# Patient Record
Sex: FEMALE | Race: BLACK OR AFRICAN AMERICAN | NOT HISPANIC OR LATINO | Employment: OTHER | URBAN - METROPOLITAN AREA
[De-identification: names, ages, dates, MRNs, and addresses within clinical notes are randomized per-mention and may not be internally consistent; named-entity substitution may affect disease eponyms.]

---

## 2017-01-12 ENCOUNTER — TELEPHONE (OUTPATIENT)
Dept: TRANSPLANT | Facility: CLINIC | Age: 62
End: 2017-01-12

## 2017-01-12 ENCOUNTER — ANTI-COAG VISIT (OUTPATIENT)
Dept: CARDIOLOGY | Facility: CLINIC | Age: 62
End: 2017-01-12
Payer: MEDICARE

## 2017-01-12 DIAGNOSIS — Z79.01 ANTICOAGULATION MONITORING BY PHARMACIST: Primary | ICD-10-CM

## 2017-01-12 DIAGNOSIS — I27.0 PRIMARY PULMONARY HYPERTENSION: ICD-10-CM

## 2017-01-12 LAB
CTP QC/QA: NORMAL
INR PPP: 1.5 (ref 2–3)

## 2017-01-12 PROCEDURE — 85610 PROTHROMBIN TIME: CPT | Mod: PBBFAC,PO

## 2017-01-12 PROCEDURE — 99211 OFF/OP EST MAY X REQ PHY/QHP: CPT | Mod: S$PBB,25,,

## 2017-01-12 NOTE — PROGRESS NOTES
INR is low. Pt missed 2 days this past week. No other changes. We will boost dose today and resume maintenance dose. Repeat INR in 2 weeks

## 2017-01-18 DIAGNOSIS — H40.50X3: ICD-10-CM

## 2017-01-18 RX ORDER — DORZOLAMIDE HYDROCHLORIDE AND TIMOLOL MALEATE 20; 5 MG/ML; MG/ML
SOLUTION/ DROPS OPHTHALMIC
Qty: 10 ML | Refills: 0 | Status: SHIPPED | OUTPATIENT
Start: 2017-01-18

## 2017-01-26 ENCOUNTER — ANTI-COAG VISIT (OUTPATIENT)
Dept: CARDIOLOGY | Facility: CLINIC | Age: 62
End: 2017-01-26
Payer: MEDICARE

## 2017-01-26 DIAGNOSIS — Z79.01 ANTICOAGULATION MONITORING BY PHARMACIST: Primary | ICD-10-CM

## 2017-01-26 LAB
CTP QC/QA: YES
INR PPP: 2.3 (ref 2–3)

## 2017-01-26 PROCEDURE — 99211 OFF/OP EST MAY X REQ PHY/QHP: CPT | Mod: S$PBB,25,,

## 2017-01-26 PROCEDURE — 85610 PROTHROMBIN TIME: CPT | Mod: PBBFAC,PO

## 2017-02-16 ENCOUNTER — ANTI-COAG VISIT (OUTPATIENT)
Dept: CARDIOLOGY | Facility: CLINIC | Age: 62
End: 2017-02-16
Payer: MEDICARE

## 2017-02-16 DIAGNOSIS — Z79.01 ANTICOAGULATION MONITORING BY PHARMACIST: Primary | ICD-10-CM

## 2017-02-16 LAB — INR PPP: 3 (ref 2–3)

## 2017-02-16 PROCEDURE — 99211 OFF/OP EST MAY X REQ PHY/QHP: CPT | Mod: S$PBB,25,,

## 2017-02-16 PROCEDURE — 85610 PROTHROMBIN TIME: CPT | Mod: PBBFAC,PO

## 2017-02-16 NOTE — PROGRESS NOTES
INR on upper end today. Pt reports she took a zpak last week. She has had a cold. She reports appetite low while having the cold. She has just started getting back to normal in the past couple days. We will continue on maintenance dose. Pt reminded again to call with new meds, especially abx. We will repeat INR in 3 weeks.

## 2017-02-21 ENCOUNTER — OFFICE VISIT (OUTPATIENT)
Dept: OPHTHALMOLOGY | Facility: CLINIC | Age: 62
End: 2017-02-21
Payer: MEDICARE

## 2017-02-21 DIAGNOSIS — H40.2232 CHRONIC ANGLE-CLOSURE GLAUCOMA OF BOTH EYES, MODERATE STAGE: Primary | ICD-10-CM

## 2017-02-21 DIAGNOSIS — Z98.49 STATUS POST CATARACT EXTRACTION AND INSERTION OF INTRAOCULAR LENS, UNSPECIFIED LATERALITY: ICD-10-CM

## 2017-02-21 DIAGNOSIS — Z98.83 GLAUCOMA FILTERING BLEB OF BOTH EYES: ICD-10-CM

## 2017-02-21 DIAGNOSIS — E11.3593 PROLIFERATIVE DIABETIC RETINOPATHY OF BOTH EYES WITHOUT MACULAR EDEMA ASSOCIATED WITH TYPE 2 DIABETES MELLITUS: ICD-10-CM

## 2017-02-21 DIAGNOSIS — H40.53X2 NEOVASCULAR GLAUCOMA OF BOTH EYES, MODERATE STAGE: ICD-10-CM

## 2017-02-21 DIAGNOSIS — Z96.1 STATUS POST CATARACT EXTRACTION AND INSERTION OF INTRAOCULAR LENS, UNSPECIFIED LATERALITY: ICD-10-CM

## 2017-02-21 PROCEDURE — 99999 PR PBB SHADOW E&M-EST. PATIENT-LVL II: CPT | Mod: PBBFAC,,, | Performed by: OPHTHALMOLOGY

## 2017-02-21 PROCEDURE — 92020 GONIOSCOPY: CPT | Mod: PBBFAC | Performed by: OPHTHALMOLOGY

## 2017-02-21 PROCEDURE — 92012 INTRM OPH EXAM EST PATIENT: CPT | Mod: S$PBB,,, | Performed by: OPHTHALMOLOGY

## 2017-02-21 PROCEDURE — 92020 GONIOSCOPY: CPT | Mod: S$PBB,,, | Performed by: OPHTHALMOLOGY

## 2017-02-21 PROCEDURE — 99212 OFFICE O/P EST SF 10 MIN: CPT | Mod: PBBFAC | Performed by: OPHTHALMOLOGY

## 2017-02-21 RX ORDER — AZITHROMYCIN 250 MG/1
TABLET, FILM COATED ORAL
Refills: 0 | COMMUNITY
Start: 2016-12-20 | End: 2017-07-14

## 2017-02-21 NOTE — MR AVS SNAPSHOT
[facility] - Ophthalmology  [address]  [place] [address]  Phone: [phone]  Fax: [phone]                  Shivani Sheets   2017 2:00 PM   Office Visit    Description:  Female : 1955   Provider:  Junaid Kern MD   Department:  [facility] - Ophthalmology           Reason for Visit     Glaucoma           Diagnoses this Visit        Comments    Chronic angle-closure glaucoma of both eyes, moderate stage    -  Primary     Glaucoma shunt device of right eye         Status post cataract extraction and insertion of intraocular lens, unspecified laterality         Glaucoma filtering bleb of both eyes         Neovascular glaucoma of both eyes, moderate stage         Proliferative diabetic retinopathy of both eyes without macular edema associated with type 2 diabetes mellitus                To Do List           Future Appointments        Provider Department Dept Phone    3/9/2017 10:30 AM Matty FlorianD [place] - Coumadin [phone]    3/16/2017 9:00 AM LAB, APPOINTMENT [place] [facility] [phone]    3/16/2017 9:40 AM SIX, MINUTE WALK [facility] - Pulmonary Lab [phone]    3/16/2017 10:00 AM Jenny Hennessy MD [facility] [phone]    2017 8:50 AM ERIKA Hilario MD [facility] [address] Ophthalmology [phone]      Goals (5 Years of Data)     None      Follow-Up and Disposition     Return in about 6 months (around 2017), or if symptoms worsen or fail to improve, for Pressure and HVF.      [facility]     [facility] Care [facility] - 24/7 Assistance  Registered nurses in the [facility] provide clinical advisement, health education, appointment booking, and other advisory services.  Call for this free service at [phone].             Medications           Message regarding Medications     Verify the changes and/or additions to your medication regime listed below are the same as discussed  with your clinician today.  If any of these changes or additions are incorrect, please notify your healthcare provider.             Verify that the below list of medications is an accurate representation of the medications you are currently taking.  If none reported, the list may be blank. If incorrect, please contact your healthcare provider. Carry this list with you in case of emergency.           Current Medications     aspirin 81 MG chewable tablet Take 81 mg by mouth Daily. 1  By mouth Every day    atorvastatin (LIPITOR) 10 MG tablet Take 1 tablet (10 mg total) by mouth once daily.    azithromycin (Z-SHLOMO) 250 MG tablet     brimonidine 0.1% (ALPHAGAN P) 0.1 % Drop Place 1 drop into both eyes 3 (three) times daily.    CARTIA  mg 24 hr capsule     cloNIDine (CATAPRES) 0.2 MG tablet Take 0.2 mg by mouth 2 (two) times daily.    diltiazem (CARDIZEM CD) 240 MG 24 hr capsule Take 1 capsule (240 mg total) by mouth once daily.    dorzolamide-timolol 2-0.5% (COSOPT) 22.3-6.8 mg/mL ophthalmic solution INSTILL 1 DROP IN BOTH EYES TWICE DAILY    latanoprost 0.005 % ophthalmic solution Place 1 drop into both eyes every evening.    levothyroxine (SYNTHROID) 100 MCG tablet Take 100 mcg by mouth. 1 Tablet Oral Every day    lidocaine-prilocaine (EMLA) cream Apply topically as needed.    losartan (COZAAR) 50 MG tablet Take 1 tablet (50 mg total) by mouth once daily.    macitentan (OPSUMIT) 10 mg Tab Take 1 tablet (10 mg total) by mouth every evening.    ondansetron (ZOFRAN-ODT) 4 MG TbDL Take 1 tablet (4 mg total) by mouth every 8 (eight) hours as needed (nausea or vomiting).    prednisoLONE acetate (PRED FORTE) 1 % DrpS Place 1 drop into the left eye 4 (four) times daily.    selexipag 200 mcg (140)- 800 mcg (60) DsPk Take 400 mcg tablet, BID, by mouth for 1 week, then increase by 200 mcg BID, at weekly intervals, to the highest tolerated dose up to 1600 mcg BID    tadalafil (CIALIS) 20 MG Tab Take 2 tablets (40 mg total) by  mouth once daily.    tramadol (ULTRAM) 50 mg tablet TK 1 T PO Q 4 H PRN P    warfarin (COUMADIN) 5 MG tablet Take 1 tablet (5 mg total) by mouth Daily. Current Dose ( 7.5 mg on Tue, Thu, Sat; 5 mg all other days) or as directed by coumadin clinic.    hydrALAZINE (APRESOLINE) 50 MG tablet Take 1 tablet (50 mg total) by mouth every 12 (twelve) hours.           Clinical Reference Information           Allergies as of 2/21/2017     Penicillins    Iodine    Sulfamethoxazole-trimethoprim      Immunizations Administered on Date of Encounter - 2/21/2017     None      Orders Placed During Today's Visit     Future Labs/Procedures Expected by Expires    Hein Visual Field - OU - Extended - Both Eyes  As directed 7/6/2018      Maintenance Dialysis History     Start End Type Comments Center    10/2/2008    [facility]            Current Dialysis Center Information     [facility] [address] Phone #:  [phone]    Contact:  N/A [place]  [address] Fax #:  [phone]            [facility] Sign-Up     Activating your [facility] account is as easy as 1-2-3!     1) Visit [email or web address], select Sign Up Now, enter this activation code and your date of birth, then select Next.  [email or web address]  Expires: 4/7/2017  3:07 PM      2) Create a username and password to use when you visit [facility] in the future and select a security question in case you lose your password and select Next.    3) Enter your e-mail address and click Sign Up!    Additional Information  If you have questions, please e-mail [email or web address] or call [phone] to talk to our [facility] staff. Remember, [facility] is NOT to be used for urgent needs. For medical emergencies, dial 911.         Language Assistance Services     ATTENTION: Language assistance services are available, free of charge. Please call [phone].      ATENCIÓN: Si habla español, tiene a ram disposición servicios gratuitos de asistencia  lingüística. Marcellus al [phone].     IRON Ý: N?u b?n nói Ti?ng Vi?t, có các d?ch v? h? tr? ngôn ng? mi?n phí dành cho b?n. G?i s? [phone].         [facility] complies with applicable Federal civil rights laws and does not discriminate on the basis of race, color, national origin, age, disability, or sex.

## 2017-02-21 NOTE — PROGRESS NOTES
HPI     Glaucoma    Additional comments: 3 mon iop chk           Comments     Bilateral Canaloplasty  ST Ahmed Sulcus OD  PC IOL  Sp avastin OS with sulaiman        Both eyes:  Cosopt TID  Xalatan QHS  Alphagan TID       HTN retinopathy OU  ONH shunt vessels OU     DM2  Hx NVG OS  Avastin OS per Sulaiman  Hx PRP OS          Last edited by Jose Bautista on 2/21/2017  2:26 PM. (History)            Assessment /Plan     For exam results, see Encounter Report.    Chronic angle-closure glaucoma of both eyes, moderate stage    Glaucoma shunt device of right eye    Status post cataract extraction and insertion of intraocular lens, unspecified laterality    Glaucoma filtering bleb of both eyes    Neovascular glaucoma of both eyes, moderate stage    Proliferative diabetic retinopathy of both eyes without macular edema associated with type 2 diabetes mellitus        HTN --> Dialysis --> renal Tx / steroids --> DM2 --> NVG OU    Complex eye Hx    Ayalla  Bilateral Canaloplasty --> PAS / scarred  ST Ahmed Sulcus OD --> not functioning  S Ex-press shunt OD --> scarred      PC IOL OU  quiet    NVG quiet  Sp avastin OS with raja    CCT  553 // 568    <24    Both eyes  Cosopt BID  Xal q day  Alphagan TID --> BID      HTN retinopathy OU  ONH shunt vessels OU    DM2  Hx NVG OS  Avastin OS per Sulaiman  Hx PRP OS      Plan  RTC 6 months IOP & HVF  Keep fu with Dr Hilario 4/2017  RTC sooner prn with good understanding

## 2017-03-01 DIAGNOSIS — I27.9 CHRONIC PULMONARY HEART DISEASE: ICD-10-CM

## 2017-03-09 ENCOUNTER — ANTI-COAG VISIT (OUTPATIENT)
Dept: CARDIOLOGY | Facility: CLINIC | Age: 62
End: 2017-03-09
Payer: MEDICARE

## 2017-03-09 DIAGNOSIS — Z79.01 ANTICOAGULATION MONITORING BY PHARMACIST: ICD-10-CM

## 2017-03-09 LAB — INR PPP: 2 (ref 2–3)

## 2017-03-09 PROCEDURE — 85610 PROTHROMBIN TIME: CPT | Mod: PBBFAC,PO

## 2017-03-09 PROCEDURE — 99211 OFF/OP EST MAY X REQ PHY/QHP: CPT | Mod: S$PBB,25,,

## 2017-03-09 NOTE — PROGRESS NOTES
INR on low end. Pt missed a dose but it was over a week ago. She took a little extra the next day, 10mg instead of 7.5mg. No other changes. Pt reports stress level improved. We will keep dose as is for now. Watch trend for INR decrease. Pt already has an INR scheduled for Dr. Hennessy next week. We will address this iNR then RTC.

## 2017-03-17 ENCOUNTER — TELEPHONE (OUTPATIENT)
Dept: TRANSPLANT | Facility: CLINIC | Age: 62
End: 2017-03-17

## 2017-04-07 DIAGNOSIS — I27.0 PRIMARY PULMONARY HYPERTENSION: ICD-10-CM

## 2017-04-07 RX ORDER — TADALAFIL 20 MG/1
40 TABLET ORAL DAILY
Qty: 60 TABLET | Refills: 11 | Status: SHIPPED | OUTPATIENT
Start: 2017-04-07 | End: 2018-04-04 | Stop reason: SDUPTHER

## 2017-04-17 ENCOUNTER — TELEPHONE (OUTPATIENT)
Dept: TRANSPLANT | Facility: CLINIC | Age: 62
End: 2017-04-17

## 2017-04-17 NOTE — TELEPHONE ENCOUNTER
----- Message from Diamante Gonzlaez sent at 4/17/2017 12:48 PM CDT -----  Contact: self  Shivani would like for a nurse to give her a call.  Pt has an appt in May and would like to know if she can be rescheduled    Please contact Shivani at [phone]    Thank you

## 2017-04-19 ENCOUNTER — TELEPHONE (OUTPATIENT)
Dept: TRANSPLANT | Facility: CLINIC | Age: 62
End: 2017-04-19

## 2017-04-19 NOTE — TELEPHONE ENCOUNTER
Patient called asking to reschedule her May appointment with Dr. Hennessy because her sister had suffered a major heart attack and needed round the clock care. Mrs. Sheets wants to act as her care giver for a month to relieve her sister's children.  Rescheduled appointment, but reminded the patient that it had been a year since she has seen Dr. Hennessy and she needs to make this next appointment. Mrs. Sheets assured the RN that she would keep her appointment. She also confirmed that she is taking Uptravi 400 mcg, BID, Opsumit and Adcirca as directed and currently not experiencing any issues.  Notified Dr. Hennessy.

## 2017-04-20 ENCOUNTER — TELEPHONE (OUTPATIENT)
Dept: TRANSPLANT | Facility: CLINIC | Age: 62
End: 2017-04-20

## 2017-04-20 ENCOUNTER — ANTI-COAG VISIT (OUTPATIENT)
Dept: CARDIOLOGY | Facility: CLINIC | Age: 62
End: 2017-04-20
Payer: MEDICARE

## 2017-04-20 DIAGNOSIS — Z79.01 ANTICOAGULATION MONITORING BY PHARMACIST: Primary | ICD-10-CM

## 2017-04-20 LAB — INR PPP: 1.5 (ref 2–3)

## 2017-04-20 PROCEDURE — 85610 PROTHROMBIN TIME: CPT | Mod: PBBFAC,PO

## 2017-04-20 PROCEDURE — 99211 OFF/OP EST MAY X REQ PHY/QHP: CPT | Mod: S$PBB,25,,

## 2017-04-20 NOTE — PROGRESS NOTES
INR low. Pt had a stressful week with a relative falling ill out of town. Pt is now planning to go to [place] to help take care of her family member. She will be leaving 4/28 or 4/29. She is getting her HD switched to somewhere out there. She will be checking on where we can schedule INRs. We will f/u on that next week. Increase dose today then resume maintenance dose.

## 2017-04-27 ENCOUNTER — ANTI-COAG VISIT (OUTPATIENT)
Dept: CARDIOLOGY | Facility: CLINIC | Age: 62
End: 2017-04-27
Payer: MEDICARE

## 2017-04-27 DIAGNOSIS — Z79.01 ANTICOAGULATION MONITORING BY PHARMACIST: Primary | ICD-10-CM

## 2017-04-27 LAB — INR PPP: 1.7 (ref 2–3)

## 2017-04-27 PROCEDURE — 85610 PROTHROMBIN TIME: CPT | Mod: PBBFAC,PO

## 2017-04-27 PROCEDURE — 99211 OFF/OP EST MAY X REQ PHY/QHP: CPT | Mod: S$PBB,25,,

## 2017-04-27 NOTE — PROGRESS NOTES
INR improved but still low. Pt denies changes in meds, health, or diet. Pt reports increase in activity this week preparing to go out of town. Pt will not be leaving until next Friday. No s/sx of bleeding.  We will increase dose and repeat INR next week.

## 2017-05-04 ENCOUNTER — ANTI-COAG VISIT (OUTPATIENT)
Dept: CARDIOLOGY | Facility: CLINIC | Age: 62
End: 2017-05-04
Payer: MEDICARE

## 2017-05-04 DIAGNOSIS — Z79.01 ANTICOAGULATION MONITORING BY PHARMACIST: Primary | ICD-10-CM

## 2017-05-04 LAB — INR PPP: 1.7 (ref 2–3)

## 2017-05-04 PROCEDURE — 85610 PROTHROMBIN TIME: CPT | Mod: PBBFAC,PO

## 2017-05-04 PROCEDURE — 99211 OFF/OP EST MAY X REQ PHY/QHP: CPT | Mod: S$PBB,25,,

## 2017-05-04 NOTE — PROGRESS NOTES
INR low again. Pt denies missed doses or changes in diet. She has been very busy getting things together for her to leave tomorrow to go to [place]. Pt will be in [place] for about a month to take care of a sick relative. She has HD set up for tu/th/sa. I gave her a standing order to bring to an outpatient lab. Pt advised to get the name and contact info of the lab and call it into us next week in case we need it. She will go to lab on Wed. Increase dose for now.

## 2017-05-12 ENCOUNTER — ANTI-COAG VISIT (OUTPATIENT)
Dept: CARDIOLOGY | Facility: CLINIC | Age: 62
End: 2017-05-12

## 2017-05-12 DIAGNOSIS — Z79.01 ANTICOAGULATION MONITORING BY PHARMACIST: ICD-10-CM

## 2017-05-12 LAB — INR PPP: 2.04

## 2017-05-18 ENCOUNTER — ANTI-COAG VISIT (OUTPATIENT)
Dept: CARDIOLOGY | Facility: CLINIC | Age: 62
End: 2017-05-18

## 2017-05-18 DIAGNOSIS — Z79.01 ANTICOAGULATION MONITORING BY PHARMACIST: ICD-10-CM

## 2017-05-18 LAB — INR PPP: 1.85

## 2017-06-01 ENCOUNTER — LAB VISIT (OUTPATIENT)
Dept: LAB | Facility: HOSPITAL | Age: 62
End: 2017-06-01
Attending: FAMILY MEDICINE
Payer: MEDICARE

## 2017-06-01 DIAGNOSIS — I48.91 ATRIAL FIBRILLATION WITH RAPID VENTRICULAR RESPONSE: ICD-10-CM

## 2017-06-01 DIAGNOSIS — I27.0 PRIMARY PULMONARY HYPERTENSION: ICD-10-CM

## 2017-06-01 LAB
INR PPP: 2.3
PROTHROMBIN TIME: 23 SEC

## 2017-06-01 PROCEDURE — 85610 PROTHROMBIN TIME: CPT

## 2017-06-01 PROCEDURE — 36415 COLL VENOUS BLD VENIPUNCTURE: CPT | Mod: PO

## 2017-06-02 ENCOUNTER — ANTI-COAG VISIT (OUTPATIENT)
Dept: CARDIOLOGY | Facility: CLINIC | Age: 62
End: 2017-06-02

## 2017-06-02 DIAGNOSIS — Z79.01 ANTICOAGULATION MONITORING BY PHARMACIST: ICD-10-CM

## 2017-06-06 ENCOUNTER — OFFICE VISIT (OUTPATIENT)
Dept: OPHTHALMOLOGY | Facility: CLINIC | Age: 62
End: 2017-06-06
Payer: MEDICARE

## 2017-06-06 DIAGNOSIS — E11.3593 PROLIFERATIVE DIABETIC RETINOPATHY OF BOTH EYES WITHOUT MACULAR EDEMA ASSOCIATED WITH TYPE 2 DIABETES MELLITUS: Primary | ICD-10-CM

## 2017-06-06 DIAGNOSIS — H40.53X2 NEOVASCULAR GLAUCOMA OF BOTH EYES, MODERATE STAGE: ICD-10-CM

## 2017-06-06 DIAGNOSIS — E11.311 DIABETIC MACULAR EDEMA: ICD-10-CM

## 2017-06-06 DIAGNOSIS — H40.2232 CHRONIC ANGLE-CLOSURE GLAUCOMA OF BOTH EYES, MODERATE STAGE: ICD-10-CM

## 2017-06-06 PROCEDURE — 92134 CPTRZ OPH DX IMG PST SGM RTA: CPT | Mod: PBBFAC | Performed by: OPHTHALMOLOGY

## 2017-06-06 PROCEDURE — 92226 PR SPECIAL EYE EXAM, SUBSEQUENT: CPT | Mod: 50,PBBFAC | Performed by: OPHTHALMOLOGY

## 2017-06-06 PROCEDURE — 99213 OFFICE O/P EST LOW 20 MIN: CPT | Mod: PBBFAC,25 | Performed by: OPHTHALMOLOGY

## 2017-06-06 PROCEDURE — 92014 COMPRE OPH EXAM EST PT 1/>: CPT | Mod: S$PBB,,, | Performed by: OPHTHALMOLOGY

## 2017-06-06 PROCEDURE — 99999 PR PBB SHADOW E&M-EST. PATIENT-LVL III: CPT | Mod: PBBFAC,,, | Performed by: OPHTHALMOLOGY

## 2017-06-06 PROCEDURE — 92226 PR SPECIAL EYE EXAM, SUBSEQUENT: CPT | Mod: 50,S$PBB,, | Performed by: OPHTHALMOLOGY

## 2017-06-06 RX ORDER — CLONIDINE HYDROCHLORIDE 0.1 MG/1
TABLET ORAL
Status: ON HOLD | COMMUNITY
Start: 2017-05-25 | End: 2018-03-02 | Stop reason: HOSPADM

## 2017-06-06 RX ORDER — SELEXIPAG 400 UG/1
600 TABLET, COATED ORAL 2 TIMES DAILY
COMMUNITY
Start: 2017-05-09 | End: 2017-10-11 | Stop reason: SDUPTHER

## 2017-06-06 NOTE — PROGRESS NOTES
OCT  No DME OU    Prior FA - smoldering regressed NVD OU  NO NVE      Plan     1. Neovascular Glaucoma OU  PDR/OIS OU - significant vascular disease -?RVO OD  OD - s/p GDD with [facility] 2013  OS  - Pt with NVI and blood in the inferior angle on last gonioscopic exam   -had Avastin #1 with tap 6/14/16  -has glaucoma f/u with Dr. Kern  Improved with optimization of gtt regimen in July    - Continue combigan gtts BID and Latanoprost QHS         - actively following with [facility]   - current regimen as below:  Both eyes  Cosopt BID  Xal q day  Alphagan BID   - IOP 25/11 today, no NVI   - monitor    2. PCIOL OU      12 months OCT

## 2017-06-15 ENCOUNTER — ANTI-COAG VISIT (OUTPATIENT)
Dept: CARDIOLOGY | Facility: CLINIC | Age: 62
End: 2017-06-15
Payer: MEDICARE

## 2017-06-15 DIAGNOSIS — Z79.01 ANTICOAGULATION MONITORING BY PHARMACIST: ICD-10-CM

## 2017-06-15 LAB — INR PPP: 1.4 (ref 2–3)

## 2017-06-15 PROCEDURE — 99211 OFF/OP EST MAY X REQ PHY/QHP: CPT | Mod: S$PBB,25,,

## 2017-06-15 PROCEDURE — 85610 PROTHROMBIN TIME: CPT | Mod: PBBFAC,PO

## 2017-06-15 NOTE — PROGRESS NOTES
INR low today. Patient denies all changes. No signs or symptoms of bleeding. We will increase dose and repeat INR next week

## 2017-06-22 ENCOUNTER — ANTI-COAG VISIT (OUTPATIENT)
Dept: CARDIOLOGY | Facility: CLINIC | Age: 62
End: 2017-06-22
Payer: MEDICARE

## 2017-06-22 DIAGNOSIS — Z79.01 ANTICOAGULATION MONITORING BY PHARMACIST: ICD-10-CM

## 2017-06-22 LAB — INR PPP: 2.2 (ref 2–3)

## 2017-06-22 PROCEDURE — 85610 PROTHROMBIN TIME: CPT | Mod: PBBFAC,PO

## 2017-06-22 PROCEDURE — 99211 OFF/OP EST MAY X REQ PHY/QHP: CPT | Mod: S$PBB,25,,

## 2017-06-22 NOTE — PROGRESS NOTES
Patient here for close follow up of recent low INR. INR good today. She reports dose as 5mg on Sun/7.5mg all other days. No other changes. No signs or symptoms of bleeding. We will continue on reported dose. Repeat INR next week.

## 2017-06-28 RX ORDER — ATORVASTATIN CALCIUM 10 MG
TABLET ORAL
Qty: 90 TABLET | Refills: 3 | Status: SHIPPED | OUTPATIENT
Start: 2017-06-28 | End: 2018-03-22 | Stop reason: SDUPTHER

## 2017-07-06 ENCOUNTER — ANTI-COAG VISIT (OUTPATIENT)
Dept: CARDIOLOGY | Facility: CLINIC | Age: 62
End: 2017-07-06
Payer: MEDICARE

## 2017-07-06 DIAGNOSIS — Z79.01 ANTICOAGULATION MONITORING BY PHARMACIST: ICD-10-CM

## 2017-07-06 LAB — INR PPP: 2.4 (ref 2–3)

## 2017-07-06 PROCEDURE — 85610 PROTHROMBIN TIME: CPT | Mod: PBBFAC,PO

## 2017-07-06 NOTE — PROGRESS NOTES
INR good. No changes reported. No signs or symptoms of bleeding. Maintain current dose and repeat INR in 3 weeks.

## 2017-07-14 ENCOUNTER — LAB VISIT (OUTPATIENT)
Dept: LAB | Facility: HOSPITAL | Age: 62
End: 2017-07-14
Attending: INTERNAL MEDICINE
Payer: MEDICARE

## 2017-07-14 ENCOUNTER — OFFICE VISIT (OUTPATIENT)
Dept: TRANSPLANT | Facility: CLINIC | Age: 62
End: 2017-07-14
Payer: MEDICARE

## 2017-07-14 VITALS
BODY MASS INDEX: 31.97 KG/M2 | DIASTOLIC BLOOD PRESSURE: 75 MMHG | HEIGHT: 61 IN | HEART RATE: 77 BPM | SYSTOLIC BLOOD PRESSURE: 119 MMHG | WEIGHT: 169.31 LBS | OXYGEN SATURATION: 95 %

## 2017-07-14 DIAGNOSIS — N18.6 ESRD (END STAGE RENAL DISEASE): ICD-10-CM

## 2017-07-14 DIAGNOSIS — I48.91 ATRIAL FIBRILLATION WITH RAPID VENTRICULAR RESPONSE: ICD-10-CM

## 2017-07-14 DIAGNOSIS — E66.9 NON MORBID OBESITY, UNSPECIFIED OBESITY TYPE: ICD-10-CM

## 2017-07-14 DIAGNOSIS — I48.20 CHRONIC ATRIAL FIBRILLATION: ICD-10-CM

## 2017-07-14 DIAGNOSIS — I25.10 CORONARY ARTERY DISEASE INVOLVING NATIVE CORONARY ARTERY OF NATIVE HEART WITHOUT ANGINA PECTORIS: ICD-10-CM

## 2017-07-14 DIAGNOSIS — Z98.61 S/P PTCA (PERCUTANEOUS TRANSLUMINAL CORONARY ANGIOPLASTY): ICD-10-CM

## 2017-07-14 DIAGNOSIS — I50.32 CHRONIC DIASTOLIC HEART FAILURE: ICD-10-CM

## 2017-07-14 DIAGNOSIS — I12.9 RENAL HYPERTENSION, STAGE 1-4 OR UNSPECIFIED CHRONIC KIDNEY DISEASE: Chronic | ICD-10-CM

## 2017-07-14 DIAGNOSIS — I27.20 PULMONARY HYPERTENSION: Primary | ICD-10-CM

## 2017-07-14 DIAGNOSIS — E11.3593 PROLIFERATIVE DIABETIC RETINOPATHY OF BOTH EYES WITHOUT MACULAR EDEMA ASSOCIATED WITH TYPE 2 DIABETES MELLITUS: ICD-10-CM

## 2017-07-14 DIAGNOSIS — Z76.82 AWAITING ORGAN TRANSPLANT: ICD-10-CM

## 2017-07-14 DIAGNOSIS — G47.30 SLEEP APNEA, UNSPECIFIED TYPE: ICD-10-CM

## 2017-07-14 DIAGNOSIS — I27.0 PRIMARY PULMONARY HYPERTENSION: ICD-10-CM

## 2017-07-14 DIAGNOSIS — M06.9 RHEUMATOID ARTHRITIS OF HAND, UNSPECIFIED LATERALITY, UNSPECIFIED RHEUMATOID FACTOR PRESENCE: ICD-10-CM

## 2017-07-14 DIAGNOSIS — Z79.01 ANTICOAGULATION MONITORING BY PHARMACIST: ICD-10-CM

## 2017-07-14 LAB
ALBUMIN SERPL BCP-MCNC: 3.5 G/DL
ALP SERPL-CCNC: 38 U/L
ALT SERPL W/O P-5'-P-CCNC: 11 U/L
ANION GAP SERPL CALC-SCNC: 11 MMOL/L
AST SERPL-CCNC: 23 U/L
BASOPHILS # BLD AUTO: 0.02 K/UL
BASOPHILS NFR BLD: 0.4 %
BILIRUB SERPL-MCNC: 0.4 MG/DL
BNP SERPL-MCNC: 112 PG/ML
BUN SERPL-MCNC: 23 MG/DL
CALCIUM SERPL-MCNC: 9 MG/DL
CHLORIDE SERPL-SCNC: 96 MMOL/L
CO2 SERPL-SCNC: 33 MMOL/L
CREAT SERPL-MCNC: 6.2 MG/DL
DIFFERENTIAL METHOD: ABNORMAL
EOSINOPHIL # BLD AUTO: 0.5 K/UL
EOSINOPHIL NFR BLD: 8 %
ERYTHROCYTE [DISTWIDTH] IN BLOOD BY AUTOMATED COUNT: 18.3 %
EST. GFR  (AFRICAN AMERICAN): 7.6 ML/MIN/1.73 M^2
EST. GFR  (NON AFRICAN AMERICAN): 6.6 ML/MIN/1.73 M^2
GLUCOSE SERPL-MCNC: 87 MG/DL
HCT VFR BLD AUTO: 38.5 %
HGB BLD-MCNC: 11.4 G/DL
INR PPP: 2.3
LYMPHOCYTES # BLD AUTO: 1.3 K/UL
LYMPHOCYTES NFR BLD: 23.1 %
MCH RBC QN AUTO: 27.3 PG
MCHC RBC AUTO-ENTMCNC: 29.6 %
MCV RBC AUTO: 92 FL
MONOCYTES # BLD AUTO: 0.6 K/UL
MONOCYTES NFR BLD: 10.3 %
NEUTROPHILS # BLD AUTO: 3.3 K/UL
NEUTROPHILS NFR BLD: 58.2 %
PLATELET # BLD AUTO: 175 K/UL
PLATELET BLD QL SMEAR: ABNORMAL
PMV BLD AUTO: 12.5 FL
POTASSIUM SERPL-SCNC: 3.8 MMOL/L
PROT SERPL-MCNC: 8.3 G/DL
PROTHROMBIN TIME: 23.4 SEC
RBC # BLD AUTO: 4.18 M/UL
SODIUM SERPL-SCNC: 140 MMOL/L
WBC # BLD AUTO: 5.63 K/UL

## 2017-07-14 PROCEDURE — 99214 OFFICE O/P EST MOD 30 MIN: CPT | Mod: S$PBB,,, | Performed by: INTERNAL MEDICINE

## 2017-07-14 PROCEDURE — 99999 PR PBB SHADOW E&M-EST. PATIENT-LVL III: CPT | Mod: PBBFAC,,, | Performed by: INTERNAL MEDICINE

## 2017-07-14 PROCEDURE — 99213 OFFICE O/P EST LOW 20 MIN: CPT | Mod: PBBFAC | Performed by: INTERNAL MEDICINE

## 2017-07-14 NOTE — PATIENT INSTRUCTIONS
Increase uptravi to 400mcg in am, and 600 mcg in pm    In two weeks, if you arent having bad headaches, increase to 600mcg twice a day    Then in 2 weeks, increase to 600mcg in am, 800 mcg in pm    Two weeks later go to 800 mcg twice A day    Continue on this way until you start getting bad headaches and then we'll park it for a while at that dose

## 2017-07-14 NOTE — LETTER
July 14, 2017        Tye Deleon  [address] [place] [address] [address]  [address]  [place] [address]  Phone: [phone]  Fax: [phone]             [facility]  [address]  [place] [address]  Phone: [phone]   Patient: Shivani Sheets   MR Number: [id number]   YOB: 1955   Date of Visit: 7/14/2017       Dear Dr. Tye Deleon    Thank you for referring Shivani Sheets to me for evaluation. Attached you will find relevant portions of my assessment and plan of care.    If you have questions, please do not hesitate to call me. I look forward to following Shivani Sheets along with you.    Sincerely,    Jenny Hennessy MD    Enclosure    If you would like to receive this communication electronically, please contact [email or web address] or [phone] to request [employer] Link access.    [employer] Link is a tool which provides read-only access to select patient information with whom you have a relationship. Its easy to use and provides real time access to review your patients record including encounter summaries, notes, results, and demographic information.    If you feel you have received this communication in error or would no longer like to receive these types of communications, please e-mail [email or web address]

## 2017-07-14 NOTE — PROGRESS NOTES
Subjective:    Patient ID:  Shivani Sheets is a 61 y.o. female who presents for follow-up of Pulmonary Hypertension      HPI 59 yo AAF with moderate to severe pulmonary HTN, diastolic dysfunction, central retinal artery occlusion with no significant carotid artery stenosis, CAD, h/o PCI, ESRD secondary to HTN, s/p failed kidney tx, now back on HD and is re-listed, STEFFANY/CPAP, hypothyroidism, rheumatoid arthritis, DLP and obesity, recent admit for AF with RVR, recently switched from adcirca/tyvaso to adcirca/opsumit, here for overdue PH/HF f/u visit (last seen 6/16).     Since last visit, pt has had a very difficult time with issues amongst her family. With all of this stress she is very drained. pt remains on uptravi- she is on 400 mcg bid (had been on higher doses but got extreme HA)  As she has been on it for a while now she is interested in trying to increase again. Says her breathing has been good- can walk without being SOB- says she could do [employer] with me.         No CP, orthopnea, PND. HD has been going well.    TTE July 2015  1 - Eccentric LVH with normal left ventricular systolic function (EF 60-65%).   2 - Mild left atrial enlargement.   3 - Left ventricular diastolic dysfunction.   4 - Right ventricular enlargement with hypertrophy, with low normal systolic function.   5 - Mild tricuspid regurgitation.   6 - Pulmonary hypertension.     6mw today 366m (305 m,  313.94   m in Nov   )                                              O2 sat  95->89  %                                                           HR 90 ->   108                                                               BP  124 / 61  ->170 /80                                                         Dolly    2 -> 4      [facility] 11/6/14  AOSAT: 97  FICKCI: 3.26  FICKCO: 5.87  PAPRES: 97/38 (59)  PASAT: 60  PVR: 4.43  PWPRES: 22/23 (24)  RAPRES: 20/23 (17)  RVPRES: 94/2, 21    6mw  314.15 (305m, 176m last visit) (317m in Sept)                              "            O2 sat   99-> 91%                                                           HR 80->102                                                               BP  158 / 92  ->217/114                                                         Dolly   0.5-> 2    Review of Systems   Constitution: Negative for chills, fever, malaise/fatigue and weight gain.   HENT: Negative.    Eyes: Negative.    Cardiovascular: Positive for dyspnea on exertion. Negative for chest pain, leg swelling, near-syncope, orthopnea, palpitations, paroxysmal nocturnal dyspnea and syncope.   Respiratory: Positive for shortness of breath. Negative for cough.    Endocrine: Negative.    Hematologic/Lymphatic: Negative.    Skin: Negative.    Musculoskeletal: Negative.    Gastrointestinal: Negative for bloating, abdominal pain and change in bowel habit.   Neurological: Negative for dizziness and light-headedness.   Psychiatric/Behavioral: Negative for depression.        Objective:  /75   Pulse 77   Ht 5' 1" (1.549 m)   Wt 76.8 kg (169 lb 5 oz)   SpO2 95%   BMI 31.99 kg/m²       Physical Exam   Constitutional: She is oriented to person, place, and time. She appears well-developed and well-nourished.   HENT:   Head: Normocephalic.   Eyes: Conjunctivae are normal. Pupils are equal, round, and reactive to light.   Neck: Normal range of motion. Neck supple. No JVD present.   Cardiovascular: Normal rate and regular rhythm.    Large AV fistula L arm, accentuated S2   Pulmonary/Chest: Effort normal. She has no rales.   Abdominal: Soft. Bowel sounds are normal.   Musculoskeletal: Normal range of motion.   Neurological: She is alert and oriented to person, place, and time.   Skin: Skin is warm and dry.   Psychiatric: She has a normal mood and affect.   Vitals reviewed.          Chemistry        Component Value Date/Time     10/19/2016 1111    K 3.7 10/19/2016 1111    CL 92 (L) 10/19/2016 1111    CO2 33 (H) 10/19/2016 1111    BUN 50 (H) 10/19/2016 " 1111    CREATININE 11.6 (H) 10/19/2016 1111     (H) 10/19/2016 1111        Component Value Date/Time    CALCIUM 8.2 (L) 10/19/2016 1111    ALKPHOS 51 (L) 10/19/2016 1111    AST 11 10/19/2016 1111    ALT 9 (L) 10/19/2016 1111    BILITOT 0.5 10/19/2016 1111            Magnesium   Date Value Ref Range Status   07/17/2015 2.8 (H) 1.6 - 2.6 mg/dL Final       Lab Results   Component Value Date    WBC 7.35 10/19/2016    HGB 13.2 10/19/2016    HCT 42.9 10/19/2016    MCV 87 10/19/2016     (L) 10/19/2016       Lab Results   Component Value Date    INR 2.4 07/06/2017    INR 2.2 06/22/2017    INR 1.4 06/15/2017       BNP   Date Value Ref Range Status   06/21/2016 948 (H) 0 - 99 pg/mL Final     Comment:     Values of less than 100 pg/ml are consistent with non-CHF populations.   04/05/2016 462 (H) 0 - 99 pg/mL Final     Comment:     Values of less than 100 pg/ml are consistent with non-CHF populations.   11/17/2015 470 (H) 0 - 99 pg/mL Final     Comment:     Values of less than 100 pg/ml are consistent with non-CHF populations.       No results found for: LDH          Assessment:       1. Pulmonary hypertension- Severe and out of proportion to her diastolic dysfunction- improving with addition of uptravi- back to being FC II, euvolemic on exam though \   2. Chronic diastolic heart failure   3. Renal hypertension, stage 5 chronic kidney disease or end stage renal disease    4. Atrial fibrillation    5. CAD (coronary artery disease)    6. Hypoxia    7. Rheumatoid arthritis    8. ESRD from HTN started RRT 1999    9. Anticoagulation monitoring by pharmacist    10. Awaiting organ transplant    11. Obesity    12. S/P PTCA (percutaneous transluminal coronary angioplasty)    13. Sleep apnea         Plan:      Will cont selexipag- would like to try to titrate by increasing by 200mcg at night for 2 week, then 200mcg in am, and cont stepwise until HA becomes unbearable    Recommend 2 gram sodium restriction and 1500cc fluid  restriction.     Daily wts. If weight goes up 3# overnight or 5# in one week she should call us    F/u3mo with labs echo and 6mw

## 2017-07-17 DIAGNOSIS — H40.50X3: ICD-10-CM

## 2017-07-17 RX ORDER — LATANOPROST 50 UG/ML
SOLUTION/ DROPS OPHTHALMIC
Qty: 7.5 ML | Refills: 0 | Status: SHIPPED | OUTPATIENT
Start: 2017-07-17 | End: 2017-09-30 | Stop reason: SDUPTHER

## 2017-07-27 ENCOUNTER — ANTI-COAG VISIT (OUTPATIENT)
Dept: CARDIOLOGY | Facility: CLINIC | Age: 62
End: 2017-07-27
Payer: MEDICARE

## 2017-07-27 DIAGNOSIS — I48.20 CHRONIC ATRIAL FIBRILLATION: ICD-10-CM

## 2017-07-27 DIAGNOSIS — Z79.01 ANTICOAGULATION MONITORING BY PHARMACIST: Primary | ICD-10-CM

## 2017-07-27 LAB — INR PPP: 3 (ref 2–3)

## 2017-07-27 PROCEDURE — 99211 OFF/OP EST MAY X REQ PHY/QHP: CPT | Mod: S$PBB,25,,

## 2017-07-27 PROCEDURE — 85610 PROTHROMBIN TIME: CPT | Mod: PBBFAC,PO

## 2017-07-27 NOTE — PROGRESS NOTES
INR is good but on upper end. She reports knee injury Monday. She is taking tylenol bid. She is in pain. No other changes. No signs or symptoms of bleeding. We will continue maintenance dose for now. Repeat INR in 2 weeks.

## 2017-08-10 ENCOUNTER — ANTI-COAG VISIT (OUTPATIENT)
Dept: CARDIOLOGY | Facility: CLINIC | Age: 62
End: 2017-08-10
Payer: MEDICARE

## 2017-08-10 DIAGNOSIS — I48.20 CHRONIC ATRIAL FIBRILLATION: ICD-10-CM

## 2017-08-10 DIAGNOSIS — Z79.01 ANTICOAGULATION MONITORING BY PHARMACIST: Primary | ICD-10-CM

## 2017-08-10 LAB — INR PPP: 3.4 (ref 2–3)

## 2017-08-10 PROCEDURE — 99211 OFF/OP EST MAY X REQ PHY/QHP: CPT | Mod: S$PBB,25,,

## 2017-08-10 PROCEDURE — 85610 PROTHROMBIN TIME: CPT | Mod: PBBFAC,PO

## 2017-08-10 NOTE — PROGRESS NOTES
INR increased. Patient reports health changes this week. She has been having increased pain and also suffering with sinuses. She reports taking a little more tylenol arthritis than normal. No other changes. No signs or symptoms of bleeding. We will decrease dose and reevaluate 8/22 while at [facility]

## 2017-08-11 ENCOUNTER — TELEPHONE (OUTPATIENT)
Dept: TRANSPLANT | Facility: CLINIC | Age: 62
End: 2017-08-11

## 2017-08-11 NOTE — TELEPHONE ENCOUNTER
Patient called to report that her blood pressure had been running low since increasing her Uptravi and they have not been able to pull off as much fluid at dialysis.   Per Dr. Hennessy, patient will decrease Uptravi dose to 600 mcg BID and call the office on Monday after dialysis, if her BP is still low.  Earlier this week it was determined that the patient had been increasing her PM dose as directed but not her AM dose, therefore she has been taking 800 mcg in the evening and 400 mcg in the morning.  Patient verbalized understanding of medication changes. RN notified [facility] Pharmacist who is also available to the patient 24/7 if she has any questions or concerns.

## 2017-08-19 DIAGNOSIS — E11.3599 PROLIFERATIVE DIABETIC RETINOPATHY WITHOUT MACULAR EDEMA ASSOCIATED WITH TYPE 2 DIABETES MELLITUS: ICD-10-CM

## 2017-08-19 RX ORDER — BRIMONIDINE TARTRATE 1 MG/ML
SOLUTION/ DROPS OPHTHALMIC
Qty: 5 ML | Refills: 0 | OUTPATIENT
Start: 2017-08-19

## 2017-08-21 ENCOUNTER — TELEPHONE (OUTPATIENT)
Dept: OPHTHALMOLOGY | Facility: CLINIC | Age: 62
End: 2017-08-21

## 2017-08-21 DIAGNOSIS — H40.50X3: ICD-10-CM

## 2017-08-21 DIAGNOSIS — E11.3599 PROLIFERATIVE DIABETIC RETINOPATHY WITHOUT MACULAR EDEMA ASSOCIATED WITH TYPE 2 DIABETES MELLITUS, UNSPECIFIED LATERALITY: ICD-10-CM

## 2017-08-22 ENCOUNTER — OFFICE VISIT (OUTPATIENT)
Dept: OPHTHALMOLOGY | Facility: CLINIC | Age: 62
End: 2017-08-22
Payer: MEDICARE

## 2017-08-22 ENCOUNTER — CLINICAL SUPPORT (OUTPATIENT)
Dept: OPHTHALMOLOGY | Facility: CLINIC | Age: 62
End: 2017-08-22
Payer: MEDICARE

## 2017-08-22 ENCOUNTER — ANTI-COAG VISIT (OUTPATIENT)
Dept: CARDIOLOGY | Facility: CLINIC | Age: 62
End: 2017-08-22
Payer: MEDICARE

## 2017-08-22 DIAGNOSIS — H40.53X2 NEOVASCULAR GLAUCOMA OF BOTH EYES, MODERATE STAGE: Primary | ICD-10-CM

## 2017-08-22 DIAGNOSIS — H40.2232 CHRONIC ANGLE-CLOSURE GLAUCOMA OF BOTH EYES, MODERATE STAGE: ICD-10-CM

## 2017-08-22 DIAGNOSIS — Z98.83 GLAUCOMA FILTERING BLEB OF BOTH EYES: ICD-10-CM

## 2017-08-22 DIAGNOSIS — Z98.49 STATUS POST CATARACT EXTRACTION AND INSERTION OF INTRAOCULAR LENS, UNSPECIFIED LATERALITY: ICD-10-CM

## 2017-08-22 DIAGNOSIS — Z96.1 STATUS POST CATARACT EXTRACTION AND INSERTION OF INTRAOCULAR LENS, UNSPECIFIED LATERALITY: ICD-10-CM

## 2017-08-22 DIAGNOSIS — Z79.01 ANTICOAGULATION MONITORING BY PHARMACIST: ICD-10-CM

## 2017-08-22 LAB — INR PPP: 1.2 (ref 2–3)

## 2017-08-22 PROCEDURE — 99999 PR PBB SHADOW E&M-EST. PATIENT-LVL I: CPT | Mod: PBBFAC,,,

## 2017-08-22 PROCEDURE — 92012 INTRM OPH EXAM EST PATIENT: CPT | Mod: S$PBB,,, | Performed by: OPHTHALMOLOGY

## 2017-08-22 PROCEDURE — 85610 PROTHROMBIN TIME: CPT | Mod: PBBFAC

## 2017-08-22 PROCEDURE — 99211 OFF/OP EST MAY X REQ PHY/QHP: CPT | Mod: PBBFAC,27

## 2017-08-22 PROCEDURE — 92083 EXTENDED VISUAL FIELD XM: CPT | Mod: 26,S$PBB,, | Performed by: OPHTHALMOLOGY

## 2017-08-22 PROCEDURE — 99999 PR PBB SHADOW E&M-EST. PATIENT-LVL II: CPT | Mod: PBBFAC,,, | Performed by: OPHTHALMOLOGY

## 2017-08-22 RX ORDER — LATANOPROST 50 UG/ML
SOLUTION/ DROPS OPHTHALMIC
Qty: 7.5 ML | Refills: 0 | Status: CANCELLED | OUTPATIENT
Start: 2017-08-22

## 2017-08-22 RX ORDER — BRIMONIDINE TARTRATE 1 MG/ML
1 SOLUTION/ DROPS OPHTHALMIC 3 TIMES DAILY
Qty: 5 ML | Refills: 10 | Status: CANCELLED | OUTPATIENT
Start: 2017-08-22 | End: 2018-08-22

## 2017-08-22 RX ORDER — DORZOLAMIDE HYDROCHLORIDE AND TIMOLOL MALEATE 20; 5 MG/ML; MG/ML
1 SOLUTION/ DROPS OPHTHALMIC 2 TIMES DAILY
Qty: 10 ML | Refills: 0 | Status: CANCELLED | OUTPATIENT
Start: 2017-08-22

## 2017-08-22 NOTE — PROGRESS NOTES
HPI     Glaucoma    Additional comments: 6 mon iop chk/hvf rev           Comments   6 months F/U-hvf rev          Meds: Cosopt BID OU             Xalatan qhs OU              Alphagan BID OU        Last edited by Jose Bautista on 8/22/2017  9:33 AM. (History)            Assessment /Plan     For exam results, see Encounter Report.    Neovascular glaucoma of both eyes, moderate stage    Chronic angle-closure glaucoma of both eyes, moderate stage    Glaucoma filtering bleb of both eyes    Status post cataract extraction and insertion of intraocular lens, unspecified laterality          HTN --> Dialysis --> renal Tx / steroids --> DM2 --> NVG OU    Complex eye Hx    Ayalla  Bilateral Canaloplasty --> PAS / scarred  ST Ahmed Sulcus OD --> not functioning  S Ex-press shunt OD --> scarred      PC IOL OU  quiet    NVG quiet  Sp avastin OS with sulaiman    CCT  553 // 568    <24    Both eyes --> non-adherence --> discussed elevated IOP / silent Glc  Cosopt BID  Xal q day  Alphagan TID --> BID      HTN retinopathy OU  ONH shunt vessels OU    DM2  Hx NVG OS  Avastin OS per Sulaiman  Hx PRP OS      Plan  RTC 3-4 weeks IOP --> adherence check  Keep fu with Dr Hilario  RTC sooner prn with good understanding

## 2017-08-22 NOTE — PROGRESS NOTES
Patient confirms dose. States no changes in diet or medications. Reports 1 missed dose 8/22. INR subtherapeutic with new dose and possibly not seeing full effect of missed dose. Will boost and increase. I have reviewed the student's initial findings and agree with their assessment. I have personally spoken with and assessed the patient in clinic to devise care plan.

## 2017-08-31 ENCOUNTER — ANTI-COAG VISIT (OUTPATIENT)
Dept: CARDIOLOGY | Facility: CLINIC | Age: 62
End: 2017-08-31
Payer: MEDICARE

## 2017-08-31 DIAGNOSIS — Z79.01 ANTICOAGULATION MONITORING BY PHARMACIST: ICD-10-CM

## 2017-08-31 LAB — INR PPP: 2.9 (ref 2–3)

## 2017-08-31 PROCEDURE — 85610 PROTHROMBIN TIME: CPT | Mod: PBBFAC,PO

## 2017-08-31 PROCEDURE — 99211 OFF/OP EST MAY X REQ PHY/QHP: CPT | Mod: S$PBB,25,,

## 2017-08-31 NOTE — PROGRESS NOTES
INR good. Patient reports she took 5mg ram/th, 7.5mg other days. She did not follow her written instructions from the visit. She went by the verbal instructions to go back to old dose. No other changes. No signs or symptoms of bleeding. We will continue the dose she has been taking. Recheck INR in 2 weeks

## 2017-09-14 ENCOUNTER — ANTI-COAG VISIT (OUTPATIENT)
Dept: CARDIOLOGY | Facility: CLINIC | Age: 62
End: 2017-09-14
Payer: MEDICARE

## 2017-09-14 DIAGNOSIS — Z79.01 ANTICOAGULATION MONITORING BY PHARMACIST: ICD-10-CM

## 2017-09-14 LAB — INR PPP: 1.4 (ref 2–3)

## 2017-09-14 PROCEDURE — 99211 OFF/OP EST MAY X REQ PHY/QHP: CPT | Mod: S$PBB,25,,

## 2017-09-14 PROCEDURE — 85610 PROTHROMBIN TIME: CPT | Mod: PBBFAC,PO

## 2017-09-14 NOTE — PROGRESS NOTES
INR low. Patient possibly missed a dose a week ago. No other changes. No signs or symptoms of bleeding. We will boost dose today and increase slightly until follow up. I would not expect INR to be this low still after missing 1 dose a week ago.

## 2017-09-28 ENCOUNTER — ANTI-COAG VISIT (OUTPATIENT)
Dept: CARDIOLOGY | Facility: CLINIC | Age: 62
End: 2017-09-28
Payer: MEDICARE

## 2017-09-28 DIAGNOSIS — Z79.01 ANTICOAGULATION MONITORING BY PHARMACIST: ICD-10-CM

## 2017-09-28 LAB — INR PPP: 1.5 (ref 2–3)

## 2017-09-28 PROCEDURE — 85610 PROTHROMBIN TIME: CPT | Mod: PBBFAC,PO

## 2017-09-28 PROCEDURE — 99211 OFF/OP EST MAY X REQ PHY/QHP: CPT | Mod: S$PBB,25,,

## 2017-09-28 NOTE — PROGRESS NOTES
Assessment /Plan     For exam results, see Encounter Report.    Chronic angle-closure glaucoma of both eyes, moderate stage    Neovascular glaucoma of both eyes, moderate stage    Proliferative diabetic retinopathy of both eyes without macular edema associated with type 2 diabetes mellitus    Glaucoma filtering bleb of both eyes    Status post cataract extraction and insertion of intraocular lens, unspecified laterality    Glaucoma shunt device of right eye    Central retinal artery occlusion, unspecified laterality          HTN --> Dialysis M, W, F 18 years  --> renal Tx / steroids --> DM2 --> NVG OU --> open     Complex eye Hx      NVG quiet  Sp avastin OS with sulaiman    CCT  553 // 568    <24 --> too high OS 10/3/2017 as discussed with patient    Both eyes --> good adherenceCosopt BID  Xal q day  Alphagan TID --> BID    Ayalla  Bilateral Canaloplasty --> PAS / scarred  ST Ahmed Sulcus OD --> not functioning  S Ex-press shunt OD --> scarred      PC IOL OU  quiet      HTN retinopathy OU  ONH shunt vessels OU    DM2  Hx NVG OS  Avastin OS per Sulaiman  Hx PRP OS      Left eye  / PPG  Glaucoma Incisional Surgery Consent: Patient with poorly controlled glaucoma on MTMT with IOP deemed too high for the health of the eye.  Discussed with patient options, risks and benefits, expectations of glaucoma surgery; utilized a glaucoma eye model, patient held  with questions and answers to facilitate discussion.  The patient voice good understanding and patient wishes to proceed with surgery.  The patient will likely benefit from surgery and patient signed consent for Left Eye.    Discussed my absence for 2 weeks during post period  Q & A regarding post-op course  Comfortable with plans for surgery      Plan  RTC  / PPG OS  Keep fu with Dr Hilario  RTC sooner prn with good understanding

## 2017-09-28 NOTE — PROGRESS NOTES
INR low. Patient missed a dose this past week. No other changes to cause low INR. Current dose has been her most stable dose and never higher. We will boost dose today and reevaluate next week.

## 2017-09-30 DIAGNOSIS — H40.50X3: ICD-10-CM

## 2017-10-02 ENCOUNTER — TELEPHONE (OUTPATIENT)
Dept: OPHTHALMOLOGY | Facility: CLINIC | Age: 62
End: 2017-10-02

## 2017-10-02 RX ORDER — LATANOPROST 50 UG/ML
SOLUTION/ DROPS OPHTHALMIC
Qty: 7.5 ML | Refills: 0 | Status: SHIPPED | OUTPATIENT
Start: 2017-10-02 | End: 2017-12-14 | Stop reason: SDUPTHER

## 2017-10-03 ENCOUNTER — OFFICE VISIT (OUTPATIENT)
Dept: OPHTHALMOLOGY | Facility: CLINIC | Age: 62
End: 2017-10-03
Payer: MEDICARE

## 2017-10-03 DIAGNOSIS — Z98.49 STATUS POST CATARACT EXTRACTION AND INSERTION OF INTRAOCULAR LENS, UNSPECIFIED LATERALITY: ICD-10-CM

## 2017-10-03 DIAGNOSIS — H40.53X2 NEOVASCULAR GLAUCOMA OF BOTH EYES, MODERATE STAGE: ICD-10-CM

## 2017-10-03 DIAGNOSIS — Z96.1 STATUS POST CATARACT EXTRACTION AND INSERTION OF INTRAOCULAR LENS, UNSPECIFIED LATERALITY: ICD-10-CM

## 2017-10-03 DIAGNOSIS — E11.3593 PROLIFERATIVE DIABETIC RETINOPATHY OF BOTH EYES WITHOUT MACULAR EDEMA ASSOCIATED WITH TYPE 2 DIABETES MELLITUS: ICD-10-CM

## 2017-10-03 DIAGNOSIS — H34.10 CENTRAL RETINAL ARTERY OCCLUSION, UNSPECIFIED LATERALITY: ICD-10-CM

## 2017-10-03 DIAGNOSIS — H40.1192 MODERATE STAGE CHRONIC OPEN ANGLE GLAUCOMA: Primary | ICD-10-CM

## 2017-10-03 DIAGNOSIS — Z98.83 GLAUCOMA FILTERING BLEB OF BOTH EYES: ICD-10-CM

## 2017-10-03 PROCEDURE — 92020 GONIOSCOPY: CPT | Mod: PBBFAC | Performed by: OPHTHALMOLOGY

## 2017-10-03 PROCEDURE — 92014 COMPRE OPH EXAM EST PT 1/>: CPT | Mod: S$PBB,,, | Performed by: OPHTHALMOLOGY

## 2017-10-03 PROCEDURE — 99212 OFFICE O/P EST SF 10 MIN: CPT | Mod: PBBFAC,25 | Performed by: OPHTHALMOLOGY

## 2017-10-03 PROCEDURE — 92020 GONIOSCOPY: CPT | Mod: S$PBB,,, | Performed by: OPHTHALMOLOGY

## 2017-10-03 PROCEDURE — 99999 PR PBB SHADOW E&M-EST. PATIENT-LVL II: CPT | Mod: PBBFAC,,, | Performed by: OPHTHALMOLOGY

## 2017-10-04 ENCOUNTER — TELEPHONE (OUTPATIENT)
Dept: OPHTHALMOLOGY | Facility: CLINIC | Age: 62
End: 2017-10-04

## 2017-10-04 DIAGNOSIS — H40.53X2 NEOVASCULAR GLAUCOMA OF BOTH EYES, MODERATE STAGE: ICD-10-CM

## 2017-10-04 DIAGNOSIS — H40.1192 MODERATE STAGE CHRONIC OPEN ANGLE GLAUCOMA: Primary | ICD-10-CM

## 2017-10-05 ENCOUNTER — ANTI-COAG VISIT (OUTPATIENT)
Dept: CARDIOLOGY | Facility: CLINIC | Age: 62
End: 2017-10-05
Payer: MEDICARE

## 2017-10-05 DIAGNOSIS — Z79.01 ANTICOAGULATION MONITORING BY PHARMACIST: ICD-10-CM

## 2017-10-05 LAB — INR PPP: 2.3 (ref 2–3)

## 2017-10-05 PROCEDURE — 85610 PROTHROMBIN TIME: CPT | Mod: PBBFAC,PO

## 2017-10-05 PROCEDURE — 99211 OFF/OP EST MAY X REQ PHY/QHP: CPT | Mod: S$PBB,,,

## 2017-10-05 NOTE — PROGRESS NOTES
INR good. Patient confirmed dose and compliance. She denies changes in medications or health or diet. She reports eye procedure 10/12 and was instructed to hold coumadin 3 days prior. Patient reports she was also instructed that she will be able to resume on 10/12. Patient advised to take a boosted dose on the day she is able to resume; 12.5mg. Then, to resume maintenance dose. Repeat INR the week after procedure.

## 2017-10-06 ENCOUNTER — TELEPHONE (OUTPATIENT)
Dept: OPHTHALMOLOGY | Facility: CLINIC | Age: 62
End: 2017-10-06

## 2017-10-06 NOTE — TELEPHONE ENCOUNTER
----- Message from Mara Mack sent at 10/5/2017 11:07 AM CDT -----  Contact: Shivani Oswald calling to see if you can fax over her clearance papers to her cardiologist?  Her name is Dr. Jenny Hennessy and the fax number is [phone]  Faxed form to Dr. Hennessy. [facility]

## 2017-10-09 ENCOUNTER — HOSPITAL ENCOUNTER (EMERGENCY)
Facility: HOSPITAL | Age: 62
Discharge: HOME | End: 2017-10-09
Attending: EMERGENCY MEDICINE
Payer: MEDICARE

## 2017-10-09 VITALS
HEART RATE: 82 BPM | TEMPERATURE: 98 F | BODY MASS INDEX: 31.72 KG/M2 | SYSTOLIC BLOOD PRESSURE: 155 MMHG | DIASTOLIC BLOOD PRESSURE: 84 MMHG | RESPIRATION RATE: 18 BRPM | WEIGHT: 168 LBS | OXYGEN SATURATION: 99 % | HEIGHT: 61 IN

## 2017-10-09 DIAGNOSIS — K52.9 GASTROENTERITIS: Primary | ICD-10-CM

## 2017-10-09 LAB
ALBUMIN SERPL BCP-MCNC: 2.9 G/DL
ALP SERPL-CCNC: 42 U/L
ALT SERPL W/O P-5'-P-CCNC: 11 U/L
ANION GAP SERPL CALC-SCNC: 16 MMOL/L
ANISOCYTOSIS BLD QL SMEAR: SLIGHT
AST SERPL-CCNC: 13 U/L
BASOPHILS # BLD AUTO: 0.01 K/UL
BASOPHILS NFR BLD: 0.1 %
BILIRUB SERPL-MCNC: 0.5 MG/DL
BUN SERPL-MCNC: 85 MG/DL
CALCIUM SERPL-MCNC: 8.7 MG/DL
CHLORIDE SERPL-SCNC: 95 MMOL/L
CO2 SERPL-SCNC: 27 MMOL/L
CREAT SERPL-MCNC: 12.7 MG/DL
DIFFERENTIAL METHOD: ABNORMAL
EOSINOPHIL # BLD AUTO: 0.3 K/UL
EOSINOPHIL NFR BLD: 3.4 %
ERYTHROCYTE [DISTWIDTH] IN BLOOD BY AUTOMATED COUNT: 19 %
EST. GFR  (AFRICAN AMERICAN): 3 ML/MIN/1.73 M^2
EST. GFR  (NON AFRICAN AMERICAN): 3 ML/MIN/1.73 M^2
GLUCOSE SERPL-MCNC: 93 MG/DL
HCT VFR BLD AUTO: 34.9 %
HGB BLD-MCNC: 10.8 G/DL
HYPOCHROMIA BLD QL SMEAR: ABNORMAL
LIPASE SERPL-CCNC: 10 U/L
LYMPHOCYTES # BLD AUTO: 1.4 K/UL
LYMPHOCYTES NFR BLD: 16.3 %
MCH RBC QN AUTO: 25.9 PG
MCHC RBC AUTO-ENTMCNC: 30.9 G/DL
MCV RBC AUTO: 84 FL
MONOCYTES # BLD AUTO: 1.1 K/UL
MONOCYTES NFR BLD: 13.6 %
NEUTROPHILS # BLD AUTO: 5.5 K/UL
NEUTROPHILS NFR BLD: 66.6 %
PLATELET # BLD AUTO: 239 K/UL
PMV BLD AUTO: ABNORMAL FL
POTASSIUM SERPL-SCNC: 4.1 MMOL/L
PROT SERPL-MCNC: 7.1 G/DL
RBC # BLD AUTO: 4.17 M/UL
SODIUM SERPL-SCNC: 138 MMOL/L
WBC # BLD AUTO: 8.33 K/UL

## 2017-10-09 PROCEDURE — 80053 COMPREHEN METABOLIC PANEL: CPT

## 2017-10-09 PROCEDURE — 85025 COMPLETE CBC W/AUTO DIFF WBC: CPT

## 2017-10-09 PROCEDURE — 63600175 PHARM REV CODE 636 W HCPCS: Performed by: EMERGENCY MEDICINE

## 2017-10-09 PROCEDURE — 93010 ELECTROCARDIOGRAM REPORT: CPT | Mod: ,,, | Performed by: INTERNAL MEDICINE

## 2017-10-09 PROCEDURE — 83690 ASSAY OF LIPASE: CPT

## 2017-10-09 PROCEDURE — 96374 THER/PROPH/DIAG INJ IV PUSH: CPT

## 2017-10-09 PROCEDURE — 93005 ELECTROCARDIOGRAM TRACING: CPT

## 2017-10-09 PROCEDURE — 99284 EMERGENCY DEPT VISIT MOD MDM: CPT | Mod: 25

## 2017-10-09 RX ORDER — ONDANSETRON 4 MG/1
4 TABLET, ORALLY DISINTEGRATING ORAL EVERY 8 HOURS PRN
Qty: 12 TABLET | Refills: 0 | Status: SHIPPED | OUTPATIENT
Start: 2017-10-09 | End: 2018-01-23

## 2017-10-09 RX ORDER — ONDANSETRON 2 MG/ML
4 INJECTION INTRAMUSCULAR; INTRAVENOUS
Status: COMPLETED | OUTPATIENT
Start: 2017-10-09 | End: 2017-10-09

## 2017-10-09 RX ADMIN — ONDANSETRON HYDROCHLORIDE 4 MG: 2 INJECTION INTRAMUSCULAR; INTRAVENOUS at 03:10

## 2017-10-09 NOTE — DISCHARGE INSTRUCTIONS
Please return to the emergency department if you develop worsening abdominal pain, persistent vomiting of everything you drink, bloody diarrhea, progressive enlargement of your belly, or for any new or worsening medical concerns.

## 2017-10-09 NOTE — ED TRIAGE NOTES
Pt arrived to Ed due to N/V for past three days. Pt reports having abdominal tenderness. Pt also reports having diarrhea for past two days. Pt reports having SOB, but denies CP.Pt also denies fever, and chills. Pt reports last dialysis being Friday.

## 2017-10-09 NOTE — ED PROVIDER NOTES
Encounter Date: 10/9/2017    SCRIBE #1 NOTE: I, Kerrie Cho, am scribing for, and in the presence of,  Jose Dangelo III, MD. I have scribed the following portions of the note - Other sections scribed: HPI and ROS.       History     Chief Complaint   Patient presents with    Nausea     States for the last 3 days she has been having abd cramping and N/V/D.  States her last dialysis was on friday    Vomiting    Diarrhea    Abdominal Pain     CC: Nausea    HPI: This 62 y.o. Female who has ESRD-HD, CHF, CVA, HTN, CAD, HLD, Anemia, Arthritis, Hypothyroidism, Renal Hypertension, DM, Glaucoma, and Diabetic retinopathy presents to the ED c/o acute, intermittent, moderately severe generalized abdominal cramping with associated nausea, emesis, and diarrhea for the past 2 days.  Patient reports she has been having episodes of emesis and diarrhea that occur simultaneously.  She reports of greater than 10 episodes of both diarrhea and emesis, with the last episode occurring this morning.  Patient reports having episodes of diarrhea, in which she was not able to make it the bathroom in time.  Patient denies any sick contacts, recent travel out of the country, consuming undercooked food, or recent use of antibiotics.  Patient reports her last colonoscopy 1 year ago and reports the removal of 2 polyps.  Patient denies fever, chills, chest pain, back pain, cough, rhinorrhea, rash, or any other associated symptoms.  No prior tx.  No alleviating factors.      The history is provided by the patient. No  was used.     Review of patient's allergies indicates:   Allergen Reactions    Penicillins Swelling    Iodine      Other reaction(s): Hives    Sulfamethoxazole-trimethoprim      Other reaction(s): Swelling  Other reaction(s): Hives     Past Medical History:   Diagnosis Date    Allergy     Anemia     Arthritis     Awaiting organ transplant 4/30/2013    Cataract     CHF (congestive heart failure)      Chronic kidney disease     Coronary artery disease     Diabetes mellitus     Diabetic retinopathy     ESRD from HTN strtied RRT 1999    Failed  donor kidney transplant      Glaucoma     Hyperlipidemia     Hypertension     Hypothyroidism     Morbid obesity 8/10/2012    Renal hypertension     Stroke      Past Surgical History:   Procedure Laterality Date    CATARACT EXTRACTION W/  INTRAOCULAR LENS IMPLANT Bilateral     EYE SURGERY      HYSTERECTOMY      KIDNEY TRANSPLANT      NEPHRECTOMY  2008    transplant     TONSILLECTOMY       Family History   Problem Relation Age of Onset    Heart attack Father     Heart failure Father     Hypertension Father     Blindness Father     Heart attack Mother     Heart failure Mother     Hypertension Mother     Asthma Sister     Depression Sister     Hypertension Sister     Hypertension Brother     Kidney disease Brother      ESRD    Diabetes Maternal Aunt     Amblyopia Neg Hx     Cataracts Neg Hx     Macular degeneration Neg Hx     Retinal detachment Neg Hx     Strabismus Neg Hx      Social History   Substance Use Topics    Smoking status: Former Smoker     Quit date: 8/10/2000    Smokeless tobacco: Never Used    Alcohol use No     Review of Systems   Constitutional: Negative for chills and fever.   HENT: Negative for ear pain and sore throat.    Eyes: Negative for pain.   Respiratory: Negative for cough and shortness of breath.    Cardiovascular: Negative for chest pain.   Gastrointestinal: Positive for abdominal pain, diarrhea, nausea and vomiting. Negative for blood in stool and constipation.   Genitourinary: Negative for dysuria.   Musculoskeletal: Negative for back pain.   Skin: Negative for rash.   Neurological: Negative for headaches.       Physical Exam     Initial Vitals [10/09/17 1313]   BP Pulse Resp Temp SpO2   138/80 85 18 98.9 °F (37.2 °C) 96 %      MAP       99.33         Physical Exam    Nursing note  and vitals reviewed.  Constitutional: She appears well-developed and well-nourished. She is not diaphoretic. No distress.   HENT:   Head: Normocephalic and atraumatic.   Nose: Nose normal.   Mouth/Throat: Oropharynx is clear and moist. No oropharyngeal exudate.   Eyes: Conjunctivae and EOM are normal. Pupils are equal, round, and reactive to light. No scleral icterus.   Neck: Normal range of motion. Neck supple. No thyromegaly present. No tracheal deviation present.   Cardiovascular: Normal rate, regular rhythm and normal heart sounds. Exam reveals no gallop and no friction rub.    No murmur heard.  Pulmonary/Chest: Breath sounds normal. No respiratory distress. She has no wheezes. She has no rhonchi. She has no rales.   Abdominal: Soft. Bowel sounds are normal. She exhibits no distension and no mass. There is no tenderness. There is no rebound and no guarding.   Musculoskeletal: Normal range of motion. She exhibits no edema or tenderness.   Lymphadenopathy:     She has no cervical adenopathy.   Neurological: She is alert and oriented to person, place, and time. She has normal strength. No cranial nerve deficit or sensory deficit.   Skin: Skin is warm and dry. No rash noted. No erythema. No pallor.   Psychiatric: She has a normal mood and affect. Her behavior is normal. Thought content normal.         ED Course   Procedures  Labs Reviewed   CBC W/ AUTO DIFFERENTIAL - Abnormal; Notable for the following:        Result Value    Hemoglobin 10.8 (*)     Hematocrit 34.9 (*)     MCH 25.9 (*)     MCHC 30.9 (*)     RDW 19.0 (*)     All other components within normal limits   COMPREHENSIVE METABOLIC PANEL - Abnormal; Notable for the following:     BUN, Bld 85 (*)     Creatinine 12.7 (*)     Albumin 2.9 (*)     Alkaline Phosphatase 42 (*)     eGFR if  3 (*)     eGFR if non  3 (*)     All other components within normal limits    Narrative:     Recoll. [id number] by JESSICA at 10/09/2017 15:30,  reason: Specimen   hemolyzed. Called to Nurse Giuliana.  10/09/2017  15:30   LIPASE    Narrative:     Recoll. [id number] by JESSICA at 10/09/2017 15:30, reason: Specimen   hemolyzed. Called to Nurse Giuliana.  10/09/2017  15:30             Medical Decision Making:   Initial Assessment:   62-year-old female presents complaining of multiple episodes of vomiting and diarrhea for the last few days.  She denies sick contacts, recent antibiotic use, or other risk factors for bacterial enteritis.  On exam she is well-appearing, with normal vital signs, benign abdominal exam, no evidence of significant dehydration or volume overload.  Differential Diagnosis:   Likely gastroenteritis, will screen for bowel obstruction or other emergent abdominal pathology.  Independently Interpreted Test(s):   I have ordered and independently interpreted X-rays - see summary below.       <> Summary of X-Ray Reading(s): CT abdomen: Borderline dilated loops of small bowel with no transition point or other acute abnormality.  ED Management:  Patient's workup is largely unremarkable.  CT shows nonspecific borderline dilation of small bowel loops with no transition point or other acute abnormality, which could be suggestive of ileus versus early small bowel traction versus enteritis.  Given patient's presence of bowel sounds and also presence of many episodes of diarrhea, ileus and small bowel obstruction are less likely.  Will treat supportively for gastroenteritis.    Patient counseled regarding test results, recommendations for supportive care, and need for follow-up.  Return precautions given.              Scribe Attestation:   Scribe #1: I performed the above scribed service and the documentation accurately describes the services I performed. I attest to the accuracy of the note.    Attending Attestation:           Physician Attestation for Scribe:  Physician Attestation Statement for Scribe #1: I, Jose Dangelo III, MD, reviewed documentation, as  scribed by Kerrie Cho in my presence, and it is both accurate and complete.                 ED Course      Clinical Impression:   The encounter diagnosis was Gastroenteritis.                           Jose Dangelo III, MD  10/09/17 7131

## 2017-10-10 ENCOUNTER — DOCUMENTATION ONLY (OUTPATIENT)
Dept: TRANSPLANT | Facility: CLINIC | Age: 62
End: 2017-10-10

## 2017-10-11 ENCOUNTER — TELEPHONE (OUTPATIENT)
Dept: TRANSPLANT | Facility: CLINIC | Age: 62
End: 2017-10-11

## 2017-10-11 ENCOUNTER — TELEPHONE (OUTPATIENT)
Dept: OPHTHALMOLOGY | Facility: CLINIC | Age: 62
End: 2017-10-11

## 2017-10-11 ENCOUNTER — ANESTHESIA EVENT (OUTPATIENT)
Dept: SURGERY | Facility: HOSPITAL | Age: 62
End: 2017-10-11
Payer: MEDICARE

## 2017-10-11 DIAGNOSIS — I27.0 PRIMARY PULMONARY HYPERTENSION: Primary | ICD-10-CM

## 2017-10-11 RX ORDER — SELEXIPAG 600 UG/1
600 TABLET, COATED ORAL 2 TIMES DAILY
Qty: 60 TABLET | Refills: 11 | Status: ON HOLD | OUTPATIENT
Start: 2017-10-11 | End: 2018-03-02 | Stop reason: HOSPADM

## 2017-10-11 NOTE — PRE-PROCEDURE INSTRUCTIONS
PreOp Instructions given:     - Verbal medication information (what to hold and what to take)   - NPO guidelines   - Arrival place directions given; time to be given the day before procedure by the   Surgeon's Office   - Bathing with antibacterial soap   - Don't wear any jewelry or bring any valuables AM of surgery   - No makeup or moisturizer to face   - No perfume/cologne, powder, lotions or aftershave   Pt. verbalized understanding.     Denies any family history of side effects or issues with anesthesia or sedation.

## 2017-10-12 ENCOUNTER — HOSPITAL ENCOUNTER (OUTPATIENT)
Facility: HOSPITAL | Age: 62
Discharge: HOME | End: 2017-10-12
Attending: OPHTHALMOLOGY | Admitting: OPHTHALMOLOGY
Payer: MEDICARE

## 2017-10-12 ENCOUNTER — ANESTHESIA (OUTPATIENT)
Dept: SURGERY | Facility: HOSPITAL | Age: 62
End: 2017-10-12
Payer: MEDICARE

## 2017-10-12 ENCOUNTER — SURGERY (OUTPATIENT)
Age: 62
End: 2017-10-12

## 2017-10-12 VITALS
HEART RATE: 66 BPM | OXYGEN SATURATION: 95 % | BODY MASS INDEX: 31.72 KG/M2 | RESPIRATION RATE: 16 BRPM | DIASTOLIC BLOOD PRESSURE: 65 MMHG | TEMPERATURE: 98 F | HEIGHT: 61 IN | WEIGHT: 168 LBS | SYSTOLIC BLOOD PRESSURE: 122 MMHG

## 2017-10-12 DIAGNOSIS — H40.9 GLAUCOMA: ICD-10-CM

## 2017-10-12 DIAGNOSIS — H40.2232 CHRONIC ANGLE-CLOSURE GLAUCOMA OF BOTH EYES, MODERATE STAGE: Primary | ICD-10-CM

## 2017-10-12 LAB
POCT GLUCOSE: 137 MG/DL (ref 70–110)
POCT GLUCOSE: 149 MG/DL (ref 70–110)

## 2017-10-12 PROCEDURE — 82962 GLUCOSE BLOOD TEST: CPT | Performed by: OPHTHALMOLOGY

## 2017-10-12 PROCEDURE — 63600175 PHARM REV CODE 636 W HCPCS: Performed by: NURSE ANESTHETIST, CERTIFIED REGISTERED

## 2017-10-12 PROCEDURE — 63600175 PHARM REV CODE 636 W HCPCS: Performed by: OPHTHALMOLOGY

## 2017-10-12 PROCEDURE — 37000009 HC ANESTHESIA EA ADD 15 MINS: Performed by: OPHTHALMOLOGY

## 2017-10-12 PROCEDURE — 36000707: Performed by: OPHTHALMOLOGY

## 2017-10-12 PROCEDURE — D9220A PRA ANESTHESIA: Mod: ANES,,, | Performed by: ANESTHESIOLOGY

## 2017-10-12 PROCEDURE — 66180 AQUEOUS SHUNT EYE W/GRAFT: CPT | Mod: LT,,, | Performed by: OPHTHALMOLOGY

## 2017-10-12 PROCEDURE — 27800903 OPTIME MED/SURG SUP & DEVICES OTHER IMPLANTS: Performed by: OPHTHALMOLOGY

## 2017-10-12 PROCEDURE — D9220A PRA ANESTHESIA: Mod: CRNA,,, | Performed by: NURSE ANESTHETIST, CERTIFIED REGISTERED

## 2017-10-12 PROCEDURE — C1783 OCULAR IMP, AQUEOUS DRAIN DE: HCPCS | Performed by: OPHTHALMOLOGY

## 2017-10-12 PROCEDURE — 71000016 HC POSTOP RECOV ADDL HR: Performed by: OPHTHALMOLOGY

## 2017-10-12 PROCEDURE — 36000706: Performed by: OPHTHALMOLOGY

## 2017-10-12 PROCEDURE — 37000008 HC ANESTHESIA 1ST 15 MINUTES: Performed by: OPHTHALMOLOGY

## 2017-10-12 PROCEDURE — 71000015 HC POSTOP RECOV 1ST HR: Performed by: OPHTHALMOLOGY

## 2017-10-12 PROCEDURE — 25000003 PHARM REV CODE 250: Performed by: OPHTHALMOLOGY

## 2017-10-12 DEVICE — PERICARDIUM TUTOPLAST 1.5X1.5: Type: IMPLANTABLE DEVICE | Site: EYE | Status: FUNCTIONAL

## 2017-10-12 DEVICE — IMPLANT BEARVELDT GLAUCOMA 250: Type: IMPLANTABLE DEVICE | Site: EYE | Status: FUNCTIONAL

## 2017-10-12 RX ORDER — SODIUM CHLORIDE 0.9 % (FLUSH) 0.9 %
3 SYRINGE (ML) INJECTION
Status: DISCONTINUED | OUTPATIENT
Start: 2017-10-12 | End: 2017-10-12 | Stop reason: HOSPADM

## 2017-10-12 RX ORDER — LIDOCAINE HYDROCHLORIDE 40 MG/ML
INJECTION, SOLUTION RETROBULBAR
Status: DISCONTINUED
Start: 2017-10-12 | End: 2017-10-12 | Stop reason: HOSPADM

## 2017-10-12 RX ORDER — MIDAZOLAM HYDROCHLORIDE 1 MG/ML
INJECTION, SOLUTION INTRAMUSCULAR; INTRAVENOUS
Status: DISCONTINUED | OUTPATIENT
Start: 2017-10-12 | End: 2017-10-12

## 2017-10-12 RX ORDER — FENTANYL CITRATE 50 UG/ML
INJECTION, SOLUTION INTRAMUSCULAR; INTRAVENOUS
Status: DISCONTINUED | OUTPATIENT
Start: 2017-10-12 | End: 2017-10-12

## 2017-10-12 RX ORDER — ATROPINE SULFATE 10 MG/ML
SOLUTION/ DROPS OPHTHALMIC
Status: DISCONTINUED | OUTPATIENT
Start: 2017-10-12 | End: 2017-10-12 | Stop reason: HOSPADM

## 2017-10-12 RX ORDER — MOXIFLOXACIN 5 MG/ML
SOLUTION/ DROPS OPHTHALMIC
Status: DISCONTINUED | OUTPATIENT
Start: 2017-10-12 | End: 2017-10-12 | Stop reason: HOSPADM

## 2017-10-12 RX ORDER — SODIUM CHLORIDE 9 MG/ML
INJECTION, SOLUTION INTRAVENOUS CONTINUOUS
Status: DISCONTINUED | OUTPATIENT
Start: 2017-10-12 | End: 2017-10-12 | Stop reason: HOSPADM

## 2017-10-12 RX ORDER — MOXIFLOXACIN 5 MG/ML
SOLUTION/ DROPS OPHTHALMIC
Status: DISCONTINUED
Start: 2017-10-12 | End: 2017-10-12 | Stop reason: HOSPADM

## 2017-10-12 RX ORDER — ATROPINE SULFATE 10 MG/ML
SOLUTION/ DROPS OPHTHALMIC
Status: DISCONTINUED
Start: 2017-10-12 | End: 2017-10-12 | Stop reason: HOSPADM

## 2017-10-12 RX ORDER — LIDOCAINE HYDROCHLORIDE 10 MG/ML
1 INJECTION, SOLUTION EPIDURAL; INFILTRATION; INTRACAUDAL; PERINEURAL ONCE
Status: COMPLETED | OUTPATIENT
Start: 2017-10-12 | End: 2017-10-12

## 2017-10-12 RX ORDER — GENTAMICIN SULFATE 40 MG/ML
INJECTION, SOLUTION INTRAMUSCULAR; INTRAVENOUS
Status: DISCONTINUED
Start: 2017-10-12 | End: 2017-10-12 | Stop reason: HOSPADM

## 2017-10-12 RX ORDER — PREDNISOLONE ACETATE 10 MG/ML
SUSPENSION/ DROPS OPHTHALMIC
Status: DISCONTINUED
Start: 2017-10-12 | End: 2017-10-12 | Stop reason: HOSPADM

## 2017-10-12 RX ORDER — ACETAMINOPHEN 325 MG/1
325 TABLET ORAL ONCE
Status: DISCONTINUED | OUTPATIENT
Start: 2017-10-12 | End: 2017-10-12 | Stop reason: HOSPADM

## 2017-10-12 RX ORDER — LIDOCAINE HYDROCHLORIDE 40 MG/ML
1 INJECTION, SOLUTION RETROBULBAR
Status: DISCONTINUED | OUTPATIENT
Start: 2017-10-12 | End: 2017-10-12 | Stop reason: HOSPADM

## 2017-10-12 RX ORDER — PREDNISOLONE ACETATE 10 MG/ML
SUSPENSION/ DROPS OPHTHALMIC
Status: DISCONTINUED | OUTPATIENT
Start: 2017-10-12 | End: 2017-10-12 | Stop reason: HOSPADM

## 2017-10-12 RX ORDER — GENTAMICIN SULFATE 40 MG/ML
INJECTION, SOLUTION INTRAMUSCULAR; INTRAVENOUS
Status: DISCONTINUED | OUTPATIENT
Start: 2017-10-12 | End: 2017-10-12 | Stop reason: HOSPADM

## 2017-10-12 RX ORDER — ACETAMINOPHEN 325 MG/1
325 TABLET ORAL EVERY 8 HOURS PRN
Status: DISCONTINUED | OUTPATIENT
Start: 2017-10-12 | End: 2017-10-12 | Stop reason: HOSPADM

## 2017-10-12 RX ADMIN — ACETAMINOPHEN 325 MG: 325 TABLET ORAL at 02:10

## 2017-10-12 RX ADMIN — APRACLONIDINE HYDROCHLORIDE 1 DROP: 10 SOLUTION/ DROPS OPHTHALMIC at 10:10

## 2017-10-12 RX ADMIN — SODIUM CHLORIDE: 900 INJECTION, SOLUTION INTRAVENOUS at 09:10

## 2017-10-12 RX ADMIN — LIDOCAINE HYDROCHLORIDE 0.2 MG: 10 INJECTION, SOLUTION EPIDURAL; INFILTRATION; INTRACAUDAL; PERINEURAL at 09:10

## 2017-10-12 RX ADMIN — FENTANYL CITRATE 25 MCG: 50 INJECTION, SOLUTION INTRAMUSCULAR; INTRAVENOUS at 11:10

## 2017-10-12 RX ADMIN — PREDNISOLONE ACETATE 6 DROP: 10 SUSPENSION/ DROPS OPHTHALMIC at 11:10

## 2017-10-12 RX ADMIN — GENTAMICIN SULFATE 80 MG: 40 INJECTION, SOLUTION INTRAMUSCULAR; INTRAVENOUS at 10:10

## 2017-10-12 RX ADMIN — FENTANYL CITRATE 25 MCG: 50 INJECTION, SOLUTION INTRAMUSCULAR; INTRAVENOUS at 10:10

## 2017-10-12 RX ADMIN — MOXIFLOXACIN HYDROCHLORIDE 6 DROP: 5 SOLUTION/ DROPS OPHTHALMIC at 11:10

## 2017-10-12 RX ADMIN — LIDOCAINE HYDROCHLORIDE 5 ML: 40 INJECTION, SOLUTION RETROBULBAR; TOPICAL at 10:10

## 2017-10-12 RX ADMIN — MIDAZOLAM HYDROCHLORIDE 1 MG: 1 INJECTION, SOLUTION INTRAMUSCULAR; INTRAVENOUS at 11:10

## 2017-10-12 RX ADMIN — MIDAZOLAM HYDROCHLORIDE 1 MG: 1 INJECTION, SOLUTION INTRAMUSCULAR; INTRAVENOUS at 10:10

## 2017-10-12 RX ADMIN — ATROPINE SULFATE 2 DROP: 10 SOLUTION OPHTHALMIC at 10:10

## 2017-10-12 NOTE — DISCHARGE SUMMARY
Operative Date:  10/12/2017    Discharge Date:  10/12/2017    Discharge Patient Home    SURGEON: Junaid Kern MD    ASSISTANT: Kenji Oreilly MD    PREOPERATIVE DIAGNOSIS: Poorly controlled advanced NVG glaucoma of the left eye    POSTOPERATIVE DIAGNOSIS: Poorly controlled advanced NVG glaucoma of the left eye    PROCEDURE PERFORMED: Baerveldt  glaucoma shunt implant 250  with a pericardial patch graft in the superior temporal quadrant of the left eye    COMPLICATIONS: None.    ESTIMATED BLOOD LOSS: Minimal.    ANESTHESIA: Topical with MAC    PROCEDURE IN DETIAL: The patient is a 62 year old  woman with poorly controlled glaucoma.  The intraocular pressure was deemed too high for the health the optic nerve and visual field status. Discussions have been carried out with this patient regarding the pertinent options, surgical risks and benefits of the procedure and expectations.   The patient / family voiced good understanding and wished to proceed with the above procedure.    The patient and correct eye to be operated on were identified by the operating surgeon and the patient was brought into the operating room. The patient received topical anesthesia and then prepped and draped in the standard sterile fashion.  A superotemporal  fornix-based conjunctival peritomy was performed with Vannas and Luis scissor dissection.  The  implant was inspected and the stent was tied off in a watertight fashion using a  7-0 vicryl suture and tested with 30-gauge cannula on a BSS syringe and demonstrated to be watertight.  A sub-Tenons pocket was formed, the rectus muscles were identified on successive throws with muscle hooks and the shunt implant was then placed in the sub-Tenons space without difficulty.  The implant was fixed to the globe 9 to 10 mm posterior to the limbus using 7-0 Vicryl sutures.  The tubing was then cut to size and the eye was first entered at the paracentesis site and then the eye was reentered  to form a stent tract site using a 23-gauge butterfly needle.  The shunt tubing  was placed in the anterior chamber in good position through this tract without difficulty.  The silicone stent was fixated to the globe using interrupted Vicryl suture, thress 7-0 needle vents placed and a pericardial patch graft was cut to shape, fitting  well over the entrance site and tubing length and fixed to the globe with 8-0 Vicryl suture.  The conjunctiva was then secured to the limbus in a watertight fashion using 8-0 Vicryl sutures.  The anterior chamber was well formed throughout the case and there were no complications.  Following the procedure, a collagen shield soaked in vigamox and prednisone 1% along with a drop atropine 1% was placed on the cornea.  The eye was closed, patched, and a Carlos shield was placed.  The patient was taken to the recovery room in good and stable condition.  The patient tolerated the procedure well.  The patient  was instructed to refrain from any heavy lifting, bending, stooping, or straining activities, discharge Home and to follow up in the morning for routine postoperative care with Dr. Junaid Kern.

## 2017-10-12 NOTE — ANESTHESIA RELEASE NOTE
"Anesthesia Release from PACU Note    Patient: Shivani Sheets    Procedure(s) Performed: Procedure(s) (LRB):  INSERTION-IMPLANT-GLAUCOMA (Left)    Anesthesia type: MAC    Post pain: Adequate analgesia    Post assessment: no apparent anesthetic complications    Last Vitals:   Visit Vitals  /65   Pulse 66   Temp 36.6 °C (97.9 °F) (Temporal)   Resp 16   Ht 5' 1" (1.549 m)   Wt 76.2 kg (168 lb)   SpO2 95%   Breastfeeding? No   BMI 31.74 kg/m²       Post vital signs: stable    Level of consciousness: awake    Nausea/Vomiting: no nausea/no vomiting    Complications: none    Airway Patency: patent    Respiratory: unassisted    Cardiovascular: stable and blood pressure at baseline    Hydration: euvolemic  "

## 2017-10-12 NOTE — ANESTHESIA PREPROCEDURE EVALUATION
10/12/2017  Shivani Sheets is a 62 y.o., female.    Anesthesia Evaluation    I have reviewed the Patient Summary Reports.     I have reviewed the Medications.     Review of Systems  Anesthesia Hx:  No problems with previous Anesthesia    Hematology/Oncology:     Oncology Normal    -- Anemia:   EENT/Dental:EENT/Dental Normal   Cardiovascular:   Exercise tolerance: poor Hypertension CAD   CHF     MORA On home oxygen; uses with exertion   Pulmonary:   Severe pulmonary hypertension; under care by Dr. Hennessy - reviewed   Renal/:   Chronic Renal Disease, ESRD, Dialysis Dialyzed yesterday   Hepatic/GI:   Denies GERD. Recent gastroenteritis, recovering   Endocrine:   Diabetes, well controlled, type 2 Hypothyroidism        Physical Exam   Airway/Jaw/Neck:  Airway Findings: Mouth Opening: Normal Tongue: Normal  General Airway Assessment: Adult  Mallampati: II  TM Distance: 4 - 6 cm  Jaw/Neck Findings:  Neck ROM: Normal ROM      Dental:  Dental Findings: In tact   Chest/Lungs:  Chest/Lungs Findings: Clear to auscultation, Normal Respiratory Rate     Heart/Vascular:  Heart Findings: Rate: Normal  Rhythm: Regular Rhythm  Sounds: Normal       Skin:  Skin Findings:  AVF L arm          Anesthesia Plan  Type of Anesthesia, risks & benefits discussed:  Anesthesia Type:  MAC  Patient's Preference:   Intra-op Monitoring Plan: standard ASA monitors  Intra-op Monitoring Plan Comments:   Post Op Pain Control Plan: multimodal analgesia  Post Op Pain Control Plan Comments:   Induction:   IV  Beta Blocker:  Patient is not currently on a Beta-Blocker (No further documentation required).       Informed Consent: Patient understands risks and agrees with Anesthesia plan.  Questions answered. Anesthesia consent signed with patient.  ASA Score: 4     Day of Surgery Review of History & Physical:    H&P update referred to the surgeon.      Anesthesia Plan Notes: miminal sedation; consider dexmedetomidine        Ready For Surgery From Anesthesia Perspective.

## 2017-10-12 NOTE — PROGRESS NOTES
Patient's earrings found in slot after she was discharged. Will lock in locker in  with her name on them.

## 2017-10-12 NOTE — PROGRESS NOTES
Patient complaining of pain; verbalized understanding that some pain and burning is normal after surgery.  Tylenol given. Dr. Calzada explained danger of giving prescription pain medication for home when she has been having so much trouble waking up and keeping oxygen at an appropriate level for discharge.  Explained that if she is having very severe pain at home, she should go to ER.

## 2017-10-12 NOTE — TRANSFER OF CARE
"Anesthesia Transfer of Care Note    Patient: Shivani Sheets    Procedure(s) Performed: Procedure(s) (LRB):  INSERTION-IMPLANT-GLAUCOMA (Left)    Patient location: PACU    Anesthesia Type: MAC    Transport from OR: Transported from OR on room air with adequate spontaneous ventilation    Post pain: adequate analgesia    Post assessment: no apparent anesthetic complications    Post vital signs: stable    Level of consciousness: awake    Nausea/Vomiting: no nausea/vomiting    Complications: none    Transfer of care protocol was followed      Last vitals:   Visit Vitals  /69   Pulse 66   Temp 36.6 °C (97.9 °F) (Oral)   Resp 16   Ht 5' 1" (1.549 m)   Wt 76.2 kg (168 lb)   SpO2 99%   Breastfeeding? No   BMI 31.74 kg/m²     "

## 2017-10-12 NOTE — PROGRESS NOTES
Dr. Calzada notified of patient's low oxygen saturation of high 80s to low 90s on 2 L nasal cannula when groggy.

## 2017-10-12 NOTE — ANESTHESIA POSTPROCEDURE EVALUATION
"Anesthesia Post Evaluation    Patient: Shivani Sheets    Procedure(s) Performed: Procedure(s) (LRB):  INSERTION-IMPLANT-GLAUCOMA (Left)    Final Anesthesia Type: MAC  Patient location during evaluation: PACU  Patient participation: Yes- Able to Participate  Level of consciousness: awake and alert  Post-procedure vital signs: reviewed and stable  Pain management: adequate  Airway patency: patent  PONV status at discharge: No PONV  Anesthetic complications: no      Cardiovascular status: blood pressure returned to baseline  Respiratory status: unassisted  Hydration status: euvolemic  Follow-up not needed.        Visit Vitals  /65   Pulse 66   Temp 36.6 °C (97.9 °F) (Temporal)   Resp 16   Ht 5' 1" (1.549 m)   Wt 76.2 kg (168 lb)   SpO2 95%   Breastfeeding? No   BMI 31.74 kg/m²       Pain/Suzie Score: Pain Assessment Performed: Yes (10/12/2017 11:45 AM)  Presence of Pain: denies (10/12/2017 11:45 AM)  Pain Rating Prior to Med Admin: 3 (10/12/2017  2:17 PM)  Suzie Score: 8 (10/12/2017 12:30 PM)      "

## 2017-10-12 NOTE — PROGRESS NOTES
When patient is reminded to take deep breaths, oxygen saturation increases to 95% - 99%.  When patient stops deep breathing, oxygen level drops to 88-90%.

## 2017-10-12 NOTE — PROGRESS NOTES
Dr. Kern at bedside; said okay to discharge. Said he will write for another 325 mg of acetaminophen PO.

## 2017-10-12 NOTE — INTERVAL H&P NOTE
History    Chief complaint:  Poorly controlled NVG glaucoma  left Eye    Present Ilness/Diagnosis: Poorly controlled NVG glaucoma left Eye    ROS: +Eyes, otherwise no significant changes    Past Medical History: refer to chart    Family History/Social History: refer to chart    Allergies:  Review of patient's allergies indicates:   Allergen Reactions    Penicillins Swelling    Iodine      Other reaction(s): Hives    Sulfamethoxazole-trimethoprim      Other reaction(s): Swelling  Other reaction(s): Hives       Current Medications: see medcard      Physical Exam    BP: Vital signs stable  General: No apparent distress  HEENT: PC IOL  Lungs: adequate respirations  Heart: + pulses  Abdomen: soft  Rectal/pelvic: deferred    Labs: Labs Reviewed    Lab Results   Component Value Date    WBC 8.33 10/09/2017    HGB 10.8 (L) 10/09/2017    HCT 34.9 (L) 10/09/2017    MCV 84 10/09/2017     10/09/2017       CMP  Sodium   Date Value Ref Range Status   10/09/2017 138 136 - 145 mmol/L Final     Potassium   Date Value Ref Range Status   10/09/2017 4.1 3.5 - 5.1 mmol/L Final     Chloride   Date Value Ref Range Status   10/09/2017 95 95 - 110 mmol/L Final     CO2   Date Value Ref Range Status   10/09/2017 27 23 - 29 mmol/L Final     Glucose   Date Value Ref Range Status   10/09/2017 93 70 - 110 mg/dL Final     BUN, Bld   Date Value Ref Range Status   10/09/2017 85 (H) 8 - 23 mg/dL Final     Creatinine   Date Value Ref Range Status   10/09/2017 12.7 (H) 0.5 - 1.4 mg/dL Final     Calcium   Date Value Ref Range Status   10/09/2017 8.7 8.7 - 10.5 mg/dL Final     Total Protein   Date Value Ref Range Status   10/09/2017 7.1 6.0 - 8.4 g/dL Final     Albumin   Date Value Ref Range Status   10/09/2017 2.9 (L) 3.5 - 5.2 g/dL Final     Total Bilirubin   Date Value Ref Range Status   10/09/2017 0.5 0.1 - 1.0 mg/dL Final     Comment:     For infants and newborns, interpretation of results should be based  on gestational age, weight and in  agreement with clinical  observations.  Premature Infant recommended reference ranges:  Up to 24 hours.............<8.0 mg/dL  Up to 48 hours............<12.0 mg/dL  3-5 days..................<15.0 mg/dL  6-29 days.................<15.0 mg/dL       Alkaline Phosphatase   Date Value Ref Range Status   10/09/2017 42 (L) 55 - 135 U/L Final     AST   Date Value Ref Range Status   10/09/2017 13 10 - 40 U/L Final     ALT   Date Value Ref Range Status   10/09/2017 11 10 - 44 U/L Final     Anion Gap   Date Value Ref Range Status   10/09/2017 16 8 - 16 mmol/L Final     eGFR if    Date Value Ref Range Status   10/09/2017 3 (A) >60 mL/min/1.73 m^2 Final     eGFR if non    Date Value Ref Range Status   10/09/2017 3 (A) >60 mL/min/1.73 m^2 Final     Comment:     Calculation used to obtain the estimated glomerular filtration  rate (eGFR) is the CKD-EPI equation. Since race is unknown   in our information system, the eGFR values for   -American and Non--American patients are given   for each creatinine result.           Impression: Poorly controlled glaucoma left Eye    Plan: Glaucoma surgery Baerveldt Shunt with Pericardium patch graft left Eye    Discussed with anesthesia  Hi risk for heavy sedation / GETA etc  Discussed with patient / family  Will plan for Local / light MAC

## 2017-10-12 NOTE — DISCHARGE INSTRUCTIONS
Leave eye shield in place until seeing Dr. Kern tomorrow.  Dr. Kern said it is your choice whether you want to go to dialysis or to see him first tomorrow morning.  Do not lift anything heavier than 10 pounds.  Keep head upright, do not stoop over or bend.  You can return to your normal diet, but do not eat anything to heavy or spicy today because you may become nauseated.

## 2017-10-13 ENCOUNTER — OFFICE VISIT (OUTPATIENT)
Dept: OPHTHALMOLOGY | Facility: CLINIC | Age: 62
End: 2017-10-13
Payer: MEDICARE

## 2017-10-13 DIAGNOSIS — H40.2232 CHRONIC ANGLE-CLOSURE GLAUCOMA OF BOTH EYES, MODERATE STAGE: ICD-10-CM

## 2017-10-13 DIAGNOSIS — Z98.83 GLAUCOMA FILTERING BLEB OF BOTH EYES: ICD-10-CM

## 2017-10-13 DIAGNOSIS — Z98.49 STATUS POST CATARACT EXTRACTION AND INSERTION OF INTRAOCULAR LENS, UNSPECIFIED LATERALITY: ICD-10-CM

## 2017-10-13 DIAGNOSIS — Z96.1 STATUS POST CATARACT EXTRACTION AND INSERTION OF INTRAOCULAR LENS, UNSPECIFIED LATERALITY: ICD-10-CM

## 2017-10-13 PROCEDURE — 99999 PR PBB SHADOW E&M-EST. PATIENT-LVL II: CPT | Mod: PBBFAC,,, | Performed by: OPHTHALMOLOGY

## 2017-10-13 PROCEDURE — 99024 POSTOP FOLLOW-UP VISIT: CPT | Mod: ,,, | Performed by: OPHTHALMOLOGY

## 2017-10-13 PROCEDURE — 99212 OFFICE O/P EST SF 10 MIN: CPT | Mod: PBBFAC | Performed by: OPHTHALMOLOGY

## 2017-10-13 NOTE — PROGRESS NOTES
HPI     Post-op Evaluation    Additional comments: 1 day po           Comments   Patient states her left eye is doing well today. No pain. No discharge.    Cosopt BID OU   Brimonidine BID OU   Latanoprost Q HS OU          Last edited by Jose Bautista on 10/13/2017  9:02 AM. (History)            Assessment /Plan     For exam results, see Encounter Report.    Glaucoma shunt device of left eye    Chronic angle-closure glaucoma of both eyes, moderate stage    Glaucoma filtering bleb of both eyes    Status post cataract extraction and insertion of intraocular lens, unspecified laterality    Glaucoma shunt device of right eye          HTN --> Dialysis M, W, F 18 years  --> renal Tx / steroids --> DM2 --> NVG OU --> open     Complex eye Hx      NVG quiet  Sp avastin OS with sulaiman    CCT  553 // 568    <24      Ayalla  Bilateral Canaloplasty --> PAS / scarred  ST Ahmed Sulcus OD --> not functioning  S Ex-press shunt OD --> scarred      PC IOL OU  quiet      HTN retinopathy OU  ONH shunt vessels OU    DM2  Hx NVG OS  Avastin OS per Sulaiman  Hx PRP OS      Left eye  / PPG 10/12/2017  Anticipate tube opening 11/07/2017    Post-op glaucoma surgery right Eye, POD 1: Patient doing well, reviewed post-op instructions handout with limited activity & eye care instructions, eye drop therapy and endophthalmitis precautions.  The patient and family voice good understanding and will return as scheduled or sooner as needed.      Discussed my absence for 2 weeks during post period  Q & A regarding post-op course  Comfortable with plans for surgery    Both eyes --> good adherence --> Hold Left eye  Cosopt BID  Xal q day  Alphagan  BID      Left eye  Vig QID  PF 1% QID        Plan  RTC 1 week post  / PPG OS  Keep fu with Dr Hilario  RTC sooner prn with good understanding

## 2017-10-14 NOTE — PROGRESS NOTES
Assessment /Plan     For exam results, see Encounter Report.    Glaucoma shunt device of left eye    Chronic angle-closure glaucoma of both eyes, moderate stage    Glaucoma filtering bleb of both eyes    Glaucoma shunt device of right eye    Central retinal artery occlusion, unspecified laterality    Status post cataract extraction and insertion of intraocular lens, unspecified laterality    Proliferative diabetic retinopathy of both eyes without macular edema associated with type 2 diabetes mellitus          HTN --> Dialysis M, W, F 18 years  --> renal Tx / steroids --> DM2 --> NVG OU --> open     Complex eye Hx      NVG quiet  Sp avastin OS with sulaiman    CCT  553 // 568    <24      Ayalla  Bilateral Canaloplasty --> PAS / scarred  ST Ahmed Sulcus OD --> not functioning  S Ex-press shunt OD --> scarred      PC IOL OU  quiet      HTN retinopathy OU  ONH shunt vessels OU    DM2  Hx NVG OS  Avastin OS per Sulaiman  Hx PRP OS      Left eye  / PPG 10/12/2017  Anticipate tube opening 11/07/2017    Post-op glaucoma surgery right Eye, POW 1: Patient doing well, reviewed post-op instructions handout with limited activity & eye care instructions, eye drop therapy and endophthalmitis precautions.  The patient and family voice good understanding and will return as scheduled or sooner as needed.      Re-Discussed my absence for 2 weeks during post period  Q & A regarding post-op course  Comfortable with plans for surgery    Both eyes --> good adherence --> Hold Left eye  Cosopt BID  Xal q day  Alphagan  BID      Left eye  Vig QID --> finish  PF 1% QID --> BID        Plan  RTC 2-3 week post  / PPG OS  Keep fu with Dr Hilario  RTC sooner prn with good understanding

## 2017-10-19 ENCOUNTER — OFFICE VISIT (OUTPATIENT)
Dept: OPHTHALMOLOGY | Facility: CLINIC | Age: 62
End: 2017-10-19
Payer: MEDICARE

## 2017-10-19 ENCOUNTER — ANTI-COAG VISIT (OUTPATIENT)
Dept: CARDIOLOGY | Facility: CLINIC | Age: 62
End: 2017-10-19
Payer: MEDICARE

## 2017-10-19 DIAGNOSIS — Z79.01 ANTICOAGULATION MONITORING BY PHARMACIST: ICD-10-CM

## 2017-10-19 DIAGNOSIS — H34.10 CENTRAL RETINAL ARTERY OCCLUSION, UNSPECIFIED LATERALITY: ICD-10-CM

## 2017-10-19 DIAGNOSIS — H40.2232 CHRONIC ANGLE-CLOSURE GLAUCOMA OF BOTH EYES, MODERATE STAGE: ICD-10-CM

## 2017-10-19 DIAGNOSIS — Z98.83 GLAUCOMA FILTERING BLEB OF BOTH EYES: ICD-10-CM

## 2017-10-19 DIAGNOSIS — Z96.1 STATUS POST CATARACT EXTRACTION AND INSERTION OF INTRAOCULAR LENS, UNSPECIFIED LATERALITY: ICD-10-CM

## 2017-10-19 DIAGNOSIS — Z98.49 STATUS POST CATARACT EXTRACTION AND INSERTION OF INTRAOCULAR LENS, UNSPECIFIED LATERALITY: ICD-10-CM

## 2017-10-19 DIAGNOSIS — E11.3593 PROLIFERATIVE DIABETIC RETINOPATHY OF BOTH EYES WITHOUT MACULAR EDEMA ASSOCIATED WITH TYPE 2 DIABETES MELLITUS: ICD-10-CM

## 2017-10-19 LAB — INR PPP: 1.5 (ref 2–3)

## 2017-10-19 PROCEDURE — 99999 PR PBB SHADOW E&M-EST. PATIENT-LVL II: CPT | Mod: PBBFAC,,, | Performed by: OPHTHALMOLOGY

## 2017-10-19 PROCEDURE — 99211 OFF/OP EST MAY X REQ PHY/QHP: CPT | Mod: S$PBB,,,

## 2017-10-19 PROCEDURE — 99024 POSTOP FOLLOW-UP VISIT: CPT | Mod: ,,, | Performed by: OPHTHALMOLOGY

## 2017-10-19 PROCEDURE — 99212 OFFICE O/P EST SF 10 MIN: CPT | Mod: PBBFAC | Performed by: OPHTHALMOLOGY

## 2017-10-19 PROCEDURE — 85610 PROTHROMBIN TIME: CPT | Mod: PBBFAC,PO

## 2017-10-19 NOTE — PROGRESS NOTES
INR low. Patient was off of coumadin and resumed 10/13. She reports eye surgery went well and is healing nicely. No complications. No signs or symptoms of bleeding. We will boost dose and increase for the week. Recheck INR in 1 week

## 2017-10-26 ENCOUNTER — ANTI-COAG VISIT (OUTPATIENT)
Dept: CARDIOLOGY | Facility: CLINIC | Age: 62
End: 2017-10-26
Payer: MEDICARE

## 2017-10-26 DIAGNOSIS — Z79.01 ANTICOAGULATION MONITORING BY PHARMACIST: ICD-10-CM

## 2017-10-26 LAB — INR PPP: 3.3 (ref 2–3)

## 2017-10-26 PROCEDURE — 85610 PROTHROMBIN TIME: CPT | Mod: PBBFAC,PO

## 2017-10-26 PROCEDURE — 99211 OFF/OP EST MAY X REQ PHY/QHP: CPT | Mod: S$PBB,25,,

## 2017-10-26 NOTE — PROGRESS NOTES
INR increased and too high today. Patient denies significant changes. No signs or symptoms of bleeding. We will decrease dose and reevaluate next week.

## 2017-11-02 ENCOUNTER — LAB VISIT (OUTPATIENT)
Dept: LAB | Facility: HOSPITAL | Age: 62
End: 2017-11-02
Attending: INTERNAL MEDICINE
Payer: MEDICARE

## 2017-11-02 ENCOUNTER — ANTI-COAG VISIT (OUTPATIENT)
Dept: CARDIOLOGY | Facility: CLINIC | Age: 62
End: 2017-11-02

## 2017-11-02 DIAGNOSIS — Z79.01 ANTICOAGULATION MONITORING BY PHARMACIST: ICD-10-CM

## 2017-11-02 LAB
INR PPP: 2.4
PROTHROMBIN TIME: 24.4 SEC

## 2017-11-02 PROCEDURE — 85610 PROTHROMBIN TIME: CPT

## 2017-11-02 PROCEDURE — 36415 COLL VENOUS BLD VENIPUNCTURE: CPT | Mod: PO

## 2017-11-09 ENCOUNTER — ANTI-COAG VISIT (OUTPATIENT)
Dept: CARDIOLOGY | Facility: CLINIC | Age: 62
End: 2017-11-09
Payer: MEDICARE

## 2017-11-09 DIAGNOSIS — Z79.01 ANTICOAGULATION MONITORING BY PHARMACIST: ICD-10-CM

## 2017-11-09 LAB — INR PPP: 2.2 (ref 2–3)

## 2017-11-09 PROCEDURE — 85610 PROTHROMBIN TIME: CPT | Mod: PBBFAC,PO

## 2017-11-09 PROCEDURE — 99211 OFF/OP EST MAY X REQ PHY/QHP: CPT | Mod: S$PBB,25,,

## 2017-11-09 NOTE — PROGRESS NOTES
INR good. Patient reports dose a 5mg Su/Th instead of Mo/Th. She adjusted it based on her memory and not following her dosing card. No signs or symptoms of bleeding. She is scheduled for scan of graft tomorrow that requires her to load prednisone due to allergy to dye. She is unsure of the prednisone dose, but knows she is taking 3 tabs today then 2 tabs tonight then 1 tab in the AM. Regardless of the strength, it will be a high dose of prednisone over the next 24 hours. We will have her take a low dose of coumadin tomorrow to offset the interaction. Otherwise, patient advised to continue current dose. Repeat INR 11/21 while at [facility] for other appts.

## 2017-11-21 ENCOUNTER — ANTI-COAG VISIT (OUTPATIENT)
Dept: CARDIOLOGY | Facility: CLINIC | Age: 62
End: 2017-11-21
Payer: MEDICARE

## 2017-11-21 ENCOUNTER — OFFICE VISIT (OUTPATIENT)
Dept: OPHTHALMOLOGY | Facility: CLINIC | Age: 62
End: 2017-11-21
Payer: MEDICARE

## 2017-11-21 DIAGNOSIS — Z98.49 STATUS POST CATARACT EXTRACTION AND INSERTION OF INTRAOCULAR LENS, UNSPECIFIED LATERALITY: ICD-10-CM

## 2017-11-21 DIAGNOSIS — Z96.1 STATUS POST CATARACT EXTRACTION AND INSERTION OF INTRAOCULAR LENS, UNSPECIFIED LATERALITY: ICD-10-CM

## 2017-11-21 DIAGNOSIS — H34.11 CENTRAL RETINAL ARTERY OCCLUSION OF RIGHT EYE: ICD-10-CM

## 2017-11-21 DIAGNOSIS — H40.2232 CHRONIC ANGLE-CLOSURE GLAUCOMA OF BOTH EYES, MODERATE STAGE: ICD-10-CM

## 2017-11-21 DIAGNOSIS — Z79.01 ANTICOAGULATION MONITORING BY PHARMACIST: Primary | ICD-10-CM

## 2017-11-21 DIAGNOSIS — E11.3593 PROLIFERATIVE DIABETIC RETINOPATHY OF BOTH EYES WITHOUT MACULAR EDEMA ASSOCIATED WITH TYPE 2 DIABETES MELLITUS: ICD-10-CM

## 2017-11-21 DIAGNOSIS — Z98.83 GLAUCOMA FILTERING BLEB OF BOTH EYES: ICD-10-CM

## 2017-11-21 LAB — INR PPP: 2.6 (ref 2–3)

## 2017-11-21 PROCEDURE — 99999 PR PBB SHADOW E&M-EST. PATIENT-LVL II: CPT | Mod: PBBFAC,,, | Performed by: OPHTHALMOLOGY

## 2017-11-21 PROCEDURE — 99211 OFF/OP EST MAY X REQ PHY/QHP: CPT | Mod: PBBFAC,27

## 2017-11-21 PROCEDURE — 99024 POSTOP FOLLOW-UP VISIT: CPT | Mod: ,,, | Performed by: OPHTHALMOLOGY

## 2017-11-21 PROCEDURE — 99212 OFFICE O/P EST SF 10 MIN: CPT | Mod: PBBFAC | Performed by: OPHTHALMOLOGY

## 2017-11-21 PROCEDURE — 99999 PR PBB SHADOW E&M-EST. PATIENT-LVL I: CPT | Mod: PBBFAC,,,

## 2017-11-21 PROCEDURE — 85610 PROTHROMBIN TIME: CPT | Mod: PBBFAC

## 2017-11-21 RX ORDER — PREDNISONE 20 MG/1
TABLET ORAL
COMMUNITY
Start: 2017-11-08 | End: 2018-01-23

## 2017-11-21 RX ORDER — BRIMONIDINE TARTRATE 1 MG/ML
SOLUTION/ DROPS OPHTHALMIC DAILY
COMMUNITY
Start: 2017-11-19

## 2017-11-21 NOTE — PROGRESS NOTES
Assessment /Plan     For exam results, see Encounter Report.    Glaucoma shunt device of left eye    Chronic angle-closure glaucoma of both eyes, moderate stage    Proliferative diabetic retinopathy of both eyes without macular edema associated with type 2 diabetes mellitus    Glaucoma filtering bleb of both eyes    Status post cataract extraction and insertion of intraocular lens, unspecified laterality    Glaucoma shunt device of right eye    Central retinal artery occlusion of right eye        HTN --> Dialysis M, W, F 18 years  --> renal Tx / steroids --> DM2 --> NVG OU --> open     Complex eye Hx      NVG quiet  Sp avastin OS with sulaiman    CCT  553 // 568    <24      Ayalla  Bilateral Canaloplasty --> PAS / scarred  ST Ahmed Sulcus OD --> not functioning  S Ex-press shunt OD --> scarred      PC IOL OU  quiet      HTN retinopathy OU  ONH shunt vessels OU    DM2  Hx NVG OS  Avastin OS per Sulaiman  Hx PRP OS    ? RVO OD    Left eye  / PPG 10/12/2017  Anticipate tube opening 11/07/2017 --> discussed    Post-op glaucoma surgery right Eye, POW 6: Patient doing well, reviewed post-op instructions handout with limited activity & eye care instructions, eye drop therapy and endophthalmitis precautions.  The patient and family voice good understanding and will return as scheduled or sooner as needed.      Re-Discussed my absence for 2 weeks during post period  Q & A regarding post-op course  Comfortable with plans for surgery    Both eyes --> good adherence --> Hold Left eye  Cosopt BID  Xal q day  Alphagan  BID      Left eye  PF 1% BID --> QID  A 1% q day --> start        Plan  RTC 1 week post  / PPG OS  Keep fu with Dr Hilario  RTC sooner prn with good understanding

## 2017-11-21 NOTE — PROGRESS NOTES
Assessment /Plan     For exam results, see Encounter Report.    Glaucoma shunt device of left eye    Chronic angle-closure glaucoma of both eyes, moderate stage    Proliferative diabetic retinopathy of both eyes without macular edema associated with type 2 diabetes mellitus    Glaucoma filtering bleb of both eyes    Status post cataract extraction and insertion of intraocular lens, unspecified laterality    Glaucoma shunt device of right eye          HTN --> Dialysis M, W, F 18 years  --> renal Tx / steroids --> DM2 --> NVG OU --> open     Complex eye Hx      NVG quiet  Sp avastin OS with sulaiman    CCT  553 // 568    <24      Ayalla  Bilateral Canaloplasty --> PAS / scarred  ST Ahmed Sulcus OD --> not functioning  S Ex-press shunt OD --> scarred      PC IOL OU  quiet      HTN retinopathy OU  ONH shunt vessels OU    DM2  Hx NVG OS  Avastin OS per Sulaiman  Hx PRP OS    ? RVO OD    Left eye  / PPG 10/12/2017  Anticipate tube opening 11/07/2017 --> uneventful @ 11/17/2017 ??    Post-op glaucoma surgery right Eye, POW 6+: Patient doing well, reviewed post-op instructions handout with limited activity & eye care instructions, eye drop therapy and endophthalmitis precautions.  The patient and family voice good understanding and will return as scheduled or sooner as needed.    Both eyes --> good adherence --> Hold Left eye  Cosopt BID  Xal q day  Alphagan  BID      Left eye  PF 1% QID --> taper to BID  A 1% q day --> finish bottle        Plan  RTC 1 month post  / PPG OS  Keep fu with Dr Hilario  RTC sooner prn with good understanding

## 2017-11-21 NOTE — PROGRESS NOTES
INR increased despite stopping prednisone therefore no DDI was occurring. I have reviewed the nurse's initial findings and agree with their assessment.

## 2017-11-21 NOTE — PROGRESS NOTES
INR within normal range today. Patient finished prednisone on 11/14/17. Denies any bleeding, bruising or other changes. Will maintain weekly dose. Follow-up in 4 weeks. Advised patient to notify us of any changes or concerns.

## 2017-11-28 ENCOUNTER — OFFICE VISIT (OUTPATIENT)
Dept: OPHTHALMOLOGY | Facility: CLINIC | Age: 62
End: 2017-11-28
Payer: MEDICARE

## 2017-11-28 DIAGNOSIS — Z98.49 STATUS POST CATARACT EXTRACTION AND INSERTION OF INTRAOCULAR LENS, UNSPECIFIED LATERALITY: ICD-10-CM

## 2017-11-28 DIAGNOSIS — Z98.83 GLAUCOMA FILTERING BLEB OF BOTH EYES: ICD-10-CM

## 2017-11-28 DIAGNOSIS — E11.3593 PROLIFERATIVE DIABETIC RETINOPATHY OF BOTH EYES WITHOUT MACULAR EDEMA ASSOCIATED WITH TYPE 2 DIABETES MELLITUS: ICD-10-CM

## 2017-11-28 DIAGNOSIS — Z96.1 STATUS POST CATARACT EXTRACTION AND INSERTION OF INTRAOCULAR LENS, UNSPECIFIED LATERALITY: ICD-10-CM

## 2017-11-28 DIAGNOSIS — H40.2232 CHRONIC ANGLE-CLOSURE GLAUCOMA OF BOTH EYES, MODERATE STAGE: ICD-10-CM

## 2017-11-28 PROCEDURE — 99212 OFFICE O/P EST SF 10 MIN: CPT | Mod: PBBFAC | Performed by: OPHTHALMOLOGY

## 2017-11-28 PROCEDURE — 99999 PR PBB SHADOW E&M-EST. PATIENT-LVL II: CPT | Mod: PBBFAC,,, | Performed by: OPHTHALMOLOGY

## 2017-11-28 PROCEDURE — 99024 POSTOP FOLLOW-UP VISIT: CPT | Mod: ,,, | Performed by: OPHTHALMOLOGY

## 2017-12-12 NOTE — PROGRESS NOTES
Assessment /Plan     For exam results, see Encounter Report.    Glaucoma shunt device of left eye    Chronic angle-closure glaucoma of both eyes, moderate stage    Glaucoma filtering bleb of both eyes    Glaucoma shunt device of right eye    Status post cataract extraction and insertion of intraocular lens, unspecified laterality    Central retinal artery occlusion of right eye    Proliferative diabetic retinopathy of both eyes without macular edema associated with type 2 diabetes mellitus          3 sons  17 grandchildren  2 great grandchildren    Mom  when 13 yo the --> 11 grade 1st child --> dropped out HS --> GED and raised a multitude of family members  Highly resilient     HTN --> Dialysis M, W, F 18 years  --> renal Tx / steroids --> DM2 --> NVG OU --> open         Complex eye Hx    NVG quiet  Sp avastin OS with sulaiman    CCT  553 // 568    <24      Ayalla  Bilateral Canaloplasty --> PAS / scarred  ST Ahmed Sulcus OD --> not functioning  S Ex-press shunt OD --> scarred      PC IOL OU  quiet      HTN retinopathy OU  ONH shunt vessels OU    DM2  Hx NVG OS  Avastin OS per Sulaiman  Hx PRP OS    ? RVO OD    Left eye  / PPG 10/12/2017  Anticipate tube opening 2017 --> uneventful @ 2017 ??    Post-op glaucoma surgery right Eye, POW 6+: Patient doing well, reviewed post-op instructions handout with limited activity & eye care instructions, eye drop therapy and endophthalmitis precautions.  The patient and family voice good understanding and will return as scheduled or sooner as needed.    Both eyes --> good adherence --> Hold Left eye  Cosopt BID  Xal q day  Alphagan  BID      Left eye  PF 1% QID --> taper to BID  A 1% q day --> stop        Plan  RTC 4-6 weeks post  / PPG OS  Keep fu with Dr Hilario  RTC sooner prn with good understanding

## 2017-12-14 DIAGNOSIS — H40.50X3: ICD-10-CM

## 2017-12-14 RX ORDER — LATANOPROST 50 UG/ML
SOLUTION/ DROPS OPHTHALMIC
Qty: 7.5 ML | Refills: 0 | Status: SHIPPED | OUTPATIENT
Start: 2017-12-14 | End: 2018-03-05 | Stop reason: SDUPTHER

## 2017-12-21 ENCOUNTER — ANTI-COAG VISIT (OUTPATIENT)
Dept: CARDIOLOGY | Facility: CLINIC | Age: 62
End: 2017-12-21
Payer: MEDICARE

## 2017-12-21 DIAGNOSIS — Z79.01 ANTICOAGULATION MONITORING BY PHARMACIST: ICD-10-CM

## 2017-12-21 LAB — INR PPP: 2.5 (ref 2–3)

## 2017-12-21 PROCEDURE — 85610 PROTHROMBIN TIME: CPT | Mod: PBBFAC,PO

## 2017-12-26 ENCOUNTER — OFFICE VISIT (OUTPATIENT)
Dept: OPHTHALMOLOGY | Facility: CLINIC | Age: 62
End: 2017-12-26
Payer: MEDICARE

## 2017-12-26 DIAGNOSIS — H40.2232 CHRONIC ANGLE-CLOSURE GLAUCOMA OF BOTH EYES, MODERATE STAGE: ICD-10-CM

## 2017-12-26 DIAGNOSIS — H34.11 CENTRAL RETINAL ARTERY OCCLUSION OF RIGHT EYE: ICD-10-CM

## 2017-12-26 DIAGNOSIS — Z96.1 STATUS POST CATARACT EXTRACTION AND INSERTION OF INTRAOCULAR LENS, UNSPECIFIED LATERALITY: ICD-10-CM

## 2017-12-26 DIAGNOSIS — Z98.49 STATUS POST CATARACT EXTRACTION AND INSERTION OF INTRAOCULAR LENS, UNSPECIFIED LATERALITY: ICD-10-CM

## 2017-12-26 DIAGNOSIS — E11.3593 PROLIFERATIVE DIABETIC RETINOPATHY OF BOTH EYES WITHOUT MACULAR EDEMA ASSOCIATED WITH TYPE 2 DIABETES MELLITUS: ICD-10-CM

## 2017-12-26 DIAGNOSIS — Z98.83 GLAUCOMA FILTERING BLEB OF BOTH EYES: ICD-10-CM

## 2017-12-26 PROCEDURE — 99212 OFFICE O/P EST SF 10 MIN: CPT | Mod: PBBFAC | Performed by: OPHTHALMOLOGY

## 2017-12-26 PROCEDURE — 99024 POSTOP FOLLOW-UP VISIT: CPT | Mod: ,,, | Performed by: OPHTHALMOLOGY

## 2017-12-26 PROCEDURE — 99999 PR PBB SHADOW E&M-EST. PATIENT-LVL II: CPT | Mod: PBBFAC,,, | Performed by: OPHTHALMOLOGY

## 2018-01-06 DIAGNOSIS — Z79.01 ANTICOAGULATION MONITORING BY PHARMACIST: ICD-10-CM

## 2018-01-08 RX ORDER — WARFARIN SODIUM 5 MG/1
TABLET ORAL
Qty: 120 TABLET | Refills: 0 | Status: ON HOLD | OUTPATIENT
Start: 2018-01-08 | End: 2018-03-02

## 2018-01-22 NOTE — PROGRESS NOTES
Patient called to reschedule 1/18 missed coumadin clinic appointment at [facility], it was rescheduled to 2/01

## 2018-01-23 ENCOUNTER — HOSPITAL ENCOUNTER (EMERGENCY)
Facility: HOSPITAL | Age: 63
Discharge: HOME | End: 2018-01-23
Attending: EMERGENCY MEDICINE
Payer: MEDICARE

## 2018-01-23 VITALS
HEART RATE: 65 BPM | BODY MASS INDEX: 32.47 KG/M2 | RESPIRATION RATE: 16 BRPM | HEIGHT: 61 IN | TEMPERATURE: 98 F | WEIGHT: 172 LBS | OXYGEN SATURATION: 95 % | SYSTOLIC BLOOD PRESSURE: 163 MMHG | DIASTOLIC BLOOD PRESSURE: 86 MMHG

## 2018-01-23 DIAGNOSIS — M25.461 KNEE EFFUSION, RIGHT: ICD-10-CM
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
## 2018-01-23 DIAGNOSIS — M25.561 RIGHT KNEE PAIN: ICD-10-CM
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
## 2018-01-23 PROCEDURE — 25000003 PHARM REV CODE 250: Performed by: EMERGENCY MEDICINE

## 2018-01-23 PROCEDURE — 99283 EMERGENCY DEPT VISIT LOW MDM: CPT

## 2018-01-23 RX ORDER — HYDROCODONE BITARTRATE AND ACETAMINOPHEN 5; 325 MG/1; MG/1
1 TABLET ORAL EVERY 4 HOURS PRN
Qty: 12 TABLET | Refills: 0 | Status: SHIPPED | OUTPATIENT
Start: 2018-01-23 | End: 2018-03-27 | Stop reason: DRUGHIGH

## 2018-01-23 RX ORDER — HYDROCODONE BITARTRATE AND ACETAMINOPHEN 5; 325 MG/1; MG/1
1 TABLET ORAL
Status: COMPLETED | OUTPATIENT
Start: 2018-01-23 | End: 2018-01-23

## 2018-01-23 RX ADMIN — HYDROCODONE BITARTRATE AND ACETAMINOPHEN 1 TABLET: 5; 325 TABLET ORAL at 04:01

## 2018-01-23 NOTE — ED TRIAGE NOTES
Pt [place] the [place] and she fell and tripped on the rug; pt denies any dizziness or weakness before the fall; pt denies hitting her head pt states she hit her right side when she fell

## 2018-01-23 NOTE — DISCHARGE INSTRUCTIONS
Please follow up with your ortho MD, Dr. Morrow.  Call and make an appointment to see him.   Use crutches.  Elevate the knee, apply ice and rest.  Return to the ED for any increase in swelling, fever or change in pain.  Be careful when taking pain medication as it can make you sleeping.

## 2018-01-23 NOTE — ED PROVIDER NOTES
"Encounter Date: 1/23/2018    SCRIBE #1 NOTE: I, Nicholbarbara Hassan, am scribing for, and in the presence of,  Jessica Braswell PA-C. I have scribed the following portions of the note - Other sections scribed: HPI and ROS.       History     Chief Complaint   Patient presents with    Knee Injury     Patient states that she fell on the rug at the bank and landed on her right side. Patient states that she landed on her right knee primarily. Patient did not hit her head.     CC: Fall    HPI: This 62 y.o. female with medical history of chronic kidney disease, congestive heart failure, stroke, anemia, arthritis, morbid obesity, hypertension, hyperlipidemia, coronary artery disease, renal hypertension, hypothyroidism, diabetes mellitus, glaucoma and diabetic retinopathy presents to the ED s/p a mechanical fall that occurred today. Pt reports that she was walking into the bank when she tripped on a rug and fell onto her right side (mainly right knee and right hip). She notes that she was able to "hop" to her car after the fall and drive home. Pt presently c/o right sided knee pain and right hip pain. She states that she experiences pain when bearing weight on the right side and notes that the symptoms are exacerbated when ambulating. Pt describes the symptoms as a squeezing pain, noting that it is constant and severe (9/10). Pt notes that she is unsure of any head trauma as the fall occurred quickly, but denies experiencing a headache. No other associated symptoms. No treatment attempted PTA to the ED. No alleviating factors. Pt notes her orthopedist as Dr. Morrow.           The history is provided by the patient. No  was used.     Review of patient's allergies indicates:   Allergen Reactions    Penicillins Swelling    Iodine      Other reaction(s): Hives    Sulfamethoxazole-trimethoprim      Other reaction(s): Swelling  Other reaction(s): Hives     Past Medical History:   Diagnosis Date    Allergy     " Anemia     Arthritis     Awaiting organ transplant 2013    Cataract     CHF (congestive heart failure)     Chronic kidney disease     Coronary artery disease     Diabetes mellitus     Diabetic retinopathy     ESRD from HTN strtied RRT 1999 1999    Failed  donor kidney transplant -     Glaucoma     Hyperlipidemia     Hypertension     Hypothyroidism     Morbid obesity 8/10/2012    Renal hypertension     Stroke      Past Surgical History:   Procedure Laterality Date    CATARACT EXTRACTION W/  INTRAOCULAR LENS IMPLANT Bilateral     EYE SURGERY      HYSTERECTOMY      KIDNEY TRANSPLANT      NEPHRECTOMY  2008    transplant     TONSILLECTOMY       Family History   Problem Relation Age of Onset    Heart attack Father     Heart failure Father     Hypertension Father     Blindness Father     Heart attack Mother     Heart failure Mother     Hypertension Mother     Asthma Sister     Depression Sister     Hypertension Sister     Hypertension Brother     Kidney disease Brother      ESRD    Diabetes Maternal Aunt     Amblyopia Neg Hx     Cataracts Neg Hx     Macular degeneration Neg Hx     Retinal detachment Neg Hx     Strabismus Neg Hx      Social History   Substance Use Topics    Smoking status: Former Smoker     Quit date: 8/10/2000    Smokeless tobacco: Never Used    Alcohol use No     Review of Systems   Constitutional: Negative for fever.   HENT: Negative for sore throat.    Respiratory: Negative for shortness of breath.    Cardiovascular: Negative for chest pain.   Gastrointestinal: Negative for nausea.   Genitourinary: Negative for dysuria.   Musculoskeletal: Positive for arthralgias (right knee; right hip). Negative for back pain.   Skin: Negative for rash.   Neurological: Negative for weakness and headaches.       Physical Exam     Initial Vitals [18 1452]   BP Pulse Resp Temp SpO2   (!) 159/85 69 16 98.4 °F (36.9 °C) 96 %      MAP       109.67          Physical Exam    Nursing note and vitals reviewed.  Constitutional: She appears well-developed and well-nourished.   This patient is sitting in a wheelchair, she appears in pain and uncomfortable.   HENT:   Head: Normocephalic and atraumatic.   Eyes: EOM are normal. Pupils are equal, round, and reactive to light.   Neck: Neck supple.   Cardiovascular: Normal rate.   Pulmonary/Chest: Breath sounds normal. No respiratory distress. She has no wheezes.   Abdominal: Soft. Bowel sounds are normal.   Musculoskeletal: Normal range of motion.   There is edema and tenderness to palpation of the right knee.  The patient could not tolerate varus or valgus stress.  No obvious deformity noted.  No erythema or warmth noted.  There is no ecchymosis noted to the right hip.   Neurological: She is alert and oriented to person, place, and time.   Skin: Skin is warm. Capillary refill takes less than 2 seconds.         ED Course   Procedures  Labs Reviewed - No data to display       X-Rays:   Independently Interpreted Readings:   Other Readings:  X-ray of the right hip reveals no acute fracture, dislocation or bony destructive lesion.    X-ray right knee reveals moderate osteoarthritis of the right knee with a suprapatellar joint effusion.    Medical Decision Making:   Initial Assessment:   This is an urgent evaluation of a 62-year-old female who presents to the emergency department complaining of right hip pain and right knee pain after fall that occurred just prior to arrival.  She denies loss of consciousness or head trauma.  She states it was a mechanical fall over a rug.    The patient is currently afebrile and nontoxic in appearance.  Her vital signs are stable.  On physical exam, there is a effusion noted to the right knee.  There is tenderness to palpation.  The patient could not tolerate varus or valgus stress.  There is no erythema or warmth noted.  There is no ecchymosis noted to the right hip.  The remaining physical  exam is unremarkable.  The patient is unable to weight-bear on the right lower extremity.    An x-ray of the right knee and right hip were performed.  X-ray of the right knee revealed moderate osteoarthritis with suprapatellar joint effusion.  X-ray of the right hip reveals no acute fracture or dislocation.  There are degenerative changes noted.  I will place his patient in a knee immobilizer and give her crutches.  Norco was prescribed.  She will need to follow-up with her orthopedist.  All diagnostic study results were discussed with the patient and she verbalized understanding and agreement.  She is safe and stable for discharge at this time.  This case was discussed with Dr. Noel and she is in agreement with the assessment and treatment plan.            Scribe Attestation:   Scribe #1: I performed the above scribed service and the documentation accurately describes the services I performed. I attest to the accuracy of the note.    Attending Attestation:           Physician Attestation for Scribe:  Physician Attestation Statement for Scribe #1: I, Jessica Braswell PA-C, reviewed documentation, as scribed by Nichol Hassan in my presence, and it is both accurate and complete.                 ED Course      Clinical Impression:   Diagnoses of Right knee pain and Fall were pertinent to this visit.    Disposition:   Disposition: Discharged  Condition: Stable                        Jessica Braswell PA-C  01/23/18 2023

## 2018-01-23 NOTE — ED PROVIDER NOTES
Encounter Date: 2018       History     Chief Complaint   Patient presents with    Knee Injury     Patient states that she [place] the bank and landed on her right side. Patient states that she landed on her right knee primarily. Patient did not hit her head.     HPI  Review of patient's allergies indicates:   Allergen Reactions    Penicillins Swelling    Iodine      Other reaction(s): Hives    Sulfamethoxazole-trimethoprim      Other reaction(s): Swelling  Other reaction(s): Hives     Past Medical History:   Diagnosis Date    Allergy     Anemia     Arthritis     Awaiting organ transplant 2013    Cataract     CHF (congestive heart failure)     Chronic kidney disease     Coronary artery disease     Diabetes mellitus     Diabetic retinopathy     ESRD from HTN strtied RRT 1999    Failed  donor kidney transplant -     Glaucoma     Hyperlipidemia     Hypertension     Hypothyroidism     Morbid obesity 8/10/2012    Renal hypertension     Stroke      Past Surgical History:   Procedure Laterality Date    CATARACT EXTRACTION W/  INTRAOCULAR LENS IMPLANT Bilateral     EYE SURGERY      HYSTERECTOMY      KIDNEY TRANSPLANT      NEPHRECTOMY  2008    transplant     TONSILLECTOMY       Family History   Problem Relation Age of Onset    Heart attack Father     Heart failure Father     Hypertension Father     Blindness Father     Heart attack Mother     Heart failure Mother     Hypertension Mother     Asthma Sister     Depression Sister     Hypertension Sister     Hypertension Brother     Kidney disease Brother      ESRD    Diabetes Maternal Aunt     Amblyopia Neg Hx     Cataracts Neg Hx     Macular degeneration Neg Hx     Retinal detachment Neg Hx     Strabismus Neg Hx      Social History   Substance Use Topics    Smoking status: Former Smoker     Quit date: 8/10/2000    Smokeless tobacco: Never Used    Alcohol use No     Review of  Systems    Physical Exam     Initial Vitals [01/23/18 1452]   BP Pulse Resp Temp SpO2   (!) 159/85 69 16 98.4 °F (36.9 °C) 96 %      MAP       109.67         Physical Exam    ED Course   Procedures  Labs Reviewed - No data to display                            ED Course      Clinical Impression:   {Add your Clinical Impression here. If you haven't documented one yet, please pend the note, finalize a Clinical Impression, and refresh your note before signing.:[id number]}

## 2018-01-24 NOTE — PROGRESS NOTES
Assessment /Plan     For exam results, see Encounter Report.    Glaucoma shunt device of right eye    Central retinal artery occlusion of right eye    Chronic angle-closure glaucoma of both eyes, moderate stage    Proliferative diabetic retinopathy of both eyes without macular edema associated with type 2 diabetes mellitus    Glaucoma filtering bleb of both eyes    Status post cataract extraction and insertion of intraocular lens, unspecified laterality    Glaucoma shunt device of left eye        3 sons  17 grandchildren  2 great grandchildren    Mom  when 11 yo the --> 11 grade 1st child --> dropped out HS --> GED and raised a multitude of family members  Highly resilient     HTN --> Dialysis M, W, F 18 years  --> renal Tx / steroids --> DM2 --> NVG OU --> open         Complex eye Hx    NVG quiet  Sp avastin OS with sulaiman    CCT  553 // 568    <24      Ayalla  Bilateral Canaloplasty --> PAS / scarred  ST Ahmed Sulcus OD --> not functioning  S Ex-press shunt OD --> scarred      PC IOL OU  quiet      HTN retinopathy OU  ONH shunt vessels OU    DM2  Hx NVG OS  Avastin OS per Sulaiman  Hx PRP OS    ? RVO OD    Left eye  / PPG 10/12/2017  Anticipate tube opening 2017 --> uneventful @ 2017 ??    Both eyes --> good adherence --> Hold Left eye  Cosopt BID  Xal q day  Alphagan  BID      Left eye  PF 1% BID      Plan  RTC 3 months post  / PPG OS  Keep fu with Dr Hilario  RTC sooner prn with good understanding

## 2018-02-01 ENCOUNTER — ANTI-COAG VISIT (OUTPATIENT)
Dept: CARDIOLOGY | Facility: CLINIC | Age: 63
End: 2018-02-01
Payer: MEDICARE

## 2018-02-01 DIAGNOSIS — Z79.01 ANTICOAGULATION MONITORING BY PHARMACIST: ICD-10-CM

## 2018-02-01 LAB — INR PPP: 3.8 (ref 2–3)

## 2018-02-01 PROCEDURE — 85610 PROTHROMBIN TIME: CPT | Mod: PBBFAC,PO

## 2018-02-01 PROCEDURE — 99211 OFF/OP EST MAY X REQ PHY/QHP: CPT | Mod: S$PBB,,,

## 2018-02-01 NOTE — PROGRESS NOTES
INR high. Patient reports recent injury. She tripped and hurt her knee pretty bad on 1/23. She missed her dose 1/23 and 1/24. She is wearing a brace and in a lot of pain. She is undergoing work-up with ortho today. She was taking hydrocodone until she ran out a few days ago. Then, she started tylenol arthritis. Her appetite has been decreased due to pain/injury as well. No signs or symptoms of bleeding. We will hold coumadin dose today and decrease dose for now. Recheck INR in 1 week

## 2018-02-06 ENCOUNTER — OFFICE VISIT (OUTPATIENT)
Dept: OPHTHALMOLOGY | Facility: CLINIC | Age: 63
End: 2018-02-06
Payer: MEDICARE

## 2018-02-06 DIAGNOSIS — Z98.49 STATUS POST CATARACT EXTRACTION AND INSERTION OF INTRAOCULAR LENS, UNSPECIFIED LATERALITY: ICD-10-CM

## 2018-02-06 DIAGNOSIS — Z98.83 GLAUCOMA FILTERING BLEB OF BOTH EYES: ICD-10-CM

## 2018-02-06 DIAGNOSIS — H34.11 CENTRAL RETINAL ARTERY OCCLUSION OF RIGHT EYE: ICD-10-CM

## 2018-02-06 DIAGNOSIS — H40.2232 CHRONIC ANGLE-CLOSURE GLAUCOMA OF BOTH EYES, MODERATE STAGE: ICD-10-CM

## 2018-02-06 DIAGNOSIS — E11.3593 PROLIFERATIVE DIABETIC RETINOPATHY OF BOTH EYES WITHOUT MACULAR EDEMA ASSOCIATED WITH TYPE 2 DIABETES MELLITUS: ICD-10-CM

## 2018-02-06 DIAGNOSIS — Z96.1 STATUS POST CATARACT EXTRACTION AND INSERTION OF INTRAOCULAR LENS, UNSPECIFIED LATERALITY: ICD-10-CM

## 2018-02-06 PROCEDURE — 99999 PR PBB SHADOW E&M-EST. PATIENT-LVL II: CPT | Mod: PBBFAC,,, | Performed by: OPHTHALMOLOGY

## 2018-02-06 PROCEDURE — 99024 POSTOP FOLLOW-UP VISIT: CPT | Mod: POP,,, | Performed by: OPHTHALMOLOGY

## 2018-02-06 PROCEDURE — 99212 OFFICE O/P EST SF 10 MIN: CPT | Mod: PBBFAC | Performed by: OPHTHALMOLOGY

## 2018-02-07 NOTE — PROGRESS NOTES
Patient Shivani Sheets, MRN [id number], was dependent on dialysis (ICD10 Z99.2) at the time of this visit on 2/6/18. This addendum is made to the medical record on 02/07/2018.

## 2018-02-08 ENCOUNTER — OFFICE VISIT (OUTPATIENT)
Dept: TRANSPLANT | Facility: CLINIC | Age: 63
End: 2018-02-08
Payer: MEDICARE

## 2018-02-08 ENCOUNTER — ANTI-COAG VISIT (OUTPATIENT)
Dept: CARDIOLOGY | Facility: CLINIC | Age: 63
End: 2018-02-08

## 2018-02-08 ENCOUNTER — HOSPITAL ENCOUNTER (OUTPATIENT)
Dept: CARDIOLOGY | Facility: CLINIC | Age: 63
Discharge: HOME | End: 2018-02-08
Attending: INTERNAL MEDICINE
Payer: MEDICARE

## 2018-02-08 VITALS
WEIGHT: 172.19 LBS | OXYGEN SATURATION: 95 % | SYSTOLIC BLOOD PRESSURE: 177 MMHG | HEART RATE: 75 BPM | BODY MASS INDEX: 32.51 KG/M2 | HEIGHT: 61 IN | DIASTOLIC BLOOD PRESSURE: 91 MMHG

## 2018-02-08 DIAGNOSIS — G47.30 SLEEP APNEA, UNSPECIFIED TYPE: ICD-10-CM

## 2018-02-08 DIAGNOSIS — E66.09 CLASS 1 OBESITY DUE TO EXCESS CALORIES WITH SERIOUS COMORBIDITY AND BODY MASS INDEX (BMI) OF 32.0 TO 32.9 IN ADULT: ICD-10-CM

## 2018-02-08 DIAGNOSIS — I27.20 PULMONARY HYPERTENSION: Primary | ICD-10-CM

## 2018-02-08 DIAGNOSIS — Z79.01 ANTICOAGULATION MONITORING BY PHARMACIST: ICD-10-CM

## 2018-02-08 DIAGNOSIS — Z98.61 S/P PTCA (PERCUTANEOUS TRANSLUMINAL CORONARY ANGIOPLASTY): ICD-10-CM

## 2018-02-08 DIAGNOSIS — Z76.82 AWAITING ORGAN TRANSPLANT: ICD-10-CM

## 2018-02-08 DIAGNOSIS — I27.20 PULMONARY HYPERTENSION: ICD-10-CM

## 2018-02-08 DIAGNOSIS — M06.9 RHEUMATOID ARTHRITIS OF HAND, UNSPECIFIED LATERALITY, UNSPECIFIED RHEUMATOID FACTOR PRESENCE: ICD-10-CM

## 2018-02-08 DIAGNOSIS — I25.10 CORONARY ARTERY DISEASE INVOLVING NATIVE CORONARY ARTERY OF NATIVE HEART WITHOUT ANGINA PECTORIS: ICD-10-CM

## 2018-02-08 DIAGNOSIS — R09.02 HYPOXIA: ICD-10-CM

## 2018-02-08 DIAGNOSIS — I50.32 CHRONIC DIASTOLIC HEART FAILURE: ICD-10-CM

## 2018-02-08 DIAGNOSIS — I48.0 PAROXYSMAL ATRIAL FIBRILLATION: ICD-10-CM

## 2018-02-08 DIAGNOSIS — I51.7 CARDIOMEGALY: ICD-10-CM

## 2018-02-08 DIAGNOSIS — E78.5 HYPERLIPIDEMIA, UNSPECIFIED HYPERLIPIDEMIA TYPE: ICD-10-CM

## 2018-02-08 DIAGNOSIS — N18.6 ESRD (END STAGE RENAL DISEASE): ICD-10-CM

## 2018-02-08 LAB
DIASTOLIC DYSFUNCTION: YES
ESTIMATED PA SYSTOLIC PRESSURE: 70.9
MITRAL VALVE REGURGITATION: ABNORMAL
RETIRED EF AND QEF - SEE NOTES: 60 (ref 55–65)
TRICUSPID VALVE REGURGITATION: ABNORMAL

## 2018-02-08 PROCEDURE — 93306 TTE W/DOPPLER COMPLETE: CPT | Mod: PBBFAC | Performed by: INTERNAL MEDICINE

## 2018-02-08 PROCEDURE — 99213 OFFICE O/P EST LOW 20 MIN: CPT | Mod: PBBFAC,25 | Performed by: INTERNAL MEDICINE

## 2018-02-08 PROCEDURE — 99214 OFFICE O/P EST MOD 30 MIN: CPT | Mod: S$PBB,,, | Performed by: INTERNAL MEDICINE

## 2018-02-08 PROCEDURE — 99999 PR PBB SHADOW E&M-EST. PATIENT-LVL III: CPT | Mod: PBBFAC,,, | Performed by: INTERNAL MEDICINE

## 2018-02-08 NOTE — LETTER
February 8, 2018        Tye Deleon  [address] [place] [address] [address]  [address]  [place] [address]  Phone: [phone]  Fax: [phone]             [facility]  [address]  [place] [address]  Phone: [phone]   Patient: Shivani Sheets   MR Number: [id number]   YOB: 1955   Date of Visit: 2/8/2018       Dear Dr. Tye Deleon    Thank you for referring Shivani Sheets to me for evaluation. Attached you will find relevant portions of my assessment and plan of care.    If you have questions, please do not hesitate to call me. I look forward to following Shivani Sheets along with you.    Sincerely,    Jenny Hennessy MD    Enclosure    If you would like to receive this communication electronically, please contact [email or web address] or [phone] to request [facility] [employer] access.    [facility] is a tool which provides read-only access to select patient information with whom you have a relationship. Its easy to use and provides real time access to review your patients record including encounter summaries, notes, results, and demographic information.    If you feel you have received this communication in error or would no longer like to receive these types of communications, please e-mail [email or web address]

## 2018-02-08 NOTE — PROGRESS NOTES
Subjective:    Patient ID:  Shivani Sheets is a 62 y.o. female who presents for follow-up of Pulmonary Hypertension (7mo clinic visit...No 6mwt done today, but, Pt did get ECHO)      HPI 63 yo AAF with moderate to severe pulmonary HTN, diastolic dysfunction, central retinal artery occlusion with no significant carotid artery stenosis, CAD, h/o PCI, ESRD secondary to HTN, s/p failed kidney tx, now back on HD and is re-listed, STEFFANY/CPAP, hypothyroidism, rheumatoid arthritis, DLP and obesity, recent admit for AF with RVR, recently switched from adcirca/tyvaso to adcirca/opsumit, here for overdue PH/HF f/u visit (last seen 6/16).     Since last visit,   Pt fell in the bank, tripped over the rug- saw sports med and is due for an MRI, says the swelling is going down, struggling with pain still. Says her breathing has been pretty good and no CP. Only has a little swelling in her affected ankle, none in her abdomen. Her appetite is down because of the pain, says all she wants to do is sleep once she gets wilber comfortable position,    uptravi is 600 mg bid- no SE. Had HA in beginning which subsided     HD has been going well except she missed a couple of treatments after the fall- weds after she fell and yest.    TTE July 2015  1 - Eccentric LVH with normal left ventricular systolic function (EF 60-65%).   2 - Mild left atrial enlargement.   3 - Left ventricular diastolic dysfunction.   4 - Right ventricular enlargement with hypertrophy, with low normal systolic function.   5 - Mild tricuspid regurgitation.   6 - Pulmonary hypertension.     No 6mw today due to fall  Last visit 366m (305 m,  313.94   m in Nov   )                                              O2 sat  95->89  %                                                           HR 90 ->   108                                                               BP  124 / 61  ->170 /80                                                         Dolly    2 -> 4      RHC  "11/6/14  AOSAT: 97  FICKCI: 3.26  FICKCO: 5.87  PAPRES: 97/38 (59)  PASAT: 60  PVR: 4.43  PWPRES: 22/23 (24)  RAPRES: 20/23 (17)  RVPRES: 94/2, 21    6mw  314.15 (305m, 176m last visit) (317m in Sept)                                         O2 sat   99-> 91%                                                           HR 80->102                                                               BP  158 / 92  ->217/114                                                         Dolly   0.5-> 2    Review of Systems   Constitution: Negative for chills, fever, malaise/fatigue and weight gain.   HENT: Negative.    Eyes: Negative.    Cardiovascular: Positive for dyspnea on exertion. Negative for chest pain, leg swelling, near-syncope, orthopnea, palpitations, paroxysmal nocturnal dyspnea and syncope.   Respiratory: Positive for shortness of breath. Negative for cough.    Endocrine: Negative.    Hematologic/Lymphatic: Negative.    Skin: Negative.    Musculoskeletal: Negative.    Gastrointestinal: Negative for bloating, abdominal pain and change in bowel habit.   Neurological: Negative for dizziness and light-headedness.   Psychiatric/Behavioral: Negative for depression.        Objective:  BP (!) 177/91   Pulse 75   Ht 5' 1" (1.549 m)   Wt 78.1 kg (172 lb 2.9 oz)   SpO2 95%   BMI 32.53 kg/m²       Physical Exam   Constitutional: She is oriented to person, place, and time. She appears well-developed and well-nourished.   HENT:   Head: Normocephalic.   Eyes: Conjunctivae are normal. Pupils are equal, round, and reactive to light.   Neck: Normal range of motion. Neck supple. No JVD present.   Cardiovascular: Normal rate and regular rhythm.    Large AV fistula L arm, accentuated S2   Pulmonary/Chest: Effort normal. She has no rales.   Abdominal: Soft. Bowel sounds are normal.   Musculoskeletal: Normal range of motion.   Neurological: She is alert and oriented to person, place, and time.   Skin: Skin is warm and dry.   Psychiatric: She " has a normal mood and affect.   Vitals reviewed.          Chemistry        Component Value Date/Time     02/08/2018 1510    K 4.7 02/08/2018 1510    CL 95 02/08/2018 1510    CO2 33 (H) 02/08/2018 1510    BUN 53 (H) 02/08/2018 1510    CREATININE 11.3 (H) 02/08/2018 1510     (H) 02/08/2018 1510        Component Value Date/Time    CALCIUM 9.6 02/08/2018 1510    ALKPHOS 47 (L) 02/08/2018 1510    AST 13 02/08/2018 1510    ALT 8 (L) 02/08/2018 1510    BILITOT 0.5 02/08/2018 1510            Magnesium   Date Value Ref Range Status   07/17/2015 2.8 (H) 1.6 - 2.6 mg/dL Final       Lab Results   Component Value Date    WBC 5.14 02/08/2018    HGB 7.9 (L) 02/08/2018    HCT 27.0 (L) 02/08/2018    MCV 87 02/08/2018     02/08/2018       Lab Results   Component Value Date    INR 1.5 (H) 02/08/2018    INR 1.5 (H) 02/08/2018    INR 3.8 02/01/2018       BNP   Date Value Ref Range Status   02/08/2018 572 (H) 0 - 99 pg/mL Final     Comment:     Values of less than 100 pg/ml are consistent with non-CHF populations.   07/14/2017 112 (H) 0 - 99 pg/mL Final     Comment:     Values of less than 100 pg/ml are consistent with non-CHF populations.   06/21/2016 948 (H) 0 - 99 pg/mL Final     Comment:     Values of less than 100 pg/ml are consistent with non-CHF populations.       No results found for: LDH          Assessment:       1. Pulmonary hypertension- Severe and out of proportion to her diastolic dysfunction- improving with addition of uptravi- back to being FC II, euvolemic on exam- BP and BNP both high today however pt missed HD yest   2. Chronic diastolic heart failure   3. Renal hypertension, stage 5 chronic kidney disease or end stage renal disease    4. Atrial fibrillation    5. CAD (coronary artery disease)    6. Hypoxia    7. Rheumatoid arthritis    8. ESRD from HTN started RRT 1999    9. Anticoagulation monitoring by pharmacist    10. Awaiting organ transplant    11. Obesity    12. S/P PTCA (percutaneous  transluminal coronary angioplasty)    13. Sleep apnea         Plan:      Will cont selexipag- will slowly increase once a month as tolerated.    Recommend 2 gram sodium restriction and 1500cc fluid restriction.     F/u on todays pending echo    Daily wts. If weight goes up 3# overnight or 5# in one week she should call us    F/u 4mo with labs and 6mw (pt knows she can cancel it if she is not back to walking yet)

## 2018-02-08 NOTE — PATIENT INSTRUCTIONS
1. Continue current therapy.  2.  Keep salt intake to under 2000 mg sodium, fluids to under 2 L (64 oz)  3  Check your weights every morning after getting out of bed and urinating. If your weight goes up 3# overnight or 5# in one week let your dialysis nurse know  4. Call us if you find yourself getting more short of breath, have more swelling or unexpected weight changes, chest pain or fatigue

## 2018-02-15 ENCOUNTER — HOSPITAL ENCOUNTER (EMERGENCY)
Facility: HOSPITAL | Age: 63
Discharge: HOME | End: 2018-02-15
Payer: MEDICARE

## 2018-02-15 VITALS
HEART RATE: 70 BPM | SYSTOLIC BLOOD PRESSURE: 126 MMHG | BODY MASS INDEX: 30.58 KG/M2 | WEIGHT: 162 LBS | OXYGEN SATURATION: 99 % | TEMPERATURE: 99 F | DIASTOLIC BLOOD PRESSURE: 72 MMHG | RESPIRATION RATE: 20 BRPM | HEIGHT: 61 IN

## 2018-02-15 DIAGNOSIS — J18.9 ATYPICAL PNEUMONIA: Primary | ICD-10-CM

## 2018-02-15 DIAGNOSIS — R06.02 SOB (SHORTNESS OF BREATH): ICD-10-CM

## 2018-02-15 DIAGNOSIS — I27.9 CHRONIC PULMONARY HEART DISEASE: ICD-10-CM

## 2018-02-15 DIAGNOSIS — R05.9 COUGH: ICD-10-CM

## 2018-02-15 LAB
ALBUMIN SERPL BCP-MCNC: 3.1 G/DL
ALP SERPL-CCNC: 46 U/L
ALT SERPL W/O P-5'-P-CCNC: 7 U/L
ANION GAP SERPL CALC-SCNC: 6 MMOL/L
AST SERPL-CCNC: 15 U/L
BASOPHILS # BLD AUTO: 0.02 K/UL
BASOPHILS NFR BLD: 0.3 %
BILIRUB SERPL-MCNC: 0.5 MG/DL
BUN SERPL-MCNC: 23 MG/DL
CALCIUM SERPL-MCNC: 8.8 MG/DL
CHLORIDE SERPL-SCNC: 94 MMOL/L
CO2 SERPL-SCNC: 38 MMOL/L
CREAT SERPL-MCNC: 7.3 MG/DL
DIFFERENTIAL METHOD: ABNORMAL
EOSINOPHIL # BLD AUTO: 0.3 K/UL
EOSINOPHIL NFR BLD: 5 %
ERYTHROCYTE [DISTWIDTH] IN BLOOD BY AUTOMATED COUNT: 18.7 %
EST. GFR  (AFRICAN AMERICAN): 6 ML/MIN/1.73 M^2
EST. GFR  (NON AFRICAN AMERICAN): 5 ML/MIN/1.73 M^2
FLUAV AG SPEC QL IA: NEGATIVE
FLUBV AG SPEC QL IA: NEGATIVE
GLUCOSE SERPL-MCNC: 106 MG/DL
HCT VFR BLD AUTO: 26.6 %
HGB BLD-MCNC: 7.8 G/DL
LYMPHOCYTES # BLD AUTO: 1.5 K/UL
LYMPHOCYTES NFR BLD: 22.8 %
MCH RBC QN AUTO: 25.5 PG
MCHC RBC AUTO-ENTMCNC: 29.3 G/DL
MCV RBC AUTO: 87 FL
MONOCYTES # BLD AUTO: 0.8 K/UL
MONOCYTES NFR BLD: 13 %
NEUTROPHILS # BLD AUTO: 3.8 K/UL
NEUTROPHILS NFR BLD: 58.7 %
PLATELET # BLD AUTO: 189 K/UL
PMV BLD AUTO: 12 FL
POCT GLUCOSE: 112 MG/DL (ref 70–110)
POTASSIUM SERPL-SCNC: 4.1 MMOL/L
PROT SERPL-MCNC: 7.6 G/DL
RBC # BLD AUTO: 3.06 M/UL
SODIUM SERPL-SCNC: 138 MMOL/L
SPECIMEN SOURCE: NORMAL
WBC # BLD AUTO: 6.46 K/UL

## 2018-02-15 PROCEDURE — 82962 GLUCOSE BLOOD TEST: CPT

## 2018-02-15 PROCEDURE — 93010 ELECTROCARDIOGRAM REPORT: CPT | Mod: ,,, | Performed by: INTERNAL MEDICINE

## 2018-02-15 PROCEDURE — 85025 COMPLETE CBC W/AUTO DIFF WBC: CPT

## 2018-02-15 PROCEDURE — 99284 EMERGENCY DEPT VISIT MOD MDM: CPT | Mod: 25

## 2018-02-15 PROCEDURE — 93005 ELECTROCARDIOGRAM TRACING: CPT

## 2018-02-15 PROCEDURE — 87400 INFLUENZA A/B EACH AG IA: CPT | Mod: 59

## 2018-02-15 PROCEDURE — 80053 COMPREHEN METABOLIC PANEL: CPT

## 2018-02-15 RX ORDER — AZITHROMYCIN 250 MG/1
250 TABLET, FILM COATED ORAL DAILY
Qty: 6 TABLET | Refills: 0 | Status: ON HOLD | OUTPATIENT
Start: 2018-02-15 | End: 2018-03-02 | Stop reason: HOSPADM

## 2018-02-15 NOTE — ED PROVIDER NOTES
Encounter Date: 2/15/2018       History     Chief Complaint   Patient presents with    Cough     f/c, body aches, earache and sore throat x 3 days. HD- MWF, shunt in lt arm. Had dialysis yesterday. On home O2 at 2l but doesn't have it with her today.    Fever     Had about 4 days of cough and fever with body aches. Had some cough productive of purulent sputum with mild hemoptysis. Recently around grandchild with similar sx. Also with ear pain, sore throat, rhinorrhea. On home 02, but no nebs for copd? (more likely pulm htn). Had last dialysis yesterday.           Review of patient's allergies indicates:   Allergen Reactions    Penicillins Swelling    Iodine      Other reaction(s): Hives    Sulfamethoxazole-trimethoprim      Other reaction(s): Swelling  Other reaction(s): Hives     Past Medical History:   Diagnosis Date    Allergy     Anemia     Arthritis     Awaiting organ transplant 2013    Cataract     CHF (congestive heart failure)     Chronic kidney disease     Coronary artery disease     Diabetes mellitus     Diabetic retinopathy     ESRD from HTN strtied RRT 1999    Failed  donor kidney transplant -     Glaucoma     Hyperlipidemia     Hypertension     Hypothyroidism     Morbid obesity 8/10/2012    Renal hypertension     Stroke      Past Surgical History:   Procedure Laterality Date    CATARACT EXTRACTION W/  INTRAOCULAR LENS IMPLANT Bilateral     EYE SURGERY      HYSTERECTOMY      KIDNEY TRANSPLANT      NEPHRECTOMY  2008    transplant     TONSILLECTOMY       Family History   Problem Relation Age of Onset    Heart attack Father     Heart failure Father     Hypertension Father     Blindness Father     Heart attack Mother     Heart failure Mother     Hypertension Mother     Asthma Sister     Depression Sister     Hypertension Sister     Hypertension Brother     Kidney disease Brother      ESRD    Diabetes Maternal Aunt     Amblyopia Neg  Hx     Cataracts Neg Hx     Macular degeneration Neg Hx     Retinal detachment Neg Hx     Strabismus Neg Hx      Social History   Substance Use Topics    Smoking status: Former Smoker     Quit date: 8/10/2000    Smokeless tobacco: Never Used    Alcohol use No     Review of Systems   Constitutional: Positive for appetite change, chills, fatigue and fever.   HENT: Positive for congestion, ear pain, rhinorrhea and sore throat. Negative for nosebleeds.    Eyes: Negative.    Respiratory: Positive for cough. Negative for shortness of breath.    Cardiovascular: Positive for leg swelling.        Chronic right leg swelling for past month since an injury.    Gastrointestinal: Negative for constipation, diarrhea, nausea and vomiting.   Musculoskeletal: Positive for myalgias.   Skin: Negative.    Neurological: Negative.        Physical Exam     Initial Vitals [02/15/18 1158]   BP Pulse Resp Temp SpO2   124/67 73 20 99.2 °F (37.3 °C) (!) 90 %      MAP       86         Physical Exam    Constitutional: She appears well-developed and well-nourished.   HENT:   Head: Normocephalic and atraumatic.   Tm normal bilaterally   Eyes: Conjunctivae and EOM are normal.   Neck: Normal range of motion. Neck supple.   Cardiovascular: Normal rate, regular rhythm and normal heart sounds.   Pulmonary/Chest: Breath sounds normal. She has no wheezes. She has no rales.   Abdominal: Soft. Bowel sounds are normal.   Musculoskeletal:   Left upper arm with dialysis access, no erythema   Skin: Skin is warm and dry. Capillary refill takes less than 2 seconds.   Psychiatric: She has a normal mood and affect.         ED Course   Procedures  Labs Reviewed   CBC W/ AUTO DIFFERENTIAL   COMPREHENSIVE METABOLIC PANEL   INFLUENZA A AND B ANTIGEN             Medical Decision Making:   ED Management:  Possible mild atypical pneumonia, or early fluid retention from dialysis. No signif dyspnea, so will treat for pneumonitis with cough.   Suspect viral illness,  with recent contacts. Will check for pneumonia. Non toxic in appearance, no current fever. Good airflow on lung exam               Attending Attestation:           Physician Attestation for Scribe:      Comments: I, Dr. Scar Bean, personally performed the services described in this documentation. All medical record entries made by the scribe were at my direction and in my presence.  I have reviewed the chart and agree that the record reflects my personal performance and is accurate and complete. Scar Bean MD.  3:11 PM 02/15/2018              ED Course      Clinical Impression:   Atypical pneumonia.                           Scar Bean MD  02/15/18 1511       Scar Bean MD  02/15/18 1540

## 2018-02-16 ENCOUNTER — PES CALL (OUTPATIENT)
Dept: ADMINISTRATIVE | Facility: CLINIC | Age: 63
End: 2018-02-16

## 2018-02-22 ENCOUNTER — ANTI-COAG VISIT (OUTPATIENT)
Dept: CARDIOLOGY | Facility: CLINIC | Age: 63
DRG: 377 | End: 2018-02-22
Payer: MEDICARE

## 2018-02-22 ENCOUNTER — HOSPITAL ENCOUNTER (INPATIENT)
Facility: HOSPITAL | Age: 63
LOS: 8 days | Discharge: HOME HEALTH | DRG: 377 | End: 2018-03-02
Attending: EMERGENCY MEDICINE | Admitting: FAMILY MEDICINE
Payer: MEDICARE

## 2018-02-22 DIAGNOSIS — I27.20 PULMONARY HYPERTENSION: ICD-10-CM

## 2018-02-22 DIAGNOSIS — K92.2 UPPER GI BLEED: ICD-10-CM

## 2018-02-22 DIAGNOSIS — K92.2 ACUTE UPPER GI BLEED: ICD-10-CM

## 2018-02-22 DIAGNOSIS — K92.2 GIB (GASTROINTESTINAL BLEEDING): ICD-10-CM

## 2018-02-22 DIAGNOSIS — R10.9 ABDOMINAL PAIN: ICD-10-CM

## 2018-02-22 DIAGNOSIS — I27.0 PRIMARY PULMONARY HYPERTENSION: ICD-10-CM

## 2018-02-22 DIAGNOSIS — K92.1 MELENA: ICD-10-CM

## 2018-02-22 DIAGNOSIS — D64.9 SYMPTOMATIC ANEMIA: Primary | ICD-10-CM

## 2018-02-22 DIAGNOSIS — Z79.01 ANTICOAGULATION MONITORING BY PHARMACIST: ICD-10-CM

## 2018-02-22 LAB
ABO + RH BLD: NORMAL
ALBUMIN SERPL BCP-MCNC: 2.9 G/DL
ALP SERPL-CCNC: 39 U/L
ALT SERPL W/O P-5'-P-CCNC: 8 U/L
ANION GAP SERPL CALC-SCNC: 11 MMOL/L
ANISOCYTOSIS BLD QL SMEAR: SLIGHT
APTT BLDCRRT: 40.7 SEC
AST SERPL-CCNC: 13 U/L
BASOPHILS # BLD AUTO: 0.04 K/UL
BASOPHILS NFR BLD: 0.6 %
BILIRUB SERPL-MCNC: 0.5 MG/DL
BLD GP AB SCN CELLS X3 SERPL QL: NORMAL
BLD PROD TYP BPU: NORMAL
BLD PROD TYP BPU: NORMAL
BLOOD GROUP ANTIBODIES SERPL: NORMAL
BLOOD UNIT EXPIRATION DATE: NORMAL
BLOOD UNIT EXPIRATION DATE: NORMAL
BLOOD UNIT TYPE CODE: 6200
BLOOD UNIT TYPE CODE: 6200
BLOOD UNIT TYPE: NORMAL
BLOOD UNIT TYPE: NORMAL
BUN SERPL-MCNC: 58 MG/DL
CALCIUM SERPL-MCNC: 8.8 MG/DL
CHLORIDE SERPL-SCNC: 94 MMOL/L
CO2 SERPL-SCNC: 32 MMOL/L
CODING SYSTEM: NORMAL
CODING SYSTEM: NORMAL
CREAT SERPL-MCNC: 8.7 MG/DL
DIFFERENTIAL METHOD: ABNORMAL
DISPENSE STATUS: NORMAL
DISPENSE STATUS: NORMAL
EOSINOPHIL # BLD AUTO: 0.3 K/UL
EOSINOPHIL NFR BLD: 3.9 %
ERYTHROCYTE [DISTWIDTH] IN BLOOD BY AUTOMATED COUNT: 19 %
EST. GFR  (AFRICAN AMERICAN): 5 ML/MIN/1.73 M^2
EST. GFR  (NON AFRICAN AMERICAN): 4 ML/MIN/1.73 M^2
GLUCOSE SERPL-MCNC: 137 MG/DL
HCT VFR BLD AUTO: 16.4 %
HGB BLD-MCNC: 4.8 G/DL
INR PPP: 2.5
INR PPP: 3.1 (ref 2–3)
LIPASE SERPL-CCNC: 17 U/L
LYMPHOCYTES # BLD AUTO: 2.1 K/UL
LYMPHOCYTES NFR BLD: 29.5 %
MCH RBC QN AUTO: 26.1 PG
MCHC RBC AUTO-ENTMCNC: 29.3 G/DL
MCV RBC AUTO: 89 FL
MONOCYTES # BLD AUTO: 0.8 K/UL
MONOCYTES NFR BLD: 11.7 %
NEUTROPHILS # BLD AUTO: 3.9 K/UL
NEUTROPHILS NFR BLD: 54.3 %
NUM UNITS TRANS FFP: NORMAL
NUM UNITS TRANS FFP: NORMAL
OB PNL STL: POSITIVE
PLATELET # BLD AUTO: 156 K/UL
PMV BLD AUTO: 12.3 FL
POTASSIUM SERPL-SCNC: 3.9 MMOL/L
PROT SERPL-MCNC: 6.7 G/DL
PROTHROMBIN TIME: 26.6 SEC
RBC # BLD AUTO: 1.84 M/UL
SODIUM SERPL-SCNC: 137 MMOL/L
WBC # BLD AUTO: 7.18 K/UL

## 2018-02-22 PROCEDURE — 63600175 PHARM REV CODE 636 W HCPCS: Performed by: NURSE PRACTITIONER

## 2018-02-22 PROCEDURE — 85610 PROTHROMBIN TIME: CPT | Mod: PBBFAC,PO

## 2018-02-22 PROCEDURE — P9017 PLASMA 1 DONOR FRZ W/IN 8 HR: HCPCS

## 2018-02-22 PROCEDURE — 86922 COMPATIBILITY TEST ANTIGLOB: CPT

## 2018-02-22 PROCEDURE — 85730 THROMBOPLASTIN TIME PARTIAL: CPT

## 2018-02-22 PROCEDURE — 96374 THER/PROPH/DIAG INJ IV PUSH: CPT

## 2018-02-22 PROCEDURE — 25000003 PHARM REV CODE 250: Performed by: NURSE PRACTITIONER

## 2018-02-22 PROCEDURE — 63600175 PHARM REV CODE 636 W HCPCS

## 2018-02-22 PROCEDURE — 86901 BLOOD TYPING SEROLOGIC RH(D): CPT

## 2018-02-22 PROCEDURE — 83690 ASSAY OF LIPASE: CPT

## 2018-02-22 PROCEDURE — 86870 RBC ANTIBODY IDENTIFICATION: CPT

## 2018-02-22 PROCEDURE — 63600175 PHARM REV CODE 636 W HCPCS: Performed by: EMERGENCY MEDICINE

## 2018-02-22 PROCEDURE — 85025 COMPLETE CBC W/AUTO DIFF WBC: CPT

## 2018-02-22 PROCEDURE — 80053 COMPREHEN METABOLIC PANEL: CPT

## 2018-02-22 PROCEDURE — C9113 INJ PANTOPRAZOLE SODIUM, VIA: HCPCS | Performed by: EMERGENCY MEDICINE

## 2018-02-22 PROCEDURE — 86905 BLOOD TYPING RBC ANTIGENS: CPT

## 2018-02-22 PROCEDURE — 36430 TRANSFUSION BLD/BLD COMPNT: CPT

## 2018-02-22 PROCEDURE — 85610 PROTHROMBIN TIME: CPT

## 2018-02-22 PROCEDURE — 20000000 HC ICU ROOM

## 2018-02-22 PROCEDURE — 99291 CRITICAL CARE FIRST HOUR: CPT | Mod: 25

## 2018-02-22 PROCEDURE — 82272 OCCULT BLD FECES 1-3 TESTS: CPT

## 2018-02-22 PROCEDURE — 99211 OFF/OP EST MAY X REQ PHY/QHP: CPT | Mod: S$PBB,25,,

## 2018-02-22 PROCEDURE — C9113 INJ PANTOPRAZOLE SODIUM, VIA: HCPCS | Performed by: NURSE PRACTITIONER

## 2018-02-22 RX ORDER — DIPHENHYDRAMINE HYDROCHLORIDE 50 MG/ML
25 INJECTION INTRAMUSCULAR; INTRAVENOUS ONCE
Status: COMPLETED | OUTPATIENT
Start: 2018-02-22 | End: 2018-02-22

## 2018-02-22 RX ORDER — FUROSEMIDE 10 MG/ML
INJECTION INTRAMUSCULAR; INTRAVENOUS
Status: DISPENSED
Start: 2018-02-22 | End: 2018-02-23

## 2018-02-22 RX ORDER — ONDANSETRON 2 MG/ML
4 INJECTION INTRAMUSCULAR; INTRAVENOUS
Status: DISPENSED | OUTPATIENT
Start: 2018-02-22 | End: 2018-02-23

## 2018-02-22 RX ORDER — LORAZEPAM 2 MG/ML
1 INJECTION INTRAMUSCULAR ONCE
Status: COMPLETED | OUTPATIENT
Start: 2018-02-22 | End: 2018-02-22

## 2018-02-22 RX ORDER — DIPHENHYDRAMINE HYDROCHLORIDE 50 MG/ML
INJECTION INTRAMUSCULAR; INTRAVENOUS
Status: DISPENSED
Start: 2018-02-22 | End: 2018-02-23

## 2018-02-22 RX ORDER — METHYLPREDNISOLONE SOD SUCC 125 MG
VIAL (EA) INJECTION
Status: COMPLETED
Start: 2018-02-22 | End: 2018-02-22

## 2018-02-22 RX ORDER — METHYLPREDNISOLONE SOD SUCC 125 MG
125 VIAL (EA) INJECTION ONCE
Status: COMPLETED | OUTPATIENT
Start: 2018-02-22 | End: 2018-02-22

## 2018-02-22 RX ORDER — LORAZEPAM 2 MG/ML
INJECTION INTRAMUSCULAR
Status: DISPENSED
Start: 2018-02-22 | End: 2018-02-23

## 2018-02-22 RX ORDER — HYDROCODONE BITARTRATE AND ACETAMINOPHEN 500; 5 MG/1; MG/1
TABLET ORAL
Status: DISCONTINUED | OUTPATIENT
Start: 2018-02-22 | End: 2018-03-02 | Stop reason: HOSPADM

## 2018-02-22 RX ORDER — FAMOTIDINE 10 MG/ML
20 INJECTION INTRAVENOUS ONCE
Status: COMPLETED | OUTPATIENT
Start: 2018-02-22 | End: 2018-02-23

## 2018-02-22 RX ORDER — FUROSEMIDE 10 MG/ML
40 INJECTION INTRAMUSCULAR; INTRAVENOUS ONCE
Status: COMPLETED | OUTPATIENT
Start: 2018-02-22 | End: 2018-02-22

## 2018-02-22 RX ORDER — DIAZEPAM 10 MG/2ML
5 INJECTION INTRAMUSCULAR ONCE
Status: DISCONTINUED | OUTPATIENT
Start: 2018-02-22 | End: 2018-02-22

## 2018-02-22 RX ORDER — PANTOPRAZOLE SODIUM 40 MG/10ML
40 INJECTION, POWDER, LYOPHILIZED, FOR SOLUTION INTRAVENOUS
Status: COMPLETED | OUTPATIENT
Start: 2018-02-22 | End: 2018-02-22

## 2018-02-22 RX ORDER — HYDROCODONE BITARTRATE AND ACETAMINOPHEN 500; 5 MG/1; MG/1
TABLET ORAL
Status: DISCONTINUED | OUTPATIENT
Start: 2018-02-22 | End: 2018-02-22

## 2018-02-22 RX ADMIN — LORAZEPAM 1 MG: 2 INJECTION, SOLUTION INTRAMUSCULAR; INTRAVENOUS at 11:02

## 2018-02-22 RX ADMIN — PANTOPRAZOLE SODIUM 8 MG/HR: 40 INJECTION, POWDER, FOR SOLUTION INTRAVENOUS at 07:02

## 2018-02-22 RX ADMIN — PANTOPRAZOLE SODIUM 40 MG: 40 INJECTION, POWDER, FOR SOLUTION INTRAVENOUS at 07:02

## 2018-02-22 RX ADMIN — METHYLPREDNISOLONE SODIUM SUCCINATE 125 MG: 125 INJECTION, POWDER, FOR SOLUTION INTRAMUSCULAR; INTRAVENOUS at 11:02

## 2018-02-22 RX ADMIN — DIPHENHYDRAMINE HYDROCHLORIDE 25 MG: 50 INJECTION, SOLUTION INTRAMUSCULAR; INTRAVENOUS at 11:02

## 2018-02-22 RX ADMIN — Medication 125 MG: at 11:02

## 2018-02-22 RX ADMIN — FUROSEMIDE 40 MG: 10 INJECTION, SOLUTION INTRAMUSCULAR; INTRAVENOUS at 11:02

## 2018-02-22 RX ADMIN — DEXTROSE 8 MG/HR: 50 INJECTION, SOLUTION INTRAVENOUS at 11:02

## 2018-02-22 NOTE — ED PROVIDER NOTES
Encounter Date: 2018    SCRIBE #1 NOTE: I, Anatoliy Gordon, am scribing for, and in the presence of,  Gi Patel MD. I have scribed the following portions of the note - Other sections scribed: HPI, ROS, PE.       History     Chief Complaint   Patient presents with    Abdominal Pain     with nausea and black tarry stools x 2 times. +SOB. Supposed to wear 2 L of home O2, not on O2 with EMS. Generalized weakness, warm to touch, Dialysis yesterday. Finished tx for pneumonia yesterday.      CC: Abdominal Pain    HPI: This 62 y.o. Female with chronic kidney disease, diabetes mellitus and HTN presents to the ED c/o periumbilical abdominal pain which began x2 days ago. Pt reports she is a dialysis pt and dialyzes x3 days a week. She states she can not sit or stand. She has not taken any medications for her current symptoms. Pt denies nausea, emesis, chest pain or diarrhea. She admits she is allergic to penicillins, iodine and sulfamethoxazole-trimethoprim. Pt reports when she has iodine her face swells.      The history is provided by the patient and a relative. No  was used.     Review of patient's allergies indicates:   Allergen Reactions    Penicillins Swelling    Iodine      Other reaction(s): Hives    Sulfamethoxazole-trimethoprim      Other reaction(s): Swelling  Other reaction(s): Hives     Past Medical History:   Diagnosis Date    Allergy     Anemia     Arthritis     Awaiting organ transplant 2013    Cataract     CHF (congestive heart failure)     Chronic kidney disease     Coronary artery disease     Diabetes mellitus     Diabetic retinopathy     ESRD from HTN strtied RRT 1999    Failed  donor kidney transplant -     Glaucoma     History of bleeding peptic ulcer      as stated per pt    Hyperlipidemia     Hypertension     Hypothyroidism     Morbid obesity 8/10/2012    Renal hypertension     Stroke      Past Surgical History:    Procedure Laterality Date    CATARACT EXTRACTION W/  INTRAOCULAR LENS IMPLANT Bilateral     EYE SURGERY      HYSTERECTOMY      KIDNEY TRANSPLANT      NEPHRECTOMY  11/2008    transplant     TONSILLECTOMY       Family History   Problem Relation Age of Onset    Heart attack Father     Heart failure Father     Hypertension Father     Blindness Father     Heart attack Mother     Heart failure Mother     Hypertension Mother     Asthma Sister     Depression Sister     Hypertension Sister     Hypertension Brother     Kidney disease Brother      ESRD    Diabetes Maternal Aunt     Amblyopia Neg Hx     Cataracts Neg Hx     Macular degeneration Neg Hx     Retinal detachment Neg Hx     Strabismus Neg Hx      Social History   Substance Use Topics    Smoking status: Former Smoker     Quit date: 8/10/2000    Smokeless tobacco: Never Used    Alcohol use No     Review of Systems   Constitutional: Negative for chills and fever.   HENT: Negative for ear pain, rhinorrhea and sore throat.    Eyes: Negative for redness.   Respiratory: Negative for shortness of breath.    Cardiovascular: Negative for chest pain.   Gastrointestinal: Positive for abdominal pain (periumbilical). Negative for diarrhea, nausea and vomiting.   Genitourinary: Negative for dysuria and hematuria.   Musculoskeletal: Negative for back pain and neck pain.   Skin: Negative for rash.   Neurological: Positive for dizziness and light-headedness. Negative for weakness, numbness and headaches.   Hematological: Does not bruise/bleed easily.   Psychiatric/Behavioral: Negative for agitation, behavioral problems and confusion. The patient is not nervous/anxious.    All other systems reviewed and are negative.      Physical Exam     Initial Vitals [02/22/18 1639]   BP Pulse Resp Temp SpO2   (!) 160/80 80 20 100.1 °F (37.8 °C) 98 %      MAP       106.67         Physical Exam    Nursing note and vitals reviewed.  Constitutional: She appears  well-developed and well-nourished. She is cooperative.  Non-toxic appearance. No distress.   HENT:   Head: Normocephalic and atraumatic.   Mouth/Throat: Oropharynx is clear and moist.   Eyes: EOM are normal. Pupils are equal, round, and reactive to light.   Pale conjunctiva bilaterally.   Neck: Normal range of motion and full passive range of motion without pain. Neck supple. No thyromegaly present. No JVD present.   Cardiovascular: Normal rate, regular rhythm, normal heart sounds and normal pulses.   Pulmonary/Chest: Effort normal and breath sounds normal. No respiratory distress.   Abdominal: Soft. Normal appearance and bowel sounds are normal. She exhibits no distension and no mass. There is tenderness in the periumbilical area.   Pt has periumbilical tenderness to palpation.   Musculoskeletal: Normal range of motion.   Neurological: She is alert and oriented to person, place, and time. She has normal strength. No cranial nerve deficit or sensory deficit.   Skin: Skin is warm, dry and intact. No rash noted.   Psychiatric: She has a normal mood and affect. Her speech is normal and behavior is normal. Judgment and thought content normal.         ED Course   Procedures  Labs Reviewed   CBC W/ AUTO DIFFERENTIAL - Abnormal; Notable for the following:        Result Value    RBC 1.84 (*)     Hemoglobin 4.8 (*)     Hematocrit 16.4 (*)     MCH 26.1 (*)     MCHC 29.3 (*)     RDW 19.0 (*)     All other components within normal limits    Narrative:       HGB & HCT critical result(s) called and verbal readback obtained   from:Paulina Quintero, 02/22/2018 19:30  Recoll. [id number] by SAI at 02/22/2018 18:26, reason: verify   results/discarded/ekta ij.   COMPREHENSIVE METABOLIC PANEL - Abnormal; Notable for the following:     Chloride 94 (*)     CO2 32 (*)     Glucose 137 (*)     BUN, Bld 58 (*)     Creatinine 8.7 (*)     Albumin 2.9 (*)     Alkaline Phosphatase 39 (*)     ALT 8 (*)     eGFR if  5 (*)      eGFR if non  4 (*)     All other components within normal limits    Narrative:     Recoll. [id number] by LM1 at 02/22/2018 18:26, reason: verify   results/discarded/ekta ji.   PROTIME-INR - Abnormal; Notable for the following:     Prothrombin Time 26.6 (*)     INR 2.5 (*)     All other components within normal limits   APTT - Abnormal; Notable for the following:     aPTT 40.7 (*)     All other components within normal limits   LIPASE    Narrative:     Recoll. [id number] by LM1 at 02/22/2018 18:26, reason: verify   results/discarded/ekta ji.   OCCULT BLOOD X 1, STOOL   PREPARE RBC SOFT        Imaging Results          X-Ray Chest AP Portable (Final result)  Result time 02/22/18 19:51:33    Final result by Winter Woods Jr., MD (02/22/18 19:51:33)                 Impression:     Findings most consistent with congestive failure.      Electronically signed by: WINTER WOODS MD  Date:     02/22/18  Time:    19:51              Narrative:    Portable chest compared to 02/15/2018.  Heart is enlarged and there is diffuse increase in interstitial and alveolar markings.  Some patchy opacities at the bases.                             CT Abdomen Pelvis  Without Contrast (Final result)  Result time 02/22/18 18:42:19   Procedure changed from CT Abdomen Pelvis With Contrast     Final result by Winter Woods Jr., MD (02/22/18 18:42:19)                 Impression:     There is a small anterior abdominal wall hernia containing a portion of the transverse colon.    There are increased areas of ground glass opacity at the lung bases as well as some linear bands of atelectasis or fibrosis.  Congestive failure or developing infiltrate not excluded.    Cholelithiasis, probably chronic renal failure with dysplastic kidneys and hypodensities presumably cysts.  Probable nonfunctioning right iliac fossa transplant or residual soft tissue from removal unchanged.      Electronically signed by: WINTER WOODS  MD  Date:     02/22/18  Time:    18:42              Narrative:    CT of the abdomen and pelvis was performed without the use of oral intravenous contrast.  Axial sagittal and coronal reformatted images submitted.    Today's study compared prior 10/09/2017    Findings: In the chest there are scattered areas of groundglass opacity as well as some linear bands of atelectasis or fibrosis.    In the abdomen the liver is similar in size and configuration.  No focal mass lesions.  Punctate gallstone again identified.  No pancreatic or splenic masses on this noncontrast exam.  Likewise no adrenal masses.  The kidneys are atrophic and contains scattered small hypodensities presumably chronic renal failure.  This calcified plaque in the aorta and branch vessels.  Normal sized para-aortic nodes are visualized.  No significant adenopathy.  In the pelvis uterus is not visualized.  Bladder is unremarkable.  Scattered groin nodes similar to prior.    Evaluation of the bowel demonstrate a small anterior abdominal wall hernia containing a portion of the transverse colon.  No obstruction seen.  No significant dilated loops of small bowel.  No inflammatory change in the right lower quadrant.  Irregular soft tissue density in the right iliac fossa presumably a prior renal transplant unchanged.  Bone windows demonstrate no lytic or blastic lesions.                                 Medical Decision Making:   Differential Diagnosis:   Abdominal pain.  Upper GI bleed.  Other:   I have discussed this case with another health care provider.       <> Summary of the Discussion: I consulted the gastroenterologist on call Dr. Kenneth Devi.  He wants patient admitted and given blood transfusion and FFP.  He will see patient in the morning for EGD.  I also discussed patient with Dr. Iverson who is the primary care doctor.  Dr. Iverson will admit patient to the ICU for further management.  He agreed with blood transfusion and FFP transfusion.             Scribe Attestation:   Scribe #1: I performed the above scribed service and the documentation accurately describes the services I performed. I attest to the accuracy of the note.    Attending Attestation:         Attending Critical Care:   Critical Care Times:   Direct Patient Care (initial evaluation, reassessments, and time considering the case)................................................................20 minutes.   Additional History from reviewing old medical records or taking additional history from the family, EMS, PCP, etc.......................10 minutes.   Ordering, Reviewing, and Interpreting Diagnostic Studies...............................................................................................................10 minutes.   Documentation..................................................................................................................................................................................15 minutes.   Consultation with other Physicians. .................................................................................................................................................15 minutes.   ==============================================================  · Total Critical Care Time - exclusive of procedural time: 70 minutes.  ==============================================================  Critical care was necessary to treat or prevent imminent or life-threatening deterioration of the following conditions: GI bleeding.   The following critical care procedures were done by me (see procedure notes): blood draw for specimens.   Critical care was time spent personally by me on the following activities: obtaining history from patient or relative, examination of patient, review of old charts, ordering lab, x-rays, and/or EKG, development of treatment plan with patient or relative, ordering and performing treatments and interventions, evaluation of patient's response to  treatment, discussion with consultants and re-evaluation of patient's conition.   Critical Care Condition: life-threatening     Physician Attestation for Scribe:  Physician Attestation Statement for Scribe #1: I, Gi Patel MD, reviewed documentation, as scribed by Anatoliy Gordon in my presence, and it is both accurate and complete.     Comments: I, Dr. Patel, personally performed the services described in this documentation. All medical record entries made by the scribe were at my direction and in my presence.  I have reviewed the chart and agree that the record reflects my personal performance and is accurate and complete.                 Clinical Impression:   The primary encounter diagnosis was Symptomatic anemia. Diagnoses of Abdominal pain, Acute upper GI bleed, and Upper GI bleed were also pertinent to this visit.    Disposition:   Disposition: Admitted  Condition: Stable                        Gi Patel MD  02/22/18 5464

## 2018-02-22 NOTE — ED TRIAGE NOTES
Patient presents after a call to EMS with c/o SOB x 2 days (wears 2L O@ at home), productive cough, lightheadedness, feeling of legs giving out along with numbness and tingling in upper and LE. Shortly after EMS arrived, patient also having c/o abdominal pain in lower abdominal area. Patient is on dialysis and takes Coumadin and Iron and has been having constipation with black tarry stools. Was treated about a week ago for pneumonia. Denies blurred vision, HA, N/V. Will continue to monitor.

## 2018-02-22 NOTE — PROGRESS NOTES
INR a tad high. Patient is not feeling well today. She reports diagnosed with pneumonia last week. She took a zpak and completed a few days ago. She is not eating well. She reports missing dialysis last week due to being ill. She reports black, tarry stools yesterday. Patient reports she is thinking/planning on going to ER for assessment of pneumonia. Agreed with patient and encouraged her to go to ER also due to black stools and concern for GI bleed. We will c/s tomorrow. She was advised to hold coumadin today.

## 2018-02-23 ENCOUNTER — ANESTHESIA EVENT (OUTPATIENT)
Dept: ENDOSCOPY | Facility: HOSPITAL | Age: 63
End: 2018-02-23

## 2018-02-23 LAB
ANION GAP SERPL CALC-SCNC: 9 MMOL/L
APTT BLDCRRT: 30.3 SEC
BASOPHILS # BLD AUTO: 0.01 K/UL
BASOPHILS NFR BLD: 0.1 %
BLD PROD TYP BPU: NORMAL
BLD PROD TYP BPU: NORMAL
BLOOD UNIT EXPIRATION DATE: NORMAL
BLOOD UNIT EXPIRATION DATE: NORMAL
BLOOD UNIT TYPE CODE: 6200
BLOOD UNIT TYPE CODE: 6200
BLOOD UNIT TYPE: NORMAL
BLOOD UNIT TYPE: NORMAL
BUN SERPL-MCNC: 70 MG/DL
CALCIUM SERPL-MCNC: 8.6 MG/DL
CHLORIDE SERPL-SCNC: 92 MMOL/L
CO2 SERPL-SCNC: 35 MMOL/L
CODING SYSTEM: NORMAL
CODING SYSTEM: NORMAL
CREAT SERPL-MCNC: 9.5 MG/DL
DIFFERENTIAL METHOD: ABNORMAL
DISPENSE STATUS: NORMAL
DISPENSE STATUS: NORMAL
EOSINOPHIL # BLD AUTO: 0 K/UL
EOSINOPHIL NFR BLD: 0.1 %
ERYTHROCYTE [DISTWIDTH] IN BLOOD BY AUTOMATED COUNT: 17.4 %
EST. GFR  (AFRICAN AMERICAN): 5 ML/MIN/1.73 M^2
EST. GFR  (NON AFRICAN AMERICAN): 4 ML/MIN/1.73 M^2
GLUCOSE SERPL-MCNC: 167 MG/DL
HCT VFR BLD AUTO: 19.6 %
HGB BLD-MCNC: 6 G/DL
INR PPP: 1.4
LYMPHOCYTES # BLD AUTO: 0.7 K/UL
LYMPHOCYTES NFR BLD: 9.9 %
MCH RBC QN AUTO: 26.8 PG
MCHC RBC AUTO-ENTMCNC: 30.6 G/DL
MCV RBC AUTO: 88 FL
MONOCYTES # BLD AUTO: 0.1 K/UL
MONOCYTES NFR BLD: 1 %
NEUTROPHILS # BLD AUTO: 6 K/UL
NEUTROPHILS NFR BLD: 88.3 %
PLATELET # BLD AUTO: 136 K/UL
PMV BLD AUTO: 11.7 FL
POTASSIUM SERPL-SCNC: 4.6 MMOL/L
PROTHROMBIN TIME: 14.6 SEC
RBC # BLD AUTO: 2.24 M/UL
SODIUM SERPL-SCNC: 136 MMOL/L
TRANS ERYTHROCYTES VOL PATIENT: NORMAL ML
TRANS ERYTHROCYTES VOL PATIENT: NORMAL ML
TSH SERPL DL<=0.005 MIU/L-ACNC: 0.79 UIU/ML
WBC # BLD AUTO: 6.84 K/UL

## 2018-02-23 PROCEDURE — 27201040 HC RC 50 FILTER

## 2018-02-23 PROCEDURE — 85610 PROTHROMBIN TIME: CPT

## 2018-02-23 PROCEDURE — 36415 COLL VENOUS BLD VENIPUNCTURE: CPT

## 2018-02-23 PROCEDURE — 25000003 PHARM REV CODE 250: Performed by: NURSE PRACTITIONER

## 2018-02-23 PROCEDURE — S0028 INJECTION, FAMOTIDINE, 20 MG: HCPCS | Performed by: NURSE PRACTITIONER

## 2018-02-23 PROCEDURE — P9021 RED BLOOD CELLS UNIT: HCPCS

## 2018-02-23 PROCEDURE — 93010 ELECTROCARDIOGRAM REPORT: CPT | Mod: ,,, | Performed by: INTERNAL MEDICINE

## 2018-02-23 PROCEDURE — 84443 ASSAY THYROID STIM HORMONE: CPT

## 2018-02-23 PROCEDURE — 85730 THROMBOPLASTIN TIME PARTIAL: CPT

## 2018-02-23 PROCEDURE — 27000221 HC OXYGEN, UP TO 24 HOURS

## 2018-02-23 PROCEDURE — 80100016 HC MAINTENANCE HEMODIALYSIS

## 2018-02-23 PROCEDURE — 63600175 PHARM REV CODE 636 W HCPCS: Performed by: NURSE PRACTITIONER

## 2018-02-23 PROCEDURE — 85025 COMPLETE CBC W/AUTO DIFF WBC: CPT

## 2018-02-23 PROCEDURE — 63600175 PHARM REV CODE 636 W HCPCS: Performed by: FAMILY MEDICINE

## 2018-02-23 PROCEDURE — 36430 TRANSFUSION BLD/BLD COMPNT: CPT

## 2018-02-23 PROCEDURE — 80048 BASIC METABOLIC PNL TOTAL CA: CPT

## 2018-02-23 PROCEDURE — 20000000 HC ICU ROOM

## 2018-02-23 PROCEDURE — C9113 INJ PANTOPRAZOLE SODIUM, VIA: HCPCS | Performed by: NURSE PRACTITIONER

## 2018-02-23 PROCEDURE — 25000003 PHARM REV CODE 250: Performed by: FAMILY MEDICINE

## 2018-02-23 RX ORDER — HYDRALAZINE HYDROCHLORIDE 20 MG/ML
10 INJECTION INTRAMUSCULAR; INTRAVENOUS EVERY 6 HOURS PRN
Status: DISCONTINUED | OUTPATIENT
Start: 2018-02-23 | End: 2018-03-02 | Stop reason: HOSPADM

## 2018-02-23 RX ORDER — SODIUM CHLORIDE 9 MG/ML
INJECTION, SOLUTION INTRAVENOUS
Status: DISCONTINUED | OUTPATIENT
Start: 2018-02-23 | End: 2018-03-02 | Stop reason: HOSPADM

## 2018-02-23 RX ORDER — SODIUM CHLORIDE 9 MG/ML
INJECTION, SOLUTION INTRAVENOUS ONCE
Status: DISCONTINUED | OUTPATIENT
Start: 2018-02-23 | End: 2018-02-23

## 2018-02-23 RX ADMIN — DEXTROSE 8 MG/HR: 50 INJECTION, SOLUTION INTRAVENOUS at 02:02

## 2018-02-23 RX ADMIN — ERYTHROPOIETIN 8000 UNITS: 10000 INJECTION, SOLUTION INTRAVENOUS; SUBCUTANEOUS at 11:02

## 2018-02-23 RX ADMIN — FAMOTIDINE 20 MG: 10 INJECTION INTRAVENOUS at 12:02

## 2018-02-23 RX ADMIN — DEXTROSE 8 MG/HR: 50 INJECTION, SOLUTION INTRAVENOUS at 09:02

## 2018-02-23 RX ADMIN — PHYTONADIONE 10 MG: 10 INJECTION, EMULSION INTRAMUSCULAR; INTRAVENOUS; SUBCUTANEOUS at 05:02

## 2018-02-23 RX ADMIN — DEXTROSE 8 MG/HR: 50 INJECTION, SOLUTION INTRAVENOUS at 04:02

## 2018-02-23 RX ADMIN — DEXTROSE 8 MG/HR: 50 INJECTION, SOLUTION INTRAVENOUS at 07:02

## 2018-02-23 NOTE — NURSING
2 units of FFP completed. Patient then begins to c/o SOB and itching. Notified Fanta Cabrera NP. NP states possible transfusion reaction. New orders rec'd to administer Benadryl 25mg IVP x1, SoluMedrol 125mg IVP x1, Pepcid 20mg IVP x1, and Lasix 40mg IVP x1. Also to notify Nephrology at this time for possible dialysis.    0010 Notified Dr. Deleon regarding consult, and  is on call tonight. Called  regarding consult. Informed physician regarding patient status.  states patient will be dialyzed in AM and ok to transfuse 1unit of PRBC's and hold 2nd unit at this time.

## 2018-02-23 NOTE — ED NOTES
Notified by charge nurse Beatriz that there will be a delay in blood from blood bank secondary to antibodies noted during screen. Communicated this with receiving nurse in ICU [place].

## 2018-02-23 NOTE — EICU
63 y/o F ESRD,  pulmonary HTN PAP 71 on warfarin, macitentan, tadalafil and selexipag. Presented with symptomatic anemia with report of black tarry stools.  H/H 4.8/16.4 (7.8/26.6 one week prior).    Camera assessment: not in distress, BP stable not tachycardic, O2 100%  CXR with congestive changes    · Ordered BNP for monitoring  · Packed RBC not available  · Spoke with bedside nurse to give blood products slowly and to watchout for congestion.  Ideally to give PRBC during dialysis tomorrow.

## 2018-02-23 NOTE — CONSULTS
[facility] [place]  Nephrology  Consult Note    Patient Name: Shivani Sheets  MRN: [id number]  Admission Date: 2018  Hospital Length of Stay: 1 days  Attending Provider: César Iverson MD   Primary Care Physician: César Iverson MD  Principal Problem:<principal problem not specified>    Inpatient consult to Nephrology  Consult performed by: CONNIE MARIA  Consult ordered by: WALTER PINK  Reason for consult: dialysis        Subjective:     HPI: 63yo F with PMH ESRD on HD MWF with Dr Deleon at [place] s/p failed KTxp, CAD who presented to our facility with 2-3 days of dark, tarry stools.  She uses home O2 NC 2L at baseline and reports progressively worsening SOB over that time frame.  She had a fall last month and has been ambulating with a walker but has noticed worsening generalized weakness.  No abdominal pain, nausea, vomiting, NSAID use.  She has not had any further bloody stools since admission.  Since admission, she was noted to have significant anemia and underwent transfusion with worsening SOB and required ventimask.  Since dialysis initiated, her SOB has improved and weaned to O2 NC.    Past Medical History:   Diagnosis Date    Allergy     Anemia     Arthritis     Awaiting organ transplant 2013    Cataract     CHF (congestive heart failure)     Chronic kidney disease     Coronary artery disease     Diabetes mellitus     Diabetic retinopathy     ESRD from HTN strtied RRT 1999    Failed  donor kidney transplant -     Glaucoma     History of bleeding peptic ulcer      as stated per pt    Hyperlipidemia     Hypertension     Hypothyroidism     Morbid obesity 8/10/2012    Renal hypertension     Stroke        Past Surgical History:   Procedure Laterality Date    CATARACT EXTRACTION W/  INTRAOCULAR LENS IMPLANT Bilateral     EYE SURGERY      HYSTERECTOMY      KIDNEY TRANSPLANT      NEPHRECTOMY  2008    transplant      TONSILLECTOMY         Review of patient's allergies indicates:   Allergen Reactions    Penicillins Swelling    Iodine      Other reaction(s): Hives    Sulfamethoxazole-trimethoprim      Other reaction(s): Swelling  Other reaction(s): Hives     Current Facility-Administered Medications   Medication Frequency    0.9%  NaCl infusion (for blood administration) Q24H PRN    0.9%  NaCl infusion PRN    0.9%  NaCl infusion Once    pantoprazole 40 mg in dextrose 5 % 100 mL infusion (ready to mix system) Continuous    pneumoc 13-jessica conj-dip cr(PF) 0.5 mL vaccine x 1 dose     Family History     Problem Relation (Age of Onset)    Asthma Sister    Blindness Father    Depression Sister    Diabetes Maternal Aunt    Heart attack Father, Mother    Heart failure Father, Mother    Hypertension Father, Mother, Sister, Brother    Kidney disease Brother        Social History Main Topics    Smoking status: Former Smoker     Quit date: 8/10/2000    Smokeless tobacco: Never Used    Alcohol use No    Drug use: No    Sexual activity: No     Review of Systems   Constitutional: Positive for activity change. Negative for chills and fever.   HENT: Negative for hearing loss.    Eyes: Negative for visual disturbance.   Respiratory: Positive for shortness of breath.    Cardiovascular: Positive for chest pain. Negative for leg swelling.   Gastrointestinal: Positive for blood in stool. Negative for nausea and vomiting.   Skin: Negative for rash and wound.   Neurological: Positive for weakness.   All other systems reviewed and are negative.    Objective:     Vital Signs (Most Recent):  Temp: 97.6 °F (36.4 °C) (02/23/18 1300)  Pulse: 76 (02/23/18 1300)  Resp: 19 (02/23/18 1300)  BP: (!) 165/85 (02/23/18 1300)  SpO2: 100 % (02/23/18 1300)  O2 Device (Oxygen Therapy): venti mask (02/23/18 0744) Vital Signs (24h Range):  Temp:  [97.6 °F (36.4 °C)-100.1 °F (37.8 °C)] 97.6 °F (36.4 °C)  Pulse:  [] 76  Resp:  [11-62] 19  SpO2:  [90 %-100 %]  100 %  BP: (126-193)/() 165/85     Weight: 73.4 kg (161 lb 13.1 oz) (02/22/18 2142)  Body mass index is 30.58 kg/m².  Body surface area is 1.78 meters squared.    I/O last 3 completed shifts:  In: 819.7 [I.V.:220.7; Blood:549; IV Piggyback:50]  Out: -     Physical Exam   Constitutional: She is oriented to person, place, and time. She appears well-developed and well-nourished. No distress.   HENT:   Head: Normocephalic and atraumatic.   Eyes: EOM are normal. No scleral icterus.   Cardiovascular: Normal rate, regular rhythm and normal heart sounds.  Exam reveals no friction rub.    No murmur heard.  ABEL AVF   Pulmonary/Chest: Effort normal. No respiratory distress. She has no wheezes. She has rales.   5L O2 NC   Abdominal: Soft. She exhibits no distension. There is no tenderness.   Musculoskeletal: She exhibits no edema or deformity.   Neurological: She is alert and oriented to person, place, and time.   Skin: Skin is warm and dry. No rash noted. She is not diaphoretic.   Psychiatric: She has a normal mood and affect. Her behavior is normal.   Nursing note and vitals reviewed.      Significant Labs:  CBC:   Recent Labs  Lab 02/23/18  0828   WBC 6.84   RBC 2.24*   HGB 6.0*   HCT 19.6*   *   MCV 88   MCH 26.8*   MCHC 30.6*     CMP:   Recent Labs  Lab 02/22/18  1844 02/23/18  0828   * 167*   CALCIUM 8.8 8.6*   ALBUMIN 2.9*  --    PROT 6.7  --     136   K 3.9 4.6   CO2 32* 35*   CL 94* 92*   BUN 58* 70*   CREATININE 8.7* 9.5*   ALKPHOS 39*  --    ALT 8*  --    AST 13  --    BILITOT 0.5  --      All labs within the past 24 hours have been reviewed.    Significant Imaging:  CXR 2/23/18:   Findings: The cardiomediastinal silhouette is enlarged, similar to prior examination.  There is a left subclavian vascular stent in place.  Surgical clips project over the left lower neck.  There is increased interstitial and parenchymal attenuation, similar to prior examination.  Findings can be seen in setting  of pulmonary edema/CHF.  No evidence of pneumothorax or significant interval detrimental change in cardiopulmonary status compared to prior examination.    CT abd/pelv:   There is a small anterior abdominal wall hernia containing a portion of the transverse colon.  There are increased areas of ground glass opacity at the lung bases as well as some linear bands of atelectasis or fibrosis.  Congestive failure or developing infiltrate not excluded.  Cholelithiasis, probably chronic renal failure with dysplastic kidneys and hypodensities presumably cysts.  Probable nonfunctioning right iliac fossa transplant or residual soft tissue from removal unchanged.    Assessment/Plan:     Active Diagnoses:    Diagnosis Date Noted POA    Symptomatic anemia [D64.9] 02/22/2018 Yes      Problems Resolved During this Admission:    Diagnosis Date Noted Date Resolved POA       1. ESRD - usual HD on MWF with Dr Deleon in [place], Dry weight 73-74kg.   - HD today with goal UF 3L as tolerated  - pulmonary edema - UF as tolerated, transfuse on dialysis today, wean O2 as tolerated  2. HTN - UF as tolerated, will give hydralazine PRN until scope.  Plan to resume PO medications once GI clears for a diet  3. Anemia of chronic kidney disease - and acute blood loss anemia 2/2 GIB, transfusion underway with dialysis, continue Epo  4. MBD/secondary hyperparathyroidism - check phos  5. Access - ABEL AVF  6. Nutrition - NPO    Failed kidney transplant  Pulm HTN  DHF  STEFFANY  Hypothyroid  RA  HLD    Thank you for allowing me to participate in the care of your patient.  Please call with any questions.    Date of service: 2/23/2018  Judi Thompson MD   Nephrology  [facility]  Office [phone]   [facility] [place]:  Dr Pancho Hutchinson

## 2018-02-23 NOTE — CONSULTS
Gastroenterology    CC: Anemia    HPI 62 y.o. female with severe, normocytic, painless anemia associated with some dark stools recently.  No abd pain.  No emesis but does have some nausea. No heavy NSAID use.  No hematochezia or hematemesis.  No recent wt loss.  On warfarin.  No other change in bowel habits.  No jaundice.  No dysphagia.  No early satiety.  No freq heartburn.  Some SOB.      Per RN, no stools or melena overnight.        Past Medical History:   Diagnosis Date    Allergy     Anemia     Arthritis     Awaiting organ transplant 2013    Cataract     CHF (congestive heart failure)     Chronic kidney disease     Coronary artery disease     Diabetes mellitus     Diabetic retinopathy     ESRD from HTN strtied RRT 1999    Failed  donor kidney transplant      Glaucoma     History of bleeding peptic ulcer      as stated per pt    Hyperlipidemia     Hypertension     Hypothyroidism     Morbid obesity 8/10/2012    Renal hypertension     Stroke          Past Surgical History:   Procedure Laterality Date    CATARACT EXTRACTION W/  INTRAOCULAR LENS IMPLANT Bilateral     EYE SURGERY      HYSTERECTOMY      KIDNEY TRANSPLANT      NEPHRECTOMY  2008    transplant     TONSILLECTOMY         Social History  Social History   Substance Use Topics    Smoking status: Former Smoker     Quit date: 8/10/2000    Smokeless tobacco: Never Used    Alcohol use No   no illicits      Family History   Problem Relation Age of Onset    Heart attack Father     Heart failure Father     Hypertension Father     Blindness Father     Heart attack Mother     Heart failure Mother     Hypertension Mother     Asthma Sister     Depression Sister     Hypertension Sister     Hypertension Brother     Kidney disease Brother      ESRD    Diabetes Maternal Aunt     Amblyopia Neg Hx     Cataracts Neg Hx     Macular degeneration Neg Hx     Retinal detachment Neg Hx      "Strabismus Neg Hx        Review of Systems  General ROS: negative for chills, fever or weight loss  Psychological ROS: negative for hallucination, depression or suicidal ideation  Ophthalmic ROS: negative for blurry vision, photophobia or eye pain  ENT ROS: negative for epistaxis, sore throat or rhinorrhea  Respiratory ROS: no cough, wheezing, + SOB  Cardiovascular ROS: no chest pain or palpitations  Gastrointestinal ROS: no abdominal pain, change in bowel habits  Genito-Urinary ROS: no vaginal pain or bleeding, no urethral discharge  Musculoskeletal ROS: negative for gait disturbance or muscular weakness  Neurological ROS: no syncope or seizures; no ataxia  Dermatological ROS: negative for pruritis, rash and jaundice    Physical Examination  BP (!) 141/77   Pulse 82   Temp 97.7 °F (36.5 °C) (Oral)   Resp (!) 26   Ht 5' 1" (1.549 m)   Wt 73.4 kg (161 lb 13.1 oz)   LMP  (LMP Unknown)   SpO2 100%   Breastfeeding? No   BMI 30.58 kg/m²   General appearance: alert, cooperative, no distress  HENT: Normocephalic, atraumatic, neck symmetrical, no nasal discharge   Eyes: conjunctivae/corneas clear, PERRL, EOM's intact  Lungs: clear to auscultation bilaterally, no dullness to percussion bilaterally  Heart: regular rate and rhythm without rub; no displacement of the PMI   Abdomen: soft, non-tender; ND  Extremities: extremities symmetric; no clubbing, cyanosis, or edema  Integument: Skin color, texture, turgor normal; no rashes; hair distrubution normal  Neurologic: Alert and oriented X 3, MAEW  Psychiatric: no pressured speech; normal affect; no evidence of impaired cognition     Labs:  Hb 4.8 with nl MCV    Assessment:     Severe anemia with reported dark stool/melena recently.  VSS.  No stools or melena overnight.      Plan:   - EGD planned for this AM.  However, pt currently on ventimask (post FFP) and getting first unit of blood.  Resp status has declined post FFP likely due to volume.  Getting lasix as well.  " Will get HD this AM with second unit blood at that time.  If resp status allows, will perform EGD this afternoon.  If she is not stable from resp standpoint, will defer for now, unless bleeding becomes recurrent/active.  PPI ggt for now.     Addendum  VSS.  Will need more blood before EGD/sedation.  Can plan for Monday, unless significant recurrent bleeding occurs.

## 2018-02-23 NOTE — PROCEDURES
"Shivani Sheets is a 62 y.o. female patient.    Temp: 97.7 °F (36.5 °C) (02/23/18 1345)  Pulse: 76 (02/23/18 1300)  Resp: 19 (02/23/18 1300)  BP: (!) 165/85 (02/23/18 1300)  SpO2: 100 % (02/23/18 1300)  Weight: 73.4 kg (161 lb 13.1 oz) (02/22/18 2142)  Height: 5' 1" (154.9 cm) (02/22/18 2142)       Prepare patient for dialysis  Date/Time: 2/23/2018 2:15 PM  Performed by: CONNIE THOMPSON.  Authorized by: ANNE LAMB       Patient seen and examined on dialysis.  /79  HR 70  AP -147   235       Full consult note to follow with history and physical exam.      Debby Thompson  2/23/2018  "

## 2018-02-23 NOTE — NURSING
O2 Sats decreased to 87% on nasal cannula. Increased oxygen to 5L and O2 Sat did not increased. Placed 45% Venti Mask on patient at this time. Fanta Cabrera aware. No acute distress at this time. Will continue to monitor.

## 2018-02-23 NOTE — ED NOTES
Pt in route to ICU and blood bank called to say FFP was ready.  Called ICU and spoke to Michelle to let them know.

## 2018-02-23 NOTE — PLAN OF CARE
Brief Nephrology Note    Consult received. Dialysis planned for after EGD.  Full consult note to follow.  Thank you for allowing me to participate in the care of your patient.  Please call with any questions.    Judi Thompson MD   Nephrology  [facility]  Office [phone]     Cross covering for:  Dr Pancho Hutchinson

## 2018-02-23 NOTE — PLAN OF CARE
Problem: Patient Care Overview  Goal: Plan of Care Review  Outcome: Ongoing (interventions implemented as appropriate)  Patient admitted from ER. AAOx4. Afebrile. Follows commands. VS WNL. NSR on cardiac monitor. Transfused 2units of FFP's. Patient became SOB after transfusions were complete. Lasix 40mg IVP was given. Placed patient on 45% venit mask. Patient currently on 55% venti mask to keep O2 Sats >92%. Anuric. Transfusing 1unit of PRBC's at a slow rate to avoid the chance of rapid fluid overload. Consulted Nephrology. Plan is for patient to have dialysis this AM. 2nd unit of PRBC will be transfused during dialysis. 1 dose of Vitamin K+ was administered. NPO. Full code. Plan of care was discussed with patient. Patient verbalized understanding.

## 2018-02-23 NOTE — NURSING
Decreased rate of blood transfusion to 75mL/hr to decrease the chance of quickly overloading patient with fluid. Ok Rick Allen NP and  with Nephrology.

## 2018-02-23 NOTE — H&P
[facility]  History & Physical    SUBJECTIVE:     Chief Complaint/Reason for Admission: SOB and weakness  History of Present Illness:  Patient is a 62 y.o. female presents will known to Dr. Iverson presents with nausea and black stool twice today.  She also c/o SOB, MORA and generalized fatigue and weakness.  She had HD yesterday for her full time.She was recently treated for a pneumonia.  She denies assoicated fever, chills or vomiting or coughing up blood.    PTA Medications   Medication Sig    ALPHAGAN P 0.1 % Drop     aspirin 81 MG chewable tablet Take 81 mg by mouth Daily. 1  By mouth Every day    azithromycin (Z-SHLOMO) 250 MG tablet Take 1 tablet (250 mg total) by mouth once daily. Take first 2 tablets together, then 1 every day until finished.    CARTIA  mg 24 hr capsule     cloNIDine (CATAPRES) 0.1 MG tablet     dorzolamide-timolol 2-0.5% (COSOPT) 22.3-6.8 mg/mL ophthalmic solution INSTILL 1 DROP IN BOTH EYES TWICE DAILY    hydrocodone-acetaminophen 5-325mg (NORCO) 5-325 mg per tablet Take 1 tablet by mouth every 4 (four) hours as needed.    latanoprost 0.005 % ophthalmic solution INSTILL 1 DROP IN BOTH EYES EVERY DAY AT BEDTIME    levothyroxine (SYNTHROID) 100 MCG tablet Take 100 mcg by mouth. 1 Tablet Oral Every day    lidocaine-prilocaine (EMLA) cream Apply topically as needed.    LIPITOR 10 mg tablet TAKE 1 BY MOUTH ONCE A DAY    macitentan (OPSUMIT) 10 mg Tab Take 1 tablet (10 mg total) by mouth every evening.    tadalafil, PULMONARY HYPERTENSION, (ADCIRCA) 20 mg Tab Take 2 tablets (40 mg total) by mouth once daily.    UPTRAVI 600 mcg Tab Take 600 mcg by mouth 2 (two) times daily.    warfarin (COUMADIN) 5 MG tablet TAKE 1 1/2 TABLETS BY MOUTH DAILY ON TUESDAY, THURSDAY AND SATURDAY, THEN TAKE 1 TABLET DAILY ON MONDAY, WEDNESDAY, FRIDAY AND SUNDAY       Review of patient's allergies indicates:   Allergen Reactions    Penicillins Swelling    Iodine      Other  reaction(s): Hives    Sulfamethoxazole-trimethoprim      Other reaction(s): Swelling  Other reaction(s): Hives       Past Medical History:   Diagnosis Date    Allergy     Anemia     Arthritis     Awaiting organ transplant 2013    Cataract     CHF (congestive heart failure)     Chronic kidney disease     Coronary artery disease     Diabetes mellitus     Diabetic retinopathy     ESRD from HTN strtied RRT 1999    Failed  donor kidney transplant      Glaucoma     History of bleeding peptic ulcer      as stated per pt    Hyperlipidemia     Hypertension     Hypothyroidism     Morbid obesity 8/10/2012    Renal hypertension     Stroke      Past Surgical History:   Procedure Laterality Date    CATARACT EXTRACTION W/  INTRAOCULAR LENS IMPLANT Bilateral     EYE SURGERY      HYSTERECTOMY      KIDNEY TRANSPLANT      NEPHRECTOMY  2008    transplant     TONSILLECTOMY       Family History   Problem Relation Age of Onset    Heart attack Father     Heart failure Father     Hypertension Father     Blindness Father     Heart attack Mother     Heart failure Mother     Hypertension Mother     Asthma Sister     Depression Sister     Hypertension Sister     Hypertension Brother     Kidney disease Brother      ESRD    Diabetes Maternal Aunt     Amblyopia Neg Hx     Cataracts Neg Hx     Macular degeneration Neg Hx     Retinal detachment Neg Hx     Strabismus Neg Hx      Social History   Substance Use Topics    Smoking status: Former Smoker     Quit date: 8/10/2000    Smokeless tobacco: Never Used    Alcohol use No        Review of Systems:  Constitutional: no fever or chills  Eyes: no visual changes  ENT: no nasal congestion or sore throat  Respiratory: positive for dyspnea on exertion  Cardiovascular: no chest pain or palpitations  Gastrointestinal: positive for change in bowel habits, melena and nausea  Neurological: no seizures or tremors    OBJECTIVE:      Vital Signs (Most Recent):  Temp: 98.5 °F (36.9 °C) (02/22/18 2257)  Pulse: 89 (02/22/18 2300)  Resp: (!) 43 (02/22/18 2300)  BP: 129/66 (02/22/18 2300)  SpO2: 100 % (02/22/18 2300)    Physical Exam:  General: cachectic, mild distress  Eyes: conjunctivae/corneas clear. PERRL..  HENT: Head:normocephalic, atraumatic. Ears:bilateral TM's and external ear canals normal. Nose: Nares normal. Septum midline. Mucosa normal. No drainage or sinus tenderness., no discharge. Throat: lips, mucosa, and tongue normal; teeth and gums normal and no throat erythema.  Neck: no carotid bruit, supple, symmetrical, trachea midline, no JVD, thyroid not enlarged, symmetric, no tenderness/mass/nodules and + JVD  Lungs:  labored breathing and rhonchi bilaterally  Cardiovascular: Heart: irregularly irregular rhythm. Chest Wall: no tenderness. Extremities: no cyanosis or edema, or clubbing. Pulses: 2+ and symmetric.  Abdomen/Rectal: Abdomen: soft, non-tender non-distented; bowel sounds normal; no masses,  no organomegaly. Rectal: not examined  Pelvic:  deferred  Skin: Skin color, texture, turgor normal. No rashes or lesions  Neurologic: Normal strength and tone. No focal numbness or weakness    Laboratory:  CBC:   Recent Labs  Lab 02/22/18 1844   WBC 7.18   RBC 1.84*   HGB 4.8*   HCT 16.4*      MCV 89   MCH 26.1*   MCHC 29.3*     BMP:   Recent Labs  Lab 02/22/18 1844   *      K 3.9   CL 94*   CO2 32*   BUN 58*   CREATININE 8.7*   CALCIUM 8.8     CMP:   Recent Labs  Lab 02/22/18  1844   *   CALCIUM 8.8   ALBUMIN 2.9*   PROT 6.7      K 3.9   CO2 32*   CL 94*   BUN 58*   CREATININE 8.7*   ALKPHOS 39*   ALT 8*   AST 13   BILITOT 0.5     LFTs:   Recent Labs  Lab 02/22/18 1844   ALT 8*   AST 13   ALKPHOS 39*   BILITOT 0.5   PROT 6.7   ALBUMIN 2.9*     Coagulation:   Recent Labs  Lab 02/22/18  1845   LABPROT 26.6*   INR 2.5*   APTT 40.7*     Cardiac markers: No results for input(s): CKMB, CPKMB, TROPONINT,  TROPONINI, MYOGLOBIN in the last 168 hours.  ABGs: No results for input(s): PH, PCO2, PO2, HCO3, POCSATURATED, BE in the last 168 hours.  Microbiology Results (last 7 days)     ** No results found for the last 168 hours. **        Specimen (12h ago through future)    None        No results for input(s): COLORU, CLARITYU, SPECGRAV, PHUR, PROTEINUA, GLUCOSEU, BILIRUBINCON, BLOODU, WBCU, RBCU, BACTERIA, MUCUS, NITRITE, LEUKOCYTESUR, UROBILINOGEN, HYALINECASTS in the last 168 hours.    Diagnostic Results:  Labs: Reviewed  ECG: Reviewed  X-Ray: Reviewed  reviewed    ASSESSMENT/PLAN:     1. Acute on Chronic Symptomatic Anemia normocytic anemia  2.  Lower GI Bleed   3. ESRD with HD  4. Anemia of CKD  5. SHPT Disease  5. Low RBC  7. Pulmonary HTN  8. Failed renal transplant  9. Diastolic HF  10. STEFFANY  11. Hypothyroidism  12. RA  13. Hyperlipidemia    Plan: Transfuse with blood products as ordered, Consult GI, Consult Hem/Onc, Consult nephrology for HD  AM labs, Trend H&H, IV Protonix bolus the drip. Close ICU monitoring    Critical Care time spent: 45 minutes

## 2018-02-23 NOTE — PROGRESS NOTES
02/23/18 0744   Patient Assessment/Suction   Level of Consciousness (AVPU) alert   Respiratory Effort Normal;Unlabored   Expansion/Accessory Muscles/Retractions expansion symmetric   All Lung Fields Breath Sounds clear;equal bilaterally   Rhythm/Pattern, Respiratory pattern regular   Cough Frequency infrequent   Cough Type none   PRE-TX-O2-ETCO2   O2 Device (Oxygen Therapy) venti mask   $ Is the patient on Low Flow Oxygen? Yes   Flow (L/min) 14   Oxygen Concentration (%) 55   Ready to Wean/Extubation Screen   FIO2<=50 (chart decimal) (!) 0.55

## 2018-02-23 NOTE — H&P
Shivani Sheets  1955, , [phone],  [id number] [address]      [facility]# [id number]   Insur#  Medicare -A  Medicaid -[id number]    Allergies: iodine penicillin    past medical history: 63-year-old female doesn't smoke, denies alcohol, no illicit drugs no decision resuscitation     medical history: endstage renal failure/dialysis Dr. Deleon, hypertension, hypothyroidism, hyperlipidemia, Type II diabetes, cardiologist is Dr. Malone at [facility], rheumatoid arthritis managed by rheumatology , right internal carotid clot diverticulitis, obstructive sleep apnea   family history:   positive for hypertension previous surgeries large intestinal surgery, thyroid surgery, status post kidney transplant which failed, hernia, removal of kidney, left upper extremity AV fistula    Medication:   Been receiving from other physicians:  clonidine 0.2 mg twice daily dispense 60  aspirin 81 mg daily  synthroid 100 mcg daily dispense 30  Lipitor 10 mg daily dispense 30  renalvite one daily  tums as directed   diltiazem 60 mg twice daily  ESRD/dialysis Dr. Deleon  eyedrops, pred forte, Acular as per ophthalmology      as needed only:  Claritin 10 mg daily for sinuses  Nasonex nasal one puff each nostril at bedtime  CPAP  high-fiber renal diet  Patanol ophthalmic one drop each eye three times daily times one-week, no contacts  albuterol HFA inhaler two puffs every four hours as needed for wheezing  Singulair 10 mg, one daily, for allergies/wheezing  other MD :  Jenny Hennessy MD Cardiology @ [facility]  Coumadin 5 mg as directed    acarica - pulm htn  opsumit - pulm htn  uptravi- pulm htn    Miscellaneous: preventive/routine medicine: yearly fasting blood sugar, lipid profile yearly female exam/Pap , yearly stool cards/mammogram  12/2010 up-to-date mammogram, clonazepam 2009 7/20110 stool cards times two  08/08/2011 Spoke to pt. today ref.to increased med.Coumadin pt. @[facility],also pt. picking up sheet  for REPEAT PTT/INR Anushka  8/19/2011 seen by rheumatology Dr. Hernández, gout versus to get of the knees, short course of steroids  2/14/2012 seen by ophthalmology for glaucoma Dr. Morgan  10/08/2012 Spoke with pt. today, concerning PT/INR RESULTS as ordred per ,, NO CHANGE =REPEAT IN ONE MONTH Anushka    4/11/2008 126/90, 185 lbs., 68, afebrile urinalysis three plus proteins otherwise negative, CBC, basal metabolic profile, urine culture, chest x-ray, 24-hour urine for protein creatinine and creatinine clearance, noncontrast screening CAT scan of the sinuses chest x-ray negative, urine culture probably negative  4/18/2008 blood sugar 124, BUN 53, creatinine 5.64, potassium 3.6, calcium 12.3, WBC 5.6, hemoglobin 7.7, hematocrit 25.4, total urine protein 3690 milligrams needs clinic follow-up  12/28/2010 afebrile, 200 pounds, 140/82 EKG shows prolong  ms left anterior fascicular block  2/24/2011 106/60, 74, 202 pounds spirometry: FEV1, 0.91, FVC, 0.99, PEF, 2.14, patient has a restrictive disorder, there is no improvement bronchodilators  chest x-ray  5/17/2011 184/106, 198 pounds chest x-ray  home blood-pressure monitor rolling walker handicap parking certificate  6/14/2011 hemoglobin 10.7 hematocrit 33; lupus anticoagulant pending regulation studies pending homocystein 15.0 elevated, glycohemoglobin 6.4, gamma globulins 1.8, protein C. protein S. within normal limits INR 1.0 MCV 86 ISSA negative rheumatoid factor 83, elevated, ACE level 22 within normal limits may need Coumadin for coagulopathy referred to Dr. Wilder and will fax copy of lab results  7/12/2011  vital signs: ht     wt, pounds; blood-pressure; pulse; last menstrual cycle, females, BMI: 147/98, 197 pounds, 86   procedures/labs: PT/INR weekly times four then monthly  controlled substance refills:  other:   7/19/2011 INR 1.9 no changes   7/27/2011  vital signs: ht     wt, pounds; blood-pressure; pulse; last menstrual cycle, females, BMI,   procedures/labs:  200 pounds 128/74, 104 controlled substance refills: PT/INR weekly times four then monthly times two other:   7/28/2011 INR 1.0, continue Coumadin 5 mg daily times three days followed by 2.5 mg daily and recheck one-week   8/5/2011 INR 1.1 increase Coumadin, 5 mg daily, recheck one-week  8/9/2011  vital signs: ht     wt, pounds; blood-pressure; pulse; last menstrual cycle, females, BMI,  procedures/labs: 200 pounds, 120/82  controlled substance refills: other: addendum, ultrasound both lower extremities negative for DVT  8/16/2011 INR is 1.8 recheck one-week no changes recheck 1 week  8/19/2011 hepatitis panel negative rheumatoid factor 70 C-reactive protein 35 ESR 38 uric acid level 4.5 consider further therapy after finishing treatment for DVT  08/30/2011 Called pt. today ref;Coumdin 7.5mq Anushka  09/27/2011 Appt. on 09/30/2011 @1;30pm Dr. Cohen N.[id number], pt. notified Anushka  8/23/2011 INR 1.3 please verify dosage and recheck one-week  8/26/2011 admitted to [facility] uncontrolled diabetes Dr. Parisi consulted, patient was taking prednisone for her knees prescribed by orthopedics  8/30/2011 INR 1.0 orders for Coumadin 7.5 mg daily and recheck one-week  9/21/2011 INR 1.1 referral to hematology Dr. Wilder for Coumadin management  9/30/2011 specification Dr. Champagne vascular surgery he feels that since it's been nearly 3 months of Coumadin therapy she can discontinue the Coumadin  9/25/2012 blood sugar 170 B. UN 32 creatinine 7.3 potassium 3.9 SGPT 11 W. BC 7.7 hemoglobin 11 hematocrit 34 , INR 3.0  10/4/2012 INR 2.3 no changes repeat one-month  10/12/2012 INR 2.6 no new changes needs clinic appointment  11/16/2012 INR 2.7 no changes repeat one-month needs clinic appointment  11/30/2012 INR 2.6 patient referred to Dr. Wilder for management of her DVT  1/25/2013 INR 1.6 fax to Dr. Wilder  2/5/2013 INR 3.8 fax to Dr. Wilder  2/28/2013 INR 2.5 fax to Dr. Wilder  3/5/2013 INR 2.5,  "notify lab that this is no longer our patient 9/17/2013 INR 3.0 this is no longer our patient please notify current and current primary care  10/1/2013 INR 3.0, no longer our patient  10/22/2013 INR 3.2 no longer our patient  12/17/2013 INR 2.3, no longer our patient  1/7/2014 INR 2.7  1/31/2013 admitted to [facility] rapid atrial fibrillation INR 5.0 cardiology consulted  2/3/2014 BNP 2054, cholesterol 161 triglycerides 122 HDL 61 LDL 75    6/24/2014 admitted to [facility] with COPD exacerbation  10/6/2015 INR 1.8  10/19/2016 seen an option with abdominal pain negative cannot enter the abdomen/pelvis afebrile with normal white count, script for Cipro advice to follow up in the office near future  2/20/2018  vital signs: ht     wt, pounds; blood-pressure; pulse; last menstrual cycle, females, BMI,  procedures/labs: 140/81, 88, 5'1", 75.4 kg, repeat chest x ray  2/23/2018 vital signs: ht  wt, pounds; blood-pressure; pulse, BMI,  procedures/labs/referrals:  controlled substance refills: patient arrived: patient discharged:      2/23/2018 admit history/physical for [facility]    Chief complaint: blood in stool    History present illness: 56-year-old female previously are patient, going to [facility] for most of her medical care until recently. Was seen in our office by the nurse practitioner about three days ago. Patient has been taking Coumadin and presents to [facility] with hemoglobin four and hematocrit 16. She got some fresh frozen plasma and is receiving her first of two units of NE BC. Dialysis is scheduled in the morning. G.I. has been consulted, she's on a protonix drip. Needs endoscopy. On Coumadin, INR 2.5, vitamin K ordered    Review of systems: patient is currently on a Ventimask is very lethargic    Clinic notes, 2/20/2018:  done today:     56-year-old female returns for follow-up, diagnosed with internal jugular clot by recent carotid ultrasound, cardiology is " recommending three months of Coumadin therapy recently admitted with abdominal pain, [facility] and was diagnosed with diverticulitis. Plus four dependent edema both legs right greater than left popliteal pulses no evidence of ischemia, recent INR was 1.1. Prescription for non-medicated Unna boots but needs stat ultrasound to exclude DVT, if positive will need admit to rapidly get INR therapeutic.  02/20/2018 kali carlson seen in office 8/2011. 62 y/o female reports present today for follow-up for [facility] last thursday was told she a touch of the pneumonia was given a Z Pac which she completed on last night. denies any fevr denies cough up green mucous. reports not ding that anymore. pt reports she follows with MD at [facility] for [facility]iology. Md at [facility] ordered her last mammogram 2 years ago. pt reports had colonoscopy ordered by Md Hennessy with [facility] dialysis clinic has her up to date on flu shot and pneumonia shot and follows with MD Deleon.     On exam    nose and ears clear, Throat  no exudate,   Head and neck: atraumatic, normocephalic, no carotid bruits  cardiac: sinus tachycardia, no murmur  lungs somewhat congested  abdominal exam positive bowel sounds, no guarding/rebounding  peripheral: deferred  neurologic no localizing deficits  orthopedic: no deformity  vascular: deferred  skin: deferred  rectal/: deferred      diagnosis: acute G.I. bleeding, anemia from acute blood loss,  resolving CAP, dependent edema rule out DVT right internal carotid clot probable diastolic CHF, chronic, uncontrolled hypertension status post renal transplantation, immunosuppressed    plan: patient has been admitted to the ICU, she may need more blood, would leave her in the ICU at least one more day    César Iverson M.D. /

## 2018-02-23 NOTE — ED NOTES
Spoke to Jan in reference to critical lab for patient. Hemoglobin 4.7 Hematocrit 16.2. Dr. Patel advised repeat.

## 2018-02-24 LAB
ALBUMIN SERPL BCP-MCNC: 3 G/DL
ALP SERPL-CCNC: 41 U/L
ALT SERPL W/O P-5'-P-CCNC: 11 U/L
ANION GAP SERPL CALC-SCNC: 9 MMOL/L
AST SERPL-CCNC: 13 U/L
BASOPHILS # BLD AUTO: 0.01 K/UL
BASOPHILS NFR BLD: 0.2 %
BILIRUB SERPL-MCNC: 0.5 MG/DL
BLD PROD TYP BPU: NORMAL
BLOOD UNIT EXPIRATION DATE: NORMAL
BLOOD UNIT TYPE CODE: 6200
BLOOD UNIT TYPE: NORMAL
BUN SERPL-MCNC: 26 MG/DL
CALCIUM SERPL-MCNC: 8.4 MG/DL
CHLORIDE SERPL-SCNC: 100 MMOL/L
CO2 SERPL-SCNC: 28 MMOL/L
CODING SYSTEM: NORMAL
CREAT SERPL-MCNC: 5.1 MG/DL
DIFFERENTIAL METHOD: ABNORMAL
DISPENSE STATUS: NORMAL
EOSINOPHIL # BLD AUTO: 0 K/UL
EOSINOPHIL NFR BLD: 0 %
ERYTHROCYTE [DISTWIDTH] IN BLOOD BY AUTOMATED COUNT: 17.3 %
EST. GFR  (AFRICAN AMERICAN): 10 ML/MIN/1.73 M^2
EST. GFR  (NON AFRICAN AMERICAN): 8 ML/MIN/1.73 M^2
GLUCOSE SERPL-MCNC: 96 MG/DL
HCT VFR BLD AUTO: 23.6 %
HGB BLD-MCNC: 7.5 G/DL
LYMPHOCYTES # BLD AUTO: 1 K/UL
LYMPHOCYTES NFR BLD: 15.8 %
MAGNESIUM SERPL-MCNC: 1.8 MG/DL
MCH RBC QN AUTO: 26.8 PG
MCHC RBC AUTO-ENTMCNC: 31.8 G/DL
MCV RBC AUTO: 84 FL
MONOCYTES # BLD AUTO: 0.5 K/UL
MONOCYTES NFR BLD: 8.5 %
NEUTROPHILS # BLD AUTO: 4.6 K/UL
NEUTROPHILS NFR BLD: 75.5 %
PHOSPHATE SERPL-MCNC: 4.3 MG/DL
PLATELET # BLD AUTO: 140 K/UL
PMV BLD AUTO: 11.4 FL
POTASSIUM SERPL-SCNC: 4.4 MMOL/L
PROT SERPL-MCNC: 7.1 G/DL
RBC # BLD AUTO: 2.8 M/UL
SODIUM SERPL-SCNC: 137 MMOL/L
TRANS ERYTHROCYTES VOL PATIENT: NORMAL ML
WBC # BLD AUTO: 6.03 K/UL

## 2018-02-24 PROCEDURE — 85025 COMPLETE CBC W/AUTO DIFF WBC: CPT

## 2018-02-24 PROCEDURE — 63700000 PHARM REV CODE 250 ALT 637 W/O HCPCS: Performed by: NURSE PRACTITIONER

## 2018-02-24 PROCEDURE — 87340 HEPATITIS B SURFACE AG IA: CPT

## 2018-02-24 PROCEDURE — P9021 RED BLOOD CELLS UNIT: HCPCS

## 2018-02-24 PROCEDURE — 25000003 PHARM REV CODE 250: Performed by: NURSE PRACTITIONER

## 2018-02-24 PROCEDURE — 36415 COLL VENOUS BLD VENIPUNCTURE: CPT

## 2018-02-24 PROCEDURE — C9113 INJ PANTOPRAZOLE SODIUM, VIA: HCPCS | Performed by: NURSE PRACTITIONER

## 2018-02-24 PROCEDURE — 63600175 PHARM REV CODE 636 W HCPCS: Performed by: NURSE PRACTITIONER

## 2018-02-24 PROCEDURE — 86706 HEP B SURFACE ANTIBODY: CPT | Mod: 91

## 2018-02-24 PROCEDURE — 86706 HEP B SURFACE ANTIBODY: CPT

## 2018-02-24 PROCEDURE — 86902 BLOOD TYPE ANTIGEN DONOR EA: CPT | Mod: 59

## 2018-02-24 PROCEDURE — 84100 ASSAY OF PHOSPHORUS: CPT

## 2018-02-24 PROCEDURE — 63600175 PHARM REV CODE 636 W HCPCS: Performed by: FAMILY MEDICINE

## 2018-02-24 PROCEDURE — 27000221 HC OXYGEN, UP TO 24 HOURS

## 2018-02-24 PROCEDURE — 63600175 PHARM REV CODE 636 W HCPCS: Performed by: INTERNAL MEDICINE

## 2018-02-24 PROCEDURE — 27201040 HC RC 50 FILTER

## 2018-02-24 PROCEDURE — 83735 ASSAY OF MAGNESIUM: CPT

## 2018-02-24 PROCEDURE — 20000000 HC ICU ROOM

## 2018-02-24 PROCEDURE — 99232 SBSQ HOSP IP/OBS MODERATE 35: CPT | Mod: ,,, | Performed by: INTERNAL MEDICINE

## 2018-02-24 PROCEDURE — 80053 COMPREHEN METABOLIC PANEL: CPT

## 2018-02-24 PROCEDURE — 90935 HEMODIALYSIS ONE EVALUATION: CPT

## 2018-02-24 PROCEDURE — 94761 N-INVAS EAR/PLS OXIMETRY MLT: CPT

## 2018-02-24 RX ORDER — FUROSEMIDE 10 MG/ML
20 INJECTION INTRAMUSCULAR; INTRAVENOUS ONCE
Status: DISCONTINUED | OUTPATIENT
Start: 2018-02-24 | End: 2018-02-24

## 2018-02-24 RX ORDER — LATANOPROST 50 UG/ML
1 SOLUTION/ DROPS OPHTHALMIC NIGHTLY
Status: DISCONTINUED | OUTPATIENT
Start: 2018-02-24 | End: 2018-03-02 | Stop reason: HOSPADM

## 2018-02-24 RX ORDER — DORZOLAMIDE HYDROCHLORIDE AND TIMOLOL MALEATE 20; 5 MG/ML; MG/ML
1 SOLUTION/ DROPS OPHTHALMIC 2 TIMES DAILY
Status: DISCONTINUED | OUTPATIENT
Start: 2018-02-24 | End: 2018-03-02 | Stop reason: HOSPADM

## 2018-02-24 RX ORDER — SODIUM CHLORIDE 9 MG/ML
INJECTION, SOLUTION INTRAVENOUS ONCE
Status: DISCONTINUED | OUTPATIENT
Start: 2018-02-24 | End: 2018-03-02 | Stop reason: HOSPADM

## 2018-02-24 RX ORDER — SODIUM CHLORIDE 9 MG/ML
INJECTION, SOLUTION INTRAVENOUS
Status: DISCONTINUED | OUTPATIENT
Start: 2018-02-24 | End: 2018-03-02 | Stop reason: HOSPADM

## 2018-02-24 RX ORDER — BRIMONIDINE TARTRATE 1 MG/ML
1 SOLUTION/ DROPS OPHTHALMIC 2 TIMES DAILY
Status: DISCONTINUED | OUTPATIENT
Start: 2018-02-24 | End: 2018-03-02 | Stop reason: HOSPADM

## 2018-02-24 RX ORDER — HYDROCODONE BITARTRATE AND ACETAMINOPHEN 500; 5 MG/1; MG/1
TABLET ORAL
Status: DISCONTINUED | OUTPATIENT
Start: 2018-02-24 | End: 2018-03-02 | Stop reason: HOSPADM

## 2018-02-24 RX ORDER — FUROSEMIDE 10 MG/ML
20 INJECTION INTRAMUSCULAR; INTRAVENOUS ONCE AS NEEDED
Status: COMPLETED | OUTPATIENT
Start: 2018-02-24 | End: 2018-02-24

## 2018-02-24 RX ORDER — PREDNISOLONE ACETATE 10 MG/ML
1 SUSPENSION/ DROPS OPHTHALMIC 2 TIMES DAILY
Status: DISCONTINUED | OUTPATIENT
Start: 2018-02-24 | End: 2018-03-02 | Stop reason: HOSPADM

## 2018-02-24 RX ADMIN — DEXTROSE 8 MG/HR: 50 INJECTION, SOLUTION INTRAVENOUS at 02:02

## 2018-02-24 RX ADMIN — HYDRALAZINE HYDROCHLORIDE 10 MG: 20 INJECTION INTRAMUSCULAR; INTRAVENOUS at 01:02

## 2018-02-24 RX ADMIN — DEXTROSE 8 MG/HR: 50 INJECTION, SOLUTION INTRAVENOUS at 08:02

## 2018-02-24 RX ADMIN — PREDNISOLONE ACETATE 1 DROP: 10 SUSPENSION/ DROPS OPHTHALMIC at 09:02

## 2018-02-24 RX ADMIN — DORZOLAMIDE HYDROCHLORIDE AND TIMOLOL MALEATE 1 DROP: 20; 5 SOLUTION/ DROPS OPHTHALMIC at 09:02

## 2018-02-24 RX ADMIN — DEXTROSE 8 MG/HR: 50 INJECTION, SOLUTION INTRAVENOUS at 05:02

## 2018-02-24 RX ADMIN — BRIMONIDINE TARTRATE 1 DROP: 1 SOLUTION/ DROPS OPHTHALMIC at 09:02

## 2018-02-24 RX ADMIN — DEXTROSE 8 MG/HR: 50 INJECTION, SOLUTION INTRAVENOUS at 10:02

## 2018-02-24 RX ADMIN — FUROSEMIDE 20 MG: 10 INJECTION, SOLUTION INTRAMUSCULAR; INTRAVENOUS at 09:02

## 2018-02-24 RX ADMIN — LATANOPROST 1 DROP: 50 SOLUTION/ DROPS OPHTHALMIC at 09:02

## 2018-02-24 NOTE — PLAN OF CARE
Problem: Hemodialysis (Adult)  Goal: Signs and Symptoms of Listed Potential Problems Will be Absent, Minimized or Managed (Hemodialysis)  Signs and symptoms of listed potential problems will be absent, minimized or managed by discharge/transition of care (reference Hemodialysis (Adult) CPG).   Outcome: Ongoing (interventions implemented as appropriate)  Hemodialysis x 4 hours done with 3144 ml. UF.

## 2018-02-24 NOTE — PLAN OF CARE
Problem: Hemodialysis (Adult)  Goal: Signs and Symptoms of Listed Potential Problems Will be Absent, Minimized or Managed (Hemodialysis)  Signs and symptoms of listed potential problems will be absent, minimized or managed by discharge/transition of care (reference Hemodialysis (Adult) CPG).   Outcome: Ongoing (interventions implemented as appropriate)   02/24/18 1216   Hemodialysis   Problems Assessed (Hemodialysis) all   Problems Present (Hemodialysis) cardiovascular complications;electrolyte imbalance;fluid imbalance;hematologic complications;situational response   2 hour UF only . In progress.

## 2018-02-24 NOTE — SUBJECTIVE & OBJECTIVE
Oncology Treatment Plan:   [No treatment plan]    Medications:  Continuous Infusions:   pantoprazole 40 mg in dextrose 5 % 100 mL infusion (ready to mix system) 8 mg/hr (18 1001)     Scheduled Meds:   sodium chloride 0.9%   Intravenous Once     PRN Meds:sodium chloride, sodium chloride 0.9%, sodium chloride 0.9%, hydrALAZINE, pneumoc 13-jessica conj-dip cr(PF)     Review of patient's allergies indicates:   Allergen Reactions    Penicillins Swelling    Iodine      Other reaction(s): Hives    Sulfamethoxazole-trimethoprim      Other reaction(s): Swelling  Other reaction(s): Hives        Past Medical History:   Diagnosis Date    Allergy     Anemia     Arthritis     Awaiting organ transplant 2013    Cataract     CHF (congestive heart failure)     Chronic kidney disease     Coronary artery disease     Diabetes mellitus     Diabetic retinopathy     ESRD from HTN strtied RRT 1999    Failed  donor kidney transplant -     Glaucoma     History of bleeding peptic ulcer      as stated per pt    Hyperlipidemia     Hypertension     Hypothyroidism     Morbid obesity 8/10/2012    Renal hypertension     Stroke      Past Surgical History:   Procedure Laterality Date    CATARACT EXTRACTION W/  INTRAOCULAR LENS IMPLANT Bilateral     EYE SURGERY      HYSTERECTOMY      KIDNEY TRANSPLANT      NEPHRECTOMY  2008    transplant     TONSILLECTOMY       Family History     Problem Relation (Age of Onset)    Asthma Sister    Blindness Father    Depression Sister    Diabetes Maternal Aunt    Heart attack Father, Mother    Heart failure Father, Mother    Hypertension Father, Mother, Sister, Brother    Kidney disease Brother        Social History Main Topics    Smoking status: Former Smoker     Quit date: 8/10/2000    Smokeless tobacco: Never Used    Alcohol use No    Drug use: No    Sexual activity: No       Review of Systems   Constitutional: Positive for fatigue.  Negative for appetite change, fever and unexpected weight change.   HENT: Negative for mouth sores.    Eyes: Negative for visual disturbance.   Respiratory: Negative for cough and shortness of breath.    Cardiovascular: Negative for chest pain.   Gastrointestinal: Negative for abdominal pain and diarrhea.   Genitourinary: Negative for frequency.   Musculoskeletal: Negative for back pain.   Skin: Negative for rash.   Neurological: Negative for headaches.   Hematological: Negative for adenopathy.   Psychiatric/Behavioral: The patient is not nervous/anxious.      Objective:     Vital Signs (Most Recent):  Temp: 98.3 °F (36.8 °C) (02/24/18 1115)  Pulse: 86 (02/24/18 1415)  Resp: (!) 29 (02/24/18 1415)  BP: 137/77 (02/24/18 1415)  SpO2: 100 % (02/24/18 1415) Vital Signs (24h Range):  Temp:  [97.7 °F (36.5 °C)-98.7 °F (37.1 °C)] 98.3 °F (36.8 °C)  Pulse:  [69-86] 86  Resp:  [0-38] 29  SpO2:  [99 %-100 %] 100 %  BP: (129-191)/() 137/77     Weight: 68.9 kg (151 lb 14.4 oz)  Body mass index is 28.7 kg/m².  Body surface area is 1.72 meters squared.      Intake/Output Summary (Last 24 hours) at 02/24/18 1454  Last data filed at 02/24/18 0600   Gross per 24 hour   Intake             1176 ml   Output             4000 ml   Net            -2824 ml       Physical Exam   Constitutional: She is oriented to person, place, and time. She appears well-developed and well-nourished.   HENT:   Head: Normocephalic.   Mouth/Throat: Oropharynx is clear and moist. No oropharyngeal exudate.   Eyes: Conjunctivae and lids are normal. Pupils are equal, round, and reactive to light. No scleral icterus.   Neck: Normal range of motion. Neck supple. No thyromegaly present.   Cardiovascular: Normal rate, regular rhythm and normal heart sounds.    No murmur heard.  Pulmonary/Chest: Breath sounds normal. She has no wheezes. She has no rales.   Abdominal: Soft. Bowel sounds are normal. She exhibits no mass. There is no hepatosplenomegaly. There is no  rebound.   Musculoskeletal: Normal range of motion. She exhibits no edema or tenderness.   Neurological: She is alert and oriented to person, place, and time. No cranial nerve deficit. Coordination normal.   Skin: Skin is warm and dry. No ecchymosis, no petechiae and no rash noted. No erythema.   Psychiatric: She has a normal mood and affect.       Significant Labs:   All pertinent labs within the past 24 hours have been reviewed    Diagnostic Results:  I have reviewed and interpreted all pertinent imaging results/findings within the past 24 hours

## 2018-02-24 NOTE — PLAN OF CARE
Problem: Patient Care Overview  Goal: Plan of Care Review  Outcome: Ongoing (interventions implemented as appropriate)  Patient awake and alert denies any pain or discomfort but does have some sob on exertion noted. Patient has received two units of PRBC today and also tolerated dialysis without any problems. Patient's EGD has been rescheduled for tomorrow.  Vital signs have been stable but high before dialysis. Patient breathing improved after dialysis and patient weaned from 55% venti mask to 5 liters nasal cannula. Patient has passed two tarry black stools today. Dr. Moore here to see patient as hematology for repeated low blood count and low rbcs. Patient remains on protonix drip at 8 mg/hour. Plan of care reviewed with patient at bedside.

## 2018-02-24 NOTE — PROGRESS NOTES
Subjective:  CC - melena/anemia    No overnight events.  The patient and RN deny any further episodes of melena since yesterday afternoon.  She denies any abdominal pain, nausea or vomiting.  She is asking for something to eat.      ROS - negative for SOB, chest pain, fever, chills    Objective:  Vitals:    02/24/18 1145   BP: (!) 181/91   Pulse: 72   Resp: 18   Temp:      Physical exam:  GEN: Well developed, well nourished in no apparent distress   HENT: Normocephalic, anicteric sclera   Cardiovascular: Regular rate and rhythm. No murmurs appreciated.   Chest: Non-labored respirations. Breath sounds equal   Abdomen: Soft, NTND, normoactive BS  Psych: Appropriate mood and affect.   Extermities: No C/C/E. 2+ dorsalis pedis pulses bilaterally      Recent Labs  Lab 02/24/18  0652   WBC 6.03   RBC 2.80*   HGB 7.5*   HCT 23.6*   *   MCV 84   MCH 26.8*   MCHC 31.8*     Impression: 62 year old female with a history of HTN, DM, CVA, CHF, CAD, ESRD on dialysis, hyperlipidemia, hypothyroidism presenting with severe anemia and dark stools/melena.    Plan:  1.  Severe anemia - unclear etiology; concerning for UGIB (aggravated by coumadin) given history of dark stools/melena.  H/H improved after receiving 2 units PRBCs though would recommend giving an additional unit as would prefer Hgb > 8.  Unable to perform EGD yesterday due to need for more blood to assure hemodynamic stability prior to sedation.  No overt bleeding overnight and VSS (actually hypertensive).  Continue PPI drip.  Ok to give clears today and plan for EGD on Monday unless significant rebleeding occurs in the meantime.   Will discuss with Dr. Esteban.

## 2018-02-24 NOTE — CONSULTS
[facility]  Hematology/Oncology  Consult Note    Patient Name: Shivani Sheets  MRN: [id number]  Admission Date: 2018  Hospital Length of Stay: 1 days  Code Status: Full Code   Attending Provider: César Casey MD  Consulting Provider: Donta Dick MD  Primary Care Physician: César Casey MD  Principal Problem:<principal problem not specified>    Inpatient consult to Hematology  Consult performed by: DONTA DICK  Consult ordered by: CÉSAR CAESY        Subjective:     HPI:     Ms. Sheets is a pleasant 63 YO lady with ESRD on HD, on chronic coumadin therapy admitted to [facility] ICU with nausea/vomiting, melena and Hg 4.8g. Pt wad determined to have upper GI bleeding and started on protonix drip. Given 2 FFP and 2  Units prb. In addition, she reported to have low red cell volume. Has prior hx of GI bleeding. No recent changes to meds.       Oncology Treatment Plan:   [No treatment plan]    Medications:  Continuous Infusions:   pantoprazole 40 mg in dextrose 5 % 100 mL infusion (ready to mix system) 8 mg/hr (18 1800)     Scheduled Meds:  PRN Meds:sodium chloride, sodium chloride 0.9%, hydrALAZINE, pneumoc 13-jessica conj-dip cr(PF)     Review of patient's allergies indicates:   Allergen Reactions    Penicillins Swelling    Iodine      Other reaction(s): Hives    Sulfamethoxazole-trimethoprim      Other reaction(s): Swelling  Other reaction(s): Hives        Past Medical History:   Diagnosis Date    Allergy     Anemia     Arthritis     Awaiting organ transplant 2013    Cataract     CHF (congestive heart failure)     Chronic kidney disease     Coronary artery disease     Diabetes mellitus     Diabetic retinopathy     ESRD from HTN strtied RRT 1999    Failed  donor kidney transplant      Glaucoma     History of bleeding peptic ulcer      as stated per pt    Hyperlipidemia     Hypertension     Hypothyroidism     Morbid  obesity 8/10/2012    Renal hypertension     Stroke      Past Surgical History:   Procedure Laterality Date    CATARACT EXTRACTION W/  INTRAOCULAR LENS IMPLANT Bilateral     EYE SURGERY      HYSTERECTOMY      KIDNEY TRANSPLANT      NEPHRECTOMY  11/2008    transplant     TONSILLECTOMY       Family History     Problem Relation (Age of Onset)    Asthma Sister    Blindness Father    Depression Sister    Diabetes Maternal Aunt    Heart attack Father, Mother    Heart failure Father, Mother    Hypertension Father, Mother, Sister, Brother    Kidney disease Brother        Social History Main Topics    Smoking status: Former Smoker     Quit date: 8/10/2000    Smokeless tobacco: Never Used    Alcohol use No    Drug use: No    Sexual activity: No       Review of Systems     Constitutional: + fatigue   Eyes: no visual changes   ENT: no nasal congestion. Denies sore throat with odynophagia   Respiratory: No cough, SOB, exertional dyspnea or  hemoptysis   Cardiovascular: no chest pain or palpitations   Gastrointestinal: see HPI  Hematologic/Lymphatic: see HPI   Musculoskeletal: No ROM abnormalities or muscle weakness   Neurological: no seizures or tremors, gait or balance prblems  Skin: No rashes or lesions  Psych: Denies any anxiety, depression or insomnia      Objective:     Vital Signs (Most Recent):  Temp: 97.7 °F (36.5 °C) (02/23/18 1600)  Pulse: 73 (02/23/18 1830)  Resp: 20 (02/23/18 1830)  BP: (!) 183/81 (02/23/18 1830)  SpO2: 100 % (02/23/18 1830) Vital Signs (24h Range):  Temp:  [97.6 °F (36.4 °C)-99.3 °F (37.4 °C)] 97.7 °F (36.5 °C)  Pulse:  [] 73  Resp:  [11-62] 20  SpO2:  [90 %-100 %] 100 %  BP: (126-193)/() 183/81     Weight: 73.4 kg (161 lb 13.1 oz)  Body mass index is 30.58 kg/m².  Body surface area is 1.78 meters squared.      Intake/Output Summary (Last 24 hours) at 02/23/18 1918  Last data filed at 02/23/18 1800   Gross per 24 hour   Intake          1745.67 ml   Output                0 ml    Net          1745.67 ml       Physical Exam    General: NAD, resting in icu bed.   Head: normocephalic, atraumatic   Eyes: conjunctivae pale  Throat: No erythema or post nasal discharge   Neck: supple, no LAD   Lungs: crackles at the bases   Heart: S1, S2, RRR   Abdomen: + BS x 4 quadrants, soft, non tender.   Extremities: no cyanosis or edema.   Skin: dry skin.   MS: move all extremities  Neuro: A&O x 2  Psy: calm     Significant Labs:   CBC:   Recent Labs  Lab 02/22/18  1844 02/23/18  0828   WBC 7.18 6.84   HGB 4.8* 6.0*   HCT 16.4* 19.6*    136*   , CMP:   Recent Labs  Lab 02/22/18  1844 02/23/18  0828    136   K 3.9 4.6   CL 94* 92*   CO2 32* 35*   * 167*   BUN 58* 70*   CREATININE 8.7* 9.5*   CALCIUM 8.8 8.6*   PROT 6.7  --    ALBUMIN 2.9*  --    BILITOT 0.5  --    ALKPHOS 39*  --    AST 13  --    ALT 8*  --    ANIONGAP 11 9   EGFRNONAA 4* 4*   , Haptoglobin: No results for input(s): HAPTOGLOBIN in the last 48 hours., Immunology: No results for input(s): SPEP, MARIKA, ISSA, FREELAMBDALI in the last 48 hours., Reticulocytes: No results for input(s): RETIC in the last 48 hours., Tumor Markers: No results for input(s): PSA, CEA, , AFPTM, TE1414,  in the last 48 hours.    Invalid input(s): ALGTM, Uric Acid No results for input(s): URICACID in the last 48 hours. and Urine Studies: No results for input(s): COLORU, APPEARANCEUA, PHUR, SPECGRAV, PROTEINUA, GLUCUA, KETONESU, BILIRUBINUA, OCCULTUA, NITRITE, UROBILINOGEN, LEUKOCYTESUR, RBCUA, WBCUA, BACTERIA, SQUAMEPITHEL, HYALINECASTS in the last 48 hours.    Invalid input(s): WRIGHTSUR    Diagnostic Results:      Assessment/Plan:     Active Diagnoses:    Diagnosis Date Noted POA    Symptomatic anemia [D64.9] 02/22/2018 Yes      Problems Resolved During this Admission:    Diagnosis Date Noted Date Resolved POA       Ms. Sheets, 63 YO lady with acute GI bleeding and symptomatic anemia in the context of coumadin therapeutic levell.     1.  Acute blood loss anemia due to acute GI bleeding:   - Hg improving   - check cbc in the am   - pt to undergo GI evaluation once Hg > 7 g   - pt received FFP    2. Low rbc mass: due to acute GI blood loss   - cbc pending for the     3. ESRD: on HD     Thank you for your consult.     Donta Moore MD  Hematology/Oncology  [facility]

## 2018-02-24 NOTE — PLAN OF CARE
Problem: Patient Care Overview  Goal: Plan of Care Review  Outcome: Ongoing (interventions implemented as appropriate)  Pt remains in ICU. 2L NC. No BM or signs of bleeding overnight. BP stable. Afebrile. NPO.

## 2018-02-24 NOTE — PROGRESS NOTES
Pt assessed, orders/labs/consent reviewed and confirmed,  Pt is awake , alert and oriented x Person, place, time and event. Noted with GCS @ 15. Denies any c/c at  Present.  Admitting Dx: GI bleeding presentation.  S/p EGD yesterday, also rec'd 2-4 units of blood products including FFP. . Regular hd tx days are MWF, rec'd 4 hour hd tx yesterday.  Today is ordered for UF only x 2 hours, 1-2 L as tolerated.  Noted afebrile, but remains hypertensive. Left UE AVF noted intact with +thrill, and +bruits auscultated. Accessed according to P&P, using dialysis angio's x 2 , 15 ga., good blood return noted, easily flushed. All connections attached and secured. UF-SEQ in progress, will continue to monitor for changes and trends.

## 2018-02-24 NOTE — HPI
Ms. Sheets is a pleasant 61 YO lady with ESRD on HD, on chronic coumadin therapy admitted to [facility] ICU with nausea/vomiting, melena and Hg 4.8g. Pt wad determined to have upper GI bleeding and started on protonix drip. Given 2 FFP and 2  Units prb. In addition, she reported to have low red cell volume. Has prior hx of GI bleeding. No recent changes to meds

## 2018-02-24 NOTE — PLAN OF CARE
02/24/18 1710   Discharge Assessment   Assessment Type Discharge Planning Assessment   Confirmed/corrected address and phone number on facesheet? Yes   Assessment information obtained from? Patient   Expected Length of Stay (days) 3   Communicated expected length of stay with patient/caregiver yes   Prior to hospitilization cognitive status: Alert/Oriented   Prior to hospitalization functional status: Independent   Current cognitive status: Alert/Oriented   Current Functional Status: Needs Assistance   Lives With sibling(s);child(jelena), adult   Able to Return to Prior Arrangements yes   Is patient able to care for self after discharge? Unable to determine at this time (comments)   Patient's perception of discharge disposition home or selfcare   Readmission Within The Last 30 Days no previous admission in last 30 days   Patient currently being followed by outpatient case management? No   Patient currently receives any other outside agency services? No   Equipment Currently Used at Home cane, straight;oxygen;walker, rolling   Do you have any problems affording any of your prescribed medications? No   Is the patient taking medications as prescribed? yes   Does the patient have transportation home? Yes   Transportation Available family or friend will provide   Dialysis Name and Scheduled days [facility]   Does the patient receive services at the [facility]? No   Discharge Plan A Home with family   Discharge Plan B (tbd)   Patient/Family In Agreement With Plan yes   Does the patient have transportation to healthcare appointments? Yes

## 2018-02-24 NOTE — PROGRESS NOTES
2 hour UF completed. Net UF =2004ml.  Well tolerated by patient. Post tx, states she is feeling better. But remains hypertensive.

## 2018-02-24 NOTE — PROGRESS NOTES
[facility]  Nephrology  Progress Note    Patient Name: Shivani Sheets  MRN: [id number]  Admission Date: 2/22/2018  Hospital Length of Stay: 2 days  Attending Provider: César Iverson MD   Primary Care Physician: César Iverson MD  Principal Problem:<principal problem not specified>    Consults   Inpatient consult to Nephrology  Consult performed by: CONNIE MARIA  Consult ordered by: WALTER PINK  Reason for consult: dialysis     Subjective:     Interval History: She is feeling better, no further bleeding or BM.  SOB is improved, weaning O2 now to 1L.  She had some cramping with dialysis today but improving since dialysis has completed.  2/24/2018      Review of patient's allergies indicates:   Allergen Reactions    Penicillins Swelling    Iodine      Other reaction(s): Hives    Sulfamethoxazole-trimethoprim      Other reaction(s): Swelling  Other reaction(s): Hives     Current Facility-Administered Medications   Medication Frequency    0.9%  NaCl infusion (for blood administration) Q24H PRN    0.9%  NaCl infusion PRN    0.9%  NaCl infusion PRN    0.9%  NaCl infusion Once    hydrALAZINE injection 10 mg Q6H PRN    pantoprazole 40 mg in dextrose 5 % 100 mL infusion (ready to mix system) Continuous    pneumoc 13-jessica conj-dip cr(PF) 0.5 mL vaccine x 1 dose       Objective:     Vital Signs (Most Recent):  Temp: 98.6 °F (37 °C) (02/24/18 1515)  Pulse: 78 (02/24/18 1608)  Resp: 14 (02/24/18 1608)  BP: 133/77 (02/24/18 1602)  SpO2: 100 % (02/24/18 1608)  O2 Device (Oxygen Therapy): nasal cannula (02/24/18 1330) Vital Signs (24h Range):  Temp:  [97.7 °F (36.5 °C)-98.7 °F (37.1 °C)] 98.6 °F (37 °C)  Pulse:  [69-89] 78  Resp:  [0-38] 14  SpO2:  [99 %-100 %] 100 %  BP: (102-191)/() 133/77     Weight: 68.9 kg (151 lb 14.4 oz) (02/24/18 1030)  Body mass index is 28.7 kg/m².  Body surface area is 1.72 meters squared.    I/O last 3 completed shifts:  In: 2841.7 [I.V.:680.7;  Blood:1255; Other:856; IV Piggyback:50]  Out: 4000 [Other:4000]    Physical Exam   Constitutional: She is oriented to person, place, and time. She appears well-developed and well-nourished. No distress.   HENT:   Head: Normocephalic and atraumatic.   Eyes: EOM are normal. No scleral icterus.   Cardiovascular: Normal rate, regular rhythm and normal heart sounds.  Exam reveals no friction rub.    No murmur heard.  ABEL AVF    Pulmonary/Chest: Effort normal. No respiratory distress. She has no wheezes. She has rales.   1L O2 NC   Abdominal: Soft. She exhibits no distension. There is no tenderness.   Musculoskeletal: She exhibits no edema or deformity.   Neurological: She is alert and oriented to person, place, and time.   Skin: Skin is warm and dry. No rash noted. She is not diaphoretic.   Psychiatric: She has a normal mood and affect. Her behavior is normal.   Nursing note and vitals reviewed.      Significant Labs:  CBC:   Recent Labs  Lab 02/24/18  0652   WBC 6.03   RBC 2.80*   HGB 7.5*   HCT 23.6*   *   MCV 84   MCH 26.8*   MCHC 31.8*     CMP:   Recent Labs  Lab 02/24/18  0240   GLU 96   CALCIUM 8.4*   ALBUMIN 3.0*   PROT 7.1      K 4.4   CO2 28      BUN 26*   CREATININE 5.1*   ALKPHOS 41*   ALT 11   AST 13   BILITOT 0.5     All labs within the past 24 hours have been reviewed.    Significant Imaging:  CXR 2/23/18:   Findings: The cardiomediastinal silhouette is enlarged, similar to prior examination.  There is a left subclavian vascular stent in place.  Surgical clips project over the left lower neck.  There is increased interstitial and parenchymal attenuation, similar to prior examination.  Findings can be seen in setting of pulmonary edema/CHF.  No evidence of pneumothorax or significant interval detrimental change in cardiopulmonary status compared to prior examination.     CT abd/pelv:   There is a small anterior abdominal wall hernia containing a portion of the transverse colon.  There are  increased areas of ground glass opacity at the lung bases as well as some linear bands of atelectasis or fibrosis.  Congestive failure or developing infiltrate not excluded.  Cholelithiasis, probably chronic renal failure with dysplastic kidneys and hypodensities presumably cysts.  Probable nonfunctioning right iliac fossa transplant or residual soft tissue from removal unchanged    Assessment/Plan:     Active Diagnoses:    Diagnosis Date Noted POA    Symptomatic anemia [D64.9] 02/22/2018 Yes      Problems Resolved During this Admission:    Diagnosis Date Noted Date Resolved POA       1. ESRD - usual HD on MWF with Dr Deleon in [place], Dry weight 73-74kg.   - s/p UF session today with goal UF 2L as tolerated  - plan to hold HD tomorrow unless otherwise indicated by labs or O2 status  - pulmonary edema - UF as tolerated, wean O2 as tolerated, improving, plan to hold HD tomorrow unless volume status deteriorates  2. HTN - UF as tolerated, continue IV hydralazine PRN until scope.  Plan to resume PO medications once GI clears for a diet  3. Anemia of chronic kidney disease - and acute blood loss anemia 2/2 GIB, s/p transfusion 2/23/18, continue Epo  4. MBD/secondary hyperparathyroidism - phos acceptable  5. Access - ABEL AVF  6. Nutrition - clear liquid diet, plan for Nepro, renal vitamins and renal diet restrictions once diet is advanced     GIB  Failed kidney transplant  Pulm HTN  DHF  STEFFANY  Hypothyroid  RA  HLD     Thank you for allowing me to participate in the care of your patient.  Please call with any questions.     Judi [place] MD   Nephrology  [facility]  Office [phone]   [facility] - [place], [place] covering for:  Dr Pancho [place]

## 2018-02-25 LAB
ALBUMIN SERPL BCP-MCNC: 3.2 G/DL
ALBUMIN SERPL BCP-MCNC: 3.2 G/DL
ALP SERPL-CCNC: 40 U/L
ALP SERPL-CCNC: 40 U/L
ALT SERPL W/O P-5'-P-CCNC: 10 U/L
ALT SERPL W/O P-5'-P-CCNC: 10 U/L
ANION GAP SERPL CALC-SCNC: 13 MMOL/L
ANION GAP SERPL CALC-SCNC: 13 MMOL/L
ANISOCYTOSIS BLD QL SMEAR: SLIGHT
AST SERPL-CCNC: 20 U/L
AST SERPL-CCNC: 20 U/L
BASOPHILS # BLD AUTO: 0.02 K/UL
BASOPHILS NFR BLD: 0.3 %
BILIRUB SERPL-MCNC: 1 MG/DL
BILIRUB SERPL-MCNC: 1 MG/DL
BUN SERPL-MCNC: 44 MG/DL
BUN SERPL-MCNC: 44 MG/DL
CALCIUM SERPL-MCNC: 8.3 MG/DL
CALCIUM SERPL-MCNC: 8.3 MG/DL
CHLORIDE SERPL-SCNC: 99 MMOL/L
CHLORIDE SERPL-SCNC: 99 MMOL/L
CO2 SERPL-SCNC: 21 MMOL/L
CO2 SERPL-SCNC: 21 MMOL/L
CREAT SERPL-MCNC: 7.3 MG/DL
CREAT SERPL-MCNC: 7.3 MG/DL
DIFFERENTIAL METHOD: ABNORMAL
EOSINOPHIL # BLD AUTO: 0.3 K/UL
EOSINOPHIL NFR BLD: 4.1 %
ERYTHROCYTE [DISTWIDTH] IN BLOOD BY AUTOMATED COUNT: 17.1 %
ERYTHROCYTE [DISTWIDTH] IN BLOOD BY AUTOMATED COUNT: 17.1 %
EST. GFR  (AFRICAN AMERICAN): 6 ML/MIN/1.73 M^2
EST. GFR  (AFRICAN AMERICAN): 6 ML/MIN/1.73 M^2
EST. GFR  (NON AFRICAN AMERICAN): 5 ML/MIN/1.73 M^2
EST. GFR  (NON AFRICAN AMERICAN): 5 ML/MIN/1.73 M^2
GLUCOSE SERPL-MCNC: 77 MG/DL
GLUCOSE SERPL-MCNC: 77 MG/DL
HCT VFR BLD AUTO: 28.8 %
HCT VFR BLD AUTO: 28.8 %
HGB BLD-MCNC: 9.4 G/DL
HGB BLD-MCNC: 9.4 G/DL
HYPOCHROMIA BLD QL SMEAR: ABNORMAL
LYMPHOCYTES # BLD AUTO: 1.6 K/UL
LYMPHOCYTES NFR BLD: 22.5 %
MAGNESIUM SERPL-MCNC: 2.1 MG/DL
MCH RBC QN AUTO: 27.6 PG
MCH RBC QN AUTO: 27.6 PG
MCHC RBC AUTO-ENTMCNC: 32.6 G/DL
MCHC RBC AUTO-ENTMCNC: 32.6 G/DL
MCV RBC AUTO: 85 FL
MCV RBC AUTO: 85 FL
MONOCYTES # BLD AUTO: 0.7 K/UL
MONOCYTES NFR BLD: 9.8 %
NEUTROPHILS # BLD AUTO: 4.6 K/UL
NEUTROPHILS NFR BLD: 63.8 %
OVALOCYTES BLD QL SMEAR: ABNORMAL
PHOSPHATE SERPL-MCNC: 3.1 MG/DL
PLATELET # BLD AUTO: 161 K/UL
PLATELET # BLD AUTO: 161 K/UL
PMV BLD AUTO: 11.3 FL
PMV BLD AUTO: 11.3 FL
POIKILOCYTOSIS BLD QL SMEAR: SLIGHT
POTASSIUM SERPL-SCNC: 4.4 MMOL/L
POTASSIUM SERPL-SCNC: 4.4 MMOL/L
PROT SERPL-MCNC: 7.4 G/DL
PROT SERPL-MCNC: 7.4 G/DL
RBC # BLD AUTO: 3.41 M/UL
RBC # BLD AUTO: 3.41 M/UL
SODIUM SERPL-SCNC: 133 MMOL/L
SODIUM SERPL-SCNC: 133 MMOL/L
WBC # BLD AUTO: 7.28 K/UL
WBC # BLD AUTO: 7.28 K/UL

## 2018-02-25 PROCEDURE — 63600175 PHARM REV CODE 636 W HCPCS: Performed by: INTERNAL MEDICINE

## 2018-02-25 PROCEDURE — 25000003 PHARM REV CODE 250: Performed by: FAMILY MEDICINE

## 2018-02-25 PROCEDURE — 63600175 PHARM REV CODE 636 W HCPCS: Performed by: NURSE PRACTITIONER

## 2018-02-25 PROCEDURE — 80053 COMPREHEN METABOLIC PANEL: CPT

## 2018-02-25 PROCEDURE — 85025 COMPLETE CBC W/AUTO DIFF WBC: CPT

## 2018-02-25 PROCEDURE — 27000221 HC OXYGEN, UP TO 24 HOURS

## 2018-02-25 PROCEDURE — 20000000 HC ICU ROOM

## 2018-02-25 PROCEDURE — 83735 ASSAY OF MAGNESIUM: CPT

## 2018-02-25 PROCEDURE — 25000003 PHARM REV CODE 250: Performed by: NURSE PRACTITIONER

## 2018-02-25 PROCEDURE — 36415 COLL VENOUS BLD VENIPUNCTURE: CPT

## 2018-02-25 PROCEDURE — C9113 INJ PANTOPRAZOLE SODIUM, VIA: HCPCS | Performed by: NURSE PRACTITIONER

## 2018-02-25 PROCEDURE — 84100 ASSAY OF PHOSPHORUS: CPT

## 2018-02-25 RX ORDER — DIAZEPAM 5 MG/1
5 TABLET ORAL EVERY 12 HOURS PRN
Status: DISCONTINUED | OUTPATIENT
Start: 2018-02-25 | End: 2018-03-02 | Stop reason: HOSPADM

## 2018-02-25 RX ORDER — TRAMADOL HYDROCHLORIDE 50 MG/1
50 TABLET ORAL EVERY 6 HOURS PRN
Status: DISCONTINUED | OUTPATIENT
Start: 2018-02-25 | End: 2018-03-02 | Stop reason: HOSPADM

## 2018-02-25 RX ADMIN — HYDRALAZINE HYDROCHLORIDE 10 MG: 20 INJECTION INTRAMUSCULAR; INTRAVENOUS at 10:02

## 2018-02-25 RX ADMIN — DEXTROSE 8 MG/HR: 50 INJECTION, SOLUTION INTRAVENOUS at 01:02

## 2018-02-25 RX ADMIN — DEXTROSE 8 MG/HR: 50 INJECTION, SOLUTION INTRAVENOUS at 11:02

## 2018-02-25 RX ADMIN — TRAMADOL HYDROCHLORIDE 50 MG: 50 TABLET, FILM COATED ORAL at 11:02

## 2018-02-25 RX ADMIN — BRIMONIDINE TARTRATE 1 DROP: 1 SOLUTION/ DROPS OPHTHALMIC at 08:02

## 2018-02-25 RX ADMIN — DEXTROSE 8 MG/HR: 50 INJECTION, SOLUTION INTRAVENOUS at 10:02

## 2018-02-25 RX ADMIN — DORZOLAMIDE HYDROCHLORIDE AND TIMOLOL MALEATE 1 DROP: 20; 5 SOLUTION/ DROPS OPHTHALMIC at 09:02

## 2018-02-25 RX ADMIN — PREDNISOLONE ACETATE 1 DROP: 10 SUSPENSION/ DROPS OPHTHALMIC at 08:02

## 2018-02-25 RX ADMIN — DEXTROSE 8 MG/HR: 50 INJECTION, SOLUTION INTRAVENOUS at 06:02

## 2018-02-25 RX ADMIN — HYDRALAZINE HYDROCHLORIDE 10 MG: 20 INJECTION INTRAMUSCULAR; INTRAVENOUS at 02:02

## 2018-02-25 RX ADMIN — BRIMONIDINE TARTRATE 1 DROP: 1 SOLUTION/ DROPS OPHTHALMIC at 10:02

## 2018-02-25 RX ADMIN — DORZOLAMIDE HYDROCHLORIDE AND TIMOLOL MALEATE 1 DROP: 20; 5 SOLUTION/ DROPS OPHTHALMIC at 10:02

## 2018-02-25 RX ADMIN — LATANOPROST 1 DROP: 50 SOLUTION/ DROPS OPHTHALMIC at 08:02

## 2018-02-25 RX ADMIN — TRAMADOL HYDROCHLORIDE 50 MG: 50 TABLET, FILM COATED ORAL at 10:02

## 2018-02-25 RX ADMIN — DIAZEPAM 5 MG: 5 TABLET ORAL at 04:02

## 2018-02-25 RX ADMIN — DEXTROSE 8 MG/HR: 50 INJECTION, SOLUTION INTRAVENOUS at 04:02

## 2018-02-25 RX ADMIN — PREDNISOLONE ACETATE 1 DROP: 10 SUSPENSION/ DROPS OPHTHALMIC at 10:02

## 2018-02-25 NOTE — PROGRESS NOTES
Progress Note    Admit Date: 2/22/2018   LOS: 2 days     SUBJECTIVE:     Follow-up For:  Severe symptomatic anemia/probable GI bleed    Scheduled Meds:   sodium chloride 0.9%   Intravenous Once    brimonidine 0.1%  1 drop Right Eye BID    dorzolamide-timolol 2-0.5%  1 drop Right Eye BID    [START ON 2/26/2018] epoetin constanza (PROCRIT) injection  10,000 Units Intravenous Every Mon, Wed, Fri    latanoprost  1 drop Right Eye QHS    prednisoLONE acetate  1 drop Left Eye BID     Continuous Infusions:   pantoprazole 40 mg in dextrose 5 % 100 mL infusion (ready to mix system) 8 mg/hr (02/24/18 1800)     PRN Meds:sodium chloride, sodium chloride, sodium chloride 0.9%, sodium chloride 0.9%, furosemide, hydrALAZINE, pneumoc 13-jessica conj-dip cr(PF)    Review of patient's allergies indicates:   Allergen Reactions    Penicillins Swelling    Iodine      Other reaction(s): Hives    Sulfamethoxazole-trimethoprim      Other reaction(s): Swelling  Other reaction(s): Hives       Review of Systems  Constitutional: negative for fevers and night sweats  Eyes: negative  Ears, nose, mouth, throat, and face: negative  Respiratory: positive for cough and Shortness of breath  Cardiovascular: positive for palpitations, palpitations and tachypnea  Gastrointestinal: positive for abdominal pain, melena and nausea  Hematologic/lymphatic: positive for easy bruising  Musculoskeletal:positive for muscle weakness and stiff joints  Neurological: positive for dizziness and weakness    OBJECTIVE:     Vital Signs (Most Recent)  Temp: 98.6 °F (37 °C) (02/24/18 1515)  Pulse: 84 (02/24/18 1915)  Resp: (!) 24 (02/24/18 1915)  BP: (!) 153/88 (02/24/18 1900)  SpO2: 100 % (02/24/18 1915)    Vital Signs Range (Last 24H):  Temp:  [97.7 °F (36.5 °C)-98.7 °F (37.1 °C)]   Pulse:  [69-89]   Resp:  [0-47]   BP: (102-187)/()   SpO2:  [99 %-100 %]     I & O (Last 24H):  Intake/Output Summary (Last 24 hours) at 02/24/18 1915  Last data filed at 02/24/18  1800   Gross per 24 hour   Intake             2160 ml   Output             2504 ml   Net             -344 ml     Physical Exam:  General: fatigued, no distress  Eyes: conjunctivae/corneas clear. PERRL..  HENT: Head:normocephalic, atraumatic. Ears:bilateral TM's and external ear canals normal. Nose: Nares normal. Septum midline. Mucosa normal. No drainage or sinus tenderness., no discharge. Throat: lips, mucosa, and tongue normal; teeth and gums normal and no throat erythema.  Neck: supple, symmetrical, trachea midline, no JVD, thyroid not enlarged, symmetric, no tenderness/mass/nodules and + JVD  Lungs:  clear to auscultation bilaterally and normal respiratory effort  Cardiovascular: Heart: regular rate and rhythm, S1, S2 normal, no murmur, click, rub or gallop and systolic murmur: early systolic 3/6, high pitch at 2nd right intercostal space. Chest Wall: no tenderness. Extremities: no cyanosis or edema, or clubbing. Pulses: 2+ and symmetric.  Abdomen/Rectal: Abdomen: soft, non-tender non-distented; bowel sounds normal; no masses,  no organomegaly. Rectal: not examined  Skin: Skin color, texture, turgor normal. No rashes or lesions  Neurologic: Normal strength and tone. No focal numbness or weakness  Mental status: Alert, oriented, thought content appropriate    Laboratory:  CBC:   Recent Labs  Lab 02/24/18  0652   WBC 6.03   RBC 2.80*   HGB 7.5*   HCT 23.6*   *   MCV 84   MCH 26.8*   MCHC 31.8*     BMP:   Recent Labs  Lab 02/24/18  0240   GLU 96      K 4.4      CO2 28   BUN 26*   CREATININE 5.1*   CALCIUM 8.4*   MG 1.8     CMP:   Recent Labs  Lab 02/24/18  0240   GLU 96   CALCIUM 8.4*   ALBUMIN 3.0*   PROT 7.1      K 4.4   CO2 28      BUN 26*   CREATININE 5.1*   ALKPHOS 41*   ALT 11   AST 13   BILITOT 0.5     LFTs:   Recent Labs  Lab 02/24/18  0240   ALT 11   AST 13   ALKPHOS 41*   BILITOT 0.5   PROT 7.1   ALBUMIN 3.0*     Coagulation:   Recent Labs  Lab 02/23/18  0828   LABPROT 14.6*    INR 1.4*   APTT 30.3     Cardiac markers: No results for input(s): CKMB, CPKMB, TROPONINT, TROPONINI, MYOGLOBIN in the last 168 hours.  ABGs: No results for input(s): PH, PCO2, PO2, HCO3, POCSATURATED, BE in the last 168 hours.  Microbiology Results (last 7 days)     ** No results found for the last 168 hours. **        Specimen (12h ago through future)    None        No results for input(s): COLORU, CLARITYU, SPECGRAV, PHUR, PROTEINUA, GLUCOSEU, BILIRUBINCON, BLOODU, WBCU, RBCU, BACTERIA, MUCUS, NITRITE, LEUKOCYTESUR, UROBILINOGEN, HYALINECASTS in the last 168 hours.    Diagnostic Results:  Labs: Reviewed  ECG: Reviewed  X-Ray: Reviewed  reviewed    ASSESSMENT/PLAN:     1. Acute on Chronic Symptomatic Anemia normocytic anemia  2.  Lower GI Bleed   3. ESRD with HD  4. Anemia of CKD  5. SHPT Disease  5. Low RBC mass (S/t GI per Hem/Onc)  7. Pulmonary HTN  8. Failed renal transplant  9. Diastolic HF  10. STEFFANY  11. Hypothyroidism  12. RA  13. Hyperlipidemia      Plan: Continue Current. Transfuse one unit of PRBC over 4-5 hours and laxis 20mg after blood complete

## 2018-02-25 NOTE — PROGRESS NOTES
Subjective:  CC - melena/anemia     Patient voices no complaints this morning.  No overt bleeding yesterday or overnight.  Last BM was 2 days ago.  She denies abdominal pain, nausea or vomiting.  Tolerating liquid diet.       ROS - negative for SOB, chest pain, fever, chills    Objective:  Vitals:    02/25/18 0815   BP:    Pulse: 78   Resp: (!) 21   Temp:      Physical exam:  GEN: Well developed, well nourished in no apparent distress   HENT: Normocephalic, anicteric sclera   Cardiovascular: Regular rate and rhythm. High-pitched systolic murmur noted.   Chest: Non-labored respirations. Breath sounds equal   Abdomen: Normal BS. Soft and ND.  Mild epigastric TTP without rebound or guarding.  Psych: Appropriate mood and affect.   Extermities: No C/C/E. 2+ dorsalis pedis pulses bilaterally      Recent Labs  Lab 02/25/18  0538   WBC 7.28  7.28   RBC 3.41*  3.41*   HGB 9.4*  9.4*   HCT 28.8*  28.8*     161   MCV 85  85   MCH 27.6  27.6   MCHC 32.6  32.6     Impression: 62 year old female with a history of HTN, DM, CVA, CHF, CAD, ESRD on dialysis, hyperlipidemia, hypothyroidism presenting with severe anemia and dark stools/melena.     Plan:  1.  Severe anemia - source uncertain though concern for UGIB given history of dark/black stools. Likely aggravated by coumadin.  H/H has corrected after receiving total of 3 units PRBCs.  No further overt bleeding over last 24-48 hours and VSS (actually remains hypertensive).  Plan will be for EGD tomorrow morning if no further active bleeding before then.  Continue PPI drip and clears for today.  NPO after MN.  Consult anesthesia.

## 2018-02-25 NOTE — ASSESSMENT & PLAN NOTE
Secondary to suspected GIB  S/p 2upbc -Hb 7.5g/dl   GI following   GI eval planned   It recommended pt undergo an additional 1uprbc target Hb >8g/dl for planned GI eval    Cont to monitor counts

## 2018-02-25 NOTE — PLAN OF CARE
Problem: Patient Care Overview  Goal: Plan of Care Review  Outcome: Ongoing (interventions implemented as appropriate)  No evidence of bleeding on today. Protonix drip maintained. Ultrafiltration today w/ 2 liters off. O2 maintained at 1 liter per minute NC. NSR on monitor w/ frequent PVCs. Order received to transfuse 1 unit of blood this evening w/ Lasix IVP to be given when infusion completed. Remained free of falls and injury this shift.

## 2018-02-25 NOTE — PROGRESS NOTES
[facility]  Hematology/Oncology  Progress Note    Patient Name: Shivani Sheets  Admission Date: 2018  Hospital Length of Stay: 2 days  Code Status: Full Code     Subjective:     HPI:  Ms. Sheets is a pleasant 63 YO lady with ESRD on HD, on chronic coumadin therapy admitted to [facility] ICU with nausea/vomiting, melena and Hg 4.8g. Pt wad determined to have upper GI bleeding and started on protonix drip. Given 2 FFP and 2  Units prb. In addition, she reported to have low red cell volume. Has prior hx of GI bleeding. No recent changes to meds    Interval History: Pt doing better. Less fatigued. No melena. No abd pain/N/V. No SOB/CP.    Oncology Treatment Plan:   [No treatment plan]    Medications:  Continuous Infusions:   pantoprazole 40 mg in dextrose 5 % 100 mL infusion (ready to mix system) 8 mg/hr (18 1001)     Scheduled Meds:   sodium chloride 0.9%   Intravenous Once     PRN Meds:sodium chloride, sodium chloride 0.9%, sodium chloride 0.9%, hydrALAZINE, pneumoc 13-jessica conj-dip cr(PF)     Review of patient's allergies indicates:   Allergen Reactions    Penicillins Swelling    Iodine      Other reaction(s): Hives    Sulfamethoxazole-trimethoprim      Other reaction(s): Swelling  Other reaction(s): Hives        Past Medical History:   Diagnosis Date    Allergy     Anemia     Arthritis     Awaiting organ transplant 2013    Cataract     CHF (congestive heart failure)     Chronic kidney disease     Coronary artery disease     Diabetes mellitus     Diabetic retinopathy     ESRD from HTN strtied RRT 1999    Failed  donor kidney transplant      Glaucoma     History of bleeding peptic ulcer      as stated per pt    Hyperlipidemia     Hypertension     Hypothyroidism     Morbid obesity 8/10/2012    Renal hypertension     Stroke      Past Surgical History:   Procedure Laterality Date    CATARACT EXTRACTION W/  INTRAOCULAR LENS IMPLANT  Bilateral     EYE SURGERY      HYSTERECTOMY      KIDNEY TRANSPLANT      NEPHRECTOMY  11/2008    transplant     TONSILLECTOMY       Family History     Problem Relation (Age of Onset)    Asthma Sister    Blindness Father    Depression Sister    Diabetes Maternal Aunt    Heart attack Father, Mother    Heart failure Father, Mother    Hypertension Father, Mother, Sister, Brother    Kidney disease Brother        Social History Main Topics    Smoking status: Former Smoker     Quit date: 8/10/2000    Smokeless tobacco: Never Used    Alcohol use No    Drug use: No    Sexual activity: No       Review of Systems   Constitutional: Positive for fatigue. Negative for appetite change, fever and unexpected weight change.   HENT: Negative for mouth sores.    Eyes: Negative for visual disturbance.   Respiratory: Negative for cough and shortness of breath.    Cardiovascular: Negative for chest pain.   Gastrointestinal: Negative for abdominal pain and diarrhea.   Genitourinary: Negative for frequency.   Musculoskeletal: Negative for back pain.   Skin: Negative for rash.   Neurological: Negative for headaches.   Hematological: Negative for adenopathy.   Psychiatric/Behavioral: The patient is not nervous/anxious.      Objective:     Vital Signs (Most Recent):  Temp: 98.3 °F (36.8 °C) (02/24/18 1115)  Pulse: 86 (02/24/18 1415)  Resp: (!) 29 (02/24/18 1415)  BP: 137/77 (02/24/18 1415)  SpO2: 100 % (02/24/18 1415) Vital Signs (24h Range):  Temp:  [97.7 °F (36.5 °C)-98.7 °F (37.1 °C)] 98.3 °F (36.8 °C)  Pulse:  [69-86] 86  Resp:  [0-38] 29  SpO2:  [99 %-100 %] 100 %  BP: (129-191)/() 137/77     Weight: 68.9 kg (151 lb 14.4 oz)  Body mass index is 28.7 kg/m².  Body surface area is 1.72 meters squared.      Intake/Output Summary (Last 24 hours) at 02/24/18 1454  Last data filed at 02/24/18 0600   Gross per 24 hour   Intake             1176 ml   Output             4000 ml   Net            -2824 ml       Physical Exam    Constitutional: She is oriented to person, place, and time. She appears well-developed and well-nourished.   HENT:   Head: Normocephalic.   Mouth/Throat: Oropharynx is clear and moist. No oropharyngeal exudate.   Eyes: Conjunctivae and lids are normal. Pupils are equal, round, and reactive to light. No scleral icterus.   Neck: Normal range of motion. Neck supple. No thyromegaly present.   Cardiovascular: Normal rate, regular rhythm and normal heart sounds.    No murmur heard.  Pulmonary/Chest: Breath sounds normal. She has no wheezes. She has no rales.   Abdominal: Soft. Bowel sounds are normal. She exhibits no mass. There is no hepatosplenomegaly. There is no rebound.   Musculoskeletal: Normal range of motion. She exhibits no edema or tenderness.   Neurological: She is alert and oriented to person, place, and time. No cranial nerve deficit. Coordination normal.   Skin: Skin is warm and dry. No ecchymosis, no petechiae and no rash noted. No erythema.   Psychiatric: She has a normal mood and affect.       Significant Labs:   All pertinent labs within the past 24 hours have been reviewed    Diagnostic Results:  I have reviewed and interpreted all pertinent imaging results/findings within the past 24 hours    Assessment/Plan:     Symptomatic anemia    Secondary to suspected GIB  S/p 2upbc -Hb 7.5g/dl   GI following   GI eval planned   It recommended pt undergo an additional 1uprbc target Hb >8g/dl for planned GI eval    Cont to monitor counts                Josephine Adair MD  Hematology/Oncology  [facility]

## 2018-02-25 NOTE — PROGRESS NOTES
[facility]  Nephrology  Progress Note    Patient Name: Shivani Sheets  MRN: [id number]  Admission Date: 2/22/2018  Hospital Length of Stay: 3 days  Attending Provider: César Iverson MD   Primary Care Physician: César Iverson MD  Principal Problem:<principal problem not specified>    Consults     Inpatient consult to Nephrology  Consult performed by: CONNIE MARIA  Consult ordered by: WALTER PINK  Reason for consult: dialysis     Subjective:     Interval History: No more cramping since dialysis treatment yesterday.  No nausea, vomiting, BM, bleeding, SOB.  Received additional 1u pRBC last night.  No complications with transfusion  2/25/2018      Review of patient's allergies indicates:   Allergen Reactions    Penicillins Swelling    Iodine      Other reaction(s): Hives    Sulfamethoxazole-trimethoprim      Other reaction(s): Swelling  Other reaction(s): Hives     Current Facility-Administered Medications   Medication Frequency    0.9%  NaCl infusion (for blood administration) Q24H PRN    0.9%  NaCl infusion (for blood administration) Q24H PRN    0.9%  NaCl infusion PRN    0.9%  NaCl infusion PRN    0.9%  NaCl infusion Once    brimonidine (ALPHAGAN P) ophthalmic solution 0.1% BID    diazePAM tablet 5 mg Q12H PRN    dorzolamide-timolol 2-0.5% ophthalmic solution 1 drop BID    [START ON 2/26/2018] epoetin constanza injection 10,000 Units Every Mon, Wed, Fri    hydrALAZINE injection 10 mg Q6H PRN    latanoprost 0.005 % ophthalmic solution 1 drop QHS    pantoprazole 40 mg in dextrose 5 % 100 mL infusion (ready to mix system) Continuous    pneumoc 13-jessica conj-dip cr(PF) 0.5 mL vaccine x 1 dose    prednisoLONE acetate 1 % ophthalmic suspension 1 drop BID    traMADol tablet 50 mg Q6H PRN       Objective:     Vital Signs (Most Recent):  Temp: 98.7 °F (37.1 °C) (02/25/18 1200)  Pulse: 72 (02/25/18 1230)  Resp: 16 (02/25/18 1230)  BP: 139/70 (02/25/18 1200)  SpO2: 100 % (02/25/18  1230)  O2 Device (Oxygen Therapy): nasal cannula (02/25/18 1100) Vital Signs (24h Range):  Temp:  [97.6 °F (36.4 °C)-98.9 °F (37.2 °C)] 98.7 °F (37.1 °C)  Pulse:  [71-92] 72  Resp:  [13-49] 16  SpO2:  [94 %-100 %] 100 %  BP: (102-200)/() 139/70     Weight: 68.9 kg (151 lb 14.4 oz) (02/24/18 1030)  Body mass index is 28.7 kg/m².  Body surface area is 1.72 meters squared.    I/O last 3 completed shifts:  In: 2420 [P.O.:1200; I.V.:720; Other:500]  Out: 2504 [Other:2504]    Physical Exam   Constitutional: She is oriented to person, place, and time. She appears well-developed and well-nourished. No distress.   HENT:   Head: Normocephalic and atraumatic.   Eyes: EOM are normal. No scleral icterus.   Cardiovascular: Normal rate, regular rhythm and normal heart sounds.  Exam reveals no friction rub.    No murmur heard.  ABEL AVF    Pulmonary/Chest: Effort normal. No respiratory distress. She has no wheezes. She has rales.   1L O2 NC   Abdominal: Soft. She exhibits no distension. There is no tenderness.   Musculoskeletal: She exhibits no edema or deformity.   Neurological: She is alert and oriented to person, place, and time.   Skin: Skin is warm and dry. No rash noted. She is not diaphoretic.   Psychiatric: She has a normal mood and affect. Her behavior is normal.   Nursing note and vitals reviewed.      Significant Labs:  CBC:     Recent Labs  Lab 02/25/18  0538   WBC 7.28  7.28   RBC 3.41*  3.41*   HGB 9.4*  9.4*   HCT 28.8*  28.8*     161   MCV 85  85   MCH 27.6  27.6   MCHC 32.6  32.6     CMP:     Recent Labs  Lab 02/25/18  0240   GLU 77  77   CALCIUM 8.3*  8.3*   ALBUMIN 3.2*  3.2*   PROT 7.4  7.4   *  133*   K 4.4  4.4   CO2 21*  21*   CL 99  99   BUN 44*  44*   CREATININE 7.3*  7.3*   ALKPHOS 40*  40*   ALT 10  10   AST 20  20   BILITOT 1.0  1.0     All labs within the past 24 hours have been reviewed.    Significant Imaging:  CXR 2/23/18:   Findings: The cardiomediastinal  silhouette is enlarged, similar to prior examination.  There is a left subclavian vascular stent in place.  Surgical clips project over the left lower neck.  There is increased interstitial and parenchymal attenuation, similar to prior examination.  Findings can be seen in setting of pulmonary edema/CHF.  No evidence of pneumothorax or significant interval detrimental change in cardiopulmonary status compared to prior examination.     CT abd/pelv:   There is a small anterior abdominal wall hernia containing a portion of the transverse colon.  There are increased areas of ground glass opacity at the lung bases as well as some linear bands of atelectasis or fibrosis.  Congestive failure or developing infiltrate not excluded.  Cholelithiasis, probably chronic renal failure with dysplastic kidneys and hypodensities presumably cysts.  Probable nonfunctioning right iliac fossa transplant or residual soft tissue from removal unchanged    Assessment/Plan:     Active Diagnoses:    Diagnosis Date Noted POA    Symptomatic anemia [D64.9] 02/22/2018 Yes      Problems Resolved During this Admission:    Diagnosis Date Noted Date Resolved POA       1. ESRD - usual HD on MWF with Dr Deleon in [place], Dry weight 73-74kg.   - hold HD today and resume MWF schedule  - renally dose medications for dialysis  - pulmonary edema - UF as tolerated, wean O2 as tolerated, improving, hold HD today and plan for HD tomorrow   2. HTN - UF as tolerated, continue IV hydralazine PRN until scope.  Plan to resume PO medications once GI clears for a diet  3. Anemia of chronic kidney disease - and acute blood loss anemia 2/2 GIB, s/p transfusion 2/23/18 and 2/24/18, continue Epo  4. MBD/secondary hyperparathyroidism - phos acceptable  5. Access - ABEL AVF  6. Nutrition - clear liquid diet, plan for Nepro, renal vitamins and renal diet restrictions once diet is advanced     GIB  Failed kidney transplant  Pulm  HTN  DHF  STEFFANY  Hypothyroid  RA  HLD     Thank you for allowing me to participate in the care of your patient.  Please call with any questions.     Judi Thompson MD   Nephrology  [facility]  Office [phone]   [facility] covering for:  Dr Pancho Hutchinson

## 2018-02-25 NOTE — PLAN OF CARE
Problem: Patient Care Overview  Goal: Plan of Care Review  Outcome: Ongoing (interventions implemented as appropriate)  Pt remains in ICU. One unit PRBC overnight. No signs of bleeding, H&H remains stable. Patient feeling a little anxious and unrested overnight, PRN Valium given. Clear liquid diet, tolerating well.

## 2018-02-26 ENCOUNTER — ANESTHESIA (OUTPATIENT)
Dept: ENDOSCOPY | Facility: HOSPITAL | Age: 63
DRG: 377 | End: 2018-02-26
Payer: MEDICARE

## 2018-02-26 ENCOUNTER — ANESTHESIA EVENT (OUTPATIENT)
Dept: ENDOSCOPY | Facility: HOSPITAL | Age: 63
DRG: 377 | End: 2018-02-26
Payer: MEDICARE

## 2018-02-26 LAB
ALBUMIN SERPL BCP-MCNC: 3.2 G/DL
ALP SERPL-CCNC: 42 U/L
ALT SERPL W/O P-5'-P-CCNC: 7 U/L
ANION GAP SERPL CALC-SCNC: 13 MMOL/L
ANISOCYTOSIS BLD QL SMEAR: SLIGHT
ANISOCYTOSIS BLD QL SMEAR: SLIGHT
AST SERPL-CCNC: 12 U/L
BASOPHILS # BLD AUTO: 0.02 K/UL
BASOPHILS # BLD AUTO: 0.02 K/UL
BASOPHILS NFR BLD: 0.3 %
BASOPHILS NFR BLD: 0.3 %
BILIRUB SERPL-MCNC: 0.7 MG/DL
BUN SERPL-MCNC: 54 MG/DL
CALCIUM SERPL-MCNC: 8.3 MG/DL
CHLORIDE SERPL-SCNC: 92 MMOL/L
CO2 SERPL-SCNC: 25 MMOL/L
CREAT SERPL-MCNC: 9.2 MG/DL
DIFFERENTIAL METHOD: ABNORMAL
DIFFERENTIAL METHOD: ABNORMAL
EOSINOPHIL # BLD AUTO: 0.7 K/UL
EOSINOPHIL # BLD AUTO: 0.7 K/UL
EOSINOPHIL NFR BLD: 9.9 %
EOSINOPHIL NFR BLD: 9.9 %
ERYTHROCYTE [DISTWIDTH] IN BLOOD BY AUTOMATED COUNT: 17.2 %
ERYTHROCYTE [DISTWIDTH] IN BLOOD BY AUTOMATED COUNT: 17.2 %
EST. GFR  (AFRICAN AMERICAN): 5 ML/MIN/1.73 M^2
EST. GFR  (NON AFRICAN AMERICAN): 4 ML/MIN/1.73 M^2
GIANT PLATELETS BLD QL SMEAR: PRESENT
GIANT PLATELETS BLD QL SMEAR: PRESENT
GLUCOSE SERPL-MCNC: 91 MG/DL
HBV SURFACE AB SER-ACNC: POSITIVE M[IU]/ML
HBV SURFACE AG SERPL QL IA: NEGATIVE
HCT VFR BLD AUTO: 30.8 %
HCT VFR BLD AUTO: 30.8 %
HGB BLD-MCNC: 10 G/DL
HGB BLD-MCNC: 10 G/DL
HYPOCHROMIA BLD QL SMEAR: ABNORMAL
HYPOCHROMIA BLD QL SMEAR: ABNORMAL
LYMPHOCYTES # BLD AUTO: 1.9 K/UL
LYMPHOCYTES # BLD AUTO: 1.9 K/UL
LYMPHOCYTES NFR BLD: 29 %
LYMPHOCYTES NFR BLD: 29 %
MAGNESIUM SERPL-MCNC: 2.2 MG/DL
MCH RBC QN AUTO: 27.6 PG
MCH RBC QN AUTO: 27.6 PG
MCHC RBC AUTO-ENTMCNC: 32.5 G/DL
MCHC RBC AUTO-ENTMCNC: 32.5 G/DL
MCV RBC AUTO: 85 FL
MCV RBC AUTO: 85 FL
MONOCYTES # BLD AUTO: 0.7 K/UL
MONOCYTES # BLD AUTO: 0.7 K/UL
MONOCYTES NFR BLD: 9.7 %
MONOCYTES NFR BLD: 9.7 %
NEUTROPHILS # BLD AUTO: 3.4 K/UL
NEUTROPHILS # BLD AUTO: 3.4 K/UL
NEUTROPHILS NFR BLD: 51.7 %
NEUTROPHILS NFR BLD: 51.7 %
OVALOCYTES BLD QL SMEAR: ABNORMAL
OVALOCYTES BLD QL SMEAR: ABNORMAL
PHOSPHATE SERPL-MCNC: 3.9 MG/DL
PLATELET # BLD AUTO: 154 K/UL
PLATELET # BLD AUTO: 154 K/UL
PLATELET BLD QL SMEAR: ABNORMAL
PLATELET BLD QL SMEAR: ABNORMAL
PMV BLD AUTO: 10.9 FL
PMV BLD AUTO: 10.9 FL
POIKILOCYTOSIS BLD QL SMEAR: ABNORMAL
POIKILOCYTOSIS BLD QL SMEAR: ABNORMAL
POTASSIUM SERPL-SCNC: 4.3 MMOL/L
PROT SERPL-MCNC: 7.4 G/DL
RBC # BLD AUTO: 3.62 M/UL
RBC # BLD AUTO: 3.62 M/UL
SCHISTOCYTES BLD QL SMEAR: ABNORMAL
SCHISTOCYTES BLD QL SMEAR: ABNORMAL
SODIUM SERPL-SCNC: 130 MMOL/L
WBC # BLD AUTO: 6.7 K/UL
WBC # BLD AUTO: 6.7 K/UL

## 2018-02-26 PROCEDURE — 80100016 HC MAINTENANCE HEMODIALYSIS

## 2018-02-26 PROCEDURE — 36415 COLL VENOUS BLD VENIPUNCTURE: CPT

## 2018-02-26 PROCEDURE — 63600175 PHARM REV CODE 636 W HCPCS: Performed by: NURSE PRACTITIONER

## 2018-02-26 PROCEDURE — C9113 INJ PANTOPRAZOLE SODIUM, VIA: HCPCS | Performed by: NURSE PRACTITIONER

## 2018-02-26 PROCEDURE — 94761 N-INVAS EAR/PLS OXIMETRY MLT: CPT

## 2018-02-26 PROCEDURE — 99233 SBSQ HOSP IP/OBS HIGH 50: CPT | Mod: GC,,, | Performed by: INTERNAL MEDICINE

## 2018-02-26 PROCEDURE — 63600175 PHARM REV CODE 636 W HCPCS: Performed by: INTERNAL MEDICINE

## 2018-02-26 PROCEDURE — 20000000 HC ICU ROOM

## 2018-02-26 PROCEDURE — 84100 ASSAY OF PHOSPHORUS: CPT

## 2018-02-26 PROCEDURE — 85025 COMPLETE CBC W/AUTO DIFF WBC: CPT

## 2018-02-26 PROCEDURE — 83735 ASSAY OF MAGNESIUM: CPT

## 2018-02-26 PROCEDURE — 25000003 PHARM REV CODE 250: Performed by: NURSE PRACTITIONER

## 2018-02-26 PROCEDURE — 27000221 HC OXYGEN, UP TO 24 HOURS

## 2018-02-26 PROCEDURE — 80053 COMPREHEN METABOLIC PANEL: CPT

## 2018-02-26 RX ORDER — ONDANSETRON 2 MG/ML
4 INJECTION INTRAMUSCULAR; INTRAVENOUS EVERY 6 HOURS PRN
Status: DISCONTINUED | OUTPATIENT
Start: 2018-02-26 | End: 2018-03-02 | Stop reason: HOSPADM

## 2018-02-26 RX ADMIN — DEXTROSE 8 MG/HR: 50 INJECTION, SOLUTION INTRAVENOUS at 10:02

## 2018-02-26 RX ADMIN — HYDRALAZINE HYDROCHLORIDE 10 MG: 20 INJECTION INTRAMUSCULAR; INTRAVENOUS at 12:02

## 2018-02-26 RX ADMIN — DEXTROSE 8 MG/HR: 50 INJECTION, SOLUTION INTRAVENOUS at 06:02

## 2018-02-26 RX ADMIN — ERYTHROPOIETIN 10000 UNITS: 10000 INJECTION, SOLUTION INTRAVENOUS; SUBCUTANEOUS at 03:02

## 2018-02-26 RX ADMIN — DEXTROSE 8 MG/HR: 50 INJECTION, SOLUTION INTRAVENOUS at 02:02

## 2018-02-26 RX ADMIN — PREDNISOLONE ACETATE 1 DROP: 10 SUSPENSION/ DROPS OPHTHALMIC at 08:02

## 2018-02-26 RX ADMIN — BRIMONIDINE TARTRATE 1 DROP: 1 SOLUTION/ DROPS OPHTHALMIC at 08:02

## 2018-02-26 RX ADMIN — PREDNISOLONE ACETATE 1 DROP: 10 SUSPENSION/ DROPS OPHTHALMIC at 10:02

## 2018-02-26 RX ADMIN — DORZOLAMIDE HYDROCHLORIDE AND TIMOLOL MALEATE 1 DROP: 20; 5 SOLUTION/ DROPS OPHTHALMIC at 08:02

## 2018-02-26 RX ADMIN — ONDANSETRON 4 MG: 2 INJECTION INTRAMUSCULAR; INTRAVENOUS at 01:02

## 2018-02-26 RX ADMIN — BRIMONIDINE TARTRATE 1 DROP: 1 SOLUTION/ DROPS OPHTHALMIC at 10:02

## 2018-02-26 RX ADMIN — LATANOPROST 1 DROP: 50 SOLUTION/ DROPS OPHTHALMIC at 08:02

## 2018-02-26 RX ADMIN — DORZOLAMIDE HYDROCHLORIDE AND TIMOLOL MALEATE 1 DROP: 20; 5 SOLUTION/ DROPS OPHTHALMIC at 10:02

## 2018-02-26 NOTE — SUBJECTIVE & OBJECTIVE
Past Medical History:   Diagnosis Date    Allergy     Anemia     Arthritis     Awaiting organ transplant 2013    Cataract     CHF (congestive heart failure)     Chronic kidney disease     Coronary artery disease     Diabetes mellitus     Diabetic retinopathy     ESRD from HTN strtied RRT 1999    Failed  donor kidney transplant      Glaucoma     History of bleeding peptic ulcer      as stated per pt    Hyperlipidemia     Hypertension     Hypothyroidism     Morbid obesity 8/10/2012    Renal hypertension     Stroke        Past Surgical History:   Procedure Laterality Date    CATARACT EXTRACTION W/  INTRAOCULAR LENS IMPLANT Bilateral     EYE SURGERY      HYSTERECTOMY      KIDNEY TRANSPLANT      NEPHRECTOMY  2008    transplant     TONSILLECTOMY         Review of patient's allergies indicates:   Allergen Reactions    Penicillins Swelling    Iodine      Other reaction(s): Hives    Sulfamethoxazole-trimethoprim      Other reaction(s): Swelling  Other reaction(s): Hives       Family History     Problem Relation (Age of Onset)    Asthma Sister    Blindness Father    Depression Sister    Diabetes Maternal Aunt    Heart attack Father, Mother    Heart failure Father, Mother    Hypertension Father, Mother, Sister, Brother    Kidney disease Brother        Social History Main Topics    Smoking status: Former Smoker     Quit date: 8/10/2000    Smokeless tobacco: Never Used    Alcohol use No    Drug use: No    Sexual activity: No         Review of Systems   Constitutional: Negative.    HENT: Negative.    Eyes: Negative.    Respiratory: Negative for shortness of breath and wheezing.    Cardiovascular: Negative for chest pain and leg swelling.   Gastrointestinal: Positive for blood in stool.   Endocrine: Negative.    Genitourinary: Negative.    Musculoskeletal: Negative.    Skin: Negative.    Allergic/Immunologic: Negative.    Neurological: Negative.     Hematological: Negative.    Psychiatric/Behavioral: Negative.      Objective:     Vital Signs (Most Recent):  Temp: 97.9 °F (36.6 °C) (02/26/18 0715)  Pulse: 77 (02/26/18 1000)  Resp: (!) 38 (02/26/18 1000)  BP: (!) 157/83 (02/26/18 1000)  SpO2: 100 % (02/26/18 1000) Vital Signs (24h Range):  Temp:  [97.9 °F (36.6 °C)-98.7 °F (37.1 °C)] 97.9 °F (36.6 °C)  Pulse:  [67-84] 77  Resp:  [14-56] 38  SpO2:  [97 %-100 %] 100 %  BP: (139-187)/(70-99) 157/83     Weight: 70.8 kg (156 lb 1.4 oz)  Body mass index is 29.49 kg/m².      Intake/Output Summary (Last 24 hours) at 02/26/18 1130  Last data filed at 02/26/18 1100   Gross per 24 hour   Intake              555 ml   Output                0 ml   Net              555 ml       Physical Exam   Constitutional: She is oriented to person, place, and time. She appears well-developed and well-nourished.   HENT:   Head: Normocephalic and atraumatic.   MPII   Eyes: EOM are normal. Pupils are equal, round, and reactive to light.   Neck: Normal range of motion. Neck supple. No JVD present. No tracheal deviation present.   Cardiovascular: Normal rate, regular rhythm and normal heart sounds.    No murmur heard.  Pulmonary/Chest: Effort normal and breath sounds normal. No respiratory distress. She has no wheezes. She has no rales.   Abdominal: Soft. Bowel sounds are normal. She exhibits no distension. There is no tenderness.   Musculoskeletal: Normal range of motion. She exhibits no edema.   Neurological: She is alert and oriented to person, place, and time.   Skin: Skin is warm and dry. Capillary refill takes less than 2 seconds.   Psychiatric: She has a normal mood and affect. Her behavior is normal.   Vitals reviewed.      Vents:  Oxygen Concentration (%): 36 (02/24/18 1330)    Lines/Drains/Airways     Central Venous Catheter Line                 RETIRED! DO NOT USE: Hemodialysis Catheter Arteriovenous fistula Left Forearm -- days          Drain                 Hemodialysis AV Fistula  Left upper arm -- days          Peripheral Intravenous Line                 Midline Catheter Insertion/Assessment  - Single Lumen 07/14/15 2110 Right basilic vein (medial side of arm) 18g x 10cm 957 days         Peripheral IV - Single Lumen 02/22/18 1728 Right Hand 3 days         Peripheral IV - Single Lumen 02/22/18 2120 Right Forearm 3 days                Significant Labs:    CBC/Anemia Profile:    Recent Labs  Lab 02/25/18  0538 02/26/18  0236   WBC 7.28  7.28 6.70  6.70   HGB 9.4*  9.4* 10.0*  10.0*   HCT 28.8*  28.8* 30.8*  30.8*     161 154  154   MCV 85  85 85  85   RDW 17.1*  17.1* 17.2*  17.2*        Chemistries:    Recent Labs  Lab 02/25/18  0240 02/26/18  0235   *  133* 130*   K 4.4  4.4 4.3   CL 99  99 92*   CO2 21*  21* 25   BUN 44*  44* 54*   CREATININE 7.3*  7.3* 9.2*   CALCIUM 8.3*  8.3* 8.3*   ALBUMIN 3.2*  3.2* 3.2*   PROT 7.4  7.4 7.4   BILITOT 1.0  1.0 0.7   ALKPHOS 40*  40* 42*   ALT 10  10 7*   AST 20  20 12   MG 2.1 2.2   PHOS 3.1 3.9     ECHO: 5/8/18L EF=60-65%, grade 2 diastolic dysfunction,  Moderate atrial enlargement. Tricuspid regurgitation, Estimated PASP of 71mmHg    Significant Imaging:   CXR reviewed.

## 2018-02-26 NOTE — ANESTHESIA PREPROCEDURE EVALUATION
2018  [facility]  Anesthesia Pre-Operative Evaluation         Patient Name: Shivani Sheets  YOB: 1955  MRN: [id number]    SUBJECTIVE:     Pre-operative evaluation for Procedure(s) (LRB):  ESOPHAGOGASTRODUODENOSCOPY (EGD) (N/A)     2018    Shivani Sheets is a 62 y.o. female w/ a significant PMHx of HTN, CAD s/p angioplasty, dCHF, atrial fibrillation, ESRD s/p failed  donor kidney transplant, STEFFANY, RA,  who presents for the above procedure.    LDA:   Right forearm 20g PIV  Right hand 20g PIV    Prev airway: None documented.    Drips: None documented.    Patient Active Problem List   Diagnosis    Chronic diastolic heart failure    CAD (coronary artery disease)    S/P PTCA (percutaneous transluminal coronary angioplasty)    Central retinal artery occlusion    ESRD from HTN started RRT     Sleep apnea    Hyperlipidemia    Obesity    Rheumatoid arthritis    Anticoagulation monitoring by pharmacist    Complication of vascular access for dialysis    Hand pain, left    Failed  donor kidney transplant     Renal hypertension diagnosed age 15    Hypothyroidism    Awaiting organ transplant    Pulmonary hypertension    Atrial fibrillation    Hypoxia    Low back pain    Other chronic pulmonary heart diseases    Chest wall pain    Glaucoma shunt device of right eye    Status post cataract extraction and insertion of intraocular lens    Glaucoma filtering bleb of both eyes    Proliferative diabetic retinopathy of both eyes without macular edema associated with type 2 diabetes mellitus    Chronic angle-closure glaucoma of both eyes, moderate stage    Glaucoma shunt device of left eye    Atypical pneumonia    GIB (gastrointestinal bleeding)       Review of patient's allergies indicates:   Allergen Reactions     Penicillins Swelling    Iodine      Other reaction(s): Hives    Sulfamethoxazole-trimethoprim      Other reaction(s): Swelling  Other reaction(s): Hives       Current Inpatient Medications:   sodium chloride 0.9%   Intravenous Once    brimonidine 0.1%  1 drop Right Eye BID    dorzolamide-timolol 2-0.5%  1 drop Right Eye BID    epoetin constanza (PROCRIT) injection  10,000 Units Intravenous Every Mon, Wed, Fri    latanoprost  1 drop Right Eye QHS    pantoprazole  40 mg Intravenous BID    prednisoLONE acetate  1 drop Left Eye BID       No current facility-administered medications on file prior to encounter.      Current Outpatient Prescriptions on File Prior to Encounter   Medication Sig Dispense Refill    ALPHAGAN P 0.1 % Drop       aspirin 81 MG chewable tablet Take 81 mg by mouth Daily. 1  By mouth Every day      azithromycin (Z-SHLOMO) 250 MG tablet Take 1 tablet (250 mg total) by mouth once daily. Take first 2 tablets together, then 1 every day until finished. 6 tablet 0    CARTIA  mg 24 hr capsule       cloNIDine (CATAPRES) 0.1 MG tablet       dorzolamide-timolol 2-0.5% (COSOPT) 22.3-6.8 mg/mL ophthalmic solution INSTILL 1 DROP IN BOTH EYES TWICE DAILY 10 mL 0    hydrocodone-acetaminophen 5-325mg (NORCO) 5-325 mg per tablet Take 1 tablet by mouth every 4 (four) hours as needed. 12 tablet 0    latanoprost 0.005 % ophthalmic solution INSTILL 1 DROP IN BOTH EYES EVERY DAY AT BEDTIME 7.5 mL 0    levothyroxine (SYNTHROID) 100 MCG tablet Take 100 mcg by mouth. 1 Tablet Oral Every day      lidocaine-prilocaine (EMLA) cream Apply topically as needed.      LIPITOR 10 mg tablet TAKE 1 BY MOUTH ONCE A DAY 90 tablet 3    macitentan (OPSUMIT) 10 mg Tab Take 1 tablet (10 mg total) by mouth every evening. 30 tablet 11    tadalafil, PULMONARY HYPERTENSION, (ADCIRCA) 20 mg Tab Take 2 tablets (40 mg total) by mouth once daily. 60 tablet 11    UPTRAVI 600 mcg Tab Take 600 mcg by mouth 2 (two) times daily. 60  tablet 11    warfarin (COUMADIN) 5 MG tablet TAKE 1 1/2 TABLETS BY MOUTH DAILY ON TUESDAY, THURSDAY AND SATURDAY, THEN TAKE 1 TABLET DAILY ON MONDAY, WEDNESDAY, FRIDAY AND SUNDAY 120 tablet 0       Past Surgical History:   Procedure Laterality Date    CATARACT EXTRACTION W/  INTRAOCULAR LENS IMPLANT Bilateral     EYE SURGERY      HYSTERECTOMY      KIDNEY TRANSPLANT      NEPHRECTOMY  11/2008    transplant     TONSILLECTOMY         Social History     Social History    Marital status:      Spouse name: N/A    Number of children: N/A    Years of education: N/A     Occupational History    Not on file.     Social History Main Topics    Smoking status: Former Smoker     Quit date: 8/10/2000    Smokeless tobacco: Never Used    Alcohol use No    Drug use: No    Sexual activity: No     Other Topics Concern    Not on file     Social History Narrative    Shivani had three children and 11grandchildren.  She lives alone.  She is on disability.        OBJECTIVE:     Vital Signs Range (Last 24H):  Temp:  [36.6 °C (97.8 °F)-37.2 °C (99 °F)]   Pulse:  []   Resp:  [12-31]   BP: (108-162)/(65-92)   SpO2:  [93 %-100 %]       CBC:   Recent Labs      02/26/18   0236  02/27/18   0406   WBC  6.70  6.70  8.20   RBC  3.62*  3.62*  3.67*   HGB  10.0*  10.0*  9.9*   HCT  30.8*  30.8*  30.8*   PLT  154  154  163   MCV  85  85  84   MCH  27.6  27.6  27.0   MCHC  32.5  32.5  32.1       CMP:   Recent Labs      02/26/18   0235  02/27/18   0406   NA  130*  134*   K  4.3  3.9   CL  92*  97   CO2  25  27   BUN  54*  20   CREATININE  9.2*  5.6*   GLU  91  94   MG  2.2  1.8   PHOS  3.9  3.0   CALCIUM  8.3*  8.2*   ALBUMIN  3.2*  2.9*   PROT  7.4  7.4   ALKPHOS  42*  45*   ALT  7*  8*   AST  12  12   BILITOT  0.7  0.6       INR:  Recent Labs      02/27/18   0406   INR  1.1   APTT  24.9       Diagnostic Studies: No relevant studies.    EKG:   Normal sinus rhythm  Cannot rule out Anterior infarct (cited on or before  09-OCT-2017)  Abnormal ECG  When compared with ECG of 15-FEB-2018 12:07,  No significant change was found  Confirmed by Eliazar AMADO John ([id number]) on 2/23/2018 11:48:56 AM    2D ECHO:  Results for orders placed or performed during the hospital encounter of 02/08/18   2D echo with color flow doppler   Result Value Ref Range    EF 60 55 - 65    Mitral Valve Regurgitation TRIVIAL     Diastolic Dysfunction Yes (A)     Est. PA Systolic Pressure 70.9 (A)     Tricuspid Valve Regurgitation MILD          ASSESSMENT/PLAN:         Anesthesia Evaluation    I have reviewed the Patient Summary Reports.     I have reviewed the Medications.     Review of Systems  Anesthesia Hx:  No problems with previous Anesthesia  History of prior surgery of interest to airway management or planning: Denies Family Hx of Anesthesia complications.   Denies Personal Hx of Anesthesia complications.   Social:  Former Smoker, No Alcohol Use    Hematology/Oncology:     Oncology Normal    -- Anemia:   EENT/Dental:EENT/Dental Normal   Cardiovascular:   Exercise tolerance: poor Hypertension CAD   CHF     hyperlipidemia MORA ECG has been reviewed. On home oxygen; uses with exertion   Pulmonary:   Sleep Apnea Severe pulmonary hypertension; under care by Dr. Hennessy - reviewed   Renal/:   Chronic Renal Disease, ESRD, Dialysis Dialyzed yesterday   Hepatic/GI:   Denies GERD. Recent gastroenteritis, recovering   Musculoskeletal:  Musculoskeletal Normal    Neurological:   CVA    Endocrine:   Diabetes, well controlled, type 2 Hypothyroidism    Dermatological:  Skin Normal    Psych:  Psychiatric Normal           Physical Exam  General:  Well nourished, Obesity    Airway/Jaw/Neck:  Airway Findings: Mouth Opening: Normal Tongue: Normal  General Airway Assessment: Adult  Mallampati: II  Improves to I with phonation.  TM Distance: 4 - 6 cm  Jaw/Neck Findings:  Neck ROM: Normal ROM      Dental:  Dental Findings: In tact   Chest/Lungs:  Chest/Lungs Findings: Clear to  auscultation, Normal Respiratory Rate     Heart/Vascular:  Heart Findings: Rate: Normal  Rhythm: Regular Rhythm  Sounds: Normal       Skin:  Skin Findings:  AVF L arm          Anesthesia Plan  Type of Anesthesia, risks & benefits discussed:  Anesthesia Type:  MAC, general  Patient's Preference:   Intra-op Monitoring Plan: standard ASA monitors  Intra-op Monitoring Plan Comments:   Post Op Pain Control Plan: multimodal analgesia  Post Op Pain Control Plan Comments:   Induction:   IV  Beta Blocker:  Patient is not currently on a Beta-Blocker (No further documentation required).       Informed Consent: Patient understands risks and agrees with Anesthesia plan.  Questions answered. Anesthesia consent signed with patient.  ASA Score: 4     Day of Surgery Review of History & Physical:    H&P update referred to the surgeon.     Anesthesia Plan Notes: Pt with severe pulmonary hypertension, on phosophodiesterase inhibitors, which have been stopped since admission.        Ready For Surgery From Anesthesia Perspective.

## 2018-02-26 NOTE — PLAN OF CARE
Problem: Patient Care Overview  Goal: Plan of Care Review  Outcome: Ongoing (interventions implemented as appropriate)  Patient AAOx4, Anuria, clear liquid diet until midnight-NPO after midnight for EGD procedure scheduled on 2/26/18, continuous protonix drip maintianed, patient complained of pain, informed MD-Tramadol Q6 PRN ordered, pain reassessed, bowel movement x1-dark brown, loose, small amount, no distress, nor falls noted during entire shift.

## 2018-02-26 NOTE — ASSESSMENT & PLAN NOTE
Severe pulmonary hypertension managed by Dr. Hennessy at [facility].  Multifactorial: diastolic dysfunction, STEFFANY  Prescribed uptravi, tadalafil, and macitentan at home.  Currently off all PH meds.  Contacted [facility] service at [facility] for guidance regarding re-initiating medical therapy.  They are familiar with the patient and echoed anesthesia's  concerns about potential hemodynamic instability related to uncontrolled pH during procedural sedation.  Recommended transfer to [facility].  Spoke with Dr. Hennessy who accepted the patient to [facility].

## 2018-02-26 NOTE — PROGRESS NOTES
[facility]  Hematology/Oncology  Progress Note    Patient Name: Shivani Sheets  Admission Date: 2/22/2018  Hospital Length of Stay: 4 days  Code Status: Full Code     Subjective:     Interval History:     02/26/2018: Had one black BM last night. No fever, chills, abdominal pain     Oncology Treatment Plan:   [No treatment plan]    Medications:  Continuous Infusions:   pantoprazole 40 mg in dextrose 5 % 100 mL infusion (ready to mix system) 8 mg/hr (02/26/18 0628)     Scheduled Meds:   sodium chloride 0.9%   Intravenous Once    brimonidine 0.1%  1 drop Right Eye BID    dorzolamide-timolol 2-0.5%  1 drop Right Eye BID    epoetin constanza (PROCRIT) injection  10,000 Units Intravenous Every Mon, Wed, Fri    latanoprost  1 drop Right Eye QHS    prednisoLONE acetate  1 drop Left Eye BID     PRN Meds:sodium chloride, sodium chloride, sodium chloride 0.9%, sodium chloride 0.9%, diazePAM, hydrALAZINE, pneumoc 13-jessica conj-dip cr(PF), traMADol     Review of Systems   Constitutional: + fatigue   Eyes: no visual changes   ENT: no nasal congestion. Denies sore throat with odynophagia   Respiratory: No cough, SOB, exertional dyspnea or  hemoptysis   Cardiovascular: no chest pain or palpitations   Gastrointestinal: see HPI  Hematologic/Lymphatic: see HPI   Musculoskeletal: No ROM abnormalities or muscle weakness   Neurological: no seizures or tremors, gait or balance prblems  Skin: No rashes or lesions  Psych: Denies any anxiety, depression or insomnia       Objective:     Vital Signs (Most Recent):  Temp: 98.3 °F (36.8 °C) (02/26/18 0300)  Pulse: 69 (02/26/18 0600)  Resp: 14 (02/26/18 0600)  BP: (!) 164/93 (02/26/18 0600)  SpO2: 100 % (02/26/18 0600) Vital Signs (24h Range):  Temp:  [97.6 °F (36.4 °C)-98.7 °F (37.1 °C)] 98.3 °F (36.8 °C)  Pulse:  [67-92] 69  Resp:  [14-49] 14  SpO2:  [94 %-100 %] 100 %  BP: (139-200)/(70-99) 164/93     Weight: 70.8 kg (156 lb 1.4 oz)  Body mass index is 29.49 kg/m².  Body  surface area is 1.75 meters squared.      Intake/Output Summary (Last 24 hours) at 02/26/18 0710  Last data filed at 02/26/18 0600   Gross per 24 hour   Intake              535 ml   Output                0 ml   Net              535 ml       Physical Exam     General: NAD, resting in icu bed.  RN at the bedside   Head: normocephalic, atraumatic   Eyes: conjunctivae pale  Throat: No erythema or post nasal discharge   Neck: supple, no LAD   Lungs: crackles at the bases   Heart: S1, S2, RRR   Abdomen: + BS x 4 quadrants, soft, non tender.   Extremities: no cyanosis or edema.   Skin: dry skin.   MS: move all extremities  Neuro: A&O x 2  Psy: calm     Significant Labs:   CBC:   Recent Labs  Lab 02/25/18  0538 02/26/18  0236   WBC 7.28  7.28 6.70  6.70   HGB 9.4*  9.4* 10.0*  10.0*   HCT 28.8*  28.8* 30.8*  30.8*     161 154  154   , CMP:   Recent Labs  Lab 02/25/18  0240 02/26/18  0235   *  133* 130*   K 4.4  4.4 4.3   CL 99  99 92*   CO2 21*  21* 25   GLU 77  77 91   BUN 44*  44* 54*   CREATININE 7.3*  7.3* 9.2*   CALCIUM 8.3*  8.3* 8.3*   PROT 7.4  7.4 7.4   ALBUMIN 3.2*  3.2* 3.2*   BILITOT 1.0  1.0 0.7   ALKPHOS 40*  40* 42*   AST 20  20 12   ALT 10  10 7*   ANIONGAP 13  13 13   EGFRNONAA 5*  5* 4*   , Coagulation: No results for input(s): PT, INR, APTT in the last 48 hours., LDH: No results for input(s): LDHCSF, BFSOURCE in the last 48 hours., Reticulocytes: No results for input(s): RETIC in the last 48 hours. and Uric Acid No results for input(s): URICACID in the last 48 hours.    Diagnostic Results:      Assessment/Plan:     Active Diagnoses:    Diagnosis Date Noted POA    Symptomatic anemia [D64.9] 02/22/2018 Yes      Problems Resolved During this Admission:    Diagnosis Date Noted Date Resolved POA     Ms. Sheets, 61 YO lady with acute GI bleeding and symptomatic anemia in the context of coumadin therapeutic levell.      1. Acute blood loss anemia due to acute GI bleeding:               - Hg improving              -Hg > 8g and ok for pt to undergo GI evaluation               - pt received FFP   - had one black BM      2. Low rbc mass: due to acute GI blood loss                 3. ESRD: on HD      I will follow-up with patient. Please contact us if you have any additional questions.     Donta Moore MD  Hematology/Oncology  [facility] [place], [place]

## 2018-02-26 NOTE — PLAN OF CARE
Problem: Patient Care Overview  Goal: Plan of Care Review  Outcome: Ongoing (interventions implemented as appropriate)  EGD was cancelled today and will be rescheduled after transfer to [facility]. Patient has been off PH medication since admission due to diagnosis of symptomatic GI bleed. She has been accepted by the HTS team and will be transferred after bedside hemodialysis and a bed is available. She is awake alert and oriented x4. She has not had any obvious signs of hemorrhage this shift and remains on continuous PPI infusion. The patient and her family are aware of the plan and are in agreement with the transfer. She was given an as needed dose of antihypertension and antiemetic medications once respectively with good results. She sustained no injury, fall, or trauma this shift.

## 2018-02-26 NOTE — CONSULTS
[facility]  Pulmonology  Consult Note    Patient Name: Shivani Sheets  MRN: [id number]  Admission Date: 2018  Hospital Length of Stay: 4 days  Code Status: Full Code  Attending Physician: César Iverson MD  Primary Care Provider: César Iverson MD   Principal Problem: <principal problem not specified>    Consults  Subjective:     HPI:  Ms. Sheets is a 63 year old woman with a complex medical history that includes ESRD with history of failed transplant current on HD, HTN, hypothyroidism, HLD, typeII DM, STEFFANY, Diastolic heart failure and severe pulmonary hypertension managed by Dr. Hennessy at [facility] on tadalifil, macitantin, uptravi, and coumadin presenting with melena and admitted for acute blood loss anemia.     Hemoglobin on admit was 4.8. She was transfused appropriately. Per chart review there was worsening hypoxemia and concern for fluid overload post transfusion and so EGD was delayed until patient could be stabilized.  Pulmonary hypertension medications were stopped on admit.  Consult requested to address optimization of her condition prior to anesthesia.    Past Medical History:   Diagnosis Date    Allergy     Anemia     Arthritis     Awaiting organ transplant 2013    Cataract     CHF (congestive heart failure)     Chronic kidney disease     Coronary artery disease     Diabetes mellitus     Diabetic retinopathy     ESRD from HTN strtied RRT 1999    Failed  donor kidney transplant -     Glaucoma     History of bleeding peptic ulcer      as stated per pt    Hyperlipidemia     Hypertension     Hypothyroidism     Morbid obesity 8/10/2012    Renal hypertension     Stroke        Past Surgical History:   Procedure Laterality Date    CATARACT EXTRACTION W/  INTRAOCULAR LENS IMPLANT Bilateral     EYE SURGERY      HYSTERECTOMY      KIDNEY TRANSPLANT      NEPHRECTOMY  2008    transplant     TONSILLECTOMY         Review  of patient's allergies indicates:   Allergen Reactions    Penicillins Swelling    Iodine      Other reaction(s): Hives    Sulfamethoxazole-trimethoprim      Other reaction(s): Swelling  Other reaction(s): Hives       Family History     Problem Relation (Age of Onset)    Asthma Sister    Blindness Father    Depression Sister    Diabetes Maternal Aunt    Heart attack Father, Mother    Heart failure Father, Mother    Hypertension Father, Mother, Sister, Brother    Kidney disease Brother        Social History Main Topics    Smoking status: Former Smoker     Quit date: 8/10/2000    Smokeless tobacco: Never Used    Alcohol use No    Drug use: No    Sexual activity: No         Review of Systems   Constitutional: Negative.    HENT: Negative.    Eyes: Negative.    Respiratory: Negative for shortness of breath and wheezing.    Cardiovascular: Negative for chest pain and leg swelling.   Gastrointestinal: Positive for blood in stool.   Endocrine: Negative.    Genitourinary: Negative.    Musculoskeletal: Negative.    Skin: Negative.    Allergic/Immunologic: Negative.    Neurological: Negative.    Hematological: Negative.    Psychiatric/Behavioral: Negative.      Objective:     Vital Signs (Most Recent):  Temp: 97.9 °F (36.6 °C) (02/26/18 0715)  Pulse: 77 (02/26/18 1000)  Resp: (!) 38 (02/26/18 1000)  BP: (!) 157/83 (02/26/18 1000)  SpO2: 100 % (02/26/18 1000) Vital Signs (24h Range):  Temp:  [97.9 °F (36.6 °C)-98.7 °F (37.1 °C)] 97.9 °F (36.6 °C)  Pulse:  [67-84] 77  Resp:  [14-56] 38  SpO2:  [97 %-100 %] 100 %  BP: (139-187)/(70-99) 157/83     Weight: 70.8 kg (156 lb 1.4 oz)  Body mass index is 29.49 kg/m².      Intake/Output Summary (Last 24 hours) at 02/26/18 1130  Last data filed at 02/26/18 1100   Gross per 24 hour   Intake              555 ml   Output                0 ml   Net              555 ml       Physical Exam   Constitutional: She is oriented to person, place, and time. She appears well-developed and  well-nourished.   HENT:   Head: Normocephalic and atraumatic.   MPII   Eyes: EOM are normal. Pupils are equal, round, and reactive to light.   Neck: Normal range of motion. Neck supple. No JVD present. No tracheal deviation present.   Cardiovascular: Normal rate, regular rhythm and normal heart sounds.    No murmur heard.  Pulmonary/Chest: Effort normal and breath sounds normal. No respiratory distress. She has no wheezes. She has no rales.   Abdominal: Soft. Bowel sounds are normal. She exhibits no distension. There is no tenderness.   Musculoskeletal: Normal range of motion. She exhibits no edema.   Neurological: She is alert and oriented to person, place, and time.   Skin: Skin is warm and dry. Capillary refill takes less than 2 seconds.   Psychiatric: She has a normal mood and affect. Her behavior is normal.   Vitals reviewed.      Vents:  Oxygen Concentration (%): 36 (02/24/18 1330)    Lines/Drains/Airways     Central Venous Catheter Line                 RETIRED! DO NOT USE: Hemodialysis Catheter Arteriovenous fistula Left Forearm -- days          Drain                 Hemodialysis AV Fistula Left upper arm -- days          Peripheral Intravenous Line                 Midline Catheter Insertion/Assessment  - Single Lumen 07/14/15 2110 Right basilic vein (medial side of arm) 18g x 10cm 957 days         Peripheral IV - Single Lumen 02/22/18 1728 Right Hand 3 days         Peripheral IV - Single Lumen 02/22/18 2120 Right Forearm 3 days                Significant Labs:    CBC/Anemia Profile:    Recent Labs  Lab 02/25/18  0538 02/26/18  0236   WBC 7.28  7.28 6.70  6.70   HGB 9.4*  9.4* 10.0*  10.0*   HCT 28.8*  28.8* 30.8*  30.8*     161 154  154   MCV 85  85 85  85   RDW 17.1*  17.1* 17.2*  17.2*        Chemistries:    Recent Labs  Lab 02/25/18  0240 02/26/18  0235   *  133* 130*   K 4.4  4.4 4.3   CL 99  99 92*   CO2 21*  21* 25   BUN 44*  44* 54*   CREATININE 7.3*  7.3* 9.2*   CALCIUM  8.3*  8.3* 8.3*   ALBUMIN 3.2*  3.2* 3.2*   PROT 7.4  7.4 7.4   BILITOT 1.0  1.0 0.7   ALKPHOS 40*  40* 42*   ALT 10  10 7*   AST 20  20 12   MG 2.1 2.2   PHOS 3.1 3.9     ECHO: 5/8/18L EF=60-65%, grade 2 diastolic dysfunction,  Moderate atrial enlargement. Tricuspid regurgitation, Estimated PASP of 71mmHg    Significant Imaging:   CXR reviewed.    Assessment/Plan:     Pulmonary hypertension    Severe pulmonary hypertension managed by Dr. Hennessy at [facility].  Multifactorial: diastolic dysfunction, STEFFANY  Prescribed uptravi, tadalafil, and macitentan at home.  Currently off all PH meds.  Contacted [facility] service at [facility] for guidance regarding re-initiating medical therapy.  They are familiar with the patient and echoed anesthesia's  concerns about potential hemodynamic instability during procedural sedation related to uncontrolled pH.  Recommended transfer to [facility].  Spoke with Dr. Hennessy who accepted the [facility] to [facility].                 Thank you for your consult.     Romi Sanches MD  Pulmonology  [facility]

## 2018-02-26 NOTE — PROGRESS NOTES
Transfer to [facility]-- aware of plan. Notified [facility] [place] per protocol.

## 2018-02-26 NOTE — PROGRESS NOTES
Progress Note    Admit Date: 2/22/2018   LOS: 3 days     SUBJECTIVE:     Follow-up For:  Severe symptomatic anemia/probable GI bleed    Scheduled Meds:   sodium chloride 0.9%   Intravenous Once    brimonidine 0.1%  1 drop Right Eye BID    dorzolamide-timolol 2-0.5%  1 drop Right Eye BID    [START ON 2/26/2018] epoetin constanza (PROCRIT) injection  10,000 Units Intravenous Every Mon, Wed, Fri    latanoprost  1 drop Right Eye QHS    prednisoLONE acetate  1 drop Left Eye BID     Continuous Infusions:   pantoprazole 40 mg in dextrose 5 % 100 mL infusion (ready to mix system) 8 mg/hr (02/25/18 2217)     PRN Meds:sodium chloride, sodium chloride, sodium chloride 0.9%, sodium chloride 0.9%, diazePAM, hydrALAZINE, pneumoc 13-jessica conj-dip cr(PF), traMADol    Review of patient's allergies indicates:   Allergen Reactions    Penicillins Swelling    Iodine      Other reaction(s): Hives    Sulfamethoxazole-trimethoprim      Other reaction(s): Swelling  Other reaction(s): Hives       Review of Systems  Constitutional: negative for fevers and night sweats  Eyes: negative  Ears, nose, mouth, throat, and face: negative  Respiratory: positive for cough and Shortness of breath  Cardiovascular: positive for palpitations, palpitations and tachypnea  Gastrointestinal: positive for abdominal pain, melena and nausea  Hematologic/lymphatic: positive for easy bruising  Musculoskeletal:positive for muscle weakness and stiff joints  Neurological: positive for dizziness and weakness    OBJECTIVE:     Vital Signs (Most Recent)  Temp: 98.2 °F (36.8 °C) (02/25/18 1900)  Pulse: 71 (02/25/18 2110)  Resp: 16 (02/25/18 2110)  BP: (!) 179/99 (02/25/18 2110)  SpO2: 100 % (02/25/18 2110)    Vital Signs Range (Last 24H):  Temp:  [97.6 °F (36.4 °C)-98.7 °F (37.1 °C)]   Pulse:  [67-92]   Resp:  [14-49]   BP: (132-200)/()   SpO2:  [94 %-100 %]     I & O (Last 24H):    Intake/Output Summary (Last 24 hours) at 02/25/18 5962  Last data filed at  02/25/18 1800   Gross per 24 hour   Intake              380 ml   Output                0 ml   Net              380 ml     Physical Exam:  General: fatigued, no distress  Eyes: conjunctivae/corneas clear. PERRL..  HENT: Head:normocephalic, atraumatic. Ears:bilateral TM's and external ear canals normal. Nose: Nares normal. Septum midline. Mucosa normal. No drainage or sinus tenderness., no discharge. Throat: lips, mucosa, and tongue normal; teeth and gums normal and no throat erythema.  Neck: supple, symmetrical, trachea midline, no JVD, thyroid not enlarged, symmetric, no tenderness/mass/nodules and + JVD  Lungs:  clear to auscultation bilaterally and normal respiratory effort  Cardiovascular: Heart: regular rate and rhythm, S1, S2 normal, no murmur, click, rub or gallop and systolic murmur: early systolic 3/6, high pitch at 2nd right intercostal space. Chest Wall: no tenderness. Extremities: no cyanosis or edema, or clubbing. Pulses: 2+ and symmetric.  Abdomen/Rectal: Abdomen: soft, non-tender non-distented; bowel sounds normal; no masses,  no organomegaly. Rectal: not examined  Skin: Skin color, texture, turgor normal. No rashes or lesions  Neurologic: Normal strength and tone. No focal numbness or weakness  Mental status: Alert, oriented, thought content appropriate    Laboratory:  CBC:     Recent Labs  Lab 02/25/18  0538   WBC 7.28  7.28   RBC 3.41*  3.41*   HGB 9.4*  9.4*   HCT 28.8*  28.8*     161   MCV 85  85   MCH 27.6  27.6   MCHC 32.6  32.6     BMP:     Recent Labs  Lab 02/25/18  0240   GLU 77  77   *  133*   K 4.4  4.4   CL 99  99   CO2 21*  21*   BUN 44*  44*   CREATININE 7.3*  7.3*   CALCIUM 8.3*  8.3*   MG 2.1     CMP:     Recent Labs  Lab 02/25/18  0240   GLU 77  77   CALCIUM 8.3*  8.3*   ALBUMIN 3.2*  3.2*   PROT 7.4  7.4   *  133*   K 4.4  4.4   CO2 21*  21*   CL 99  99   BUN 44*  44*   CREATININE 7.3*  7.3*   ALKPHOS 40*  40*   ALT 10  10   AST 20  20    BILITOT 1.0  1.0     LFTs:     Recent Labs  Lab 02/25/18  0240   ALT 10  10   AST 20  20   ALKPHOS 40*  40*   BILITOT 1.0  1.0   PROT 7.4  7.4   ALBUMIN 3.2*  3.2*     Coagulation:     Recent Labs  Lab 02/23/18  0828   LABPROT 14.6*   INR 1.4*   APTT 30.3     Cardiac markers: No results for input(s): CKMB, CPKMB, TROPONINT, TROPONINI, MYOGLOBIN in the last 168 hours.  ABGs: No results for input(s): PH, PCO2, PO2, HCO3, POCSATURATED, BE in the last 168 hours.  Microbiology Results (last 7 days)     ** No results found for the last 168 hours. **        Specimen (12h ago through future)    None        No results for input(s): COLORU, CLARITYU, SPECGRAV, PHUR, PROTEINUA, GLUCOSEU, BILIRUBINCON, BLOODU, WBCU, RBCU, BACTERIA, MUCUS, NITRITE, LEUKOCYTESUR, UROBILINOGEN, HYALINECASTS in the last 168 hours.    Diagnostic Results:  Labs: Reviewed  ECG: Reviewed  X-Ray: Reviewed  reviewed    ASSESSMENT/PLAN:     1. Acute on Chronic Symptomatic Anemia normocytic anemia - much improved after blood  2. Probable Lower GI Bleed   3. ESRD with HD  4. Anemia of CKD  5. SHPT Disease  5. Low RBC mass (S/t GI per Hem/Onc)  7. Pulmonary HTN  8. Failed renal transplant  9. Diastolic HF  10. STEFFANY  11. Hypothyroidism  12. RA  13. Hyperlipidemia      Plan: Continue Current. Planning EGD in am

## 2018-02-26 NOTE — PLAN OF CARE
Problem: Hemodialysis (Adult)  Goal: Signs and Symptoms of Listed Potential Problems Will be Absent, Minimized or Managed (Hemodialysis)  Signs and symptoms of listed potential problems will be absent, minimized or managed by discharge/transition of care (reference Hemodialysis (Adult) CPG).   Outcome: Ongoing (interventions implemented as appropriate)   02/26/18 1509   Hemodialysis   Problems Assessed (Hemodialysis) all   Problems Present (Hemodialysis) electrolyte imbalance;fluid imbalance;hematologic complications   Hemodialysis as ordered

## 2018-02-26 NOTE — DISCHARGE SUMMARY
Admit Date: 2/22/2018   LOS: 4 days     SUBJECTIVE:     Follow-up For:  Severe symptomatic anemia/probable GI bleed    Scheduled Meds:   sodium chloride 0.9%   Intravenous Once    brimonidine 0.1%  1 drop Right Eye BID    dorzolamide-timolol 2-0.5%  1 drop Right Eye BID    epoetin constanza (PROCRIT) injection  10,000 Units Intravenous Every Mon, Wed, Fri    latanoprost  1 drop Right Eye QHS    prednisoLONE acetate  1 drop Left Eye BID     Continuous Infusions:   pantoprazole 40 mg in dextrose 5 % 100 mL infusion (ready to mix system) 8 mg/hr (02/26/18 1100)     PRN Meds:sodium chloride, sodium chloride, sodium chloride 0.9%, sodium chloride 0.9%, diazePAM, hydrALAZINE, pneumoc 13-jessica conj-dip cr(PF), traMADol    Review of patient's allergies indicates:   Allergen Reactions    Penicillins Swelling    Iodine      Other reaction(s): Hives    Sulfamethoxazole-trimethoprim      Other reaction(s): Swelling  Other reaction(s): Hives       Review of Systems  Constitutional: negative for fevers and night sweats  Eyes: negative  Ears, nose, mouth, throat, and face: negative  Respiratory: positive for cough and Shortness of breath  Cardiovascular: positive for palpitations, palpitations and tachypnea  Gastrointestinal: positive for abdominal pain, melena and nausea  Hematologic/lymphatic: positive for easy bruising  Musculoskeletal:positive for muscle weakness and stiff joints  Neurological: positive for dizziness and weakness    OBJECTIVE:     Vital Signs (Most Recent)  Temp: 98.1 °F (36.7 °C) (02/26/18 1200)  Pulse: 77 (02/26/18 1200)  Resp: (!) 27 (02/26/18 1200)  BP: (!) 162/86 (02/26/18 1200)  SpO2: (!) 93 % (02/26/18 1200)    Vital Signs Range (Last 24H):  Temp:  [97.9 °F (36.6 °C)-98.4 °F (36.9 °C)]   Pulse:  [67-84]   Resp:  [14-56]   BP: (144-187)/(75-99)   SpO2:  [93 %-100 %]     I & O (Last 24H):    Intake/Output Summary (Last 24 hours) at 02/26/18 1250  Last data filed at 02/26/18 1100   Gross per 24 hour    Intake              535 ml   Output                0 ml   Net              535 ml     Physical Exam:  General: fatigued, no distress  Eyes: conjunctivae/corneas clear. PERRL..  HENT: Head:normocephalic, atraumatic. Ears:bilateral TM's and external ear canals normal. Nose: Nares normal. Septum midline. Mucosa normal. No drainage or sinus tenderness., no discharge. Throat: lips, mucosa, and tongue normal; teeth and gums normal and no throat erythema.  Neck: supple, symmetrical, trachea midline, no JVD, thyroid not enlarged, symmetric, no tenderness/mass/nodules and + JVD  Lungs:  clear to auscultation bilaterally and normal respiratory effort  Cardiovascular: Heart: regular rate and rhythm, S1, S2 normal, no murmur, click, rub or gallop and systolic murmur: early systolic 3/6, high pitch at 2nd right intercostal space. Chest Wall: no tenderness. Extremities: no cyanosis or edema, or clubbing. Pulses: 2+ and symmetric.  Abdomen/Rectal: Abdomen: soft, non-tender non-distented; bowel sounds normal; no masses,  no organomegaly. Rectal: not examined  Skin: Skin color, texture, turgor normal. No rashes or lesions  Neurologic: Normal strength and tone. No focal numbness or weakness  Mental status: Alert, oriented, thought content appropriate    Laboratory:  CBC:     Recent Labs  Lab 02/26/18 0236   WBC 6.70  6.70   RBC 3.62*  3.62*   HGB 10.0*  10.0*   HCT 30.8*  30.8*     154   MCV 85  85   MCH 27.6  27.6   MCHC 32.5  32.5     BMP:     Recent Labs  Lab 02/26/18  0235   GLU 91   *   K 4.3   CL 92*   CO2 25   BUN 54*   CREATININE 9.2*   CALCIUM 8.3*   MG 2.2     CMP:     Recent Labs  Lab 02/26/18  0235   GLU 91   CALCIUM 8.3*   ALBUMIN 3.2*   PROT 7.4   *   K 4.3   CO2 25   CL 92*   BUN 54*   CREATININE 9.2*   ALKPHOS 42*   ALT 7*   AST 12   BILITOT 0.7     LFTs:     Recent Labs  Lab 02/26/18 0235   ALT 7*   AST 12   ALKPHOS 42*   BILITOT 0.7   PROT 7.4   ALBUMIN 3.2*     Coagulation:     Recent  Labs  Lab 02/23/18  0828   LABPROT 14.6*   INR 1.4*   APTT 30.3     Cardiac markers: No results for input(s): CKMB, CPKMB, TROPONINT, TROPONINI, MYOGLOBIN in the last 168 hours.  ABGs: No results for input(s): PH, PCO2, PO2, HCO3, POCSATURATED, BE in the last 168 hours.  Microbiology Results (last 7 days)     ** No results found for the last 168 hours. **        Specimen (12h ago through future)    None        No results for input(s): COLORU, CLARITYU, SPECGRAV, PHUR, PROTEINUA, GLUCOSEU, BILIRUBINCON, BLOODU, WBCU, RBCU, BACTERIA, MUCUS, NITRITE, LEUKOCYTESUR, UROBILINOGEN, HYALINECASTS in the last 168 hours.    Diagnostic Results:  Labs: Reviewed  ECG: Reviewed  X-Ray: Reviewed  reviewed    ASSESSMENT/PLAN:     1. Acute on Chronic Symptomatic Anemia normocytic anemia - much improved after blood  2. Probable Lower GI Bleed   3. ESRD with HD  4. Anemia of CKD  5. SHPT Disease  5. Low RBC mass (S/t GI per Hem/Onc)  7. Pulmonary HTN  8. Failed renal transplant  9. Diastolic HF  10. STEFFANY  11. Hypothyroidism  12. RA  13. Hyperlipidemia      Plan: Due to the patient's complex history of severe Pulm HTN, and CHF she was transferred to [facility] for EGD. Per anesthesiologist  recommendations

## 2018-02-26 NOTE — PHYSICIAN QUERY
PT Name: Shivani Sheets  MR #: [id number]     Physician Query Form - Documentation Clarification      Judith Dobbs RN, CCDS  Contact Info: [phone] [email or web address]      This form is a permanent document in the medical record.     Query Date: February 26, 2018    By submitting this query, we are merely seeking further clarification of documentation. Please utilize your independent clinical judgment when addressing the question(s) below.    The Medical record reflects the following:    Supporting Clinical Findings Location in Medical Record   Anesthesia would prefer to have pul HTN optimized prior to EGD   GI pn 2/26    Failed kidney transplant  Pulm HTN  DHF  STEFFANY  Hypothyroid  RA  HLD    1. ESRD - usual HD on MWF with Dr Deleon in [place], Dry weight 73-74kg.   - HD today with goal UF 3L as tolerated  - pulmonary edema - UF as tolerated, transfuse on dialysis today, wean O2 as tolerated    2. HTN - UF as tolerated, will give hydralazine PRN until scope.  Plan to resume PO medications once GI clears for a diet    Positive for shortness of breath.    She has rales.   5L O2 NC   She is not diaphoretic.   She uses home O2 NC 2L at baseline   Nephrology  MD at 2/23/2018    Some SOB.    Resp status has declined post FFP likely due to volume.    Getting lasix as well   GI pn 2/23   Chronic diastolic CHF   H&P Iverson   cachectic, mild distress   + JVD  Lungs:  labored breathing and rhonchi bilaterally   H&P Royer Cabrera NP   ESRD,  pulmonary HTN PAP 71  Presented with symptomatic anemia with report of black tarry stools.  H/H 4.8/16.4 (7.8/26.6 one week prior).    · Ordered BNP for monitoring  · Packed RBC not available  · Spoke with bedside nurse to give blood products slowly and to watchout for congestion.  Ideally to give PRBC during dialysis tomorrow.   eICU by Rebecca Connor MD at 2/22/2018      On 2/8/18                                                                             Doctor, Please  "specify diagnosis or diagnoses associated with above clinical findings.      Please further specify "Pulmonary HTN".     Provider Use Only    ( x ) Primary Pulmonary HTN    (  ) Pulmonary HTN Due to CHF    (  ) Pulmonary HTN, unspecified    (  ) Pulmonary HTN, due to other - specify: __________________    (  ) Other: _________________________                                                                                                             [  ] Clinically undetermined            "

## 2018-02-26 NOTE — HPI
Ms. Sheets is a 63 year old woman with a complex medical history that includes ESRD with history of failed transplant current on HD, HTN, hypothyroidism, HLD, typeII DM, STEFFANY, Diastolic heart failure and severe pulmonary hypertension [facility] tadalif[facility], macitantin, uptravi, and coumadin presenting with melena and admitted for acute blood loss anemia.     Hemoglobin on admit was 4.8. She was transfused appropriately. Per chart review there was worsening hypoxemia and concern for fluid overload post transfusion and so EGD was delayed until patient could be stabilized.  Pulmonary hypertension medications were stopped on admit.  Consult requested to address optimization of her condition prior to anesthesia.

## 2018-02-26 NOTE — PROGRESS NOTES
GI    Anesthesia would prefer to have pul HTN optimized prior to EGD.  Apparently, she was on phosphodiesterase inhibitor therapy as outpt.  H/H stable.  Will consult pulmonology and repeat attempt at EGD tomorrow.

## 2018-02-26 NOTE — PHYSICIAN QUERY
PT Name: Shivani Sheets  MR #: [id number]     Physician Query Form - Documentation Clarification      Judith Dobbs RN, CCDS  Contact Info: [phone] [email or web address]      This form is a permanent document in the medical record.     Query Date: February 26, 2018    By submitting this query, we are merely seeking further clarification of documentation. Please utilize your independent clinical judgment when addressing the question(s) below.    The Medical record reflects the following:    Supporting Clinical Findings Location in Medical Record   Some SOB.    Resp status has declined post FFP likely due to volume.    Getting lasix as well   GI pn 2/23   · 2 units of FFP completed. Patient then begins to c/o SOB and itching.   · Notified Fanta Cabrera NP.   · NP states possible transfusion reaction.   · New orders rec'd to administer Benadryl 25mg IVP x1, SoluMedrol 125mg IVP x1, Pepcid 20mg IVP x1, and Lasix 40mg IVP x1.   Also to notify Nephrology at this time for possible dialysis.    O2 Sats decreased to 87% on nasal cannula  Increased oxygen to 5L and O2 Sat did not increased. Placed 45% Venti Mask on patient at this time.    RN at 2/23/2018 12:15 AM   She also c/o SOB, MORA and generalized fatigue and weakness  cachectic, mild distress   + JVD  Lungs:  labored breathing and rhonchi bilaterally  Respiratory: positive for dyspnea on exertion   She was recently treated for a pneumonia   H&P Fanta Cabrera NP   Chronic Diastolic CHF   Iverson H&P   Pulm HTN  DHF     1. ESRD - usual HD on MWF with Dr Deleon in [place], Dry weight 73-74kg.   - HD today with goal UF 3L as tolerated  - pulmonary edema - UF as tolerated, transfuse on dialysis today, wean O2 as tolerated     2. HTN - UF as tolerated, will give hydralazine PRN until scope.  Plan to resume PO medications once GI clears for a diet     Positive for shortness of breath.    She has rales.   5L O2 NC   She is not diaphoretic.   She uses home O2 NC 2L at  baseline Nephrology  MD at 2/23/2018                                                                             Doctor, Please specify diagnosis or diagnoses associated with above clinical findings.    Provider Use Only      ( x ) FFP transfusion reaction ruled in    (  ) FFP transfusion reaction ruled out    (   ) Other diagnosis: ____________________                                                                                                             [  ] Clinically undetermined

## 2018-02-27 PROBLEM — D64.9 SYMPTOMATIC ANEMIA: Status: RESOLVED | Noted: 2018-02-22 | Resolved: 2018-02-27

## 2018-02-27 PROBLEM — K92.2 GIB (GASTROINTESTINAL BLEEDING): Status: ACTIVE | Noted: 2018-02-27

## 2018-02-27 PROBLEM — K92.1 MELENA: Status: ACTIVE | Noted: 2018-02-27

## 2018-02-27 PROBLEM — D64.9 SYMPTOMATIC ANEMIA: Status: ACTIVE | Noted: 2018-02-27

## 2018-02-27 LAB
ABO + RH BLD: NORMAL
ALBUMIN SERPL BCP-MCNC: 2.9 G/DL
ALP SERPL-CCNC: 45 U/L
ALT SERPL W/O P-5'-P-CCNC: 8 U/L
ANION GAP SERPL CALC-SCNC: 10 MMOL/L
APTT BLDCRRT: 24.9 SEC
AST SERPL-CCNC: 12 U/L
BASOPHILS # BLD AUTO: 0.03 K/UL
BASOPHILS NFR BLD: 0.4 %
BILIRUB SERPL-MCNC: 0.6 MG/DL
BLD GP AB SCN CELLS X3 SERPL QL: NORMAL
BUN SERPL-MCNC: 20 MG/DL
CALCIUM SERPL-MCNC: 8.2 MG/DL
CHLORIDE SERPL-SCNC: 97 MMOL/L
CO2 SERPL-SCNC: 27 MMOL/L
CREAT SERPL-MCNC: 5.6 MG/DL
DIFFERENTIAL METHOD: ABNORMAL
EOSINOPHIL # BLD AUTO: 0.4 K/UL
EOSINOPHIL NFR BLD: 4.8 %
ERYTHROCYTE [DISTWIDTH] IN BLOOD BY AUTOMATED COUNT: 17.7 %
EST. GFR  (AFRICAN AMERICAN): 8.6 ML/MIN/1.73 M^2
EST. GFR  (NON AFRICAN AMERICAN): 7.4 ML/MIN/1.73 M^2
GLUCOSE SERPL-MCNC: 94 MG/DL
HCT VFR BLD AUTO: 30.8 %
HEP. B SURF AB, QUAL: POSITIVE
HEP. B SURF AB, QUANT.: 36 MIU/ML
HGB BLD-MCNC: 9.9 G/DL
IMM GRANULOCYTES # BLD AUTO: 0.06 K/UL
IMM GRANULOCYTES NFR BLD AUTO: 0.7 %
INR PPP: 1.1
LYMPHOCYTES # BLD AUTO: 1.2 K/UL
LYMPHOCYTES NFR BLD: 14.5 %
MAGNESIUM SERPL-MCNC: 1.8 MG/DL
MCH RBC QN AUTO: 27 PG
MCHC RBC AUTO-ENTMCNC: 32.1 G/DL
MCV RBC AUTO: 84 FL
MONOCYTES # BLD AUTO: 1 K/UL
MONOCYTES NFR BLD: 12 %
NEUTROPHILS # BLD AUTO: 5.6 K/UL
NEUTROPHILS NFR BLD: 67.6 %
NRBC BLD-RTO: 0 /100 WBC
PHOSPHATE SERPL-MCNC: 3 MG/DL
PLATELET # BLD AUTO: 163 K/UL
PMV BLD AUTO: 12.6 FL
POCT GLUCOSE: 102 MG/DL (ref 70–110)
POCT GLUCOSE: 105 MG/DL (ref 70–110)
POCT GLUCOSE: 106 MG/DL (ref 70–110)
POCT GLUCOSE: 83 MG/DL (ref 70–110)
POCT GLUCOSE: 91 MG/DL (ref 70–110)
POTASSIUM SERPL-SCNC: 3.9 MMOL/L
PROT SERPL-MCNC: 7.4 G/DL
PROTHROMBIN TIME: 11.3 SEC
RBC # BLD AUTO: 3.67 M/UL
SODIUM SERPL-SCNC: 134 MMOL/L
WBC # BLD AUTO: 8.2 K/UL

## 2018-02-27 PROCEDURE — 85025 COMPLETE CBC W/AUTO DIFF WBC: CPT

## 2018-02-27 PROCEDURE — 20600001 HC STEP DOWN PRIVATE ROOM

## 2018-02-27 PROCEDURE — 27201012 HC FORCEPS, HOT/COLD, DISP: Performed by: INTERNAL MEDICINE

## 2018-02-27 PROCEDURE — 99223 1ST HOSP IP/OBS HIGH 75: CPT | Mod: ,,, | Performed by: NURSE PRACTITIONER

## 2018-02-27 PROCEDURE — 43239 EGD BIOPSY SINGLE/MULTIPLE: CPT | Performed by: INTERNAL MEDICINE

## 2018-02-27 PROCEDURE — 25000003 PHARM REV CODE 250: Performed by: PHYSICIAN ASSISTANT

## 2018-02-27 PROCEDURE — 63600175 PHARM REV CODE 636 W HCPCS: Performed by: NURSE ANESTHETIST, CERTIFIED REGISTERED

## 2018-02-27 PROCEDURE — 37000008 HC ANESTHESIA 1ST 15 MINUTES: Performed by: INTERNAL MEDICINE

## 2018-02-27 PROCEDURE — 0DB78ZX EXCISION OF STOMACH, PYLORUS, VIA NATURAL OR ARTIFICIAL OPENING ENDOSCOPIC, DIAGNOSTIC: ICD-10-PCS | Performed by: INTERNAL MEDICINE

## 2018-02-27 PROCEDURE — 88305 TISSUE EXAM BY PATHOLOGIST: CPT | Mod: 26,,, | Performed by: PATHOLOGY

## 2018-02-27 PROCEDURE — 80053 COMPREHEN METABOLIC PANEL: CPT

## 2018-02-27 PROCEDURE — 82962 GLUCOSE BLOOD TEST: CPT | Performed by: INTERNAL MEDICINE

## 2018-02-27 PROCEDURE — 94761 N-INVAS EAR/PLS OXIMETRY MLT: CPT

## 2018-02-27 PROCEDURE — D9220A PRA ANESTHESIA: Mod: ANES,,, | Performed by: ANESTHESIOLOGY

## 2018-02-27 PROCEDURE — 25000003 PHARM REV CODE 250: Performed by: NURSE ANESTHETIST, CERTIFIED REGISTERED

## 2018-02-27 PROCEDURE — C9113 INJ PANTOPRAZOLE SODIUM, VIA: HCPCS | Performed by: INTERNAL MEDICINE

## 2018-02-27 PROCEDURE — 27000221 HC OXYGEN, UP TO 24 HOURS

## 2018-02-27 PROCEDURE — 63600175 PHARM REV CODE 636 W HCPCS: Performed by: INTERNAL MEDICINE

## 2018-02-27 PROCEDURE — D9220A PRA ANESTHESIA: Mod: CRNA,,, | Performed by: NURSE ANESTHETIST, CERTIFIED REGISTERED

## 2018-02-27 PROCEDURE — 99222 1ST HOSP IP/OBS MODERATE 55: CPT | Mod: ,,, | Performed by: INTERNAL MEDICINE

## 2018-02-27 PROCEDURE — 84100 ASSAY OF PHOSPHORUS: CPT

## 2018-02-27 PROCEDURE — 85610 PROTHROMBIN TIME: CPT

## 2018-02-27 PROCEDURE — 99223 1ST HOSP IP/OBS HIGH 75: CPT | Mod: ,,, | Performed by: INTERNAL MEDICINE

## 2018-02-27 PROCEDURE — 88305 TISSUE EXAM BY PATHOLOGIST: CPT | Performed by: PATHOLOGY

## 2018-02-27 PROCEDURE — 88342 IMHCHEM/IMCYTCHM 1ST ANTB: CPT | Performed by: PATHOLOGY

## 2018-02-27 PROCEDURE — 83735 ASSAY OF MAGNESIUM: CPT

## 2018-02-27 PROCEDURE — 37000009 HC ANESTHESIA EA ADD 15 MINS: Performed by: INTERNAL MEDICINE

## 2018-02-27 PROCEDURE — A4216 STERILE WATER/SALINE, 10 ML: HCPCS | Performed by: NURSE ANESTHETIST, CERTIFIED REGISTERED

## 2018-02-27 PROCEDURE — 43239 EGD BIOPSY SINGLE/MULTIPLE: CPT | Mod: ,,, | Performed by: INTERNAL MEDICINE

## 2018-02-27 PROCEDURE — 88342 IMHCHEM/IMCYTCHM 1ST ANTB: CPT | Mod: 26,,, | Performed by: PATHOLOGY

## 2018-02-27 PROCEDURE — 25000003 PHARM REV CODE 250: Performed by: NURSE PRACTITIONER

## 2018-02-27 PROCEDURE — 99222 1ST HOSP IP/OBS MODERATE 55: CPT | Mod: 25,,, | Performed by: INTERNAL MEDICINE

## 2018-02-27 PROCEDURE — 86901 BLOOD TYPING SEROLOGIC RH(D): CPT

## 2018-02-27 PROCEDURE — 85730 THROMBOPLASTIN TIME PARTIAL: CPT

## 2018-02-27 RX ORDER — PANTOPRAZOLE SODIUM 40 MG/10ML
40 INJECTION, POWDER, LYOPHILIZED, FOR SOLUTION INTRAVENOUS 2 TIMES DAILY
Status: DISCONTINUED | OUTPATIENT
Start: 2018-02-27 | End: 2018-03-02

## 2018-02-27 RX ORDER — DOCUSATE SODIUM 100 MG/1
100 CAPSULE, LIQUID FILLED ORAL 2 TIMES DAILY
Status: DISCONTINUED | OUTPATIENT
Start: 2018-02-27 | End: 2018-03-02 | Stop reason: HOSPADM

## 2018-02-27 RX ORDER — SODIUM CHLORIDE 9 MG/ML
INJECTION, SOLUTION INTRAVENOUS
Status: DISCONTINUED | OUTPATIENT
Start: 2018-02-27 | End: 2018-03-02

## 2018-02-27 RX ORDER — SODIUM CHLORIDE 9 MG/ML
INJECTION, SOLUTION INTRAVENOUS ONCE
Status: COMPLETED | OUTPATIENT
Start: 2018-02-27 | End: 2018-02-27

## 2018-02-27 RX ORDER — SODIUM CHLORIDE 0.9 % (FLUSH) 0.9 %
3 SYRINGE (ML) INJECTION
Status: DISCONTINUED | OUTPATIENT
Start: 2018-02-27 | End: 2018-03-02 | Stop reason: HOSPADM

## 2018-02-27 RX ORDER — DEXMEDETOMIDINE HYDROCHLORIDE 100 UG/ML
INJECTION, SOLUTION INTRAVENOUS
Status: DISCONTINUED | OUTPATIENT
Start: 2018-02-27 | End: 2018-02-28

## 2018-02-27 RX ORDER — INSULIN ASPART 100 [IU]/ML
0-5 INJECTION, SOLUTION INTRAVENOUS; SUBCUTANEOUS EVERY 6 HOURS PRN
Status: DISCONTINUED | OUTPATIENT
Start: 2018-02-27 | End: 2018-03-02 | Stop reason: HOSPADM

## 2018-02-27 RX ORDER — LIDOCAINE HCL/PF 100 MG/5ML
SYRINGE (ML) INTRAVENOUS
Status: DISCONTINUED | OUTPATIENT
Start: 2018-02-27 | End: 2018-02-28

## 2018-02-27 RX ORDER — TADALAFIL 20 MG/1
40 TABLET ORAL DAILY
Status: DISCONTINUED | OUTPATIENT
Start: 2018-02-27 | End: 2018-03-02 | Stop reason: HOSPADM

## 2018-02-27 RX ORDER — GLUCAGON 1 MG
1 KIT INJECTION
Status: DISCONTINUED | OUTPATIENT
Start: 2018-02-27 | End: 2018-03-02 | Stop reason: HOSPADM

## 2018-02-27 RX ORDER — ETOMIDATE 2 MG/ML
INJECTION INTRAVENOUS
Status: DISCONTINUED | OUTPATIENT
Start: 2018-02-27 | End: 2018-02-28

## 2018-02-27 RX ORDER — VASOPRESSIN 20 [USP'U]/ML
INJECTION, SOLUTION INTRAMUSCULAR; SUBCUTANEOUS
Status: DISCONTINUED | OUTPATIENT
Start: 2018-02-27 | End: 2018-02-28

## 2018-02-27 RX ADMIN — PREDNISOLONE ACETATE 1 DROP: 10 SUSPENSION/ DROPS OPHTHALMIC at 09:02

## 2018-02-27 RX ADMIN — DEXMEDETOMIDINE HYDROCHLORIDE 50 MCG: 100 INJECTION, SOLUTION, CONCENTRATE INTRAVENOUS at 04:02

## 2018-02-27 RX ADMIN — ETOMIDATE 2 MG: 2 INJECTION, SOLUTION INTRAVENOUS at 04:02

## 2018-02-27 RX ADMIN — ETOMIDATE 4 MG: 2 INJECTION, SOLUTION INTRAVENOUS at 04:02

## 2018-02-27 RX ADMIN — DEXMEDETOMIDINE HYDROCHLORIDE 0.5 MCG/KG/HR: 100 INJECTION, SOLUTION, CONCENTRATE INTRAVENOUS at 04:02

## 2018-02-27 RX ADMIN — DORZOLAMIDE HYDROCHLORIDE AND TIMOLOL MALEATE 1 DROP: 20; 5 SOLUTION/ DROPS OPHTHALMIC at 09:02

## 2018-02-27 RX ADMIN — BRIMONIDINE TARTRATE 1 DROP: 1 SOLUTION/ DROPS OPHTHALMIC at 09:02

## 2018-02-27 RX ADMIN — LIDOCAINE HYDROCHLORIDE 60 MG: 20 INJECTION, SOLUTION INTRAVENOUS at 04:02

## 2018-02-27 RX ADMIN — TADALAFIL 40 MG: 20 TABLET ORAL at 12:02

## 2018-02-27 RX ADMIN — DORZOLAMIDE HYDROCHLORIDE AND TIMOLOL MALEATE 1 DROP: 20; 5 SOLUTION/ DROPS OPHTHALMIC at 08:02

## 2018-02-27 RX ADMIN — PREDNISOLONE ACETATE 1 DROP: 10 SUSPENSION/ DROPS OPHTHALMIC at 08:02

## 2018-02-27 RX ADMIN — VASOPRESSIN 2 UNITS: 20 INJECTION INTRAVENOUS at 05:02

## 2018-02-27 RX ADMIN — DOCUSATE SODIUM 100 MG: 100 CAPSULE, LIQUID FILLED ORAL at 08:02

## 2018-02-27 RX ADMIN — BRIMONIDINE TARTRATE 1 DROP: 1 SOLUTION/ DROPS OPHTHALMIC at 08:02

## 2018-02-27 RX ADMIN — PANTOPRAZOLE SODIUM 40 MG: 40 INJECTION, POWDER, FOR SOLUTION INTRAVENOUS at 09:02

## 2018-02-27 RX ADMIN — PANTOPRAZOLE SODIUM 40 MG: 40 INJECTION, POWDER, FOR SOLUTION INTRAVENOUS at 08:02

## 2018-02-27 RX ADMIN — SODIUM CHLORIDE: 0.9 INJECTION, SOLUTION INTRAVENOUS at 04:02

## 2018-02-27 RX ADMIN — DOCUSATE SODIUM 100 MG: 100 CAPSULE, LIQUID FILLED ORAL at 12:02

## 2018-02-27 RX ADMIN — LATANOPROST 1 DROP: 50 SOLUTION/ DROPS OPHTHALMIC at 08:02

## 2018-02-27 NOTE — PROVATION PATIENT INSTRUCTIONS
Discharge Summary/Instructions after an Endoscopic Procedure  Patient Name: Shivani Sheets  Patient MRN: [id number]  Patient YOB: 1955 Tuesday, February 27, 2018  Kenji Desir MD  RESTRICTIONS:  During your procedure today, you received medications for sedation.  These   medications may affect your judgment, balance and coordination.  Therefore,   for 24 hours, you have the following restrictions:   - DO NOT drive a car, operate machinery, make legal/financial decisions,   sign important papers or drink alcohol.    ACTIVITY:  The following day: return to full activity including work, except no heavy   lifting, straining or running for 3 days if polyps were removed.  DIET:  Eat and drink normally unless instructed otherwise.     TREATMENT FOR COMMON SIDE EFFECTS:  - Mild abdominal pain, nausea, belching, bloating or excessive gas:  rest,   eat lightly and use a heating pad.  - Sore Throat: treat with throat lozenges and/or gargle with warm salt   water.  - Because air was used during the procedure, expelling large amounts of air   from your rectum or belching is normal.  - If a bowel prep was taken, you may not have a bowel movement for 1-3 days.    This is normal.  SYMPTOMS TO WATCH FOR AND REPORT TO YOUR PHYSICIAN:  1. Abdominal pain or bloating, other than gas cramps.  2. Chest pain.  3. Back pain.  4. Signs of infection such as: chills or fever occurring within 24 hours   after the procedure.  5. Rectal bleeding, which would show as bright red, maroon, or black stools.   (A tablespoon of blood from the rectum is not serious, especially if   hemorrhoids are present.)  6. Vomiting.  7. Weakness or dizziness.  GO DIRECTLY TO THE NEAREST EMERGENCY ROOM IF YOU HAVE ANY OF THE FOLLOWING:      Difficulty breathing  Chills and/or fever over 101 F   Persistent vomiting and/or vomiting blood   Severe abdominal pain   Severe chest pain   Black, tarry stools   Bleeding- more than one tablespoon   Any other  symptom or condition that you feel may need urgent attention  Your doctor recommends these additional instructions:  If any biopsies were taken, your doctors clinic will contact you in 1 to 2   weeks with any results.  Take a clear liquid diet.   Continue your present medications.   We are waiting for your pathology results.  For questions, problems or results please call your physician - Kenji Desir MD at Work:  [phone].  [facility], EMERGENCY ROOM PHONE NUMBER: [phone]  IF A COMPLICATION OR EMERGENCY SITUATION ARISES AND YOU ARE UNABLE TO REACH   YOUR PHYSICIAN - GO DIRECTLY TO THE EMERGENCY ROOM.  Kenji Desir MD  2/27/2018 5:01:20 PM  This report has been verified and signed electronically.

## 2018-02-27 NOTE — HPI
"64 y/o female with PMH of PHT, RA, HLD, DD, CAD, central retinal artery occlusion, PUD (patient reports diagnosis in [place]), and ESRD 2/2 HTN (LUE AVF) s/p failed transplant (re-listed) who presented to [facility] on 2/15 with cough and was diagnosed with atypical PNA and discharged home with a Z-Yariel. She saw anticoagulation clinic 2/22 and her INR was noted to be elevated with patient having melena. She was encouraged to go to the ER and adviced to hold warfarin. In triage, she was note to have abdominal pain in the lower abdomen per history. The patient's Hgb was noted to 4.7 (down from 7.8 one week prior), the patient was admitted to the ICU, and "given blood transfusion and FFP". Per notes, at least 2U FFP and 3U PRBCs were given as well as Benadryl, IV steroids, Pepsid, and Lasix. IV protonix gtt was also ordered as was vitamin K. An EGD was post-poned with plan for 2/26 because the patient was on Venturi mask after FFP administration. HD was initiated with plan for MWF inpatient dialysis. Pulmonolgy was consulted 2/2 high risk nature of an EGD in someone with PHT. She was noted to be off all PHT medications, and the pulmonologist discussed a transfer to [facility] [place]. Hennessy.     TTE 2/8/18    1 - Eccentric hypertrophy.     2 - Normal left ventricular systolic function (EF 60-65%).     3 - Right ventricle is upper limit of normal in size with hypertrophy, with normal systolic function.     4 - Impaired LV relaxation, elevated LAP (grade 2 diastolic dysfunction).     5 - Moderate left atrial enlargement.     6 - Mild tricuspid regurgitation.     7 - Pulmonary hypertension. The estimated PA systolic pressure is 71 mmHg.   "

## 2018-02-27 NOTE — ASSESSMENT & PLAN NOTE
- hold selexipag, tadalafil, macitentan   - follows with Dr. Hennessy at [facility]  - high risk EGD given hx  - anesthesia will need to be involved for EGD  - hold OAC

## 2018-02-27 NOTE — CONSULTS
"[facility]  Nephrology  Consult Note    Patient Name: Shivani Sheets  MRN: [id number]  Admission Date: 2018  Hospital Length of Stay: 5 days  Attending Provider: Jenny Hennessy MD   Primary Care Physician: César Iverson MD  Principal Problem:GIB (gastrointestinal bleeding)    Inpatient consult to Nephrology  Consult performed by: HUI DIEZ  Consult ordered by: FAIZAN RICHARDS  Reason for consult: esrd        Subjective:     HPI: 64 yo with significant history for PHT on home oxygen 2 L NC, diastolic heart failure, RA, DM, CAD, atrial fib on coumadin, PUD/GIB 1072 CVA in  with no residual deficit and ESRD sp failed transplant (recieved  then failed ) who initially admitted to [facility] for weakness and melena found [address] hgb 4.8 sp 4 units PRBC and 2 FFP transfusion now transfer to [facility] for further guidance of severe pulmonary hypertension by HTS (patient followed by Dr. Hennessy outpt) and continue GI evaluation/EGD. Patient was found to have "black stools" 2 days PTA to . Vitamin K and protonix gtt also started at . Last BM  (3 days ago) also noted to be dark. Currently, hgb 9.9 stable.     Nephrology consult for ESRD. ESRD on HD MWF 3.5 hrs duration at [facility] under direction of Dr. Masterson. No residual. EDW 75.5 kg? (currently under EDW). Last HD yesterday  prior to transfer to [facility].      Past Medical History:   Diagnosis Date    Allergy     Anemia     Arthritis     Awaiting organ transplant 2013    Cataract     CHF (congestive heart failure)     Chronic kidney disease     Coronary artery disease     Diabetes mellitus     Diabetic retinopathy     ESRD from HTN strtied RRT 1999    Failed  donor kidney transplant      Glaucoma     History of bleeding peptic ulcer      as stated per pt    Hyperlipidemia     Hypertension     Hypothyroidism     Morbid obesity 8/10/2012    " Renal hypertension     Stroke        Past Surgical History:   Procedure Laterality Date    CATARACT EXTRACTION W/  INTRAOCULAR LENS IMPLANT Bilateral     EYE SURGERY      HYSTERECTOMY      KIDNEY TRANSPLANT      NEPHRECTOMY  11/2008    transplant     TONSILLECTOMY         Review of patient's allergies indicates:   Allergen Reactions    Penicillins Swelling    Iodine      Other reaction(s): Hives    Sulfamethoxazole-trimethoprim      Other reaction(s): Swelling  Other reaction(s): Hives     Current Facility-Administered Medications   Medication Frequency    0.9%  NaCl infusion (for blood administration) Q24H PRN    0.9%  NaCl infusion (for blood administration) Q24H PRN    0.9%  NaCl infusion PRN    0.9%  NaCl infusion PRN    0.9%  NaCl infusion Once    brimonidine (ALPHAGAN P) ophthalmic solution 0.1% BID    dextrose 50% injection 12.5 g PRN    diazePAM tablet 5 mg Q12H PRN    docusate sodium capsule 100 mg BID    dorzolamide-timolol 2-0.5% ophthalmic solution 1 drop BID    epoetin constanza injection 10,000 Units Every Mon, Wed, Fri    glucagon (human recombinant) injection 1 mg PRN    hydrALAZINE injection 10 mg Q6H PRN    insulin aspart U-100 pen 0-5 Units Q6H PRN    latanoprost 0.005 % ophthalmic solution 1 drop QHS    ondansetron injection 4 mg Q6H PRN    pantoprazole injection 40 mg BID    pneumoc 13-jessica conj-dip cr(PF) 0.5 mL vaccine x 1 dose    prednisoLONE acetate 1 % ophthalmic suspension 1 drop BID    tadalafil (PULMONARY HYPERTENSION) tablet 40 mg Daily    traMADol tablet 50 mg Q6H PRN     Family History     Problem Relation (Age of Onset)    Asthma Sister    Blindness Father    Depression Sister    Diabetes Maternal Aunt    Heart attack Father, Mother    Heart failure Father, Mother    Hypertension Father, Mother, Sister, Brother    Kidney disease Brother        Social History Main Topics    Smoking status: Former Smoker     Quit date: 8/10/2000    Smokeless tobacco: Never  Used    Alcohol use No    Drug use: No    Sexual activity: No     Review of Systems   Constitutional: Negative for activity change, appetite change, chills, fatigue and fever.   HENT: Negative.    Eyes: Negative.    Respiratory: Negative.    Cardiovascular: Negative.    Gastrointestinal: Negative.    Endocrine: Negative.    Genitourinary:        No residual.    Musculoskeletal: Negative.    Skin: Negative.    Neurological: Negative.    Psychiatric/Behavioral: Negative.      Objective:     Vital Signs (Most Recent):  Temp: 98.6 °F (37 °C) (02/27/18 1100)  Pulse: 80 (02/27/18 1223)  Resp: (!) 21 (02/27/18 1223)  BP: (!) 146/85 (02/27/18 1100)  SpO2: 100 % (02/27/18 1223)  O2 Device (Oxygen Therapy): nasal cannula (02/27/18 1223) Vital Signs (24h Range):  Temp:  [97.8 °F (36.6 °C)-99 °F (37.2 °C)] 98.6 °F (37 °C)  Pulse:  [] 80  Resp:  [12-32] 21  SpO2:  [96 %-100 %] 100 %  BP: (108-162)/(65-94) 146/85     Weight: 70.8 kg (156 lb 1.4 oz) (02/26/18 0500)  Body mass index is 29.49 kg/m².  Body surface area is 1.75 meters squared.    I/O last 3 completed shifts:  In: 1355 [P.O.:75; I.V.:580; Other:700]  Out: 3000 [Other:3000]    Physical Exam   Constitutional: She is oriented to person, place, and time. She appears well-developed and well-nourished.   HENT:   Head: Normocephalic and atraumatic.   Eyes: EOM are normal.   Neck: Neck supple.   Cardiovascular: Normal rate and regular rhythm.    Pulmonary/Chest: Effort normal and breath sounds normal. No respiratory distress.   Abdominal: Soft. Bowel sounds are normal.   Musculoskeletal: Normal range of motion. She exhibits no edema or deformity.   Neurological: She is alert and oriented to person, place, and time.   Skin: Skin is warm and dry.   ABEL AVF with pseudoaneurysm. Good bruit and thrill.    Psychiatric: She has a normal mood and affect.       Significant Labs:  All labs within the past 24 hours have been reviewed.    Significant Imaging:  Labs:  Reviewed    Assessment/Plan:     ESRD from HTN started RRT 1999    ESRD hx failed transplant (received 2005 then failed transplant 2008) on HD MWF 3.5 hrs duration at [facility] under care of . She is under EDW 75.5 kg. No residual. Last HD 2/26/18 prior to transfer to [facility]  -Lytes and acid base stable.   -Plan for HD tomorrow.         Anemia of CKD/acute GIB  -Hgb 9.9 stable last 3 days.   -Continue epo    BMD  Lab Results   Component Value Date    PTH 79 06/27/2014    CALCIUM 8.2 (L) 02/27/2018    PHOS 3.0 02/27/2018   -No binders for now. Renal diet when able to eat.               Thank you for your consult. I will follow-up with patient. Please contact us if you have any additional questions.    Ruth Phipps NP  Nephrology  [facility]

## 2018-02-27 NOTE — SUBJECTIVE & OBJECTIVE
Past Medical History:   Diagnosis Date    Allergy     Anemia     Arthritis     Awaiting organ transplant 2013    Cataract     CHF (congestive heart failure)     Chronic kidney disease     Coronary artery disease     Diabetes mellitus     Diabetic retinopathy     ESRD from HTN strtied RRT 1999    Failed  donor kidney transplant      Glaucoma     History of bleeding peptic ulcer      as stated per pt    Hyperlipidemia     Hypertension     Hypothyroidism     Morbid obesity 8/10/2012    Renal hypertension     Stroke        Past Surgical History:   Procedure Laterality Date    CATARACT EXTRACTION W/  INTRAOCULAR LENS IMPLANT Bilateral     EYE SURGERY      HYSTERECTOMY      KIDNEY TRANSPLANT      NEPHRECTOMY  2008    transplant     TONSILLECTOMY         Review of patient's allergies indicates:   Allergen Reactions    Penicillins Swelling    Iodine      Other reaction(s): Hives    Sulfamethoxazole-trimethoprim      Other reaction(s): Swelling  Other reaction(s): Hives     Current Facility-Administered Medications   Medication Frequency    0.9%  NaCl infusion (for blood administration) Q24H PRN    0.9%  NaCl infusion (for blood administration) Q24H PRN    0.9%  NaCl infusion PRN    0.9%  NaCl infusion PRN    0.9%  NaCl infusion Once    brimonidine (ALPHAGAN P) ophthalmic solution 0.1% BID    dextrose 50% injection 12.5 g PRN    diazePAM tablet 5 mg Q12H PRN    docusate sodium capsule 100 mg BID    dorzolamide-timolol 2-0.5% ophthalmic solution 1 drop BID    epoetin constanza injection 10,000 Units Every Mon, Wed, Fri    glucagon (human recombinant) injection 1 mg PRN    hydrALAZINE injection 10 mg Q6H PRN    insulin aspart U-100 pen 0-5 Units Q6H PRN    latanoprost 0.005 % ophthalmic solution 1 drop QHS    ondansetron injection 4 mg Q6H PRN    pantoprazole injection 40 mg BID    pneumoc 13-jessica conj-dip cr(PF) 0.5 mL vaccine x 1 dose     prednisoLONE acetate 1 % ophthalmic suspension 1 drop BID    tadalafil (PULMONARY HYPERTENSION) tablet 40 mg Daily    traMADol tablet 50 mg Q6H PRN     Family History     Problem Relation (Age of Onset)    Asthma Sister    Blindness Father    Depression Sister    Diabetes Maternal Aunt    Heart attack Father, Mother    Heart failure Father, Mother    Hypertension Father, Mother, Sister, Brother    Kidney disease Brother        Social History Main Topics    Smoking status: Former Smoker     Quit date: 8/10/2000    Smokeless tobacco: Never Used    Alcohol use No    Drug use: No    Sexual activity: No     Review of Systems   Constitutional: Negative for activity change, appetite change, chills, fatigue and fever.   HENT: Negative.    Eyes: Negative.    Respiratory: Negative.    Cardiovascular: Negative.    Gastrointestinal: Negative.    Endocrine: Negative.    Genitourinary:        No residual.    Musculoskeletal: Negative.    Skin: Negative.    Neurological: Negative.    Psychiatric/Behavioral: Negative.      Objective:     Vital Signs (Most Recent):  Temp: 98.6 °F (37 °C) (02/27/18 1100)  Pulse: 80 (02/27/18 1223)  Resp: (!) 21 (02/27/18 1223)  BP: (!) 146/85 (02/27/18 1100)  SpO2: 100 % (02/27/18 1223)  O2 Device (Oxygen Therapy): nasal cannula (02/27/18 1223) Vital Signs (24h Range):  Temp:  [97.8 °F (36.6 °C)-99 °F (37.2 °C)] 98.6 °F (37 °C)  Pulse:  [] 80  Resp:  [12-32] 21  SpO2:  [96 %-100 %] 100 %  BP: (108-162)/(65-94) 146/85     Weight: 70.8 kg (156 lb 1.4 oz) (02/26/18 0500)  Body mass index is 29.49 kg/m².  Body surface area is 1.75 meters squared.    I/O last 3 completed shifts:  In: 1355 [P.O.:75; I.V.:580; Other:700]  Out: 3000 [Other:3000]    Physical Exam   Constitutional: She is oriented to person, place, and time. She appears well-developed and well-nourished.   HENT:   Head: Normocephalic and atraumatic.   Eyes: EOM are normal.   Neck: Neck supple.   Cardiovascular: Normal rate and  regular rhythm.    Pulmonary/Chest: Effort normal and breath sounds normal. No respiratory distress.   Abdominal: Soft. Bowel sounds are normal.   Musculoskeletal: Normal range of motion. She exhibits no edema or deformity.   Neurological: She is alert and oriented to person, place, and time.   Skin: Skin is warm and dry.   ABEL AVG with pseudoaneurysm. Good bruit and thrill.    Psychiatric: She has a normal mood and affect.       Significant Labs:  All labs within the past 24 hours have been reviewed.    Significant Imaging:  Labs: Reviewed

## 2018-02-27 NOTE — SUBJECTIVE & OBJECTIVE
Past Medical History:   Diagnosis Date    Allergy     Anemia     Arthritis     Awaiting organ transplant 2013    Cataract     CHF (congestive heart failure)     Chronic kidney disease     Coronary artery disease     Diabetes mellitus     Diabetic retinopathy     ESRD from HTN strtied RRT 1999    Failed  donor kidney transplant      Glaucoma     History of bleeding peptic ulcer      as stated per pt    Hyperlipidemia     Hypertension     Hypothyroidism     Morbid obesity 8/10/2012    Renal hypertension     Stroke        Past Surgical History:   Procedure Laterality Date    CATARACT EXTRACTION W/  INTRAOCULAR LENS IMPLANT Bilateral     EYE SURGERY      HYSTERECTOMY      KIDNEY TRANSPLANT      NEPHRECTOMY  2008    transplant     TONSILLECTOMY         Review of patient's allergies indicates:   Allergen Reactions    Penicillins Swelling    Iodine      Other reaction(s): Hives    Sulfamethoxazole-trimethoprim      Other reaction(s): Swelling  Other reaction(s): Hives     Family History     Problem Relation (Age of Onset)    Asthma Sister    Blindness Father    Depression Sister    Diabetes Maternal Aunt    Heart attack Father, Mother    Heart failure Father, Mother    Hypertension Father, Mother, Sister, Brother    Kidney disease Brother        Social History Main Topics    Smoking status: Former Smoker     Quit date: 8/10/2000    Smokeless tobacco: Never Used    Alcohol use No    Drug use: No    Sexual activity: No     Review of Systems   Constitutional: Positive for activity change, appetite change and fatigue.   HENT: Negative for trouble swallowing and voice change.    Eyes: Negative for photophobia.   Respiratory: Negative for choking and chest tightness.    Cardiovascular: Positive for leg swelling.   Gastrointestinal: Positive for blood in stool. Negative for abdominal distention, abdominal pain, anal bleeding, constipation, diarrhea and  nausea.   Endocrine: Negative for polyphagia.   Genitourinary: Negative for dysuria and enuresis.   Musculoskeletal: Negative for arthralgias and back pain.   Neurological: Positive for weakness. Negative for seizures.   Psychiatric/Behavioral: Negative for confusion and hallucinations.     Objective:     Vital Signs (Most Recent):  Temp: 99 °F (37.2 °C) (02/27/18 0700)  Pulse: 87 (02/27/18 0851)  Resp: (!) 27 (02/27/18 0851)  BP: 139/82 (02/27/18 0700)  SpO2: 97 % (02/27/18 0851) Vital Signs (24h Range):  Temp:  [97.8 °F (36.6 °C)-99 °F (37.2 °C)] 99 °F (37.2 °C)  Pulse:  [] 87  Resp:  [12-38] 27  SpO2:  [93 %-100 %] 97 %  BP: (108-162)/(65-92) 139/82     Weight: 70.8 kg (156 lb 1.4 oz) (02/26/18 0500)  Body mass index is 29.49 kg/m².      Intake/Output Summary (Last 24 hours) at 02/27/18 0932  Last data filed at 02/26/18 2300   Gross per 24 hour   Intake              980 ml   Output             3000 ml   Net            -2020 ml       Lines/Drains/Airways     Central Venous Catheter Line                 RETIRED! DO NOT USE: Hemodialysis Catheter Arteriovenous fistula Left Forearm -- days          Drain                 Hemodialysis AV Fistula Left upper arm -- days          Peripheral Intravenous Line                 Midline Catheter Insertion/Assessment  - Single Lumen 07/14/15 2110 Right basilic vein (medial side of arm) 18g x 10cm 958 days         Peripheral IV - Single Lumen 02/22/18 2120 Right Forearm 4 days         Peripheral IV - Single Lumen 02/27/18 0010 Right Hand less than 1 day                Physical Exam   Constitutional: She is oriented to person, place, and time. She appears well-developed and well-nourished.   Eyes: No scleral icterus.   Neck: Neck supple.   Cardiovascular: Normal rate.  Exam reveals no gallop.    Pulmonary/Chest: Effort normal. No respiratory distress.   Abdominal: Soft. Bowel sounds are normal. She exhibits no distension and no mass. There is no tenderness. There is no  rebound and no guarding. No hernia.   Musculoskeletal: She exhibits no edema.   Neurological: She is alert and oriented to person, place, and time.   Skin: Skin is warm.   Psychiatric: She has a normal mood and affect.       Lab Results   Component Value Date    WBC 8.20 02/27/2018    HGB 9.9 (L) 02/27/2018    HCT 30.8 (L) 02/27/2018    MCV 84 02/27/2018     02/27/2018     Lab Results   Component Value Date    INR 1.1 02/27/2018     Lab Results   Component Value Date     (L) 02/27/2018    K 3.9 02/27/2018    CREATININE 5.6 (H) 02/27/2018     Lab Results   Component Value Date    ALBUMIN 2.9 (L) 02/27/2018    ALT 8 (L) 02/27/2018    AST 12 02/27/2018    ALKPHOS 45 (L) 02/27/2018    BILITOT 0.6 02/27/2018     No results found for: AFP  Lab Results   Component Value Date    LIPASE 17 02/22/2018    AMYLASE 79 07/21/2011     No results found for: TACROLIMUS    Imaging:  Reviewed and as noted in HPI.

## 2018-02-27 NOTE — HPI
"62 yo with significant history for PHT on home oxygen 2 L NC, diastolic heart failure, RA, DM, CAD, atrial fib on coumadin, PUD/GIB 1072 CVA in 1980 with no residual deficit and ESRD sp failed transplant (recieved 2005 then failed 2008) who initially admitted to [facility] for weakness and melena found [address] hgb 4.8 sp 4 units PRBC and 2 FFP transfusion now transfer to [facility] ED for further guidance of severe pulmonary hypertension by HTS (patient followed by Dr. Hennessy outpt) and continue GI evaluation/EGD. Patient was found to have "black stools" 2 days PTA to . Vitamin K and protonix gtt also started at . Last BM Sunday (3 days ago) also noted to be dark. Currently, hgb 9.9 stable.       Nephrology consult for ESRD. ESRD on HD MWF 3.5 hrs duration at [facility] under direction of Dr. Masterson. No residual. EDW 75.5 kg? (currently under EDW). Last HD yesterday 2/26 prior to transfer to [facility] ED.    "

## 2018-02-27 NOTE — CONSULTS
"[facility]  Gastroenterology  Consult Note    Patient Name: Shivani Sheets  MRN: [id number]  Admission Date: 2018  Hospital Length of Stay: 5 days  Code Status: Full Code   Attending Provider: Jenny Hennessy MD   Consulting Provider: Kevan Olmstead MD  Primary Care Physician: César Iverson MD  Principal Problem:<principal problem not specified>    Inpatient consult to Gastroenterology  Consult performed by: KEVAN OLMSTEAD  Consult ordered by: FAIZAN RICHARDS  Reason for consult: GIB        Subjective:     HPI:  Shivani Sheets is a 62 y.o. female with PHTN with PA pressures of 71, afib on coumadin, remote PUD, and ESRD 2/2 HTN (LUE AVF) s/p failed transplant (re-listed) who presented to [facility] on 2/15 with cough and was diagnosed with atypical PNA and discharged home with a Z-Yariel. She saw anticoagulation clinic  and her INR was noted to be elevated with patient having melena. She was encouraged to go to the ER and adviced to hold warfarin. In triage, Hgb was noted to 4.7 (down from 7.8 one week prior with baseline around 9-10), the patient was admitted to the ICU, and "given blood transfusion and FFP". Per notes, at least 2U FFP and 3U PRBCs were given as well as Benadryl, IV steroids, Pepsid, and Lasix. IV protonix gtt was also ordered as was vitamin K. An EGD was post-poned with plan for  because the patient was on Venturi mask after FFP administration. Transferred to [facility] for EGD. Currently, stable, on 2L NC. Denies having any new complaints.     Past Medical History:   Diagnosis Date    Allergy     Anemia     Arthritis     Awaiting organ transplant 2013    Cataract     CHF (congestive heart failure)     Chronic kidney disease     Coronary artery disease     Diabetes mellitus     Diabetic retinopathy     ESRD from HTN strtied RRT 1999    Failed  donor kidney transplant      Glaucoma     History of bleeding peptic " ulcer     1970's as stated per pt    Hyperlipidemia     Hypertension     Hypothyroidism     Morbid obesity 8/10/2012    Renal hypertension     Stroke        Past Surgical History:   Procedure Laterality Date    CATARACT EXTRACTION W/  INTRAOCULAR LENS IMPLANT Bilateral     EYE SURGERY      HYSTERECTOMY      KIDNEY TRANSPLANT      NEPHRECTOMY  11/2008    transplant     TONSILLECTOMY         Review of patient's allergies indicates:   Allergen Reactions    Penicillins Swelling    Iodine      Other reaction(s): Hives    Sulfamethoxazole-trimethoprim      Other reaction(s): Swelling  Other reaction(s): Hives     Family History     Problem Relation (Age of Onset)    Asthma Sister    Blindness Father    Depression Sister    Diabetes Maternal Aunt    Heart attack Father, Mother    Heart failure Father, Mother    Hypertension Father, Mother, Sister, Brother    Kidney disease Brother        Social History Main Topics    Smoking status: Former Smoker     Quit date: 8/10/2000    Smokeless tobacco: Never Used    Alcohol use No    Drug use: No    Sexual activity: No     Review of Systems   Constitutional: Positive for activity change, appetite change and fatigue.   HENT: Negative for trouble swallowing and voice change.    Eyes: Negative for photophobia.   Respiratory: Negative for choking and chest tightness.    Cardiovascular: Positive for leg swelling.   Gastrointestinal: Positive for blood in stool. Negative for abdominal distention, abdominal pain, anal bleeding, constipation, diarrhea and nausea.   Endocrine: Negative for polyphagia.   Genitourinary: Negative for dysuria and enuresis.   Musculoskeletal: Negative for arthralgias and back pain.   Neurological: Positive for weakness. Negative for seizures.   Psychiatric/Behavioral: Negative for confusion and hallucinations.     Objective:     Vital Signs (Most Recent):  Temp: 99 °F (37.2 °C) (02/27/18 0700)  Pulse: 87 (02/27/18 0851)  Resp: (!) 27 (02/27/18  0851)  BP: 139/82 (02/27/18 0700)  SpO2: 97 % (02/27/18 0851) Vital Signs (24h Range):  Temp:  [97.8 °F (36.6 °C)-99 °F (37.2 °C)] 99 °F (37.2 °C)  Pulse:  [] 87  Resp:  [12-38] 27  SpO2:  [93 %-100 %] 97 %  BP: (108-162)/(65-92) 139/82     Weight: 70.8 kg (156 lb 1.4 oz) (02/26/18 0500)  Body mass index is 29.49 kg/m².      Intake/Output Summary (Last 24 hours) at 02/27/18 0932  Last data filed at 02/26/18 2300   Gross per 24 hour   Intake              980 ml   Output             3000 ml   Net            -2020 ml       Lines/Drains/Airways     Central Venous Catheter Line                 RETIRED! DO NOT USE: Hemodialysis Catheter Arteriovenous fistula Left Forearm -- days          Drain                 Hemodialysis AV Fistula Left upper arm -- days          Peripheral Intravenous Line                 Midline Catheter Insertion/Assessment  - Single Lumen 07/14/15 2110 Right basilic vein (medial side of arm) 18g x 10cm 958 days         Peripheral IV - Single Lumen 02/22/18 2120 Right Forearm 4 days         Peripheral IV - Single Lumen 02/27/18 0010 Right Hand less than 1 day                Physical Exam   Constitutional: She is oriented to person, place, and time. She appears well-developed and well-nourished.   Eyes: No scleral icterus.   Neck: Neck supple.   Cardiovascular: Normal rate.  Exam reveals no gallop.    Pulmonary/Chest: Effort normal. No respiratory distress.   Abdominal: Soft. Bowel sounds are normal. She exhibits no distension and no mass. There is no tenderness. There is no rebound and no guarding. No hernia.   Musculoskeletal: She exhibits no edema.   Neurological: She is alert and oriented to person, place, and time.   Skin: Skin is warm.   Psychiatric: She has a normal mood and affect.       Lab Results   Component Value Date    WBC 8.20 02/27/2018    HGB 9.9 (L) 02/27/2018    HCT 30.8 (L) 02/27/2018    MCV 84 02/27/2018     02/27/2018     Lab Results   Component Value Date    INR 1.1  02/27/2018     Lab Results   Component Value Date     (L) 02/27/2018    K 3.9 02/27/2018    CREATININE 5.6 (H) 02/27/2018     Lab Results   Component Value Date    ALBUMIN 2.9 (L) 02/27/2018    ALT 8 (L) 02/27/2018    AST 12 02/27/2018    ALKPHOS 45 (L) 02/27/2018    BILITOT 0.6 02/27/2018     No results found for: AFP  Lab Results   Component Value Date    LIPASE 17 02/22/2018    AMYLASE 79 07/21/2011     No results found for: TACROLIMUS    Imaging:  Reviewed and as noted in HPI.         Assessment/Plan:     GIB (gastrointestinal bleeding)    Melena and drop in HnH in the setting of supratherapeutic INR. Denies NSAID exposure.   DDx PUD, AVM, dieulafoy.     Plan:  Plan:  Protonix IV BID  Intravascular resuscitation/support with IVFs   Serial H/H's and pRBCs transfusion as indicated  Discontinue all NSAIDs and Heparin products  Please correct any coagulopathy with platelets and FFP to a goal of platelets >50K and INR <2.0  Maintain IV access with 2 large bore IVs  NPO  Plan for EGD today  Please notify GI team if there is significant change in patient's clinical status              Thank you for your consult. I will follow-up with patient. Please contact us if you have any additional questions.    Gabby Merrill MD  Gastroenterology  [facility]

## 2018-02-27 NOTE — PROGRESS NOTES
Called report to Elida on TSU. Patient belonging left at charge nurses desk. Endoscopy notified of patient transfer.

## 2018-02-27 NOTE — PROGRESS NOTES
Shivani Sheets is a 62 y.o. female patient.  Seen on HD   No issues  Transferring to main campus for EGD in am  Scheduled Meds:   sodium chloride 0.9%   Intravenous Once    brimonidine 0.1%  1 drop Right Eye BID    dorzolamide-timolol 2-0.5%  1 drop Right Eye BID    epoetin constanza (PROCRIT) injection  10,000 Units Intravenous Every Mon, Wed, Fri    latanoprost  1 drop Right Eye QHS    prednisoLONE acetate  1 drop Left Eye BID       Review of patient's allergies indicates:   Allergen Reactions    Penicillins Swelling    Iodine      Other reaction(s): Hives    Sulfamethoxazole-trimethoprim      Other reaction(s): Swelling  Other reaction(s): Hives       Past Medical History:   Diagnosis Date    Allergy     Anemia     Arthritis     Awaiting organ transplant 2013    Cataract     CHF (congestive heart failure)     Chronic kidney disease     Coronary artery disease     Diabetes mellitus     Diabetic retinopathy     ESRD from HTN strtied RRT 1999    Failed  donor kidney transplant -     Glaucoma     History of bleeding peptic ulcer      as stated per pt    Hyperlipidemia     Hypertension     Hypothyroidism     Morbid obesity 8/10/2012    Renal hypertension     Stroke      Past Surgical History:   Procedure Laterality Date    CATARACT EXTRACTION W/  INTRAOCULAR LENS IMPLANT Bilateral     EYE SURGERY      HYSTERECTOMY      KIDNEY TRANSPLANT      NEPHRECTOMY  2008    transplant     TONSILLECTOMY        reports that she quit smoking about 17 years ago. She has never used smokeless tobacco. She reports that she does not drink alcohol or use drugs.   Family History   Problem Relation Age of Onset    Heart attack Father     Heart failure Father     Hypertension Father     Blindness Father     Heart attack Mother     Heart failure Mother     Hypertension Mother     Asthma Sister     Depression Sister     Hypertension Sister     Hypertension  Brother     Kidney disease Brother      ESRD    Diabetes Maternal Aunt     Amblyopia Neg Hx     Cataracts Neg Hx     Macular degeneration Neg Hx     Retinal detachment Neg Hx     Strabismus Neg Hx           Vital Signs Range (Last 24H):  Temp:  [97.9 °F (36.6 °C)-98.4 °F (36.9 °C)]   Pulse:  [67-87]   Resp:  [13-56]   BP: (108-187)/(65-99)   SpO2:  [93 %-100 %]     I & O (Last 24H):  Intake/Output Summary (Last 24 hours) at 02/26/18 1908  Last data filed at 02/26/18 1845   Gross per 24 hour   Intake             1255 ml   Output             3000 ml   Net            -1745 ml           Physical Exam:  General appearance: well developed, well nourished, appears older than stated age  Lungs:  clear to auscultation bilaterally, normal respiratory effort and normal percussion bilaterally  Heart: regular rate and rhythm, S1, S2 normal, no murmur, click, rub or gallop, normal apical impulse and S4 present  Abdomen: soft, non-tender non-distented; bowel sounds normal; no masses,  no organomegaly  Extremities: no cyanosis or edema, or clubbing  avf in place  Laboratory:  CBC:   Recent Labs  Lab 02/26/18  0236   WBC 6.70  6.70   RBC 3.62*  3.62*   HGB 10.0*  10.0*   HCT 30.8*  30.8*     154   MCV 85  85   MCH 27.6  27.6   MCHC 32.5  32.5     BMP:   Recent Labs  Lab 02/26/18  0235   GLU 91   *   K 4.3   CL 92*   CO2 25   BUN 54*   CREATININE 9.2*   CALCIUM 8.3*   MG 2.2         Diagnostic Results:      1. ESRD  2. GI Bleed  3. Anemia of CKD  4. Pulm HTN    HD today  MWF while admitted  To [facility]  Hb stable    Lani Hutchinson  2/26/2018

## 2018-02-27 NOTE — PLAN OF CARE
Report called to CHIDI Chase at [facility]. EMS here for transport. Pt remains AA&O. Protonix infusing at 8 mg/hr via pump. Pt moved self to stretcher w/o diff. Ready for transported from unit w/o diff. Will notify [facility] CHIDI Chase. All personal belonging sent with pt.

## 2018-02-27 NOTE — ASSESSMENT & PLAN NOTE
Melena and drop in HnH in the setting of supratherapeutic INR. Denies NSAID exposure.   DDx PUD, AVM, dieulafoy.     Plan:  Plan:  Protonix IV BID  Intravascular resuscitation/support with IVFs   Serial H/H's and pRBCs transfusion as indicated  Discontinue all NSAIDs and Heparin products  Please correct any coagulopathy with platelets and FFP to a goal of platelets >50K and INR <2.0  Maintain IV access with 2 large bore IVs  NPO  Plan for EGD today  Please notify GI team if there is significant change in patient's clinical status

## 2018-02-27 NOTE — PROGRESS NOTES
Admit Note     Met with patient to assess needs. Patient is a 62 y.o.  female, admitted for pulmonary hypertension.      Patient admitted from an outside hospital on 2/22/2018 .  At this time, patient presents as alert and oriented x 4, pleasant, calm and asking and answering questions appropriately.  At this time, patient is alone in room.    Household/Family Systems     Patient resides with patient's brother Kenji Zaldivar and her son, at:     [address]  [place] [address].      Cell [phone]  Brother Kenji [phone]  Bne, son, [phone]    Support system includes brother Kenji, and her son, Ben. Pt has other supportive extended family members as well.  Patient does not have dependents that are need of being cared for.     Patient cares for herself, with help from family as needed.   During admission, patient's caregiver plans to stay at home.  Confirmed patient and patients caregivers do have access to reliable transportation.    Cognitive Status/Learning     Patient reports reading ability as 12th grade and states patient does have difficulty with vision. Pt had surgery for cataracts and can see much better per report.    Needed: N/A.   Highest education level: Attended College/Technical School    Vocation/Disability     Working for Income: No  If no, reason not working: Patient Choice - Retired  Patient retired in 1999.    Adherence     Patient reports a high level of adherence to patients health care regimen.  Adherence counseling and education provided.  Patient's caregiver verbalizes understanding.    Substance Use    Patient reports the following substance usage.    Tobacco: none, patient denies any use.  Alcohol: none, patient denies any use.  Illicit Drugs/Non-prescribed Medications: none, patient denies any use.  Patient states clear understanding of the potential impact of substance use.  Substance abstinence/cessation counseling, education and resources provided and  reviewed.     Services Utilizing/ADLS    Infusion Service: Prior to admission, patient utilizing? Pt is on several PHTN medications  Home Health: Prior to admission, patient utilizing? no  DME: Prior to admission, yes o2 concentrator, walker, cane, Cpap, scooter and shower chair  Pulmonary/Cardiac Rehab: Prior to admission, no  Dialysis:  Prior to admission, Yes, pt goes to the [facility] on // at 5:30am  Transplant Specialty Pharmacy:  Prior to admission, no.    Prior to admission, patient reports patient was independent with ADLS and was not driving. Pt states she injured her knee recently and hasn't drive for the past month. She plans to return to driving once it is healed. Pt's brother has been providing her transportation. Patient reports patient is not able to care for self at this time due to compromised medical condition (as documented in medical record) and physical weakness.  Patient indicates a willingness to care for self once medically cleared to do so.    Insurance/Medications    Insured by   Payor/Plan Subscr  Sex Relation Sub. Ins. ID Effective Group Num   1. [place] - [place]* MIGUEL ANGEL AMADO* 1955 Female  [id number]                                    [address]       Primary Insurance (for UNOS reporting): Public Insurance - Medicare FFS (Fee For Service)  Secondary Insurance (for UNOS reporting): None in chart, but pt states she had Medicaid as well.    Patient reports patient is able to obtain and afford medications at this time and at time of discharge.    Living Will/Healthcare Power of     Patient states patient does not have a LW and/or HCPA.  provided education regarding LW and HCPA and the completion of forms.    Coping/Mental Health    Patient is coping adequately with the aid of  family members and mely. Patient denies mental health difficulties.     Discharge Planning    At time of discharge, patient plans to return to patient's home under the  care of herself.  Patients family will transport patient.  Per rounds today, expected discharge date has not been medically determined at this time. Patient verbalizes understanding and are involved in treatment planning and discharge process.    Additional Concerns     providing ongoing psychosocial support, education, resources and d/c planning as needed.  SW remains available. Patient denies additional needs and/or concerns at this time. Patient verbalizes understanding and agreement with information reviewed, social work availability, and how to access available resources as needed.

## 2018-02-27 NOTE — PROGRESS NOTES
Pt admitted to SICU 6067 . Transferred for [facility] by transport via ambulance. Pt brought up on cardiac, BP, and O2 monitoring to 1L NC. VSS. Pt transferred to SICU monitors. Assessment preformed. Skin free from any break down. Pt is AAOx4 and following commands. VSS. Will continue to monitor.

## 2018-02-27 NOTE — ASSESSMENT & PLAN NOTE
- currently not tachycardic  - no NSAIDs/ASA  - 2 large bore IV catheters  - resuscitated prior to transfer  - high risk case given PHT, tenuous volume status  - CBC, CMP, coags, T&S  - goal Hgb >8 (h/o CAD)  - alerted endoscopist at [facility] (EGD planned for today)  - c/w Protonix gtt (once runs out start 40 mg IVP BID)  - formal GI consult

## 2018-02-27 NOTE — ASSESSMENT & PLAN NOTE
ESRD hx failed transplant (received 2005 then failed transplant 2008) on HD MWF 3.5 hrs duration [place] [facility] under care of . She is under EDW 75.5 kg. No residual. Last HD 2/26/18 prior to [place] [facility]  -Lytes and acid base stable.   -Plan for HD tomorrow.         Anemia of CKD/acute GIB  -Hgb 9.9 stable last 3 days.   -Continue epo    BMD  Lab Results   Component Value Date    PTH 79 06/27/2014    CALCIUM 8.2 (L) 02/27/2018    PHOS 3.0 02/27/2018   -No binders for now. Renal diet when able to eat.

## 2018-02-27 NOTE — PLAN OF CARE
Problem: Patient Care Overview  Goal: Plan of Care Review  Outcome: Ongoing (interventions implemented as appropriate)  POC reviewed with pt. Pt AAOx4 and following commands. Pt on no continuous IV medications. Pt on 1L NC, o2 saturation % throughout shift. Breath sounds clear. Pt anuric. No bowel movements throughout shift. Pt remained NPO throughout the night. Skin free from new breakdown. Shift free from any falls or injury. VSS. See flow sheets for details. Will continue to monitor.

## 2018-02-27 NOTE — HPI
"Shivani Sheets is a 62 y.o. female with PHTN with PA pressures of 71, afib on coumadin, remote PUD, and ESRD 2/2 HTN (LUE AVF) s/p failed transplant (re-listed) who presented to [facility] on 2/15 with cough and was diagnosed with atypical PNA and discharged home with a Z-Yariel. She saw anticoagulation clinic 2/22 and her INR was noted to be elevated with patient having melena. She was encouraged to go to the ER and adviced to hold warfarin. In triage, Hgb was noted to 4.7 (down from 7.8 one week prior with baseline around 9-10), the patient was admitted to the ICU, and "given blood transfusion and FFP". Per notes, at least 2U FFP and 3U PRBCs were given as well as Benadryl, IV steroids, Pepsid, and Lasix. IV protonix gtt was also ordered as was vitamin K. An EGD was post-poned with plan for 2/26 because the patient was on Venturi mask after FFP administration. Transferred to [facility] for EGD. Currently, stable, on 2L NC. Denies having any new complaints.   "

## 2018-02-27 NOTE — SUBJECTIVE & OBJECTIVE
Past Medical History:   Diagnosis Date    Allergy     Anemia     Arthritis     Awaiting organ transplant 2013    Cataract     CHF (congestive heart failure)     Chronic kidney disease     Coronary artery disease     Diabetes mellitus     Diabetic retinopathy     ESRD from HTN strtied RRT 1999    Failed  donor kidney transplant      Glaucoma     History of bleeding peptic ulcer      as stated per pt    Hyperlipidemia     Hypertension     Hypothyroidism     Morbid obesity 8/10/2012    Renal hypertension     Stroke        Past Surgical History:   Procedure Laterality Date    CATARACT EXTRACTION W/  INTRAOCULAR LENS IMPLANT Bilateral     EYE SURGERY      HYSTERECTOMY      KIDNEY TRANSPLANT      NEPHRECTOMY  2008    transplant     TONSILLECTOMY         Review of patient's allergies indicates:   Allergen Reactions    Penicillins Swelling    Iodine      Other reaction(s): Hives    Sulfamethoxazole-trimethoprim      Other reaction(s): Swelling  Other reaction(s): Hives       Current Facility-Administered Medications   Medication    0.9%  NaCl infusion (for blood administration)    0.9%  NaCl infusion (for blood administration)    0.9%  NaCl infusion    0.9%  NaCl infusion    0.9%  NaCl infusion    brimonidine (ALPHAGAN P) ophthalmic solution 0.1%    diazePAM tablet 5 mg    dorzolamide-timolol 2-0.5% ophthalmic solution 1 drop    epoetin constanza injection 10,000 Units    hydrALAZINE injection 10 mg    latanoprost 0.005 % ophthalmic solution 1 drop    ondansetron injection 4 mg    pantoprazole 40 mg in dextrose 5 % 100 mL infusion (ready to mix system)    pneumoc 13-jessica conj-dip cr(PF) 0.5 mL    prednisoLONE acetate 1 % ophthalmic suspension 1 drop    traMADol tablet 50 mg     Family History     Problem Relation (Age of Onset)    Asthma Sister    Blindness Father    Depression Sister    Diabetes Maternal Aunt    Heart attack Father, Mother     Heart failure Father, Mother    Hypertension Father, Mother, Sister, Brother    Kidney disease Brother        Social History Main Topics    Smoking status: Former Smoker     Quit date: 8/10/2000    Smokeless tobacco: Never Used    Alcohol use No    Drug use: No    Sexual activity: No     Review of Systems   Constitutional: Negative.    HENT: Negative.    Eyes: Negative.    Respiratory: Negative.    Cardiovascular: Negative.    Gastrointestinal: Positive for blood in stool.   Endocrine: Negative.    Genitourinary: Negative.    Musculoskeletal: Negative.    Skin: Negative.    Allergic/Immunologic: Negative.    Neurological: Negative.    Hematological: Negative.    Psychiatric/Behavioral: Negative.      Objective:     Vital Signs (Most Recent):  Temp: 98.3 °F (36.8 °C) (02/26/18 2345)  Pulse: 82 (02/27/18 0007)  Resp: 13 (02/27/18 0007)  BP: (!) 157/77 (02/26/18 2345)  SpO2: 99 % (02/27/18 0007) Vital Signs (24h Range):  Temp:  [97.8 °F (36.6 °C)-98.4 °F (36.9 °C)] 98.3 °F (36.8 °C)  Pulse:  [67-90] 82  Resp:  [13-56] 13  SpO2:  [93 %-100 %] 99 %  BP: (108-187)/(65-97) 157/77     Patient Vitals for the past 72 hrs (Last 3 readings):   Weight   02/26/18 0500 70.8 kg (156 lb 1.4 oz)   02/24/18 1030 68.9 kg (151 lb 14.4 oz)     Body mass index is 29.49 kg/m².      Intake/Output Summary (Last 24 hours) at 02/27/18 0013  Last data filed at 02/26/18 2300   Gross per 24 hour   Intake             1280 ml   Output             3000 ml   Net            -1720 ml       Physical Exam   Constitutional: She is oriented to person, place, and time. She appears well-developed and well-nourished.   HENT:   Head: Normocephalic and atraumatic.   Eyes: Right eye exhibits no discharge. Left eye exhibits no discharge.   Cardiovascular: Normal rate and regular rhythm.  Exam reveals no gallop and no friction rub.    Pulmonary/Chest: Effort normal and breath sounds normal. No stridor.   Abdominal: Soft. Bowel sounds are normal. There is no  tenderness.   Musculoskeletal: She exhibits no edema.   Neurological: She is alert and oriented to person, place, and time.   Skin: Skin is warm and dry.   Psychiatric: She has a normal mood and affect.       Significant Labs:  CBC:    Recent Labs  Lab 02/24/18  0652 02/25/18  0538 02/26/18  0236   WBC 6.03 7.28  7.28 6.70  6.70   RBC 2.80* 3.41*  3.41* 3.62*  3.62*   HGB 7.5* 9.4*  9.4* 10.0*  10.0*   HCT 23.6* 28.8*  28.8* 30.8*  30.8*   * 161  161 154  154   MCV 84 85  85 85  85   MCH 26.8* 27.6  27.6 27.6  27.6   MCHC 31.8* 32.6  32.6 32.5  32.5     BNP:  No results for input(s): BNP in the last 168 hours.    Invalid input(s): BNPTRIAGELBLO  CMP:    Recent Labs  Lab 02/24/18  0240 02/25/18  0240 02/26/18  0235   GLU 96 77  77 91   CALCIUM 8.4* 8.3*  8.3* 8.3*   ALBUMIN 3.0* 3.2*  3.2* 3.2*   PROT 7.1 7.4  7.4 7.4    133*  133* 130*   K 4.4 4.4  4.4 4.3   CO2 28 21*  21* 25    99  99 92*   BUN 26* 44*  44* 54*   CREATININE 5.1* 7.3*  7.3* 9.2*   ALKPHOS 41* 40*  40* 42*   ALT 11 10  10 7*   AST 13 20  20 12   BILITOT 0.5 1.0  1.0 0.7      Coagulation:     Recent Labs  Lab 02/22/18  1404 02/22/18  1845 02/23/18  0828   INR 3.1 2.5* 1.4*   APTT  --  40.7* 30.3     LDH:  No results for input(s): LDH in the last 72 hours.  Microbiology:  Microbiology Results (last 7 days)     ** No results found for the last 168 hours. **          I have reviewed all pertinent labs within the past 24 hours.    Diagnostic Results:  I have reviewed all pertinent imaging results/findings within the past 24 hours.

## 2018-02-27 NOTE — TREATMENT PLAN
GI Progress note    EGD done - please see full report for details.     No findings to explain anemia.     Recommend:  Continue PPI daily  Monitor in the hospital - if no bleeding, plan for outpatient colonoscopy.   If active bleeding, plan for inpatient colonoscopy  Resume diet and advance as tolerated  Please call GI with questions/updates.    Gabby Merrill MD  Gastroenterology & Hepatology Fellow  Pager: [phone]

## 2018-02-28 LAB
ALBUMIN SERPL BCP-MCNC: 2.8 G/DL
ALP SERPL-CCNC: 46 U/L
ALT SERPL W/O P-5'-P-CCNC: 7 U/L
ANION GAP SERPL CALC-SCNC: 11 MMOL/L
APTT BLDCRRT: 23.7 SEC
AST SERPL-CCNC: 11 U/L
BASOPHILS # BLD AUTO: 0.03 K/UL
BASOPHILS NFR BLD: 0.4 %
BILIRUB SERPL-MCNC: 0.6 MG/DL
BUN SERPL-MCNC: 30 MG/DL
CALCIUM SERPL-MCNC: 7.7 MG/DL
CHLORIDE SERPL-SCNC: 99 MMOL/L
CO2 SERPL-SCNC: 26 MMOL/L
CREAT SERPL-MCNC: 7.9 MG/DL
DIFFERENTIAL METHOD: ABNORMAL
EOSINOPHIL # BLD AUTO: 0.7 K/UL
EOSINOPHIL NFR BLD: 10.6 %
ERYTHROCYTE [DISTWIDTH] IN BLOOD BY AUTOMATED COUNT: 18 %
EST. GFR  (AFRICAN AMERICAN): 5.7 ML/MIN/1.73 M^2
EST. GFR  (NON AFRICAN AMERICAN): 4.9 ML/MIN/1.73 M^2
GLUCOSE SERPL-MCNC: 89 MG/DL
HCT VFR BLD AUTO: 29.7 %
HGB BLD-MCNC: 9.6 G/DL
IMM GRANULOCYTES # BLD AUTO: 0.03 K/UL
IMM GRANULOCYTES NFR BLD AUTO: 0.4 %
INR PPP: 1.1
LYMPHOCYTES # BLD AUTO: 1.6 K/UL
LYMPHOCYTES NFR BLD: 23.7 %
MAGNESIUM SERPL-MCNC: 2 MG/DL
MCH RBC QN AUTO: 28.2 PG
MCHC RBC AUTO-ENTMCNC: 32.3 G/DL
MCV RBC AUTO: 87 FL
MONOCYTES # BLD AUTO: 1 K/UL
MONOCYTES NFR BLD: 14.5 %
NEUTROPHILS # BLD AUTO: 3.4 K/UL
NEUTROPHILS NFR BLD: 50.4 %
NRBC BLD-RTO: 0 /100 WBC
PHOSPHATE SERPL-MCNC: 4.2 MG/DL
PLATELET # BLD AUTO: 149 K/UL
PMV BLD AUTO: 12.5 FL
POCT GLUCOSE: 106 MG/DL (ref 70–110)
POCT GLUCOSE: 110 MG/DL (ref 70–110)
POCT GLUCOSE: 162 MG/DL (ref 70–110)
POCT GLUCOSE: 167 MG/DL (ref 70–110)
POTASSIUM SERPL-SCNC: 3.8 MMOL/L
PROT SERPL-MCNC: 6.9 G/DL
PROTHROMBIN TIME: 11.4 SEC
RBC # BLD AUTO: 3.4 M/UL
SODIUM SERPL-SCNC: 136 MMOL/L
WBC # BLD AUTO: 6.68 K/UL

## 2018-02-28 PROCEDURE — 25000003 PHARM REV CODE 250: Performed by: NURSE PRACTITIONER

## 2018-02-28 PROCEDURE — 25000003 PHARM REV CODE 250: Performed by: PHYSICIAN ASSISTANT

## 2018-02-28 PROCEDURE — 36415 COLL VENOUS BLD VENIPUNCTURE: CPT

## 2018-02-28 PROCEDURE — 25000003 PHARM REV CODE 250: Performed by: INTERNAL MEDICINE

## 2018-02-28 PROCEDURE — 99232 SBSQ HOSP IP/OBS MODERATE 35: CPT | Mod: ,,, | Performed by: INTERNAL MEDICINE

## 2018-02-28 PROCEDURE — 83735 ASSAY OF MAGNESIUM: CPT

## 2018-02-28 PROCEDURE — C9113 INJ PANTOPRAZOLE SODIUM, VIA: HCPCS | Performed by: INTERNAL MEDICINE

## 2018-02-28 PROCEDURE — 80053 COMPREHEN METABOLIC PANEL: CPT

## 2018-02-28 PROCEDURE — 85730 THROMBOPLASTIN TIME PARTIAL: CPT

## 2018-02-28 PROCEDURE — 20600001 HC STEP DOWN PRIVATE ROOM

## 2018-02-28 PROCEDURE — 90935 HEMODIALYSIS ONE EVALUATION: CPT

## 2018-02-28 PROCEDURE — 85025 COMPLETE CBC W/AUTO DIFF WBC: CPT

## 2018-02-28 PROCEDURE — 84100 ASSAY OF PHOSPHORUS: CPT

## 2018-02-28 PROCEDURE — 63600175 PHARM REV CODE 636 W HCPCS: Performed by: INTERNAL MEDICINE

## 2018-02-28 PROCEDURE — 90935 HEMODIALYSIS ONE EVALUATION: CPT | Mod: ,,, | Performed by: INTERNAL MEDICINE

## 2018-02-28 PROCEDURE — 85610 PROTHROMBIN TIME: CPT

## 2018-02-28 RX ORDER — LIDOCAINE AND PRILOCAINE 25; 25 MG/G; MG/G
CREAM TOPICAL
Status: DISCONTINUED | OUTPATIENT
Start: 2018-02-28 | End: 2018-03-02 | Stop reason: HOSPADM

## 2018-02-28 RX ORDER — LEVOTHYROXINE SODIUM 100 UG/1
100 TABLET ORAL
Status: DISCONTINUED | OUTPATIENT
Start: 2018-03-01 | End: 2018-03-02 | Stop reason: HOSPADM

## 2018-02-28 RX ORDER — ATORVASTATIN CALCIUM 10 MG/1
10 TABLET, FILM COATED ORAL NIGHTLY
Status: DISCONTINUED | OUTPATIENT
Start: 2018-02-28 | End: 2018-03-02 | Stop reason: HOSPADM

## 2018-02-28 RX ADMIN — DORZOLAMIDE HYDROCHLORIDE AND TIMOLOL MALEATE 1 DROP: 20; 5 SOLUTION/ DROPS OPHTHALMIC at 10:02

## 2018-02-28 RX ADMIN — PREDNISOLONE ACETATE 1 DROP: 10 SUSPENSION/ DROPS OPHTHALMIC at 10:02

## 2018-02-28 RX ADMIN — DOCUSATE SODIUM 100 MG: 100 CAPSULE, LIQUID FILLED ORAL at 10:02

## 2018-02-28 RX ADMIN — ATORVASTATIN CALCIUM 10 MG: 10 TABLET, FILM COATED ORAL at 10:02

## 2018-02-28 RX ADMIN — PANTOPRAZOLE SODIUM 40 MG: 40 INJECTION, POWDER, FOR SOLUTION INTRAVENOUS at 09:02

## 2018-02-28 RX ADMIN — SODIUM CHLORIDE 1000 ML: 0.9 INJECTION, SOLUTION INTRAVENOUS at 03:02

## 2018-02-28 RX ADMIN — WARFARIN SODIUM 7.5 MG: 2.5 TABLET ORAL at 10:02

## 2018-02-28 RX ADMIN — DOCUSATE SODIUM 100 MG: 100 CAPSULE, LIQUID FILLED ORAL at 09:02

## 2018-02-28 RX ADMIN — PREDNISOLONE ACETATE 1 DROP: 10 SUSPENSION/ DROPS OPHTHALMIC at 09:02

## 2018-02-28 RX ADMIN — PANTOPRAZOLE SODIUM 40 MG: 40 INJECTION, POWDER, FOR SOLUTION INTRAVENOUS at 10:02

## 2018-02-28 RX ADMIN — BRIMONIDINE TARTRATE 1 DROP: 1 SOLUTION/ DROPS OPHTHALMIC at 10:02

## 2018-02-28 RX ADMIN — BRIMONIDINE TARTRATE 1 DROP: 1 SOLUTION/ DROPS OPHTHALMIC at 09:02

## 2018-02-28 RX ADMIN — TADALAFIL 40 MG: 20 TABLET ORAL at 09:02

## 2018-02-28 RX ADMIN — LATANOPROST 1 DROP: 50 SOLUTION/ DROPS OPHTHALMIC at 10:02

## 2018-02-28 RX ADMIN — DORZOLAMIDE HYDROCHLORIDE AND TIMOLOL MALEATE 1 DROP: 20; 5 SOLUTION/ DROPS OPHTHALMIC at 09:02

## 2018-02-28 NOTE — ASSESSMENT & PLAN NOTE
-GI consulted. S/p EGD yesterday- No findings to explain anemia  -Continue PPI daily  -Monitor in the hospital - if no bleeding, plan for outpatient colonoscopy.   If active bleeding, plan for inpatient colonoscopy  -Restart coumadin today

## 2018-02-28 NOTE — PLAN OF CARE
Problem: Patient Care Overview  Goal: Plan of Care Review  Outcome: Ongoing (interventions implemented as appropriate)  -Pt AAOx4, VSS, SpO- 97% on 2 L NC, tele remains NSR, Accuchecks q6h- no coverage needed  - EGD today remains inconclusive, latest H/H 9.9/30.8 + pt remains stable, Protonix given IVP  - Remains anuric, plan for inpatient HD MWF.   - Bed in lowest position, non skid socks worn, call light within reach. No acute events overnight, will continue to monitor.

## 2018-02-28 NOTE — PROGRESS NOTES
MD gave ok for patient to eat dinner but diet in [facility] remains NPO, will continue to monitor.

## 2018-02-28 NOTE — PLAN OF CARE
Problem: Patient Care Overview  Goal: Plan of Care Review  Outcome: Ongoing (interventions implemented as appropriate)  Pt is AAOx4, stand by assist, and VSS. 2L NC applied to pt. Blood glucose monitoring performed and treated as ordered. Telemetry monitoring in place. Pt went to HD. Will wait for pt return.

## 2018-02-28 NOTE — PROGRESS NOTES
[facility]  Heart Transplant  Progress Note    Patient Name: Shivani Sheets  MRN: [id number]  Admission Date: 2/22/2018  Hospital Length of Stay: 6 days  Attending Physician: Jenny Hennessy MD  Primary Care Provider: César Iverson MD  Principal Problem:GIB (gastrointestinal bleeding)    Subjective:     Interval History: Feeling better this am.     Continuous Infusions:  Scheduled Meds:   sodium chloride 0.9%   Intravenous Once    brimonidine 0.1%  1 drop Right Eye BID    docusate sodium  100 mg Oral BID    dorzolamide-timolol 2-0.5%  1 drop Right Eye BID    epoetin constanza (PROCRIT) injection  10,000 Units Intravenous Every Mon, Wed, Fri    latanoprost  1 drop Right Eye QHS    pantoprazole  40 mg Intravenous BID    prednisoLONE acetate  1 drop Left Eye BID    tadalafil (PULMONARY HYPERTENSION)  40 mg Oral Daily    warfarin  7.5 mg Oral Daily     PRN Meds:sodium chloride, sodium chloride, sodium chloride 0.9%, sodium chloride 0.9%, sodium chloride 0.9%, dextrose 50%, diazePAM, glucagon (human recombinant), hydrALAZINE, insulin aspart U-100, ondansetron, pneumoc 13-jessica conj-dip cr(PF), sodium chloride 0.9%, traMADol    Review of patient's allergies indicates:   Allergen Reactions    Penicillins Swelling    Iodine      Other reaction(s): Hives    Sulfamethoxazole-trimethoprim      Other reaction(s): Swelling  Other reaction(s): Hives     Objective:     Vital Signs (Most Recent):  Temp: 98.6 °F (37 °C) (02/28/18 1254)  Pulse: 81 (02/28/18 1254)  Resp: 16 (02/28/18 1254)  BP: (!) 155/86 (02/28/18 1254)  SpO2: (!) 92 % (02/28/18 1254) Vital Signs (24h Range):  Temp:  [97.7 °F (36.5 °C)-98.6 °F (37 °C)] 98.6 °F (37 °C)  Pulse:  [64-87] 81  Resp:  [9-31] 16  SpO2:  [92 %-100 %] 92 %  BP: (100-183)/(60-93) 155/86     Patient Vitals for the past 72 hrs (Last 3 readings):   Weight   02/28/18 0500 70.3 kg (154 lb 15.7 oz)   02/26/18 0500 70.8 kg (156 lb 1.4 oz)     Body mass index is 29.28  kg/m².      Intake/Output Summary (Last 24 hours) at 02/28/18 1428  Last data filed at 02/28/18 0500   Gross per 24 hour   Intake              130 ml   Output                0 ml   Net              130 ml       Hemodynamic Parameters:           Physical Exam   Constitutional: She is oriented to person, place, and time. She appears well-developed and well-nourished.   Neck: Normal range of motion. Neck supple. No JVD present.   Cardiovascular: Normal rate and regular rhythm.  Exam reveals no gallop and no friction rub.    No murmur heard.  Pulmonary/Chest: Effort normal and breath sounds normal. She has no wheezes. She has no rales.   Abdominal: Soft. Bowel sounds are normal. There is no tenderness.   Musculoskeletal: She exhibits no edema.   Neurological: She is alert and oriented to person, place, and time.   Skin: Skin is warm and dry.       Significant Labs:  CBC:    Recent Labs  Lab 02/26/18  0236 02/27/18  0406 02/28/18  0501   WBC 6.70  6.70 8.20 6.68   RBC 3.62*  3.62* 3.67* 3.40*   HGB 10.0*  10.0* 9.9* 9.6*   HCT 30.8*  30.8* 30.8* 29.7*     154 163 149*   MCV 85  85 84 87   MCH 27.6  27.6 27.0 28.2   MCHC 32.5  32.5 32.1 32.3     BNP:  No results for input(s): BNP in the last 168 hours.    Invalid input(s): BNPTRIAGELBLO  CMP:    Recent Labs  Lab 02/26/18  0235 02/27/18  0406 02/28/18  0501   GLU 91 94 89   CALCIUM 8.3* 8.2* 7.7*   ALBUMIN 3.2* 2.9* 2.8*   PROT 7.4 7.4 6.9   * 134* 136   K 4.3 3.9 3.8   CO2 25 27 26   CL 92* 97 99   BUN 54* 20 30*   CREATININE 9.2* 5.6* 7.9*   ALKPHOS 42* 45* 46*   ALT 7* 8* 7*   AST 12 12 11   BILITOT 0.7 0.6 0.6      Coagulation:     Recent Labs  Lab 02/23/18  0828 02/27/18  0406 02/28/18  0501   INR 1.4* 1.1 1.1   APTT 30.3 24.9 23.7     LDH:  No results for input(s): LDH in the last 72 hours.  Microbiology:  Microbiology Results (last 7 days)     ** No results found for the last 168 hours. **          I have reviewed all pertinent labs within the  "past 24 hours.    Estimated Creatinine Clearance: 6.5 mL/min (A) (based on SCr of 7.9 mg/dL (H)).    Diagnostic Results:  I have reviewed all pertinent imaging results/findings within the past 24 hours.    Assessment and Plan:     62 y/o female with PMH of PHT, RA, HLD, DD, CAD, central retinal artery occlusion, PUD (patient reports diagnosis in [place]), and ESRD 2/2 HTN (LUE AVF) s/p failed transplant (re-listed) who presented to [facility] on 2/15 with cough and was diagnosed with atypical PNA and discharged home with a Z-Yariel. She saw anticoagulation clinic 2/22 and her INR was noted to be elevated with patient having melena. She was encouraged to go to the ER and adviced to hold warfarin. In triage, she was note to have abdominal pain in the lower abdomen per history. The patient's Hgb was noted to 4.7 (down from 7.8 one week prior), the patient was admitted to the ICU, and "given blood transfusion and FFP". Per notes, at least 2U FFP and 3U PRBCs were given as well as Benadryl, IV steroids, Pepsid, and Lasix. IV protonix gtt was also ordered as was vitamin K. An EGD was post-poned with plan for 2/26 because the patient was on Venturi mask after FFP administration. HD was initiated with plan for MWF inpatient dialysis. Pulmonolgy was consulted 2/2 high risk nature of an EGD in someone with PHT. She was noted to be off all PHT medications, and the pulmonologist discussed a transfer to [facility] with Dr. Hennessy.     TTE 2/8/18    1 - Eccentric hypertrophy.     2 - Normal left ventricular systolic function (EF 60-65%).     3 - Right ventricle is upper limit of normal in size with hypertrophy, with normal systolic function.     4 - Impaired LV relaxation, elevated LAP (grade 2 diastolic dysfunction).     5 - Moderate left atrial enlargement.     6 - Mild tricuspid regurgitation.     7 - Pulmonary hypertension. The estimated PA systolic pressure is 71 mmHg.     * GIB (gastrointestinal bleeding)    -GI consulted. S/p EGD " yesterday- No findings to explain anemia  -Continue PPI daily  -Monitor in the hospital - if no bleeding, plan for outpatient colonoscopy.   If active bleeding, plan for inpatient colonoscopy  -Restart coumadin today        Pulmonary hypertension    -  selexipag, tadalafil, macitentan held at OSH  -Restarted tadalafil yesterday  -Will not restart macitentan in setting of GIB/low Hgb on admit  -Restart selexipag as outpt(not on formulary here)          Atrial fibrillation    - hold diltiazem  -Restart coumadin today. INR goal 2-3        Hypothyroidism    -Restart Synthroid          Hyperlipidemia    - hold statin        ESRD from HTN started RRT 1999    - c/w epo with dialysis  - nephrology consult for HD  - hold clonidine and diltiazem        Central retinal artery occlusion    - c/w eye drops        CAD (coronary artery disease)    - hold ASA for now  -Restart statin            Quin Krishnamurthy PA-C  Heart Transplant  [facility]

## 2018-02-28 NOTE — SUBJECTIVE & OBJECTIVE
Interval History: Feeling better this am.     Continuous Infusions:  Scheduled Meds:   sodium chloride 0.9%   Intravenous Once    brimonidine 0.1%  1 drop Right Eye BID    docusate sodium  100 mg Oral BID    dorzolamide-timolol 2-0.5%  1 drop Right Eye BID    epoetin constanza (PROCRIT) injection  10,000 Units Intravenous Every Mon, Wed, Fri    latanoprost  1 drop Right Eye QHS    pantoprazole  40 mg Intravenous BID    prednisoLONE acetate  1 drop Left Eye BID    tadalafil (PULMONARY HYPERTENSION)  40 mg Oral Daily    warfarin  7.5 mg Oral Daily     PRN Meds:sodium chloride, sodium chloride, sodium chloride 0.9%, sodium chloride 0.9%, sodium chloride 0.9%, dextrose 50%, diazePAM, glucagon (human recombinant), hydrALAZINE, insulin aspart U-100, ondansetron, pneumoc 13-jessica conj-dip cr(PF), sodium chloride 0.9%, traMADol    Review of patient's allergies indicates:   Allergen Reactions    Penicillins Swelling    Iodine      Other reaction(s): Hives    Sulfamethoxazole-trimethoprim      Other reaction(s): Swelling  Other reaction(s): Hives     Objective:     Vital Signs (Most Recent):  Temp: 98.6 °F (37 °C) (02/28/18 1254)  Pulse: 81 (02/28/18 1254)  Resp: 16 (02/28/18 1254)  BP: (!) 155/86 (02/28/18 1254)  SpO2: (!) 92 % (02/28/18 1254) Vital Signs (24h Range):  Temp:  [97.7 °F (36.5 °C)-98.6 °F (37 °C)] 98.6 °F (37 °C)  Pulse:  [64-87] 81  Resp:  [9-31] 16  SpO2:  [92 %-100 %] 92 %  BP: (100-183)/(60-93) 155/86     Patient Vitals for the past 72 hrs (Last 3 readings):   Weight   02/28/18 0500 70.3 kg (154 lb 15.7 oz)   02/26/18 0500 70.8 kg (156 lb 1.4 oz)     Body mass index is 29.28 kg/m².      Intake/Output Summary (Last 24 hours) at 02/28/18 4473  Last data filed at 02/28/18 0500   Gross per 24 hour   Intake              130 ml   Output                0 ml   Net              130 ml       Hemodynamic Parameters:           Physical Exam   Constitutional: She is oriented to person, place, and time. She appears  well-developed and well-nourished.   Neck: Normal range of motion. Neck supple. No JVD present.   Cardiovascular: Normal rate and regular rhythm.  Exam reveals no gallop and no friction rub.    No murmur heard.  Pulmonary/Chest: Effort normal and breath sounds normal. She has no wheezes. She has no rales.   Abdominal: Soft. Bowel sounds are normal. There is no tenderness.   Musculoskeletal: She exhibits no edema.   Neurological: She is alert and oriented to person, place, and time.   Skin: Skin is warm and dry.       Significant Labs:  CBC:    Recent Labs  Lab 02/26/18  0236 02/27/18  0406 02/28/18  0501   WBC 6.70  6.70 8.20 6.68   RBC 3.62*  3.62* 3.67* 3.40*   HGB 10.0*  10.0* 9.9* 9.6*   HCT 30.8*  30.8* 30.8* 29.7*     154 163 149*   MCV 85  85 84 87   MCH 27.6  27.6 27.0 28.2   MCHC 32.5  32.5 32.1 32.3     BNP:  No results for input(s): BNP in the last 168 hours.    Invalid input(s): BNPTRIAGELBLO  CMP:    Recent Labs  Lab 02/26/18 0235 02/27/18  0406 02/28/18  0501   GLU 91 94 89   CALCIUM 8.3* 8.2* 7.7*   ALBUMIN 3.2* 2.9* 2.8*   PROT 7.4 7.4 6.9   * 134* 136   K 4.3 3.9 3.8   CO2 25 27 26   CL 92* 97 99   BUN 54* 20 30*   CREATININE 9.2* 5.6* 7.9*   ALKPHOS 42* 45* 46*   ALT 7* 8* 7*   AST 12 12 11   BILITOT 0.7 0.6 0.6      Coagulation:     Recent Labs  Lab 02/23/18  0828 02/27/18  0406 02/28/18  0501   INR 1.4* 1.1 1.1   APTT 30.3 24.9 23.7     LDH:  No results for input(s): LDH in the last 72 hours.  Microbiology:  Microbiology Results (last 7 days)     ** No results found for the last 168 hours. **          I have reviewed all pertinent labs within the past 24 hours.    Estimated Creatinine Clearance: 6.5 mL/min (A) (based on SCr of 7.9 mg/dL (H)).    Diagnostic Results:  I have reviewed all pertinent imaging results/findings within the past 24 hours.

## 2018-02-28 NOTE — ASSESSMENT & PLAN NOTE
-  selexipag, tadalafil, macitentan held at OSH  -Restarted tadalafil yesterday  -Will not restart macitentan in setting of GIB/low Hgb on admit  -Restart selexipag as outpt(not on formulary here)

## 2018-02-28 NOTE — NURSING TRANSFER
Nursing Transfer Note      2/27/2018     Transfer To: [id number]    Transfer via stretcher    Transfer with cardiac monitoring and 2L NC    Transported by pct    Medicines sent: none    Chart send with patient: Yes    Notified: brother    Patient reassessed at: 2/27/18 see flowsheets

## 2018-03-01 LAB
ALBUMIN SERPL BCP-MCNC: 2.7 G/DL
ALP SERPL-CCNC: 44 U/L
ALT SERPL W/O P-5'-P-CCNC: 7 U/L
ANION GAP SERPL CALC-SCNC: 10 MMOL/L
ANISOCYTOSIS BLD QL SMEAR: SLIGHT
APTT BLDCRRT: <21 SEC
AST SERPL-CCNC: 11 U/L
BASOPHILS # BLD AUTO: 0.05 K/UL
BASOPHILS NFR BLD: 0.8 %
BILIRUB SERPL-MCNC: 0.5 MG/DL
BUN SERPL-MCNC: 15 MG/DL
CALCIUM SERPL-MCNC: 7.5 MG/DL
CHLORIDE SERPL-SCNC: 105 MMOL/L
CO2 SERPL-SCNC: 24 MMOL/L
CREAT SERPL-MCNC: 5.8 MG/DL
DIFFERENTIAL METHOD: ABNORMAL
EOSINOPHIL # BLD AUTO: 0.6 K/UL
EOSINOPHIL NFR BLD: 8.9 %
ERYTHROCYTE [DISTWIDTH] IN BLOOD BY AUTOMATED COUNT: 18.5 %
EST. GFR  (AFRICAN AMERICAN): 8.2 ML/MIN/1.73 M^2
EST. GFR  (NON AFRICAN AMERICAN): 7.1 ML/MIN/1.73 M^2
GLUCOSE SERPL-MCNC: 96 MG/DL
HCT VFR BLD AUTO: 30.4 %
HGB BLD-MCNC: 9.6 G/DL
HYPOCHROMIA BLD QL SMEAR: ABNORMAL
IMM GRANULOCYTES # BLD AUTO: 0.12 K/UL
IMM GRANULOCYTES NFR BLD AUTO: 1.8 %
INR PPP: 1
LYMPHOCYTES # BLD AUTO: 1.5 K/UL
LYMPHOCYTES NFR BLD: 23.3 %
MAGNESIUM SERPL-MCNC: 1.8 MG/DL
MCH RBC QN AUTO: 27.9 PG
MCHC RBC AUTO-ENTMCNC: 31.6 G/DL
MCV RBC AUTO: 88 FL
MONOCYTES # BLD AUTO: 1 K/UL
MONOCYTES NFR BLD: 15.2 %
NEUTROPHILS # BLD AUTO: 3.3 K/UL
NEUTROPHILS NFR BLD: 50 %
NRBC BLD-RTO: 0 /100 WBC
OVALOCYTES BLD QL SMEAR: ABNORMAL
PHOSPHATE SERPL-MCNC: 3.2 MG/DL
PLATELET # BLD AUTO: 113 K/UL
PMV BLD AUTO: ABNORMAL FL
POCT GLUCOSE: 101 MG/DL (ref 70–110)
POCT GLUCOSE: 115 MG/DL (ref 70–110)
POCT GLUCOSE: 137 MG/DL (ref 70–110)
POCT GLUCOSE: 157 MG/DL (ref 70–110)
POIKILOCYTOSIS BLD QL SMEAR: ABNORMAL
POLYCHROMASIA BLD QL SMEAR: ABNORMAL
POTASSIUM SERPL-SCNC: 3.9 MMOL/L
PROT SERPL-MCNC: 6.8 G/DL
PROTHROMBIN TIME: 10.5 SEC
RBC # BLD AUTO: 3.44 M/UL
SODIUM SERPL-SCNC: 139 MMOL/L
WBC # BLD AUTO: 6.51 K/UL

## 2018-03-01 PROCEDURE — 85610 PROTHROMBIN TIME: CPT

## 2018-03-01 PROCEDURE — 20600001 HC STEP DOWN PRIVATE ROOM

## 2018-03-01 PROCEDURE — 25000003 PHARM REV CODE 250: Performed by: PHYSICIAN ASSISTANT

## 2018-03-01 PROCEDURE — 84100 ASSAY OF PHOSPHORUS: CPT

## 2018-03-01 PROCEDURE — 97161 PT EVAL LOW COMPLEX 20 MIN: CPT

## 2018-03-01 PROCEDURE — 63600175 PHARM REV CODE 636 W HCPCS: Performed by: INTERNAL MEDICINE

## 2018-03-01 PROCEDURE — 25000003 PHARM REV CODE 250: Performed by: INTERNAL MEDICINE

## 2018-03-01 PROCEDURE — 36415 COLL VENOUS BLD VENIPUNCTURE: CPT

## 2018-03-01 PROCEDURE — 83735 ASSAY OF MAGNESIUM: CPT

## 2018-03-01 PROCEDURE — 97165 OT EVAL LOW COMPLEX 30 MIN: CPT

## 2018-03-01 PROCEDURE — 80053 COMPREHEN METABOLIC PANEL: CPT

## 2018-03-01 PROCEDURE — 85730 THROMBOPLASTIN TIME PARTIAL: CPT

## 2018-03-01 PROCEDURE — C9113 INJ PANTOPRAZOLE SODIUM, VIA: HCPCS | Performed by: INTERNAL MEDICINE

## 2018-03-01 PROCEDURE — 97535 SELF CARE MNGMENT TRAINING: CPT

## 2018-03-01 PROCEDURE — 85025 COMPLETE CBC W/AUTO DIFF WBC: CPT

## 2018-03-01 PROCEDURE — 99232 SBSQ HOSP IP/OBS MODERATE 35: CPT | Mod: ,,, | Performed by: INTERNAL MEDICINE

## 2018-03-01 PROCEDURE — 97116 GAIT TRAINING THERAPY: CPT

## 2018-03-01 RX ORDER — DILTIAZEM HYDROCHLORIDE 120 MG/1
120 CAPSULE, COATED, EXTENDED RELEASE ORAL DAILY
Status: DISCONTINUED | OUTPATIENT
Start: 2018-03-01 | End: 2018-03-02

## 2018-03-01 RX ORDER — POLYETHYLENE GLYCOL 3350 17 G/17G
17 POWDER, FOR SOLUTION ORAL DAILY
Status: DISCONTINUED | OUTPATIENT
Start: 2018-03-01 | End: 2018-03-02 | Stop reason: HOSPADM

## 2018-03-01 RX ADMIN — PREDNISOLONE ACETATE 1 DROP: 10 SUSPENSION/ DROPS OPHTHALMIC at 09:03

## 2018-03-01 RX ADMIN — BRIMONIDINE TARTRATE 1 DROP: 1 SOLUTION/ DROPS OPHTHALMIC at 09:03

## 2018-03-01 RX ADMIN — DILTIAZEM HYDROCHLORIDE 120 MG: 120 CAPSULE, COATED, EXTENDED RELEASE ORAL at 12:03

## 2018-03-01 RX ADMIN — DOCUSATE SODIUM 100 MG: 100 CAPSULE, LIQUID FILLED ORAL at 09:03

## 2018-03-01 RX ADMIN — BRIMONIDINE TARTRATE 1 DROP: 1 SOLUTION/ DROPS OPHTHALMIC at 08:03

## 2018-03-01 RX ADMIN — WARFARIN SODIUM 7.5 MG: 2.5 TABLET ORAL at 05:03

## 2018-03-01 RX ADMIN — PANTOPRAZOLE SODIUM 40 MG: 40 INJECTION, POWDER, FOR SOLUTION INTRAVENOUS at 08:03

## 2018-03-01 RX ADMIN — TADALAFIL 40 MG: 20 TABLET ORAL at 09:03

## 2018-03-01 RX ADMIN — POLYETHYLENE GLYCOL 3350 17 G: 17 POWDER, FOR SOLUTION ORAL at 01:03

## 2018-03-01 RX ADMIN — DORZOLAMIDE HYDROCHLORIDE AND TIMOLOL MALEATE 1 DROP: 20; 5 SOLUTION/ DROPS OPHTHALMIC at 08:03

## 2018-03-01 RX ADMIN — DORZOLAMIDE HYDROCHLORIDE AND TIMOLOL MALEATE 1 DROP: 20; 5 SOLUTION/ DROPS OPHTHALMIC at 09:03

## 2018-03-01 RX ADMIN — ATORVASTATIN CALCIUM 10 MG: 10 TABLET, FILM COATED ORAL at 08:03

## 2018-03-01 RX ADMIN — PANTOPRAZOLE SODIUM 40 MG: 40 INJECTION, POWDER, FOR SOLUTION INTRAVENOUS at 09:03

## 2018-03-01 RX ADMIN — DOCUSATE SODIUM 100 MG: 100 CAPSULE, LIQUID FILLED ORAL at 08:03

## 2018-03-01 RX ADMIN — LEVOTHYROXINE SODIUM 100 MCG: 100 TABLET ORAL at 06:03

## 2018-03-01 RX ADMIN — PREDNISOLONE ACETATE 1 DROP: 10 SUSPENSION/ DROPS OPHTHALMIC at 08:03

## 2018-03-01 RX ADMIN — LATANOPROST 1 DROP: 50 SOLUTION/ DROPS OPHTHALMIC at 08:03

## 2018-03-01 NOTE — PROGRESS NOTES
[facility]  Heart Transplant  Progress Note    Patient Name: Shivani Sheets  MRN: [id number]  Admission Date: 2/22/2018  Hospital Length of Stay: 7 days  Attending Physician: Jenny Hennessy MD  Primary Care Provider: César Iverson MD  Principal Problem:GIB (gastrointestinal bleeding)    Subjective:     Interval History: No stool in the past few days. Continues to feel better    Continuous Infusions:  Scheduled Meds:   sodium chloride 0.9%   Intravenous Once    atorvastatin  10 mg Oral QHS    brimonidine 0.1%  1 drop Right Eye BID    diltiaZEM  120 mg Oral Daily    docusate sodium  100 mg Oral BID    dorzolamide-timolol 2-0.5%  1 drop Right Eye BID    epoetin constanza (PROCRIT) injection  10,000 Units Intravenous Every Mon, Wed, Fri    latanoprost  1 drop Right Eye QHS    levothyroxine  100 mcg Oral Before breakfast    pantoprazole  40 mg Intravenous BID    polyethylene glycol  17 g Oral Daily    prednisoLONE acetate  1 drop Left Eye BID    tadalafil (PULMONARY HYPERTENSION)  40 mg Oral Daily    warfarin  7.5 mg Oral Daily     PRN Meds:sodium chloride, sodium chloride, sodium chloride 0.9%, sodium chloride 0.9%, sodium chloride 0.9%, dextrose 50%, diazePAM, glucagon (human recombinant), hydrALAZINE, insulin aspart U-100, lidocaine-prilocaine, ondansetron, pneumoc 13-jessica conj-dip cr(PF), sodium chloride 0.9%, traMADol    Review of patient's allergies indicates:   Allergen Reactions    Penicillins Swelling    Iodine      Other reaction(s): Hives    Sulfamethoxazole-trimethoprim      Other reaction(s): Swelling  Other reaction(s): Hives     Objective:     Vital Signs (Most Recent):  Temp: 98.1 °F (36.7 °C) (03/01/18 1627)  Pulse: 71 (03/01/18 1627)  Resp: 17 (03/01/18 1627)  BP: 130/79 (03/01/18 1627)  SpO2: 100 % (03/01/18 1627) Vital Signs (24h Range):  Temp:  [98.1 °F (36.7 °C)-99 °F (37.2 °C)] 98.1 °F (36.7 °C)  Pulse:  [70-95] 71  Resp:  [16-20] 17  SpO2:  [98 %-100 %] 100  %  BP: (117-168)/(61-93) 130/79     Patient Vitals for the past 72 hrs (Last 3 readings):   Weight   03/01/18 0500 70.3 kg (154 lb 15.7 oz)   02/28/18 0500 70.3 kg (154 lb 15.7 oz)     Body mass index is 29.28 kg/m².      Intake/Output Summary (Last 24 hours) at 03/01/18 1645  Last data filed at 03/01/18 1300   Gross per 24 hour   Intake              870 ml   Output             1600 ml   Net             -730 ml       Hemodynamic Parameters:           Physical Exam   Constitutional: She is oriented to person, place, and time. She appears well-developed and well-nourished.   Neck: Normal range of motion. Neck supple. No JVD present.   Cardiovascular: Normal rate and regular rhythm.  Exam reveals no gallop and no friction rub.    No murmur heard.  Pulmonary/Chest: Effort normal and breath sounds normal. She has no wheezes. She has no rales.   Abdominal: Soft. Bowel sounds are normal. There is no tenderness.   Musculoskeletal: She exhibits no edema.   Neurological: She is alert and oriented to person, place, and time.   Skin: Skin is warm and dry.       Significant Labs:  CBC:    Recent Labs  Lab 02/27/18  0406 02/28/18  0501 03/01/18  0538   WBC 8.20 6.68 6.51   RBC 3.67* 3.40* 3.44*   HGB 9.9* 9.6* 9.6*   HCT 30.8* 29.7* 30.4*    149* 113*   MCV 84 87 88   MCH 27.0 28.2 27.9   MCHC 32.1 32.3 31.6*     BNP:  No results for input(s): BNP in the last 168 hours.    Invalid input(s): BNPTRIAGELBLO  CMP:    Recent Labs  Lab 02/27/18  0406 02/28/18  0501 03/01/18  0538   GLU 94 89 96   CALCIUM 8.2* 7.7* 7.5*   ALBUMIN 2.9* 2.8* 2.7*   PROT 7.4 6.9 6.8   * 136 139   K 3.9 3.8 3.9   CO2 27 26 24   CL 97 99 105   BUN 20 30* 15   CREATININE 5.6* 7.9* 5.8*   ALKPHOS 45* 46* 44*   ALT 8* 7* 7*   AST 12 11 11   BILITOT 0.6 0.6 0.5      Coagulation:     Recent Labs  Lab 02/27/18  0406 02/28/18  0501 03/01/18  0538   INR 1.1 1.1 1.0   APTT 24.9 23.7 <21.0     LDH:  No results for input(s): LDH in the last 72  "hours.  Microbiology:  Microbiology Results (last 7 days)     ** No results found for the last 168 hours. **          I have reviewed all pertinent labs within the past 24 hours.    Estimated Creatinine Clearance: 8.8 mL/min (A) (based on SCr of 5.8 mg/dL (H)).    Diagnostic Results:  I have reviewed all pertinent imaging results/findings within the past 24 hours.    Assessment and Plan:     64 y/o female with PMH of PHT, RA, HLD, DD, CAD, central retinal artery occlusion, PUD (patient reports diagnosis in [place]), and ESRD 2/2 HTN (LUE AVF) s/p failed transplant (re-listed) who presented to [facility] on 2/15 with cough and was diagnosed with atypical PNA and discharged home with a Z-Yariel. She saw anticoagulation clinic 2/22 and her INR was noted to be elevated with patient having melena. She was encouraged to go to the ER and adviced to hold warfarin. In triage, she was note to have abdominal pain in the lower abdomen per history. The patient's Hgb was noted to 4.7 (down from 7.8 one week prior), the patient was admitted to the ICU, and "given blood transfusion and FFP". Per notes, at least 2U FFP and 3U PRBCs were given as well as Benadryl, IV steroids, Pepsid, and Lasix. IV protonix gtt was also ordered as was vitamin K. An EGD was post-poned with plan for 2/26 because the patient was on Venturi mask after FFP administration. HD was initiated with plan for MWF inpatient dialysis. Pulmonolgy was consulted 2/2 high risk nature of an EGD in someone with PHT. She was noted to be off all PHT medications, and the pulmonologist discussed a [place] [facility] [place]. Hennessy.     TTE 2/8/18    1 - Eccentric hypertrophy.     2 - Normal left ventricular systolic function (EF 60-65%).     3 - Right ventricle is upper limit of normal in size with hypertrophy, with normal systolic function.     4 - Impaired LV relaxation, elevated LAP (grade 2 diastolic dysfunction).     5 - Moderate left atrial enlargement.     6 - Mild tricuspid " regurgitation.     7 - Pulmonary hypertension. The estimated PA systolic pressure is 71 mmHg.     * GIB (gastrointestinal bleeding)    -GI consulted. S/p EGD 2/27/18 No findings to explain anemia  -Continue PPI daily  -Monitor in the hospital - if no bleeding, plan for outpatient colonoscopy.   If active bleeding, plan for inpatient colonoscopy  -Restarted coumadin yesterday        Pulmonary hypertension    -  selexipag, tadalafil, macitentan held at OSH  -Restarted tadalafil yesterday  -Will not restart macitentan in setting of GIB/low Hgb on admit  -Restart selexipag as outpt(not on formulary here)          Atrial fibrillation    - Restart lower dose diltiazem today  -Restarted coumadin. INR goal 2-3        Hypothyroidism    -Restart Synthroid          Hyperlipidemia    - hold statin        ESRD from HTN started RRT 1999    - c/w epo with dialysis  - nephrology consult for HD  - hold clonidine and diltiazem        Central retinal artery occlusion    - c/w eye drops        CAD (coronary artery disease)    - hold ASA for now  -Restart statin            CAM CookC  Heart Transplant  [facility]

## 2018-03-01 NOTE — PT/OT/SLP EVAL
Occupational Therapy   Evaluation    Name: Shivani Sheets  MRN: [id number]  Admitting Diagnosis:  GIB (gastrointestinal bleeding) 2 Days Post-Op    Recommendations:     Discharge Recommendations: home health PT  Discharge Equipment Recommendations:  none  Barriers to discharge:  None    History:     Occupational Profile:  Living Environment: 2 story home with 12 steps to bedroom, 3 steps to enter, with son and brother  Previous level of function: Mod I/I  Roles and Routines: cooks, takes bus trips  Equipment Owned:  walker, rolling, cane, straight, oxygen, shower chair  Assistance upon Discharge: yes    Past Medical History:   Diagnosis Date    Allergy     Anemia     Arthritis     Awaiting organ transplant 2013    Cataract     CHF (congestive heart failure)     Chronic kidney disease     Coronary artery disease     Diabetes mellitus     Diabetic retinopathy     ESRD from HTN strtied RRT 1999    Failed  donor kidney transplant -     Glaucoma     History of bleeding peptic ulcer      as stated per pt    Hyperlipidemia     Hypertension     Hypothyroidism     Morbid obesity 8/10/2012    Renal hypertension     Stroke        Past Surgical History:   Procedure Laterality Date    CATARACT EXTRACTION W/  INTRAOCULAR LENS IMPLANT Bilateral     EYE SURGERY      HYSTERECTOMY      KIDNEY TRANSPLANT      NEPHRECTOMY  2008    transplant     TONSILLECTOMY         Subjective     Chief Complaint: c/o back ache from the bed  Patient/Family stated goals: go home  Communicated with: elizabeth prior to session.  Pain/Comfort:  · Pain Rating 1: 3/10 (R knee during ambulation)  · Pain Addressed 1: Distraction, Cessation of Activity    Patients cultural, spiritual, [employer] conflicts given the current situation: none    Objective:     Patient found with: telemetry, oxygen    General Precautions: Standard, fall   Orthopedic Precautions:    Braces:       Occupational  "Performance:    Bed Mobility:    · Sup to sit with mod I    Functional Mobility/Transfers:  · Sit to stand with spvn  · Functional Mobility: ambulated 25 ft x 2 with RW with CGA  · Stood x ~5 min at sink for ADL's with spvn/CGA    Activities of Daily Living:  · Grooming: contact guard assistance standing at sink  · Bathing: minimum assistance sitting  · UB Dressing: supervision gown, sitting  · LB Dressing: minimum assistance socks in long sitting  · Toileting: contact guard assistance hygiene and transfer    Cognitive/Visual Perceptual:  intact    Physical Exam:  BUE's 5/5 strength, some numbness on L fingertips    Patient left up in chair with all lines intact and call button in reach    AMPA 6 Click:  [facility] Total Score: 20    Treatment & Education:  Ambulated, grooming in standing, Pt educated on POC, role/goals of OT , importance of EOB/OOB to chair and self ADL's as tolerated, safety with mobility    Education:    Assessment:     Shivani Sheets is a 62 y.o. female with a medical diagnosis of GIB (gastrointestinal bleeding).  She presents with motivation to return to [facility].  Performance deficits affecting function are weakness, impaired endurance, impaired self care skills, decreased lower extremity function, gait instability, impaired functional mobilty, impaired cardiopulmonary response to activity.      Rehab Prognosis:  good; patient would benefit from acute skilled OT services to address these deficits and reach maximum level of function.         Clinical Decision Makin.  OT Low:  "Pt evaluation falls under low complexity for evaluation coding due to performance deficits noted in 1-3 areas as stated above and 0 co-morbities affecting current functional status. Data obtained from problem focused assessments. No modifications or assistance was required for completion of evaluation. Only brief occupational profile and history review completed."     Plan:     Patient to be seen 3 x/week to address " the above listed problems via self-care/home management, therapeutic activities, therapeutic exercises  · Plan of Care Expires:    · Plan of Care Reviewed with: patient    This Plan of care has been discussed with the patient who was involved in its development and understands and is in agreement with the identified goals and treatment plan    GOALS:    Occupational Therapy Goals        Problem: Occupational Therapy Goal    Goal Priority Disciplines Outcome Interventions   Occupational Therapy Goal     OT, PT/OT     Description:  Goals to be met by: 3/15/18     Patient will increase functional independence with ADLs by performing:    UE Dressing with Modified [place].  LE Dressing with Modified [place].  Grooming while standing with Supervision.  Toileting from toilet with Supervision for hygiene and clothing management.   Toilet transfer to toilet with Supervision.                      Time Tracking:     OT Date of Treatment: 03/01/18  OT Start Time: 0833  OT Stop Time: 0907  OT Total Time (min): 34 min    Billable Minutes:Evaluation 25 min  Self Care/Home Management 10 min    Mary Welch OT  3/1/2018

## 2018-03-01 NOTE — TRANSFER OF CARE
"Anesthesia Transfer of Care Note    Patient: Shivani Sheets    Procedure(s) Performed: Procedure(s) (LRB):  ESOPHAGOGASTRODUODENOSCOPY (EGD) (N/A)    Patient location: PACU    Anesthesia Type: general    Transport from OR: Transported from OR on 2-3 L/min O2 by NC with adequate spontaneous ventilation    Post pain: adequate analgesia    Post assessment: no apparent anesthetic complications    Post vital signs: stable    Level of consciousness: awake, alert and oriented    Nausea/Vomiting: no nausea/vomiting    Complications: none    Transfer of care protocol was followed      Last vitals:   Visit Vitals  /80 (Patient Position: Lying)   Pulse 79   Temp 36.9 °C (98.4 °F) (Oral)   Resp 17   Ht 5' 1" (1.549 m)   Wt 70.3 kg (154 lb 15.7 oz)   LMP  (LMP Unknown)   SpO2 99%   Breastfeeding? No   BMI 29.28 kg/m²     "

## 2018-03-01 NOTE — PLAN OF CARE
Problem: Physical Therapy Goal  Goal: Physical Therapy Goal  Goals to be met by: 3/15/2018     Patient will increase functional independence with mobility by performin. Sit to stand transfer with Supervision  2. Bed to chair transfer with Supervision using Rolling Walker  3. Gait  x 150 feet with Supervision using Rolling Walker.   4. Ascend/descend 12 stair with right Handrails Contact Guard Assistance  5. Lower extremity exercise program x30 reps per handout, with independence    Outcome: Ongoing (interventions implemented as appropriate)  Eval completed and POC established    Song Torres DPT  3/1/2018

## 2018-03-01 NOTE — PT/OT/SLP EVAL
Physical Therapy Evaluation    Patient Name:  Shivani Sheets   MRN:  [id number]    Recommendations:     Discharge Recommendations:  home health PT   Discharge Equipment Recommendations: none   Barriers to discharge: None    Assessment:     Shivani Sheets is a 62 y.o. female admitted with a medical diagnosis of GIB (gastrointestinal bleeding).  She presents with the following impairments/functional limitations:  weakness, impaired functional mobilty, gait instability, impaired endurance, impaired balance, impaired self care skills, decreased lower extremity function, pain, impaired cardiopulmonary response to activity.  Pt demo decreased functional mobility and decreased tolerance for activity secondary to weakness and R knee pain.  Pt tolerate therapy well.  Pt performed bed mobility c mod I; transfer c min A and gait c CGA.  Pt amb 5ft, 20ft,25ft c RW CGA - decreased gait speed, decreased stance time on RLE d/t knee pain, no LOB, 2L O2 via NC, standing rest break between each trial.  Pt would benefit from continued skilled acute PT 3x/wk to improve functional mobility.  Recommending pt receive PT services in home health setting following d/c from hospital once medically cleared.       Rehab Prognosis:  good; patient would benefit from acute skilled PT services to address these deficits and reach maximum level of function.      Recent Surgery: Procedure(s) (LRB):  ESOPHAGOGASTRODUODENOSCOPY (EGD) (N/A) 2 Days Post-Op    Plan:     During this hospitalization, patient to be seen 3 x/week to address the above listed problems via gait training, therapeutic activities, therapeutic exercises, neuromuscular re-education  · Plan of Care Expires:  04/01/18   Plan of Care Reviewed with: patient    Subjective     Communicated with RN and OT prior to session.  Patient found HOB elevated upon PT entry to room, agreeable to evaluation.      Chief Complaint: weakness, decreased functional mobility; R knee pain  Patient  "comments/goals: "i'm ready to get home"  Pain/Comfort:  · Pain Rating 1: 3/10  · Location - Side 1: Right  · Location - Orientation 1: generalized  · Location 1: knee  · Pain Addressed 1: Distraction    Patients cultural, spiritual, Worship conflicts given the current situation: none    Living Environment:  Pt lives c brother, son and granddaughter in 2SH, 3STE c 0HR, 12steps to 2nd level c RHR. PTA pt driving and occasionally using RW for ambulation when RA flares.  Prior to admission, patients level of function was independent c ADLs and mod I c functional mobility.  Patient has the following equipment: walker, rolling, cane, straight, CPAP, oxygen, power chair, shower chair.  DME owned (not currently used): none.  Upon discharge, patient will have assistance from family as needed.    Objective:     Patient found with: telemetry, oxygen     General Precautions: Standard, fall   Orthopedic Precautions:    Braces:       Exams:  · Cognitive Exam:  Patient is oriented to Person, Place, Time and Situation and follows 100% of all commands   · Gross Motor Coordination:  WFL  · Sensation:    · -       Intact  · RLE ROM: WFL  · RLE Strength: WFL  · LLE ROM: WFL  · LLE Strength: WFL    Functional Mobility:  · Bed Mobility:     · Rolling Right: modified independence  · Scooting: modified independence  · Supine to Sit: modified independence  · Transfers:     · Sit to Stand:  minimum assistance with no AD  · Bed to Chair: contact guard assistance with  rolling walker  using  Step Transfer  · Gait: 5ft, 20ft, 25ft c RW CGA 2L O2 via NC   · Balance: static standing (S); dynamic standing (CGA)    AM-PAC 6 CLICK MOBILITY  Total Score:19       Therapeutic Activities and Exercises:  OT present for Co-Eval  Educated on PT role/POC; safety c mobility; d/c rec  Amb 50ft c RW  Static standing 1x4min at sink    Patient left up in chair with all lines intact, call button in reach and RN notified.    GOALS:    Physical Therapy Goals  "       Problem: Physical Therapy Goal    Goal Priority Disciplines Outcome Goal Variances Interventions   Physical Therapy Goal     PT/OT, PT Ongoing (interventions implemented as appropriate)     Description:  Goals to be met by: 3/15/2018     Patient will increase functional independence with mobility by performin. Sit to stand transfer with Supervision  2. Bed to chair transfer with Supervision using Rolling Walker  3. Gait  x 150 feet with Supervision using Rolling Walker.   4. Ascend/descend 12 stair with right Handrails Contact Guard Assistance  5. Lower extremity exercise program x30 reps per handout, with independence                      History:     Past Medical History:   Diagnosis Date    Allergy     Anemia     Arthritis     Awaiting organ transplant 2013    Cataract     CHF (congestive heart failure)     Chronic kidney disease     Coronary artery disease     Diabetes mellitus     Diabetic retinopathy     ESRD from HTN strtied RRT 1999    Failed  donor kidney transplant -     Glaucoma     History of bleeding peptic ulcer      as stated per pt    Hyperlipidemia     Hypertension     Hypothyroidism     Morbid obesity 8/10/2012    Renal hypertension     Stroke        Past Surgical History:   Procedure Laterality Date    CATARACT EXTRACTION W/  INTRAOCULAR LENS IMPLANT Bilateral     EYE SURGERY      HYSTERECTOMY      KIDNEY TRANSPLANT      NEPHRECTOMY  2008    transplant     TONSILLECTOMY         Clinical Decision Making:     History  Co-morbidities and personal factors that may impact the plan of care Examination  Body Structures and Functions, activity limitations and participation restrictions that may impact the plan of care Clinical Presentation   Decision Making/ Complexity Score   Co-morbidities:   [] Time since onset of injury / illness / exacerbation  [] Status of current condition  []Patient's cognitive status and safety concerns     [] Multiple Medical Problems (see med hx)  Personal Factors:   [] Patient's age  [] Prior Level of function   [] Patient's home situation (environment and family support)  [] Patient's level of motivation  [] Expected progression of patient      HISTORY:(criteria)    [x] 56894 - no personal factors/history    [] 41428 - has 1-2 personal factor/comorbidity     [] 49845 - has >3 personal factor/comorbidity     Body Regions:  [] Objective examination findings  [] Head     []  Neck  [] Trunk   [] Upper Extremity  [] Lower Extremity    Body Systems:  [] For communication ability, affect, cognition, language, and learning style: the assessment of the ability to make needs known, consciousness, orientation (person, place, and time), expected emotional /behavioral responses, and learning preferences (eg, learning barriers, education  needs)  [] For the neuromuscular system: a general assessment of gross coordinated movement (eg, balance, gait, locomotion, transfers, and transitions) and motor function  (motor control and motor learning)  [] For the musculoskeletal system: the assessment of gross symmetry, gross range of motion, gross strength, height, and weight  [] For the integumentary system: the assessment of pliability(texture), presence of scar formation, skin color, and skin integrity  [] For cardiovascular/pulmonary system: the assessment of heart rate, respiratory rate, blood pressure, and edema     Activity limitations:    [] Patient's cognitive status and saf ety concerns          [] Status of current condition      [] Weight bearing restriction  [] Cardiopulmunary Restriction    Participation Restrictions:   [] Goals and goal agreement with the patient     [] Rehab potential (prognosis) and probable outcome      Examination of Body System: (criteria)    [x] 25041 - addressing 1-2 elements    [] 61770 - addressing a total of 3 or more elements     [] 15340 -  Addressing a total of 4 or more elements          Clinical Presentation: (criteria)  Stable - 54113     On examination of body system using standardized tests and measures patient presents with 1-2 elements from any of the following: body structures and functions, activity limitations, and/or participation restrictions.  Leading to a clinical presentation that is considered stable and/or uncomplicated                              Clinical Decision Making  (Eval Complexity):  Low- 04353     Time Tracking:     PT Received On: 03/01/18  PT Start Time: 0833     PT Stop Time: 0907  PT Total Time (min): 34 min     Billable Minutes: Evaluation 15 min and Gait Training 10 min      Song Torres, PT  03/01/2018

## 2018-03-01 NOTE — SUBJECTIVE & OBJECTIVE
Interval History: No stool in the past few days. Continues to feel better    Continuous Infusions:  Scheduled Meds:   sodium chloride 0.9%   Intravenous Once    atorvastatin  10 mg Oral QHS    brimonidine 0.1%  1 drop Right Eye BID    diltiaZEM  120 mg Oral Daily    docusate sodium  100 mg Oral BID    dorzolamide-timolol 2-0.5%  1 drop Right Eye BID    epoetin constanza (PROCRIT) injection  10,000 Units Intravenous Every Mon, Wed, Fri    latanoprost  1 drop Right Eye QHS    levothyroxine  100 mcg Oral Before breakfast    pantoprazole  40 mg Intravenous BID    polyethylene glycol  17 g Oral Daily    prednisoLONE acetate  1 drop Left Eye BID    tadalafil (PULMONARY HYPERTENSION)  40 mg Oral Daily    warfarin  7.5 mg Oral Daily     PRN Meds:sodium chloride, sodium chloride, sodium chloride 0.9%, sodium chloride 0.9%, sodium chloride 0.9%, dextrose 50%, diazePAM, glucagon (human recombinant), hydrALAZINE, insulin aspart U-100, lidocaine-prilocaine, ondansetron, pneumoc 13-jessica conj-dip cr(PF), sodium chloride 0.9%, traMADol    Review of patient's allergies indicates:   Allergen Reactions    Penicillins Swelling    Iodine      Other reaction(s): Hives    Sulfamethoxazole-trimethoprim      Other reaction(s): Swelling  Other reaction(s): Hives     Objective:     Vital Signs (Most Recent):  Temp: 98.1 °F (36.7 °C) (03/01/18 1627)  Pulse: 71 (03/01/18 1627)  Resp: 17 (03/01/18 1627)  BP: 130/79 (03/01/18 1627)  SpO2: 100 % (03/01/18 1627) Vital Signs (24h Range):  Temp:  [98.1 °F (36.7 °C)-99 °F (37.2 °C)] 98.1 °F (36.7 °C)  Pulse:  [70-95] 71  Resp:  [16-20] 17  SpO2:  [98 %-100 %] 100 %  BP: (117-168)/(61-93) 130/79     Patient Vitals for the past 72 hrs (Last 3 readings):   Weight   03/01/18 0500 70.3 kg (154 lb 15.7 oz)   02/28/18 0500 70.3 kg (154 lb 15.7 oz)     Body mass index is 29.28 kg/m².      Intake/Output Summary (Last 24 hours) at 03/01/18 1645  Last data filed at 03/01/18 1300   Gross per 24 hour    Intake              870 ml   Output             1600 ml   Net             -730 ml       Hemodynamic Parameters:           Physical Exam   Constitutional: She is oriented to person, place, and time. She appears well-developed and well-nourished.   Neck: Normal range of motion. Neck supple. No JVD present.   Cardiovascular: Normal rate and regular rhythm.  Exam reveals no gallop and no friction rub.    No murmur heard.  Pulmonary/Chest: Effort normal and breath sounds normal. She has no wheezes. She has no rales.   Abdominal: Soft. Bowel sounds are normal. There is no tenderness.   Musculoskeletal: She exhibits no edema.   Neurological: She is alert and oriented to person, place, and time.   Skin: Skin is warm and dry.       Significant Labs:  CBC:    Recent Labs  Lab 02/27/18 0406 02/28/18  0501 03/01/18  0538   WBC 8.20 6.68 6.51   RBC 3.67* 3.40* 3.44*   HGB 9.9* 9.6* 9.6*   HCT 30.8* 29.7* 30.4*    149* 113*   MCV 84 87 88   MCH 27.0 28.2 27.9   MCHC 32.1 32.3 31.6*     BNP:  No results for input(s): BNP in the last 168 hours.    Invalid input(s): BNPTRIAGELBLO  CMP:    Recent Labs  Lab 02/27/18 0406 02/28/18  0501 03/01/18  0538   GLU 94 89 96   CALCIUM 8.2* 7.7* 7.5*   ALBUMIN 2.9* 2.8* 2.7*   PROT 7.4 6.9 6.8   * 136 139   K 3.9 3.8 3.9   CO2 27 26 24   CL 97 99 105   BUN 20 30* 15   CREATININE 5.6* 7.9* 5.8*   ALKPHOS 45* 46* 44*   ALT 8* 7* 7*   AST 12 11 11   BILITOT 0.6 0.6 0.5      Coagulation:     Recent Labs  Lab 02/27/18 0406 02/28/18  0501 03/01/18  0538   INR 1.1 1.1 1.0   APTT 24.9 23.7 <21.0     LDH:  No results for input(s): LDH in the last 72 hours.  Microbiology:  Microbiology Results (last 7 days)     ** No results found for the last 168 hours. **          I have reviewed all pertinent labs within the past 24 hours.    Estimated Creatinine Clearance: 8.8 mL/min (A) (based on SCr of 5.8 mg/dL (H)).    Diagnostic Results:  I have reviewed all pertinent imaging results/findings  within the past 24 hours.

## 2018-03-01 NOTE — ANESTHESIA POSTPROCEDURE EVALUATION
"Anesthesia Post Evaluation    Patient: Shivani Sheets    Procedure(s) Performed: Procedure(s) (LRB):  ESOPHAGOGASTRODUODENOSCOPY (EGD) (N/A)    Final Anesthesia Type: general  Patient location during evaluation: floor  Patient participation: Yes- Able to Participate  Level of consciousness: awake and alert  Post-procedure vital signs: reviewed and stable  Pain management: adequate  Airway patency: patent  PONV status at discharge: No PONV  Anesthetic complications: no      Cardiovascular status: blood pressure returned to baseline  Respiratory status: unassisted  Hydration status: euvolemic  Follow-up not needed.        Visit Vitals  BP (!) 135/93 (Patient Position: Lying)   Pulse 87   Temp 37.1 °C (98.7 °F) (Oral)   Resp 20   Ht 5' 1" (1.549 m)   Wt 70.3 kg (154 lb 15.7 oz)   LMP  (LMP Unknown)   SpO2 99%   Breastfeeding? No   BMI 29.28 kg/m²       Pain/Suzie Score: Pain Assessment Performed: Yes (2/28/2018  8:10 PM)  Presence of Pain: denies (2/28/2018  8:10 PM)      "

## 2018-03-01 NOTE — PHYSICIAN QUERY
PT Name: Shivani Sheets  MR #: [id number]    Physician Query Form - Atrial Fibrillation Specificity     Judith Dobbs RN, CCDS  Contact Info: [phone] [email or web address]       This form is a permanent document in the medical record.     Query Date: March 1, 2018    By submitting this query, we are merely seeking further clarification of documentation. Please utilize your independent clinical judgment when addressing the question(s) below.    The medical record contains the following:   Indicators  Supporting Clinical Findings Location in Medical Record   x Atrial Fibrillation Atrial fibrillation     - hold diltiazem  -Restart coumadin today. INR goal 2-3         Rolling, PA-C; Heart Transplant PN 2/28    EKG results      Medication      Treatment     x Other EGD without source of bleed- resume coumadin for AF tonight   hg stable. JOSE Krishnamurthy; Heart Transplant PN 2/28       Provider, please further specify the Atrial Fibrillation diagnosis.    [x  ] Chronic  [  ] Paroxysmal  [  ] Permanent  [  ] Persistent  [  ] Other (please specify): ____________________________  [  ] Clinically Undetermined    Please document in your progress notes daily for the duration of treatment until resolved, and include in your discharge summary.

## 2018-03-01 NOTE — PROGRESS NOTES
Update:    SW to pt's room for update. Pt aaox4, calm, and pleasant. Pt reports coping well today. Pt reports hoping to go home soon. Pt states PT recommending HH, and pt has no preference in HH companies. Verbal referral made to [facility] with [facility]. Pt reports brother will provide transport home. SW providing ongoing psychosocial and counseling support, education, assistance, resources, and discharge planning as indicated. SW continuing to follow and remains available.

## 2018-03-01 NOTE — PLAN OF CARE
Problem: Occupational Therapy Goal  Goal: Occupational Therapy Goal  Goals to be met by: 3/15/18     Patient will increase functional independence with ADLs by performing:    UE Dressing with Modified [place].  LE Dressing with Modified [place].  Grooming while standing with Supervision.  Toileting from toilet with Supervision for hygiene and clothing management.   Toilet transfer to toilet with Supervision.    IE completed and goals established  Mary Welch, BUNNYR

## 2018-03-01 NOTE — PLAN OF CARE
Problem: Patient Care Overview  Goal: Plan of Care Review  Outcome: Ongoing (interventions implemented as appropriate)  Dialysis complete.  Blood rinsed back.  [place] pulled from ABEL fistula.  Pressure held x 5 minutes.  Hemostasis achieved.  Covered with gauze and paper tape.  +thrill +bruit.  Net UF 1L.  Tolerated well. Transported from dialysis unit to room 8085 via wheelchair by transporter.

## 2018-03-01 NOTE — PLAN OF CARE
[facility]   Heart Transplant/PHTN Clinic   [address] [place] [address]   [phone] after hours [phone] fax   HOME HEALTH ORDERS     Admit to Home Health   Diagnosis:  Patient Active Problem List   Diagnosis    Chronic diastolic heart failure    CAD (coronary artery disease)    S/P PTCA (percutaneous transluminal coronary angioplasty)    Central retinal artery occlusion    ESRD from HTN started RRT     Sleep apnea    Hyperlipidemia    Obesity    Rheumatoid arthritis    Anticoagulation monitoring by pharmacist    Complication of vascular access for dialysis    Hand pain, left    Failed  donor kidney transplant     Renal hypertension diagnosed age 15    Hypothyroidism    Awaiting organ transplant    Pulmonary hypertension    Atrial fibrillation    Hypoxia    Low back pain    Other chronic pulmonary heart diseases    Chest wall pain    Glaucoma shunt device of right eye    Status post cataract extraction and insertion of intraocular lens    Glaucoma filtering bleb of both eyes    Proliferative diabetic retinopathy of both eyes without macular edema associated with type 2 diabetes mellitus    Chronic angle-closure glaucoma of both eyes, moderate stage    Glaucoma shunt device of left eye    Atypical pneumonia    GIB (gastrointestinal bleeding)    Symptomatic anemia    Melena       Patient is homebound due to: PH     Diet: Low Na    Acitivities: As tolerated    Nursing:   SN to complete comprehensive assessment including routine vital signs. Instruct on disease process and s/s of complications to report to MD. Review/verify medication list sent home with the patient at time of discharge and instruct patient/caregiver as needed. Frequency may be adjusted depending on start of care date.     Notify MD if SBP > 160 or < 90; DBP > 90 or < 50; HR > 120 or < 50; Temp > 101; Weight gain >3lbs in 1 day or 5lbs in 1  week.     LABS: SN to perform labs:  INR Mon 3/5/18, to coumadin clinic    CBC on Mon 3/5/18 with results sent to PH clinic, fax below          CONSULTS:      Physical Therapy to evaluate and treat. Evaluate for home safety and equipment needs; Establish/upgrade home exercise program. Perform / instruct on therapeutic exercises, gait training, transfer training, and Range of Motion.     Occupational Therapy to evaluate and treat. Evaluate home environment for safety and equipment needs. Perform/Instruct on transfers, ADL training, ROM, and therapeutic exercises.       Send follow up questions to [phone] or [phone].

## 2018-03-01 NOTE — PLAN OF CARE
Problem: Patient Care Overview  Goal: Plan of Care Review  Outcome: Ongoing (interventions implemented as appropriate)  -Pt AAOx4, VSS, SpO- 97% on 1 L NC, tele remains NSR, Accuchecks q6h- no coverage needed  - EGD today remains inconclusive, latest H/H 9.6/29.7, Protonix given IVP  - Remains anuric, HD yesterday 1 L fluid removed  - Bed in lowest position, non skid socks worn, call light within reach. No acute events overnight, will continue to monitor.   - If H/H remains stable plan for colonoscopy out patient, pt is also due for RHC

## 2018-03-01 NOTE — ASSESSMENT & PLAN NOTE
-GI consulted. S/p EGD 2/27/18 No findings to explain anemia  -Continue PPI daily  -Monitor in the hospital - if no bleeding, plan for outpatient colonoscopy.   If active bleeding, plan for inpatient colonoscopy  -Restarted coumadin yesterday

## 2018-03-01 NOTE — PHYSICIAN QUERY
PT Name: Shivani Sheets  MR #: [id number]    Physician Query Form - Respiratory Condition Clarification      Judith Dobbs RN, CCDS  Contact Info: [phone] [email or web address]      This form is a permanent document in the medical record.    Query Date: March 1, 2018    By submitting this query, we are merely seeking further clarification of documentation. Please utilize your independent clinical judgment when addressing the question(s) below.    The Medical record contains the following   Indicators   Supporting Clinical Findings Location in Medical Record    SOB, MORA, Wheezing, Productive Cough, Use of Accessory Muscles, etc.     x   Acute/Chronic Illness 1.  Acute on Chronic Symptomatic Anemia normocytic anemia  2.  Lower GI Bleed   3. ESRD with HD  4. Anemia of CKD  5. SHPT Disease  7. Pulmonary HTN  8. Failed renal transplant  9. Diastolic HF  10. STEFFANY  11. Hypothyroidism  12. RA  13. Hyperlipidemia Rick H&P on 2/22      Radiology Findings        Respiratory Distress or Failure        Hypoxia or Hypercapnia        RR         ABGs         O2 sat On 2/22 to 3/01  RR 43, 31, 25 to 16  HR 89 - 90 - 68    Sats:   92%  On 1- 2  lpm on 2/27 - 28    On 2/22 - 2/24   100 % on 2lpm to 14 lpm venti mask to 5 lpm hi flow O2 nc            VS Flow Sheet      BiPAP/Intubation     x   Supplemental O2  Venti mask  High flow O2   Nasal cannula VS Flow Sheet 2/22 - 3/1   x   Home O2, Oxygen Dependence PHT on home oxygen 2 L NC Nephrology   NP at 2/27/2018       Treatment     x   Other She also c/o SOB, MORA and generalized fatigue and weakness  cachectic, mild distress   + JVD  Lungs:  labored breathing and rhonchi bilaterally  Respiratory: positive for dyspnea on exertion   She was recently treated for a pneumonia    Positive for shortness of breath.    She has rales.   5L O2 NC   She is not diaphoretic.   She uses home O2 NC 2L at baseline H&P Royer Cabrera NP                Nephrology  MD at 2/23/2018      Provider,  please specify diagnosis or diagnoses associated with above clinical findings.    [ x ] Chronic Respiratory Failure with Hypoxia    [  ] Chronic Respiratory Failure with Hypercapnia    [  ] Chronic Respiratory Failure with Hypoxia and Hypercapnia    [  ] Other Chronic Respiratory Failure    [  ] Other Respiratory Diagnosis (please specify): _______________________________    [  ] Clinically Undetermined      Please document in your progress notes daily for the duration of treatment until resolved and include in your discharge summary.

## 2018-03-02 ENCOUNTER — TELEPHONE (OUTPATIENT)
Dept: TRANSPLANT | Facility: CLINIC | Age: 63
End: 2018-03-02

## 2018-03-02 VITALS
DIASTOLIC BLOOD PRESSURE: 76 MMHG | OXYGEN SATURATION: 97 % | HEIGHT: 61 IN | BODY MASS INDEX: 30.55 KG/M2 | HEART RATE: 78 BPM | WEIGHT: 161.81 LBS | TEMPERATURE: 99 F | SYSTOLIC BLOOD PRESSURE: 154 MMHG | RESPIRATION RATE: 18 BRPM

## 2018-03-02 LAB
ALBUMIN SERPL BCP-MCNC: 2.7 G/DL
ALP SERPL-CCNC: 41 U/L
ALT SERPL W/O P-5'-P-CCNC: 7 U/L
ANION GAP SERPL CALC-SCNC: 12 MMOL/L
APTT BLDCRRT: 25.5 SEC
AST SERPL-CCNC: 10 U/L
BASOPHILS # BLD AUTO: 0.03 K/UL
BASOPHILS NFR BLD: 0.5 %
BILIRUB SERPL-MCNC: 0.5 MG/DL
BUN SERPL-MCNC: 27 MG/DL
CALCIUM SERPL-MCNC: 7.6 MG/DL
CHLORIDE SERPL-SCNC: 103 MMOL/L
CO2 SERPL-SCNC: 24 MMOL/L
CREAT SERPL-MCNC: 8.1 MG/DL
DIFFERENTIAL METHOD: ABNORMAL
EOSINOPHIL # BLD AUTO: 0.6 K/UL
EOSINOPHIL NFR BLD: 9.5 %
ERYTHROCYTE [DISTWIDTH] IN BLOOD BY AUTOMATED COUNT: 18.2 %
EST. GFR  (AFRICAN AMERICAN): 5.5 ML/MIN/1.73 M^2
EST. GFR  (NON AFRICAN AMERICAN): 4.8 ML/MIN/1.73 M^2
GLUCOSE SERPL-MCNC: 97 MG/DL
HCT VFR BLD AUTO: 28.9 %
HGB BLD-MCNC: 9 G/DL
IMM GRANULOCYTES # BLD AUTO: 0.01 K/UL
IMM GRANULOCYTES NFR BLD AUTO: 0.2 %
INR PPP: 1.1
LYMPHOCYTES # BLD AUTO: 1.6 K/UL
LYMPHOCYTES NFR BLD: 26.6 %
MAGNESIUM SERPL-MCNC: 2.1 MG/DL
MCH RBC QN AUTO: 27.4 PG
MCHC RBC AUTO-ENTMCNC: 31.1 G/DL
MCV RBC AUTO: 88 FL
MONOCYTES # BLD AUTO: 0.9 K/UL
MONOCYTES NFR BLD: 14.2 %
NEUTROPHILS # BLD AUTO: 2.9 K/UL
NEUTROPHILS NFR BLD: 49 %
NRBC BLD-RTO: 0 /100 WBC
PHOSPHATE SERPL-MCNC: 3.6 MG/DL
PLATELET # BLD AUTO: 152 K/UL
PMV BLD AUTO: 12.5 FL
POCT GLUCOSE: 100 MG/DL (ref 70–110)
POCT GLUCOSE: 131 MG/DL (ref 70–110)
POCT GLUCOSE: 140 MG/DL (ref 70–110)
POTASSIUM SERPL-SCNC: 4 MMOL/L
PROT SERPL-MCNC: 6.7 G/DL
PROTHROMBIN TIME: 11.2 SEC
RBC # BLD AUTO: 3.29 M/UL
SODIUM SERPL-SCNC: 139 MMOL/L
WBC # BLD AUTO: 5.97 K/UL

## 2018-03-02 PROCEDURE — 63600175 PHARM REV CODE 636 W HCPCS: Performed by: INTERNAL MEDICINE

## 2018-03-02 PROCEDURE — 83735 ASSAY OF MAGNESIUM: CPT

## 2018-03-02 PROCEDURE — 80053 COMPREHEN METABOLIC PANEL: CPT

## 2018-03-02 PROCEDURE — C9113 INJ PANTOPRAZOLE SODIUM, VIA: HCPCS | Performed by: INTERNAL MEDICINE

## 2018-03-02 PROCEDURE — 25000003 PHARM REV CODE 250: Performed by: INTERNAL MEDICINE

## 2018-03-02 PROCEDURE — 96372 THER/PROPH/DIAG INJ SC/IM: CPT

## 2018-03-02 PROCEDURE — 99238 HOSP IP/OBS DSCHRG MGMT 30/<: CPT | Mod: ,,, | Performed by: INTERNAL MEDICINE

## 2018-03-02 PROCEDURE — 90935 HEMODIALYSIS ONE EVALUATION: CPT | Mod: ,,, | Performed by: INTERNAL MEDICINE

## 2018-03-02 PROCEDURE — 90935 HEMODIALYSIS ONE EVALUATION: CPT

## 2018-03-02 PROCEDURE — 85025 COMPLETE CBC W/AUTO DIFF WBC: CPT

## 2018-03-02 PROCEDURE — 84100 ASSAY OF PHOSPHORUS: CPT

## 2018-03-02 PROCEDURE — 25000003 PHARM REV CODE 250: Performed by: PHYSICIAN ASSISTANT

## 2018-03-02 PROCEDURE — 85730 THROMBOPLASTIN TIME PARTIAL: CPT

## 2018-03-02 PROCEDURE — 85610 PROTHROMBIN TIME: CPT

## 2018-03-02 RX ORDER — DILTIAZEM HYDROCHLORIDE 120 MG/1
120 CAPSULE, COATED, EXTENDED RELEASE ORAL ONCE
Status: COMPLETED | OUTPATIENT
Start: 2018-03-02 | End: 2018-03-02

## 2018-03-02 RX ORDER — DILTIAZEM HYDROCHLORIDE 180 MG/1
180 CAPSULE, COATED, EXTENDED RELEASE ORAL DAILY
Status: DISCONTINUED | OUTPATIENT
Start: 2018-03-03 | End: 2018-03-02

## 2018-03-02 RX ORDER — WARFARIN SODIUM 5 MG/1
7.5 TABLET ORAL DAILY
Qty: 120 TABLET | Refills: 0
Start: 2018-03-02 | End: 2018-06-14 | Stop reason: SDUPTHER

## 2018-03-02 RX ORDER — DILTIAZEM HYDROCHLORIDE 180 MG/1
180 CAPSULE, COATED, EXTENDED RELEASE ORAL DAILY
Status: DISCONTINUED | OUTPATIENT
Start: 2018-03-02 | End: 2018-03-02

## 2018-03-02 RX ORDER — SODIUM CHLORIDE 9 MG/ML
INJECTION, SOLUTION INTRAVENOUS
Status: DISCONTINUED | OUTPATIENT
Start: 2018-03-02 | End: 2018-03-02 | Stop reason: HOSPADM

## 2018-03-02 RX ORDER — SODIUM CHLORIDE 9 MG/ML
INJECTION, SOLUTION INTRAVENOUS ONCE
Status: COMPLETED | OUTPATIENT
Start: 2018-03-02 | End: 2018-03-02

## 2018-03-02 RX ORDER — PANTOPRAZOLE SODIUM 40 MG/1
40 TABLET, DELAYED RELEASE ORAL DAILY
Qty: 30 TABLET | Refills: 3 | Status: SHIPPED | OUTPATIENT
Start: 2018-03-02 | End: 2018-07-19 | Stop reason: SDUPTHER

## 2018-03-02 RX ORDER — PANTOPRAZOLE SODIUM 40 MG/1
40 TABLET, DELAYED RELEASE ORAL DAILY
Status: DISCONTINUED | OUTPATIENT
Start: 2018-03-02 | End: 2018-03-02 | Stop reason: HOSPADM

## 2018-03-02 RX ORDER — DILTIAZEM HYDROCHLORIDE 240 MG/1
240 CAPSULE, COATED, EXTENDED RELEASE ORAL DAILY
Status: DISCONTINUED | OUTPATIENT
Start: 2018-03-03 | End: 2018-03-02 | Stop reason: HOSPADM

## 2018-03-02 RX ADMIN — PANTOPRAZOLE SODIUM 40 MG: 40 TABLET, DELAYED RELEASE ORAL at 03:03

## 2018-03-02 RX ADMIN — ERYTHROPOIETIN 10000 UNITS: 10000 INJECTION, SOLUTION INTRAVENOUS; SUBCUTANEOUS at 12:03

## 2018-03-02 RX ADMIN — PREDNISOLONE ACETATE 1 DROP: 10 SUSPENSION/ DROPS OPHTHALMIC at 01:03

## 2018-03-02 RX ADMIN — PANTOPRAZOLE SODIUM 40 MG: 40 INJECTION, POWDER, FOR SOLUTION INTRAVENOUS at 05:03

## 2018-03-02 RX ADMIN — LEVOTHYROXINE SODIUM 100 MCG: 100 TABLET ORAL at 05:03

## 2018-03-02 RX ADMIN — DORZOLAMIDE HYDROCHLORIDE AND TIMOLOL MALEATE 1 DROP: 20; 5 SOLUTION/ DROPS OPHTHALMIC at 01:03

## 2018-03-02 RX ADMIN — LIDOCAINE AND PRILOCAINE: 25; 25 CREAM TOPICAL at 08:03

## 2018-03-02 RX ADMIN — SODIUM CHLORIDE 350 ML: 0.9 INJECTION, SOLUTION INTRAVENOUS at 08:03

## 2018-03-02 RX ADMIN — BRIMONIDINE TARTRATE 1 DROP: 1 SOLUTION/ DROPS OPHTHALMIC at 01:03

## 2018-03-02 RX ADMIN — WARFARIN SODIUM 7.5 MG: 2.5 TABLET ORAL at 05:03

## 2018-03-02 RX ADMIN — DILTIAZEM HYDROCHLORIDE 120 MG: 120 CAPSULE, COATED, EXTENDED RELEASE ORAL at 05:03

## 2018-03-02 RX ADMIN — DOCUSATE SODIUM 100 MG: 100 CAPSULE, LIQUID FILLED ORAL at 05:03

## 2018-03-02 RX ADMIN — TADALAFIL 40 MG: 20 TABLET ORAL at 05:03

## 2018-03-02 RX ADMIN — DILTIAZEM HYDROCHLORIDE 120 MG: 120 CAPSULE, COATED, EXTENDED RELEASE ORAL at 01:03

## 2018-03-02 NOTE — PROGRESS NOTES
"[facility]  Nephrology  Progress Note    Patient Name: Shivani Sheets  MRN: [id number]  Admission Date: 2/22/2018  Hospital Length of Stay: 8 days  Attending Provider: Jenny Hennessy MD   Primary Care Physician: César Iverson MD  Principal Problem:GIB (gastrointestinal bleeding)    Subjective:     HPI: 62 yo with significant history for PHT on home oxygen 2 L NC, diastolic heart failure, RA, DM, CAD, atrial fib on coumadin, PUD/GIB 1072 CVA in 1980 with no residual deficit and ESRD sp failed transplant (recieved 2005 then failed 2008) who initially admitted to [facility] for weakness and melena found [address] hgb 4.8 sp 4 units PRBC and 2 FFP transfusion now transfer to [facility] for further guidance of severe pulmonary hypertension by HTS (patient followed by Dr. Hennessy outpt) and continue GI evaluation/EGD. Patient was found to have "black stools" 2 days PTA to . Vitamin K and protonix gtt also started at . Last BM Sunday (3 days ago) also noted to be dark. Currently, hgb 9.9 stable.       Nephrology consult for ESRD. ESRD on HD MWF 3.5 hrs duration at [facility] under direction of Dr. Masterson. No residual. EDW 75.5 kg? (currently under EDW). Last HD yesterday 2/26 prior to transfer to [facility].      Interval History:   Patient evaluated at bedside in JOHANN, Blood pressure has been stable, no complaints.     Review of patient's allergies indicates:   Allergen Reactions    Penicillins Swelling    Iodine      Other reaction(s): Hives    Sulfamethoxazole-trimethoprim      Other reaction(s): Swelling  Other reaction(s): Hives     Current Facility-Administered Medications   Medication Frequency    0.9%  NaCl infusion (for blood administration) Q24H PRN    0.9%  NaCl infusion (for blood administration) Q24H PRN    0.9%  NaCl infusion PRN    0.9%  NaCl infusion PRN    0.9%  NaCl infusion Once    0.9%  NaCl infusion PRN    0.9%  NaCl infusion Once    atorvastatin tablet 10 " mg QHS    brimonidine (ALPHAGAN P) ophthalmic solution 0.1% BID    dextrose 50% injection 12.5 g PRN    diazePAM tablet 5 mg Q12H PRN    [START ON 3/3/2018] diltiaZEM 24 hr capsule 180 mg Daily    docusate sodium capsule 100 mg BID    dorzolamide-timolol 2-0.5% ophthalmic solution 1 drop BID    epoetin constanza injection 10,000 Units Every Mon, Wed, Fri    glucagon (human recombinant) injection 1 mg PRN    hydrALAZINE injection 10 mg Q6H PRN    insulin aspart U-100 pen 0-5 Units Q6H PRN    latanoprost 0.005 % ophthalmic solution 1 drop QHS    levothyroxine tablet 100 mcg Before breakfast    lidocaine-prilocaine cream PRN    ondansetron injection 4 mg Q6H PRN    pantoprazole injection 40 mg BID    pneumoc 13-jessica conj-dip cr(PF) 0.5 mL vaccine x 1 dose    polyethylene glycol packet 17 g Daily    prednisoLONE acetate 1 % ophthalmic suspension 1 drop BID    sodium chloride 0.9% flush 3 mL PRN    tadalafil (PULMONARY HYPERTENSION) tablet 40 mg Daily    traMADol tablet 50 mg Q6H PRN    warfarin tablet 7.5 mg Daily       Objective:     Vital Signs (Most Recent):  Temp: 98.3 °F (36.8 °C) (03/02/18 0745)  Pulse: 80 (03/02/18 0745)  Resp: 18 (03/02/18 0745)  BP: (!) 144/87 (03/02/18 0745)  SpO2: 98 % (03/02/18 0745)  O2 Device (Oxygen Therapy): nasal cannula (03/02/18 0745) Vital Signs (24h Range):  Temp:  [97.6 °F (36.4 °C)-99 °F (37.2 °C)] 98.3 °F (36.8 °C)  Pulse:  [69-80] 80  Resp:  [17-20] 18  SpO2:  [97 %-100 %] 98 %  BP: (130-171)/(74-98) 144/87     Weight: 73.4 kg (161 lb 13.1 oz) (03/02/18 0545)  Body mass index is 30.58 kg/m².  Body surface area is 1.78 meters squared.    I/O last 3 completed shifts:  In: 880 [P.O.:880]  Out: 0     Physical Exam   Constitutional: She is oriented to person, place, and time. She appears well-developed and well-nourished.   HENT:   Head: Normocephalic and atraumatic.   Eyes: EOM are normal.   Neck: Neck supple.   Cardiovascular: Normal rate and regular rhythm.     Pulmonary/Chest: Effort normal and breath sounds normal. No respiratory distress.   Abdominal: Soft. Bowel sounds are normal.   Musculoskeletal: Normal range of motion. She exhibits no edema or deformity.   Neurological: She is alert and oriented to person, place, and time.   Skin: Skin is warm and dry.   ABEL AVG with pseudoaneurysm. Good bruit and thrill.    Psychiatric: She has a normal mood and affect.       Significant Labs:  ABGs: No results for input(s): PH, PCO2, HCO3, POCSATURATED, BE in the last 168 hours.  BMP:   Recent Labs  Lab 03/02/18  0509   GLU 97      CO2 24   BUN 27*   CREATININE 8.1*   CALCIUM 7.6*   MG 2.1     CBC:   Recent Labs  Lab 03/02/18  0509   WBC 5.97   RBC 3.29*   HGB 9.0*   HCT 28.9*      MCV 88   MCH 27.4   MCHC 31.1*     CMP:   Recent Labs  Lab 03/02/18  0509   GLU 97   CALCIUM 7.6*   ALBUMIN 2.7*   PROT 6.7      K 4.0   CO2 24      BUN 27*   CREATININE 8.1*   ALKPHOS 41*   ALT 7*   AST 10   BILITOT 0.5     All labs within the past 24 hours have been reviewed.     Assessment/Plan:     ESRD from HTN started RRT 1999    ESRD hx failed transplant (received 2005 then failed transplant 2008) on HD MWF 3.5 hrs duration at [facility] under care of . She is under EDW 75.5 kg. No residual. Last HD 2/26/18 prior to transfer to [facility]    Plan:  - Patient will have dialysis today for clearance and volume removal, duration 3.5 hrs and UF 2-2.5 L as tolerated by patient, maintain MAP>65, [place] will be adjusted to chem  - Blood pressures have been stable  - Anemia of chronic renal disease:Hgb 9.0. Will continue with EPO  - [place] bone disease: still no need for binders           Lefty Feng  Nephrology  Fellow  [facility]    Pager [phone]   I have reviewed and concur with the fellow's history, physical, assessment, and plan. I have personally interviewed and examined the patient at bedside

## 2018-03-02 NOTE — PROGRESS NOTES
D/C note:    SW to pt's room for D/C. Pt aaox4, calm, and pleasant. Pt reports in agreement with plan to d/c home today with HH through [facility]. Pt reports no other needs at this time and none identified. Pt reports her brother will provide transport home. Pt reports coping well at this time. SW providing psychosocial and counseling support, education, assistance, resources, and D/C planning as indicated. SW remains available.

## 2018-03-02 NOTE — PLAN OF CARE
Problem: Patient Care Overview  Goal: Plan of Care Review  Outcome: Ongoing (interventions implemented as appropriate)  - Pt admitted to [facility] 2/22/18 with a GI bleed (H&H 4.8 / 16.4; INR 3.1); pt was transferred to [facility] 2/26; on SICU 2/26-2/27; stepped down to TSU 2/27.  - Pt had a kidney transplant in August 2005 for hypertension; the transplant failed in 2008. Pt is dialysis dependent; pt has a left UA fistula.  - Pt has pulmonary hypertension and takes Adcirca.  - No clear source of GI bleed was ever identified; pt had an EGD 2/28 which was inconclusive; stomach biopsy taken and sent off for H. pylori. Pt is now constipated (last BM 2/25), so unable to assess stools.  - Pt doing well overnight. Pt denies pain. VS WNL except for elevated BPs - up to 140s-150s/80s. Pt has PRN hydralazine available for SBP > 160. Pt is wearing 2 L nasal cannula which is her home oxygen usage.  - Pt will go to dialysis today. Non-dialyzable morning meds will be given early.  - Pt is on Coumadin due to a history of A-fib with stroke. Pt's Coumadin was restarted yesterday; goal INR is 2.0 to 3.0.  - Plan is for pt to potentially be discharged today after dialysis. Pt will then require a colonoscopy and a RHC on an outpatient basis.  - Pt is anuric. Pt is up with walker. Pt recommends Home Health on discharge. Will continue to monitor.

## 2018-03-02 NOTE — ASSESSMENT & PLAN NOTE
-  selexipag, tadalafil, macitentan held at OSH  -Restarted tadalafil   -Will not restart macitentan in setting of GIB/low Hgb on admit  -Restart selexipag as outpt(not on formulary here) at lower dose

## 2018-03-02 NOTE — PHYSICIAN QUERY
PT Name: Shivani Sheets  MR #: [id number]     Physician Query Form - Documentation Clarification      Judith Dobbs RN, CCDS  Contact Info: [phone] [email or web address]      This form is a permanent document in the medical record.     Query Date: March 2, 2018    By submitting this query, we are merely seeking further clarification of documentation. Please utilize your independent clinical judgment when addressing the question(s) below.    The Medical record reflects the following:    Supporting Clinical Findings Location in Medical Record   FINAL PATHOLOGIC DIAGNOSIS  Stomach, prepyloric antrum, biopsy:  Chronic gastritis with intestinal metaplasia.  Immunohistochemical stain for Helicobacter species will be performed and results will follow in a supplemental report.   Path Results 3/1/18   -EGD without source of bleed- cont coumadin for AF       GIB (gastrointestinal bleeding)     -GI consulted. S/p EGD 2/27/18 No findings to explain anemia  -Continue PPI daily  -Monitor in the hospital - if no bleeding, plan for outpatient colonoscopy.   If active bleeding, plan for inpatient colonoscopy  -Restarted coumadin yesterday    Heart Transplant pn 3/1                                                                            Doctor, Please specify diagnosis or diagnoses associated with above clinical findings.    Provider Use Only    ( x ) chronic atrophic gastritis with bleeding  (  ) chronic atrophic gastritis without bleeding  (  ) chronic atrophic gastritis; undeterminable bleeding    (  ) chronic superficial gastritis with bleeding  (  ) chronic superficial gastritis without bleeding  (  ) chronic superficial gastritis; undeterminable bleeding    (  ) Unspecified chronic gastritis with bleeding  (  ) Unspecified chronic gastritis without bleeding  (  ) Unspecified chronic gastritis; undeterminable bleeding    (  ) Do not agree with pathology results     (  ) Other - specify: ________________________                                                                                                              [  ] Clinically undetermined

## 2018-03-02 NOTE — TELEPHONE ENCOUNTER
Visited Mrs. Sheets in her hospital room. She states that she is feeling good and ready to go home. Discussed restarting Uptravi and reviewed the titration schedule. Gave patient written information. Also noted that the Uptravi shipment from [employer] had been delivered to her room. Advised patient that she would be titrating weekly.  Also reminded patient to bring all of her medication with her whenever she goes to the hospital. Additionally, confirmed with patient that she is to discontinue Opsumit, per Dr. Hennessy.  No other questions or concerns at this time.

## 2018-03-02 NOTE — PLAN OF CARE
Problem: Patient Care Overview  Goal: Plan of Care Review  Patient aaox4. Bed kept locked, lowest position with 2x side rails up while in bed. nonslip footwear when out of bed. Ambulates with standby assistance and use of walker. Call bell and personal items within reach. Low sodium diet with fluid restriction. accuchecks ac/hs. Telemetry monitoring SR. Right upper shoulder PIV cdi, removed per discharge order, catheter intact. Left arm fistula +/+; went to HD this morning, dressing in place cdi. 2LNC, O2>96%. Home Pulmonary HTN medications delivered to room, patient verbalized understanding to bring home. Patient met with PA,  and [facility] htn clinic before discharge.  All discharge instructions explained and questions answered. To be discharged to care of family.

## 2018-03-02 NOTE — SUBJECTIVE & OBJECTIVE
Interval History: No complaints. Feeling well and ready to go home    Continuous Infusions:  Scheduled Meds:   sodium chloride 0.9%   Intravenous Once    atorvastatin  10 mg Oral QHS    brimonidine 0.1%  1 drop Right Eye BID    [START ON 3/3/2018] diltiaZEM  240 mg Oral Daily    docusate sodium  100 mg Oral BID    dorzolamide-timolol 2-0.5%  1 drop Right Eye BID    epoetin constanza (PROCRIT) injection  10,000 Units Intravenous Every Mon, Wed, Fri    latanoprost  1 drop Right Eye QHS    levothyroxine  100 mcg Oral Before breakfast    pantoprazole  40 mg Oral Daily    pantoprazole  40 mg Intravenous BID    polyethylene glycol  17 g Oral Daily    prednisoLONE acetate  1 drop Left Eye BID    tadalafil (PULMONARY HYPERTENSION)  40 mg Oral Daily    warfarin  7.5 mg Oral Daily     PRN Meds:sodium chloride, sodium chloride, sodium chloride 0.9%, sodium chloride 0.9%, sodium chloride 0.9%, dextrose 50%, diazePAM, glucagon (human recombinant), hydrALAZINE, insulin aspart U-100, lidocaine-prilocaine, ondansetron, pneumoc 13-jessica conj-dip cr(PF), sodium chloride 0.9%, traMADol    Review of patient's allergies indicates:   Allergen Reactions    Penicillins Swelling    Iodine      Other reaction(s): Hives    Sulfamethoxazole-trimethoprim      Other reaction(s): Swelling  Other reaction(s): Hives     Objective:     Vital Signs (Most Recent):  Temp: 98.4 °F (36.9 °C) (03/02/18 1230)  Pulse: 80 (03/02/18 1230)  Resp: 15 (03/02/18 1230)  BP: (!) 141/74 (03/02/18 1230)  SpO2: 98 % (03/02/18 0745) Vital Signs (24h Range):  Temp:  [97.6 °F (36.4 °C)-99 °F (37.2 °C)] 98.4 °F (36.9 °C)  Pulse:  [60-82] 80  Resp:  [13-20] 15  SpO2:  [97 %-100 %] 98 %  BP: (130-175)/(74-98) 141/74     Patient Vitals for the past 72 hrs (Last 3 readings):   Weight   03/02/18 0545 73.4 kg (161 lb 13.1 oz)   03/01/18 0500 70.3 kg (154 lb 15.7 oz)   02/28/18 0500 70.3 kg (154 lb 15.7 oz)     Body mass index is 30.58 kg/m².      Intake/Output  Summary (Last 24 hours) at 03/02/18 1423  Last data filed at 03/02/18 1215   Gross per 24 hour   Intake             1260 ml   Output             3150 ml   Net            -1890 ml       Hemodynamic Parameters:           Physical Exam   Constitutional: She is oriented to person, place, and time. She appears well-developed and well-nourished.   Neck: Normal range of motion. Neck supple. No JVD present.   Cardiovascular: Normal rate and regular rhythm.  Exam reveals no gallop and no friction rub.    No murmur heard.  Pulmonary/Chest: Effort normal and breath sounds normal. She has no wheezes. She has no rales.   Abdominal: Soft. Bowel sounds are normal. There is no tenderness.   Musculoskeletal: She exhibits no edema.   Neurological: She is alert and oriented to person, place, and time.   Skin: Skin is warm and dry.       Significant Labs:  CBC:    Recent Labs  Lab 02/28/18  0501 03/01/18  0538 03/02/18  0509   WBC 6.68 6.51 5.97   RBC 3.40* 3.44* 3.29*   HGB 9.6* 9.6* 9.0*   HCT 29.7* 30.4* 28.9*   * 113* 152   MCV 87 88 88   MCH 28.2 27.9 27.4   MCHC 32.3 31.6* 31.1*     BNP:  No results for input(s): BNP in the last 168 hours.    Invalid input(s): BNPTRIAGELBLO  CMP:    Recent Labs  Lab 02/28/18  0501 03/01/18  0538 03/02/18  0509   GLU 89 96 97   CALCIUM 7.7* 7.5* 7.6*   ALBUMIN 2.8* 2.7* 2.7*   PROT 6.9 6.8 6.7    139 139   K 3.8 3.9 4.0   CO2 26 24 24   CL 99 105 103   BUN 30* 15 27*   CREATININE 7.9* 5.8* 8.1*   ALKPHOS 46* 44* 41*   ALT 7* 7* 7*   AST 11 11 10   BILITOT 0.6 0.5 0.5      Coagulation:     Recent Labs  Lab 02/28/18  0501 03/01/18  0538 03/02/18  0509   INR 1.1 1.0 1.1   APTT 23.7 <21.0 25.5     LDH:  No results for input(s): LDH in the last 72 hours.  Microbiology:  Microbiology Results (last 7 days)     ** No results found for the last 168 hours. **          I have reviewed all pertinent labs within the past 24 hours.    Estimated Creatinine Clearance: 6.4 mL/min (A) (based on SCr of  8.1 mg/dL (H)).    Diagnostic Results:  I have reviewed all pertinent imaging results/findings within the past 24 hours.

## 2018-03-02 NOTE — ASSESSMENT & PLAN NOTE
-GI consulted. S/p EGD 2/27/18 No findings to explain anemia  -Continue PPI daily  -Monitor in the hospital -no bleeding so plan for outpatient colonoscopy.   -Restarted coumadin. H and H stable

## 2018-03-02 NOTE — SUBJECTIVE & OBJECTIVE
Interval History:   Patient evaluated at bedside in [facility], Blood pressure has been stable, no complaints.     Review of patient's allergies indicates:   Allergen Reactions    Penicillins Swelling    Iodine      Other reaction(s): Hives    Sulfamethoxazole-trimethoprim      Other reaction(s): Swelling  Other reaction(s): Hives     Current Facility-Administered Medications   Medication Frequency    0.9%  NaCl infusion (for blood administration) Q24H PRN    0.9%  NaCl infusion (for blood administration) Q24H PRN    0.9%  NaCl infusion PRN    0.9%  NaCl infusion PRN    0.9%  NaCl infusion Once    0.9%  NaCl infusion PRN    0.9%  NaCl infusion Once    atorvastatin tablet 10 mg QHS    brimonidine (ALPHAGAN P) ophthalmic solution 0.1% BID    dextrose 50% injection 12.5 g PRN    diazePAM tablet 5 mg Q12H PRN    [START ON 3/3/2018] diltiaZEM 24 hr capsule 180 mg Daily    docusate sodium capsule 100 mg BID    dorzolamide-timolol 2-0.5% ophthalmic solution 1 drop BID    epoetin constanza injection 10,000 Units Every Mon, Wed, Fri    glucagon (human recombinant) injection 1 mg PRN    hydrALAZINE injection 10 mg Q6H PRN    insulin aspart U-100 pen 0-5 Units Q6H PRN    latanoprost 0.005 % ophthalmic solution 1 drop QHS    levothyroxine tablet 100 mcg Before breakfast    lidocaine-prilocaine cream PRN    ondansetron injection 4 mg Q6H PRN    pantoprazole injection 40 mg BID    pneumoc 13-jessica conj-dip cr(PF) 0.5 mL vaccine x 1 dose    polyethylene glycol packet 17 g Daily    prednisoLONE acetate 1 % ophthalmic suspension 1 drop BID    sodium chloride 0.9% flush 3 mL PRN    tadalafil (PULMONARY HYPERTENSION) tablet 40 mg Daily    traMADol tablet 50 mg Q6H PRN    warfarin tablet 7.5 mg Daily       Objective:     Vital Signs (Most Recent):  Temp: 98.3 °F (36.8 °C) (03/02/18 0745)  Pulse: 80 (03/02/18 0745)  Resp: 18 (03/02/18 0745)  BP: (!) 144/87 (03/02/18 0745)  SpO2: 98 % (03/02/18 0745)  O2 Device (Oxygen  Therapy): nasal cannula (03/02/18 0742) Vital Signs (24h Range):  Temp:  [97.6 °F (36.4 °C)-99 °F (37.2 °C)] 98.3 °F (36.8 °C)  Pulse:  [69-80] 80  Resp:  [17-20] 18  SpO2:  [97 %-100 %] 98 %  BP: (130-171)/(74-98) 144/87     Weight: 73.4 kg (161 lb 13.1 oz) (03/02/18 0545)  Body mass index is 30.58 kg/m².  Body surface area is 1.78 meters squared.    I/O last 3 completed shifts:  In: 880 [P.O.:880]  Out: 0     Physical Exam   Constitutional: She is oriented to person, place, and time. She appears well-developed and well-nourished.   HENT:   Head: Normocephalic and atraumatic.   Eyes: EOM are normal.   Neck: Neck supple.   Cardiovascular: Normal rate and regular rhythm.    Pulmonary/Chest: Effort normal and breath sounds normal. No respiratory distress.   Abdominal: Soft. Bowel sounds are normal.   Musculoskeletal: Normal range of motion. She exhibits no edema or deformity.   Neurological: She is alert and oriented to person, place, and time.   Skin: Skin is warm and dry.   ABEL AVG with pseudoaneurysm. Good bruit and thrill.    Psychiatric: She has a normal mood and affect.       Significant Labs:  ABGs: No results for input(s): PH, PCO2, HCO3, POCSATURATED, BE in the last 168 hours.  BMP:   Recent Labs  Lab 03/02/18  0509   GLU 97      CO2 24   BUN 27*   CREATININE 8.1*   CALCIUM 7.6*   MG 2.1     CBC:   Recent Labs  Lab 03/02/18  0509   WBC 5.97   RBC 3.29*   HGB 9.0*   HCT 28.9*      MCV 88   MCH 27.4   MCHC 31.1*     CMP:   Recent Labs  Lab 03/02/18  0509   GLU 97   CALCIUM 7.6*   ALBUMIN 2.7*   PROT 6.7      K 4.0   CO2 24      BUN 27*   CREATININE 8.1*   ALKPHOS 41*   ALT 7*   AST 10   BILITOT 0.5     All labs within the past 24 hours have been reviewed.

## 2018-03-02 NOTE — PROGRESS NOTES
[facility]  Heart Transplant  Progress Note    Patient Name: Shivani Sheets  MRN: [id number]  Admission Date: 2/22/2018  Hospital Length of Stay: 8 days  Attending Physician: Jenny Hennessy MD  Primary Care Provider: César Iverson MD  Principal Problem:GIB (gastrointestinal bleeding)    Subjective:     Interval History: No complaints. Feeling well and ready to go home    Continuous Infusions:  Scheduled Meds:   sodium chloride 0.9%   Intravenous Once    atorvastatin  10 mg Oral QHS    brimonidine 0.1%  1 drop Right Eye BID    [START ON 3/3/2018] diltiaZEM  240 mg Oral Daily    docusate sodium  100 mg Oral BID    dorzolamide-timolol 2-0.5%  1 drop Right Eye BID    epoetin constanza (PROCRIT) injection  10,000 Units Intravenous Every Mon, Wed, Fri    latanoprost  1 drop Right Eye QHS    levothyroxine  100 mcg Oral Before breakfast    pantoprazole  40 mg Oral Daily    pantoprazole  40 mg Intravenous BID    polyethylene glycol  17 g Oral Daily    prednisoLONE acetate  1 drop Left Eye BID    tadalafil (PULMONARY HYPERTENSION)  40 mg Oral Daily    warfarin  7.5 mg Oral Daily     PRN Meds:sodium chloride, sodium chloride, sodium chloride 0.9%, sodium chloride 0.9%, sodium chloride 0.9%, dextrose 50%, diazePAM, glucagon (human recombinant), hydrALAZINE, insulin aspart U-100, lidocaine-prilocaine, ondansetron, pneumoc 13-jessica conj-dip cr(PF), sodium chloride 0.9%, traMADol    Review of patient's allergies indicates:   Allergen Reactions    Penicillins Swelling    Iodine      Other reaction(s): Hives    Sulfamethoxazole-trimethoprim      Other reaction(s): Swelling  Other reaction(s): Hives     Objective:     Vital Signs (Most Recent):  Temp: 98.4 °F (36.9 °C) (03/02/18 1230)  Pulse: 80 (03/02/18 1230)  Resp: 15 (03/02/18 1230)  BP: (!) 141/74 (03/02/18 1230)  SpO2: 98 % (03/02/18 0745) Vital Signs (24h Range):  Temp:  [97.6 °F (36.4 °C)-99 °F (37.2 °C)] 98.4 °F (36.9 °C)  Pulse:   [60-82] 80  Resp:  [13-20] 15  SpO2:  [97 %-100 %] 98 %  BP: (130-175)/(74-98) 141/74     Patient Vitals for the past 72 hrs (Last 3 readings):   Weight   03/02/18 0545 73.4 kg (161 lb 13.1 oz)   03/01/18 0500 70.3 kg (154 lb 15.7 oz)   02/28/18 0500 70.3 kg (154 lb 15.7 oz)     Body mass index is 30.58 kg/m².      Intake/Output Summary (Last 24 hours) at 03/02/18 1423  Last data filed at 03/02/18 1215   Gross per 24 hour   Intake             1260 ml   Output             3150 ml   Net            -1890 ml       Hemodynamic Parameters:           Physical Exam   Constitutional: She is oriented to person, place, and time. She appears well-developed and well-nourished.   Neck: Normal range of motion. Neck supple. No JVD present.   Cardiovascular: Normal rate and regular rhythm.  Exam reveals no gallop and no friction rub.    No murmur heard.  Pulmonary/Chest: Effort normal and breath sounds normal. She has no wheezes. She has no rales.   Abdominal: Soft. Bowel sounds are normal. There is no tenderness.   Musculoskeletal: She exhibits no edema.   Neurological: She is alert and oriented to person, place, and time.   Skin: Skin is warm and dry.       Significant Labs:  CBC:    Recent Labs  Lab 02/28/18  0501 03/01/18  0538 03/02/18  0509   WBC 6.68 6.51 5.97   RBC 3.40* 3.44* 3.29*   HGB 9.6* 9.6* 9.0*   HCT 29.7* 30.4* 28.9*   * 113* 152   MCV 87 88 88   MCH 28.2 27.9 27.4   MCHC 32.3 31.6* 31.1*     BNP:  No results for input(s): BNP in the last 168 hours.    Invalid input(s): BNPTRIAGELBLO  CMP:    Recent Labs  Lab 02/28/18  0501 03/01/18  0538 03/02/18  0509   GLU 89 96 97   CALCIUM 7.7* 7.5* 7.6*   ALBUMIN 2.8* 2.7* 2.7*   PROT 6.9 6.8 6.7    139 139   K 3.8 3.9 4.0   CO2 26 24 24   CL 99 105 103   BUN 30* 15 27*   CREATININE 7.9* 5.8* 8.1*   ALKPHOS 46* 44* 41*   ALT 7* 7* 7*   AST 11 11 10   BILITOT 0.6 0.5 0.5      Coagulation:     Recent Labs  Lab 02/28/18  0501 03/01/18  0538 03/02/18  0509   INR 1.1  "1.0 1.1   APTT 23.7 <21.0 25.5     LDH:  No results for input(s): LDH in the last 72 hours.  Microbiology:  Microbiology Results (last 7 days)     ** No results found for the last 168 hours. **          I have reviewed all pertinent labs within the past 24 hours.    Estimated Creatinine Clearance: 6.4 mL/min (A) (based on SCr of 8.1 mg/dL (H)).    Diagnostic Results:  I have reviewed all pertinent imaging results/findings within the past 24 hours.    Assessment and Plan:     62 y/o female with PMH of PHT, RA, HLD, DD, CAD, central retinal artery occlusion, PUD (patient reports diagnosis in [place]), and ESRD 2/2 HTN (LUE AVF) s/p failed transplant (re-listed) who presented to [facility] on 2/15 with cough and was diagnosed with atypical PNA and discharged home with a Z-Yariel. She saw anticoagulation clinic 2/22 and her INR was noted to be elevated with patient having melena. She was encouraged to go to the ER and adviced to hold warfarin. In triage, she was note to have abdominal pain in the lower abdomen per history. The patient's Hgb was noted to 4.7 (down from 7.8 one week prior), the patient was admitted to the ICU, and "given blood transfusion and FFP". Per notes, at least 2U FFP and 3U PRBCs were given as well as Benadryl, IV steroids, Pepsid, and Lasix. IV protonix gtt was also ordered as was vitamin K. An EGD was post-poned with plan for 2/26 because the patient was on Venturi mask after FFP administration. HD was initiated with plan for MWF inpatient dialysis. Pulmonolgy was consulted 2/2 high risk nature of an EGD in someone with PHT. She was noted to be off all PHT medications, and the pulmonologist discussed a transfer to [facility] with Dr. Hennessy.     TTE 2/8/18    1 - Eccentric hypertrophy.     2 - Normal left ventricular systolic function (EF 60-65%).     3 - Right ventricle is upper limit of normal in size with hypertrophy, with normal systolic function.     4 - Impaired LV relaxation, elevated LAP (grade 2 " diastolic dysfunction).     5 - Moderate left atrial enlargement.     6 - Mild tricuspid regurgitation.     7 - Pulmonary hypertension. The estimated PA systolic pressure is 71 mmHg.     * GIB (gastrointestinal bleeding)    -GI consulted. S/p EGD 2/27/18 No findings to explain anemia  -Continue PPI daily  -Monitor in the hospital -no bleeding so plan for outpatient colonoscopy.   -Restarted coumadin. H and H stable        Pulmonary hypertension    -  selexipag, tadalafil, macitentan held at OSH  -Restarted tadalafil   -Will not restart macitentan in setting of GIB/low Hgb on admit  -Restart selexipag as outpt(not on formulary here) at lower dose          Atrial fibrillation    - Cont diltiazem   -Restarted coumadin. INR goal 2-3        Hypothyroidism    -Restart Synthroid          Hyperlipidemia    - hold statin        ESRD from HTN started RRT 1999    - c/w epo with dialysis  - nephrology consult for HD  - hold clonidine. Restart diltiazem        Central retinal artery occlusion    - c/w eye drops        CAD (coronary artery disease)    - hold ASA for now  -Restarted statin            Quin Krishnamurthy, PAJeffreyC  Heart Transplant  [facility]

## 2018-03-02 NOTE — PLAN OF CARE
Problem: Patient Care Overview  Goal: Plan of Care Review  Outcome: Ongoing (interventions implemented as appropriate)  3.5 hr hd completed, net fluid [id number] mls, target goal achieved, pt tolerated well.  AVF deaccessed, pressure held to the site 8 mins and 5 mins, A/V respectively, hemostasis achieved, bruit/thrill present post tx.  Report given to CHIDI Freedman.

## 2018-03-02 NOTE — PROGRESS NOTES
"Calendar updated with doses. Will plan For INR 3/6 due to HD on 3/5.     Per d/c note, "Pt was transferred here for GIB in setting of moderate PH. At OSH, all PH meds had been held. Adcirca was restarted here. Selexipag is not on formulary here so plan was to restart as outpt at lower dose. Plan is to not resume macitentan given association with severe anemia in rare cases. GI was consulted for GIB. Pt had EGD on 2/27/18- no findings to explain anemia. GI plans to do cscope as an outpt. Pt's coumadin was restarted after EGD and her H and H remained stable. She was d/c'ed home on PPI.  Pt will be d/c'ed home with Home Health."  "

## 2018-03-02 NOTE — PROGRESS NOTES
Shahnaz([id number]) called today to inform us that patient was discharged today 3/2/18. Went home on warfarin (7.5mg) daily. New med (Protonix 40mg) daily. [facility] will admit on 3/4/18. Please advise

## 2018-03-02 NOTE — ASSESSMENT & PLAN NOTE
ESRD hx failed transplant (received 2005 then failed transplant 2008) on HD MWF 3.5 hrs duration at [facility] under care of . She is under EDW 75.5 kg. No residual. Last HD 2/26/18 prior to transfer to [facility]    Plan:  - Patient will have dialysis today for clearance and volume removal, duration 3.5 hrs and UF 2-2.5 L as tolerated by patient, maintain MAP>65, [place] will be adjusted to chem  - Blood pressures have been stable  - Anemia of chronic renal disease:Hgb 9.0. Will continue with EPO  - Mineral bone disease: still no need for binders

## 2018-03-05 ENCOUNTER — PATIENT OUTREACH (OUTPATIENT)
Dept: ADMINISTRATIVE | Facility: CLINIC | Age: 63
End: 2018-03-05

## 2018-03-05 ENCOUNTER — OUTPATIENT CASE MANAGEMENT (OUTPATIENT)
Dept: ADMINISTRATIVE | Facility: OTHER | Age: 63
End: 2018-03-05

## 2018-03-05 DIAGNOSIS — H40.50X3: ICD-10-CM

## 2018-03-05 RX ORDER — LATANOPROST 50 UG/ML
SOLUTION/ DROPS OPHTHALMIC
Qty: 7.5 ML | Refills: 0 | Status: SHIPPED | OUTPATIENT
Start: 2018-03-05 | End: 2018-05-20 | Stop reason: SDUPTHER

## 2018-03-05 NOTE — PROGRESS NOTES
Thank you for the referral. The following patient has been assigned to Eula Sheldon, RN with Outpatient Complex Care Management for high risk screening.    Reason for referral: Symptomatic anemia    Please contact \[facility] [place] [facility]\"" at ext.[phone] with any questions.    Thank you,    Leigha Ochoa, [facility]  Outpatient Complex Care Mgmt  Ext [phone]

## 2018-03-05 NOTE — PATIENT INSTRUCTIONS
When You Have Gastrointestinal (GI) Bleeding    Blood in your vomit or stool can be a sign of gastrointestinal (GI) bleeding. GI bleeding can be scary. But the cause may not be serious. You should always see a doctor if GI bleeding occurs.  The GI tract  The GI tract is the path through which food travels in the body. Food passes from the mouth down the esophagus (the tube from the mouth to the stomach). Food begins to break down in the stomach. It then moves through the duodenum, the first part of the small intestine. Nutrients are absorbed as food travels through the small intestine. What is left passes into the colon (large intestine) as waste. The colon removes water from the waste. Waste continues from the colon to the rectum (where stool is stored). Waste then leaves the body through the anus.  Causes of GI bleeding  GI bleeding can be caused by many different problems. Some of the more common causes include:  · Swollen veins in the anus (hemorrhoids)  · Swollen veins in the esophagus (varices)  · Sore on the lining of the GI tract (ulcer)  · Cuts or scrapes in the mouth or throat  · Infection caused by germs such as bacteria or parasites  · Food allergies, such as milk allergy in young children  · Medicines  · Inflammation of the GI tract (gastritis or esophagitis)  · Colitis (Crohn's disease or ulcerative colitis)  · Cancer (tumors or polyps)  · Abnormal pouches in the colon (diverticula)  · Tears in the esophagus or anus  · Nosebleed  · Abnormal blood vessels in the GI tract (angiodysplasia)  Diagnosing the cause of blood in stool  If blood is coming out in your stool, you may have a lower GI tract problem or a very fast upper GI tract bleed. Bleeding from the GI tract can be bright red. Or it may look dark and tarry. Tests may also find blood in your stool that cant be seen with the eye (occult blood). To find out the cause, tests that may be ordered include:  · Blood tests. A blood sample is taken and  sent to a lab for exam.  · Hemoccult test. Checks a stool sample for blood.  · Stool culture. Checks a stool sample for bacteria or parasites.  · X-ray, ultrasound, or CT scan. Imaging tests that take pictures of the digestive tract.  · Colonoscopy or sigmoidoscopy. This test uses a flexible tube with a tiny camera. The tube is inserted through your anus into your rectum to see the inside of your colon. Your provider can also take a tiny tissue sample (biopsy) and treat a bleeding source  Diagnosing the cause of blood in vomit  If you are vomiting blood or something that looks like coffee grounds, you may have an upper GI tract problem. To find the cause, tests that may be done include:  · Upper Endoscopy. A flexible tube with a tiny camera is inserted through your mouth and throat to see inside your upper GI tract. This lets your provider take a tiny tissue sample (biopsy) and treat a bleeding source.  · Nasogastric lavage. This can tell if you have upper GI or lower GI bleeding.  · X-ray, ultrasound, or CT scan. Imaging tests that take pictures of your digestive tract.  · Upper GI series. X-rays of the upper part of your GI tract taken from inside your body.  · Enteroscopy. This sends a flexible tube or a small, swallowed capsule camera into your small intestine.  When to call your healthcare provider  Call your healthcare provider right away if you have any of the following:  · Bleeding from your mouth or anus that can't be stopped  · Fever of 100.4°F (38.0°) or higher  · Bleeding along with feeling lightheaded or dizzy  · Signs of fluid loss (dehydration). These include a dry, sticky mouth, decreased urine output; and very dark urine.  · Belly (abdominal) pain   Date Last Reviewed: 7/1/2016  © 6406-4248 [employer]. [address] [place] [address], [place] [address]. All rights reserved. This information is not intended as a substitute for professional medical care. Always follow your healthcare  professional's instructions.

## 2018-03-05 NOTE — PT/OT/SLP DISCHARGE
Physical Therapy Discharge Summary    Name: Shivani Sheets  MRN: [id number]   Principal Problem: GIB (gastrointestinal bleeding)     Patient Discharged from acute Physical Therapy on 3/2/2018.  Please refer to prior PT noted date on 3/1/2018 for functional status.     Assessment:     Patient was discharged unexpectedly.  Information required to complete an accurate discharge summary is unknown.  Refer to therapy initial evaluation and last progress note for initial and most recent functional status and goal achievement.  Recommendations made may be found in medical record.    Objective:     GOALS:    Physical Therapy Goals        Problem: Physical Therapy Goal    Goal Priority Disciplines Outcome Goal Variances Interventions   Physical Therapy Goal     PT/OT, PT Ongoing (interventions implemented as appropriate)     Description:  Goals to be met by: 3/15/2018     Patient will increase functional independence with mobility by performin. Sit to stand transfer with Supervision  2. Bed to chair transfer with Supervision using Rolling Walker  3. Gait  x 150 feet with Supervision using Rolling Walker.   4. Ascend/descend 12 stair with right Handrails Contact Guard Assistance  5. Lower extremity exercise program x30 reps per handout, with independence                      Reasons for Discontinuation of Therapy Services  Transfer to alternate level of care.      Plan:     Patient Discharged to: Home with Home Health Service.    Song Torres, PT  3/5/2018

## 2018-03-05 NOTE — DISCHARGE SUMMARY
"[facility]  Heart Transplant  Discharge Summary      Patient Name: Shivani Sheets  MRN: [id number]  Admission Date: 2/22/2018  Hospital Length of Stay: 8 days  Discharge Date and Time: 03/05/2018 9:03 AM  Attending Physician: No att. providers found   Discharging Provider: Quin Krishnamurthy PA-C  Primary Care Provider: César Iverson MD     HPI: 64 y/o female with PMH of PHT, RA, HLD, DD, CAD, central retinal artery occlusion, PUD (patient reports diagnosis in [place]), and ESRD 2/2 HTN (LUE AVF) s/p failed transplant (re-listed) who presented to [facility] on 2/15 with cough and was diagnosed with atypical PNA and discharged home with a Z-Yariel. She saw anticoagulation clinic 2/22 and her INR was noted to be elevated with patient having melena. She was encouraged to go to the ER and adviced to hold warfarin. In triage, she was note to have abdominal pain in the lower abdomen per history. The patient's Hgb was noted to 4.7 (down from 7.8 one week prior), the patient was admitted to the ICU, and "given blood transfusion and FFP". Per notes, at least 2U FFP and 3U PRBCs were given as well as Benadryl, IV steroids, Pepsid, and Lasix. IV protonix gtt was also ordered as was vitamin K. An EGD was post-poned with plan for 2/26 because the patient was on Venturi mask after FFP administration. HD was initiated with plan for MWF inpatient dialysis. Pulmonolgy was consulted 2/2 high risk nature of an EGD in someone with PHT. She was noted to be off all PHT medications, and the pulmonologist discussed a transfer to [facility] with Dr. Hennessy.     TTE 2/8/18    1 - Eccentric hypertrophy.     2 - Normal left ventricular systolic function (EF 60-65%).     3 - Right ventricle is upper limit of normal in size with hypertrophy, with normal systolic function.     4 - Impaired LV relaxation, elevated LAP (grade 2 diastolic dysfunction).     5 - Moderate left atrial enlargement.     6 - Mild tricuspid regurgitation.     7 - " Pulmonary hypertension. The estimated PA systolic pressure is 71 mmHg.     Procedure(s) (LRB):  ESOPHAGOGASTRODUODENOSCOPY (EGD) (N/A)     Hospital Course: Pt was transferred here for GIB in setting of moderate PH. At OSH, all PH meds had been held. Adcirca was restarted here. Selexipag is not on formulary here so plan was to restart as outpt at lower dose. Plan is to not resume macitentan given association with severe anemia in rare cases. GI was consulted for GIB . Pt had EGD on 2/27/18- no findings to explain anemia. GI plans to do cscope as an outpt. Pt's coumadin was restarted after EGD and her H and H remained stable. She was d/c'ed home on PPI.  Pt will be d/c'ed home with Home Health. She will have INR and CBC on Mon 3/5/18. On d/c, he will be restarted on selixipag at 200 mcg BID and this will be uptitrated as an outpt. She will also cont on Adcirca. She will no longer be on macitentan given association with severe anemia in rare cases. Pt will have f/u in PH clinic in the next 2 weeks. She will also need a RHC once acute issues resolve and back on maintenence PH tx. She will alsp need an outpt colonoscopy.     Consults         Status Ordering Provider     Inpatient consult to Gastroenterology  Once     Provider:  Kenneth Devi MD    Completed ANKIT CAMP,     Inpatient consult to Gastroenterology  Once     Provider:  (Not yet assigned)    Completed FAIZAN RICHARDS     Inpatient consult to Hematology  Once     Provider:  Donta Moore MD    Completed WALTER PINK     Inpatient consult to Nephrology  Once     Provider:  Tye Deleon MD    Completed WALTER PINK     Inpatient consult to Nephrology  Once     Provider:  (Not yet assigned)    FAIZAN Wan              Pending Diagnostic Studies:     None        Final Active Diagnoses:    Diagnosis Date Noted POA    PRINCIPAL PROBLEM:  GIB (gastrointestinal bleeding) [K92.2] 02/27/2018 Yes    Pulmonary hypertension [I27.20]  02/11/2014 Yes    Atrial fibrillation [I48.91] 02/11/2014 Yes    Symptomatic anemia [D64.9] 02/27/2018 Yes    Melena [K92.1] 02/27/2018 Yes    Hypothyroidism [E03.9]  Yes    Central retinal artery occlusion [H34.10] 08/10/2012 Yes    CAD (coronary artery disease) [I25.10] 08/10/2012 Yes    ESRD from HTN started RRT 1999 [N18.6] 01/01/1999 Yes      Problems Resolved During this Admission:    Diagnosis Date Noted Date Resolved POA    Symptomatic anemia [D64.9] 02/22/2018 02/27/2018 Yes      Discharged Condition: stable    Disposition: [facility]-Health Care [facility]        Patient Instructions:     Ambulatory referral to Outpatient Case Management   Referral Priority: Routine Referral Type: Consultation   Referral Reason: Specialty Services Required    Number of Visits Requested: 1      Diet Adult Regular   Order Specific Question Answer Comments   Na restriction, if any: 2gNa    Additional restrictions: Renal      Activity as tolerated     Notify your health care provider if you experience any of the following:  persistent nausea and vomiting or diarrhea     Notify your health care provider if you experience any of the following:  difficulty breathing or increased cough     Notify your health care provider if you experience any of the following:  severe persistent headache     Notify your health care provider if you experience any of the following:  persistent dizziness, light-headedness, or visual disturbances     Notify your health care provider if you experience any of the following:  increased confusion or weakness       Medications:  Reconciled Home Medications:   Discharge Medication List as of 3/2/2018  3:33 PM      START taking these medications    Details   pantoprazole (PROTONIX) 40 MG tablet Take 1 tablet (40 mg total) by mouth once daily., Starting Fri 3/2/2018, Until Sat 3/2/2019, Normal         CONTINUE these medications which have CHANGED    Details   selexipag (UPTRAVI) 200 mcg Tab Take 200 mcg by mouth  2 (two) times daily., Starting Fri 3/2/2018, No Print      warfarin (COUMADIN) 5 MG tablet Take 1.5 tablets (7.5 mg total) by mouth Daily. Then as directed by coumadin clinic, Starting Fri 3/2/2018, No Print         CONTINUE these medications which have NOT CHANGED    Details   ALPHAGAN P 0.1 % Drop Starting Sun 11/19/2017, Historical Med      aspirin 81 MG chewable tablet Take 81 mg by mouth Daily. 1  By mouth Every day, Until Discontinued, Historical Med      CARTIA  mg 24 hr capsule Starting 11/8/2015, Until Discontinued, Historical Med      dorzolamide-timolol 2-0.5% (COSOPT) 22.3-6.8 mg/mL ophthalmic solution INSTILL 1 DROP IN BOTH EYES TWICE DAILY, Normal      hydrocodone-acetaminophen 5-325mg (NORCO) 5-325 mg per tablet Take 1 tablet by mouth every 4 (four) hours as needed., Starting Tue 1/23/2018, Print      latanoprost 0.005 % ophthalmic solution INSTILL 1 DROP IN BOTH EYES EVERY DAY AT BEDTIME, Normal      levothyroxine (SYNTHROID) 100 MCG tablet Take 100 mcg by mouth. 1 Tablet Oral Every day, Until Discontinued, Historical Med      lidocaine-prilocaine (EMLA) cream Apply topically as needed., Until Discontinued, Historical Med      LIPITOR 10 mg tablet TAKE 1 BY MOUTH ONCE A DAY, Normal      tadalafil, PULMONARY HYPERTENSION, (ADCIRCA) 20 mg Tab Take 2 tablets (40 mg total) by mouth once daily., Starting Fri 4/7/2017, Normal         STOP taking these medications       azithromycin (Z-SHLOMO) 250 MG tablet Comments:   Reason for Stopping:         cloNIDine (CATAPRES) 0.1 MG tablet Comments:   Reason for Stopping:         macitentan (OPSUMIT) 10 mg Tab Comments:   Reason for Stopping:               Quin Krishnamurthy PA-C  Heart Transplant  [facility]

## 2018-03-05 NOTE — HOSPITAL COURSE
Pt was transferred here for GIB in setting of moderate PH. At OSH, all PH meds had been held. Adcirca was restarted here. Selexipag is not on formulary here so plan was to restart as outpt at lower dose. Plan is to not resume macitentan given association with severe anemia in rare cases. GI was consulted for GIB . Pt had EGD on 2/27/18- no findings to explain anemia. GI plans to do cscope as an outpt. Pt's coumadin was restarted after EGD and her H and H remained stable. She was d/c'ed home on PPI.  Pt will be d/c'ed home with Home Health. She will have INR and CBC on Mon 3/5/18. On d/c, he will be restarted on selixipag at 200 mcg BID and this will be uptitrated as an outpt. She will also cont on Adcirca. She will no longer be on macitentan given association with severe anemia in rare cases. Pt will have f/u in PH clinic in the next 2 weeks. She will also need a RHC once acute issues resolve and back on maintenence PH tx. She will alsp need an outpt colonoscopy.

## 2018-03-06 ENCOUNTER — ANTI-COAG VISIT (OUTPATIENT)
Dept: CARDIOLOGY | Facility: CLINIC | Age: 63
End: 2018-03-06

## 2018-03-06 ENCOUNTER — LAB VISIT (OUTPATIENT)
Dept: LAB | Facility: HOSPITAL | Age: 63
End: 2018-03-06
Attending: INTERNAL MEDICINE
Payer: MEDICARE

## 2018-03-06 ENCOUNTER — OUTPATIENT CASE MANAGEMENT (OUTPATIENT)
Dept: ADMINISTRATIVE | Facility: OTHER | Age: 63
End: 2018-03-06

## 2018-03-06 DIAGNOSIS — D64.9 ANEMIA, UNSPECIFIED: Primary | ICD-10-CM

## 2018-03-06 DIAGNOSIS — Z79.01 ANTICOAGULATION MONITORING BY PHARMACIST: ICD-10-CM

## 2018-03-06 LAB
BASOPHILS # BLD AUTO: 0.04 K/UL
BASOPHILS NFR BLD: 0.7 %
DIFFERENTIAL METHOD: ABNORMAL
EOSINOPHIL # BLD AUTO: 0.3 K/UL
EOSINOPHIL NFR BLD: 5.8 %
ERYTHROCYTE [DISTWIDTH] IN BLOOD BY AUTOMATED COUNT: 18.5 %
HCT VFR BLD AUTO: 31.5 %
HGB BLD-MCNC: 9.8 G/DL
INR PPP: 1.7
LYMPHOCYTES # BLD AUTO: 1.2 K/UL
LYMPHOCYTES NFR BLD: 21.3 %
MCH RBC QN AUTO: 27.1 PG
MCHC RBC AUTO-ENTMCNC: 31.1 G/DL
MCV RBC AUTO: 87 FL
MONOCYTES # BLD AUTO: 0.9 K/UL
MONOCYTES NFR BLD: 16 %
NEUTROPHILS # BLD AUTO: 3.1 K/UL
NEUTROPHILS NFR BLD: 56.2 %
PLATELET # BLD AUTO: 134 K/UL
PMV BLD AUTO: 11 FL
RBC # BLD AUTO: 3.62 M/UL
WBC # BLD AUTO: 5.5 K/UL

## 2018-03-06 PROCEDURE — 85025 COMPLETE CBC W/AUTO DIFF WBC: CPT

## 2018-03-06 NOTE — LETTER
March 7, 2018    Shivani MELA Sheets  [address]  [place] [address]             [facility] [address] [facility] [address] Dear Ms. Sheets:    It was a pleasure speaking with you today. As discussed, I am enclosing information on Anemia, Warfarin and Fall Prevention. I will call you weekly in the beginning to review and discuss. Furthermore, I wanted to remind you that [facility] care line [phone] or [phone] is a 24 hour 7 days per week free nurse line to assist you determine best care options for you, provides triage, appointment booking, health education and advisory services.     I can be reached Monday through Wednesday at [phone]. The Outpatient Care Management Department can be reached at [phone] from 8:00AM to 4:30 PM on Monday thru Friday.    Sincerely,          Emili Edmond RN [facility]  Outpatient Complex Care Manager

## 2018-03-06 NOTE — PROGRESS NOTES
The pt's INR is subtherapeutic today, although it is rising quickly.  She was recently discharged with a GI bleed, so I am therefore lowering her current weekly dose of warfarin to prevent a supratherapeutic INR.  I will monitor closely with a repeat level next week.

## 2018-03-06 NOTE — PROGRESS NOTES
3/6/18 Patient referred to [facility] for high risk. Patient reports living with her brother Kenji Zaldivar, her son and granddaughter. Patient is ESRD on HD MWF at [facility] for the last 18 years. Has early shift of 5:30 am to 9:45 AM. States has brother or son drive her to appointment as she has had bad experience with Medicaid transportation in past. Would like to explore [employer]S. Patient also has history of CHF with EF of 60%. Patient is managing HF well, no admissions and following low sodium diet, knows S&S of when to call MD, etc. Patient does have CPAP that she wears at night and also Oxygen that she wears mainly when doing house work or activities that exert her. Requesting to see if she could qualify for any help through [place] on Aging or any other benefit. States since she had GI Bleed and developed anemia on 2/20, she is needing help with ADLS, cooking, cleaning, etc. Would like to see if she qualifies for meals on wheels for a while until she is back to normal. Last Hgb 9 and Hct 28.9. Furthermore, patient had a fall on January 23rd where she hit her right knee and has been having to use walker or cane. Reviewed Anemia and Fall Prevention JESSENIA. Patient verbalizes understanding. Will refer to Frieda Lovett LCSW to assist with resources.     Plan:  1. Anemia/GIB: focusing on diet ( patient needs to also follow Coumadin, renal and low sodium diet)  2. Fall Prevention  3. Resources: Meals on wheels, Cleaning services, transportation - referral to [facility] HERMANW

## 2018-03-07 RX ORDER — DILTIAZEM HYDROCHLORIDE 240 MG/1
CAPSULE, EXTENDED RELEASE ORAL
Qty: 90 CAPSULE | Refills: 0 | Status: SHIPPED | OUTPATIENT
Start: 2018-03-07 | End: 2018-06-11 | Stop reason: SDUPTHER

## 2018-03-08 ENCOUNTER — OUTPATIENT CASE MANAGEMENT (OUTPATIENT)
Dept: ADMINISTRATIVE | Facility: OTHER | Age: 63
End: 2018-03-08

## 2018-03-08 NOTE — PROGRESS NOTES
EMERGENCY DEPARTMENT ENCOUNTER    CHIEF COMPLAINT  Chief Complaint: R ankle pain   History given by: pt   History limited by: none   Room Number: 45/45  PMD: Yoanna Monroe, APRN      HPI:  Pt is a 47 y.o. female who presents complaining of R ankle pain s/p injury earlier today. Pt states that she was texting while walking, and missed a step, causing her R ankle to roll. Pt also c/o R ankle swelling. Pt denies other injury.    Duration:  Since earlier today   Onset: s/p injury   Timing: constant   Location: R ankle   Radiation: none stated   Quality: pain   Intensity/Severity: moderate   Progression: unchanged   Associated Symptoms: none   Aggravating Factors: none stated   Alleviating Factors: none stated   Previous Episodes: none stated   Treatment before arrival: none    PAST MEDICAL HISTORY  Active Ambulatory Problems     Diagnosis Date Noted   • No Active Ambulatory Problems     Resolved Ambulatory Problems     Diagnosis Date Noted   • No Resolved Ambulatory Problems     Past Medical History:   Diagnosis Date   • Anxiety    • Atrial fibrillation    • Hypertension    • Panic attacks        PAST SURGICAL HISTORY  History reviewed. No pertinent surgical history.    FAMILY HISTORY  History reviewed. No pertinent family history.    SOCIAL HISTORY  Social History     Social History   • Marital status:      Spouse name: N/A   • Number of children: N/A   • Years of education: N/A     Occupational History   • Not on file.     Social History Main Topics   • Smoking status: Never Smoker   • Smokeless tobacco: Not on file   • Alcohol use No   • Drug use: No   • Sexual activity: Defer     Other Topics Concern   • Not on file     Social History Narrative   • No narrative on file       ALLERGIES  Review of patient's allergies indicates no known allergies.    REVIEW OF SYSTEMS  Review of Systems   Constitutional: Negative.    HENT: Negative.    Respiratory: Negative.    Cardiovascular: Negative.    Gastrointestinal:  Patient was referred by [facility] RN Emili Edmond for psychosocial care coordination and support. Patient said she was not able to complete the assessment at this time.  A telephonic appointment to complete the assessment was scheduled for Tuesday, 3/13/18 at 12:30 PM.  LCSW will contact patient at that time.   Negative.    Genitourinary: Negative.    Musculoskeletal: Positive for myalgias (R ankle).   Skin: Negative.    Neurological: Negative.        PHYSICAL EXAM  ED Triage Vitals   Temp Heart Rate Resp BP SpO2   03/03/18 2113 03/03/18 2113 03/03/18 2113 03/03/18 2113 03/03/18 2113   98.2 °F (36.8 °C) 78 18 114/78 98 %      Temp src Heart Rate Source Patient Position BP Location FiO2 (%)   03/03/18 2113 03/03/18 2113 -- -- --   Tympanic Monitor          Physical Exam   Constitutional: She is oriented to person, place, and time and well-developed, well-nourished, and in no distress.   Eyes: EOM are normal.   Neck: Normal range of motion.   Cardiovascular: Normal rate and regular rhythm.    Pulmonary/Chest: Effort normal and breath sounds normal. No respiratory distress.   Musculoskeletal: She exhibits edema (mild R ankle and foot) and tenderness (mild R ankle and R foot). She exhibits no deformity.   No proximal tib/fib tenderness   Neurological: She is alert and oriented to person, place, and time. She has normal sensation and normal strength.   Pt is neurovascularly intact   Skin: Skin is warm and dry.   Psychiatric: Affect normal.   Nursing note and vitals reviewed.    RADIOLOGY  XR Ankle 3+ View Right   Final Result   1. No acute osseous finding..       FINDINGS RIGHT FOOT: 3 views of the right foot were obtained. There is   no fracture or dislocation.  Soft tissues are unremarkable.  There is a   bipartite tibial sesamoid.       IMPRESSION:    1. No acute osseous finding.       This report was finalized on 3/3/2018 10:00 PM by Charles Núñez MD.          XR Foot 3+ View Right   Final Result   1. No acute osseous finding..       FINDINGS RIGHT FOOT: 3 views of the right foot were obtained. There is   no fracture or dislocation.  Soft tissues are unremarkable.  There is a   bipartite tibial sesamoid.       IMPRESSION:    1. No acute osseous finding.       This report was finalized on 3/3/2018 10:00 PM by Charles Núñez  MD.               I ordered the above noted radiological studies. Interpreted by radiologist. Reviewed by me in PACS.       PROCEDURES  Procedures      PROGRESS AND CONSULTS  ED Course   2125  Ordered XR R foot and XR R ankle for further evaluation.   2207  Rechecked pt, who is resting comfortably. Discussed the pt's XR results, which were negative for fracture. Plan to d/c the pt with antiinflammatory medication, and a boot for stability. Pt understands and agrees with the plan, and all questions were answered.     MEDICAL DECISION MAKING  Results were reviewed/discussed with the patient and they were also made aware of online access. Pt also made aware that some labs, such as cultures, will not be resulted during ER visit and follow up with PMD is necessary.     MDM  Number of Diagnoses or Management Options  Sprain of right ankle, unspecified ligament, initial encounter:   Sprain of right foot, initial encounter:      Amount and/or Complexity of Data Reviewed  Tests in the radiology section of CPT®: ordered and reviewed (XR R foot: negative. XR R ankle: negative)    Patient Progress  Patient progress: stable         DIAGNOSIS  Final diagnoses:   Sprain of right ankle, unspecified ligament, initial encounter   Sprain of right foot, initial encounter       DISPOSITION  DISCHARGE    Patient discharged in stable condition.    Reviewed implications of results, diagnosis, meds, responsibility to follow up, warning signs and symptoms of possible worsening, potential complications and reasons to return to ER.    Patient/Family voiced understanding of above instructions.    Discussed plan for discharge, as there is no emergent indication for admission.  Pt/family is agreeable and understands need for follow up and repeat testing.  Pt is aware that discharge does not mean that nothing is wrong but it indicates no emergency is present that requires admission and they must continue care with follow-up as given below or  physician of their choice.     FOLLOW-UP  Yoanna Monroe, APRN  [address]  [facility] [address] [phone]    In 1 week  If symptoms worsen         Medication List      New Prescriptions          naproxen 500 MG EC tablet   Commonly known as:  EC NAPROSYN   Take 1 tablet by mouth 2 (Two) Times a Day With Meals.               Latest Documented Vital Signs:  As of 10:25 PM  BP- 114/78 HR- 78 Temp- 98.2 °F (36.8 °C) (Tympanic) O2 sat- 98%    --  Documentation assistance provided by vicki Robles for Dr. Celestin.  Information recorded by the vicki was done at my direction and has been verified and validated by me.       Matthew Robles  03/03/18 [id number]       Hernando Celestin MD  03/03/18 [id number]

## 2018-03-09 ENCOUNTER — TELEPHONE (OUTPATIENT)
Dept: TRANSPLANT | Facility: CLINIC | Age: 63
End: 2018-03-09

## 2018-03-12 ENCOUNTER — OUTPATIENT CASE MANAGEMENT (OUTPATIENT)
Dept: ADMINISTRATIVE | Facility: OTHER | Age: 63
End: 2018-03-12

## 2018-03-12 ENCOUNTER — TELEPHONE (OUTPATIENT)
Dept: TRANSPLANT | Facility: CLINIC | Age: 63
End: 2018-03-12

## 2018-03-12 NOTE — PROGRESS NOTES
"3/12/18 Follow-up call with patient. Reports receiving packet from [facility]. Reports feeling tired as they pulled more fluid in HD. Denies any falls. Denies black, tarry stool. States "finally brown". Reviewed last H/H 9.8/31.5, going up. Reviewed diet and foods that can help to bring h/H up and not interfere with Coumadin diet. Patient verbalizes Coumadin dose for this week as INR had been below 2. Reviewed appointments. Patient verbalizes understanding.     Plan:  1. Anemia/GIB: focusing on diet ( patient needs to also follow Coumadin, renal and low sodium diet)  2. Fall Prevention  3. Resources: Meals on wheels, Cleaning services, transportation - referral to [facility] LCSW  "

## 2018-03-13 ENCOUNTER — OUTPATIENT CASE MANAGEMENT (OUTPATIENT)
Dept: ADMINISTRATIVE | Facility: OTHER | Age: 63
End: 2018-03-13

## 2018-03-13 LAB — INR PPP: 2.2

## 2018-03-13 NOTE — PROGRESS NOTES
LCSW called patient at the scheduled time to complete the [facility] SW Assessment.  Patient said she forgot about the appointment and was having lunch.  Patient said she would call back at 1:30 PM today.

## 2018-03-13 NOTE — PROGRESS NOTES
Telephonic assessment completed with patient today.  Patient is a 62 year-old  female who with her brother Kenji Zaldivar, her son and her adult granddaughter.  Patient has medical history ESRD on HD and goes to [facility] for treatment MWF.  Patient multiple co-morbid conditions.  Patient said her family assists with transportation and other caregiving needs but she would like additional support. Patient participated in [employer] activities and continues to enjoy social outings with friends.  Patient states that her needs include: transportation; homemaker services; home-delivered meals.  Medicaid, [employer]S, [employer], and family transportation options discussed; [employer] application mailed to patient.  [place] on Aging homemaker, nutrition, and senior center programs discussed as well as private pay homemaker services.   Patient states that she has a Living Will no MPOA.  She will provide a copy of her LW to her provided.  MPOA discussed and the [facility] Advance Directive form mailed to patient. Patient said her medical condition and resulting loss of independence is causing stress. Ways to decrease stress discussed.  [employer] literature on Stress mailed to patient.  Information on senior organizations also mailed for increased social activity.  Patient states that she is working with [facility] PFA on her bill.      Interventions:  Per POC    Ensure that patient has received and reviewed community resource information.  Review community resource information with patient and provide support as needed.  Ensure follow-up/contact with community resource programs.  Follow-up on Living Will to provider and completion of MPOA form  Follow-up on completion of [employer]S application/transportation  .

## 2018-03-14 ENCOUNTER — ANTI-COAG VISIT (OUTPATIENT)
Dept: CARDIOLOGY | Facility: CLINIC | Age: 63
End: 2018-03-14

## 2018-03-14 DIAGNOSIS — Z79.01 ANTICOAGULATION MONITORING BY PHARMACIST: ICD-10-CM

## 2018-03-15 ENCOUNTER — OUTPATIENT CASE MANAGEMENT (OUTPATIENT)
Dept: ADMINISTRATIVE | Facility: OTHER | Age: 63
End: 2018-03-15

## 2018-03-15 NOTE — LETTER
March 15, 2018    Shivani Sheets  [address] [place] [address]             Outpatient Case Management  [address]  [facility] [address] Dear Shivani Sheets:    We understand that receiving many services from different doctors and healthcare providers is overwhelming. There are appointments to make, transportation to arrange, dietary instructions to understand, and new medications to obtain.    This is where [facility] can help.     You are eligible to receive Outpatient Case Management services when you have healthcare needs that require the coordination of many providers, treatments, and services. You also qualify if you need assistance with a new treatment plan.     There is no charge for this support. You may have been referred to this program from your doctor(s), hospital staff member(s), or insurance company but you always have a choice to participate or not participate. To participate, you must give us your permission to be enrolled.     When you are enrolled in the [facility] program, the  who is assigned to you is    Emili Edmond RN  [phone]    Depending on your needs and wishes, your  may speak with you by phone, visit you at your place of living (for example your home, skilled nursing facility, or rehabilitation facility), or meet you at your doctors office.     Your  will tell you why you have been selected to participate in the program and will complete an assessment of your needs. Then a personalized plan of care will be developed with you and or your caregiver.             Here are examples of the services your [facility]  provides.     Coordinate communication among multiple providers.   Arrange for transportation, doctors visits, durable medical equipment, home care services, and special clinics.    Provide coaching on how to manage your health condition.    Answer  questions about your health condition.   Help you understand your doctors treatment plan.    Provide additional instruction about your health condition, treatments, and medications.    Help you obtain information about your insurance coverage.    Advocate for your individual needs.    Request a Licensed Clinical  (LCSW) to visit you if you need their services. LCSWs help with long term planning (discussing placement options, advanced planning directives), financial planning, and assistance (for example rent, utilities, medication funding).     Your  will coordinate their activities with other outpatient services you are receiving. All services provided by [facility]  are coordinated with and communicated to your primary care physician.    Our goal is to help you manage your health condition(s) safely within your living environment, whether that is your home or a medical facility. We want to help you function at the healthiest and highest level possible.     Sincerely,      Slade Thomas MD  Medical Director    Enclosures:    Frequently Asked Questions  Patient Rights and Responsibilities   Reporting a Grievance or Complaint  Consent/Release of Information  Stamped Addressed Envelope                  Frequently Asked Questions about [facility]    What is [facility]?  Outpatient Case management is not Home healthcare services. [facility]  do not provide hands-on care. [facility]  will work with your doctor to arrange for home health services, if needed. Home health services have a limited duration and there are some restrictions as to who can get these services. There is no prescribed limit to the amount of time you receive [facility] services. [facility]  are not agents of your insurance company. However, [facility] Case  Managers can help you obtain information from your insurance company.     Who are the [facility] ?  [facility]  are Registered Nurses and Social Workers. It is important to remember that you and your  are a team that works together with your primary care physician to create your individualized plan of care. The ultimate goal of your care plan is to help you implement your doctors treatment plan and to help you function at the highest level of health possible.     What are my rights as a patient?  It is important for you to know and understand your rights and responsibilities while receiving services from the [facility] program. We have enclosed a complete description of your rights and responsibilities. You can help to make your care more effective when you understand your right and responsibilities.     What is needed to be enrolled in the program?  You are only enrolled in the [facility] Program when you give us your consent to participate. You will find enclosed a consent form. You are receiving this letter because you or your caregivers have given us a verbal consent to enroll you in [facility] Management Program. We ask that you sign and return the enclosed written consent in the stamped self-addressed envelope.                           Patient Rights and Responsibilities    We consider you a partner in your care. When you are well informed, participate in treatment decisions and communicate openly with your doctor and other healthcare professionals, you help make your care more effective.     While you are in the Outpatient Case Management Program, your rights include the following:     You have a right to be provided services in a non-discriminatory manner in accordance with the provisions of Title VI of the Civil Rights Act of 1964, Section 504 of the Rehabilitation Act of 1973, the Age  Discrimination Act of 1975, the Americans with Disabilities Act as well as any other applicable Federal and State laws and regulations.     You have the right to a reasonable, timely response to your request or need for care, as well as the right to considerate and respectful care including an environment that preserves dignity and contributes to a positive self-image. You are responsible for being considerate and respectful of our staff.     You have a right to information regarding patient rights, advocacy services and complaint mechanisms, and the right to prompt resolution of any complaint. You or a designee has the right to participate in the resolution of ethical issues surrounding your care. You have a right to file a complaint if you feel that your rights have been infringed, without fear or penalty from [facility] or the federal government. You may file a complaint with the Director of Outpatient Case Management by calling [phone]. At any time, you may lodge a grievance with the [facility] and [facility] by calling [phone], or the Joint Commission on Accreditation of Healthcare Organizations at [phone].     You, or someone acting on your behalf, have the right to understandable information on your health status, treatment and progress in order to make decisions. You have the right to know the nature, risks and alternatives to treatment. You have the right to be informed, when appropriate, regarding the outcome of the care that has been provided. You have the right to refuse treatment to the extent permitted by law, and the right to be informed of the alternatives and consequences of refusing treatment.     You, in collaboration with your physician, have the right to make decisions regarding care and the right to participate in the development and implementation of the plan of care and effective pain management. You have the right to know the name and professional status of  those responsible for the delivery of your care and treatment.       You have a right, within legal guidelines, to have a guardian, next-of-kin or legal designee exercises your patient rights when you are unable to do so. You have the right for your wishes regarding end-of-life decisions to be addressed by the healthcare team through advance directives. You have the right to personal privacy and confidentiality and to expect confidentiality of all records and communications pertaining to your care.      You have the right to receive communications about your health information confidentially. You have the right to request restrictions on the uses and disclosures of your health information. You have the right to inspect, copy, request amendments and receive an accounting of to whom we have disclosed your health information.     You have the right to be provided with interpretation services if you do not speak English; to alternative communication techniques if you are hearing or vision impaired; and to have any other resources needed on your behalf to ensure effective communication. These services are provided free of charge.     You have a right to personal safety (free from mental, physical, sexual and verbal abuse, neglect and exploitation). You have the right to access protective and advocacy services.     Advance Directives  A Patient Advocate is available to meet with patients to answer questions regarding advance directives.    Living Will  A document that outlines what medical treatment the patient does or does not want in the event the patient becomes unable to make those decisions at the appropriate time.    Durable Medical Power of   A document by which the patient designates an individual to be responsible for making medical decisions in the event the patient becomes unable to do so.    HIPAA Notice of Privacy Practices  Your medical information is governed by federal privacy laws. HIPAA  protects private medical information and how that information is disclosed. If you have a question regarding the HIPAA Notice of Privacy Practices, or if you believe your privacy rights have been violated, you may call our designated hotline at ([phone].            Quality Improvement  Because we consistently strive to improve the care and service provided to our patients, we welcome your feedback. Your comments are an important part of our quality improvement process, as we like to know what we are doing right and which areas are in need of improvement. Our policy is to listen, be responsive and provide you with an appropriate and timely follow-up to your questions or concerns. Our goal is active patient and family involvement in all aspects of the care process.           Reporting a Grievance or Complaint    During your time with the [facility] team you may have a grievance or complaint with our services. Your Patients Bill of Rights gives patients, families, and caregivers the right to express concerns and grievances and the right to expect a reasonable and timely response.     Your presentation of your concerns is not viewed negatively. It is an opportunity for us to improve the quality of our care and services we provide to you.     You may report your concerns directly to your , or you can phone in a complaint to:     Director of Outpatient Case Management  [phone]    You may also send a complaint letter to:    Director of Outpatient Case Management Services  [address] [place] [id number]    Tell us the details of your complaint and provide us with a contact phone number so we can contact you to obtain additional information. We will return a call to you within two business days of our receipt of your complaint, and to request additional information as needed. If you choose to mail a letter, your complaint may take a few days longer to reach us.      All grievances will be addressed as quickly as possible. A grievance or complaint that involves situations or practices which place patients in immediate danger will be addressed as an urgent matter. We will work to resolve all other complaints within seven days of receipt. By that time, you will receive a phone call with either the resolution of your complaint, or a plan for corrective action. A formal written response will be sent to you within 30 days of receipt of your grievance.     If a resolution cannot be completed within 30 days, a letter will be sent to you or your family member with an estimated time for the final response.    Additionally, all patients have the right to file complaints with external agencies, without exception. Complaints/grievances can be addressed to the following agencies:            Patient Safety or Quality of Care Concerns  Office of Quality Monitoring   [facility] [place] [address]  [phone] Toll Free    HIPPA Privacy/Security Concerns  Office for Civil Rights Region IV  [place] Department of Health & Human Services  [address], [address]  [place] [address] [phone] Phone  [phone] TDD  [phone] Toll Free    Medicare/Medicaid Billing Concerns  [place] for Medicare & Medicaid Services  Region 6  [address], [address]  [place] [address] [phone] Phone  [phone] Toll Free    General Concerns  [place] [facility]  [phone] Toll Free Complaint Hotline                                      Consent Form/Release of Information    By signing--     (1) I agree I have read the Outpatient Case Management information provided to me;     (2) I agree to voluntarily participate in the Outpatient Case Management program;     (3) I understand I must consent to participation in the Outpatient Case Management program during my first interview with my Case  Manager;    (4) I consent to the discussion and release of my personal health information to my healthcare team (including my personal physician, my medical home care team, any specialty physician(s), and my [facility] team);     (5) I agree my consent is valid for the length of time I am receiving Outpatient Case Management;    (6) I agree to referrals to community resources which my Case Management team recommends for me. I agree to the release of my personal information and personal health information as necessary to referral sources.    ___________________________________________________________________  Patients Printed Name     ___________________________________________________________________  Patients Signature       Date    If patient is in being cared for, please complete this section:     ___________________________________________________________________  Printed Name of Person Caring For Patient   Relationship To Patient    ___________________________________________________________________   Signature of Person Caring For Patient     Date    PLEASE SIGN AND RETURN IN THE ENCLOSED PRE-ADDRESSED ENVELOPE.

## 2018-03-15 NOTE — PROGRESS NOTES
Please note an Outpatient Complex Care Management welcome packet and consent form was created and mailed to the patient on 3/15/18.    Please contact [facility] at ext. [phone] with any questions.    Thank you,    Leigha Ochoa, [facility]  Outpatient Complex Care Mgmt  Ext [phone]

## 2018-03-21 ENCOUNTER — TELEPHONE (OUTPATIENT)
Dept: TRANSPLANT | Facility: CLINIC | Age: 63
End: 2018-03-21

## 2018-03-21 ENCOUNTER — ANTI-COAG VISIT (OUTPATIENT)
Dept: CARDIOLOGY | Facility: CLINIC | Age: 63
End: 2018-03-21

## 2018-03-21 DIAGNOSIS — Z79.01 ANTICOAGULATION MONITORING BY PHARMACIST: ICD-10-CM

## 2018-03-21 LAB — INR PPP: 1.6

## 2018-03-21 NOTE — TELEPHONE ENCOUNTER
----- Message from Deana Griffin MA sent at 3/21/2018 12:47 PM CDT -----  Contact: Mimi   Please call Mimi   From [facility] she need to talk to the nurse about  The patient medication. Please call [phone].. Thank you.

## 2018-03-21 NOTE — TELEPHONE ENCOUNTER
Returned phone call. Details regarding recent hospitalization for GI bleed and D/C of opsumit provided.

## 2018-03-21 NOTE — PROGRESS NOTES
Verbal result taken from __ky_______. PT/INR _1.6______ Date drawn___3/20/18_____ Hardcopy to be faxed.

## 2018-03-23 RX ORDER — ATORVASTATIN CALCIUM 10 MG
TABLET ORAL
Qty: 90 TABLET | Refills: 2 | Status: SHIPPED | OUTPATIENT
Start: 2018-03-23 | End: 2019-05-06 | Stop reason: SDUPTHER

## 2018-03-26 ENCOUNTER — OUTPATIENT CASE MANAGEMENT (OUTPATIENT)
Dept: ADMINISTRATIVE | Facility: OTHER | Age: 63
End: 2018-03-26

## 2018-03-26 NOTE — PROGRESS NOTES
"3/26/18 Summary: Follow-up call with patient. Reports still in route from HD to home. Requests call back in 30 min. Called patient as requested. Reports she is home and has had breakfast. States she was irritable at HD due to pain in right hip. States her B/P went up due to pain in HD in last 30 min of treatment.  States she had brought a pillow for her back but nothing for hip. States she had taken Tylenol but then medication must have been flushed out with HD.  States now pain is resolved. States chairs are uncomfortable. Reports stools are finally "normal in color, brown". Denies falls.      Intervention: Reviewed Anemia/GI Bleed JESSENIA. Patient verbalizes understanding. Reviewed upcomming appts. Patient verbalizes understanding. States she was called for a procedure but couldn't get to phone. Informed that I only see six-min walk, no other orders/calls/etc.         Plan:  1. Anemia/GIB: focusing on diet ( patient needs to also follow Coumadin, renal and low sodium diet)  2. Fall Prevention  3. Resources: Meals on wheels, Cleaning services, transportation - referral to [facility] LCSW  "

## 2018-03-27 ENCOUNTER — OFFICE VISIT (OUTPATIENT)
Dept: TRANSPLANT | Facility: CLINIC | Age: 63
End: 2018-03-27
Payer: MEDICARE

## 2018-03-27 ENCOUNTER — LAB VISIT (OUTPATIENT)
Dept: LAB | Facility: HOSPITAL | Age: 63
End: 2018-03-27
Attending: INTERNAL MEDICINE
Payer: MEDICARE

## 2018-03-27 ENCOUNTER — ANTI-COAG VISIT (OUTPATIENT)
Dept: CARDIOLOGY | Facility: CLINIC | Age: 63
End: 2018-03-27

## 2018-03-27 VITALS
WEIGHT: 163.13 LBS | DIASTOLIC BLOOD PRESSURE: 105 MMHG | SYSTOLIC BLOOD PRESSURE: 194 MMHG | HEART RATE: 74 BPM | OXYGEN SATURATION: 96 % | BODY MASS INDEX: 30.8 KG/M2 | HEIGHT: 61 IN

## 2018-03-27 DIAGNOSIS — Z79.01 ANTICOAGULATION MONITORING BY PHARMACIST: ICD-10-CM

## 2018-03-27 DIAGNOSIS — E78.5 HYPERLIPIDEMIA, UNSPECIFIED HYPERLIPIDEMIA TYPE: ICD-10-CM

## 2018-03-27 DIAGNOSIS — I27.20 PULMONARY HYPERTENSION: ICD-10-CM

## 2018-03-27 DIAGNOSIS — I25.10 CORONARY ARTERY DISEASE INVOLVING NATIVE CORONARY ARTERY OF NATIVE HEART WITHOUT ANGINA PECTORIS: ICD-10-CM

## 2018-03-27 DIAGNOSIS — I27.20 PULMONARY HYPERTENSION: Primary | ICD-10-CM

## 2018-03-27 DIAGNOSIS — G47.33 OBSTRUCTIVE SLEEP APNEA SYNDROME: ICD-10-CM

## 2018-03-27 DIAGNOSIS — I48.20 CHRONIC ATRIAL FIBRILLATION: ICD-10-CM

## 2018-03-27 DIAGNOSIS — I50.32 CHRONIC DIASTOLIC HEART FAILURE: ICD-10-CM

## 2018-03-27 DIAGNOSIS — E66.09 CLASS 1 OBESITY DUE TO EXCESS CALORIES WITH SERIOUS COMORBIDITY AND BODY MASS INDEX (BMI) OF 32.0 TO 32.9 IN ADULT: ICD-10-CM

## 2018-03-27 DIAGNOSIS — Z98.61 S/P PTCA (PERCUTANEOUS TRANSLUMINAL CORONARY ANGIOPLASTY): ICD-10-CM

## 2018-03-27 DIAGNOSIS — N18.6 ESRD (END STAGE RENAL DISEASE): ICD-10-CM

## 2018-03-27 DIAGNOSIS — M06.9 RHEUMATOID ARTHRITIS OF HAND, UNSPECIFIED LATERALITY, UNSPECIFIED RHEUMATOID FACTOR PRESENCE: ICD-10-CM

## 2018-03-27 LAB
ALBUMIN SERPL BCP-MCNC: 3.3 G/DL
ALP SERPL-CCNC: 54 U/L
ALT SERPL W/O P-5'-P-CCNC: 9 U/L
ANION GAP SERPL CALC-SCNC: 13 MMOL/L
AST SERPL-CCNC: 14 U/L
BASOPHILS # BLD AUTO: 0.04 K/UL
BASOPHILS NFR BLD: 0.8 %
BILIRUB SERPL-MCNC: 0.5 MG/DL
BNP SERPL-MCNC: 1699 PG/ML
BUN SERPL-MCNC: 26 MG/DL
CALCIUM SERPL-MCNC: 9.6 MG/DL
CHLORIDE SERPL-SCNC: 97 MMOL/L
CO2 SERPL-SCNC: 32 MMOL/L
CREAT SERPL-MCNC: 6.6 MG/DL
DIFFERENTIAL METHOD: ABNORMAL
EOSINOPHIL # BLD AUTO: 0.4 K/UL
EOSINOPHIL NFR BLD: 8.6 %
ERYTHROCYTE [DISTWIDTH] IN BLOOD BY AUTOMATED COUNT: 18.8 %
EST. GFR  (AFRICAN AMERICAN): 7 ML/MIN/1.73 M^2
EST. GFR  (NON AFRICAN AMERICAN): 6.1 ML/MIN/1.73 M^2
GLUCOSE SERPL-MCNC: 144 MG/DL
HCT VFR BLD AUTO: 30.1 %
HGB BLD-MCNC: 9 G/DL
IMM GRANULOCYTES # BLD AUTO: 0.01 K/UL
IMM GRANULOCYTES NFR BLD AUTO: 0.2 %
INR PPP: 2.1
LYMPHOCYTES # BLD AUTO: 1.3 K/UL
LYMPHOCYTES NFR BLD: 27.3 %
MCH RBC QN AUTO: 26.7 PG
MCHC RBC AUTO-ENTMCNC: 29.9 G/DL
MCV RBC AUTO: 89 FL
MONOCYTES # BLD AUTO: 0.5 K/UL
MONOCYTES NFR BLD: 10.8 %
NEUTROPHILS # BLD AUTO: 2.6 K/UL
NEUTROPHILS NFR BLD: 52.3 %
NRBC BLD-RTO: 0 /100 WBC
PLATELET # BLD AUTO: 155 K/UL
PMV BLD AUTO: 10.6 FL
POTASSIUM SERPL-SCNC: 3.5 MMOL/L
PROT SERPL-MCNC: 7.2 G/DL
PROTHROMBIN TIME: 20.5 SEC
RBC # BLD AUTO: 3.37 M/UL
SODIUM SERPL-SCNC: 142 MMOL/L
WBC # BLD AUTO: 4.91 K/UL

## 2018-03-27 PROCEDURE — 85610 PROTHROMBIN TIME: CPT

## 2018-03-27 PROCEDURE — 99999 PR PBB SHADOW E&M-EST. PATIENT-LVL IV: CPT | Mod: PBBFAC,,, | Performed by: INTERNAL MEDICINE

## 2018-03-27 PROCEDURE — 80053 COMPREHEN METABOLIC PANEL: CPT

## 2018-03-27 PROCEDURE — 83880 ASSAY OF NATRIURETIC PEPTIDE: CPT

## 2018-03-27 PROCEDURE — 99214 OFFICE O/P EST MOD 30 MIN: CPT | Mod: PBBFAC | Performed by: INTERNAL MEDICINE

## 2018-03-27 PROCEDURE — 36415 COLL VENOUS BLD VENIPUNCTURE: CPT

## 2018-03-27 PROCEDURE — 99214 OFFICE O/P EST MOD 30 MIN: CPT | Mod: S$PBB,,, | Performed by: INTERNAL MEDICINE

## 2018-03-27 PROCEDURE — 85025 COMPLETE CBC W/AUTO DIFF WBC: CPT

## 2018-03-27 RX ORDER — HYDROCODONE BITARTRATE AND ACETAMINOPHEN 7.5; 325 MG/1; MG/1
TABLET ORAL
Refills: 0 | COMMUNITY
Start: 2018-02-07 | End: 2019-02-02

## 2018-03-27 NOTE — LETTER
March 27, 2018        Tye Deleon  [address] [place] [address] [address]  [address]  [place] [address]  Phone: [phone]  Fax: [phone]             [facility]  [address]  [place] [address]  Phone: [phone]   Patient: Shivani Sheets   MR Number: [id number]   YOB: 1955   Date of Visit: 3/27/2018       Dear Dr. Tye Deleon    Thank you for referring Shivani Sheets to me for evaluation. Attached you will find relevant portions of my assessment and plan of care.    If you have questions, please do not hesitate to call me. I look forward to following Shivani Sheets along with you.    Sincerely,    Jenny Hennessy MD    Enclosure    If you would like to receive this communication electronically, please contact [email or web address] or [phone] to request [employer] access.    [employer] Link is a tool which provides read-only access to select patient information with whom you have a relationship. Its easy to use and provides real time access to review your patients record including encounter summaries, notes, results, and demographic information.    If you feel you have received this communication in error or would no longer like to receive these types of communications, please e-mail [email or web address]

## 2018-03-27 NOTE — PROGRESS NOTES
Subjective:    Patient ID:  Shivani Sheets is a 62 y.o. female who presents for follow-up of Pulmonary Hypertension (FUP visit, s/p Hospital d/c)      HPI 63 yo AAF with moderate to severe pulmonary HTN, diastolic dysfunction, central retinal artery occlusion with no significant carotid artery stenosis, CAD, h/o PCI, ESRD secondary to HTN, s/p failed kidney tx, now back on HD and is re-listed, STEFFANY/CPAP, hypothyroidism, rheumatoid arthritis, DLP and obesity, recent admit for AF with RVR, recently switched from adcirca/tyvaso to adcirca/opsumit, here for overdue PH/HF f/u visit (last seen 6/16).     Since last visit, pt was admitted with GIB 2/22-3/5-  At OSH, all PH meds had been held. Adcirca was restarted here. Selexipag is not on formulary here so plan was to restart as outpt at lower dose. Plan is to not resume macitentan given association with severe anemia in rare cases. GI was consulted for GIB . Pt had EGD on 2/27/18- no findings to explain anemia. GI plans to do cscope as an outpt. Pt's coumadin was restarted after EGD and her H and H remained stable. She was d/c'ed home on PPI.   Pt will be d/c'ed home with Home Health. She will have INR and CBC on Mon 3/5/18. On d/c, he will be restarted on selixipag at 200 mcg BID and this will be uptitrated as an outpt. She will also cont on Adcirca. She will no longer be on macitentan given association with severe anemia in rare cases    Since then has been suffering with hip pain related to her bad knee- her breathing has been getting better- uptravi is back up to 600 mg bid (increases to 800mcg Thurs)- no SE. Still with tolerable HA . No CP, minimal swelling, no orthopnea/PND  Says HD is going as well as can be expected, uncomfortable in the chair because of hip pain.  Has not had any fluid retention. Dry wt is 73.5kg  Says her BP is high today because shes in pain, also has not taken her bp meds yet (takes them at 9 am)      TTE July 2015  1 - Eccentric LVH with  "normal left ventricular systolic function (EF 60-65%).   2 - Mild left atrial enlargement.   3 - Left ventricular diastolic dysfunction.   4 - Right ventricular enlargement with hypertrophy, with low normal systolic function.   5 - Mild tricuspid regurgitation.   6 - Pulmonary hypertension.     No 6mw today due to fall  Last visit 366m (305 m,  313.94   m in Nov   )                                              O2 sat  95->89  %                                                           HR 90 ->   108                                                               BP  124 / 61  ->170 /80                                                         Dolly    2 -> 4      [facility] 11/6/14  AOSAT: 97  FICKCI: 3.26  FICKCO: 5.87  PAPRES: 97/38 (59)  PASAT: 60  PVR: 4.43  PWPRES: 22/23 (24)  RAPRES: 20/23 (17)  RVPRES: 94/2, 21    6mw  314.15 (305m, 176m last visit) (317m in Sept)                                         O2 sat   99-> 91%                                                           HR 80->102                                                               BP  158 / 92  ->217/114                                                         Dolly   0.5-> 2    Review of Systems   Constitution: Negative for chills, fever, malaise/fatigue and weight gain.   HENT: Negative.    Eyes: Negative.    Cardiovascular: Positive for dyspnea on exertion. Negative for chest pain, leg swelling, near-syncope, orthopnea, palpitations, paroxysmal nocturnal dyspnea and syncope.   Respiratory: Positive for shortness of breath. Negative for cough.    Endocrine: Negative.    Hematologic/Lymphatic: Negative.    Skin: Negative.    Musculoskeletal: Negative.    Gastrointestinal: Negative for bloating, abdominal pain and change in bowel habit.   Neurological: Negative for dizziness and light-headedness.   Psychiatric/Behavioral: Negative for depression.        Objective:  BP (!) 194/105   Pulse 74   Ht 5' 1" (1.549 m)   Wt 74 kg (163 lb 2.3 oz)   LMP  (LMP " Unknown)   SpO2 96%   BMI 30.83 kg/m²       Physical Exam   Constitutional: She is oriented to person, place, and time. She appears well-developed and well-nourished.   HENT:   Head: Normocephalic.   Eyes: Conjunctivae are normal. Pupils are equal, round, and reactive to light.   Neck: Normal range of motion. Neck supple. No JVD present.   Cardiovascular: Normal rate and regular rhythm.    Large AV fistula L arm, accentuated S2   Pulmonary/Chest: Effort normal. She has no rales.   Abdominal: Soft. Bowel sounds are normal.   Musculoskeletal: Normal range of motion.   Neurological: She is alert and oriented to person, place, and time.   Skin: Skin is warm and dry.   Psychiatric: She has a normal mood and affect.   Vitals reviewed.          Chemistry        Component Value Date/Time     03/27/2018 0732    K 3.5 03/27/2018 0732    CL 97 03/27/2018 0732    CO2 32 (H) 03/27/2018 0732    BUN 26 (H) 03/27/2018 0732    CREATININE 6.6 (H) 03/27/2018 0732     (H) 03/27/2018 0732        Component Value Date/Time    CALCIUM 9.6 03/27/2018 0732    ALKPHOS 54 (L) 03/27/2018 0732    AST 14 03/27/2018 0732    ALT 9 (L) 03/27/2018 0732    BILITOT 0.5 03/27/2018 0732            Magnesium   Date Value Ref Range Status   03/02/2018 2.1 1.6 - 2.6 mg/dL Final       Lab Results   Component Value Date    WBC 4.91 03/27/2018    HGB 9.0 (L) 03/27/2018    HCT 30.1 (L) 03/27/2018    MCV 89 03/27/2018     03/27/2018       Lab Results   Component Value Date    INR 2.1 (H) 03/27/2018    INR 1.6 03/20/2018    INR 2.2 03/13/2018       BNP   Date Value Ref Range Status   03/27/2018 1,699 (H) 0 - 99 pg/mL Final     Comment:     Values of less than 100 pg/ml are consistent with non-CHF populations.   02/08/2018 572 (H) 0 - 99 pg/mL Final     Comment:     Values of less than 100 pg/ml are consistent with non-CHF populations.   07/14/2017 112 (H) 0 - 99 pg/mL Final     Comment:     Values of less than 100 pg/ml are consistent with  non-CHF populations.       No results found for: LDH          Assessment:       1. Pulmonary hypertension- Severe and out of proportion to her diastolic dysfunction- improving with addition of uptravi- back to being FC II (limited more now by orthopedic issues), euvolemic on exam- BP and BNP both high today as pt had not taken her am meds before coming to clinic   2. Chronic diastolic heart failure   3. Renal hypertension, stage 5 chronic kidney disease or end stage renal disease    4. Atrial fibrillation    5. CAD (coronary artery disease)    6. Hypoxia    7. Rheumatoid arthritis    8. ESRD from HTN started RRT 1999    9. Anticoagulation monitoring by pharmacist    10. Awaiting organ transplant    11. Obesity    12. S/P PTCA (percutaneous transluminal coronary angioplasty)    13. Sleep apnea         Plan:      Will cont selexipag- will slowly increase once a month as tolerated. Ok to increase at night first for a few days if HA becomes untolerable again    Pt took her am meds in clinic today- asked her to start taking them when she first gets up in am on a non_HD day rather than waiting until 9am to take them    Recommend 2 gram sodium restriction and 1500cc fluid restriction.     Daily wts. If weight goes up 3# overnight or 5# in one week she should call us    Start  tracking blood pressure at home and bring log to next visit.    F/u 3mo with RHC- when she no longer needs a walker will start doing 6mw again  Instructed to  Hold coumadin 3 nights prior

## 2018-03-27 NOTE — PATIENT INSTRUCTIONS
1. Continue increasing uptravi as tolerated. You can increase first at night if headaches become a problem.  2.  Keep salt intake to under 2000 mg sodium, fluids to under 2 L (64 oz)  3  Check your weights every morning after getting out of bed and urinating. If your weight goes up 3# overnight or 5# in one week let your dialysis team know  4. Call us if you find yourself getting more short of breath, have more swelling or unexpected weight changes, fatigue ror chest pain    Start tracking blood pressure at home and bring log to next visit.    We will do a right heart catheterization to measure the blood pressure in your lungs-  Take your usual medicines and eat a light breakfast that am.  Labs on 2nd floor- then go to third [facility] cath lab waiting area and check in  Hold coumadin 3 nights prior to the procedure.    start taking your blood pressure meds when you first ges up in am on a non-HD day rather than waiting until 9am to take them

## 2018-03-28 ENCOUNTER — OUTPATIENT CASE MANAGEMENT (OUTPATIENT)
Dept: ADMINISTRATIVE | Facility: OTHER | Age: 63
End: 2018-03-28

## 2018-03-28 NOTE — PROGRESS NOTES
Summary:  Assigned OPCM LCSW Frieda Lovett is out of the office until 4/9/18 and this LCSW is assisting with follow up in her absence.      Interventions:  1st Attempt to complete SW follow-up for Outpatient Care Management; left message requesting return call.    Plan:  Ensure that patient has received and reviewed community resource information.  Review community resource information with patient and provide support as needed.  Ensure follow-up/contact with community resource programs.  Follow-up on Living Will to provider and completion of MPOA form  Follow-up on completion of MITS application/transportation  .

## 2018-04-02 ENCOUNTER — OUTPATIENT CASE MANAGEMENT (OUTPATIENT)
Dept: ADMINISTRATIVE | Facility: OTHER | Age: 63
End: 2018-04-02

## 2018-04-02 NOTE — PROGRESS NOTES
Summary:  Assigned OPCM LCSW Frieda Lovett is out of the office until 4/9/18 and this LCSW is assisting with follow up in her absence.      Interventions:  2nd Attempt to complete SW follow-up for Outpatient Care Management; left message requesting a return call and LCSW mailed a letter with contact information requesting a return call.  OPCM RN notified.    Plan:  Ensure that patient has received and reviewed community resource information.  Review community resource information with patient and provide support as needed.  Ensure follow-up/contact with community resource programs.  Follow-up on Living Will to provider and completion of MPOA form  Follow-up on completion of MITS application/transportation  .

## 2018-04-02 NOTE — LETTER
April 2, 2018    Shivani Sheets  [address]  [place] [address]             [facility] [address] [place] [address] Dear Mrs. Sheets:    I am writing from the Outpatient Complex Care Management Department at [facility].  I have been unsuccessful at reaching you to follow up with you to see how you have been doing. I hope you find the resources previously provided to you by Frieda Lovett LCSW helpful. Ms. Lovett will be out of the office until April 9, 2018.  Please contact me for further assistance.    I can be reached at [phone] Monday thru Friday from 8:00am to 4:30pm.  [facility] also has a program with a nurse available 24/7 to answer questions or provide medical advice.  [facility] can be reached at [phone].    Sincerely,         Debbie Davis LCSW, [facility]

## 2018-04-04 DIAGNOSIS — I27.0 PRIMARY PULMONARY HYPERTENSION: ICD-10-CM

## 2018-04-04 RX ORDER — TADALAFIL 20 MG/1
TABLET ORAL
Qty: 60 TABLET | Refills: 11 | Status: SHIPPED | OUTPATIENT
Start: 2018-04-04 | End: 2018-08-01

## 2018-04-04 RX ORDER — TADALAFIL 20 MG/1
40 TABLET ORAL DAILY
Qty: 60 TABLET | Refills: 11 | Status: SHIPPED | OUTPATIENT
Start: 2018-04-04 | End: 2018-04-04 | Stop reason: SDUPTHER

## 2018-04-05 ENCOUNTER — OUTPATIENT CASE MANAGEMENT (OUTPATIENT)
Dept: ADMINISTRATIVE | Facility: OTHER | Age: 63
End: 2018-04-05

## 2018-04-05 DIAGNOSIS — Z79.01 ANTICOAGULATION MONITORING BY PHARMACIST: ICD-10-CM

## 2018-04-05 RX ORDER — WARFARIN SODIUM 5 MG/1
TABLET ORAL
Qty: 120 TABLET | Refills: 0 | Status: SHIPPED | OUTPATIENT
Start: 2018-04-05 | End: 2018-07-09 | Stop reason: SDUPTHER

## 2018-04-05 NOTE — PROGRESS NOTES
Summary:  Assigned OPCM LCSW Frieda Lovett is out of the office until 4/9/18 and this LCSW is assisting with follow up in her absence.      Interventions: 3rd Attempt to complete SW follow-up for Outpatient Care Management;  left message requesting a return call.  LCSW mailed letter after 2nd attempt with contact information requesting a return call.      Plan:  LCSW will close case on 4/6/18 if no return call received.

## 2018-04-09 ENCOUNTER — OUTPATIENT CASE MANAGEMENT (OUTPATIENT)
Dept: ADMINISTRATIVE | Facility: OTHER | Age: 63
End: 2018-04-09

## 2018-04-09 NOTE — PROGRESS NOTES
4/9/18 Summary: Follow-up call attempted. No answer. In-comming call from patient. Reports doing well denies any black, red nor tarry stool. Remains getting regular lab work. H&H remains around 9 and 30. Upon reviewing lab work BNP up. Denies falls.      Intervention: Voice message left requesting call back. Reviewed KRAMES Anemia, GI bleed, low sodium diet. Patient verbalizes understanding. Patient would like to meet with dietician to review renal, coumadin, low sodium and anemia diet. In-basket message sent to PCP. Reviewed up-coming appts. Pt verbalizes understanding.     Plan:  1. Anemia/GIB: focusing on diet ( patient needs to also follow Coumadin, renal and low sodium diet)  2. Fall Prevention  3. Resources: Meals on wheels, Cleaning services, transportation - referral to OPCM LCSW - case closed unable to reach

## 2018-04-10 ENCOUNTER — ANTI-COAG VISIT (OUTPATIENT)
Dept: CARDIOLOGY | Facility: CLINIC | Age: 63
End: 2018-04-10

## 2018-04-10 DIAGNOSIS — Z79.01 ANTICOAGULATION MONITORING BY PHARMACIST: ICD-10-CM

## 2018-04-10 LAB — INR PPP: 2.9

## 2018-04-19 ENCOUNTER — ANTI-COAG VISIT (OUTPATIENT)
Dept: CARDIOLOGY | Facility: CLINIC | Age: 63
End: 2018-04-19

## 2018-04-19 DIAGNOSIS — Z79.01 ANTICOAGULATION MONITORING BY PHARMACIST: ICD-10-CM

## 2018-04-19 LAB — INR PPP: 2.9

## 2018-04-27 ENCOUNTER — OUTPATIENT CASE MANAGEMENT (OUTPATIENT)
Dept: ADMINISTRATIVE | Facility: OTHER | Age: 63
End: 2018-04-27

## 2018-04-27 NOTE — PROGRESS NOTES
4/27/18 Summary: Follow-up call with patient. Reports being in HD. Requests call back after 11 AM. !1:03 Patient reports doing much better. States started driving one week ago, has been fixing her own meals, much more independent. Denies any signs and symptoms of anemia nor GIB. Denies falls.     Intervention: Will call back after 11 AM. Returned call as patient requested. Denies any needs. Reviewed Anemia/GIB KRAMES patient knowledgeable. Denies need for SW as she in now much more independent. Will closed case in 2 weeks if all remains same.      Plan:  1. Anemia/GIB: focusing on diet ( patient needs to also follow Coumadin, renal and low sodium diet)  2. Fall Prevention  3. Resources: Meals on wheels, Cleaning services, transportation - [facility] - case closed unable to reach

## 2018-05-01 ENCOUNTER — ANTI-COAG VISIT (OUTPATIENT)
Dept: CARDIOLOGY | Facility: CLINIC | Age: 63
End: 2018-05-01

## 2018-05-01 ENCOUNTER — LAB VISIT (OUTPATIENT)
Dept: LAB | Facility: HOSPITAL | Age: 63
End: 2018-05-01
Attending: FAMILY MEDICINE
Payer: MEDICARE

## 2018-05-01 DIAGNOSIS — Z79.01 ANTICOAGULATION MONITORING BY PHARMACIST: ICD-10-CM

## 2018-05-01 LAB
INR PPP: 2.1
PROTHROMBIN TIME: 20.8 SEC

## 2018-05-01 PROCEDURE — 85610 PROTHROMBIN TIME: CPT

## 2018-05-01 PROCEDURE — 36415 COLL VENOUS BLD VENIPUNCTURE: CPT | Mod: PO

## 2018-05-01 NOTE — PROGRESS NOTES
Assessment /Plan     For exam results, see Encounter Report.    Chronic angle-closure glaucoma of both eyes, moderate stage    Glaucoma filtering bleb of both eyes    Status post cataract extraction and insertion of intraocular lens, unspecified laterality    Glaucoma shunt device of both eyes        3 sons  17 grandchildren  2 great grandchildren    Mom  when 11 yo the --> 11 grade 1st child --> dropped out HS --> GED and raised a multitude of family members  Highly resilient     HTN --> Dialysis M, W, F 18 years  --> renal Tx / steroids --> DM2 --> NVG OU --> open         Complex eye Hx    NVG quiet  Sp avastin OS with sulaiman    CCT  553 // 568    <24      Ayalla  Bilateral Canaloplasty --> PAS / scarred  ST Ahmed Sulcus OD --> not functioning  S Ex-press shunt OD --> scarred      PC IOL OU  quiet      HTN retinopathy OU  ONH shunt vessels OU    DM2  Hx NVG OS  Avastin OS per Sulaiman  Hx PRP OS    ? RVO OD    Left eye  / PPG 10/12/2017  Anticipate tube opening 2017 --> uneventful @ 2017 ??    Both eyes --> good adherence --> Hold Left eye  Cosopt BID  Xal q day  Alphagan  BID    Left eye  PF 1% BID      Plan  RTC 6 months IOP & HVF  Keep fu with Dr Hilario --> 2018  RTC sooner prn with good understanding

## 2018-05-08 ENCOUNTER — OFFICE VISIT (OUTPATIENT)
Dept: INTERNAL MEDICINE | Facility: CLINIC | Age: 63
End: 2018-05-08
Payer: MEDICARE

## 2018-05-08 DIAGNOSIS — I10 HYPERTENSION, UNSPECIFIED TYPE: ICD-10-CM

## 2018-05-08 DIAGNOSIS — K92.2 GASTROINTESTINAL HEMORRHAGE, UNSPECIFIED GASTROINTESTINAL HEMORRHAGE TYPE: Primary | ICD-10-CM

## 2018-05-08 PROCEDURE — 99999 PR PBB SHADOW E&M-EST. PATIENT-LVL III: CPT | Mod: PBBFAC,,, | Performed by: INTERNAL MEDICINE

## 2018-05-08 PROCEDURE — 99213 OFFICE O/P EST LOW 20 MIN: CPT | Mod: PBBFAC | Performed by: INTERNAL MEDICINE

## 2018-05-08 PROCEDURE — 99203 OFFICE O/P NEW LOW 30 MIN: CPT | Mod: S$PBB,,, | Performed by: INTERNAL MEDICINE

## 2018-05-13 VITALS
WEIGHT: 164 LBS | HEIGHT: 61 IN | HEART RATE: 82 BPM | TEMPERATURE: 98 F | DIASTOLIC BLOOD PRESSURE: 84 MMHG | BODY MASS INDEX: 30.96 KG/M2 | OXYGEN SATURATION: 96 % | SYSTOLIC BLOOD PRESSURE: 138 MMHG

## 2018-05-13 NOTE — PROGRESS NOTES
Subjective:       Patient ID: Shivani Sheets is a 62 y.o. female.    Chief Complaint: Establish Care    HPI  She is here for an initial visit.She returns for management of hypertension.  She has had hypertension for over a year.  Current treatment has included medications outlined in medication list.  She denies chest pain or shortness of breath.  No palpitations.  Denies left arm or neck pain.  She was recently hospitalized with a GI bleed.  Please see hospitalization records    Past medical history: Hypertension, end-stage renal disease on dialysis, status post failed kidney transplant, pulmonary hypertension, atrial fibrillation, coronary artery disease, rheumatoid arthritis, sleep apnea, hypothyroidism, status post GI bleed, glaucoma, status post hernia repair    Medications: Xalatan, Synthroid 0.1 mg daily, Lipitor 10 mg daily,selexipag,tadalafil, Coumadin as monitored by Coumadin clinic, diltiazem 240 mg daily    ALLERGIES: Penicillin, iodine, Bactrim      Review of Systems   Constitutional: Negative for chills, fatigue, fever and unexpected weight change.   Respiratory: Negative for chest tightness and shortness of breath.    Cardiovascular: Negative for chest pain and palpitations.   Gastrointestinal: Negative for abdominal pain and blood in stool.   Neurological: Negative for dizziness, syncope, numbness and headaches.       Objective:      Physical Exam   HENT:   Right Ear: External ear normal.   Left Ear: External ear normal.   Nose: Nose normal.   Mouth/Throat: Oropharynx is clear and moist.   Eyes: Pupils are equal, round, and reactive to light.   Neck: Normal range of motion.   Cardiovascular: Normal rate and regular rhythm.    No murmur heard.  Pulmonary/Chest: Breath sounds normal.   Abdominal: She exhibits no distension. There is no hepatosplenomegaly. There is no tenderness.   Lymphadenopathy:     She has no cervical adenopathy.     She has no axillary adenopathy.   Neurological: She has normal  strength and normal reflexes. No cranial nerve deficit or sensory deficit.       Assessment/Plan       Assessment and plan: 1.  Hypertension: Controlled  2.  GI bleed: Follow up with gastroenterology  3.  She sees a nephrologist located at [place]

## 2018-05-14 ENCOUNTER — TELEPHONE (OUTPATIENT)
Dept: INTERNAL MEDICINE | Facility: CLINIC | Age: 63
End: 2018-05-14

## 2018-05-14 ENCOUNTER — OUTPATIENT CASE MANAGEMENT (OUTPATIENT)
Dept: ADMINISTRATIVE | Facility: OTHER | Age: 63
End: 2018-05-14

## 2018-05-14 DIAGNOSIS — G47.30 SLEEP APNEA, UNSPECIFIED TYPE: Primary | ICD-10-CM

## 2018-05-14 NOTE — PROGRESS NOTES
5/14/18  Summary: Follow-up call attempted with patient. No answer. In-coming call from patient. Denies any falls. Anemia: States HD clinic was running lab work with every HD and values have gone up. Does report waking up feeling tired some mornings. Patient reports using CPAP every night though last sleep test/adjustment was done in 2007.     Intervention: Voice message left requesting call back. In-basket message sent to PCP Dr Eula Weiss informing of waking up feeling tired and last CPAP adjustment/sleep test done in 2007. Requested PCP to call patient to discuss. Patient requests follow-up next week and no case closure at this time.    Plan:  1. Anemia/GIB: focusing on diet ( patient needs to also follow Coumadin, renal and low sodium diet)  2. Fall Prevention  3. Resources: Meals on wheels, Cleaning services, transportation - referral to OPCM LCSW - case closed unable to reach

## 2018-05-14 NOTE — TELEPHONE ENCOUNTER
----- Message from Emili Edmond RN sent at 5/14/2018  3:19 PM CDT -----  Patient reports waking up feeling tired some days. Patient reports using CPAP at night, but has not had a sleep study nor adjustments since 2007 or around that time. Do you think she can benefit from having another study done and or setting adjustments to help with tiredness. Please call patient to discuss. Thanks.

## 2018-05-15 ENCOUNTER — LAB VISIT (OUTPATIENT)
Dept: LAB | Facility: HOSPITAL | Age: 63
End: 2018-05-15
Payer: MEDICARE

## 2018-05-15 ENCOUNTER — TELEPHONE (OUTPATIENT)
Dept: ENDOSCOPY | Facility: HOSPITAL | Age: 63
End: 2018-05-15

## 2018-05-15 ENCOUNTER — ANTI-COAG VISIT (OUTPATIENT)
Dept: CARDIOLOGY | Facility: CLINIC | Age: 63
End: 2018-05-15

## 2018-05-15 ENCOUNTER — OFFICE VISIT (OUTPATIENT)
Dept: GASTROENTEROLOGY | Facility: CLINIC | Age: 63
End: 2018-05-15
Payer: MEDICARE

## 2018-05-15 ENCOUNTER — OFFICE VISIT (OUTPATIENT)
Dept: OPHTHALMOLOGY | Facility: CLINIC | Age: 63
End: 2018-05-15
Payer: MEDICARE

## 2018-05-15 VITALS
HEART RATE: 83 BPM | HEIGHT: 61 IN | DIASTOLIC BLOOD PRESSURE: 93 MMHG | SYSTOLIC BLOOD PRESSURE: 152 MMHG | BODY MASS INDEX: 30.55 KG/M2 | WEIGHT: 161.81 LBS

## 2018-05-15 DIAGNOSIS — I12.9 RENAL HYPERTENSION: Chronic | ICD-10-CM

## 2018-05-15 DIAGNOSIS — Z79.01 ANTICOAGULATION MONITORING BY PHARMACIST: ICD-10-CM

## 2018-05-15 DIAGNOSIS — Z98.61 S/P PTCA (PERCUTANEOUS TRANSLUMINAL CORONARY ANGIOPLASTY): ICD-10-CM

## 2018-05-15 DIAGNOSIS — I50.32 CHRONIC DIASTOLIC HEART FAILURE: ICD-10-CM

## 2018-05-15 DIAGNOSIS — H40.2232 CHRONIC ANGLE-CLOSURE GLAUCOMA OF BOTH EYES, MODERATE STAGE: Primary | ICD-10-CM

## 2018-05-15 DIAGNOSIS — R19.5 HEME POSITIVE STOOL: ICD-10-CM

## 2018-05-15 DIAGNOSIS — D64.9 ANEMIA, UNSPECIFIED TYPE: ICD-10-CM

## 2018-05-15 DIAGNOSIS — K92.1 MELENA: Primary | ICD-10-CM

## 2018-05-15 DIAGNOSIS — I27.20 PULMONARY HYPERTENSION: ICD-10-CM

## 2018-05-15 DIAGNOSIS — Z98.49 STATUS POST CATARACT EXTRACTION AND INSERTION OF INTRAOCULAR LENS, UNSPECIFIED LATERALITY: ICD-10-CM

## 2018-05-15 DIAGNOSIS — I48.20 CHRONIC ATRIAL FIBRILLATION: ICD-10-CM

## 2018-05-15 DIAGNOSIS — Z96.1 STATUS POST CATARACT EXTRACTION AND INSERTION OF INTRAOCULAR LENS, UNSPECIFIED LATERALITY: ICD-10-CM

## 2018-05-15 DIAGNOSIS — Z79.01 ANTICOAGULANT LONG-TERM USE: ICD-10-CM

## 2018-05-15 DIAGNOSIS — Z98.83 GLAUCOMA FILTERING BLEB OF BOTH EYES: ICD-10-CM

## 2018-05-15 DIAGNOSIS — I25.10 CORONARY ARTERY DISEASE INVOLVING NATIVE CORONARY ARTERY OF NATIVE HEART WITHOUT ANGINA PECTORIS: ICD-10-CM

## 2018-05-15 DIAGNOSIS — R10.84 GENERALIZED ABDOMINAL PAIN: ICD-10-CM

## 2018-05-15 LAB
INR PPP: 2.8
PROTHROMBIN TIME: 26.7 SEC

## 2018-05-15 PROCEDURE — 85610 PROTHROMBIN TIME: CPT

## 2018-05-15 PROCEDURE — 92012 INTRM OPH EXAM EST PATIENT: CPT | Mod: S$PBB,,, | Performed by: OPHTHALMOLOGY

## 2018-05-15 PROCEDURE — 99215 OFFICE O/P EST HI 40 MIN: CPT | Mod: S$PBB,,, | Performed by: PHYSICIAN ASSISTANT

## 2018-05-15 PROCEDURE — 99999 PR PBB SHADOW E&M-EST. PATIENT-LVL IV: CPT | Mod: PBBFAC,,, | Performed by: PHYSICIAN ASSISTANT

## 2018-05-15 PROCEDURE — 99212 OFFICE O/P EST SF 10 MIN: CPT | Mod: PBBFAC | Performed by: OPHTHALMOLOGY

## 2018-05-15 PROCEDURE — 99999 PR PBB SHADOW E&M-EST. PATIENT-LVL II: CPT | Mod: PBBFAC,,, | Performed by: OPHTHALMOLOGY

## 2018-05-15 PROCEDURE — 99214 OFFICE O/P EST MOD 30 MIN: CPT | Mod: PBBFAC,27 | Performed by: PHYSICIAN ASSISTANT

## 2018-05-15 PROCEDURE — 36415 COLL VENOUS BLD VENIPUNCTURE: CPT

## 2018-05-15 NOTE — LETTER
May 17, 2018      Eula Weiss MD  [address]  [place] [address]           [facility] Gastroenterology  [address]  [place] [address]  Phone: [phone]  Fax: [phone]          Patient: Shivani Sheets   MR Number: [id number]   YOB: 1955   Date of Visit: 5/15/2018       Dear Dr. Eula Weiss:    Thank you for referring Shivani Sheets to me for evaluation. Attached you will find relevant portions of my assessment and plan of care.    If you have questions, please do not hesitate to call me. I look forward to following Shivani Sheets along with you.    Sincerely,    Regina Rodriguez PA-C    Enclosure  CC:  No Recipients    If you would like to receive this communication electronically, please contact [email or web address] or [phone] to request more information on [facility] Link access.    For providers and/or their staff who would like to refer a patient to [facility], please contact us through our one-stop-shop provider referral line, [facility] [place], at [phone].    If you feel you have received this communication in error or would no longer like to receive these types of communications, please e-mail [email or web address]

## 2018-05-15 NOTE — PROGRESS NOTES
"    [facility] Consultation Note    Reason for Consult:  The primary encounter diagnosis was Melena. Diagnoses of Anemia, unspecified type, Generalized abdominal pain, Heme positive stool, Pulmonary hypertension, Renal hypertension diagnosed age 15, Chronic diastolic heart failure, Coronary artery disease involving native coronary artery of native heart without angina pectoris, S/P PTCA (percutaneous transluminal coronary angioplasty), and Anticoagulant long-term use were also pertinent to this visit.    PCP:   Eula Weiss   [address] [facility] / [place] [address]    Referring MD:  Eula Weiss Md  [address] [place] [address]    HPI:  This is a 62 y.o. female here for a hospital f/u to f/u on her melena, CARMEN, and heme positive stool. PMH Pum HTN with PA pressures of 71, afib on coumadin, remote PUD, and ESRD 2/2 HTN (LUE AVF) s/p failed transplant (re-listed).    She saw anticoagulation clinic 2/22 and her INR was noted to be elevated with patient having melena. She was encouraged to go to the ER and adviced to hold warfarin. In triage, Hgb was noted to 4.7 (down from 7.8 one week prior with baseline around 9-10), the patient was admitted to the ICU, and "given blood transfusion and FFP". Per notes, at least 2U FFP and 3U PRBCs were given.     11/2008 colonoscopy - internal hermorhoid and diverticulosis  2/2018 EGD revealed gastric polyps, and a small hiatal hernia    Plan was to do a colonoscopy as an outpatient    Today she dnies melena  Some intermittent abdominal cramping  Bowels alternate between bristol type 1, normal stool, and diarrhea with fecal incontinence  + poor appetite and early satiety  + abdominal bloating    Has fluid restriction  Getting iron in dialysis    ROS:  Constitutional: No fevers, chills, No weight loss  ENT: No allergies  CV: No chest pain  Pulm: No cough, No shortness of breath  Ophtho: No vision changes  GI: see HPI  Derm: No rash  Heme: No " lymphadenopathy, No bruising  MSK: No arthritis  : No dysuria, No hematuria  Endo: No hot or cold intolerance  Neuro: No syncope, No seizure  Psych: No anxiety, No depression    Medical History:  has a past medical history of Allergy; Anemia; Arthritis; Awaiting organ transplant (2013); Cataract; CHF (congestive heart failure); Chronic kidney disease; Coronary artery disease; Diabetes mellitus; Diabetic retinopathy; Diverticulosis; ESRD from HTN strtied RRT  (1999); Failed  donor kidney transplant ; Glaucoma; History of bleeding peptic ulcer; Hyperlipidemia; Hypertension; Hypothyroidism; Morbid obesity (8/10/2012); Renal hypertension; and Stroke.    Surgical History:  has a past surgical history that includes Kidney transplant; Hysterectomy; Eye surgery; Nephrectomy (2008); Tonsillectomy; Cataract extraction w/  intraocular lens implant (Bilateral); Upper gastrointestinal endoscopy; and Colonoscopy.    Family History: family history includes Asthma in her sister; Blindness in her father; Depression in her sister; Diabetes in her maternal aunt; Heart attack in her father and mother; Heart failure in her father and mother; Hypertension in her brother, father, mother, and sister; Kidney disease in her brother..     Social History:  reports that she quit smoking about 17 years ago. She has never used smokeless tobacco. She reports that she does not drink alcohol or use drugs.    Review of patient's allergies indicates:   Allergen Reactions    Penicillins Swelling    Iodine      Other reaction(s): Hives    Sulfamethoxazole-trimethoprim      Other reaction(s): Swelling  Other reaction(s): Hives       Current Outpatient Prescriptions on File Prior to Visit   Medication Sig Dispense Refill    ALPHAGAN P 0.1 % Drop       aspirin 81 MG chewable tablet Take 81 mg by mouth Daily. 1  By mouth Every day      CARTIA  mg 24 hr capsule       CARTIA  mg 24 hr capsule TAKE ONE  "CAPSULE BY MOUTH DAILY 90 capsule 0    dorzolamide-timolol 2-0.5% (COSOPT) 22.3-6.8 mg/mL ophthalmic solution INSTILL 1 DROP IN BOTH EYES TWICE DAILY 10 mL 0    hydrocodone-acetaminophen 7.5-325mg (NORCO) 7.5-325 mg per tablet TK 1 T PO Q 6 H PRN  0    latanoprost 0.005 % ophthalmic solution INSTILL 1 DROP IN BOTH EYES EVERY DAY AT BEDTIME 7.5 mL 0    levothyroxine (SYNTHROID) 100 MCG tablet Take 100 mcg by mouth. 1 Tablet Oral Every day      lidocaine-prilocaine (EMLA) cream Apply topically as needed.      LIPITOR 10 mg tablet TAKE 1 BY MOUTH ONCE A DAY 90 tablet 2    pantoprazole (PROTONIX) 40 MG tablet Take 1 tablet (40 mg total) by mouth once daily. 30 tablet 3    selexipag (UPTRAVI) 200 mcg Tab Take 200 mcg by mouth 2 (two) times daily.      tadalafil, PULMONARY HYPERTENSION, (ADCIRCA) 20 mg Tab Take 1 tab (20 mg) by mouth every Tuesday, Thursday, Saturday, Sunday in the AM and 1 tablet by mouth every afternoon. 60 tablet 11    warfarin (COUMADIN) 5 MG tablet Take 1.5 tablets (7.5 mg total) by mouth Daily. Then as directed by coumadin clinic 120 tablet 0    warfarin (COUMADIN) 5 MG tablet TAKE 1 1/2 TABLETS BY MOUTH DAILY ON TUESDAY, THURSDAY AND SATURDAY, THEN TAKE 1 TABLET DAILY ON MONDAY, WEDNESDAY, FRIDAY AND SUNDAY 120 tablet 0     No current facility-administered medications on file prior to visit.          Objective Findings:    Vital Signs:  BP (!) 152/93   Pulse 83   Ht 5' 1" (1.549 m)   Wt 73.4 kg (161 lb 13.1 oz)   LMP  (LMP Unknown)   BMI 30.58 kg/m²   Body mass index is 30.58 kg/m².    Physical Exam:  General Appearance: Well appearing in no acute distress  Head:   Normocephalic, without obvious abnormality  Eyes:    No scleral icterus  ENT: Neck supple, Lips, mucosa, and tongue normal  Lungs: CTA bilaterally in anterior and posterior fields, no wheezes, no crackles.  Heart:  Regular rate and rhythm, S1, S2 normal, no murmurs heard  Abdomen: Soft, non tender, non distended with " positive bowel sounds in all four quadrants.   Extremities: no edema  Skin: No rash  Neurologic: AAO x 3      Labs:  Lab Results   Component Value Date    WBC 4.91 03/27/2018    HGB 9.0 (L) 03/27/2018    HCT 30.1 (L) 03/27/2018     03/27/2018    CHOL 161 02/01/2014    TRIG 122 02/01/2014    HDL 61 02/01/2014    ALT 9 (L) 03/27/2018    AST 14 03/27/2018     03/27/2018    K 3.5 03/27/2018    CL 97 03/27/2018    CREATININE 6.6 (H) 03/27/2018    BUN 26 (H) 03/27/2018    CO2 32 (H) 03/27/2018    TSH 0.793 02/23/2018    INR 2.8 (H) 05/15/2018    GLUF 126 (H) 03/16/2010    HGBA1C 10.8 (H) 08/26/2011       Imaging:    Endoscopy:    11/2008 colonoscopy - internal hermorhoid and diverticulosis  2/2018 EGD revealed gastric polyps, and a small hiatal hernia    Assessment:  1. Melena    2. Anemia, unspecified type    3. Generalized abdominal pain    4. Heme positive stool    5. Pulmonary hypertension    6. Renal hypertension diagnosed age 15    7. Chronic diastolic heart failure    8. Coronary artery disease involving native coronary artery of native heart without angina pectoris    9. S/P PTCA (percutaneous transluminal coronary angioplasty)    10. Anticoagulant long-term use       61yo F with PMH Pum HTN with PA pressures of 71, afib on coumadin, remote PUD, and ESRD 2/2 HTN (LUE AVF) s/p failed transplant (re-listed). Hospitalized 2/2018 for anemia requiring. EGD was unrevealing. Plan is to do a colonoscopy as an outpatient.       Recommendations:  1. Schedule colonoscopy to rule out sources of bleeding. She is high risk for endoscopies with PA pressure 71    Take miralax 17g at night  the week prior to your procedure  Also take as needed for constipation     Consider VCE if unrevealing.    No Follow-up on file.      Order summary:  Orders Placed This Encounter    Case request GI: COLONOSCOPY         Thank you so much for allowing me to participate in the care of Shivani Rodriguez,  JOSE

## 2018-05-15 NOTE — TELEPHONE ENCOUNTER
Patient has an order in for a colonoscopy. Is it ok for her to hold Coumadin 5 days prior? Please advise?    Thank you,  CHIDI Moncada

## 2018-05-16 ENCOUNTER — TELEPHONE (OUTPATIENT)
Dept: ENDOSCOPY | Facility: HOSPITAL | Age: 63
End: 2018-05-16

## 2018-05-16 NOTE — PROGRESS NOTES
The pt will need to schedule a c-scope in the near future and will need to hold her coumadin x 3 days prior.  She is CHADs = 2 (HTN, CHF) and is not a candidate for lovenox, as she requires dialysis therapy.  Also of note, the pt was recently admitted in 6/2018 for GIB.  She has held coumadin in the past without a bridge and without complication.  She is approved to hold coumadin x 3 days prior to this procedure without bridge.  We will provide detailed holding instructions once a date for this procedure is known.    As her INR is in the upper part of her range and she recently had a GI bleed, I am gently lowering her current dosing to aim for the lower part of her therapeutic range.

## 2018-05-17 ENCOUNTER — TELEPHONE (OUTPATIENT)
Dept: ENDOSCOPY | Facility: HOSPITAL | Age: 63
End: 2018-05-17

## 2018-05-17 DIAGNOSIS — N18.6 ESRD (END STAGE RENAL DISEASE): Primary | ICD-10-CM

## 2018-05-17 DIAGNOSIS — Z12.11 SPECIAL SCREENING FOR MALIGNANT NEOPLASMS, COLON: Primary | ICD-10-CM

## 2018-05-17 DIAGNOSIS — Z79.01 LONG TERM (CURRENT) USE OF ANTICOAGULANTS: Primary | ICD-10-CM

## 2018-05-17 RX ORDER — SODIUM, POTASSIUM,MAG SULFATES 17.5-3.13G
1 SOLUTION, RECONSTITUTED, ORAL ORAL ONCE
Qty: 1 BOTTLE | Refills: 0 | Status: SHIPPED | OUTPATIENT
Start: 2018-05-17 | End: 2018-05-17

## 2018-05-20 DIAGNOSIS — H40.50X3: ICD-10-CM

## 2018-05-21 ENCOUNTER — OUTPATIENT CASE MANAGEMENT (OUTPATIENT)
Dept: ADMINISTRATIVE | Facility: OTHER | Age: 63
End: 2018-05-21

## 2018-05-21 ENCOUNTER — TELEPHONE (OUTPATIENT)
Dept: ENDOSCOPY | Facility: HOSPITAL | Age: 63
End: 2018-05-21

## 2018-05-21 RX ORDER — LATANOPROST 50 UG/ML
SOLUTION/ DROPS OPHTHALMIC
Qty: 7.5 ML | Refills: 0 | Status: SHIPPED | OUTPATIENT
Start: 2018-05-21 | End: 2018-08-04 | Stop reason: SDUPTHER

## 2018-05-21 NOTE — PROGRESS NOTES
5/21/18  Summary: Follow-up call with patient. Report doing well, has made appt to see Sleep Medicine as well as appt for colonoscopy. Patient denies any needs. States understands pre-op for colonsocopy, etc. Case will be closed as all needs met.      Intervention: Case closed as patient denies needs. Very appreciative of [facility].    Plan:  1. Anemia/GIB: focusing on diet ( patient needs to also follow Coumadin, renal and low sodium diet)  2. Fall Prevention  3. Resources: Meals on wheels, Cleaning services, transportation - referral to [facility] LCSW - case closed unable to reach

## 2018-05-21 NOTE — TELEPHONE ENCOUNTER
Jacquelyn    Mrs. Shivani Sheets MRN [id number] is scheduled for her colonoscopy on 6/12/18.    Thank you,  CHIDI Moncada

## 2018-05-21 NOTE — TELEPHONE ENCOUNTER
Patient scheduled for colonoscopy on 6/12/18 @8:00am. Instructed patient on Suprep prep instructions and to stop taking Coumadin on 6/9/18. Labs ordered for morning of procedure. Patient verbalized understanding. Written instructions mailed to patient.

## 2018-06-11 RX ORDER — DILTIAZEM HYDROCHLORIDE 240 MG/1
CAPSULE, EXTENDED RELEASE ORAL
Qty: 90 CAPSULE | Refills: 0 | Status: SHIPPED | OUTPATIENT
Start: 2018-06-11 | End: 2018-09-17 | Stop reason: SDUPTHER

## 2018-06-12 ENCOUNTER — ANESTHESIA (OUTPATIENT)
Dept: ENDOSCOPY | Facility: HOSPITAL | Age: 63
End: 2018-06-12
Payer: MEDICARE

## 2018-06-12 ENCOUNTER — SURGERY (OUTPATIENT)
Age: 63
End: 2018-06-12

## 2018-06-12 ENCOUNTER — ANESTHESIA EVENT (OUTPATIENT)
Dept: ENDOSCOPY | Facility: HOSPITAL | Age: 63
End: 2018-06-12
Payer: MEDICARE

## 2018-06-12 ENCOUNTER — HOSPITAL ENCOUNTER (OUTPATIENT)
Facility: HOSPITAL | Age: 63
Discharge: HOME | End: 2018-06-12
Attending: INTERNAL MEDICINE | Admitting: INTERNAL MEDICINE
Payer: MEDICARE

## 2018-06-12 VITALS
RESPIRATION RATE: 16 BRPM | OXYGEN SATURATION: 100 % | SYSTOLIC BLOOD PRESSURE: 158 MMHG | TEMPERATURE: 97 F | DIASTOLIC BLOOD PRESSURE: 85 MMHG | HEART RATE: 71 BPM

## 2018-06-12 DIAGNOSIS — K63.5 COLON POLYP: ICD-10-CM

## 2018-06-12 PROCEDURE — D9220A PRA ANESTHESIA: Mod: PT,ANES,, | Performed by: ANESTHESIOLOGY

## 2018-06-12 PROCEDURE — 27201012 HC FORCEPS, HOT/COLD, DISP: Performed by: INTERNAL MEDICINE

## 2018-06-12 PROCEDURE — 25000003 PHARM REV CODE 250: Performed by: INTERNAL MEDICINE

## 2018-06-12 PROCEDURE — 37000009 HC ANESTHESIA EA ADD 15 MINS: Performed by: INTERNAL MEDICINE

## 2018-06-12 PROCEDURE — 88305 TISSUE EXAM BY PATHOLOGIST: CPT | Mod: 26,,, | Performed by: PATHOLOGY

## 2018-06-12 PROCEDURE — 37000008 HC ANESTHESIA 1ST 15 MINUTES: Performed by: INTERNAL MEDICINE

## 2018-06-12 PROCEDURE — 45380 COLONOSCOPY AND BIOPSY: CPT | Performed by: INTERNAL MEDICINE

## 2018-06-12 PROCEDURE — 63600175 PHARM REV CODE 636 W HCPCS: Performed by: NURSE ANESTHETIST, CERTIFIED REGISTERED

## 2018-06-12 PROCEDURE — 88305 TISSUE EXAM BY PATHOLOGIST: CPT | Performed by: PATHOLOGY

## 2018-06-12 PROCEDURE — 45380 COLONOSCOPY AND BIOPSY: CPT | Mod: PT,,, | Performed by: INTERNAL MEDICINE

## 2018-06-12 PROCEDURE — D9220A PRA ANESTHESIA: Mod: PT,CRNA,, | Performed by: NURSE ANESTHETIST, CERTIFIED REGISTERED

## 2018-06-12 PROCEDURE — 25000003 PHARM REV CODE 250: Performed by: ANESTHESIOLOGY

## 2018-06-12 RX ORDER — LABETALOL HYDROCHLORIDE 5 MG/ML
10 INJECTION, SOLUTION INTRAVENOUS EVERY 10 MIN PRN
Status: DISCONTINUED | OUTPATIENT
Start: 2018-06-12 | End: 2018-06-12 | Stop reason: HOSPADM

## 2018-06-12 RX ORDER — PROPOFOL 10 MG/ML
VIAL (ML) INTRAVENOUS
Status: DISCONTINUED | OUTPATIENT
Start: 2018-06-12 | End: 2018-06-12

## 2018-06-12 RX ORDER — LIDOCAINE HCL/PF 100 MG/5ML
SYRINGE (ML) INTRAVENOUS
Status: DISCONTINUED | OUTPATIENT
Start: 2018-06-12 | End: 2018-06-12

## 2018-06-12 RX ORDER — SODIUM CHLORIDE 0.9 % (FLUSH) 0.9 %
3 SYRINGE (ML) INJECTION
Status: DISCONTINUED | OUTPATIENT
Start: 2018-06-12 | End: 2018-06-12 | Stop reason: HOSPADM

## 2018-06-12 RX ORDER — SODIUM CHLORIDE 9 MG/ML
INJECTION, SOLUTION INTRAVENOUS CONTINUOUS
Status: DISCONTINUED | OUTPATIENT
Start: 2018-06-12 | End: 2018-06-12 | Stop reason: HOSPADM

## 2018-06-12 RX ORDER — PROPOFOL 10 MG/ML
VIAL (ML) INTRAVENOUS CONTINUOUS PRN
Status: DISCONTINUED | OUTPATIENT
Start: 2018-06-12 | End: 2018-06-12

## 2018-06-12 RX ADMIN — PROPOFOL 100 MCG/KG/MIN: 10 INJECTION, EMULSION INTRAVENOUS at 09:06

## 2018-06-12 RX ADMIN — PROPOFOL 50 MG: 10 INJECTION, EMULSION INTRAVENOUS at 09:06

## 2018-06-12 RX ADMIN — LABETALOL HYDROCHLORIDE 10 MG: 5 INJECTION INTRAVENOUS at 10:06

## 2018-06-12 RX ADMIN — SODIUM CHLORIDE: 0.9 INJECTION, SOLUTION INTRAVENOUS at 08:06

## 2018-06-12 RX ADMIN — LIDOCAINE HYDROCHLORIDE 50 MG: 20 INJECTION, SOLUTION INTRAVENOUS at 09:06

## 2018-06-12 NOTE — ANESTHESIA PREPROCEDURE EVALUATION
2018  [facility]  Anesthesia Pre-Operative Evaluation         Patient Name: Shivani Sheets  YOB: 1955  MRN: [id number]    SUBJECTIVE:     Pre-operative evaluation for Procedure(s) (LRB):  ESOPHAGOGASTRODUODENOSCOPY (EGD) (N/A)     2018    Shivani Sheets is a 62 y.o. female w/ a significant PMHx of HTN, CAD s/p angioplasty, dCHF, atrial fibrillation, ESRD s/p failed  donor kidney transplant, STEFFANY, RA,  who presents for the above procedure.        Prev airway: None documented.    Drips: None documented.    Patient Active Problem List   Diagnosis    Chronic diastolic heart failure    CAD (coronary artery disease)    S/P PTCA (percutaneous transluminal coronary angioplasty)    Central retinal artery occlusion    ESRD from HTN started RRT     Sleep apnea    Hyperlipidemia    Obesity    Rheumatoid arthritis    Anticoagulation monitoring by pharmacist    Complication of vascular access for dialysis    Hand pain, left    Failed  donor kidney transplant     Renal hypertension diagnosed age 15    Hypothyroidism    Awaiting organ transplant    Pulmonary hypertension    Atrial fibrillation    Hypoxia    Low back pain    Other chronic pulmonary heart diseases    Chest wall pain    Status post cataract extraction and insertion of intraocular lens    Glaucoma filtering bleb of both eyes    Proliferative diabetic retinopathy of both eyes without macular edema associated with type 2 diabetes mellitus    Chronic angle-closure glaucoma of both eyes, moderate stage    Atypical pneumonia    GIB (gastrointestinal bleeding)    Symptomatic anemia    Melena    Glaucoma shunt device of both eyes       Review of patient's allergies indicates:   Allergen Reactions    Penicillins Swelling    Iodine      Other reaction(s): Hives     Sulfamethoxazole-trimethoprim      Other reaction(s): Swelling  Other reaction(s): Hives       Current Inpatient Medications:      Current Outpatient Prescriptions on File Prior to Visit   Medication Sig Dispense Refill    ALPHAGAN P 0.1 % Drop       aspirin 81 MG chewable tablet Take 81 mg by mouth Daily. 1  By mouth Every day      CARTIA  mg 24 hr capsule       CARTIA  mg 24 hr capsule TAKE ONE CAPSULE BY MOUTH DAILY 90 capsule 0    dorzolamide-timolol 2-0.5% (COSOPT) 22.3-6.8 mg/mL ophthalmic solution INSTILL 1 DROP IN BOTH EYES TWICE DAILY 10 mL 0    hydrocodone-acetaminophen 7.5-325mg (NORCO) 7.5-325 mg per tablet TK 1 T PO Q 6 H PRN  0    latanoprost 0.005 % ophthalmic solution INSTILL 1 DROP IN BOTH EYES EVERY DAY AT BEDTIME 7.5 mL 0    levothyroxine (SYNTHROID) 100 MCG tablet Take 100 mcg by mouth. 1 Tablet Oral Every day      lidocaine-prilocaine (EMLA) cream Apply topically as needed.      LIPITOR 10 mg tablet TAKE 1 BY MOUTH ONCE A DAY 90 tablet 2    pantoprazole (PROTONIX) 40 MG tablet Take 1 tablet (40 mg total) by mouth once daily. 30 tablet 3    selexipag (UPTRAVI) 200 mcg Tab Take 200 mcg by mouth 2 (two) times daily.      tadalafil, PULMONARY HYPERTENSION, (ADCIRCA) 20 mg Tab Take 1 tab (20 mg) by mouth every Tuesday, Thursday, Saturday, Sunday in the AM and 1 tablet by mouth every afternoon. 60 tablet 11    warfarin (COUMADIN) 5 MG tablet Take 1.5 tablets (7.5 mg total) by mouth Daily. Then as directed by coumadin clinic 120 tablet 0    warfarin (COUMADIN) 5 MG tablet TAKE 1 1/2 TABLETS BY MOUTH DAILY ON TUESDAY, THURSDAY AND SATURDAY, THEN TAKE 1 TABLET DAILY ON MONDAY, WEDNESDAY, FRIDAY AND SUNDAY 120 tablet 0     No current facility-administered medications on file prior to visit.        Past Surgical History:   Procedure Laterality Date    CATARACT EXTRACTION W/  INTRAOCULAR LENS IMPLANT Bilateral     COLONOSCOPY      EYE SURGERY      HYSTERECTOMY      KIDNEY  TRANSPLANT      NEPHRECTOMY  11/2008    transplant     TONSILLECTOMY      UPPER GASTROINTESTINAL ENDOSCOPY         Social History     Social History    Marital status:      Spouse name: N/A    Number of children: N/A    Years of education: N/A     Occupational History    Not on file.     Social History Main Topics    Smoking status: Former Smoker     Quit date: 8/10/2000    Smokeless tobacco: Never Used    Alcohol use No    Drug use: No    Sexual activity: No     Other Topics Concern    Not on file     Social History Narrative    Shivani had three children and 11grandchildren.  She lives alone.  She is on disability.        OBJECTIVE:     Vital Signs Range (Last 24H):  BP: ()/()   Arterial Line BP: ()/()       CBC:   No results for input(s): WBC, RBC, HGB, HCT, PLT, MCV, MCH, MCHC in the last 72 hours.    CMP:   No results for input(s): NA, K, CL, CO2, BUN, CREATININE, GLU, MG, PHOS, CALCIUM, ALBUMIN, PROT, ALKPHOS, ALT, AST, BILITOT in the last 72 hours.    INR:  No results for input(s): PT, INR, PROTIME, APTT in the last 72 hours.    Diagnostic Studies: No relevant studies.    EKG:   Normal sinus rhythm  Cannot rule out Anterior infarct (cited on or before 09-OCT-2017)  Abnormal ECG  When compared with ECG of 15-FEB-2018 12:07,  No significant change was found  Confirmed by Eliazar AMADO, [facility] ([id number]) on 2/23/2018 11:48:56 AM    2D ECHO:  Results for orders placed or performed during the hospital encounter of 02/08/18   2D echo with color flow doppler   Result Value Ref Range    EF 60 55 - 65    Mitral Valve Regurgitation TRIVIAL     Diastolic Dysfunction Yes (A)     Est. PA Systolic Pressure 70.9 (A)     Tricuspid Valve Regurgitation MILD     The estimated PA systolic pressure is 71 mmHg.       Last 3 sets of Vitals    Vitals - 1 value per visit 3/27/2018 5/8/2018 5/15/2018   SYSTOLIC 194 138 152   DIASTOLIC 105 84 93   PULSE 74 82 83   TEMPERATURE - 98.2 -   RESPIRATIONS - - -   SPO2 96 96 -  "  Weight (lb) 163.14 164.02 161.82   Weight (kg) 74 74.4 73.4   HEIGHT 5' 1" 5' 1" 5' 1"   BODY MASS INDEX 30.83 30.99 30.58   VISIT REPORT - - -   Pain Score  - - 0   Some recent data might be hidden         ASSESSMENT/PLAN:         Pre-op Assessment    I have reviewed the Patient Summary Reports.      I have reviewed the Medications.     Review of Systems  Anesthesia Hx:  No problems with previous Anesthesia  History of prior surgery of interest to airway management or planning: Denies Family Hx of Anesthesia complications.   Denies Personal Hx of Anesthesia complications.   Social:  Former Smoker, No Alcohol Use    Hematology/Oncology:     Oncology Normal    -- Anemia:   EENT/Dental:EENT/Dental Normal   Cardiovascular:   Exercise tolerance: poor Hypertension CAD   CHF     hyperlipidemia MORA ECG has been reviewed. On home oxygen; uses with exertion   Pulmonary:   Sleep Apnea Severe pulmonary hypertension; under care by Dr. Hennessy - reviewed   Renal/:   Chronic Renal Disease, ESRD, Dialysis Dialyzed yesterday   Hepatic/GI:   Denies GERD. Recent gastroenteritis, recovering   Musculoskeletal:  Musculoskeletal Normal    Neurological:   CVA    Endocrine:   Diabetes, well controlled, type 2 Hypothyroidism    Dermatological:  Skin Normal    Psych:  Psychiatric Normal           Physical Exam  General:  Well nourished, Obesity    Airway/Jaw/Neck:  Airway Findings: Mouth Opening: Normal Tongue: Normal  General Airway Assessment: Adult  Mallampati: II  Improves to I with phonation.  TM Distance: 4 - 6 cm  Jaw/Neck Findings:  Neck ROM: Normal ROM      Dental:  Dental Findings: In tact   Chest/Lungs:  Chest/Lungs Findings: Clear to auscultation, Normal Respiratory Rate     Heart/Vascular:  Heart Findings: Rate: Normal  Rhythm: Regular Rhythm  Sounds: Normal       Skin:  Skin Findings:  AVF L arm          Anesthesia Plan  Type of Anesthesia, risks & benefits discussed:  Anesthesia Type:  MAC, general  Patient's Preference: "   Intra-op Monitoring Plan: standard ASA monitors  Intra-op Monitoring Plan Comments:   Post Op Pain Control Plan: multimodal analgesia  Post Op Pain Control Plan Comments:   Induction:   IV  Beta Blocker:  Patient is not currently on a Beta-Blocker (No further documentation required).       Informed Consent: Patient understands risks and agrees with Anesthesia plan.  Questions answered. Anesthesia consent signed with patient.  ASA Score: 4     Day of Surgery Review of History & Physical:    H&P update referred to the surgeon.     Anesthesia Plan Notes: Pt with severe pulmonary hypertension, on phosophodiesterase inhibitors,         Ready For Surgery From Anesthesia Perspective.

## 2018-06-12 NOTE — H&P
Short Stay Endoscopy History and Physical    PCP - Eula Weiss MD  Referring Physician - CAM PaizC  [address] [place] [address]    Procedure - colonoscopy  ASA - per anesthesia  Mallampati - per anesthesia  History of Anesthesia problems - no  Family history Anesthesia problems -  no   Plan of anesthesia - General    HPI:  This is a 62 y.o. female here for evaluation of: colon polyps    Reflux - no  Dysphagia - no  Abdominal pain - no  Diarrhea - no    ROS:  Constitutional: No fevers, chills, No weight loss  CV: No chest pain  Pulm: No cough, No shortness of breath  Ophtho: No vision changes  GI: see HPI  Derm: No rash    Medical History:  has a past medical history of Allergy; Anemia; Arthritis; Awaiting organ transplant (2013); Cataract; CHF (congestive heart failure); Chronic kidney disease; Coronary artery disease; Diabetes mellitus; Diabetic retinopathy; Diverticulosis; ESRD from HTN strtied RRT  (1999); Failed  donor kidney transplant -; Glaucoma; History of bleeding peptic ulcer; Hyperlipidemia; Hypertension; Hypothyroidism; Morbid obesity (8/10/2012); Renal hypertension; and Stroke.    Surgical History:  has a past surgical history that includes Kidney transplant; Hysterectomy; Eye surgery; Nephrectomy (2008); Tonsillectomy; Cataract extraction w/  intraocular lens implant (Bilateral); Upper gastrointestinal endoscopy; and Colonoscopy.    Family History: family history includes Asthma in her sister; Blindness in her father; Depression in her sister; Diabetes in her maternal aunt; Heart attack in her father and mother; Heart failure in her father and mother; Hypertension in her brother, father, mother, and sister; Kidney disease in her brother..    Social History:  reports that she quit smoking about 17 years ago. She has never used smokeless tobacco. She reports that she does not drink alcohol or use drugs.    Review of patient's  allergies indicates:   Allergen Reactions    Penicillins Swelling    Iodine      Other reaction(s): Hives    Sulfamethoxazole-trimethoprim      Other reaction(s): Swelling  Other reaction(s): Hives       Medications:   Prescriptions Prior to Admission   Medication Sig Dispense Refill Last Dose    ALPHAGAN P 0.1 % Drop    6/12/2018 at Unknown time    aspirin 81 MG chewable tablet Take 81 mg by mouth Daily. 1  By mouth Every day   Past Week at Unknown time    CARTIA  mg 24 hr capsule    6/11/2018 at Unknown time    CARTIA  mg 24 hr capsule TAKE ONE CAPSULE BY MOUTH DAILY 90 capsule 0 6/11/2018 at Unknown time    dorzolamide-timolol 2-0.5% (COSOPT) 22.3-6.8 mg/mL ophthalmic solution INSTILL 1 DROP IN BOTH EYES TWICE DAILY 10 mL 0 6/12/2018 at Unknown time    hydrocodone-acetaminophen 7.5-325mg (NORCO) 7.5-325 mg per tablet TK 1 T PO Q 6 H PRN  0 6/11/2018 at Unknown time    latanoprost 0.005 % ophthalmic solution INSTILL 1 DROP IN BOTH EYES EVERY DAY AT BEDTIME 7.5 mL 0 6/12/2018 at Unknown time    levothyroxine (SYNTHROID) 100 MCG tablet Take 100 mcg by mouth. 1 Tablet Oral Every day   6/11/2018 at Unknown time    lidocaine-prilocaine (EMLA) cream Apply topically as needed.   6/11/2018 at Unknown time    LIPITOR 10 mg tablet TAKE 1 BY MOUTH ONCE A DAY 90 tablet 2 6/11/2018 at Unknown time    pantoprazole (PROTONIX) 40 MG tablet Take 1 tablet (40 mg total) by mouth once daily. 30 tablet 3 6/11/2018 at Unknown time    selexipag (UPTRAVI) 200 mcg Tab Take 200 mcg by mouth 2 (two) times daily.   6/11/2018 at Unknown time    tadalafil, PULMONARY HYPERTENSION, (ADCIRCA) 20 mg Tab Take 1 tab (20 mg) by mouth every Tuesday, Thursday, Saturday, Sunday in the AM and 1 tablet by mouth every afternoon. 60 tablet 11 6/11/2018 at Unknown time    warfarin (COUMADIN) 5 MG tablet Take 1.5 tablets (7.5 mg total) by mouth Daily. Then as directed by coumadin clinic 120 tablet 0 6/9/2018    warfarin  (COUMADIN) 5 MG tablet TAKE 1 1/2 TABLETS BY MOUTH DAILY ON TUESDAY, THURSDAY AND SATURDAY, THEN TAKE 1 TABLET DAILY ON MONDAY, WEDNESDAY, FRIDAY AND SUNDAY 120 tablet 0 6/9/2018       Physical Exam:    Vital Signs: There were no vitals filed for this visit.    General Appearance: Well appearing in no acute distress  Eyes:    No scleral icterus  ENT: Neck supple  Lungs: CTA anteriorly  Heart:  Regular rate  Abdomen: Soft, non tender, non distended with normal bowel sounds.  Extremities: No edema  Skin: No rash    Labs:  Lab Results   Component Value Date    WBC 4.91 03/27/2018    HGB 9.0 (L) 03/27/2018    HCT 30.1 (L) 03/27/2018     03/27/2018    CHOL 161 02/01/2014    TRIG 122 02/01/2014    HDL 61 02/01/2014    ALT 9 (L) 03/27/2018    AST 14 03/27/2018     03/27/2018    K 3.8 06/12/2018    CL 97 03/27/2018    CREATININE 6.6 (H) 03/27/2018    BUN 26 (H) 03/27/2018    CO2 32 (H) 03/27/2018    TSH 0.793 02/23/2018    INR 2.8 (H) 05/15/2018    GLUF 126 (H) 03/16/2010    HGBA1C 10.8 (H) 08/26/2011       I have explained the risks and benefits of this endoscopic procedure to the patient including but not limited to bleeding, inflammation, infection, perforation, and death.      Jose Falk MD

## 2018-06-12 NOTE — TRANSFER OF CARE
Anesthesia Transfer of Care Note    Patient: Shivani Sheets    Procedure(s) Performed: Procedure(s) (LRB):  COLONOSCOPY (N/A)    Patient location: [facility]    Anesthesia Type: general    Transport from OR: Transported from OR on 6-10 L/min O2 by face mask with adequate spontaneous ventilation    Post pain: adequate analgesia    Post assessment: no apparent anesthetic complications    Post vital signs: stable    Level of consciousness: responds to stimulation and sedated    Nausea/Vomiting: no nausea/vomiting    Complications: none    Transfer of care protocol was followed      Last vitals:   Visit Vitals  BP (!) 209/108 (BP Location: Left leg, Patient Position: Lying)   Pulse 80   Temp 36.5 °C (97.7 °F) (Tympanic)   Resp 18   LMP  (LMP Unknown)   SpO2 96%   Breastfeeding? No

## 2018-06-12 NOTE — DISCHARGE INSTRUCTIONS
Colonoscopy     A camera attached to a flexible tube with a viewing lens is used to take video pictures.     Colonoscopy is a test to view the inside of your lower digestive tract (colon and rectum). Sometimes it can show the last part of the small intestine (ileum). During the test, small pieces of tissue may be removed for testing. This is called a biopsy. Small growths, such as polyps, may also be removed.   Why is colonoscopy done?  The test is done to help look for colon cancer. And it can help find the source of abdominal pain, bleeding, and changes in bowel habits. It may be needed once a year, depending on factors such as your:  · Age  · Health history  · Family health history  · Symptoms  · Results from any prior colonoscopy  Risks and possible complications  These include:  · Bleeding               · A puncture or tear in the colon   · Risks of anesthesia  · A cancer lesion not being seen  Getting ready   To prepare for the test:  · Talk with your healthcare provider about the risks of the test (see below). Also ask your healthcare provider about alternatives to the test.  · Tell your healthcare provider about any medicines you take. Also tell him or her about any health conditions you may have.  · Make sure your rectum and colon are empty for the test. Follow the diet and bowel prep instructions exactly. If you dont, the test may need to be rescheduled.  · Plan for a friend or family member to drive you home after the test.     Colonoscopy provides an inside view of the entire colon.     You may discuss the results with your doctor right away or at a future visit.  During the test   The test is usually done in the hospital on an outpatient basis. This means you go home the same day. The procedure takes about 30 minutes. During that time:  · You are given relaxing (sedating) medicine through an IV line. You may be drowsy, or fully asleep.  · The healthcare provider will first give you a physical exam to  check for anal and rectal problems.  · Then the anus is lubricated and the scope inserted.  · If you are awake, you may have a feeling similar to needing to have a bowel movement. You may also feel pressure as air is pumped into the colon. Its OK to pass gas during the procedure.  · Biopsy, polyp removal, or other treatments may be done during the test.  After the test   You may have gas right after the test. It can help to try to pass it to help prevent later bloating. Your healthcare provider may discuss the results with you right away. Or you may need to schedule a follow-up visit to talk about the results. After the test, you can go back to your normal eating and other activities. You may be tired from the sedation and need to rest for a few hours.  Date Last Reviewed: 11/1/2016 © 2000-2017 [employer]. [address] [place] [address], [place] [address]. All rights reserved. This information is not intended as a substitute for professional medical care. Always follow your healthcare professional's instructions.

## 2018-06-12 NOTE — ANESTHESIA POSTPROCEDURE EVALUATION
Anesthesia Post Evaluation    Patient: Shivani Sheets    Procedure(s) Performed: Procedure(s) (LRB):  COLONOSCOPY (N/A)    Final Anesthesia Type: general  Patient location during evaluation: [facility]  Patient participation: Yes- Able to Participate  Level of consciousness: awake and alert  Post-procedure vital signs: reviewed and stable  Pain management: adequate  Airway patency: patent  PONV status at discharge: No PONV  Anesthetic complications: no      Cardiovascular status: hypertensive and blood pressure returned to baseline  Respiratory status: unassisted, spontaneous ventilation and room air  Hydration status: euvolemic          Visit Vitals  BP (!) 169/107   Pulse 78   Temp 36.4 °C (97.5 °F) (Temporal)   Resp 16   LMP  (LMP Unknown)   SpO2 100%   Breastfeeding? No       Pain/Suzie Score: Pain Assessment Performed: Yes (6/12/2018  9:41 AM)  Presence of Pain: denies (6/12/2018  9:41 AM)  Suzie Score: 10 (6/12/2018  9:41 AM)

## 2018-06-13 DIAGNOSIS — Z12.39 SCREENING FOR BREAST CANCER: Primary | ICD-10-CM

## 2018-06-14 ENCOUNTER — TELEPHONE (OUTPATIENT)
Dept: GASTROENTEROLOGY | Facility: CLINIC | Age: 63
End: 2018-06-14

## 2018-06-14 ENCOUNTER — OFFICE VISIT (OUTPATIENT)
Dept: SLEEP MEDICINE | Facility: CLINIC | Age: 63
End: 2018-06-14
Payer: MEDICARE

## 2018-06-14 VITALS
HEART RATE: 72 BPM | SYSTOLIC BLOOD PRESSURE: 154 MMHG | DIASTOLIC BLOOD PRESSURE: 90 MMHG | BODY MASS INDEX: 29.55 KG/M2 | HEIGHT: 61 IN | WEIGHT: 156.5 LBS

## 2018-06-14 DIAGNOSIS — G47.33 OBSTRUCTIVE SLEEP APNEA: Primary | ICD-10-CM

## 2018-06-14 PROCEDURE — 99213 OFFICE O/P EST LOW 20 MIN: CPT | Mod: S$PBB,,, | Performed by: NURSE PRACTITIONER

## 2018-06-14 PROCEDURE — 99999 PR PBB SHADOW E&M-EST. PATIENT-LVL IV: CPT | Mod: PBBFAC,,, | Performed by: NURSE PRACTITIONER

## 2018-06-14 PROCEDURE — 99214 OFFICE O/P EST MOD 30 MIN: CPT | Mod: PBBFAC | Performed by: NURSE PRACTITIONER

## 2018-06-14 NOTE — TELEPHONE ENCOUNTER
----- Message from Jose Falk MD sent at 6/14/2018  7:56 AM CDT -----  Can you tell her the following: The polyps we removed at your colonoscopy were adenomatous  .  They have been completely removed and pose no threat to you. You should have a repat colonoscopy in 5 years.  Please reach out to our office as the time draws near.

## 2018-06-14 NOTE — PROGRESS NOTES
Shivani Sheets  was seen as a new patient for the evaluation of obstructive sleep apnea.    06/14/2018 Checked-in at 8:22 am, 22 minutes into 40-minute appt, scheduled at 8 am.     CHIEF COMPLAINT:    Chief Complaint   Patient presents with    Sleep Apnea       06/14/2018 VIKKI Cuevas NP: Initial HISTORY OF PRESENT ILLNESS: Shivani Sheets is a 62 y.o. female is here for sleep evaluation.       Had sleep study done in 2009, prompted by symptoms of snoring and daytime fatigue.   2009 PSG revealed severe STEFFANY. Pt set up with CPAP February 2010 and used it off/on until 2012.  Stopped use all together in 2012 when she started nocturnal supplemental O2 (pulmonary HTN) because she didn't know how to use nasal canula for O2 with CPAP nasal mask  She also felt that sleep was better with oxygen than CPAP machine    In addition to snoring and fatigue, she now has interrupted sleep affecting sleep maintenance    Fatigue worse on dialysis days MWF    CPAP Interrogation: REMStar Plus CPAP 15 cm, Blower Hours: 1091.4, No Usage    Denies symptoms of restless legs or kicking during sleep.    Occupation: disability due to ESRD    EPWORTH SLEEPINESS SCALE 6/14/2018   Sitting and reading 3   Watching TV 1   Sitting, inactive in a public place (e.g. a theatre or a meeting) 0   As a passenger in a car for an hour without a break 0   Lying down to rest in the afternoon when circumstances permit 2   Sitting and talking to someone 0   Sitting quietly after a lunch without alcohol 0   In a car, while stopped for a few minutes in traffic 0   Total score 6       SLEEP ROUTINE:  Sleep Clinic New Patient 6/14/2018   What time do you go to bed on a week day? (Give a range) between 8-9pm   What time do you go to bed on a day off? (Give a range) 8-9pm   How long does it take you to fall asleep? (Give a range) when I take my meds about 15-20 min   How long does it take you to fall back into sleep? (Give a range) normally, I can't fall back to  sleep.  I wake up at 2 am; I go to dialysis.   What time do you wake up to start your day on a week day? (Give a range) varies   What time do you wake up to start your day on a day off? (Give a range) 8am   What time do you get out of bed? (Give a range) 8am   Rate your sleep quality from 0 to 5 (0-poor, 5-great). 3   Have you experienced:  Weight loss?   Have you ever had a sleep study/CPAP machine/surgery for sleep apnea? Yes   Have you ever had a CPAP machine for sleep apnea? Yes   Have you ever had surgery for sleep apnea? No        Baseline Sleep Study: 10/12/2009 AHI 86.6 with oxygen kayden 90%. Split to CPAP 15 cm.       PAST MEDICAL HISTORY:    Active Ambulatory Problems     Diagnosis Date Noted    Chronic diastolic heart failure 08/10/2012    CAD (coronary artery disease) 08/10/2012    S/P PTCA (percutaneous transluminal coronary angioplasty) 08/10/2012    Central retinal artery occlusion 08/10/2012    ESRD from HTN started RRT 1999    Sleep apnea 08/10/2012    Hyperlipidemia 08/10/2012    Obesity 08/10/2012    Rheumatoid arthritis 08/10/2012    Anticoagulation monitoring by pharmacist 2012    Complication of vascular access for dialysis 2013    Hand pain, left 2013    Failed  donor kidney transplant -     Renal hypertension diagnosed age 15     Hypothyroidism     Awaiting organ transplant 2013    Pulmonary hypertension 2014    Atrial fibrillation 2014    Hypoxia 2014    Low back pain 2015    Other chronic pulmonary heart diseases 2015    Chest wall pain 2015    Status post cataract extraction and insertion of intraocular lens 2016    Glaucoma filtering bleb of both eyes 2016    Proliferative diabetic retinopathy of both eyes without macular edema associated with type 2 diabetes mellitus 2016    Chronic angle-closure glaucoma of both eyes, moderate stage 2017    Atypical  pneumonia     GIB (gastrointestinal bleeding) 2018    Symptomatic anemia 2018    Melena 2018    Glaucoma shunt device of both eyes 2018    Colon polyp 2018     Resolved Ambulatory Problems     Diagnosis Date Noted    Glaucoma 10/12/2017    Symptomatic anemia 2018     Past Medical History:   Diagnosis Date    Allergy     Anemia     Arthritis     Awaiting organ transplant 2013    Cataract     CHF (congestive heart failure)     Chronic kidney disease     Coronary artery disease     Diabetes mellitus     Diabetic retinopathy     Diverticulosis     ESRD from HTN strtied RRT 1999    Failed  donor kidney transplant -     Glaucoma     History of bleeding peptic ulcer     Hyperlipidemia     Hypertension     Hypothyroidism     Morbid obesity 8/10/2012    Renal hypertension     Stroke                 PAST SURGICAL HISTORY:    Past Surgical History:   Procedure Laterality Date    CATARACT EXTRACTION W/  INTRAOCULAR LENS IMPLANT Bilateral     COLONOSCOPY      EYE SURGERY      HYSTERECTOMY      KIDNEY TRANSPLANT      NEPHRECTOMY  2008    transplant     TONSILLECTOMY      UPPER GASTROINTESTINAL ENDOSCOPY           FAMILY HISTORY:                Family History   Problem Relation Age of Onset    Heart attack Father     Heart failure Father     Hypertension Father     Blindness Father     Heart attack Mother     Heart failure Mother     Hypertension Mother     Asthma Sister     Depression Sister     Hypertension Sister     Hypertension Brother     Kidney disease Brother         ESRD    Diabetes Maternal Aunt     Amblyopia Neg Hx     Cataracts Neg Hx     Macular degeneration Neg Hx     Retinal detachment Neg Hx     Strabismus Neg Hx     Colon cancer Neg Hx     Esophageal cancer Neg Hx        SOCIAL HISTORY:          Tobacco:   History   Smoking Status    Former Smoker    Quit date: 8/10/2000   Smokeless  Tobacco    Never Used       Alcohol use:    History   Alcohol Use No                 ALLERGIES:    Review of patient's allergies indicates:   Allergen Reactions    Penicillins Swelling    Iodine      Other reaction(s): Hives    Sulfamethoxazole-trimethoprim      Other reaction(s): Swelling  Other reaction(s): Hives       CURRENT MEDICATIONS:    Current Outpatient Prescriptions   Medication Sig Dispense Refill    ALPHAGAN P 0.1 % Drop       aspirin 81 MG chewable tablet Take 81 mg by mouth Daily. 1  By mouth Every day      CARTIA  mg 24 hr capsule       CARTIA  mg 24 hr capsule TAKE ONE CAPSULE BY MOUTH DAILY 90 capsule 0    dorzolamide-timolol 2-0.5% (COSOPT) 22.3-6.8 mg/mL ophthalmic solution INSTILL 1 DROP IN BOTH EYES TWICE DAILY 10 mL 0    hydrocodone-acetaminophen 7.5-325mg (NORCO) 7.5-325 mg per tablet TK 1 T PO Q 6 H PRN  0    latanoprost 0.005 % ophthalmic solution INSTILL 1 DROP IN BOTH EYES EVERY DAY AT BEDTIME 7.5 mL 0    levothyroxine (SYNTHROID) 100 MCG tablet Take 100 mcg by mouth. 1 Tablet Oral Every day      lidocaine-prilocaine (EMLA) cream Apply topically as needed.      LIPITOR 10 mg tablet TAKE 1 BY MOUTH ONCE A DAY 90 tablet 2    pantoprazole (PROTONIX) 40 MG tablet Take 1 tablet (40 mg total) by mouth once daily. 30 tablet 3    selexipag (UPTRAVI) 200 mcg Tab Take 200 mcg by mouth 2 (two) times daily.      tadalafil, PULMONARY HYPERTENSION, (ADCIRCA) 20 mg Tab Take 1 tab (20 mg) by mouth every Tuesday, Thursday, Saturday, Sunday in the AM and 1 tablet by mouth every afternoon. 60 tablet 11    warfarin (COUMADIN) 5 MG tablet Take 1.5 tablets (7.5 mg total) by mouth Daily. Then as directed by coumadin clinic 120 tablet 0    warfarin (COUMADIN) 5 MG tablet TAKE 1 1/2 TABLETS BY MOUTH DAILY ON TUESDAY, THURSDAY AND SATURDAY, THEN TAKE 1 TABLET DAILY ON MONDAY, WEDNESDAY, FRIDAY AND SUNDAY 120 tablet 0     No current facility-administered medications for this visit.   "                 REVIEW OF SYSTEMS:     Sleep related symptoms as per HPI.  Sleep Clinic ROS  6/14/2018   Difficulty breathing through the nose?  Sometimes   Sore throat or dry mouth in the morning? Yes   Irregular or very fast heart beat?  Sometimes   Shortness of breath?  Sometimes   Acid reflux? No   Body aches and pains?  No   Morning headaches? No   Dizziness? No   Mood changes?  Sometimes   Do you exercise?  No   Do you feel like moving your legs a lot?  No         Otherwise, a balance of systems reviewed is negative.          PHYSICAL EXAM:  Vitals:    06/14/18 0829   BP: (!) 154/90   Pulse: 72   Weight: 71 kg (156 lb 8.4 oz)   Height: 5' 1" (1.549 m)   PainSc:   7   PainLoc: Hip     Body mass index is 29.58 kg/m².     GENERAL: Obese body habitus development, well groomed  NECK: Supple. No thyromegaly. No palpable nodes.    SKIN: On face and neck: No abrasions, no rashes, no lesions.  No subcutaneous nodules are palpable.  RESPIRATORY:  Normal chest expansion and non-labored breathing at rest.  CARDIOVASCULAR: Normal rate  EXTREMITIES: No edema. No clubbing. No cyanosis. Station normal. Gait normal.        NEURO/PSYCH: Oriented to time, place and person. Normal attention span and concentration. Affect is full. Mood is normal.         02/08/2018 Echo  CONCLUSIONS     1 - Eccentric hypertrophy.     2 - Normal left ventricular systolic function (EF 60-65%).     3 - Right ventricle is upper limit of normal in size with hypertrophy, with normal systolic function.     4 - Impaired LV relaxation, elevated LAP (grade 2 diastolic dysfunction).     5 - Moderate left atrial enlargement.     6 - Mild tricuspid regurgitation.     7 - Pulmonary hypertension. The estimated PA systolic pressure is 71 mmHg.                                          ASSESSMENT:    STEFFANY, severe by AHI.  The patient symptomatically has snoring, interrupted sleep, and daytime fatigue. Medical co-morbidities: glaucoma, pulmonary HTN, heart failure, " atrial fibrillation on long-term anticoagulation, ESRD on HD, and obesity.  This warrants treatment for obstructive sleep apnea.      PLAN:    Treatment:  -resume CPAP 15 cm with supplemental O2. Supplies Rx to [facility], including O2 adaptor.   -RTC 4 - 6 weeks after resuming CPAP. Pt prefers [facility]    Education: During our discussion today, we talked about the etiology of obstructive sleep apnea as well as the potential ramifications of untreated sleep apnea, which could include daytime sleepiness, hypertension, heart disease and/or stroke. We discussed potential treatment options, which could include weight loss, body positioning, continuous positive airway pressure (CPAP), OA, EPAP, or referral for surgical consideration.     Precautions: The patient was advised to abstain from driving should they feel sleepy  or drowsy.     Thank you for allowing me the opportunity to participate in the care of your patient.

## 2018-06-14 NOTE — [employer] PATIENT INSTRUCTIONS
Discharge Summary/Instructions after an Endoscopic Procedure  Patient Name: Shivani Sheets  Patient MRN: [id number]  Patient YOB: 1955 Tuesday, June 12, 2018  Jose Falk MD  RESTRICTIONS:  During your procedure today, you received medications for sedation.  These   medications may affect your judgment, balance and coordination.  Therefore,   for 24 hours, you have the following restrictions:   - DO NOT drive a car, operate machinery, make legal/financial decisions,   sign important papers or drink alcohol.    ACTIVITY:  Today: no heavy lifting, straining or running due to procedural   sedation/anesthesia.  The following day: return to full activity including work.  DIET:  Eat and drink normally unless instructed otherwise.     TREATMENT FOR COMMON SIDE EFFECTS:  - Mild abdominal pain, nausea, belching, bloating or excessive gas:  rest,   eat lightly and use a heating pad.  - Sore Throat: treat with throat lozenges and/or gargle with warm salt   water.  - Because air was used during the procedure, expelling large amounts of air   from your rectum or belching is normal.  - If a bowel prep was taken, you may not have a bowel movement for 1-3 days.    This is normal.  SYMPTOMS TO WATCH FOR AND REPORT TO YOUR PHYSICIAN:  1. Abdominal pain or bloating, other than gas cramps.  2. Chest pain.  3. Back pain.  4. Signs of infection such as: chills or fever occurring within 24 hours   after the procedure.  5. Rectal bleeding, which would show as bright red, maroon, or black stools.   (A tablespoon of blood from the rectum is not serious, especially if   hemorrhoids are present.)  6. Vomiting.  7. Weakness or dizziness.  GO DIRECTLY TO THE NEAREST EMERGENCY ROOM IF YOU HAVE ANY OF THE FOLLOWING:      Difficulty breathing              Chills and/or fever over 101 F   Persistent vomiting and/or vomiting blood   Severe abdominal pain   Severe chest pain   Black, tarry stools   Bleeding- more than one  tablespoon   Any other symptom or condition that you feel may need urgent attention  Your doctor recommends these additional instructions:  If any biopsies were taken, your doctors clinic will contact you in 1 to 2   weeks with any results.  - Await pathology results.   - Repeat colonoscopy in 5 years for surveillance.   - Take Sitz baths as needed.   - Discharge patient to home.   - Resume previous diet.   - Continue present medications.   - Resume Coumadin (warfarin) at prior dose today.  For questions, problems or results please call your physician - Jose Falk MD at Work:  [phone].  [facility], EMERGENCY ROOM PHONE NUMBER: [phone]  IF A COMPLICATION OR EMERGENCY SITUATION ARISES AND YOU ARE UNABLE TO REACH   YOUR PHYSICIAN - GO DIRECTLY TO THE EMERGENCY ROOM.  Jose Falk MD  6/12/2018 9:32:13 AM  [employer]

## 2018-06-17 ENCOUNTER — TELEPHONE (OUTPATIENT)
Dept: GASTROENTEROLOGY | Facility: CLINIC | Age: 63
End: 2018-06-17

## 2018-06-18 ENCOUNTER — TELEPHONE (OUTPATIENT)
Dept: GASTROENTEROLOGY | Facility: CLINIC | Age: 63
End: 2018-06-18

## 2018-06-19 ENCOUNTER — OFFICE VISIT (OUTPATIENT)
Dept: TRANSPLANT | Facility: CLINIC | Age: 63
End: 2018-06-19
Payer: MEDICARE

## 2018-06-19 ENCOUNTER — HOSPITAL ENCOUNTER (OUTPATIENT)
Dept: PULMONOLOGY | Facility: CLINIC | Age: 63
Discharge: HOME | End: 2018-06-19
Payer: MEDICARE

## 2018-06-19 VITALS
HEIGHT: 61 IN | WEIGHT: 156 LBS | DIASTOLIC BLOOD PRESSURE: 73 MMHG | HEIGHT: 61 IN | SYSTOLIC BLOOD PRESSURE: 140 MMHG | HEART RATE: 67 BPM | BODY MASS INDEX: 29.45 KG/M2 | OXYGEN SATURATION: 97 % | BODY MASS INDEX: 30.3 KG/M2 | WEIGHT: 160.5 LBS

## 2018-06-19 DIAGNOSIS — I50.32 CHRONIC DIASTOLIC HEART FAILURE: ICD-10-CM

## 2018-06-19 DIAGNOSIS — T86.12 FAILED KIDNEY TRANSPLANT: Chronic | ICD-10-CM

## 2018-06-19 DIAGNOSIS — I27.20 PULMONARY HYPERTENSION: Primary | ICD-10-CM

## 2018-06-19 DIAGNOSIS — I25.10 CORONARY ARTERY DISEASE INVOLVING NATIVE CORONARY ARTERY OF NATIVE HEART WITHOUT ANGINA PECTORIS: ICD-10-CM

## 2018-06-19 DIAGNOSIS — Z79.01 ANTICOAGULATION MONITORING BY PHARMACIST: ICD-10-CM

## 2018-06-19 DIAGNOSIS — I48.20 CHRONIC ATRIAL FIBRILLATION: ICD-10-CM

## 2018-06-19 DIAGNOSIS — Z98.61 S/P PTCA (PERCUTANEOUS TRANSLUMINAL CORONARY ANGIOPLASTY): ICD-10-CM

## 2018-06-19 DIAGNOSIS — E78.5 HYPERLIPIDEMIA, UNSPECIFIED HYPERLIPIDEMIA TYPE: ICD-10-CM

## 2018-06-19 DIAGNOSIS — N18.6 ESRD (END STAGE RENAL DISEASE): ICD-10-CM

## 2018-06-19 DIAGNOSIS — I27.20 PULMONARY HYPERTENSION: ICD-10-CM

## 2018-06-19 DIAGNOSIS — I12.9 RENAL HYPERTENSION: Chronic | ICD-10-CM

## 2018-06-19 DIAGNOSIS — G47.33 OBSTRUCTIVE SLEEP APNEA: ICD-10-CM

## 2018-06-19 DIAGNOSIS — E66.09 CLASS 1 OBESITY DUE TO EXCESS CALORIES WITH SERIOUS COMORBIDITY AND BODY MASS INDEX (BMI) OF 32.0 TO 32.9 IN ADULT: ICD-10-CM

## 2018-06-19 PROCEDURE — 99214 OFFICE O/P EST MOD 30 MIN: CPT | Mod: S$PBB,,, | Performed by: INTERNAL MEDICINE

## 2018-06-19 PROCEDURE — 99213 OFFICE O/P EST LOW 20 MIN: CPT | Mod: PBBFAC,25 | Performed by: INTERNAL MEDICINE

## 2018-06-19 PROCEDURE — 99999 PR PBB SHADOW E&M-EST. PATIENT-LVL III: CPT | Mod: PBBFAC,,, | Performed by: INTERNAL MEDICINE

## 2018-06-19 PROCEDURE — 94618 PULMONARY STRESS TESTING: CPT | Mod: PBBFAC | Performed by: INTERNAL MEDICINE

## 2018-06-19 PROCEDURE — 94618 PULMONARY STRESS TESTING: CPT | Mod: 26,S$PBB,, | Performed by: INTERNAL MEDICINE

## 2018-06-19 NOTE — LETTER
June 19, 2018        Tye Deleon  [address] [place] [address] [address]  [address]  [place] [address]  Phone: [phone]  Fax: [phone]             [facility]  [address]  [place] [address]  Phone: [phone]   Patient: Shivani Sheets   MR Number: [id number]   YOB: 1955   Date of Visit: 6/19/2018       Dear Dr. Tye Deleon    Thank you for referring Shivani Sheets to me for evaluation. Attached you will find relevant portions of my assessment and plan of care.    If you have questions, please do not hesitate to call me. I look forward to following Shivani Sheets along with you.    Sincerely,    Jenny Hennessy MD    Enclosure    If you would like to receive this communication electronically, please contact [email or web address] or [phone] to request e[employer] Link access.    [employer] is a tool which provides read-only access to select patient information with whom you have a relationship. Its easy to use and provides real time access to review your patients record including encounter summaries, notes, results, and demographic information.    If you feel you have received this communication in error or would no longer like to receive these types of communications, please e-mail [email or web address]

## 2018-06-19 NOTE — PROGRESS NOTES
Subjective:    Patient ID:  Shivani Sheets is a 62 y.o. female who presents for follow-up of Pulmonary Hypertension      HPI 63 yo AAF with moderate to severe pulmonary HTN, diastolic dysfunction, central retinal artery occlusion with no significant carotid artery stenosis, CAD, h/o PCI, ESRD secondary to HTN, s/p failed kidney tx, now back on HD and is re-listed, STEFFANY/CPAP, hypothyroidism, rheumatoid arthritis, DLP and obesity, recent admit for AF with RVR, recently switched from adcirca/tyvaso to adcirca/opsumit, here for routine PH/HF f/u visit-   pt was admitted with GIB 2/22-3/5-  At OSH, all PH meds had been held. Adcirca was restarted here. Selexipag is not on formulary here so plan was to restart as outpt at lower dose. Plan is to not resume macitentan given association with severe anemia in rare cases. GI was consulted for GIB . Pt had EGD on 2/27/18- no findings to explain anemia. GI plans to do cscope as an outpt. Pt's coumadin was restarted after EGD and her H and H remained stable. She was d/c'ed home on PPI.   Pt d/c'ed with Home Health.  On d/c, she was restarted on selixipag at 200 mcg BID and this will be uptitrated as an outpt. She cont Adcirca. She will no longer be on macitentan given association with severe anemia in rare cases    Since last visit pt had a bad episode of diarrhea which lead to missing her apt. Did not miss HD though- says her breathing has been good, but her bp has been high since she got off opsummit, has been steadily rising.    She is up to 1000 mcg bid uptravi, and doing well other than HA- says her breathing has been improving and she  Walk a little farther without having to stop. Keeping her fluid off with HD  No CP, minimal swelling, no orthopnea/PND, sleeps with her O2, was recently seen in sleep clinic and will be taught to use her O2 with her CPAP     Dry wt now 70.5        TTE July 2015  1 - Eccentric LVH with normal left ventricular systolic function (EF 60-65%).  "  2 - Mild left atrial enlargement.   3 - Left ventricular diastolic dysfunction.   4 - Right ventricular enlargement with hypertrophy, with low normal systolic function.   5 - Mild tricuspid regurgitation.   6 - Pulmonary hypertension.     6mw  213.5m ( 366m 305 m,  313.94   m in Nov   )         Hip slowed her down, not SOB                                     O2 sat  99->95  %                                                           HR 63 ->   90                                                               BP  153 / 90  ->196 /96                                                         Dolly    0.5 -> 4      [facility] 11/6/14  AOSAT: 97  FICKCI: 3.26  FICKCO: 5.87  PAPRES: 97/38 (59)  PASAT: 60  PVR: 4.43  PWPRES: 22/23 (24)  RAPRES: 20/23 (17)  RVPRES: 94/2, 21    6mw  314.15 (305m, 176m last visit) (317m in Sept)                                         O2 sat   99-> 91%                                                           HR 80->102                                                               BP  158 / 92  ->217/114                                                         Dolly   0.5-> 2    Review of Systems   Constitution: Negative for chills, fever, malaise/fatigue and weight gain.   HENT: Negative.    Eyes: Negative.    Cardiovascular: Positive for dyspnea on exertion. Negative for chest pain, leg swelling, near-syncope, orthopnea, palpitations, paroxysmal nocturnal dyspnea and syncope.   Respiratory: Positive for shortness of breath. Negative for cough.    Endocrine: Negative.    Hematologic/Lymphatic: Negative.    Skin: Negative.    Musculoskeletal: Negative.    Gastrointestinal: Negative for bloating, abdominal pain and change in bowel habit.   Neurological: Negative for dizziness and light-headedness.   Psychiatric/Behavioral: Negative for depression.        Objective:  BP (!) 140/73 (BP Location: Right arm, Patient Position: Sitting, BP Method: Medium (Automatic))   Pulse 67   Ht 5' 1" (1.549 m)   Wt " 72.8 kg (160 lb 7.9 oz)   LMP  (LMP Unknown)   SpO2 97%   BMI 30.33 kg/m²       Physical Exam   Constitutional: She is oriented to person, place, and time. She appears well-developed and well-nourished.   HENT:   Head: Normocephalic.   Eyes: Conjunctivae are normal. Pupils are equal, round, and reactive to light.   Neck: Normal range of motion. Neck supple. No JVD present.   Cardiovascular: Normal rate and regular rhythm.    Large AV fistula L arm, accentuated S2   Pulmonary/Chest: Effort normal. She has no rales.   Abdominal: Soft. Bowel sounds are normal.   Musculoskeletal: Normal range of motion.   Neurological: She is alert and oriented to person, place, and time.   Skin: Skin is warm and dry.   Psychiatric: She has a normal mood and affect.   Vitals reviewed.          Chemistry        Component Value Date/Time     03/27/2018 0732    K 3.8 06/12/2018 0700    CL 97 03/27/2018 0732    CO2 32 (H) 03/27/2018 0732    BUN 26 (H) 03/27/2018 0732    CREATININE 6.6 (H) 03/27/2018 0732     (H) 03/27/2018 0732        Component Value Date/Time    CALCIUM 9.6 03/27/2018 0732    ALKPHOS 54 (L) 03/27/2018 0732    AST 14 03/27/2018 0732    ALT 9 (L) 03/27/2018 0732    BILITOT 0.5 03/27/2018 0732            Magnesium   Date Value Ref Range Status   03/02/2018 2.1 1.6 - 2.6 mg/dL Final       Lab Results   Component Value Date    WBC 5.72 06/19/2018    HGB 10.7 (L) 06/19/2018    HCT 35.5 (L) 06/19/2018    MCV 87 06/19/2018     06/19/2018       Lab Results   Component Value Date    INR 1.3 (H) 06/19/2018    INR 2.8 (H) 05/15/2018    INR 2.1 (H) 05/01/2018       BNP   Date Value Ref Range Status   03/27/2018 1,699 (H) 0 - 99 pg/mL Final     Comment:     Values of less than 100 pg/ml are consistent with non-CHF populations.   02/08/2018 572 (H) 0 - 99 pg/mL Final     Comment:     Values of less than 100 pg/ml are consistent with non-CHF populations.   07/14/2017 112 (H) 0 - 99 pg/mL Final     Comment:      Values of less than 100 pg/ml are consistent with non-CHF populations.       No results found for: LDH          Assessment:       1. Pulmonary hypertension- Severe and out of proportion to her diastolic dysfunction- improving with addition of uptravi- back to being FC II (limited more now by orthopedic issues), euvolemic on exam- BP and BNP both high today as pt's losartan and opsummit were both d/c'd   2. Chronic diastolic heart failure   3. Renal hypertension, stage 5 chronic kidney disease or end stage renal disease    4. Atrial fibrillation    5. CAD (coronary artery disease)    6. Hypoxia    7. Rheumatoid arthritis    8. ESRD from HTN started RRT 1999    9. Anticoagulation monitoring by pharmacist    10. Awaiting organ transplant    11. Obesity    12. S/P PTCA (percutaneous transluminal coronary angioplasty)    13. Sleep apnea         Plan:      Will cont selexipag, titrate as tolerated    Given systemic HTN- Would like to apply to switch from adcirca to adempas to improve 6mw and SOB- will start PA process and once approved, hold adcirca 48 hours prior to starting 1mg tid    Start wearing CPAP with oxygen every time she sleeps    Recommend 2 gram sodium restriction and 1500cc fluid restriction.     Daily wts. If weight goes up 3# overnight or 5# in one week she should call us    Start  tracking blood pressure at home and bring log to next visit.    F/u 3mo with rescheduled RHC- Instructed to  Hold coumadin 3 nights prior

## 2018-06-19 NOTE — PATIENT INSTRUCTIONS
Will continue selexipag, titrate as tolerated, call us with any issues- try to increase at night first    Would like to apply to switch from adcirca to adempas to improve 6 minute walk  and Shortness Of Breath- will apply for it and once approved, hold adcirca 48 hours prior to starting 1mg three times a day    Start wearing the CPAP with your oxygen every time you sleep as soon as they teach you how    Recommend 2 gram sodium restriction and 1500cc fluid restriction.     Check your weights every morning after getting out of bed and urinating. If your weight goes up 3# overnight or 5# in one week take call us.    On follow up  We will do a right heart catheterization to measure the blood pressure in your lungs-  Take your usual medicines and eat a light breakfast that am.  Labs on 2nd floor- then go to third [facility] cath lab waiting area and check in    HOLD COUMADIN 3 nights prior to the right heart cath

## 2018-06-21 NOTE — PROCEDURES
Shivani Sheets is a 62 y.o.  female patient, who presents for a 6 minute walk test ordered by Gerhard SMITH  The diagnosis is Pulmonary Hypertension.  The patient's BMI is 29.5 kg/m2.  Predicted distance (lower limit of normal) is 320.8 meters.      Test Results:    The test was completed without stopping.  The total time walked was 360 seconds.  During walking, the patient reported:  Dyspnea, Leg pain. The patient used no assistive devices and supplemental oxygen during testing.     6/19/2018---------Distance: 213.36 meters (700 feet)     O2 Sat % Supplemental Oxygen Heart Rate Blood Pressure Dolly Scale   Pre-exercise  (Resting) 99 % 2 L/M 63 bpm 153/90 mmHg 0.5   During Exercise 95% 2 L/M 90 bpm (!) 196/96 mmHg 4   Post-exercise  (Recovery) 98 % 2 L/M  70 bpm 184/88 mmHg       Recovery Time: 458 seconds    Performing nurse/tech: Thang LITTLEJOHN      PREVIOUS STUDY:   The patient had a previous study.  06/21/2016---------Distance: 365.76 meters (1200 feet)       O2 Sat % Supplemental Oxygen Heart Rate Blood Pressure Dolly Scale   Pre-exercise  (Resting) 95 % 2 L/M 80 bpm 148/85 0.5   During Exercise 89 % 2 L/M 95 bpm 185/90 2   Post-exercise  (Recovery) 93 % 2 L/M  88 bpm           CLINICAL INTERPRETATION:  Six minute walk distance is 213.36 meters (700 feet) with somewhat heavy dyspnea.  During exercise, there was significant desaturation while breathing supplemental oxygen.  Both blood pressure and heart rate increased significantly with walking.  Hypertension was present prior to exercise.  This may represent a hypertensive and a tachycardic response to exercise.  The patient reported non-pulmonary symptoms during exercise.  Significant exercise impairment is likely due to cardiovascular causes.  Since the previous study in June 2016, exercise capacity is unchanged.  Based upon age and body mass index, exercise capacity is less than predicted.

## 2018-06-22 ENCOUNTER — TELEPHONE (OUTPATIENT)
Dept: GASTROENTEROLOGY | Facility: CLINIC | Age: 63
End: 2018-06-22

## 2018-06-26 ENCOUNTER — TELEPHONE (OUTPATIENT)
Dept: TRANSPLANT | Facility: CLINIC | Age: 63
End: 2018-06-26

## 2018-07-02 ENCOUNTER — EDUCATION (OUTPATIENT)
Dept: TRANSPLANT | Facility: CLINIC | Age: 63
End: 2018-07-02

## 2018-07-02 ENCOUNTER — TELEPHONE (OUTPATIENT)
Dept: OPHTHALMOLOGY | Facility: CLINIC | Age: 63
End: 2018-07-02

## 2018-07-02 ENCOUNTER — TELEPHONE (OUTPATIENT)
Dept: TRANSPLANT | Facility: CLINIC | Age: 63
End: 2018-07-02

## 2018-07-02 NOTE — PROGRESS NOTES
Spoke w/patient by telephone regarding initiation of Adempas. Pt already established with [facility], so she understands role of specialty pharmacy.      Patient provided with Adempas Medication guide, including REMS information for Adempas. Pt is of non-reproductive potential. Possible side effects of medication also discussed, including low BP, indigestion, HA. Pt was urged to contact office (pt has direct phone number) should she have any SEs that are concerning to her whatsoever.     Confirmed that patient would stop Adcirca 48 hours prior to initiation of Adempas. Also, confirmed that patient does not take any nitrate medicine to treat high blood pressure or heart disease, such as nitroglycerin, or a medicine called a nitric oxide donor, such as amyl nitrite.    Patient expressed some concern about taking Adempas and Dialysis and her blood pressure. Reassured patient and advised that Dr. Hennessy would adjust medication if needed. Asked patient to keep us updated on how she is feeling while taking Adempas.     Pt understands that a nurse will contact her for a visit to her home and she should not initiate Adempas without the nurse visit. She will start at 1 mg PO TID and titrate every two weeks as tolerated for goal dose of 2.5mg PO TID.      Pt verbalized understanding of all. All questions/concerns answered/addressed to patient satisfaction. Referral for Adempas sent to Chiquis at Pending sale to [facility].

## 2018-07-09 DIAGNOSIS — Z79.01 ANTICOAGULATION MONITORING BY PHARMACIST: ICD-10-CM

## 2018-07-09 RX ORDER — WARFARIN SODIUM 5 MG/1
TABLET ORAL
Qty: 120 TABLET | Refills: 0 | Status: SHIPPED | OUTPATIENT
Start: 2018-07-09 | End: 2018-10-06 | Stop reason: SDUPTHER

## 2018-07-12 ENCOUNTER — TELEPHONE (OUTPATIENT)
Dept: TRANSPLANT | Facility: CLINIC | Age: 63
End: 2018-07-12

## 2018-07-12 NOTE — TELEPHONE ENCOUNTER
Patient called to express concerns about starting the medication Adempas. She noted that bleeding and heart palpitations are listed as side effects of taking Adempas. Patient has had issues with anemia in the past and has afib. Reassure patient. Advised that we would monitor to her closely and to report any SE's to our office. Patient verbalized understanding. She is setting up her initial appt with [facility] today. She knows to stop Adcirca 48 hours prior to starting Adempas.

## 2018-07-17 ENCOUNTER — TELEPHONE (OUTPATIENT)
Dept: SLEEP MEDICINE | Facility: CLINIC | Age: 63
End: 2018-07-17

## 2018-07-17 DIAGNOSIS — G47.33 OBSTRUCTIVE SLEEP APNEA: Primary | ICD-10-CM

## 2018-07-17 NOTE — TELEPHONE ENCOUNTER
----- Message from Lorraine Owen sent at 7/17/2018 12:20 PM CDT -----  Contact: Paulina    Name of Who is Calling: Paulina Rivera      What is the request in detail: Paulina Rivera is requesting a copy of patient sleep study. Please contact to further discuss and advise       Can the clinic reply by [facility]: No      What Number to Call Back if not in [facility]: [phone]    QI

## 2018-07-17 NOTE — TELEPHONE ENCOUNTER
----- Message from Mohamud Sauceda sent at 7/17/2018 12:08 PM CDT -----            Name [facility] is [facility]ing[facility]      What is the request in detail: Rep states pt has orders for supplies, but she would like to have order for a new machine as well. Her machine is over 5 years old. Please call to discuss or fax orders to [phone].      Can the clinic reply by [facility]: no      What Number to Call Back [facility] not [facility]: [phone]

## 2018-07-19 RX ORDER — PANTOPRAZOLE SODIUM 40 MG/1
TABLET, DELAYED RELEASE ORAL
Qty: 30 TABLET | Refills: 0 | Status: SHIPPED | OUTPATIENT
Start: 2018-07-19 | End: 2018-08-26 | Stop reason: SDUPTHER

## 2018-08-04 DIAGNOSIS — H40.50X3: ICD-10-CM

## 2018-08-06 ENCOUNTER — TELEPHONE (OUTPATIENT)
Dept: OPHTHALMOLOGY | Facility: CLINIC | Age: 63
End: 2018-08-06

## 2018-08-07 RX ORDER — LATANOPROST 50 UG/ML
SOLUTION/ DROPS OPHTHALMIC
Qty: 7.5 ML | Refills: 0 | Status: SHIPPED | OUTPATIENT
Start: 2018-08-07 | End: 2018-10-21 | Stop reason: SDUPTHER

## 2018-08-09 ENCOUNTER — HOSPITAL ENCOUNTER (EMERGENCY)
Facility: HOSPITAL | Age: 63
Discharge: HOME | End: 2018-08-09
Attending: EMERGENCY MEDICINE
Payer: MEDICARE

## 2018-08-09 VITALS
HEART RATE: 84 BPM | DIASTOLIC BLOOD PRESSURE: 90 MMHG | SYSTOLIC BLOOD PRESSURE: 149 MMHG | TEMPERATURE: 99 F | BODY MASS INDEX: 29.48 KG/M2 | RESPIRATION RATE: 18 BRPM | OXYGEN SATURATION: 93 % | WEIGHT: 156 LBS

## 2018-08-09 DIAGNOSIS — R52 PAIN: ICD-10-CM

## 2018-08-09 DIAGNOSIS — M54.30 SCIATICA, UNSPECIFIED LATERALITY: Primary | ICD-10-CM

## 2018-08-09 PROCEDURE — 25000003 PHARM REV CODE 250: Performed by: EMERGENCY MEDICINE

## 2018-08-09 PROCEDURE — 99283 EMERGENCY DEPT VISIT LOW MDM: CPT | Mod: ,,, | Performed by: EMERGENCY MEDICINE

## 2018-08-09 PROCEDURE — 99284 EMERGENCY DEPT VISIT MOD MDM: CPT

## 2018-08-09 PROCEDURE — 63600175 PHARM REV CODE 636 W HCPCS: Performed by: EMERGENCY MEDICINE

## 2018-08-09 RX ORDER — IBUPROFEN 400 MG/1
400 TABLET ORAL EVERY 6 HOURS PRN
Qty: 20 TABLET | Refills: 0 | Status: ON HOLD | OUTPATIENT
Start: 2018-08-09 | End: 2018-09-14 | Stop reason: HOSPADM

## 2018-08-09 RX ORDER — PREDNISONE 20 MG/1
20 TABLET ORAL
Status: COMPLETED | OUTPATIENT
Start: 2018-08-09 | End: 2018-08-09

## 2018-08-09 RX ORDER — IBUPROFEN 600 MG/1
600 TABLET ORAL
Status: COMPLETED | OUTPATIENT
Start: 2018-08-09 | End: 2018-08-09

## 2018-08-09 RX ORDER — CYCLOBENZAPRINE HCL 10 MG
10 TABLET ORAL
Status: COMPLETED | OUTPATIENT
Start: 2018-08-09 | End: 2018-08-09

## 2018-08-09 RX ORDER — PREDNISONE 20 MG/1
20 TABLET ORAL DAILY
Qty: 10 TABLET | Refills: 0 | Status: SHIPPED | OUTPATIENT
Start: 2018-08-09 | End: 2018-08-14

## 2018-08-09 RX ORDER — CYCLOBENZAPRINE HCL 10 MG
10 TABLET ORAL 3 TIMES DAILY PRN
Qty: 15 TABLET | Refills: 0 | Status: SHIPPED | OUTPATIENT
Start: 2018-08-09 | End: 2018-08-14

## 2018-08-09 RX ADMIN — PREDNISONE 20 MG: 20 TABLET ORAL at 04:08

## 2018-08-09 RX ADMIN — IBUPROFEN 600 MG: 600 TABLET ORAL at 04:08

## 2018-08-09 RX ADMIN — CYCLOBENZAPRINE HYDROCHLORIDE 10 MG: 10 TABLET, FILM COATED ORAL at 04:08

## 2018-08-09 NOTE — ED NOTES
Pt alert, oriented, and stable for discharge. Pt discharged with family member er as transportation home. Discharge instructions, medications, and follow up care reviewed with patient.

## 2018-08-09 NOTE — ED PROVIDER NOTES
Encounter Date: 2018    SCRIBE #1 NOTE: I, Bhumika Christianson, am scribing for, and in the presence of,  Dr. Carson. I have scribed the following portions of the note - Other sections scribed: HPI, ROS, PE.       History     Chief Complaint   Patient presents with    Hip Pain     left hip pain X 3 weeks; denies trauma or injury; reports tingling in left leg with the hip pain     Time patient was seen by the provider: 4:08 PM    The patient is a 63 y.o. female with co-morbidities including: HTN, DM and arthritis who presents to the ED with a complaint of left hip pain radiating down LLE x 3 weeks. Patient reports pain in back when lifting LLE. Standing exacerbates the pain. Denies injury.       The history is provided by the patient and medical records.     Review of patient's allergies indicates:   Allergen Reactions    Penicillins Swelling    Iodine      Other reaction(s): Hives    Sulfamethoxazole-trimethoprim      Other reaction(s): Swelling  Other reaction(s): Hives     Past Medical History:   Diagnosis Date    Allergy     Anemia     Arthritis     Awaiting organ transplant 2013    Cataract     CHF (congestive heart failure)     Chronic kidney disease     Coronary artery disease     Diabetes mellitus     Diabetic retinopathy     Diverticulosis     ESRD from HTN strtied RRT 1999    Failed  donor kidney transplant      Glaucoma     History of bleeding peptic ulcer      as stated per pt    Hyperlipidemia     Hypertension     Hypothyroidism     Morbid obesity 8/10/2012    Renal hypertension     Stroke      Past Surgical History:   Procedure Laterality Date    CATARACT EXTRACTION W/  INTRAOCULAR LENS IMPLANT Bilateral     COLONOSCOPY      COLONOSCOPY N/A 2018    Procedure: COLONOSCOPY;  Surgeon: Jose Falk MD;  Location: [facility] ([address] [place] [address]);  Service: Endoscopy;  Laterality: N/A;  PA pressure >50     Ok to hold Coumadin 3 days prior per  Mike,PharmD   Dialysis M-W-F  Labs ordered    EYE SURGERY      HYSTERECTOMY      KIDNEY TRANSPLANT      NEPHRECTOMY  11/2008    transplant     TONSILLECTOMY      UPPER GASTROINTESTINAL ENDOSCOPY       Family History   Problem Relation Age of Onset    Heart attack Father     Heart failure Father     Hypertension Father     Blindness Father     Heart attack Mother     Heart failure Mother     Hypertension Mother     Asthma Sister     Depression Sister     Hypertension Sister     Hypertension Brother     Kidney disease Brother         ESRD    Diabetes Maternal Aunt     Amblyopia Neg Hx     Cataracts Neg Hx     Macular degeneration Neg Hx     Retinal detachment Neg Hx     Strabismus Neg Hx     Colon cancer Neg Hx     Esophageal cancer Neg Hx      Social History   Substance Use Topics    Smoking status: Former Smoker     Quit date: 8/10/2000    Smokeless tobacco: Never Used    Alcohol use No     Review of Systems   Constitutional: Negative for fever.   HENT: Negative for sore throat.    Respiratory: Negative for shortness of breath.    Cardiovascular: Negative for chest pain.   Gastrointestinal: Negative for nausea.   Genitourinary: Negative for dysuria.   Musculoskeletal: Positive for back pain (left lower).        Positive for left hip pain radiating down LLE   Skin: Negative for rash.   Neurological: Negative for weakness.   Hematological: Does not bruise/bleed easily.       Physical Exam     Initial Vitals [08/09/18 1604]   BP Pulse Resp Temp SpO2   (!) 149/90 84 18 98.8 °F (37.1 °C) (!) 93 %      MAP       --         Physical Exam    Nursing note and vitals reviewed.  Constitutional: She appears well-developed and well-nourished. She is not diaphoretic. No distress.   HENT:   Head: Normocephalic and atraumatic.   Mouth/Throat: Oropharynx is clear and moist.   Eyes: Conjunctivae and EOM are normal. Pupils are equal, round, and reactive to light.   Neck: Normal range of motion. Neck  supple. No JVD present.   Cardiovascular: Normal rate, regular rhythm and normal heart sounds.   No murmur heard.  Pulmonary/Chest: Breath sounds normal. No respiratory distress. She has no wheezes. She has no rhonchi. She has no rales.   Abdominal: Soft. Bowel sounds are normal. She exhibits no distension and no mass. There is no tenderness. There is no rebound and no guarding.   Musculoskeletal: Normal range of motion.   No midline lumbar tenderness. Left perispinous musculature. Positive straight leg raise on the left.   Neurological: She is alert and oriented to person, place, and time. She has normal strength. No cranial nerve deficit or sensory deficit.   Skin: Skin is warm and dry. No rash noted.   Psychiatric: She has a normal mood and affect.         ED Course   Procedures  Labs Reviewed - No data to display       Imaging Results          X-Ray Lumbar Spine Ap And Lateral (In process)                             Scribe Attestation:   Scribe #1: I performed the above scribed service and the documentation accurately describes the services I performed. I attest to the accuracy of the note.               Clinical Impression:   Sciatica    Disposition:   Disposition: Discharged  63-year-old black female presents to the ER complaining of many months of left-sided low back pain radiating down her buttock to her legs sometimes leg turns numb this comes and goes she has never been to see her primary care doctor for no trauma no abdominal pain no chest pain no shortness of breath no fevers urine output has been normal no bleeding bowel movements have been normal. X-rays of the lumbar spine and left hip are negative for any acute process per radiologist.  I diagnosed her with sciatica I will give her prescription for Motrin Flexeril and prednisone asked her follow up with her primary care doctor soon as possible and return to ER for abdominal pain fevers decreased urine output bleeding increased pain increased  numbness or weakness of her extremities                        Rivas Carson MD  08/09/18 [id number]

## 2018-08-09 NOTE — ED TRIAGE NOTES
Pt c/o pain in left hip, radiates down left leg and into left foot. ROM in LLE normal but states causes pain in back when lifting extremity. PMS all intact in all extremities. Pt states when goes from sitting to standing, feels the pain shoot down leg from hip. Pt A&O x4 during triage. Pt denies any chest pain, SOB, fevers, chills, or N/V/D.

## 2018-08-09 NOTE — ED NOTES
LOC: The patient is awake, alert, and oriented to place, time, situation. Affect is appropriate.  Speech is appropriate and clear.     APPEARANCE: Patient resting comfortably in no acute distress.  Patient is clean and well groomed.    SKIN: The skin is warm and dry; color consistent with ethnicity.  Patient has normal skin turgor and moist mucus membranes.  Skin intact; no breakdown or bruising noted.     MUSCULOSKELETAL: Pt c/o pain in left hip and left lower back, radiates down left leg and into left foot. ROM in LLE normal but states causes pain in back when lifting extremity. PMS all intact in all extremities. Pt states when goes from sitting to standing, feels the pain shoot down leg from hip. Pt A&O x4.    RESPIRATORY: Airway is open and patent. Respirations spontaneous, even, easy, and non-labored.  Patient has a normal effort and rate.  No accessory muscle use noted. Denies cough.     CARDIAC:  Normal rhythm and rate noted.  No peripheral edema noted. No complaints of chest pain.      ABDOMEN: Soft and non tender to palpation.  No distention noted.     NEUROLOGIC: Eyes open spontaneously.  Behavior appropriate to situation.  Follows commands; facial expression symmetrical.  Purposeful motor response noted; normal sensation in all extremities.

## 2018-08-17 NOTE — PROGRESS NOTES
Pt has not had INR since May. Please contact patient, confirm she is still taking coumadin, and schedule INR ASAP. She likely does not have HH anymore

## 2018-08-21 ENCOUNTER — LAB VISIT (OUTPATIENT)
Dept: LAB | Facility: HOSPITAL | Age: 63
End: 2018-08-21
Payer: MEDICARE

## 2018-08-21 ENCOUNTER — OFFICE VISIT (OUTPATIENT)
Dept: OPHTHALMOLOGY | Facility: CLINIC | Age: 63
End: 2018-08-21
Payer: MEDICARE

## 2018-08-21 ENCOUNTER — ANTI-COAG VISIT (OUTPATIENT)
Dept: CARDIOLOGY | Facility: CLINIC | Age: 63
End: 2018-08-21

## 2018-08-21 DIAGNOSIS — H40.1133 PRIMARY OPEN-ANGLE GLAUCOMA, BILATERAL, SEVERE STAGE: ICD-10-CM

## 2018-08-21 DIAGNOSIS — I48.20 CHRONIC ATRIAL FIBRILLATION: ICD-10-CM

## 2018-08-21 DIAGNOSIS — Z79.01 ANTICOAGULATION MONITORING BY PHARMACIST: ICD-10-CM

## 2018-08-21 DIAGNOSIS — H35.711 CENTRAL SEROUS CHORIORETINOPATHY OF EYE, RIGHT: ICD-10-CM

## 2018-08-21 DIAGNOSIS — E11.3593 CONTROLLED TYPE 2 DIABETES MELLITUS WITH BOTH EYES AFFECTED BY PROLIFERATIVE RETINOPATHY WITHOUT MACULAR EDEMA, WITHOUT LONG-TERM CURRENT USE OF INSULIN: Primary | ICD-10-CM

## 2018-08-21 LAB
INR PPP: 1.7
PROTHROMBIN TIME: 17.1 SEC

## 2018-08-21 PROCEDURE — 92014 COMPRE OPH EXAM EST PT 1/>: CPT | Mod: S$PBB,,, | Performed by: OPHTHALMOLOGY

## 2018-08-21 PROCEDURE — 92226 PR SPECIAL EYE EXAM, SUBSEQUENT: CPT | Mod: 50,PBBFAC | Performed by: OPHTHALMOLOGY

## 2018-08-21 PROCEDURE — 92226 PR SPECIAL EYE EXAM, SUBSEQUENT: CPT | Mod: 50,S$PBB,, | Performed by: OPHTHALMOLOGY

## 2018-08-21 PROCEDURE — 99212 OFFICE O/P EST SF 10 MIN: CPT | Mod: PBBFAC,25 | Performed by: OPHTHALMOLOGY

## 2018-08-21 PROCEDURE — 85610 PROTHROMBIN TIME: CPT

## 2018-08-21 PROCEDURE — 36415 COLL VENOUS BLD VENIPUNCTURE: CPT

## 2018-08-21 PROCEDURE — 99999 PR PBB SHADOW E&M-EST. PATIENT-LVL II: CPT | Mod: PBBFAC,,, | Performed by: OPHTHALMOLOGY

## 2018-08-21 NOTE — PROGRESS NOTES
ED visit 8/9/18  Disposition:   Disposition: Discharged  63-year-old black female presents to the ER complaining of many months of left-sided low back pain radiating down her buttock to her legs sometimes leg turns numb this comes and goes she has never been to see her primary care doctor for no trauma no abdominal pain no chest pain no shortness of breath no fevers urine output has been normal no bleeding bowel movements have been normal. X-rays of the lumbar spine and left hip are negative for any acute process per radiologist.  I diagnosed her with sciatica I will give her prescription for Motrin Flexeril and prednisone asked her follow up with her primary care doctor soon as possible and return to ER for abdominal pain fevers decreased urine output bleeding increased pain increased numbness or weakness of her extremities      Prednisone pulse has finished.  INR remains subtherapeutic.  Will boost today, then maintain her dose.  RHC 9/20/18- directions are to hold coumadin x 3 nights prior to RHC per Dr Hennessy.  Recommend that she avoid IBU use.

## 2018-08-21 NOTE — Clinical Note
IOP 44 ODMay need Morgan tube revision ODSmall  leakage on recent PO steroids, should resolve spontaneiously, ok to proceed with Sx for Glc.  Will follow fluid

## 2018-08-21 NOTE — PROGRESS NOTES
HPI     12 months F/U     Patient complain of blurry vision in the right eye today and says pain is a zero in both eyes   Denies f/f.     Meds: Cosopt BID OU             Xalatan qhs OU              Alphagan BID OU     Last edited by Maricarmen Izquierdo MA on 8/21/2018  8:44 AM. (History)        OCT  OD - ERM with small subfoveal SRF pocket  OS - no ME    Prior FA - smoldering regressed NVD OU  NO NVE      Plan     1. Neovascular Glaucoma OU  PDR/OIS OU - significant vascular disease -?RVO OD  OD - s/p GDD with Cathy 2013  OS  S/p BV with JDN 10/17      Both eyes  Cosopt BID  Xal q day  Alphagan BID   - IOP 44/19 today, no NVI  May need tube revision OD in near future - will notify JDN    2.  OD  Recent PO steroids - pt taking 0.5 on taper  ERM possibly contributing  Recent elevated BP, also predominant pulmonary HTN with recent exacerbation - could contribute to small uveal effusiions - will  Monitor    3. ERM OD  No MMP  Follow    4. HTN Ret OU  Contributing to #2    5. ESRD on HD    6. PCIOL OU        8 weeks OCT

## 2018-08-28 RX ORDER — PANTOPRAZOLE SODIUM 40 MG/1
TABLET, DELAYED RELEASE ORAL
Qty: 30 TABLET | Refills: 0 | Status: SHIPPED | OUTPATIENT
Start: 2018-08-28 | End: 2018-11-01 | Stop reason: SDUPTHER

## 2018-09-04 NOTE — PROGRESS NOTES
Patient called to reschedule today's lab appointment due to the weather, it was rescheduled to 9/06

## 2018-09-06 ENCOUNTER — LAB VISIT (OUTPATIENT)
Dept: LAB | Facility: HOSPITAL | Age: 63
End: 2018-09-06
Attending: INTERNAL MEDICINE
Payer: MEDICARE

## 2018-09-06 ENCOUNTER — ANTI-COAG VISIT (OUTPATIENT)
Dept: CARDIOLOGY | Facility: CLINIC | Age: 63
End: 2018-09-06

## 2018-09-06 DIAGNOSIS — Z79.01 ANTICOAGULATION MONITORING BY PHARMACIST: ICD-10-CM

## 2018-09-06 DIAGNOSIS — I48.20 CHRONIC ATRIAL FIBRILLATION: ICD-10-CM

## 2018-09-06 LAB
INR PPP: 5.6
PROTHROMBIN TIME: 55.2 SEC

## 2018-09-06 PROCEDURE — 85610 PROTHROMBIN TIME: CPT

## 2018-09-06 PROCEDURE — 36415 COLL VENOUS BLD VENIPUNCTURE: CPT | Mod: PO

## 2018-09-07 ENCOUNTER — TELEPHONE (OUTPATIENT)
Dept: TRANSPLANT | Facility: CLINIC | Age: 63
End: 2018-09-07

## 2018-09-07 NOTE — TELEPHONE ENCOUNTER
"Patient called to report that she has not been feeling well since increasing to 2.5 mg Adempas. Her "appetite has vanished." She feels "discombobulated." Is having memory problems, forgetful and feels "depressed."    Notified Dr. Hennessy. Per MD instructions decrease Adempas to 2mg. Patient has 6 2 mg tablets on hand. [facility] who will set up a delivery of 2 mg tablets for tomorrow (Saturday).  Asked patient to report symptom relief or if symptoms worsen.        "

## 2018-09-07 NOTE — PROGRESS NOTES
Possible double of dose x 1-2 days, does not recall which days.  Pt held yesterday on her own, will lower dose today for elevated INR, then maintain with quick follow up on Tuesday.  Pt reports that she is forgetful.

## 2018-09-10 ENCOUNTER — TELEPHONE (OUTPATIENT)
Dept: TRANSPLANT | Facility: CLINIC | Age: 63
End: 2018-09-10

## 2018-09-10 ENCOUNTER — HOSPITAL ENCOUNTER (INPATIENT)
Facility: HOSPITAL | Age: 63
LOS: 4 days | Discharge: HOME | DRG: 813 | End: 2018-09-14
Attending: EMERGENCY MEDICINE | Admitting: INTERNAL MEDICINE
Payer: MEDICARE

## 2018-09-10 ENCOUNTER — TELEPHONE (OUTPATIENT)
Dept: OPHTHALMOLOGY | Facility: CLINIC | Age: 63
End: 2018-09-10

## 2018-09-10 DIAGNOSIS — D62 ACUTE BLOOD LOSS ANEMIA: ICD-10-CM

## 2018-09-10 DIAGNOSIS — N18.6 ESRD (END STAGE RENAL DISEASE): ICD-10-CM

## 2018-09-10 DIAGNOSIS — D64.9 ANEMIA, UNSPECIFIED TYPE: ICD-10-CM

## 2018-09-10 DIAGNOSIS — I27.20 PULMONARY HYPERTENSION: ICD-10-CM

## 2018-09-10 DIAGNOSIS — K92.1 MELENA: ICD-10-CM

## 2018-09-10 DIAGNOSIS — R79.1 SUPRATHERAPEUTIC INR: Primary | ICD-10-CM

## 2018-09-10 DIAGNOSIS — D64.9 ANEMIA: ICD-10-CM

## 2018-09-10 DIAGNOSIS — K92.2 GASTROINTESTINAL HEMORRHAGE, UNSPECIFIED GASTROINTESTINAL HEMORRHAGE TYPE: ICD-10-CM

## 2018-09-10 DIAGNOSIS — I48.20 CHRONIC ATRIAL FIBRILLATION: ICD-10-CM

## 2018-09-10 LAB
ABO + RH BLD: NORMAL
ALBUMIN SERPL BCP-MCNC: 3.3 G/DL
ALP SERPL-CCNC: 43 U/L
ALT SERPL W/O P-5'-P-CCNC: 12 U/L
ANION GAP SERPL CALC-SCNC: 12 MMOL/L
ANISOCYTOSIS BLD QL SMEAR: SLIGHT
APTT BLDCRRT: 38.7 SEC
AST SERPL-CCNC: 10 U/L
BASOPHILS # BLD AUTO: 0.02 K/UL
BASOPHILS NFR BLD: 0.2 %
BASOPHILS NFR BLD: 0.2 %
BASOPHILS NFR BLD: 0.3 %
BILIRUB SERPL-MCNC: 0.8 MG/DL
BLD GP AB SCN CELLS X3 SERPL QL: NORMAL
BLD PROD TYP BPU: NORMAL
BLD PROD TYP BPU: NORMAL
BLOOD GROUP ANTIBODIES SERPL: NORMAL
BLOOD UNIT EXPIRATION DATE: NORMAL
BLOOD UNIT EXPIRATION DATE: NORMAL
BLOOD UNIT TYPE CODE: 6200
BLOOD UNIT TYPE CODE: 6200
BLOOD UNIT TYPE: NORMAL
BLOOD UNIT TYPE: NORMAL
BUN SERPL-MCNC: 95 MG/DL
CALCIUM SERPL-MCNC: 9.2 MG/DL
CHLORIDE SERPL-SCNC: 100 MMOL/L
CO2 SERPL-SCNC: 27 MMOL/L
CODING SYSTEM: NORMAL
CODING SYSTEM: NORMAL
CREAT SERPL-MCNC: 12 MG/DL
DIFFERENTIAL METHOD: ABNORMAL
DISPENSE STATUS: NORMAL
DISPENSE STATUS: NORMAL
EOSINOPHIL # BLD AUTO: 0.2 K/UL
EOSINOPHIL # BLD AUTO: 0.3 K/UL
EOSINOPHIL # BLD AUTO: 0.3 K/UL
EOSINOPHIL NFR BLD: 3 %
EOSINOPHIL NFR BLD: 3.4 %
EOSINOPHIL NFR BLD: 3.4 %
ERYTHROCYTE [DISTWIDTH] IN BLOOD BY AUTOMATED COUNT: 17.8 %
ERYTHROCYTE [DISTWIDTH] IN BLOOD BY AUTOMATED COUNT: 20 %
ERYTHROCYTE [DISTWIDTH] IN BLOOD BY AUTOMATED COUNT: 20.1 %
EST. GFR  (AFRICAN AMERICAN): 3.4 ML/MIN/1.73 M^2
EST. GFR  (NON AFRICAN AMERICAN): 2.9 ML/MIN/1.73 M^2
GLUCOSE SERPL-MCNC: 123 MG/DL
HCT VFR BLD AUTO: 12.6 %
HCT VFR BLD AUTO: 13.2 %
HCT VFR BLD AUTO: 14.2 %
HGB BLD-MCNC: 3.8 G/DL
HGB BLD-MCNC: 3.8 G/DL
HGB BLD-MCNC: 4.2 G/DL
HYPOCHROMIA BLD QL SMEAR: ABNORMAL
IMM GRANULOCYTES # BLD AUTO: 0.05 K/UL
IMM GRANULOCYTES # BLD AUTO: 0.06 K/UL
IMM GRANULOCYTES # BLD AUTO: 0.07 K/UL
IMM GRANULOCYTES NFR BLD AUTO: 0.7 %
IMM GRANULOCYTES NFR BLD AUTO: 0.7 %
IMM GRANULOCYTES NFR BLD AUTO: 0.9 %
INR PPP: 3.1
LYMPHOCYTES # BLD AUTO: 1.4 K/UL
LYMPHOCYTES # BLD AUTO: 1.5 K/UL
LYMPHOCYTES # BLD AUTO: 1.7 K/UL
LYMPHOCYTES NFR BLD: 16.9 %
LYMPHOCYTES NFR BLD: 20.4 %
LYMPHOCYTES NFR BLD: 20.4 %
MAGNESIUM SERPL-MCNC: 2 MG/DL
MCH RBC QN AUTO: 26.9 PG
MCH RBC QN AUTO: 27 PG
MCH RBC QN AUTO: 27.9 PG
MCHC RBC AUTO-ENTMCNC: 28.8 G/DL
MCHC RBC AUTO-ENTMCNC: 29.6 G/DL
MCHC RBC AUTO-ENTMCNC: 30.2 G/DL
MCV RBC AUTO: 91 FL
MCV RBC AUTO: 93 FL
MCV RBC AUTO: 94 FL
MONOCYTES # BLD AUTO: 0.6 K/UL
MONOCYTES # BLD AUTO: 0.7 K/UL
MONOCYTES # BLD AUTO: 0.8 K/UL
MONOCYTES NFR BLD: 8.2 %
MONOCYTES NFR BLD: 8.3 %
MONOCYTES NFR BLD: 9.4 %
NEUTROPHILS # BLD AUTO: 5 K/UL
NEUTROPHILS # BLD AUTO: 5.4 K/UL
NEUTROPHILS # BLD AUTO: 5.9 K/UL
NEUTROPHILS NFR BLD: 67 %
NEUTROPHILS NFR BLD: 67.2 %
NEUTROPHILS NFR BLD: 69.4 %
NRBC BLD-RTO: 0 /100 WBC
NUM UNITS TRANS PACKED RBC: NORMAL
NUM UNITS TRANS PACKED RBC: NORMAL
OVALOCYTES BLD QL SMEAR: ABNORMAL
PHOSPHATE SERPL-MCNC: 4.2 MG/DL
PLATELET # BLD AUTO: 182 K/UL
PLATELET # BLD AUTO: 213 K/UL
PLATELET # BLD AUTO: 227 K/UL
PLATELET BLD QL SMEAR: ABNORMAL
PMV BLD AUTO: 11.2 FL
PMV BLD AUTO: 11.5 FL
PMV BLD AUTO: 11.8 FL
POIKILOCYTOSIS BLD QL SMEAR: SLIGHT
POLYCHROMASIA BLD QL SMEAR: ABNORMAL
POTASSIUM SERPL-SCNC: 5 MMOL/L
PROT SERPL-MCNC: 6.7 G/DL
PROTHROMBIN TIME: 30.1 SEC
RBC # BLD AUTO: 1.36 M/UL
RBC # BLD AUTO: 1.41 M/UL
RBC # BLD AUTO: 1.56 M/UL
SODIUM SERPL-SCNC: 139 MMOL/L
WBC # BLD AUTO: 7.46 K/UL
WBC # BLD AUTO: 8.07 K/UL
WBC # BLD AUTO: 8.44 K/UL

## 2018-09-10 PROCEDURE — 99223 1ST HOSP IP/OBS HIGH 75: CPT | Mod: ,,, | Performed by: INTERNAL MEDICINE

## 2018-09-10 PROCEDURE — C9113 INJ PANTOPRAZOLE SODIUM, VIA: HCPCS | Performed by: STUDENT IN AN ORGANIZED HEALTH CARE EDUCATION/TRAINING PROGRAM

## 2018-09-10 PROCEDURE — 86922 COMPATIBILITY TEST ANTIGLOB: CPT

## 2018-09-10 PROCEDURE — 84100 ASSAY OF PHOSPHORUS: CPT

## 2018-09-10 PROCEDURE — P9016 RBC LEUKOCYTES REDUCED: HCPCS

## 2018-09-10 PROCEDURE — C9113 INJ PANTOPRAZOLE SODIUM, VIA: HCPCS | Performed by: EMERGENCY MEDICINE

## 2018-09-10 PROCEDURE — 86870 RBC ANTIBODY IDENTIFICATION: CPT

## 2018-09-10 PROCEDURE — 80053 COMPREHEN METABOLIC PANEL: CPT

## 2018-09-10 PROCEDURE — 20000000 HC ICU ROOM

## 2018-09-10 PROCEDURE — 80100014 HC HEMODIALYSIS 1:1

## 2018-09-10 PROCEDURE — 63600175 PHARM REV CODE 636 W HCPCS: Performed by: EMERGENCY MEDICINE

## 2018-09-10 PROCEDURE — 86850 RBC ANTIBODY SCREEN: CPT

## 2018-09-10 PROCEDURE — 83735 ASSAY OF MAGNESIUM: CPT

## 2018-09-10 PROCEDURE — A4216 STERILE WATER/SALINE, 10 ML: HCPCS | Performed by: STUDENT IN AN ORGANIZED HEALTH CARE EDUCATION/TRAINING PROGRAM

## 2018-09-10 PROCEDURE — 36415 COLL VENOUS BLD VENIPUNCTURE: CPT

## 2018-09-10 PROCEDURE — 86902 BLOOD TYPE ANTIGEN DONOR EA: CPT

## 2018-09-10 PROCEDURE — 96374 THER/PROPH/DIAG INJ IV PUSH: CPT

## 2018-09-10 PROCEDURE — 85025 COMPLETE CBC W/AUTO DIFF WBC: CPT

## 2018-09-10 PROCEDURE — 25000003 PHARM REV CODE 250: Performed by: STUDENT IN AN ORGANIZED HEALTH CARE EDUCATION/TRAINING PROGRAM

## 2018-09-10 PROCEDURE — 94761 N-INVAS EAR/PLS OXIMETRY MLT: CPT

## 2018-09-10 PROCEDURE — 63600175 PHARM REV CODE 636 W HCPCS: Performed by: STUDENT IN AN ORGANIZED HEALTH CARE EDUCATION/TRAINING PROGRAM

## 2018-09-10 PROCEDURE — 99285 EMERGENCY DEPT VISIT HI MDM: CPT | Mod: ,,, | Performed by: NURSE PRACTITIONER

## 2018-09-10 PROCEDURE — 85610 PROTHROMBIN TIME: CPT

## 2018-09-10 PROCEDURE — 99285 EMERGENCY DEPT VISIT HI MDM: CPT | Mod: 25

## 2018-09-10 PROCEDURE — 36430 TRANSFUSION BLD/BLD COMPNT: CPT

## 2018-09-10 PROCEDURE — 99222 1ST HOSP IP/OBS MODERATE 55: CPT | Mod: AI,,, | Performed by: INTERNAL MEDICINE

## 2018-09-10 PROCEDURE — 85730 THROMBOPLASTIN TIME PARTIAL: CPT

## 2018-09-10 PROCEDURE — 25000003 PHARM REV CODE 250: Performed by: INTERNAL MEDICINE

## 2018-09-10 RX ORDER — PANTOPRAZOLE SODIUM 40 MG/10ML
40 INJECTION, POWDER, LYOPHILIZED, FOR SOLUTION INTRAVENOUS 2 TIMES DAILY
Status: DISCONTINUED | OUTPATIENT
Start: 2018-09-10 | End: 2018-09-13

## 2018-09-10 RX ORDER — ATORVASTATIN CALCIUM 10 MG/1
10 TABLET, FILM COATED ORAL DAILY
Status: DISCONTINUED | OUTPATIENT
Start: 2018-09-11 | End: 2018-09-14 | Stop reason: HOSPADM

## 2018-09-10 RX ORDER — LEVOTHYROXINE SODIUM 100 UG/1
100 TABLET ORAL
Status: DISCONTINUED | OUTPATIENT
Start: 2018-09-11 | End: 2018-09-14 | Stop reason: HOSPADM

## 2018-09-10 RX ORDER — PANTOPRAZOLE SODIUM 40 MG/10ML
80 INJECTION, POWDER, LYOPHILIZED, FOR SOLUTION INTRAVENOUS
Status: COMPLETED | OUTPATIENT
Start: 2018-09-10 | End: 2018-09-10

## 2018-09-10 RX ORDER — DILTIAZEM HYDROCHLORIDE 120 MG/1
240 CAPSULE, COATED, EXTENDED RELEASE ORAL DAILY
Status: DISCONTINUED | OUTPATIENT
Start: 2018-09-11 | End: 2018-09-14 | Stop reason: HOSPADM

## 2018-09-10 RX ORDER — HYDROCODONE BITARTRATE AND ACETAMINOPHEN 7.5; 325 MG/1; MG/1
1 TABLET ORAL EVERY 6 HOURS PRN
Status: DISCONTINUED | OUTPATIENT
Start: 2018-09-10 | End: 2018-09-14 | Stop reason: HOSPADM

## 2018-09-10 RX ORDER — SODIUM CHLORIDE 9 MG/ML
INJECTION, SOLUTION INTRAVENOUS
Status: DISCONTINUED | OUTPATIENT
Start: 2018-09-10 | End: 2018-09-12 | Stop reason: SDUPTHER

## 2018-09-10 RX ORDER — HYDROCODONE BITARTRATE AND ACETAMINOPHEN 500; 5 MG/1; MG/1
TABLET ORAL
Status: DISCONTINUED | OUTPATIENT
Start: 2018-09-10 | End: 2018-09-12 | Stop reason: SDUPTHER

## 2018-09-10 RX ORDER — SODIUM CHLORIDE 9 MG/ML
INJECTION, SOLUTION INTRAVENOUS ONCE
Status: DISCONTINUED | OUTPATIENT
Start: 2018-09-10 | End: 2018-09-14 | Stop reason: SDUPTHER

## 2018-09-10 RX ORDER — ONDANSETRON 4 MG/1
4 TABLET, ORALLY DISINTEGRATING ORAL EVERY 6 HOURS PRN
Status: DISCONTINUED | OUTPATIENT
Start: 2018-09-10 | End: 2018-09-14 | Stop reason: HOSPADM

## 2018-09-10 RX ORDER — SODIUM CHLORIDE 0.9 % (FLUSH) 0.9 %
3 SYRINGE (ML) INJECTION EVERY 8 HOURS
Status: DISCONTINUED | OUTPATIENT
Start: 2018-09-10 | End: 2018-09-14 | Stop reason: HOSPADM

## 2018-09-10 RX ADMIN — SODIUM CHLORIDE: 0.9 INJECTION, SOLUTION INTRAVENOUS at 11:09

## 2018-09-10 RX ADMIN — PANTOPRAZOLE SODIUM 80 MG: 40 INJECTION, POWDER, FOR SOLUTION INTRAVENOUS at 01:09

## 2018-09-10 RX ADMIN — PANTOPRAZOLE SODIUM 40 MG: 40 INJECTION, POWDER, LYOPHILIZED, FOR SOLUTION INTRAVENOUS at 09:09

## 2018-09-10 RX ADMIN — RIOCIGUAT 2 MG: 2 TABLET, FILM COATED ORAL at 11:09

## 2018-09-10 RX ADMIN — Medication 3 ML: at 10:09

## 2018-09-10 NOTE — ASSESSMENT & PLAN NOTE
Provoking factors likely elevated INR, ibuprofen use and asa use  hgb 3.8, pending blood transfusion and ICU admission  S/p protonix 80mg IV x 1 in ED  GI consult placed, discussed with consult team and they will assess patient

## 2018-09-10 NOTE — TREATMENT PLAN
GI brief note    Shivani Sheets is a 63 y.o. female with past medical history of pulm HTN, Afib on coumadin, CAD, ESRD, PUD, admitted to hospital 9/10/2018 (Hospital Day: 1) due to GI bleeding.     Patient presented with 4 days of melena in the setting of high INR and recent NSAID use. Presented with Hb of 3.8 from a baseline of ~11. She is hemodynamically stable.     Will plan for EGD after the patient has been resuscitated and transfused with H/H > 6/18.     -Please start patient on PPI IV BID.  -Continue to trend H/H, and transfuse as needed. Correct coagulopathy (supratherapeutic INR).  -Maintain x2 large bore IVs  -Keep NPO for EGD in the AM    Please contact GI promptly if patient is hemodynamically unstable or no response to blood transfusions.     Objective  Temp:  [96.7 °F (35.9 °C)-98.5 °F (36.9 °C)] 98.2 °F (36.8 °C) (09/10 1636)  Pulse:  [79-92] 91 (09/10 1631)  BP: (130-156)/(61-85) 130/61 (09/10 1631)  Resp:  [17-34] 22 (09/10 1631)  SpO2:  [96 %-100 %] 96 % (09/10 1631)    Laboratory    Recent Labs   Lab  09/10/18   1242  09/10/18   1413   HGB  3.8*  3.8*       Lab Results   Component Value Date    WBC 7.46 09/10/2018    HGB 3.8 (LL) 09/10/2018    HCT 12.6 (LL) 09/10/2018    MCV 93 09/10/2018     09/10/2018       Lab Results   Component Value Date     09/10/2018    K 5.0 09/10/2018     09/10/2018    CO2 27 09/10/2018    BUN 95 (H) 09/10/2018    CREATININE 12.0 (H) 09/10/2018    CALCIUM 9.2 09/10/2018    ANIONGAP 12 09/10/2018    ESTGFRAFRICA 3.4 (A) 09/10/2018    EGFRNONAA 2.9 (A) 09/10/2018       Lab Results   Component Value Date    ALT 12 09/10/2018    AST 10 09/10/2018    ALKPHOS 43 (L) 09/10/2018    BILITOT 0.8 09/10/2018       Lab Results   Component Value Date    INR 3.1 (H) 09/10/2018    INR 5.6 (HH) 09/06/2018    INR 1.7 (H) 08/21/2018       Thank you for involving us in the care of Shivanijamarcus Guillermoman. Please call with any additional concerns or questions.

## 2018-09-10 NOTE — HPI
62 yo AAF h/o ESRD (MWF), did not dialyze today due to feeling week, was noted with Hgb of 3.8, also has experienced black tarry stools over the past few days. Nephrology consulted to help with dialysis needs while in the hospital.

## 2018-09-10 NOTE — SUBJECTIVE & OBJECTIVE
Past Medical History:   Diagnosis Date    Allergy     Anemia     Arthritis     Awaiting organ transplant 2013    Cataract     Central serous chorioretinopathy of eye, right 2018    CHF (congestive heart failure)     Chronic kidney disease     Coronary artery disease     Diabetes mellitus     Diabetic retinopathy     Diverticulosis     ESRD from HTN strtied RRT 1999    Failed  donor kidney transplant      Glaucoma     History of bleeding peptic ulcer      as stated per pt    Hyperlipidemia     Hypertension     Hypothyroidism     Morbid obesity 8/10/2012    Renal hypertension     Stroke        Past Surgical History:   Procedure Laterality Date    CATARACT EXTRACTION W/  INTRAOCULAR LENS IMPLANT Bilateral     COLONOSCOPY      COLONOSCOPY N/A 2018    Procedure: COLONOSCOPY;  Surgeon: Jose Falk MD;  [facility]  Service: Endoscopy;  Laterality: N/A;  PA pressure >50     Ok to hold Coumadin 3 days prior per Matty MckeonD   Dialysis -W-  Labs ordered    COLONOSCOPY N/A 2018    Performed by [facility] at [facility] (2ND FLR)    ESOPHAGOGASTRODUODENOSCOPY (EGD) N/A 2018    Performed by Kenji Desir MD at [facility] (2ND FLR)    EYE SURGERY      HYSTERECTOMY      INSERTION-IMPLANT-GLAUCOMA Left 10/12/2017    Performed [facility] at [facility]ST FLR    KIDNEY TRANSPLANT      NEPHRECTOMY  2008    transplant     TONSILLECTOMY      UPPER GASTROINTESTINAL ENDOSCOPY         Review of patient's allergies indicates:   Allergen Reactions    Penicillins Swelling    Iodine      Other reaction(s): Hives    Sulfamethoxazole-trimethoprim      Other reaction(s): Swelling  Other reaction(s): Hives       No current facility-administered medications on file prior to encounter.      Current Outpatient Medications on File Prior to Encounter   Medication Sig    ALPHAGAN P 0.1 % Drop     aspirin 81 MG  chewable tablet Take 81 mg by mouth Daily. 1  By mouth Every day    CARTIA  mg 24 hr capsule TAKE ONE CAPSULE BY MOUTH DAILY    dorzolamide-timolol 2-0.5% (COSOPT) 22.3-6.8 mg/mL ophthalmic solution INSTILL 1 DROP IN BOTH EYES TWICE DAILY    hydrocodone-acetaminophen 7.5-325mg (NORCO) 7.5-325 mg per tablet TK 1 T PO Q 6 H PRN    ibuprofen (ADVIL,MOTRIN) 400 MG tablet Take 1 tablet (400 mg total) by mouth every 6 (six) hours as needed.    latanoprost 0.005 % ophthalmic solution INSTILL 1 DROP IN BOTH EYES EVERY DAY AT BEDTIME    levothyroxine (SYNTHROID) 100 MCG tablet Take 100 mcg by mouth. 1 Tablet Oral Every day    lidocaine-prilocaine (EMLA) cream Apply topically as needed.    LIPITOR 10 mg tablet TAKE 1 BY MOUTH ONCE A DAY    pantoprazole (PROTONIX) 40 MG tablet TAKE 1 TABLET BY MOUTH ONCE DAILY    riociguat (ADEMPAS) 2 mg Tab tablet Take 2 mg by mouth 3 (three) times daily.     selexipag (UPTRAVI) 1,000 mcg Tab Take 1,000 mcg by mouth 2 (two) times daily.    warfarin (COUMADIN) 5 MG tablet TAKE 1 1/2 TABLETS BY MOUTH DAILY ON TUESDAY, THURSDAY AND SATURDAY, THEN TAKE 1 TABLET DAILY ON MONDAY, WEDNESDAY, FRIDAY AND      Family History     Problem Relation (Age of Onset)    Asthma Sister    Blindness Father    Depression Sister    Diabetes Maternal Aunt    Heart attack Father, Mother    Heart failure Father, Mother    Hypertension Father, Mother, Sister, Brother    Kidney disease Brother        Tobacco Use    Smoking status: Former Smoker     Last attempt to quit: 8/10/2000     Years since quittin.0    Smokeless tobacco: Never Used   Substance and Sexual Activity    Alcohol use: No    Drug use: No    Sexual activity: No     Review of Systems   Constitution: Positive for weakness. Negative for chills, diaphoresis, fever and weight loss.   HENT: Negative for congestion, hoarse voice and sore throat.    Eyes: Negative for blurred vision and double vision.   Cardiovascular: Positive  for dyspnea on exertion and leg swelling. Negative for chest pain, irregular heartbeat, near-syncope, orthopnea, palpitations, paroxysmal nocturnal dyspnea and syncope.   Respiratory: Positive for shortness of breath.    Endocrine: Negative for cold intolerance and heat intolerance.   Hematologic/Lymphatic: Does not bruise/bleed easily.   Gastrointestinal: Positive for melena. Negative for abdominal pain, constipation, diarrhea, heartburn, hematochezia, hemorrhoids, jaundice, nausea and vomiting.   Genitourinary: Negative for dysuria.   Neurological: Positive for light-headedness. Negative for focal weakness and sensory change.     Objective:     Vital Signs (Most Recent):  Temp: 98.1 °F (36.7 °C) (09/10/18 1420)  Pulse: 82 (09/10/18 1431)  Resp: 17 (09/10/18 1431)  BP: (!) 147/65 (09/10/18 1431)  SpO2: 100 % (09/10/18 1420) Vital Signs (24h Range):  Temp:  [96.7 °F (35.9 °C)-98.5 °F (36.9 °C)] 98.1 °F (36.7 °C)  Pulse:  [82-92] 82  Resp:  [17-34] 17  SpO2:  [96 %-100 %] 100 %  BP: (133-156)/(65-85) 147/65     Weight: 72.1 kg (159 lb)  Body mass index is 30.04 kg/m².    SpO2: 100 %  O2 Device (Oxygen Therapy): nasal cannula    No intake or output data in the 24 hours ending 09/10/18 1505    Lines/Drains/Airways     Central Venous Catheter Line                 RETIRED! DO NOT USE: Hemodialysis Catheter Arteriovenous fistula Left Forearm -- days          Drain                 Hemodialysis AV Fistula Left upper arm -- days          Peripheral Intravenous Line                 Midline Catheter Insertion/Assessment  - Single Lumen 07/14/15 2110 Right basilic vein (medial side of arm) 18g x 10cm 1153 days         Peripheral IV - Single Lumen 09/10/18 1246 Right Hand less than 1 day                Physical Exam   Constitutional: She is oriented to person, place, and time. She appears well-developed and well-nourished.   HENT:   Head: Normocephalic and atraumatic.   Eyes: EOM are normal.   Neck: JVD present.    Cardiovascular: Normal rate, regular rhythm, normal heart sounds and intact distal pulses. Exam reveals no gallop and no friction rub.   No murmur heard.  Pulmonary/Chest: Effort normal and breath sounds normal. No respiratory distress.   Abdominal: Soft. Bowel sounds are normal. There is no tenderness.   Musculoskeletal: Normal range of motion. She exhibits edema.   Neurological: She is oriented to person, place, and time.   Skin: Skin is warm and dry.        LUE AVF +thrill/bruit       Significant Labs:   Recent Lab Results       09/10/18  1413 09/10/18  1242      Immature Granulocytes 0.7 0.9     Immature Grans (Abs) 0.05  Comment:  Mild elevation in immature granulocytes is non specific and   can be seen in a variety of conditions including stress response,   acute inflammation, trauma and pregnancy. Correlation with other   laboratory and clinical findings is essential.   0.07  Comment:  Mild elevation in immature granulocytes is non specific and   can be seen in a variety of conditions including stress response,   acute inflammation, trauma and pregnancy. Correlation with other   laboratory and clinical findings is essential.       Albumin  3.3     Alkaline Phosphatase  43     ALT  12     Anion Gap  12     Aniso  Slight     aPTT  38.7  Comment:  aPTT therapeutic range = 39-69 seconds     AST  10     Baso # 0.02 0.02     Basophil% 0.3 0.2     Total Bilirubin  0.8  Comment:  For infants and newborns, interpretation of results should be based  on gestational age, weight and in agreement with clinical  observations.  Premature Infant recommended reference ranges:  Up to 24 hours.............<8.0 mg/dL  Up to 48 hours............<12.0 mg/dL  3-5 days..................<15.0 mg/dL  6-29 days.................<15.0 mg/dL       BUN, Bld  95     Calcium  9.2     Chloride  100     CO2  27     Creatinine  12.0     Differential Method Automated Automated     eGFR if African American  3.4     eGFR if non African American   2.9  Comment:  Calculation used to obtain the estimated glomerular filtration  rate (eGFR) is the CKD-EPI equation.        Eos # 0.3 0.2     Eosinophil% 3.4 3.0     Glucose  123     Gran # (ANC) 5.0 5.4     Gran% 67.0 67.2     Group & Rh  A POS     Hematocrit 12.6  Comment:  HCT/HGB critical result(s) called and verbal readback obtained from   Abe Ulloa RN, 09/10/2018 14:49   13.2  Comment:  HCT/HGB critical result(s) called and verbal readback obtained from   Abe Ulloa RN , 09/10/2018 13:47       Hemoglobin 3.8  Comment:  HCT/HGB critical result(s) called and verbal readback obtained from   Abe Ulloa RN, 09/10/2018 14:49   3.8  Comment:  HCT/HGB critical result(s) called and verbal readback obtained from   Abe Ulloa RN , 09/10/2018 13:47       Hypo  Occasional     INDIRECT BANDAR  POS     Coumadin Monitoring INR  3.1  Comment:  Coumadin Therapy:  2.0 - 3.0 for INR for all indicators except mechanical heart valves  and antiphospholipid syndromes which should use 2.5 - 3.5.       Lymph # 1.5 1.7     Lymph% 20.4 20.4     MCH 27.9 27.0     MCHC 30.2 28.8     MCV 93 94     Mono # 0.6 0.7     Mono% 8.2 8.3     MPV 11.8 11.5     nRBC 0 0     Ovalocytes  Occasional     Platelet Estimate  Appears normal     Platelets 213 227     Poik  Slight     Poly  Occasional     Potassium  5.0     Total Protein  6.7     Protime  30.1     RBC 1.36 1.41     RDW 20.1 20.0     Sodium  139     WBC 7.46 8.07           Significant Imaging: Echocardiogram:   2D echo with color flow doppler:   Results for orders placed or performed during the hospital encounter of 02/08/18   2D echo with color flow doppler   Result Value Ref Range    EF 60 55 - 65    Mitral Valve Regurgitation TRIVIAL     Diastolic Dysfunction Yes (A)     Est. PA Systolic Pressure 70.9 (A)     Tricuspid Valve Regurgitation MILD

## 2018-09-10 NOTE — SUBJECTIVE & OBJECTIVE
Past Medical History:   Diagnosis Date    Allergy     Anemia     Arthritis     Awaiting organ transplant 2013    Cataract     Central serous chorioretinopathy of eye, right 2018    CHF (congestive heart failure)     Chronic kidney disease     Coronary artery disease     Diabetes mellitus     Diabetic retinopathy     Diverticulosis     ESRD from HTN strtied RRT 1999    Failed  donor kidney transplant      Glaucoma     History of bleeding peptic ulcer      as stated per pt    Hyperlipidemia     Hypertension     Hypothyroidism     Morbid obesity 8/10/2012    Renal hypertension     Stroke        Past Surgical History:   Procedure Laterality Date    CATARACT EXTRACTION W/  INTRAOCULAR LENS IMPLANT Bilateral     COLONOSCOPY      COLONOSCOPY N/A 2018    Procedure: COLONOSCOPY;  Surgeon: Jose Falk MD;  [facility]  Service: Endoscopy;  Laterality: N/A;  PA pressure >50     Ok to hold Coumadin 3 days prior per Matty MckeonD   Dialysis -W-  Labs ordered    COLONOSCOPY N/A 2018    [facility]    ESOPHAGOGASTRODUODENOSCOPY (EGD) N/A 2018    [facility]    EYE SURGERY      HYSTERECTOMY      INSERTION-IMPLANT-GLAUCOMA Left 10/12/2017    [facility]ST FLR    KIDNEY TRANSPLANT      NEPHRECTOMY  2008    transplant     TONSILLECTOMY      UPPER GASTROINTESTINAL ENDOSCOPY         Review of patient's allergies indicates:   Allergen Reactions    Penicillins Swelling    Iodine      Other reaction(s): Hives    Sulfamethoxazole-trimethoprim      Other reaction(s): Swelling  Other reaction(s): Hives     Current Facility-Administered Medications   Medication Frequency    0.9%  NaCl infusion (for blood administration) Q24H PRN    0.9%  NaCl infusion PRN    0.9%  NaCl infusion Once    pantoprazole  injection 40 mg BID     Current Outpatient Medications   Medication    ALPHAGAN P 0.1 % Drop    aspirin 81 MG chewable tablet    CARTIA  mg 24 hr capsule    dorzolamide-timolol 2-0.5% (COSOPT) 22.3-6.8 mg/mL ophthalmic solution    hydrocodone-acetaminophen 7.5-325mg (NORCO) 7.5-325 mg per tablet    ibuprofen (ADVIL,MOTRIN) 400 MG tablet    latanoprost 0.005 % ophthalmic solution    levothyroxine (SYNTHROID) 100 MCG tablet    lidocaine-prilocaine (EMLA) cream    LIPITOR 10 mg tablet    pantoprazole (PROTONIX) 40 MG tablet    riociguat (ADEMPAS) 2 mg Tab tablet    selexipag (UPTRAVI) 1,000 mcg Tab    warfarin (COUMADIN) 5 MG tablet     Family History     Problem Relation (Age of Onset)    Asthma Sister    Blindness Father    Depression Sister    Diabetes Maternal Aunt    Heart attack Father, Mother    Heart failure Father, Mother    Hypertension Father, Mother, Sister, Brother    Kidney disease Brother        Tobacco Use    Smoking status: Former Smoker     Last attempt to quit: 8/10/2000     Years since quittin.0    Smokeless tobacco: Never Used   Substance and Sexual Activity    Alcohol use: No    Drug use: No    Sexual activity: No     Review of Systems   Constitutional: Negative for chills, fatigue and fever.   Respiratory: Positive for shortness of breath. Negative for chest tightness.    Cardiovascular: Negative for chest pain and leg swelling.   Gastrointestinal: Positive for blood in stool. Negative for abdominal distention, abdominal pain, diarrhea and nausea.   Genitourinary: Negative for decreased urine volume, difficulty urinating, flank pain, frequency, hematuria and urgency.   Skin: Negative for rash.   Neurological: Negative for tremors, speech difficulty and weakness.     Objective:     Vital Signs (Most Recent):  Temp: 98.3 °F (36.8 °C) (09/10/18 1815)  Pulse: 82 (09/10/18 1815)  Resp: 18 (09/10/18 1815)  BP: (!) 140/68 (09/10/18 1815)  SpO2: 100 % (09/10/18 1815)  O2  Device (Oxygen Therapy): nasal cannula (09/10/18 1815) Vital Signs (24h Range):  Temp:  [96.7 °F (35.9 °C)-98.5 °F (36.9 °C)] 98.3 °F (36.8 °C)  Pulse:  [78-92] 82  Resp:  [15-34] 18  SpO2:  [96 %-100 %] 100 %  BP: (130-156)/(61-85) 140/68     Weight: 72.1 kg (159 lb) (09/10/18 1027)  Body mass index is 30.04 kg/m².  Body surface area is 1.76 meters squared.    No intake/output data recorded.    Physical Exam   Constitutional: She is oriented to person, place, and time.   HENT:   Head: Atraumatic.   Eyes: EOM are normal.   Pale conjunctivae   Neck: No JVD present.   Cardiovascular: Normal rate and regular rhythm.   Pulmonary/Chest: Effort normal and breath sounds normal.   Abdominal: Soft. She exhibits no distension.   Musculoskeletal: She exhibits no edema or tenderness.   LUE fistula noted, +thrill, +bruit   Neurological: She is alert and oriented to person, place, and time.   Skin: Skin is warm.   Psychiatric: She has a normal mood and affect. Her behavior is normal.

## 2018-09-10 NOTE — ED PROVIDER NOTES
Encounter Date: 9/10/2018    SCRIBE #1 NOTE: I, Mary Carmen Muro, am scribing for, and in the presence of,  Dr. Mariee. I have scribed the following portions of the note - the APC attestation.       History     Chief Complaint   Patient presents with    Melena     for past 2d,      62 y/o BF with history of pulmonary HTN, ESRD on dialysis MWF, and symptomatic anemia presents to the ED with family member for melena.  Pt states for the past 2 days, she has has 2 to 3 black tarry stools.  This morning she began to feel weak before dialysis.  She stated her symptoms were similar to how she felt in  when she required a blood transfusion.  Pt is also on Coumadin for atrial fibrillation; last INR check was 18 at 5.6.  She has been instructed to hold her Coumadin and scheduled for repeat INR tomorrow.  Pt is complaining of headache.  Endorses generalized weakness.  Denies CP, SOB, abdominal pain, fever, chills, photophobia, numbness, nausea, diarrhea and vomiting.         The history is provided by the patient and medical records.     Review of patient's allergies indicates:   Allergen Reactions    Penicillins Swelling    Iodine      Other reaction(s): Hives    Sulfamethoxazole-trimethoprim      Other reaction(s): Swelling  Other reaction(s): Hives     Past Medical History:   Diagnosis Date    Allergy     Anemia     Arthritis     Awaiting organ transplant 2013    Cataract     Central serous chorioretinopathy of eye, right 2018    CHF (congestive heart failure)     Chronic kidney disease     Coronary artery disease     Diabetes mellitus     Diabetic retinopathy     Diverticulosis     ESRD from HTN strtied RRT 1999    Failed  donor kidney transplant      Glaucoma     History of bleeding peptic ulcer      as stated per pt    Hyperlipidemia     Hypertension     Hypothyroidism     Morbid obesity 8/10/2012    Renal hypertension     Stroke      Past  Surgical History:   Procedure Laterality Date    CATARACT EXTRACTION W/  INTRAOCULAR LENS IMPLANT Bilateral     COLONOSCOPY      COLONOSCOPY N/A 2018    Procedure: COLONOSCOPY;  Surgeon: Jose Falk MD;  [facility]  Service: Endoscopy;  Laterality: N/A;  PA pressure >50     Ok to hold Coumadin 3 days prior per Matty MckeonD   Dialysis M-W-F  Labs ordered    COLONOSCOPY N/A 2018    Performed by Jose Falk MD at [facility] (2ND FLR)    ESOPHAGOGASTRODUODENOSCOPY (EGD) N/A 2018    Performed by Kenji Desir MD at [facility] (2ND FLR)    EYE SURGERY      HYSTERECTOMY      INSERTION-IMPLANT-GLAUCOMA Left 10/12/2017    Performed by [facility]id [facility]ern [facility] at [facility] OR [facility]ST FLR    KIDNEY TRANSPLANT      NEPHRECTOMY  2008    transplant     TONSILLECTOMY      UPPER GASTROINTESTINAL ENDOSCOPY       Family History   Problem Relation Age of Onset    Heart attack Father     Heart failure Father     Hypertension Father     Blindness Father     Heart attack Mother     Heart failure Mother     Hypertension Mother     Asthma Sister     Depression Sister     Hypertension Sister     Hypertension Brother     Kidney disease Brother         ESRD    Diabetes Maternal Aunt     Amblyopia Neg Hx     Cataracts Neg Hx     Macular degeneration Neg Hx     Retinal detachment Neg Hx     Strabismus Neg Hx     Colon cancer Neg Hx     Esophageal cancer Neg Hx      Social History     Tobacco Use    Smoking status: Former Smoker     Last attempt to quit: 8/10/2000     Years since quittin.0    Smokeless tobacco: Never Used   Substance Use Topics    Alcohol use: No    Drug use: No     Review of Systems   Constitutional: Positive for fatigue. Negative for fever.   HENT: Negative for sore throat.    Respiratory: Negative for shortness of breath.    Cardiovascular: Negative for chest pain and leg swelling.   Gastrointestinal: Negative for abdominal pain and nausea.    Genitourinary: Negative for dysuria.   Musculoskeletal: Negative for back pain.   Skin: Negative for rash.   Neurological: Positive for weakness.   Hematological: Does not bruise/bleed easily.   Psychiatric/Behavioral: Negative for agitation.       Physical Exam     Initial Vitals [09/10/18 1027]   BP Pulse Resp Temp SpO2   (!) 145/65 86 18 98.5 °F (36.9 °C) 96 %      MAP       --         Physical Exam    Nursing note and vitals reviewed.  Constitutional: She appears well-developed and well-nourished.   HENT:   Head: Normocephalic and atraumatic.   Eyes: Pupils are equal, round, and reactive to light.   Cardiovascular: Normal rate, regular rhythm, normal heart sounds and intact distal pulses.   Pulmonary/Chest: Breath sounds normal. No respiratory distress. She has no wheezes.   Abdominal: Soft. Bowel sounds are normal. She exhibits no distension. There is no tenderness. There is no guarding.   Genitourinary: Rectal exam shows guaiac positive stool ( dark brown stool). Guaiac positive stool ( dark brown stool). : Acceptable.  Musculoskeletal:   Hemoccult Sensa test detected fecal occult blood.    Neurological: She is alert and oriented to person, place, and time.   Psychiatric: She has a normal mood and affect.         ED Course   Procedures  Labs Reviewed   CBC W/ AUTO DIFFERENTIAL - Abnormal; Notable for the following components:       Result Value    RBC 1.41 (*)     Hemoglobin 3.8 (*)     Hematocrit 13.2 (*)     MCHC 28.8 (*)     RDW 20.0 (*)     Immature Granulocytes 0.9 (*)     Immature Grans (Abs) 0.07 (*)     All other components within normal limits    Narrative:     HCT/HGB critical result(s) called and verbal readback obtained from   Abe Ulloa RN , 09/10/2018 13:47   COMPREHENSIVE METABOLIC PANEL - Abnormal; Notable for the following components:    Glucose 123 (*)     BUN, Bld 95 (*)     Creatinine 12.0 (*)     Albumin 3.3 (*)     Alkaline Phosphatase 43 (*)     eGFR if   American 3.4 (*)     eGFR if non  2.9 (*)     All other components within normal limits   PROTIME-INR - Abnormal; Notable for the following components:    Prothrombin Time 30.1 (*)     INR 3.1 (*)     All other components within normal limits   APTT - Abnormal; Notable for the following components:    aPTT 38.7 (*)     All other components within normal limits   CBC W/ AUTO DIFFERENTIAL - Abnormal; Notable for the following components:    RBC 1.36 (*)     Hemoglobin 3.8 (*)     Hematocrit 12.6 (*)     MCHC 30.2 (*)     RDW 20.1 (*)     Immature Granulocytes 0.7 (*)     Immature Grans (Abs) 0.05 (*)     All other components within normal limits    Narrative:     Repeat to verify h/h  HCT/HGB critical result(s) called and verbal readback obtained from   Abe Ulloa RN, 09/10/2018 14:49   TYPE & SCREEN   ANTIBODY IDENTIFICATION   PREPARE RBC SOFT          Imaging Results          X-Ray Chest PA And Lateral (Final result)  Result time 09/10/18 13:38:16    Final result by Kiet Jacobo MD (09/10/18 13:38:16)                 Impression:      1. Findings suggesting edema.  There is cardiomegaly versus pericardial effusion, correlation advised.      Electronically signed by: Kiet Jacobo MD  Date:    09/10/2018  Time:    13:38             Narrative:    EXAMINATION:  XR CHEST PA AND LATERAL    CLINICAL HISTORY:  missed dialysis;    TECHNIQUE:  PA and lateral views of the chest were performed.    COMPARISON:  02/23/2018    FINDINGS:  The cardiomediastinal silhouette is enlarged, similar to if not slightly more prominent than the previous exam..  There is no pleural effusion.  The trachea is midline.  The lungs are symmetrically expanded bilaterally with patchy increased interstitial and parenchymal attenuation bilaterally, suggesting edema..  No large focal consolidation seen.  There is no pneumothorax.  The osseous structures are remarkable for degenerative change.  Postsurgical change overlies the  neck.  There is a left vascular stent..                                 Medical Decision Making:   History:   Old Medical Records: I decided to obtain old medical records.  Clinical Tests:   Lab Tests: Ordered and Reviewed  Radiological Study: Reviewed and Ordered       APC / Resident Notes:   62 y/o BF with history of pulmonary HTN, ESRD and symptomatic anemia presents to the ED with sibling for melena.  Pt is alert and orientated, but fatigued.  Pt skin is dry and ashen, lungs are CTA, but tachypnea is present.  Pt is afebrile.  Radial pulses are 2+; sensation is intact.  I do suspect esophagogastric varices, mass lesions, or liver disease.  DDx includes but is not limited to peptic ulcer disease, UGIB, complication of anticoagulant medication, gastritis or anemia  Will obtain labs, Hemoccult Sensa test, x-ray, give medications and reassess.        0010  Hemoccult test positive     0045  HGB: 3.8, HCT: 13.2, INR: 3.1     X-ray chest: no pleural effusion, no pneumothorax    Patient is followed by our Heart Transplant Service due to history of pulmonary hypertension.  Dr. Peterson's resident was aware that the patient was in the ER and came to evaluate her shortly after H/H resulted.  She will require admission to CICU.    We have discussed with Nephrology as the patient missed dialysis today.  He has advised to go ahead with 2 units of pRBC transfusion and he will evaluate the patient for further recommendations.      1300  Since pt is symptomatic, I will treat for low HGB will blood transfusion (2 units).  Pt will be admitted to heart transplant service (CICU).  Pt is is not stable for discharge.        Patient has signs blood transfusion form and is in agreement with plan for admission.       I discussed the care of this pt with my supervising MDAshanti Santos Attestation:   Geovaniibe #1: I performed the above scribed service and the documentation accurately describes the services I performed. I attest to  the accuracy of the note.    Attending Attestation:     Physician Attestation Statement for NP/PA:   I discussed this assessment and plan of this patient with the NP/PA, but I did not personally examine the patient. The face to face encounter was performed by the NP/PA.                     Clinical Impression:   The primary encounter diagnosis was Supratherapeutic INR. Diagnoses of Gastrointestinal hemorrhage, unspecified gastrointestinal hemorrhage type and Anemia, unspecified type were also pertinent to this visit.                             Aaron Thompson III, NP  09/10/18 1493

## 2018-09-10 NOTE — ED NOTES
.  Patient identifiers for Shivani Sheets 63 y.o. female checked and correct.  Chief Complaint   Patient presents with    Melena     for past 2d,      Past Medical History:   Diagnosis Date    Allergy     Anemia     Arthritis     Awaiting organ transplant 2013    Cataract     Central serous chorioretinopathy of eye, right 2018    CHF (congestive heart failure)     Chronic kidney disease     Coronary artery disease     Diabetes mellitus     Diabetic retinopathy     Diverticulosis     ESRD from HTN strtied RRT 1999    Failed  donor kidney transplant      Glaucoma     History of bleeding peptic ulcer      as stated per pt    Hyperlipidemia     Hypertension     Hypothyroidism     Morbid obesity 8/10/2012    Renal hypertension     Stroke      Allergies reported:   Review of patient's allergies indicates:   Allergen Reactions    Penicillins Swelling    Iodine      Other reaction(s): Hives    Sulfamethoxazole-trimethoprim      Other reaction(s): Swelling  Other reaction(s): Hives         LOC: Patient is awake, alert, and aware of environment with an appropriate affect. Patient is oriented x 3 and speaking appropriately.  APPEARANCE: Patient resting comfortably and in no acute distress. Patient is clean and well groomed, patient's clothing is properly fastened.  SKIN: The skin is warm and dry. Patient has normal skin turgor and moist mucus membranes. Skin is intact; no bruising or breakdown noted. Mucous membranes pale.   MUSKULOSKELETAL: Patient is moving all extremities well, no obvious deformities noted. Pulses intact.   RESPIRATORY: Airway is open and patent. Respirations are spontaneous and non-labored with normal effort and rate, BBS=clear  CARDIAC: Patient has a normal rate and rhythm. Trace peripheral edema noted.   ABDOMEN: No distention noted. Bowel sounds active in all 4 quadrants. Soft and non-tender upon palpation. Pt reports black tarry  stools x 2 today, reports black stools x 3 days.   NEUROLOGICAL: PERRL. Facial expression is symmetrical. Hand grasps are equal bilaterally. Normal sensation in all extremities when touched with finger.

## 2018-09-10 NOTE — CONSULTS
[facility]  Cardiology  Consult Note    Patient Name: Shivani Sheets  MRN: [id number]  Admission Date: 9/10/2018  Hospital Length of Stay: 0 days  Code Status: Prior   Attending Provider: Milka Mariee MD   Consulting Provider: Jamil Weir MD  Primary Care Physician: Eula Weiss MD  Principal Problem:<principal problem not specified>    Patient information was obtained from patient and ER records.     Consults  Subjective:     Chief Complaint:  Melena     HPI:   63 year old female with a history of pulmonary HTN(on adempas/uptravi), diastolic dysfunction, pAF(on coumadin), central retinal artery occlusion without significant carotid artery stenosis, CAD with remote PCI, ESRD(LUE AVF) secondary to HTN, s/p failed kidney transplant, PUD, STEFFANY on CPAP, hypothyroidism, RA, DLP and obesity who presents to the ED for 4 days of melena. Per patient symptoms started last Thursday and she states she has had only on BM a day. She had a similar presentation in 2/2018 at which time EGD did not find a cause for the bleed. Colonoscopy done 6/12/18 showed non-bleeding hemorrhoids and some polyps in sigmoid as well as ascending colon.    She is on coumadin for her history of afib and INR on 9/6/18 was 5.6, it is 3.1 today(9/10). She was seen in the ED in 8/2018 for hip pain and given some ibuprofen, the last time she used it was approximately 3-4 days ago. She is also taking ASA 81 mg daily for her CAD history. The last time she ate any food was yesterday morning.     Her Hgb in the ED was 3.8 and she is pending a blood transfusion. She missed dialysis today and is pending nephrology assessment.    Past Medical History:   Diagnosis Date    Allergy     Anemia     Arthritis     Awaiting organ transplant 4/30/2013    Cataract     Central serous chorioretinopathy of eye, right 8/21/2018    CHF (congestive heart failure)     Chronic kidney disease     Coronary artery disease     Diabetes  mellitus     Diabetic retinopathy     Diverticulosis     ESRD from HTN strtied RRT 1999    Failed  donor kidney transplant      Glaucoma     History of bleeding peptic ulcer      as stated per pt    Hyperlipidemia     Hypertension     Hypothyroidism     Morbid obesity 8/10/2012    Renal hypertension     Stroke        Past Surgical History:   Procedure Laterality Date    CATARACT EXTRACTION W/  INTRAOCULAR LENS IMPLANT Bilateral     COLONOSCOPY      COLONOSCOPY N/A 2018    Procedure: COLONOSCOPY;  Surgeon: Jose Falk MD;  Location: [facility] (2ND FLR);  Service: Endoscopy;  Laterality: N/A;  PA pressure >50     Ok to hold Coumadin 3 days prior per Mike,MattyD   Dialysis M-W-F  Labs ordered    COLONOSCOPY N/A 2018    Performed by Jose Falk MD at [facility] (2ND FLR)    ESOPHAGOGASTRODUODENOSCOPY (EGD) N/A 2018    Performed by Kenji Desir MD at [facility] (2ND FLR)    EYE SURGERY      HYSTERECTOMY      INSERTION-IMPLANT-GLAUCOMA Left 10/12/2017    Performed by [facility] at [facility] F[facility]    KIDNEY TRANSPLANT      NEPHRECTOMY  2008    transplant     TONSILLECTOMY      UPPER GASTROINTESTINAL ENDOSCOPY         Review of patient's allergies indicates:   Allergen Reactions    Penicillins Swelling    Iodine      Other reaction(s): Hives    Sulfamethoxazole-trimethoprim      Other reaction(s): Swelling  Other reaction(s): Hives       No current facility-administered medications on file prior to encounter.      Current Outpatient Medications on File Prior to Encounter   Medication Sig    ALPHAGAN P 0.1 % Drop     aspirin 81 MG chewable tablet Take 81 mg by mouth Daily. 1  By mouth Every day    CARTIA  mg 24 hr capsule TAKE ONE CAPSULE BY MOUTH DAILY    dorzolamide-timolol 2-0.5% (COSOPT) 22.3-6.8 mg/mL ophthalmic solution INSTILL 1 DROP IN BOTH EYES TWICE DAILY    hydrocodone-acetaminophen 7.5-325mg (NORCO)  7.5-325 mg per tablet TK 1 T PO Q 6 H PRN    ibuprofen (ADVIL,MOTRIN) 400 MG tablet Take 1 tablet (400 mg total) by mouth every 6 (six) hours as needed.    latanoprost 0.005 % ophthalmic solution INSTILL 1 DROP IN BOTH EYES EVERY DAY AT BEDTIME    levothyroxine (SYNTHROID) 100 MCG tablet Take 100 mcg by mouth. 1 Tablet Oral Every day    lidocaine-prilocaine (EMLA) cream Apply topically as needed.    LIPITOR 10 mg tablet TAKE 1 BY MOUTH ONCE A DAY    pantoprazole (PROTONIX) 40 MG tablet TAKE 1 TABLET BY MOUTH ONCE DAILY    riociguat (ADEMPAS) 2 mg Tab tablet Take 2 mg by mouth 3 (three) times daily.     selexipag (UPTRAVI) 1,000 mcg Tab Take 1,000 mcg by mouth 2 (two) times daily.    warfarin (COUMADIN) 5 MG tablet TAKE 1 1/2 TABLETS BY MOUTH DAILY ON TUESDAY, THURSDAY AND SATURDAY, THEN TAKE 1 TABLET DAILY ON MONDAY, WEDNESDAY, FRIDAY AND      Family History     Problem Relation (Age of Onset)    Asthma Sister    Blindness Father    Depression Sister    Diabetes Maternal Aunt    Heart attack Father, Mother    Heart failure Father, Mother    Hypertension Father, Mother, Sister, Brother    Kidney disease Brother        Tobacco Use    Smoking status: Former Smoker     Last attempt to quit: 8/10/2000     Years since quittin.0    Smokeless tobacco: Never Used   Substance and Sexual Activity    Alcohol use: No    Drug use: No    Sexual activity: No     Review of Systems   Constitution: Positive for weakness. Negative for chills, diaphoresis, fever and weight loss.   HENT: Negative for congestion, hoarse voice and sore throat.    Eyes: Negative for blurred vision and double vision.   Cardiovascular: Positive for dyspnea on exertion and leg swelling. Negative for chest pain, irregular heartbeat, near-syncope, orthopnea, palpitations, paroxysmal nocturnal dyspnea and syncope.   Respiratory: Positive for shortness of breath.    Endocrine: Negative for cold intolerance and heat intolerance.    Hematologic/Lymphatic: Does not bruise/bleed easily.   Gastrointestinal: Positive for melena. Negative for abdominal pain, constipation, diarrhea, heartburn, hematochezia, hemorrhoids, jaundice, nausea and vomiting.   Genitourinary: Negative for dysuria.   Neurological: Positive for light-headedness. Negative for focal weakness and sensory change.     Objective:     Vital Signs (Most Recent):  Temp: 98.1 °F (36.7 °C) (09/10/18 1420)  Pulse: 82 (09/10/18 1431)  Resp: 17 (09/10/18 1431)  BP: (!) 147/65 (09/10/18 1431)  SpO2: 100 % (09/10/18 1420) Vital Signs (24h Range):  Temp:  [96.7 °F (35.9 °C)-98.5 °F (36.9 °C)] 98.1 °F (36.7 °C)  Pulse:  [82-92] 82  Resp:  [17-34] 17  SpO2:  [96 %-100 %] 100 %  BP: (133-156)/(65-85) 147/65     Weight: 72.1 kg (159 lb)  Body mass index is 30.04 kg/m².    SpO2: 100 %  O2 Device (Oxygen Therapy): nasal cannula    No intake or output data in the 24 hours ending 09/10/18 1505    Lines/Drains/Airways     Central Venous Catheter Line                 RETIRED! DO NOT USE: Hemodialysis Catheter Arteriovenous fistula Left Forearm -- days          Drain                 Hemodialysis AV Fistula Left upper arm -- days          Peripheral Intravenous Line                 Midline Catheter Insertion/Assessment  - Single Lumen 07/14/15 2110 Right basilic vein (medial side of arm) 18g x 10cm 1153 days         Peripheral IV - Single Lumen 09/10/18 1246 Right Hand less than 1 day                Physical Exam   Constitutional: She is oriented to person, place, and time. She appears well-developed and well-nourished.   HENT:   Head: Normocephalic and atraumatic.   Eyes: EOM are normal.   Neck: JVD present.   Cardiovascular: Normal rate, regular rhythm, normal heart sounds and intact distal pulses. Exam reveals no gallop and no friction rub.   No murmur heard.  Pulmonary/Chest: Effort normal and breath sounds normal. No respiratory distress.   Abdominal: Soft. Bowel sounds are normal. There is no  tenderness.   Musculoskeletal: Normal range of motion. She exhibits edema.   Neurological: She is oriented to person, place, and time.   Skin: Skin is warm and dry.        LUE AVF +thrill/bruit       Significant Labs:   Recent Lab Results       09/10/18  1413 09/10/18  1242      Immature Granulocytes 0.7 0.9     Immature Grans (Abs) 0.05  Comment:  Mild elevation in immature granulocytes is non specific and   can be seen in a variety of conditions including stress response,   acute inflammation, trauma and pregnancy. Correlation with other   laboratory and clinical findings is essential.   0.07  Comment:  Mild elevation in immature granulocytes is non specific and   can be seen in a variety of conditions including stress response,   acute inflammation, trauma and pregnancy. Correlation with other   laboratory and clinical findings is essential.       Albumin  3.3     Alkaline Phosphatase  43     ALT  12     Anion Gap  12     Aniso  Slight     aPTT  38.7  Comment:  aPTT therapeutic range = 39-69 seconds     AST  10     Baso # 0.02 0.02     Basophil% 0.3 0.2     Total Bilirubin  0.8  Comment:  For infants and newborns, interpretation of results should be based  on gestational age, weight and in agreement with clinical  observations.  Premature Infant recommended reference ranges:  Up to 24 hours.............<8.0 mg/dL  Up to 48 hours............<12.0 mg/dL  3-5 days..................<15.0 mg/dL  6-29 days.................<15.0 mg/dL       BUN, Bld  95     Calcium  9.2     Chloride  100     CO2  27     Creatinine  12.0     Differential Method Automated Automated     eGFR if African American  3.4     eGFR if non   2.9  Comment:  Calculation used to obtain the estimated glomerular filtration  rate (eGFR) is the CKD-EPI equation.        Eos # 0.3 0.2     Eosinophil% 3.4 3.0     Glucose  123     Gran # (ANC) 5.0 5.4     Gran% 67.0 67.2     Group & Rh  A POS     Hematocrit 12.6  Comment:  HCT/HGB critical  result(s) called and verbal readback obtained from   Abe Ulloa RN, 09/10/2018 14:49   13.2  Comment:  HCT/HGB critical result(s) called and verbal readback obtained from   Abe Ulloa RN , 09/10/2018 13:47       Hemoglobin 3.8  Comment:  HCT/HGB critical result(s) called and verbal readback obtained from   Abe Ulloa RN, 09/10/2018 14:49   3.8  Comment:  HCT/HGB critical result(s) called and verbal readback obtained from   Abe Ulloa RN , 09/10/2018 13:47       Hypo  Occasional     INDIRECT BANDAR  POS     Coumadin Monitoring INR  3.1  Comment:  Coumadin Therapy:  2.0 - 3.0 for INR for all indicators except mechanical heart valves  and antiphospholipid syndromes which should use 2.5 - 3.5.       Lymph # 1.5 1.7     Lymph% 20.4 20.4     MCH 27.9 27.0     MCHC 30.2 28.8     MCV 93 94     Mono # 0.6 0.7     Mono% 8.2 8.3     MPV 11.8 11.5     nRBC 0 0     Ovalocytes  Occasional     Platelet Estimate  Appears normal     Platelets 213 227     Poik  Slight     Poly  Occasional     Potassium  5.0     Total Protein  6.7     Protime  30.1     RBC 1.36 1.41     RDW 20.1 20.0     Sodium  139     WBC 7.46 8.07           Significant Imaging: Echocardiogram:   2D echo with color flow doppler:   Results for orders placed or performed during the hospital encounter of 02/08/18   2D echo with color flow doppler   Result Value Ref Range    EF 60 55 - 65    Mitral Valve Regurgitation TRIVIAL     Diastolic Dysfunction Yes (A)     Est. PA Systolic Pressure 70.9 (A)     Tricuspid Valve Regurgitation MILD      Assessment and Plan:     Melena    Provoking factors likely elevated INR, ibuprofen use and asa use  hgb 3.8, pending blood transfusion and ICU admission  S/p protonix 80mg IV x 1 in ED  GI consult placed, discussed with consult team and they will assess patient        Pulmonary hypertension    Will discuss resuming home meds with admitting service        ESRD from HTN started RRT 1999    Missed HD this morning  No longer making  urine  Nephrology team notified            VTE Risk Mitigation (From admission, onward)    None          Thank you for your consult.     Case discussed with [facility] staff and care signed over to admitting team    Jamil Weir DO  Cardiology Fellow    Cardiology   [facility]

## 2018-09-10 NOTE — ASSESSMENT & PLAN NOTE
Etiology: ?HTN    Modality: iHD  Days: MWF (last full session this past Friday, did not go today 2ndary weakness)  Access: LUE fistula, g  Unit: [facility]  Tx Duration: ?3-4hrs  EDW: patient not sure  UF : ?2-3L  Residual fxn: none  Transplant list: no    Patient currently with a GIB and profound anemia (Hgb of 3.8)    Plan/Recommendations:  1) will dialyze tonight, but not until she has received at least one full unit of PRBCs

## 2018-09-10 NOTE — CONSULTS
[facility]  Nephrology  Consult Note    Patient Name: Shivani Sheets  MRN: [id number]  Admission Date: 9/10/2018  Hospital Length of Stay: 0 days  Attending Provider: Gera Jordan Jr.,*   Primary Care Physician: Eula Weiss MD  Principal Problem:<principal problem not specified>    Inpatient consult to Nephrology  Consult performed by: Jose Levy MD  Consult ordered by: ANA LILIA Tompkins  Reason for consult: ESRD        Subjective:     HPI: 62 yo AAF h/o ESRD (MWF), did not dialyze today due to feeling week, was noted with Hgb of 3.8, also has experienced black tarry stools over the past few days. Nephrology consulted to help with dialysis needs while in the hospital.    Past Medical History:   Diagnosis Date    Allergy     Anemia     Arthritis     Awaiting organ transplant 2013    Cataract     Central serous chorioretinopathy of eye, right 2018    CHF (congestive heart failure)     Chronic kidney disease     Coronary artery disease     Diabetes mellitus     Diabetic retinopathy     Diverticulosis     ESRD from HTN strtied RRT 1999    Failed  donor kidney transplant -     Glaucoma     History of bleeding peptic ulcer      as stated per pt    Hyperlipidemia     Hypertension     Hypothyroidism     Morbid obesity 8/10/2012    Renal hypertension     Stroke        Past Surgical History:   Procedure Laterality Date    CATARACT EXTRACTION W/  INTRAOCULAR LENS IMPLANT Bilateral     COLONOSCOPY      COLONOSCOPY N/A 2018    Procedure: COLONOSCOPY;  Surgeon: Jose Falk MD;  Location: [facility] ([address] [place] [address]);  Service: Endoscopy;  Laterality: N/A;  PA pressure >50     Ok to hold Coumadin 3 days prior per Mike,MattyD   Dialysis -W-  Labs ordered    COLONOSCOPY N/A 2018    [facility]    ESOPHAGOGASTRODUODENOSCOPY (EGD) N/A 2018    Performed by Kenji PEDERSEN  MD Thang at [facility] ENDO (2ND FLR)    EYE SURGERY      HYSTERECTOMY      INSERTION-IMPLANT-GLAUCOMA Left 10/12/2017    Performed by Junaid Kern MD at [facility] OR 1ST FLR    KIDNEY TRANSPLANT      NEPHRECTOMY  2008    transplant     TONSILLECTOMY      UPPER GASTROINTESTINAL ENDOSCOPY         Review of patient's allergies indicates:   Allergen Reactions    Penicillins Swelling    Iodine      Other reaction(s): Hives    Sulfamethoxazole-trimethoprim      Other reaction(s): Swelling  Other reaction(s): Hives     Current Facility-Administered Medications   Medication Frequency    0.9%  NaCl infusion (for blood administration) Q24H PRN    0.9%  NaCl infusion PRN    0.9%  NaCl infusion Once    pantoprazole injection 40 mg BID     Current Outpatient Medications   Medication    ALPHAGAN P 0.1 % Drop    aspirin 81 MG chewable tablet    CARTIA  mg 24 hr capsule    dorzolamide-timolol 2-0.5% (COSOPT) 22.3-6.8 mg/mL ophthalmic solution    hydrocodone-acetaminophen 7.5-325mg (NORCO) 7.5-325 mg per tablet    ibuprofen (ADVIL,MOTRIN) 400 MG tablet    latanoprost 0.005 % ophthalmic solution    levothyroxine (SYNTHROID) 100 MCG tablet    lidocaine-prilocaine (EMLA) cream    LIPITOR 10 mg tablet    pantoprazole (PROTONIX) 40 MG tablet    riociguat (ADEMPAS) 2 mg Tab tablet    selexipag (UPTRAVI) 1,000 mcg Tab    warfarin (COUMADIN) 5 MG tablet     Family History     Problem Relation (Age of Onset)    Asthma Sister    Blindness Father    Depression Sister    Diabetes Maternal Aunt    Heart attack Father, Mother    Heart failure Father, Mother    Hypertension Father, Mother, Sister, Brother    Kidney disease Brother        Tobacco Use    Smoking status: Former Smoker     Last attempt to quit: 8/10/2000     Years since quittin.0    Smokeless tobacco: Never Used   Substance and Sexual Activity    Alcohol use: No    Drug use: No    Sexual activity: No     Review of Systems    Constitutional: Negative for chills, fatigue and fever.   Respiratory: Positive for shortness of breath. Negative for chest tightness.    Cardiovascular: Negative for chest pain and leg swelling.   Gastrointestinal: Positive for blood in stool. Negative for abdominal distention, abdominal pain, diarrhea and nausea.   Genitourinary: Negative for decreased urine volume, difficulty urinating, flank pain, frequency, hematuria and urgency.   Skin: Negative for rash.   Neurological: Negative for tremors, speech difficulty and weakness.     Objective:     Vital Signs (Most Recent):  Temp: 98.3 °F (36.8 °C) (09/10/18 1815)  Pulse: 82 (09/10/18 1815)  Resp: 18 (09/10/18 1815)  BP: (!) 140/68 (09/10/18 1815)  SpO2: 100 % (09/10/18 1815)  O2 Device (Oxygen Therapy): nasal cannula (09/10/18 1815) Vital Signs (24h Range):  Temp:  [96.7 °F (35.9 °C)-98.5 °F (36.9 °C)] 98.3 °F (36.8 °C)  Pulse:  [78-92] 82  Resp:  [15-34] 18  SpO2:  [96 %-100 %] 100 %  BP: (130-156)/(61-85) 140/68     Weight: 72.1 kg (159 lb) (09/10/18 1027)  Body mass index is 30.04 kg/m².  Body surface area is 1.76 meters squared.    No intake/output data recorded.    Physical Exam   Constitutional: She is oriented to person, place, and time.   HENT:   Head: Atraumatic.   Eyes: EOM are normal.   Pale conjunctivae   Neck: No JVD present.   Cardiovascular: Normal rate and regular rhythm.   Pulmonary/Chest: Effort normal and breath sounds normal.   Abdominal: Soft. She exhibits no distension.   Musculoskeletal: She exhibits no edema or tenderness.   LUE fistula noted, +thrill, +bruit   Neurological: She is alert and oriented to person, place, and time.   Skin: Skin is warm.   Psychiatric: She has a normal mood and affect. Her behavior is normal.           Assessment/Plan:     ESRD from HTN started RRT 1999    Etiology: ?HTN    Modality: iHD  Days: MWF (last full session this past Friday, did not go today 2ndary weakness)  Access: LUE fistula, g  Unit: [facility]  [address]  Tx Duration: ?3-4hrs  EDW: patient not sure  UF : ?2-3L  Residual fxn: none  Transplant list: no    Patient currently with a GIB and profound anemia (Hgb of 3.8)    Plan/Recommendations:  1) will dialyze tonight, but not until she has received at least one full unit of PRBCs                Thank you for your consult. I will follow-up with patient. Please contact us if you have any additional questions.    Jose Levy MD  Nephrology  [facility]

## 2018-09-10 NOTE — HPI
63 year old female with a history of pulmonary HTN(on adempas/uptravi), diastolic dysfunction, pAF(on coumadin), central retinal artery occlusion without significant carotid artery stenosis, CAD with remote PCI, ESRD(LUE AVF) secondary to HTN, s/p failed kidney transplant, PUD, STEFFANY on CPAP, hypothyroidism, RA, DLP and obesity who presents to the ED for 4 days of melena. Per patient symptoms started last Thursday and she states she has had only on BM a day. She had a similar presentation in 2/2018 at which time EGD did not find a cause for the bleed. Colonoscopy done 6/12/18 showed non-bleeding hemorrhoids and some polyps in sigmoid as well as ascending colon.    She is on coumadin for her history of afib and INR on 9/6/18 was 5.6, it is 3.1 today(9/10). She was seen in the ED in 8/2018 for hip pain and given some ibuprofen, the last time she used it was approximately 3-4 days ago. She is also taking ASA 81 mg daily for her CAD history. The last time she ate any food was yesterday morning.     Her Hgb in the ED was 3.8 and she is pending a blood transfusion. She missed dialysis today and is pending nephrology assessment.

## 2018-09-11 ENCOUNTER — ANESTHESIA (OUTPATIENT)
Dept: ENDOSCOPY | Facility: HOSPITAL | Age: 63
DRG: 813 | End: 2018-09-11
Payer: MEDICARE

## 2018-09-11 ENCOUNTER — ANESTHESIA EVENT (OUTPATIENT)
Dept: ENDOSCOPY | Facility: HOSPITAL | Age: 63
DRG: 813 | End: 2018-09-11
Payer: MEDICARE

## 2018-09-11 PROBLEM — D64.9 ANEMIA: Status: ACTIVE | Noted: 2018-02-27

## 2018-09-11 LAB
ANION GAP SERPL CALC-SCNC: 12 MMOL/L
BASOPHILS # BLD AUTO: 0.03 K/UL
BASOPHILS # BLD AUTO: 0.05 K/UL
BASOPHILS NFR BLD: 0.3 %
BASOPHILS NFR BLD: 0.4 %
BUN SERPL-MCNC: 31 MG/DL
CALCIUM SERPL-MCNC: 8.6 MG/DL
CHLORIDE SERPL-SCNC: 105 MMOL/L
CO2 SERPL-SCNC: 19 MMOL/L
CREAT SERPL-MCNC: 5.8 MG/DL
DIFFERENTIAL METHOD: ABNORMAL
DIFFERENTIAL METHOD: ABNORMAL
EOSINOPHIL # BLD AUTO: 0.2 K/UL
EOSINOPHIL # BLD AUTO: 0.2 K/UL
EOSINOPHIL NFR BLD: 1.9 %
EOSINOPHIL NFR BLD: 2.1 %
ERYTHROCYTE [DISTWIDTH] IN BLOOD BY AUTOMATED COUNT: 16.8 %
ERYTHROCYTE [DISTWIDTH] IN BLOOD BY AUTOMATED COUNT: 18 %
EST. GFR  (AFRICAN AMERICAN): 8.2 ML/MIN/1.73 M^2
EST. GFR  (NON AFRICAN AMERICAN): 7.1 ML/MIN/1.73 M^2
GLUCOSE SERPL-MCNC: 107 MG/DL
HCT VFR BLD AUTO: 23.7 %
HCT VFR BLD AUTO: 26 %
HGB BLD-MCNC: 7.2 G/DL
HGB BLD-MCNC: 8.4 G/DL
IMM GRANULOCYTES # BLD AUTO: 0.08 K/UL
IMM GRANULOCYTES # BLD AUTO: 0.12 K/UL
IMM GRANULOCYTES NFR BLD AUTO: 0.9 %
IMM GRANULOCYTES NFR BLD AUTO: 1 %
INR PPP: 2.6
LYMPHOCYTES # BLD AUTO: 0.8 K/UL
LYMPHOCYTES # BLD AUTO: 0.9 K/UL
LYMPHOCYTES NFR BLD: 7.4 %
LYMPHOCYTES NFR BLD: 8.7 %
MAGNESIUM SERPL-MCNC: 2 MG/DL
MCH RBC QN AUTO: 27.4 PG
MCH RBC QN AUTO: 28.1 PG
MCHC RBC AUTO-ENTMCNC: 30.4 G/DL
MCHC RBC AUTO-ENTMCNC: 32.3 G/DL
MCV RBC AUTO: 87 FL
MCV RBC AUTO: 90 FL
MONOCYTES # BLD AUTO: 0.7 K/UL
MONOCYTES # BLD AUTO: 1 K/UL
MONOCYTES NFR BLD: 8 %
MONOCYTES NFR BLD: 8.1 %
NEUTROPHILS # BLD AUTO: 10.3 K/UL
NEUTROPHILS # BLD AUTO: 7.1 K/UL
NEUTROPHILS NFR BLD: 79.9 %
NEUTROPHILS NFR BLD: 81.3 %
NRBC BLD-RTO: 0 /100 WBC
NRBC BLD-RTO: 0 /100 WBC
PHOSPHATE SERPL-MCNC: 3.3 MG/DL
PLATELET # BLD AUTO: 197 K/UL
PLATELET # BLD AUTO: 218 K/UL
PMV BLD AUTO: 11.3 FL
PMV BLD AUTO: 11.3 FL
POTASSIUM SERPL-SCNC: 4.2 MMOL/L
PROTHROMBIN TIME: 25.4 SEC
RBC # BLD AUTO: 2.63 M/UL
RBC # BLD AUTO: 2.99 M/UL
SODIUM SERPL-SCNC: 136 MMOL/L
WBC # BLD AUTO: 12.62 K/UL
WBC # BLD AUTO: 8.86 K/UL

## 2018-09-11 PROCEDURE — 94761 N-INVAS EAR/PLS OXIMETRY MLT: CPT

## 2018-09-11 PROCEDURE — 37000008 HC ANESTHESIA 1ST 15 MINUTES: Performed by: INTERNAL MEDICINE

## 2018-09-11 PROCEDURE — D9220A PRA ANESTHESIA: Mod: ANES,,, | Performed by: ANESTHESIOLOGY

## 2018-09-11 PROCEDURE — 37000009 HC ANESTHESIA EA ADD 15 MINS: Performed by: INTERNAL MEDICINE

## 2018-09-11 PROCEDURE — 36415 COLL VENOUS BLD VENIPUNCTURE: CPT

## 2018-09-11 PROCEDURE — 25000003 PHARM REV CODE 250: Performed by: NURSE ANESTHETIST, CERTIFIED REGISTERED

## 2018-09-11 PROCEDURE — 20000000 HC ICU ROOM

## 2018-09-11 PROCEDURE — C1751 CATH, INF, PER/CENT/MIDLINE: HCPCS

## 2018-09-11 PROCEDURE — 83735 ASSAY OF MAGNESIUM: CPT

## 2018-09-11 PROCEDURE — A4216 STERILE WATER/SALINE, 10 ML: HCPCS | Performed by: STUDENT IN AN ORGANIZED HEALTH CARE EDUCATION/TRAINING PROGRAM

## 2018-09-11 PROCEDURE — 99291 CRITICAL CARE FIRST HOUR: CPT | Mod: ,,, | Performed by: PHYSICIAN ASSISTANT

## 2018-09-11 PROCEDURE — 27000221 HC OXYGEN, UP TO 24 HOURS

## 2018-09-11 PROCEDURE — 85025 COMPLETE CBC W/AUTO DIFF WBC: CPT

## 2018-09-11 PROCEDURE — 76937 US GUIDE VASCULAR ACCESS: CPT

## 2018-09-11 PROCEDURE — 0DJ08ZZ INSPECTION OF UPPER INTESTINAL TRACT, VIA NATURAL OR ARTIFICIAL OPENING ENDOSCOPIC: ICD-10-PCS | Performed by: INTERNAL MEDICINE

## 2018-09-11 PROCEDURE — 43235 EGD DIAGNOSTIC BRUSH WASH: CPT | Mod: ,,, | Performed by: INTERNAL MEDICINE

## 2018-09-11 PROCEDURE — 80048 BASIC METABOLIC PNL TOTAL CA: CPT

## 2018-09-11 PROCEDURE — 43235 EGD DIAGNOSTIC BRUSH WASH: CPT | Performed by: INTERNAL MEDICINE

## 2018-09-11 PROCEDURE — 36569 INSJ PICC 5 YR+ W/O IMAGING: CPT

## 2018-09-11 PROCEDURE — D9220A PRA ANESTHESIA: Mod: CRNA,,, | Performed by: NURSE ANESTHETIST, CERTIFIED REGISTERED

## 2018-09-11 PROCEDURE — 63600175 PHARM REV CODE 636 W HCPCS: Performed by: STUDENT IN AN ORGANIZED HEALTH CARE EDUCATION/TRAINING PROGRAM

## 2018-09-11 PROCEDURE — 85610 PROTHROMBIN TIME: CPT

## 2018-09-11 PROCEDURE — 63600175 PHARM REV CODE 636 W HCPCS: Performed by: NURSE ANESTHETIST, CERTIFIED REGISTERED

## 2018-09-11 PROCEDURE — 25000003 PHARM REV CODE 250: Performed by: INTERNAL MEDICINE

## 2018-09-11 PROCEDURE — 84100 ASSAY OF PHOSPHORUS: CPT

## 2018-09-11 PROCEDURE — C9113 INJ PANTOPRAZOLE SODIUM, VIA: HCPCS | Performed by: STUDENT IN AN ORGANIZED HEALTH CARE EDUCATION/TRAINING PROGRAM

## 2018-09-11 PROCEDURE — 25000003 PHARM REV CODE 250: Performed by: STUDENT IN AN ORGANIZED HEALTH CARE EDUCATION/TRAINING PROGRAM

## 2018-09-11 PROCEDURE — 27201040 HC RC 50 FILTER

## 2018-09-11 RX ORDER — LIDOCAINE HCL/PF 100 MG/5ML
SYRINGE (ML) INTRAVENOUS
Status: DISCONTINUED | OUTPATIENT
Start: 2018-09-11 | End: 2018-09-11

## 2018-09-11 RX ORDER — ETOMIDATE 2 MG/ML
INJECTION INTRAVENOUS
Status: DISCONTINUED | OUTPATIENT
Start: 2018-09-11 | End: 2018-09-11

## 2018-09-11 RX ORDER — WARFARIN SODIUM 5 MG/1
5 TABLET ORAL DAILY
Status: DISCONTINUED | OUTPATIENT
Start: 2018-09-11 | End: 2018-09-13

## 2018-09-11 RX ORDER — PROPOFOL 10 MG/ML
VIAL (ML) INTRAVENOUS
Status: DISCONTINUED | OUTPATIENT
Start: 2018-09-11 | End: 2018-09-11

## 2018-09-11 RX ORDER — SODIUM CHLORIDE 9 MG/ML
INJECTION, SOLUTION INTRAVENOUS CONTINUOUS PRN
Status: DISCONTINUED | OUTPATIENT
Start: 2018-09-11 | End: 2018-09-11

## 2018-09-11 RX ORDER — HYDROCODONE BITARTRATE AND ACETAMINOPHEN 500; 5 MG/1; MG/1
TABLET ORAL
Status: DISCONTINUED | OUTPATIENT
Start: 2018-09-11 | End: 2018-09-12 | Stop reason: SDUPTHER

## 2018-09-11 RX ADMIN — RIOCIGUAT 2 MG: 2 TABLET, FILM COATED ORAL at 03:09

## 2018-09-11 RX ADMIN — SODIUM CHLORIDE: 0.9 INJECTION, SOLUTION INTRAVENOUS at 02:09

## 2018-09-11 RX ADMIN — ONDANSETRON 4 MG: 4 TABLET, ORALLY DISINTEGRATING ORAL at 12:09

## 2018-09-11 RX ADMIN — ATORVASTATIN CALCIUM 10 MG: 10 TABLET, FILM COATED ORAL at 10:09

## 2018-09-11 RX ADMIN — ETOMIDATE 2 MG: 2 INJECTION, SOLUTION INTRAVENOUS at 02:09

## 2018-09-11 RX ADMIN — HYDROCODONE BITARTRATE AND ACETAMINOPHEN 1 TABLET: 7.5; 325 TABLET ORAL at 08:09

## 2018-09-11 RX ADMIN — Medication 3 ML: at 02:09

## 2018-09-11 RX ADMIN — HYDROCODONE BITARTRATE AND ACETAMINOPHEN 1 TABLET: 7.5; 325 TABLET ORAL at 04:09

## 2018-09-11 RX ADMIN — ETOMIDATE 6 MG: 2 INJECTION, SOLUTION INTRAVENOUS at 02:09

## 2018-09-11 RX ADMIN — LEVOTHYROXINE SODIUM 100 MCG: 100 TABLET ORAL at 07:09

## 2018-09-11 RX ADMIN — LIDOCAINE HYDROCHLORIDE 50 MG: 20 INJECTION, SOLUTION INTRAVENOUS at 02:09

## 2018-09-11 RX ADMIN — PROPOFOL 30 MG: 10 INJECTION, EMULSION INTRAVENOUS at 02:09

## 2018-09-11 RX ADMIN — RIOCIGUAT 2 MG: 2 TABLET, FILM COATED ORAL at 09:09

## 2018-09-11 RX ADMIN — PANTOPRAZOLE SODIUM 40 MG: 40 INJECTION, POWDER, LYOPHILIZED, FOR SOLUTION INTRAVENOUS at 10:09

## 2018-09-11 RX ADMIN — PROPOFOL 20 MG: 10 INJECTION, EMULSION INTRAVENOUS at 02:09

## 2018-09-11 RX ADMIN — PANTOPRAZOLE SODIUM 40 MG: 40 INJECTION, POWDER, LYOPHILIZED, FOR SOLUTION INTRAVENOUS at 09:09

## 2018-09-11 RX ADMIN — WARFARIN SODIUM 5 MG: 5 TABLET ORAL at 05:09

## 2018-09-11 RX ADMIN — Medication 3 ML: at 06:09

## 2018-09-11 RX ADMIN — DILTIAZEM HYDROCHLORIDE 240 MG: 120 CAPSULE, COATED, EXTENDED RELEASE ORAL at 10:09

## 2018-09-11 NOTE — CONSULTS
Midline placed in right brachial vein, 18g x 10cm size.  Max dwell date 10/10/2018.  Lot# [id number].  Needle advanced using realtime u/s guidance.

## 2018-09-11 NOTE — H&P (VIEW-ONLY)
[facility]  Heart Transplant  H&P    Patient Name: Shivani Sheets  MRN: [id number]  Admission Date: 9/10/2018  Attending Physician: Gera Jordan Jr.,*  Primary Care Provider: Eula Weiss MD  Principal Problem:<principal problem not specified>    Subjective:     History of Present Illness:  63 year old female with a history of pulmonary HTN(on adempas/uptravi), diastolic dysfunction, pAF(on coumadin), central retinal artery occlusion without significant carotid artery stenosis, CAD with remote PCI, ESRD(LUE AVF) secondary to HTN, s/p failed kidney transplant, PUD, STEFFANY on CPAP, hypothyroidism, RA, DLP and obesity who presents to the ED for 4 days of melena. Per patient symptoms started last Thursday and she states she has had only on BM a day with very dark stools. She had a similar presentation in 2/2018 at which time EGD was unremarkable. Colonoscopy done 6/12/18 showed non-bleeding hemorrhoids and some polyps in sigmoid as well as ascending colon.     She is on coumadin for her history of afib and INR on 9/6/18 was 5.6, it is 3.1 today (9/10). She was seen in the ED in 8/2018 for hip pain and given some ibuprofen, the last time she used it was approximately 3-4 days ago. She is also taking ASA 81 mg daily for her CAD history. The last time she ate any food was yesterday morning. Denies abdominal pain, vomiting, hematemesis, BRBPR.     Her Hgb in the ED was 3.8. 2 units of pRBC are pending. No further active bleeding. GI was consulted and she is planned for EGD tomorrow. Missed HD today, nephrology consulted, plan for HD tonight after receiving pRBC.    Past Medical History:   Diagnosis Date    Allergy     Anemia     Arthritis     Awaiting organ transplant 4/30/2013    Cataract     Central serous chorioretinopathy of eye, right 8/21/2018    CHF (congestive heart failure)     Chronic kidney disease     Coronary artery disease     Diabetes mellitus     Diabetic retinopathy      Diverticulosis     ESRD from HTN strtied RRT 1999    Failed  donor kidney transplant      Glaucoma     History of bleeding peptic ulcer      as stated per pt    Hyperlipidemia     Hypertension     Hypothyroidism     Morbid obesity 8/10/2012    Renal hypertension     Stroke        Past Surgical History:   Procedure Laterality Date    CATARACT EXTRACTION W/  INTRAOCULAR LENS IMPLANT Bilateral     COLONOSCOPY      COLONOSCOPY N/A 2018    Procedure: COLONOSCOPY;  Surgeon: Jose Falk MD;  Location: [facility] (2ND FLR);  Service: Endoscopy;  Laterality: N/A;  PA pressure >50     Ok to hold Coumadin 3 days prior per MikePharmD   Dialysis M-W-  Labs ordered    COLONOSCOPY N/A 2018    Performed by Jose Falk MD at [facility] (2ND FLR)    ESOPHAGOGASTRODUODENOSCOPY (EGD) N/A 2018    Performed by Kenji Desir MD at [facility] (2ND FLR)    EYE SURGERY      HYSTERECTOMY      INSERTION-IMPLANT-GLAUCOMA Left 10/12/2017    [facility]formed [facility] F[facility]    KIDNEY TRANSPLANT      NEPHRECTOMY  2008    transplant     TONSILLECTOMY      UPPER GASTROINTESTINAL ENDOSCOPY         Review of patient's allergies indicates:   Allergen Reactions    Penicillins Swelling    Iodine      Other reaction(s): Hives    Sulfamethoxazole-trimethoprim      Other reaction(s): Swelling  Other reaction(s): Hives       Current Facility-Administered Medications   Medication    0.9%  NaCl infusion (for blood administration)    0.9%  NaCl infusion    0.9%  NaCl infusion    [START ON 2018] atorvastatin tablet 10 mg    [START ON 2018] diltiaZEM 24 hr capsule 240 mg    HYDROcodone-acetaminophen 7.5-325 mg per tablet 1 tablet    [START ON 2018] levothyroxine tablet 100 mcg    NON FORMULARY MEDICATION 1,000 mcg    ondansetron disintegrating tablet 4 mg    pantoprazole injection 40 mg    riociguat (ADEMPAS) tablet 2 mg     sodium chloride 0.9% flush 3 mL     Current Outpatient Medications   Medication Sig    ALPHAGAN P 0.1 % Drop     aspirin 81 MG chewable tablet Take 81 mg by mouth Daily. 1  By mouth Every day    CARTIA  mg 24 hr capsule TAKE ONE CAPSULE BY MOUTH DAILY    dorzolamide-timolol 2-0.5% (COSOPT) 22.3-6.8 mg/mL ophthalmic solution INSTILL 1 DROP IN BOTH EYES TWICE DAILY    hydrocodone-acetaminophen 7.5-325mg (NORCO) 7.5-325 mg per tablet TK 1 T PO Q 6 H PRN    ibuprofen (ADVIL,MOTRIN) 400 MG tablet Take 1 tablet (400 mg total) by mouth every 6 (six) hours as needed.    latanoprost 0.005 % ophthalmic solution INSTILL 1 DROP IN BOTH EYES EVERY DAY AT BEDTIME    levothyroxine (SYNTHROID) 100 MCG tablet Take 100 mcg by mouth. 1 Tablet Oral Every day    lidocaine-prilocaine (EMLA) cream Apply topically as needed.    LIPITOR 10 mg tablet TAKE 1 BY MOUTH ONCE A DAY    pantoprazole (PROTONIX) 40 MG tablet TAKE 1 TABLET BY MOUTH ONCE DAILY    riociguat (ADEMPAS) 2 mg Tab tablet Take 2 mg by mouth 3 (three) times daily.     selexipag (UPTRAVI) 1,000 mcg Tab Take 1,000 mcg by mouth 2 (two) times daily.    warfarin (COUMADIN) 5 MG tablet TAKE 1 1/2 TABLETS BY MOUTH DAILY ON TUESDAY, THURSDAY AND SATURDAY, THEN TAKE 1 TABLET DAILY ON MONDAY, WEDNESDAY, FRIDAY AND      Family History     Problem Relation (Age of Onset)    Asthma Sister    Blindness Father    Depression Sister    Diabetes Maternal Aunt    Heart attack Father, Mother    Heart failure Father, Mother    Hypertension Father, Mother, Sister, Brother    Kidney disease Brother        Tobacco Use    Smoking status: Former Smoker     Last attempt to quit: 8/10/2000     Years since quittin.0    Smokeless tobacco: Never Used   Substance and Sexual Activity    Alcohol use: No    Drug use: No    Sexual activity: No     Review of Systems   All other systems reviewed and are negative.    Objective:     Vital Signs (Most Recent):  Temp: 98.3 °F (36.8  °C) (09/10/18 1815)  Pulse: 78 (09/10/18 1846)  Resp: 20 (09/10/18 1846)  BP: (!) 160/83 (09/10/18 1846)  SpO2: 100 % (09/10/18 1846) Vital Signs (24h Range):  Temp:  [96.7 °F (35.9 °C)-98.5 °F (36.9 °C)] 98.3 °F (36.8 °C)  Pulse:  [78-92] 78  Resp:  [15-34] 20  SpO2:  [96 %-100 %] 100 %  BP: (130-160)/(61-85) 160/83     Patient Vitals for the past 72 hrs (Last 3 readings):   Weight   09/10/18 1027 72.1 kg (159 lb)     Body mass index is 30.04 kg/m².    No intake or output data in the 24 hours ending 09/10/18 1907    Physical Exam   Constitutional: She is oriented to person, place, and time. She appears well-nourished. No distress.   HENT:   Head: Atraumatic.   Mouth/Throat: No oropharyngeal exudate.   Eyes: EOM are normal. No scleral icterus.   Neck: Neck supple. No JVD present.   Cardiovascular: Normal rate, regular rhythm and normal heart sounds.   LUE AVF   Abdominal: Soft. Bowel sounds are normal. She exhibits no distension. There is no tenderness.   Neurological: She is alert and oriented to person, place, and time. No cranial nerve deficit.   Skin: Skin is warm and dry. No erythema.   Psychiatric: She has a normal mood and affect.       Significant Labs:  CBC:  Recent Labs   Lab  09/10/18   1242  09/10/18   1413   WBC  8.07  7.46   RBC  1.41*  1.36*   HGB  3.8*  3.8*   HCT  13.2*  12.6*   PLT  227  213   MCV  94  93   MCH  27.0  27.9   MCHC  28.8*  30.2*     BNP:  No results for input(s): BNP in the last 168 hours.    Invalid input(s): BNPTRIAGELBLO  CMP:  Recent Labs   Lab  09/10/18   1242   GLU  123*   CALCIUM  9.2   ALBUMIN  3.3*   PROT  6.7   NA  139   K  5.0   CO2  27   CL  100   BUN  95*   CREATININE  12.0*   ALKPHOS  43*   ALT  12   AST  10   BILITOT  0.8      Coagulation:   Recent Labs   Lab  09/06/18   0912  09/10/18   1242   INR  5.6*  3.1*   APTT   --   38.7*     LDH:  No results for input(s): LDH in the last 72 hours.  Microbiology:  Microbiology Results (last 7 days)     ** No results found for  the last 168 hours. **          I have reviewed all pertinent labs within the past 24 hours.    Diagnostic Results:  I have reviewed all pertinent imaging results/findings within the past 24 hours.    Assessment/Plan:     63 year old female with a history of pulmonary HTN(on adempas/uptravi), diastolic dysfunction, pAF(on coumadin), central retinal artery occlusion without significant carotid artery stenosis, CAD with remote PCI, ESRD(LUE AVF) secondary to HTN, s/p failed kidney transplant, PUD, STEFFANY on CPAP, hypothyroidism, RA, DLP and obesity here with melena, found to have Hgb 3.8 from baseline 9-10. VSS, transfusing and for scope tomorrow AM.    Melena    Acute GIB, likely upper, Hgb 3.8 from last 10.7 6/2018 (most values 9-10)  Hx of GIB, also recent NSAID use; EGD 2/2018 unremarkable. C-scope 6/2018 with nonbleeding sigmoid polyps, internal hemorrhoids  S/p 80 IV PPI in ED, continue 40 IV BID  GI consulted, for scope in the AM  Transfuse 2 units, then will check labs, coordinating with renal  Hold coumadin (INR 3.1), ASA        ESRD from HTN started RRT 1999    Nephrology consulted  Missed HD today, plan for HD tonight after at least 1 unit        Pulmonary hypertension    Stable  Continue adempas, selexipag        Atrial fibrillation    Holding home coumadin  Continue diltiazem        Hypothyroidism    Continue synthroid            Frederick Duran MD  Heart Transplant  [facility]

## 2018-09-11 NOTE — TREATMENT PLAN
EGD done today.    Impression:           - Normal esophagus.                        - A few gastric polyps.                        - Normal examined duodenum.                        - No specimens collected.  Recommendation:         Continue to monitor clinically and trend H/H. If continues to show signs of active GI bleeding will consider inpatient VCE.

## 2018-09-11 NOTE — PLAN OF CARE
Problem: Patient Care Overview  Goal: Plan of Care Review  Outcome: Ongoing (interventions implemented as appropriate)  Pt AAOx4, remains on 2LNC, all VSS. Pt denies any pain or discomfort at this time. LBM yesterday, no bloody BM or emesis today. EGD completed in the room, pt tolerated well. PIV x1, midline x1 both SL, ABEL fistula. Pt anuric, gets HD MWF. Continue to monitor pt closely and trend H/H, transfer out tomorrow.

## 2018-09-11 NOTE — HPI
63 year old female with a history of pulmonary HTN(on adempas/uptravi), diastolic dysfunction, pAF(on coumadin), central retinal artery occlusion without significant carotid artery stenosis, CAD with remote PCI, ESRD(LUE AVF) secondary to HTN, s/p failed kidney transplant, PUD, STEFFANY on CPAP, hypothyroidism, RA, DLP and obesity who presents to the ED for 4 days of melena. Per patient symptoms started last Thursday and she states she has had only on BM a day with very dark stools. She had a similar presentation in 2/2018 at which time EGD was unremarkable. Colonoscopy done 6/12/18 showed non-bleeding hemorrhoids and some polyps in sigmoid as well as ascending colon.     She is on coumadin for her history of afib and INR on 9/6/18 was 5.6, it is 3.1 today (9/10). She was seen in the ED in 8/2018 for hip pain and given some ibuprofen, the last time she used it was approximately 3-4 days ago. She is also taking ASA 81 mg daily for her CAD history. The last time she ate any food was yesterday morning. Denies abdominal pain, vomiting, hematemesis, BRBPR.     Her Hgb in the ED was 3.8. 2 units of pRBC are pending. No further active bleeding. GI was consulted and she is planned for EGD tomorrow. Missed HD today, nephrology consulted, plan for HD tonight after receiving pRBC.

## 2018-09-11 NOTE — HPI
Shivani Sheets is a 63 y.o. female with past medical history of pulm HTN, Afib on coumadin, CAD, ESRD s/p failed kidney transplant, admitted to hospital 9/10/2018 with melena and symptomatic anemia. GI consulted for evaluation.    The patient states that she was doing relatively well until Thursday (5 day ago when she started to notice her stool getting darker. She was feeling ill and tired and progressively more short of breath. The next 2 days, she noticed black tarry stools, with no associated bright red blood. She denies nausea, vomiting, hematemesis/coffee ground emesis. No abdominal pain. She states that her symptoms are similar to when she presented ~6 months ago with melena and symptomatic anemia, and her EGD at that time was negative for any significant findings. She had a gastric polyp that was positive for intestinal metaplasia. She had a colonoscopy in 6/2018 with 2 5mm polyps that were adenomatous.     Of note the patient was given a short steroids of 5 days ~3 weeks ago along with ibuprofen for left hip pain. She states that she uses ibuprofen twice weekly only. She presented with Hb of 3.8 from a baseline of ~11. She is hemodynamically stable.

## 2018-09-11 NOTE — ANESTHESIA PREPROCEDURE EVALUATION
Past Medical History:   Diagnosis Date    Allergy     Anemia     Anticoagulant long-term use     4 years coumadin, asa    Arthritis     Awaiting organ transplant 2013    Cataract     Central serous chorioretinopathy of eye, right 2018    CHF (congestive heart failure)     Chronic kidney disease     Colon polyps     COPD (chronic obstructive pulmonary disease)     Coronary artery disease     Diabetes mellitus     Diabetic retinopathy     Diverticulosis     Encounter for blood transfusion     ESRD from HTN strtied RRT 1999    Failed  donor kidney transplant      Glaucoma     History of bleeding peptic ulcer      as stated per pt    Hyperlipidemia     Hypertension     Hypothyroidism     Morbid obesity 8/10/2012    Renal hypertension     Stroke          Past Surgical History:   Procedure Laterality Date    CATARACT EXTRACTION W/  INTRAOCULAR LENS IMPLANT Bilateral     COLON SURGERY      COLONOSCOPY      COLONOSCOPY N/A 2018    Procedure: COLONOSCOPY;  Surgeon: Jose Falk MD;  Location: [facility] (2ND FLR);  Service: Endoscopy;  Laterality: N/A;  PA pressure >50     Ok to hold Coumadin 3 days prior per Matty MckeonD   Dialysis M-W-  Labs ordered    COLONOSCOPY N/A 2018    Performed by Jose Falk MD at [facility] (2ND FLR)    ESOPHAGOGASTRODUODENOSCOPY (EGD) N/A 2018    Performed by Kenji Desir MD at [facility] (2ND FLR)    EYE SURGERY      FRACTURE SURGERY      R arm    HERNIA REPAIR      HYSTERECTOMY      INSERTION-IMPLANT-GLAUCOMA Left 10/12/2017    Performed by Junaid Kern MD at [facility] OR 1ST FLR    KIDNEY TRANSPLANT      NEPHRECTOMY  2008    transplant     PARATHYROID GLAND SURGERY      TONSILLECTOMY      UPPER GASTROINTESTINAL ENDOSCOPY       Patient Active Problem List   Diagnosis    Chronic diastolic heart failure    CAD (coronary artery disease)    S/P PTCA (percutaneous transluminal  coronary angioplasty)    Central retinal artery occlusion    ESRD from HTN started RRT     Obstructive sleep apnea    Hyperlipidemia    Obesity    Rheumatoid arthritis    Anticoagulation monitoring by pharmacist    Complication of vascular access for dialysis    Hand pain, left    Failed  donor kidney transplant -    Renal hypertension diagnosed age 15    Hypothyroidism    Awaiting organ transplant    Pulmonary hypertension    Atrial fibrillation    Hypoxia    Low back pain    Other chronic pulmonary heart diseases    Chest wall pain    Status post cataract extraction and insertion of intraocular lens    Glaucoma filtering bleb of both eyes    Chronic angle-closure glaucoma of both eyes, moderate stage    Atypical pneumonia    GIB (gastrointestinal bleeding)    Symptomatic anemia    Melena    Glaucoma shunt device of both eyes    Colon polyp    Controlled type 2 diabetes mellitus with both eyes affected by proliferative retinopathy without macular edema, without long-term current use of insulin    Central serous chorioretinopathy of eye, right    Primary open-angle glaucoma, bilateral, severe stage    Supratherapeutic INR     Body mass index is 29.53 kg/m².  2D Echo:  Results for orders placed or performed during the hospital encounter of 18   2D echo with color flow doppler   Result Value Ref Range    EF 60 55 - 65    Mitral Valve Regurgitation TRIVIAL     Diastolic Dysfunction Yes (A)     Est. PA Systolic Pressure 70.9 (A)     Tricuspid Valve Regurgitation MILD        Please See ROS/PMH and Active Problem List above                                                                                                               2018  Shivani Sheets is a 63 y.o., female.    Pre-op Assessment    I have reviewed the Patient Summary Reports.      I have reviewed the Medications.     Review of Systems  Anesthesia Hx:  No problems with previous Anesthesia   History of prior surgery of interest to airway management or planning: Previous anesthesia: General Denies Family Hx of Anesthesia complications.   Denies Personal Hx of Anesthesia complications.   Social:  Former Smoker    Cardiovascular:   Hypertension, poorly controlled CAD   CHF    Pulmonary:   COPD, severe Sleep Apnea On 2L home O2    Renal/:   Chronic Renal Disease, ESRD, Dialysis    Endocrine:   Diabetes, poorly controlled, type 2, using insulin        Physical Exam  General:  Obesity    Airway/Jaw/Neck:  Airway Findings: Mouth Opening: Normal Tongue: Normal  General Airway Assessment: Adult  Mallampati: II  Improves to I with phonation.  TM Distance: Normal, at least 6 cm  Jaw/Neck Findings:     Neck ROM: Normal ROM      Dental:  Dental Findings: In tact   Chest/Lungs:  Chest/Lungs Findings: Decreased Breath Sounds Bilateral     Heart/Vascular:  Heart Findings: Rate: Normal  Rhythm: Regular Rhythm  Sounds: Normal             Anesthesia Plan  Type of Anesthesia, risks & benefits discussed:  Anesthesia Type:  general, MAC  Patient's Preference:   Intra-op Monitoring Plan: standard ASA monitors  Intra-op Monitoring Plan Comments:   Post Op Pain Control Plan: multimodal analgesia  Post Op Pain Control Plan Comments:   Induction:   IV  Beta Blocker:  Patient is not currently on a Beta-Blocker (No further documentation required).       Informed Consent: Patient understands risks and agrees with Anesthesia plan.  Questions answered. Anesthesia consent signed with patient.  ASA Score: 4     Day of Surgery Review of History & Physical:    H&P update referred to the provider.

## 2018-09-11 NOTE — NURSING
[facility] notified about pt BP (191/91). No new orders or changes at this time. Continue to monitor closely.

## 2018-09-11 NOTE — NURSING
Pt had a 6 beat of v tach then a pause a couplet a pause then 4 sec of bigeminy then converted to sinus rhythm. Pt felt a palpitation at this time. She voiced she has been having them since she last took her meds yesterday. MD notified of arrhythmia. No new orders at this time.

## 2018-09-11 NOTE — CONSULTS
[facility]  Gastroenterology  Consult Note    Patient Name: Shivani Sheets  MRN: [id number]  Admission Date: 9/10/2018  Hospital Length of Stay: 1 days  Code Status: Full Code   Attending Provider: Gera Jordan Jr.,*   Consulting Provider: Sergo Magdaleno MD  Primary Care Physician: Eula Weiss MD  Principal Problem:<principal problem not specified>    Inpatient consult to Gastroenterology  Consult performed by: Sergo Magdaleno MD  Consult ordered by: Jamil Weir MD        Subjective:     HPI:  Shivani Sheets is a 63 y.o. female with past medical history of pulm HTN, Afib on coumadin, CAD, ESRD s/p failed kidney transplant, admitted to hospital 9/10/2018  with melena and symptomatic anemia. GI consulted for evaluation.    The patient states that she was doing relatively well until Thursday (5 day ago when she started to notice her stool getting darker. She was feeling ill and tired and progressively more short of breath. The next 2 days, she noticed black tarry stools, with no associated bright red blood. She denies nausea, vomiting, hematemesis/coffee ground emesis. No abdominal pain. She states that her symptoms are similar to when she presented ~6 months ago with melena and symptomatic anemia, and her EGD at that time was negative for any significant findings. She had a gastric polyp that was positive for intestinal metaplasia. She had a colonoscopy in 6/2018 with 2 5mm polyps that were adenomatous.     Of note the patient was given a short steroids of 5 days ~3 weeks ago along with ibuprofen for left hip pain. She states that she uses ibuprofen twice weekly only. She presented with Hb of 3.8 from a baseline of ~11. She is hemodynamically stable.     Past Medical History:   Diagnosis Date    Allergy     Anemia     Anticoagulant long-term use     4 years coumadin, asa    Arthritis     Awaiting organ transplant 4/30/2013    Cataract     Central serous chorioretinopathy of  eye, right 2018    CHF (congestive heart failure)     Chronic kidney disease     Colon polyps     COPD (chronic obstructive pulmonary disease)     Coronary artery disease     Diabetes mellitus     Diabetic retinopathy     Diverticulosis     Encounter for blood transfusion     ESRD from HTN strtied RRT 1999    Failed  donor kidney transplant      Glaucoma     History of bleeding peptic ulcer      as stated per pt    Hyperlipidemia     Hypertension     Hypothyroidism     Morbid obesity 8/10/2012    Renal hypertension     Stroke            Past Surgical History:   Procedure Laterality Date    CATARACT EXTRACTION W/  INTRAOCULAR LENS IMPLANT Bilateral     COLON SURGERY      COLONOSCOPY      COLONOSCOPY N/A 2018    Procedure: COLONOSCOPY;  Surgeon: Jose Falk MD;  Location: [facility] (2ND FLR);  Service: Endoscopy;  Laterality: N/A;  PA pressure >50     Ok to hold Coumadin 3 days prior per Matty MckeonD   Dialysis -W-  Labs ordered    COLONOSCOPY N/A 2018    Performed by Jose Falk MD at [facility] (2ND FLR)    ESOPHAGOGASTRODUODENOSCOPY (EGD) N/A 2018    Performed by Kneji Desir MD at [facility] (2ND FLR)    EYE SURGERY      FRACTURE SURGERY      R arm    HERNIA REPAIR      HYSTERECTOMY      INSERTION-IMPLANT-GLAUCOMA Left 10/12/2017    Performed [facility] at [facility]ST FLR    KIDNEY TRANSPLANT      NEPHRECTOMY  2008    transplant     PARATHYROID GLAND SURGERY      TONSILLECTOMY      UPPER GASTROINTESTINAL ENDOSCOPY         Review of patient's allergies indicates:   Allergen Reactions    Penicillins Swelling    Iodine      Other reaction(s): Hives    Sulfamethoxazole-trimethoprim      Other reaction(s): Swelling  Other reaction(s): Hives     Family History     Problem Relation (Age of Onset)    Asthma Sister    Blindness Father    Depression Sister    Diabetes Maternal Aunt    Heart attack  Father, Mother    Heart failure Father, Mother    Hypertension Father, Mother, Sister, Brother    Kidney disease Brother        Tobacco Use    Smoking status: Former Smoker     Types: Cigarettes     Last attempt to quit: 8/10/2000     Years since quittin.0    Smokeless tobacco: Never Used   Substance and Sexual Activity    Alcohol use: No     Comment: hx of etoh     Drug use: No    Sexual activity: No     Review of Systems   Constitutional: Positive for activity change, appetite change and fatigue. Negative for chills and fever.   HENT: Negative for trouble swallowing.    Respiratory: Negative for cough, choking and chest tightness.    Cardiovascular: Negative for chest pain, palpitations and leg swelling.   Gastrointestinal: Positive for blood in stool. Negative for abdominal distention, abdominal pain, anal bleeding, constipation, diarrhea, nausea and rectal pain.   Genitourinary: Negative for difficulty urinating and dysuria.   Musculoskeletal: Positive for arthralgias. Negative for back pain.   Skin: Positive for pallor. Negative for color change.   Neurological: Negative for dizziness and headaches.   Hematological: Negative for adenopathy.   Psychiatric/Behavioral: Negative for agitation and confusion.     Objective:     Vital Signs (Most Recent):  Temp: 98.7 °F (37.1 °C) (18 0809)  Pulse: 84 (18 1000)  Resp: (!) 21 (18 1000)  BP: (!) 189/90 (18 1000)  SpO2: 99 % (18 1000) Vital Signs (24h Range):  Temp:  [96.7 °F (35.9 °C)-98.9 °F (37.2 °C)] 98.7 °F (37.1 °C)  Pulse:  [] 84  Resp:  [15-34] 21  SpO2:  [93 %-100 %] 99 %  BP: (130-194)/(61-90) 189/90     Weight: 70.9 kg (156 lb 4.9 oz) (18 0400)  Body mass index is 29.53 kg/m².      Intake/Output Summary (Last 24 hours) at 2018 1108  Last data filed at 2018 0900  Gross per 24 hour   Intake 3496 ml   Output 2500 ml   Net 996 ml       Lines/Drains/Airways     Central Venous Catheter Line                  RETIRED! DO NOT USE: Hemodialysis Catheter Arteriovenous fistula Left Forearm -- days          Drain                 Hemodialysis AV Fistula Left upper arm -- days          Peripheral Intravenous Line                 Midline Catheter Insertion/Assessment  - Single Lumen 09/11/18 0957 Right brachial vein 18g x 10cm less than 1 day         Peripheral IV - Single Lumen 09/10/18 1246 Right Hand less than 1 day                Physical Exam   Constitutional: She is oriented to person, place, and time. She appears well-developed and well-nourished. No distress.   HENT:   Head: Normocephalic.   Eyes: Conjunctivae are normal. Pupils are equal, round, and reactive to light.   Neck: Normal range of motion. Neck supple.   Cardiovascular: Normal rate and regular rhythm.   Pulmonary/Chest: Effort normal and breath sounds normal.   Abdominal: Soft. Bowel sounds are normal. She exhibits no distension and no mass. There is no tenderness. There is no guarding.   Musculoskeletal: Normal range of motion.   Neurological: She is alert and oriented to person, place, and time.   Skin: Skin is warm and dry.   Psychiatric: She has a normal mood and affect.       Significant Labs:  CBC:   Recent Labs   Lab  09/10/18   1413  09/10/18   2154  09/11/18   0620   WBC  7.46  8.44  12.62   HGB  3.8*  4.2*  8.4*   HCT  12.6*  14.2*  26.0*   PLT  213  182  197     BMP:   Recent Labs   Lab  09/11/18   0620   GLU  107   NA  136   K  4.2   CL  105   CO2  19*   BUN  31*   CREATININE  5.8*   CALCIUM  8.6*   MG  2.0     CMP:   Recent Labs   Lab  09/10/18   1242  09/11/18   0620   GLU  123*  107   CALCIUM  9.2  8.6*   ALBUMIN  3.3*   --    PROT  6.7   --    NA  139  136   K  5.0  4.2   CO2  27  19*   CL  100  105   BUN  95*  31*   CREATININE  12.0*  5.8*   ALKPHOS  43*   --    ALT  12   --    AST  10   --    BILITOT  0.8   --      Coagulation:   Recent Labs   Lab  09/10/18   1242  09/11/18   0956   INR  3.1*  2.6*   APTT  38.7*   --      Lipase: No results  for input(s): LIPASE in the last 48 hours.    Significant Imaging:  Imaging results within the past 24 hours have been reviewed.    Assessment/Plan:     Kaila Sheets is a 63 y.o. female with past medical history of pulm HTN, Afib on coumadin, CAD, ESRD s/p failed kidney transplant, admitted to hospital 9/10/2018 with melena and symptomatic anemia. GI consulted for evaluation.    Patient presented with 4 days of melena in the setting of high INR and recent NSAID use. Presented with Hb of 3.8 from a baseline of ~11. Responded well to transfusion. She is hemodynamically stable.      Will plan for EGD today     -Continue  patient on PPI IV BID.  -Continue to trend H/H, and transfuse as needed.   -Maintain x2 large bore IVs  -Keep NPO for EGD               Thank you for your consult. I will follow-up with patient. Please contact us if you have any additional questions.    Júnior Magdaleno MD  Gastroenterology  [facility]

## 2018-09-11 NOTE — ASSESSMENT & PLAN NOTE
Acute GIB, likely upper, Hgb 3.8 from last 10.7 6/2018 (most values 9-10)  Hx of GIB, also recent NSAID use; EGD 2/2018 unremarkable. C-scope 6/2018 with nonbleeding sigmoid polyps, internal hemorrhoids  S/p 80 IV PPI in ED, continue 40 IV BID  GI consulted, for scope in the AM  Transfuse 2 units, then will check labs, coordinating with renal  Hold coumadin (INR 3.1), ASA

## 2018-09-11 NOTE — ASSESSMENT & PLAN NOTE
-With reported history of melena. Acute GIB, likely upper, Hgb 3.8 from last 10.7 6/2018 (most values 9-10)  -Hx of GIB, also recent NSAID use; EGD 2/2018 gastric polyps. C-scope 6/2018 with nonbleeding sigmoid polyps, internal hemorrhoids  -S/p 80 IV PPI in ED, continue 40 IV BID  -GI consulted, plan for EGD today  -Transfused total of 4 units, with appropriate rise.  Will check CBC this afternoon  -Held coumadin (INR 2.6), ASA on admit  -Will give lower dose of Coumadin today

## 2018-09-11 NOTE — PROGRESS NOTES
HD NOTES    HD started via left upper BC AVF, with good bruit and thrill  x 3hrs UF of  2.5L NET. See flow sheet    2330 - I unit of PRBC  propery typed and crossmatched, started    0045 - Blood transfusion ended without transfusion reactions, completed HD x 3hrs with UF net of 1.5L only due to cramps. Patient with elevated blood pressure post dialysis.    See flow sheet

## 2018-09-11 NOTE — [employer] PATIENT INSTRUCTIONS
Discharge Summary/Instructions after an Endoscopic Procedure  Patient Name: Shivani Sheets  Patient MRN: [id number]  Patient YOB: 1955 Tuesday, September 11, 2018  Luis Washington MD  RESTRICTIONS:  During your procedure today, you received medications for sedation.  These   medications may affect your judgment, balance and coordination.  Therefore,   for 24 hours, you have the following restrictions:   - DO NOT drive a car, operate machinery, make legal/financial decisions,   sign important papers or drink alcohol.    ACTIVITY:  Today: no heavy lifting, straining or running due to procedural   sedation/anesthesia.  The following day: return to full activity including work.  DIET:  Eat and drink normally unless instructed otherwise.     TREATMENT FOR COMMON SIDE EFFECTS:  - Mild abdominal pain, nausea, belching, bloating or excessive gas:  rest,   eat lightly and use a heating pad.  - Sore Throat: treat with throat lozenges and/or gargle with warm salt   water.  - Because air was used during the procedure, expelling large amounts of air   from your rectum or belching is normal.  - If a bowel prep was taken, you may not have a bowel movement for 1-3 days.    This is normal.  SYMPTOMS TO WATCH FOR AND REPORT TO YOUR PHYSICIAN:  1. Abdominal pain or bloating, other than gas cramps.  2. Chest pain.  3. Back pain.  4. Signs of infection such as: chills or fever occurring within 24 hours   after the procedure.  5. Rectal bleeding, which would show as bright red, maroon, or black stools.   (A tablespoon of blood from the rectum is not serious, especially if   hemorrhoids are present.)  6. Vomiting.  7. Weakness or dizziness.  GO DIRECTLY TO THE NEAREST EMERGENCY ROOM IF YOU HAVE ANY OF THE FOLLOWING:      Difficulty breathing              Chills and/or fever over 101 F   Persistent vomiting and/or vomiting blood   Severe abdominal pain   Severe chest pain   Black, tarry stools   Bleeding- more than one  tablespoon   Any other symptom or condition that you feel may need urgent attention  Your doctor recommends these additional instructions:  If any biopsies were taken, your doctors clinic will contact you in 1 to 2   weeks with any results.  - Return patient to ICU for ongoing care.   - The findings and recommendations were discussed with the patient's family.     - If continued melena or drop in Hemoglobin proceed with video capsule.   Bleeding appears to have stopped currently with INR correction.  For questions, problems or results please call your physician - Luis Washington MD at Work:  [phone].  [facility], EMERGENCY ROOM PHONE NUMBER: [phone]  IF A COMPLICATION OR EMERGENCY SITUATION ARISES AND YOU ARE UNABLE TO REACH   YOUR PHYSICIAN - GO DIRECTLY TO THE EMERGENCY ROOM.  Luis Washington MD  9/11/2018 2:48:13 PM  This report has been verified and signed electronically.  [employer]

## 2018-09-11 NOTE — ASSESSMENT & PLAN NOTE
Shivani Sheets is a 63 y.o. female with past medical history of pulm HTN, Afib on coumadin, CAD, ESRD s/p failed kidney transplant, admitted to hospital 9/10/2018 with melena and symptomatic anemia. GI consulted for evaluation.    Patient presented with 4 days of melena in the setting of high INR and recent NSAID use. Presented with Hb of 3.8 from a baseline of ~11. Responded well to transfusion. She is hemodynamically stable.      Will plan for EGD today     -Continue  patient on PPI IV BID.  -Continue to trend H/H, and transfuse as needed.   -Maintain x2 large bore IVs  -Keep NPO for EGD

## 2018-09-11 NOTE — SUBJECTIVE & OBJECTIVE
Past Medical History:   Diagnosis Date    Allergy     Anemia     Anticoagulant long-term use     4 years coumadin, asa    Arthritis     Awaiting organ transplant 2013    Cataract     Central serous chorioretinopathy of eye, right 2018    CHF (congestive heart failure)     Chronic kidney disease     Colon polyps     COPD (chronic obstructive pulmonary disease)     Coronary artery disease     Diabetes mellitus     Diabetic retinopathy     Diverticulosis     Encounter for blood transfusion     ESRD from HTN strtied RRT 1999    Failed  donor kidney transplant      Glaucoma     History of bleeding peptic ulcer      as stated per pt    Hyperlipidemia     Hypertension     Hypothyroidism     Morbid obesity 8/10/2012    Renal hypertension     Stroke            Past Surgical History:   Procedure Laterality Date    CATARACT EXTRACTION W/  INTRAOCULAR LENS IMPLANT Bilateral     COLON SURGERY      COLONOSCOPY      COLONOSCOPY N/A 2018    Procedure: COLONOSCOPY;  Surgeon: Jose Falk MD;  Location: [facility] (2ND FLR);  Service: Endoscopy;  Laterality: N/A;  PA pressure >50     Ok to hold Coumadin 3 days prior per Matty MckeonD   Dialysis M-W-  Labs ordered    COLONOSCOPY N/A 2018    Performed by Jose Falk MD at [facility] (2ND FLR)    ESOPHAGOGASTRODUODENOSCOPY (EGD) N/A 2018    Performed by Kenji Desir MD at [facility] (2ND FLR)    EYE SURGERY      FRACTURE SURGERY      R arm    HERNIA REPAIR      HYSTERECTOMY      INSERTION-IMPLANT-GLAUCOMA Left 10/12/2017    Performed by [facility]id [facility]ern MD at [facility] OR 1ST FLR    KIDNEY TRANSPLANT      NEPHRECTOMY  2008    transplant     PARATHYROID GLAND SURGERY      TONSILLECTOMY      UPPER GASTROINTESTINAL ENDOSCOPY         Review of patient's allergies indicates:   Allergen Reactions    Penicillins Swelling    Iodine      Other reaction(s): Hives     Sulfamethoxazole-trimethoprim      Other reaction(s): Swelling  Other reaction(s): Hives     Family History     Problem Relation (Age of Onset)    Asthma Sister    Blindness Father    Depression Sister    Diabetes Maternal Aunt    Heart attack Father, Mother    Heart failure Father, Mother    Hypertension Father, Mother, Sister, Brother    Kidney disease Brother        Tobacco Use    Smoking status: Former Smoker     Types: Cigarettes     Last attempt to quit: 8/10/2000     Years since quittin.0    Smokeless tobacco: Never Used   Substance and Sexual Activity    Alcohol use: No     Comment: hx of etoh     Drug use: No    Sexual activity: No     Review of Systems   Constitutional: Positive for activity change, appetite change and fatigue. Negative for chills and fever.   HENT: Negative for trouble swallowing.    Respiratory: Negative for cough, choking and chest tightness.    Cardiovascular: Negative for chest pain, palpitations and leg swelling.   Gastrointestinal: Positive for blood in stool. Negative for abdominal distention, abdominal pain, anal bleeding, constipation, diarrhea, nausea and rectal pain.   Genitourinary: Negative for difficulty urinating and dysuria.   Musculoskeletal: Positive for arthralgias. Negative for back pain.   Skin: Positive for pallor. Negative for color change.   Neurological: Negative for dizziness and headaches.   Hematological: Negative for adenopathy.   Psychiatric/Behavioral: Negative for agitation and confusion.     Objective:     Vital Signs (Most Recent):  Temp: 98.7 °F (37.1 °C) (18 0809)  Pulse: 84 (18 1000)  Resp: (!) 21 (18 1000)  BP: (!) 189/90 (18 1000)  SpO2: 99 % (18 1000) Vital Signs (24h Range):  Temp:  [96.7 °F (35.9 °C)-98.9 °F (37.2 °C)] 98.7 °F (37.1 °C)  Pulse:  [] 84  Resp:  [15-34] 21  SpO2:  [93 %-100 %] 99 %  BP: (130-194)/(61-90) 189/90     Weight: 70.9 kg (156 lb 4.9 oz) (18 0400)  Body mass index is 29.53  kg/m².      Intake/Output Summary (Last 24 hours) at 9/11/2018 1108  Last data filed at 9/11/2018 0900  Gross per 24 hour   Intake 3496 ml   Output 2500 ml   Net 996 ml       Lines/Drains/Airways     Central Venous Catheter Line                 RETIRED! DO NOT USE: Hemodialysis Catheter Arteriovenous fistula Left Forearm -- days          Drain                 Hemodialysis AV Fistula Left upper arm -- days          Peripheral Intravenous Line                 Midline Catheter Insertion/Assessment  - Single Lumen 09/11/18 0957 Right brachial vein 18g x 10cm less than 1 day         Peripheral IV - Single Lumen 09/10/18 1246 Right Hand less than 1 day                Physical Exam   Constitutional: She is oriented to person, place, and time. She appears well-developed and well-nourished. No distress.   HENT:   Head: Normocephalic.   Eyes: Conjunctivae are normal. Pupils are equal, round, and reactive to light.   Neck: Normal range of motion. Neck supple.   Cardiovascular: Normal rate and regular rhythm.   Pulmonary/Chest: Effort normal and breath sounds normal.   Abdominal: Soft. Bowel sounds are normal. She exhibits no distension and no mass. There is no tenderness. There is no guarding.   Musculoskeletal: Normal range of motion.   Neurological: She is alert and oriented to person, place, and time.   Skin: Skin is warm and dry.   Psychiatric: She has a normal mood and affect.       Significant Labs:  CBC:   Recent Labs   Lab  09/10/18   1413  09/10/18   2154  09/11/18   0620   WBC  7.46  8.44  12.62   HGB  3.8*  4.2*  8.4*   HCT  12.6*  14.2*  26.0*   PLT  213  182  197     BMP:   Recent Labs   Lab  09/11/18   0620   GLU  107   NA  136   K  4.2   CL  105   CO2  19*   BUN  31*   CREATININE  5.8*   CALCIUM  8.6*   MG  2.0     CMP:   Recent Labs   Lab  09/10/18   1242  09/11/18   0620   GLU  123*  107   CALCIUM  9.2  8.6*   ALBUMIN  3.3*   --    PROT  6.7   --    NA  139  136   K  5.0  4.2   CO2  27  19*   CL  100  105    BUN  95*  31*   CREATININE  12.0*  5.8*   ALKPHOS  43*   --    ALT  12   --    AST  10   --    BILITOT  0.8   --      Coagulation:   Recent Labs   Lab  09/10/18   1242  09/11/18   0956   INR  3.1*  2.6*   APTT  38.7*   --      Lipase: No results for input(s): LIPASE in the last 48 hours.    Significant Imaging:  Imaging results within the past 24 hours have been reviewed.

## 2018-09-11 NOTE — SUBJECTIVE & OBJECTIVE
Interval History: Patient given 4 units of blood with appropriate rise.  She reports no new complaints this morning.  She is feeling better.  She has not had a bowel movement since admission to say whether is is dark.  GI planning EGD today.     Continuous Infusions:  Scheduled Meds:   sodium chloride 0.9%   Intravenous Once    atorvastatin  10 mg Oral Daily    diltiaZEM  240 mg Oral Daily    levothyroxine  100 mcg Oral Before breakfast    pantoprazole  40 mg Intravenous BID    riociguat  2 mg Oral TID    selexipag  1,000 mcg Oral BID    sodium chloride 0.9%  3 mL Intravenous Q8H     PRN Meds:sodium chloride, sodium chloride, sodium chloride 0.9%, HYDROcodone-acetaminophen, [START ON 9/12/2018] influenza, ondansetron    Review of patient's allergies indicates:   Allergen Reactions    Penicillins Swelling    Iodine      Other reaction(s): Hives    Sulfamethoxazole-trimethoprim      Other reaction(s): Swelling  Other reaction(s): Hives     Objective:     Vital Signs (Most Recent):  Temp: 98.7 °F (37.1 °C) (09/11/18 0809)  Pulse: 84 (09/11/18 1000)  Resp: (!) 21 (09/11/18 1000)  BP: (!) 189/90 (09/11/18 1000)  SpO2: 99 % (09/11/18 1000) Vital Signs (24h Range):  Temp:  [96.7 °F (35.9 °C)-98.9 °F (37.2 °C)] 98.7 °F (37.1 °C)  Pulse:  [] 84  Resp:  [15-34] 21  SpO2:  [93 %-100 %] 99 %  BP: (130-194)/(61-90) 189/90     Patient Vitals for the past 72 hrs (Last 3 readings):   Weight   09/11/18 0400 70.9 kg (156 lb 4.9 oz)   09/10/18 1947 73.7 kg (162 lb 7.7 oz)   09/10/18 1027 72.1 kg (159 lb)     Body mass index is 29.53 kg/m².      Intake/Output Summary (Last 24 hours) at 9/11/2018 1125  Last data filed at 9/11/2018 1100  Gross per 24 hour   Intake 3541 ml   Output 2500 ml   Net 1041 ml     Physical Exam  Constitutional: laying in bed NAD; family at bedside  Neck: Neck supple. No JVD present.   Cardiovascular: Normal rate, regular rhythm and normal heart sounds.  III/VI holosystolic murmer LUSB   LUE AVF    Abdominal: Soft. Bowel sounds are normal. She exhibits no distension. There is no tenderness.   Neurological: She is alert and oriented to person, place, and time. No cranial nerve deficit.   Skin: Skin is warm and dry. No erythema.   Psychiatric: She has a normal mood and affect.     Significant Labs:  CBC:  Recent Labs   Lab  09/10/18   1413  09/10/18   2154  09/11/18   0620   WBC  7.46  8.44  12.62   RBC  1.36*  1.56*  2.99*   HGB  3.8*  4.2*  8.4*   HCT  12.6*  14.2*  26.0*   PLT  213  182  197   MCV  93  91  87   MCH  27.9  26.9*  28.1   MCHC  30.2*  29.6*  32.3     BNP:  No results for input(s): BNP in the last 168 hours.    Invalid input(s): BNPTRIAGELBLO  CMP:  Recent Labs   Lab  09/10/18   1242  09/11/18   0620   GLU  123*  107   CALCIUM  9.2  8.6*   ALBUMIN  3.3*   --    PROT  6.7   --    NA  139  136   K  5.0  4.2   CO2  27  19*   CL  100  105   BUN  95*  31*   CREATININE  12.0*  5.8*   ALKPHOS  43*   --    ALT  12   --    AST  10   --    BILITOT  0.8   --       Coagulation:   Recent Labs   Lab  09/06/18   0912  09/10/18   1242  09/11/18   0956   INR  5.6*  3.1*  2.6*   APTT   --   38.7*   --      LDH:  No results for input(s): LDH in the last 72 hours.  Microbiology:  Microbiology Results (last 7 days)     ** No results found for the last 168 hours. **          I have reviewed all pertinent labs within the past 24 hours.    Estimated Creatinine Clearance: 8.7 mL/min (A) (based on SCr of 5.8 mg/dL (H)).    Diagnostic Results:  I have reviewed all pertinent imaging results/findings within the past 24 hours.

## 2018-09-11 NOTE — ANESTHESIA RELEASE NOTE
"Anesthesia Release from PACU Note    Patient: Shivani Sheets    Procedure(s) Performed: Procedure(s) (LRB):  EGD (ESOPHAGOGASTRODUODENOSCOPY) (N/A)    Anesthesia type: general    Post pain: Adequate analgesia    Post assessment: no apparent anesthetic complications and tolerated procedure well    Last Vitals:   Visit Vitals  BP (!) 181/91 (BP Location: Left leg, Patient Position: Lying)   Pulse 83   Temp 36.9 °C (98.5 °F) (Axillary)   Resp (!) 26   Ht 5' 1" (1.549 m)   Wt 70.9 kg (156 lb 4.9 oz)   LMP  (LMP Unknown)   SpO2 97%   Breastfeeding? No   BMI 29.53 kg/m²       Post vital signs: stable    Level of consciousness: awake and alert     Nausea/Vomiting: no nausea/no vomiting    Complications: none    Airway Patency: patent    Respiratory: unassisted, spontaneous ventilation, room air    Cardiovascular: stable and blood pressure at baseline    Hydration: euvolemic  "

## 2018-09-11 NOTE — H&P
[facility]  Heart Transplant  H&P    Patient Name: Shivani Sheets  MRN: [id number]  Admission Date: 9/10/2018  Attending Physician: Gera Jordan Jr.,*  Primary Care Provider: Eula Weiss MD  Principal Problem:<principal problem not specified>    Subjective:     History of Present Illness:  63 year old female with a history of pulmonary HTN(on adempas/uptravi), diastolic dysfunction, pAF(on coumadin), central retinal artery occlusion without significant carotid artery stenosis, CAD with remote PCI, ESRD(LUE AVF) secondary to HTN, s/p failed kidney transplant, PUD, STEFFANY on CPAP, hypothyroidism, RA, DLP and obesity who presents to the ED for 4 days of melena. Per patient symptoms started last Thursday and she states she has had only on BM a day with very dark stools. She had a similar presentation in 2/2018 at which time EGD was unremarkable. Colonoscopy done 6/12/18 showed non-bleeding hemorrhoids and some polyps in sigmoid as well as ascending colon.     She is on coumadin for her history of afib and INR on 9/6/18 was 5.6, it is 3.1 today (9/10). She was seen in the ED in 8/2018 for hip pain and given some ibuprofen, the last time she used it was approximately 3-4 days ago. She is also taking ASA 81 mg daily for her CAD history. The last time she ate any food was yesterday morning. Denies abdominal pain, vomiting, hematemesis, BRBPR.     Her Hgb in the ED was 3.8. 2 units of pRBC are pending. No further active bleeding. GI was consulted and she is planned for EGD tomorrow. Missed HD today, nephrology consulted, plan for HD tonight after receiving pRBC.    Past Medical History:   Diagnosis Date    Allergy     Anemia     Arthritis     Awaiting organ transplant 4/30/2013    Cataract     Central serous chorioretinopathy of eye, right 8/21/2018    CHF (congestive heart failure)     Chronic kidney disease     Coronary artery disease     Diabetes mellitus     Diabetic retinopathy      Diverticulosis     ESRD from HTN strtied RRT 1999    Failed  donor kidney transplant      Glaucoma     History of bleeding peptic ulcer      as stated per pt    Hyperlipidemia     Hypertension     Hypothyroidism     Morbid obesity 8/10/2012    Renal hypertension     Stroke        Past Surgical History:   Procedure Laterality Date    CATARACT EXTRACTION W/  INTRAOCULAR LENS IMPLANT Bilateral     COLONOSCOPY      COLONOSCOPY N/A 2018    Procedure: COLONOSCOPY;  Surgeon: Jose Falk MD;  Location: [facility] (2ND FLR);  Service: Endoscopy;  Laterality: N/A;  PA pressure >50     Ok to hold Coumadin 3 days prior per MikePharmD   Dialysis M-W-  Labs ordered    COLONOSCOPY N/A 2018    Performed by Jose Falk MD at [facility] (2ND FLR)    ESOPHAGOGASTRODUODENOSCOPY (EGD) N/A 2018    Performed by Kenji Desir MD at [facility] (2ND FLR)    EYE SURGERY      HYSTERECTOMY      INSERTION-IMPLANT-GLAUCOMA Left 10/12/2017    Performed by [facility] at [facility]ST F[facility]    KIDNEY TRANSPLANT      NEPHRECTOMY  2008    transplant     TONSILLECTOMY      UPPER GASTROINTESTINAL ENDOSCOPY         Review of patient's allergies indicates:   Allergen Reactions    Penicillins Swelling    Iodine      Other reaction(s): Hives    Sulfamethoxazole-trimethoprim      Other reaction(s): Swelling  Other reaction(s): Hives       Current Facility-Administered Medications   Medication    0.9%  NaCl infusion (for blood administration)    0.9%  NaCl infusion    0.9%  NaCl infusion    [START ON 2018] atorvastatin tablet 10 mg    [START ON 2018] diltiaZEM 24 hr capsule 240 mg    HYDROcodone-acetaminophen 7.5-325 mg per tablet 1 tablet    [START ON 2018] levothyroxine tablet 100 mcg    NON FORMULARY MEDICATION 1,000 mcg    ondansetron disintegrating tablet 4 mg    pantoprazole injection 40 mg    riociguat (ADEMPAS) tablet 2 mg     sodium chloride 0.9% flush 3 mL     Current Outpatient Medications   Medication Sig    ALPHAGAN P 0.1 % Drop     aspirin 81 MG chewable tablet Take 81 mg by mouth Daily. 1  By mouth Every day    CARTIA  mg 24 hr capsule TAKE ONE CAPSULE BY MOUTH DAILY    dorzolamide-timolol 2-0.5% (COSOPT) 22.3-6.8 mg/mL ophthalmic solution INSTILL 1 DROP IN BOTH EYES TWICE DAILY    hydrocodone-acetaminophen 7.5-325mg (NORCO) 7.5-325 mg per tablet TK 1 T PO Q 6 H PRN    ibuprofen (ADVIL,MOTRIN) 400 MG tablet Take 1 tablet (400 mg total) by mouth every 6 (six) hours as needed.    latanoprost 0.005 % ophthalmic solution INSTILL 1 DROP IN BOTH EYES EVERY DAY AT BEDTIME    levothyroxine (SYNTHROID) 100 MCG tablet Take 100 mcg by mouth. 1 Tablet Oral Every day    lidocaine-prilocaine (EMLA) cream Apply topically as needed.    LIPITOR 10 mg tablet TAKE 1 BY MOUTH ONCE A DAY    pantoprazole (PROTONIX) 40 MG tablet TAKE 1 TABLET BY MOUTH ONCE DAILY    riociguat (ADEMPAS) 2 mg Tab tablet Take 2 mg by mouth 3 (three) times daily.     selexipag (UPTRAVI) 1,000 mcg Tab Take 1,000 mcg by mouth 2 (two) times daily.    warfarin (COUMADIN) 5 MG tablet TAKE 1 1/2 TABLETS BY MOUTH DAILY ON TUESDAY, THURSDAY AND SATURDAY, THEN TAKE 1 TABLET DAILY ON MONDAY, WEDNESDAY, FRIDAY AND      Family History     Problem Relation (Age of Onset)    Asthma Sister    Blindness Father    Depression Sister    Diabetes Maternal Aunt    Heart attack Father, Mother    Heart failure Father, Mother    Hypertension Father, Mother, Sister, Brother    Kidney disease Brother        Tobacco Use    Smoking status: Former Smoker     Last attempt to quit: 8/10/2000     Years since quittin.0    Smokeless tobacco: Never Used   Substance and Sexual Activity    Alcohol use: No    Drug use: No    Sexual activity: No     Review of Systems   All other systems reviewed and are negative.    Objective:     Vital Signs (Most Recent):  Temp: 98.3 °F (36.8  °C) (09/10/18 1815)  Pulse: 78 (09/10/18 1846)  Resp: 20 (09/10/18 1846)  BP: (!) 160/83 (09/10/18 1846)  SpO2: 100 % (09/10/18 1846) Vital Signs (24h Range):  Temp:  [96.7 °F (35.9 °C)-98.5 °F (36.9 °C)] 98.3 °F (36.8 °C)  Pulse:  [78-92] 78  Resp:  [15-34] 20  SpO2:  [96 %-100 %] 100 %  BP: (130-160)/(61-85) 160/83     Patient Vitals for the past 72 hrs (Last 3 readings):   Weight   09/10/18 1027 72.1 kg (159 lb)     Body mass index is 30.04 kg/m².    No intake or output data in the 24 hours ending 09/10/18 1907    Physical Exam   Constitutional: She is oriented to person, place, and time. She appears well-nourished. No distress.   HENT:   Head: Atraumatic.   Mouth/Throat: No oropharyngeal exudate.   Eyes: EOM are normal. No scleral icterus.   Neck: Neck supple. No JVD present.   Cardiovascular: Normal rate, regular rhythm and normal heart sounds.   LUE AVF   Abdominal: Soft. Bowel sounds are normal. She exhibits no distension. There is no tenderness.   Neurological: She is alert and oriented to person, place, and time. No cranial nerve deficit.   Skin: Skin is warm and dry. No erythema.   Psychiatric: She has a normal mood and affect.       Significant Labs:  CBC:  Recent Labs   Lab  09/10/18   1242  09/10/18   1413   WBC  8.07  7.46   RBC  1.41*  1.36*   HGB  3.8*  3.8*   HCT  13.2*  12.6*   PLT  227  213   MCV  94  93   MCH  27.0  27.9   MCHC  28.8*  30.2*     BNP:  No results for input(s): BNP in the last 168 hours.    Invalid input(s): BNPTRIAGELBLO  CMP:  Recent Labs   Lab  09/10/18   1242   GLU  123*   CALCIUM  9.2   ALBUMIN  3.3*   PROT  6.7   NA  139   K  5.0   CO2  27   CL  100   BUN  95*   CREATININE  12.0*   ALKPHOS  43*   ALT  12   AST  10   BILITOT  0.8      Coagulation:   Recent Labs   Lab  09/06/18   0912  09/10/18   1242   INR  5.6*  3.1*   APTT   --   38.7*     LDH:  No results for input(s): LDH in the last 72 hours.  Microbiology:  Microbiology Results (last 7 days)     ** No results found for  the last 168 hours. **          I have reviewed all pertinent labs within the past 24 hours.    Diagnostic Results:  I have reviewed all pertinent imaging results/findings within the past 24 hours.    Assessment/Plan:     63 year old female with a history of pulmonary HTN(on adempas/uptravi), diastolic dysfunction, pAF(on coumadin), central retinal artery occlusion without significant carotid artery stenosis, CAD with remote PCI, ESRD(LUE AVF) secondary to HTN, s/p failed kidney transplant, PUD, STEFFANY on CPAP, hypothyroidism, RA, DLP and obesity here with melena, found to have Hgb 3.8 from baseline 9-10. VSS, transfusing and for scope tomorrow AM.    Melena    Acute GIB, likely upper, Hgb 3.8 from last 10.7 6/2018 (most values 9-10)  Hx of GIB, also recent NSAID use; EGD 2/2018 unremarkable. C-scope 6/2018 with nonbleeding sigmoid polyps, internal hemorrhoids  S/p 80 IV PPI in ED, continue 40 IV BID  GI consulted, for scope in the AM  Transfuse 2 units, then will check labs, coordinating with renal  Hold coumadin (INR 3.1), ASA        ESRD from HTN started RRT 1999    Nephrology consulted  Missed HD today, plan for HD tonight after at least 1 unit        Pulmonary hypertension    Stable  Continue adempas, selexipag        Atrial fibrillation    Holding home coumadin  Continue diltiazem        Hypothyroidism    Continue synthroid            Frederick Duran MD  Heart Transplant  [facility]

## 2018-09-11 NOTE — SUBJECTIVE & OBJECTIVE
Past Medical History:   Diagnosis Date    Allergy     Anemia     Arthritis     Awaiting organ transplant 2013    Cataract     Central serous chorioretinopathy of eye, right 2018    CHF (congestive heart failure)     Chronic kidney disease     Coronary artery disease     Diabetes mellitus     Diabetic retinopathy     Diverticulosis     ESRD from HTN strtied RRT 1999    Failed  donor kidney transplant      Glaucoma     History of bleeding peptic ulcer      as stated per pt    Hyperlipidemia     Hypertension     Hypothyroidism     Morbid obesity 8/10/2012    Renal hypertension     Stroke        Past Surgical History:   Procedure Laterality Date    CATARACT EXTRACTION W/  INTRAOCULAR LENS IMPLANT Bilateral     COLONOSCOPY      COLONOSCOPY N/A 2018    Procedure: COLONOSCOPY;  Surgeon: Jose Falk MD;  [facility]  Service: Endoscopy;  Laterality: N/A;  PA pressure >50     Ok to hold Coumadin 3 days prior per Matty MckeonD   Dialysis -W-  Labs ordered    COLONOSCOPY N/A 2018    Performed by Jose Falk MD at [facility] (2ND FLR)    ESOPHAGOGASTRODUODENOSCOPY (EGD) N/A 2018    Performed by Kenji Desir MD at [facility] (2ND FLR)    EYE SURGERY      HYSTERECTOMY      INSERTION-IMPLANT-GLAUCOMA Left 10/12/2017    Performed [facility]ST FLR    KIDNEY TRANSPLANT      NEPHRECTOMY  2008    transplant     TONSILLECTOMY      UPPER GASTROINTESTINAL ENDOSCOPY         Review of patient's allergies indicates:   Allergen Reactions    Penicillins Swelling    Iodine      Other reaction(s): Hives    Sulfamethoxazole-trimethoprim      Other reaction(s): Swelling  Other reaction(s): Hives       Current Facility-Administered Medications   Medication    0.9%  NaCl infusion (for blood administration)    0.9%  NaCl infusion    0.9%  NaCl infusion    [START ON 2018] atorvastatin  tablet 10 mg    [START ON 9/11/2018] diltiaZEM 24 hr capsule 240 mg    HYDROcodone-acetaminophen 7.5-325 mg per tablet 1 tablet    [START ON 9/11/2018] levothyroxine tablet 100 mcg    NON FORMULARY MEDICATION 1,000 mcg    ondansetron disintegrating tablet 4 mg    pantoprazole injection 40 mg    riociguat (ADEMPAS) tablet 2 mg    sodium chloride 0.9% flush 3 mL     Current Outpatient Medications   Medication Sig    ALPHAGAN P 0.1 % Drop     aspirin 81 MG chewable tablet Take 81 mg by mouth Daily. 1  By mouth Every day    CARTIA  mg 24 hr capsule TAKE ONE CAPSULE BY MOUTH DAILY    dorzolamide-timolol 2-0.5% (COSOPT) 22.3-6.8 mg/mL ophthalmic solution INSTILL 1 DROP IN BOTH EYES TWICE DAILY    hydrocodone-acetaminophen 7.5-325mg (NORCO) 7.5-325 mg per tablet TK 1 T PO Q 6 H PRN    ibuprofen (ADVIL,MOTRIN) 400 MG tablet Take 1 tablet (400 mg total) by mouth every 6 (six) hours as needed.    latanoprost 0.005 % ophthalmic solution INSTILL 1 DROP IN BOTH EYES EVERY DAY AT BEDTIME    levothyroxine (SYNTHROID) 100 MCG tablet Take 100 mcg by mouth. 1 Tablet Oral Every day    lidocaine-prilocaine (EMLA) cream Apply topically as needed.    LIPITOR 10 mg tablet TAKE 1 BY MOUTH ONCE A DAY    pantoprazole (PROTONIX) 40 MG tablet TAKE 1 TABLET BY MOUTH ONCE DAILY    riociguat (ADEMPAS) 2 mg Tab tablet Take 2 mg by mouth 3 (three) times daily.     selexipag (UPTRAVI) 1,000 mcg Tab Take 1,000 mcg by mouth 2 (two) times daily.    warfarin (COUMADIN) 5 MG tablet TAKE 1 1/2 TABLETS BY MOUTH DAILY ON TUESDAY, THURSDAY AND SATURDAY, THEN TAKE 1 TABLET DAILY ON MONDAY, WEDNESDAY, FRIDAY AND SUNDAY     Family History     Problem Relation (Age of Onset)    Asthma Sister    Blindness Father    Depression Sister    Diabetes Maternal Aunt    Heart attack Father, Mother    Heart failure Father, Mother    Hypertension Father, Mother, Sister, Brother    Kidney disease Brother        Tobacco Use    Smoking status:  Former Smoker     Last attempt to quit: 8/10/2000     Years since quittin.0    Smokeless tobacco: Never Used   Substance and Sexual Activity    Alcohol use: No    Drug use: No    Sexual activity: No     Review of Systems   All other systems reviewed and are negative.    Objective:     Vital Signs (Most Recent):  Temp: 98.3 °F (36.8 °C) (09/10/18 1815)  Pulse: 78 (09/10/18 1846)  Resp: 20 (09/10/18 1846)  BP: (!) 160/83 (09/10/18 1846)  SpO2: 100 % (09/10/18 1846) Vital Signs (24h Range):  Temp:  [96.7 °F (35.9 °C)-98.5 °F (36.9 °C)] 98.3 °F (36.8 °C)  Pulse:  [78-92] 78  Resp:  [15-34] 20  SpO2:  [96 %-100 %] 100 %  BP: (130-160)/(61-85) 160/83     Patient Vitals for the past 72 hrs (Last 3 readings):   Weight   09/10/18 1027 72.1 kg (159 lb)     Body mass index is 30.04 kg/m².    No intake or output data in the 24 hours ending 09/10/18 1907    Physical Exam   Constitutional: She is oriented to person, place, and time. She appears well-nourished. No distress.   HENT:   Head: Atraumatic.   Mouth/Throat: No oropharyngeal exudate.   Eyes: EOM are normal. No scleral icterus.   Neck: Neck supple. No JVD present.   Cardiovascular: Normal rate, regular rhythm and normal heart sounds.   LUE AVF   Abdominal: Soft. Bowel sounds are normal. She exhibits no distension. There is no tenderness.   Neurological: She is alert and oriented to person, place, and time. No cranial nerve deficit.   Skin: Skin is warm and dry. No erythema.   Psychiatric: She has a normal mood and affect.       Significant Labs:  CBC:  Recent Labs   Lab  09/10/18   1242  09/10/18   1413   WBC  8.07  7.46   RBC  1.41*  1.36*   HGB  3.8*  3.8*   HCT  13.2*  12.6*   PLT  227  213   MCV  94  93   MCH  27.0  27.9   MCHC  28.8*  30.2*     BNP:  No results for input(s): BNP in the last 168 hours.    Invalid input(s): BNPTRIAGELBLO  CMP:  Recent Labs   Lab  09/10/18   1242   GLU  123*   CALCIUM  9.2   ALBUMIN  3.3*   PROT  6.7   NA  139   K  5.0   CO2  27    CL  100   BUN  95*   CREATININE  12.0*   ALKPHOS  43*   ALT  12   AST  10   BILITOT  0.8      Coagulation:   Recent Labs   Lab  09/06/18   0912  09/10/18   1242   INR  5.6*  3.1*   APTT   --   38.7*     LDH:  No results for input(s): LDH in the last 72 hours.  Microbiology:  Microbiology Results (last 7 days)     ** No results found for the last 168 hours. **          I have reviewed all pertinent labs within the past 24 hours.    Diagnostic Results:  I have reviewed all pertinent imaging results/findings within the past 24 hours.

## 2018-09-11 NOTE — TRANSFER OF CARE
"Anesthesia Transfer of Care Note    Patient: Shivani Sheets    Procedure(s) Performed: Procedure(s) (LRB):  EGD (ESOPHAGOGASTRODUODENOSCOPY) (N/A)    Patient location: ICU    Anesthesia Type: general    Post pain: adequate analgesia    Post assessment: no apparent anesthetic complications and tolerated procedure well    Post vital signs: stable    Level of consciousness: sedated    Nausea/Vomiting: no nausea/vomiting    Complications: none    Transfer of care protocol was followed      Last vitals:   Visit Vitals  BP (!) 181/91 (BP Location: Left leg, Patient Position: Lying)   Pulse 83   Temp 36.9 °C (98.5 °F) (Axillary)   Resp (!) 26   Ht 5' 1" (1.549 m)   Wt 70.9 kg (156 lb 4.9 oz)   LMP  (LMP Unknown)   SpO2 97%   Breastfeeding? No   BMI 29.53 kg/m²     "

## 2018-09-11 NOTE — PROGRESS NOTES
[facility]  Heart Transplant  Progress Note    Patient Name: Shivani Sheets  MRN: [id number]  Admission Date: 9/10/2018  Hospital Length of Stay: 1 days  Attending Physician: Gera Jordan Jr.,*  Primary Care Provider: Eula Weiss MD  Principal Problem:<principal problem not specified>    Subjective:     Interval History: Patient given 4 units of blood with appropriate rise.  She reports no new complaints this morning.  She is feeling better.  She has not had a bowel movement since admission to say whether is is dark.  GI planning EGD today.     Continuous Infusions:  Scheduled Meds:   sodium chloride 0.9%   Intravenous Once    atorvastatin  10 mg Oral Daily    diltiaZEM  240 mg Oral Daily    levothyroxine  100 mcg Oral Before breakfast    pantoprazole  40 mg Intravenous BID    riociguat  2 mg Oral TID    selexipag  1,000 mcg Oral BID    sodium chloride 0.9%  3 mL Intravenous Q8H     PRN Meds:sodium chloride, sodium chloride, sodium chloride 0.9%, HYDROcodone-acetaminophen, [START ON 9/12/2018] influenza, ondansetron    Review of patient's allergies indicates:   Allergen Reactions    Penicillins Swelling    Iodine      Other reaction(s): Hives    Sulfamethoxazole-trimethoprim      Other reaction(s): Swelling  Other reaction(s): Hives     Objective:     Vital Signs (Most Recent):  Temp: 98.7 °F (37.1 °C) (09/11/18 0809)  Pulse: 84 (09/11/18 1000)  Resp: (!) 21 (09/11/18 1000)  BP: (!) 189/90 (09/11/18 1000)  SpO2: 99 % (09/11/18 1000) Vital Signs (24h Range):  Temp:  [96.7 °F (35.9 °C)-98.9 °F (37.2 °C)] 98.7 °F (37.1 °C)  Pulse:  [] 84  Resp:  [15-34] 21  SpO2:  [93 %-100 %] 99 %  BP: (130-194)/(61-90) 189/90     Patient Vitals for the past 72 hrs (Last 3 readings):   Weight   09/11/18 0400 70.9 kg (156 lb 4.9 oz)   09/10/18 1947 73.7 kg (162 lb 7.7 oz)   09/10/18 1027 72.1 kg (159 lb)     Body mass index is 29.53 kg/m².      Intake/Output Summary (Last 24 hours) at  9/11/2018 1125  Last data filed at 9/11/2018 1100  Gross per 24 hour   Intake 3541 ml   Output 2500 ml   Net 1041 ml     Physical Exam  Constitutional: laying in bed NAD; family at bedside  Neck: Neck supple. No JVD present.   Cardiovascular: Normal rate, regular rhythm and normal heart sounds.  III/VI holosystolic murmer LUSB   LUE AVF   Abdominal: Soft. Bowel sounds are normal. She exhibits no distension. There is no tenderness.   Neurological: She is alert and oriented to person, place, and time. No cranial nerve deficit.   Skin: Skin is warm and dry. No erythema.   Psychiatric: She has a normal mood and affect.     Significant Labs:  CBC:  Recent Labs   Lab  09/10/18   1413  09/10/18   2154  09/11/18   0620   WBC  7.46  8.44  12.62   RBC  1.36*  1.56*  2.99*   HGB  3.8*  4.2*  8.4*   HCT  12.6*  14.2*  26.0*   PLT  213  182  197   MCV  93  91  87   MCH  27.9  26.9*  28.1   MCHC  30.2*  29.6*  32.3     BNP:  No results for input(s): BNP in the last 168 hours.    Invalid input(s): BNPTRIAGELBLO  CMP:  Recent Labs   Lab  09/10/18   1242  09/11/18   0620   GLU  123*  107   CALCIUM  9.2  8.6*   ALBUMIN  3.3*   --    PROT  6.7   --    NA  139  136   K  5.0  4.2   CO2  27  19*   CL  100  105   BUN  95*  31*   CREATININE  12.0*  5.8*   ALKPHOS  43*   --    ALT  12   --    AST  10   --    BILITOT  0.8   --       Coagulation:   Recent Labs   Lab  09/06/18   0912  09/10/18   1242  09/11/18   0956   INR  5.6*  3.1*  2.6*   APTT   --   38.7*   --      LDH:  No results for input(s): LDH in the last 72 hours.  Microbiology:  Microbiology Results (last 7 days)     ** No results found for the last 168 hours. **          I have reviewed all pertinent labs within the past 24 hours.    Estimated Creatinine Clearance: 8.7 mL/min (A) (based on SCr of 5.8 mg/dL (H)).    Diagnostic Results:  I have reviewed all pertinent imaging results/findings within the past 24 hours.    Assessment and Plan:     63 year old female with a history of  pulmonary HTN(on adempas/uptravi), diastolic dysfunction, pAF(on coumadin), central retinal artery occlusion without significant carotid artery stenosis, CAD with remote PCI, ESRD(LUE AVF) secondary to HTN, s/p failed kidney transplant, PUD, STEFFANY on CPAP, hypothyroidism, RA, DLP and obesity who presents to the ED for 4 days of melena. Per patient symptoms started last Thursday and she states she has had only on BM a day with very dark stools. She had a similar presentation in 2/2018 at which time EGD was unremarkable. Colonoscopy done 6/12/18 showed non-bleeding hemorrhoids and some polyps in sigmoid as well as ascending colon.     She is on coumadin for her history of afib and INR on 9/6/18 was 5.6, it is 3.1 today (9/10). She was seen in the ED in 8/2018 for hip pain and given some ibuprofen, the last time she used it was approximately 3-4 days ago. She is also taking ASA 81 mg daily for her CAD history. The last time she ate any food was yesterday morning. Denies abdominal pain, vomiting, hematemesis, BRBPR.     Her Hgb in the ED was 3.8. 2 units of pRBC are pending. No further active bleeding. GI was consulted and she is planned for EGD tomorrow. Missed HD today, nephrology consulted, plan for HD tonight after receiving pRBC.    Anemia    -With reported history of melena. Acute GIB, likely upper, Hgb 3.8 from last 10.7 6/2018 (most values 9-10)  -Hx of GIB, also recent NSAID use; EGD 2/2018 gastric polyps. C-scope 6/2018 with nonbleeding sigmoid polyps, internal hemorrhoids  -S/p 80 IV PPI in ED, continue 40 IV BID  -GI consulted, plan for EGD today  -Transfused total of 4 units, with appropriate rise.  Will check CBC this afternoon  -Held coumadin (INR 2.6), ASA on admit  -Will give lower dose of Coumadin today        Pulmonary hypertension    -Stable  -Continue adempas, selexipag  -Supplemental oxygen        ESRD from HTN started RRT 1999    -Nephrology consulted  -Patient gets HD MWF         Atrial  fibrillation    -Held home coumadin as INR supra therapeutic.  Will give lower dose today  -Continue diltiazem        Hypothyroidism    -Continue synthroid          Uninterrupted Critical Care/Counseling Time (not including procedures): 60 minutes      ANA LILIA Dukes  Heart Transplant  [facility]

## 2018-09-11 NOTE — PROGRESS NOTES
Admit Note     Met with patient, pt brother and pt son to assess needs. Patient is a 63 y.o.  female, admitted GI bleed, pt received a blood transfusion. Pt has a history of pulmonary hypertension.      Patient admitted from the ED on 9/10/2018 .  At this time, patient presents as alert and oriented x 4, pleasant, calm and asking and answering questions appropriately.  At this time, patients caregivers are alert/oriented x4, pleasant and communicative..    Household/Family Systems     Patient resides with patient's brother Kenji Zaldivar and her 2 sons, at:     [address] [place] [address].      Cell [phone]  Brother Kenji [phone]  Ben, son, [phone]  Frederick, son, c: [phone]    Support system includes brother Kenji, and her sons, Ben and Frederick. Pt has other supportive extended family members as well.  Patient does not have dependents that are need of being cared for.     Patient cares for herself, with help from family as needed.   During admission, patient's caregiver plans to stay at home.  Confirmed patient and patients caregivers do have access to reliable transportation.    Cognitive Status/Learning     Patient reports reading ability as 12th grade and states patient does have difficulty with vision. Pt had surgery for cataracts and can see much better per report.    Needed: N/A.   Highest education level: Attended College/Technical School    Vocation/Disability     Working for Income: No  If no, reason not working: Patient Choice - Retired  Patient retired in 1999.    Adherence     Patient reports a high level of adherence to patients health care regimen.  Adherence counseling and education provided.  Patient's caregiver verbalizes understanding.    Substance Use    Patient reports the following substance usage.    Tobacco: none, patient denies any use.  Alcohol: none, patient denies any use.  Illicit Drugs/Non-prescribed Medications: none, patient denies any  use.  Patient states clear understanding of the potential impact of substance use.  Substance abstinence/cessation counseling, education and resources provided and reviewed.     Services Utilizing/ADLS    Infusion Service: Prior to admission, patient utilizing? no  Home Health: Prior to admission, patient utilizing? Has used [facility]  DME: Prior to admission, yes o2 concentrator, walker, cane, Cpap, scooter and shower chair  Pulmonary/Cardiac Rehab: Prior to admission, no  Dialysis:  Prior to admission, Yes, pt goes to the [facility] on // at 5:30am  Transplant Specialty Pharmacy:  Prior to admission, no.    Prior to admission, patient reports patient was independent with ADLS and was driving. Patient reports patient is not able to care for self at this time due to compromised medical condition (as documented in medical record) and physical weakness.  Patient indicates a willingness to care for self once medically cleared to do so.    Insurance/Medications    Insured by    Payor/Plan Subscr  Sex Relation Sub. Ins. ID Effective Group Num   1. MEDICARE - [place]* MIGUEL ANGEL AMADO* 1955 Female  [id number]                                    [address]   2. [place]UEL ANGEL AMADO* 1955 Female  [id number]* [id number]                                    [address] [address]       Primary Insurance (for UNOS reporting): Public Insurance - Medicare FFS (Fee For Service)  Secondary Insurance (for UNOS reporting): None in chart, but pt states she had Medicaid as well.    Patient reports patient is able to obtain and afford medications at this time and at time of discharge.    Living Will/Healthcare Power of     Patient states patient does not have a LW and/or HCPA.  provided education regarding LW and HCPA and the completion of forms.    Coping/Mental Health    Patient is coping adequately with the aid of  family members and mely. Patient denies mental health difficulties.     Discharge  Planning    At time of discharge, patient plans to return to patient's home under the care of herself.  Patients family will transport patient.  Per rounds today, expected discharge date has not been medically determined at this time. Patient verbalizes understanding and are involved in treatment planning and discharge process.    Additional Concerns     providing ongoing psychosocial support, education, resources and d/c planning as needed.  SW remains available. Patient denies additional needs and/or concerns at this time. Patient verbalizes understanding and agreement with information reviewed, social work availability, and how to access available resources as needed.

## 2018-09-11 NOTE — NURSING
"MD notified of pt symptomatic and having frequent ectopy. Informed him that pt has not taken her pulmonary htn meds since yesterday and has been having palpitations since. MD voiced to attempt to verify meds with pharmacy. pharmacy unable to verify pt daily meds (adempas 2 mg tid, and selexipag 1000 mcg bid.) Without the bottles. Pharmacy recommends pt to take her selexipag if MD orders.  Pt voiced "i needs my  meds tonight" pt has meds in her suitcase in a daily planner. Pt able to verify which pills were the above stated and times and dosage of each. Family not here at this time to give pt belongings to. Pt was worried about not getting her meds when she was notified pharmacy was unable to verify and insisted she take her medications. MD orders to give above meds due to the risk of not having the medication is higher than the risks of administering unverified home medication. Pt agrees.   "

## 2018-09-11 NOTE — ANESTHESIA POSTPROCEDURE EVALUATION
"Anesthesia Post Evaluation    Patient: Shivani Sheets    Procedure(s) Performed: Procedure(s) (LRB):  EGD (ESOPHAGOGASTRODUODENOSCOPY) (N/A)    Final Anesthesia Type: general  Patient location during evaluation: ICU  Patient participation: Yes- Able to Participate  Level of consciousness: awake and alert  Post-procedure vital signs: reviewed and stable  Pain management: adequate  Airway patency: patent  PONV status at discharge: No PONV  Anesthetic complications: no      Cardiovascular status: hemodynamically stable  Respiratory status: unassisted, spontaneous ventilation and room air  Hydration status: euvolemic  Follow-up not needed.        Visit Vitals  BP (!) 181/91 (BP Location: Left leg, Patient Position: Lying)   Pulse 83   Temp 36.9 °C (98.5 °F) (Axillary)   Resp (!) 26   Ht 5' 1" (1.549 m)   Wt 70.9 kg (156 lb 4.9 oz)   LMP  (LMP Unknown)   SpO2 97%   Breastfeeding? No   BMI 29.53 kg/m²       Pain/Suzie Score: Pain Assessment Performed: Yes (9/11/2018  1:00 PM)  Presence of Pain: denies (9/11/2018  1:00 PM)  Pain Rating Prior to Med Admin: 9 (9/11/2018  4:11 AM)  Pain Rating Post Med Admin: 0 (9/11/2018  5:11 AM)        "

## 2018-09-12 PROBLEM — K59.00 CONSTIPATION: Status: ACTIVE | Noted: 2018-09-12

## 2018-09-12 LAB
ANION GAP SERPL CALC-SCNC: 10 MMOL/L
BASOPHILS # BLD AUTO: 0.02 K/UL
BASOPHILS NFR BLD: 0.3 %
BLD PROD TYP BPU: NORMAL
BLOOD UNIT EXPIRATION DATE: NORMAL
BLOOD UNIT TYPE CODE: 6200
BLOOD UNIT TYPE: NORMAL
BUN SERPL-MCNC: 44 MG/DL
CALCIUM SERPL-MCNC: 7.8 MG/DL
CHLORIDE SERPL-SCNC: 104 MMOL/L
CO2 SERPL-SCNC: 19 MMOL/L
CODING SYSTEM: NORMAL
CREAT SERPL-MCNC: 8.2 MG/DL
DIFFERENTIAL METHOD: ABNORMAL
DISPENSE STATUS: NORMAL
EOSINOPHIL # BLD AUTO: 0.3 K/UL
EOSINOPHIL NFR BLD: 3.5 %
ERYTHROCYTE [DISTWIDTH] IN BLOOD BY AUTOMATED COUNT: 18.3 %
EST. GFR  (AFRICAN AMERICAN): 5.4 ML/MIN/1.73 M^2
EST. GFR  (NON AFRICAN AMERICAN): 4.7 ML/MIN/1.73 M^2
GLUCOSE SERPL-MCNC: 102 MG/DL
HCT VFR BLD AUTO: 22.7 %
HGB BLD-MCNC: 6.9 G/DL
IMM GRANULOCYTES # BLD AUTO: 0.07 K/UL
IMM GRANULOCYTES NFR BLD AUTO: 0.9 %
INR PPP: 2.2
LYMPHOCYTES # BLD AUTO: 0.8 K/UL
LYMPHOCYTES NFR BLD: 9.9 %
MAGNESIUM SERPL-MCNC: 1.9 MG/DL
MCH RBC QN AUTO: 27.3 PG
MCHC RBC AUTO-ENTMCNC: 30.4 G/DL
MCV RBC AUTO: 90 FL
MONOCYTES # BLD AUTO: 1.1 K/UL
MONOCYTES NFR BLD: 13.9 %
NEUTROPHILS # BLD AUTO: 5.7 K/UL
NEUTROPHILS NFR BLD: 71.5 %
NRBC BLD-RTO: 0 /100 WBC
PHOSPHATE SERPL-MCNC: 4.8 MG/DL
PLATELET # BLD AUTO: 200 K/UL
PMV BLD AUTO: 11 FL
POTASSIUM SERPL-SCNC: 4.5 MMOL/L
PROTHROMBIN TIME: 21.5 SEC
RBC # BLD AUTO: 2.53 M/UL
SODIUM SERPL-SCNC: 133 MMOL/L
TRANS ERYTHROCYTES VOL PATIENT: NORMAL ML
WBC # BLD AUTO: 7.98 K/UL

## 2018-09-12 PROCEDURE — 86902 BLOOD TYPE ANTIGEN DONOR EA: CPT

## 2018-09-12 PROCEDURE — 27201040 HC RC 50 FILTER

## 2018-09-12 PROCEDURE — 80100014 HC HEMODIALYSIS 1:1

## 2018-09-12 PROCEDURE — P9021 RED BLOOD CELLS UNIT: HCPCS

## 2018-09-12 PROCEDURE — 25000003 PHARM REV CODE 250: Performed by: INTERNAL MEDICINE

## 2018-09-12 PROCEDURE — 94761 N-INVAS EAR/PLS OXIMETRY MLT: CPT

## 2018-09-12 PROCEDURE — 83735 ASSAY OF MAGNESIUM: CPT

## 2018-09-12 PROCEDURE — 25000003 PHARM REV CODE 250: Performed by: STUDENT IN AN ORGANIZED HEALTH CARE EDUCATION/TRAINING PROGRAM

## 2018-09-12 PROCEDURE — 63600175 PHARM REV CODE 636 W HCPCS: Performed by: STUDENT IN AN ORGANIZED HEALTH CARE EDUCATION/TRAINING PROGRAM

## 2018-09-12 PROCEDURE — 36430 TRANSFUSION BLD/BLD COMPNT: CPT

## 2018-09-12 PROCEDURE — A4216 STERILE WATER/SALINE, 10 ML: HCPCS | Performed by: STUDENT IN AN ORGANIZED HEALTH CARE EDUCATION/TRAINING PROGRAM

## 2018-09-12 PROCEDURE — 80048 BASIC METABOLIC PNL TOTAL CA: CPT

## 2018-09-12 PROCEDURE — 85025 COMPLETE CBC W/AUTO DIFF WBC: CPT

## 2018-09-12 PROCEDURE — 25000003 PHARM REV CODE 250: Performed by: PHYSICIAN ASSISTANT

## 2018-09-12 PROCEDURE — 90935 HEMODIALYSIS ONE EVALUATION: CPT | Mod: ,,, | Performed by: INTERNAL MEDICINE

## 2018-09-12 PROCEDURE — 84100 ASSAY OF PHOSPHORUS: CPT

## 2018-09-12 PROCEDURE — C9113 INJ PANTOPRAZOLE SODIUM, VIA: HCPCS | Performed by: STUDENT IN AN ORGANIZED HEALTH CARE EDUCATION/TRAINING PROGRAM

## 2018-09-12 PROCEDURE — 27000221 HC OXYGEN, UP TO 24 HOURS

## 2018-09-12 PROCEDURE — 85610 PROTHROMBIN TIME: CPT

## 2018-09-12 PROCEDURE — 99233 SBSQ HOSP IP/OBS HIGH 50: CPT | Mod: ,,, | Performed by: INTERNAL MEDICINE

## 2018-09-12 PROCEDURE — 20600001 HC STEP DOWN PRIVATE ROOM

## 2018-09-12 RX ORDER — SODIUM CHLORIDE 9 MG/ML
INJECTION, SOLUTION INTRAVENOUS ONCE
Status: COMPLETED | OUTPATIENT
Start: 2018-09-12 | End: 2018-09-12

## 2018-09-12 RX ORDER — SODIUM CHLORIDE 9 MG/ML
INJECTION, SOLUTION INTRAVENOUS
Status: DISCONTINUED | OUTPATIENT
Start: 2018-09-12 | End: 2018-09-14

## 2018-09-12 RX ORDER — HYDROCODONE BITARTRATE AND ACETAMINOPHEN 500; 5 MG/1; MG/1
TABLET ORAL
Status: DISCONTINUED | OUTPATIENT
Start: 2018-09-12 | End: 2018-09-14 | Stop reason: HOSPADM

## 2018-09-12 RX ORDER — BISACODYL 10 MG
10 SUPPOSITORY, RECTAL RECTAL ONCE
Status: COMPLETED | OUTPATIENT
Start: 2018-09-12 | End: 2018-09-12

## 2018-09-12 RX ADMIN — LEVOTHYROXINE SODIUM 100 MCG: 100 TABLET ORAL at 06:09

## 2018-09-12 RX ADMIN — SODIUM CHLORIDE: 0.9 INJECTION, SOLUTION INTRAVENOUS at 04:09

## 2018-09-12 RX ADMIN — PANTOPRAZOLE SODIUM 40 MG: 40 INJECTION, POWDER, LYOPHILIZED, FOR SOLUTION INTRAVENOUS at 08:09

## 2018-09-12 RX ADMIN — WARFARIN SODIUM 5 MG: 5 TABLET ORAL at 04:09

## 2018-09-12 RX ADMIN — ATORVASTATIN CALCIUM 10 MG: 10 TABLET, FILM COATED ORAL at 08:09

## 2018-09-12 RX ADMIN — Medication 3 ML: at 02:09

## 2018-09-12 RX ADMIN — BISACODYL 10 MG: 10 SUPPOSITORY RECTAL at 08:09

## 2018-09-12 RX ADMIN — DILTIAZEM HYDROCHLORIDE 240 MG: 120 CAPSULE, COATED, EXTENDED RELEASE ORAL at 08:09

## 2018-09-12 RX ADMIN — RIOCIGUAT 2 MG: 2 TABLET, FILM COATED ORAL at 09:09

## 2018-09-12 RX ADMIN — RIOCIGUAT 2 MG: 2 TABLET, FILM COATED ORAL at 04:09

## 2018-09-12 RX ADMIN — Medication 3 ML: at 09:09

## 2018-09-12 NOTE — ASSESSMENT & PLAN NOTE
Etiology: ?HTN    Modality: iHD  Days: MWF (last full session this past Friday, did not go today 2ndary weakness)  Access: LUE fistula, g  [facility]  Tx Duration: ?3-4hrs  EDW: patient not sure  UF : ?2-3L  Residual fxn: none  Transplant list: no    Admitted with GIB, no source identified at this time, negative EGD, hemodynamically stable    Plan/Recommendations:  1) regular HD at bedside today

## 2018-09-12 NOTE — PROGRESS NOTES
Notified HTS that pt's hbg 6.9 and hct 22.7. No new orders at this time. Will continue to monitor.

## 2018-09-12 NOTE — PROGRESS NOTES
[facility]  Nephrology  Progress Note    Patient Name: Shivani Sheets  MRN: [id number]  Admission Date: 9/10/2018  Hospital Length of Stay: 2 days  Attending Provider: Gera Jordan Jr.,*   Primary Care Physician: Eula Weiss MD  Principal Problem:<principal problem not specified>    Subjective:     HPI: 64 yo AAF h/o ESRD (MWF), did not dialyze today due to feeling week, was noted with Hgb of 3.8, also has experienced black tarry stools over the past few days. Nephrology consulted to help with dialysis needs while in the hospital.    Interval History: s/p EGD and no source of bleed identified    Review of patient's allergies indicates:   Allergen Reactions    Penicillins Swelling    Iodine      Other reaction(s): Hives    Sulfamethoxazole-trimethoprim      Other reaction(s): Swelling  Other reaction(s): Hives     Current Facility-Administered Medications   Medication Frequency    0.9%  NaCl infusion (for blood administration) Q24H PRN    0.9%  NaCl infusion Once    0.9%  NaCl infusion PRN    0.9%  NaCl infusion Once    atorvastatin tablet 10 mg Daily    diltiaZEM 24 hr capsule 240 mg Daily    HYDROcodone-acetaminophen 7.5-325 mg per tablet 1 tablet Q6H PRN    influenza (FLUZONE HIGH-DOSE) vaccine 0.5 mL vaccine x 1 dose    levothyroxine tablet 100 mcg Before breakfast    ondansetron disintegrating tablet 4 mg Q6H PRN    pantoprazole injection 40 mg BID    riociguat (ADEMPAS) tablet 2 mg TID    selexipag Tab 1,000 mcg BID    sodium chloride 0.9% flush 3 mL Q8H    warfarin (COUMADIN) tablet 5 mg Daily       Objective:     Vital Signs (Most Recent):  Temp: 98.5 °F (36.9 °C) (09/12/18 1100)  Pulse: 80 (09/12/18 1300)  Resp: 18 (09/12/18 1300)  BP: (!) 156/73 (09/12/18 1300)  SpO2: 96 % (09/12/18 1300)  O2 Device (Oxygen Therapy): nasal cannula (09/12/18 1300) Vital Signs (24h Range):  Temp:  [98.4 °F (36.9 °C)-99.2 °F (37.3 °C)] 98.5 °F (36.9 °C)  Pulse:  [73-92]  80  Resp:  [12-35] 18  SpO2:  [93 %-100 %] 96 %  BP: (136-172)/(67-94) 156/73     Weight: 74.8 kg (164 lb 14.5 oz) (09/12/18 0300)  Body mass index is 31.16 kg/m².  Body surface area is 1.79 meters squared.    I/O last 3 completed shifts:  In: 4171 [P.O.:750; I.V.:321; Blood:2100; Other:1000]  Out: 2500 [Other:2500]    Physical Exam   HENT:   Head: Atraumatic.   Neck: No JVD present.   Cardiovascular: Normal rate and regular rhythm.   Pulmonary/Chest: Effort normal.   Abdominal: Soft. She exhibits no distension.   Musculoskeletal: She exhibits no edema or tenderness.   Skin: Skin is warm.           Assessment/Plan:     ESRD from [facility]N started RRT 1999    Etiology: ?HTN    Modality: iHD  Days: MWF (last full session this past Friday, did not go today 2ndary weakness)  Access: LUE fistula, g  [facility]  Tx Duration: ?3-4hrs  EDW: patient not sure  UF : ?2-3L  Residual fxn: none  Transplant list: no    Admitted with GIB, no source identified at this time, negative EGD, hemodynamically stable    Plan/Recommendations:  1) regular HD at bedside today                Thank you for your consult. I will follow-up with patient. Please contact us if you have any additional questions.    Jose Levy MD  Nephrology  [facility]

## 2018-09-12 NOTE — SUBJECTIVE & OBJECTIVE
Interval History: s/p EGD and no source of bleed identified    Review of patient's allergies indicates:   Allergen Reactions    Penicillins Swelling    Iodine      Other reaction(s): Hives    Sulfamethoxazole-trimethoprim      Other reaction(s): Swelling  Other reaction(s): Hives     Current Facility-Administered Medications   Medication Frequency    0.9%  NaCl infusion (for blood administration) Q24H PRN    0.9%  NaCl infusion Once    0.9%  NaCl infusion PRN    0.9%  NaCl infusion Once    atorvastatin tablet 10 mg Daily    diltiaZEM 24 hr capsule 240 mg Daily    HYDROcodone-acetaminophen 7.5-325 mg per tablet 1 tablet Q6H PRN    influenza (FLUZONE HIGH-DOSE) vaccine 0.5 mL vaccine x 1 dose    levothyroxine tablet 100 mcg Before breakfast    ondansetron disintegrating tablet 4 mg Q6H PRN    pantoprazole injection 40 mg BID    riociguat (ADEMPAS) tablet 2 mg TID    selexipag Tab 1,000 mcg BID    sodium chloride 0.9% flush 3 mL Q8H    warfarin (COUMADIN) tablet 5 mg Daily       Objective:     Vital Signs (Most Recent):  Temp: 98.5 °F (36.9 °C) (09/12/18 1100)  Pulse: 80 (09/12/18 1300)  Resp: 18 (09/12/18 1300)  BP: (!) 156/73 (09/12/18 1300)  SpO2: 96 % (09/12/18 1300)  O2 Device (Oxygen Therapy): nasal cannula (09/12/18 1300) Vital Signs (24h Range):  Temp:  [98.4 °F (36.9 °C)-99.2 °F (37.3 °C)] 98.5 °F (36.9 °C)  Pulse:  [73-92] 80  Resp:  [12-35] 18  SpO2:  [93 %-100 %] 96 %  BP: (136-172)/(67-94) 156/73     Weight: 74.8 kg (164 lb 14.5 oz) (09/12/18 0300)  Body mass index is 31.16 kg/m².  Body surface area is 1.79 meters squared.    I/O last 3 completed shifts:  In: 4171 [P.O.:750; I.V.:321; Blood:2100; Other:1000]  Out: 2500 [Other:2500]    Physical Exam   HENT:   Head: Atraumatic.   Neck: No JVD present.   Cardiovascular: Normal rate and regular rhythm.   Pulmonary/Chest: Effort normal.   Abdominal: Soft. She exhibits no distension.   Musculoskeletal: She exhibits no edema or tenderness.    Skin: Skin is warm.

## 2018-09-12 NOTE — ASSESSMENT & PLAN NOTE
-Held home coumadin as INR supra therapeutic.  Restarted at lower dose yesterday  -Not bridging with heparin given recent GI bleed  -Continue diltiazem

## 2018-09-12 NOTE — PLAN OF CARE
Problem: Patient Care Overview  Goal: Plan of Care Review  Outcome: Ongoing (interventions implemented as appropriate)  POC reviewed with pt at 0500. Pt verbalized understanding. Questions and concerns addressed. No acute events overnight. Pt progressing toward goals. Will continue to monitor. See flowsheets for full assessment and VS info POC reviewed with pt at 0500. Pt verbalized understanding. Questions and concerns addressed. No acute events overnight. Pt progressing toward goals. Will continue to monitor. See flowsheets for full assessment and VS info

## 2018-09-12 NOTE — ASSESSMENT & PLAN NOTE
-With reported history of melena. Acute GIB, likely upper, Hgb 3.8 from last 10.7 6/2018 (most values 9-10)  -Hx of GIB, also recent NSAID use; EGD 2/2018 gastric polyps. C-scope 6/2018 with nonbleeding sigmoid polyps, internal hemorrhoids  -GI consulted, EGD done and few gastic polyps; no signs of bleed.  Will monitor patient today.  Will discuss video capsule if she develops melena or H/H drops.  Seems to have stabilized with therapeutic INR  -S/p 80 IV PPI in ED, continue 40 IV BID.  Will transition to po tomorrow if no signs of bleeding  -Transfused total of 4 units, with appropriate rise.  Will transfuse another 2 units with HD today  -Held coumadin (INR 2.6), ASA on admit  -restarted Coumadin yesterday.  Will resume ASA if H/H stable tomorrow.  However, could consider stopping it in the future if she has another bleed.   -Will need iron studies after discharge (would not do now secondary to recent transfusions)

## 2018-09-12 NOTE — SUBJECTIVE & OBJECTIVE
Interval History: EGD done yesterday and no overt signs of cause for bleed (few gastic polyps).  Restarted low dose Coumadin yesterday.  She has not had BM since admission.  Otherwise she feels good with no new complaints.     Continuous Infusions:  Scheduled Meds:   sodium chloride 0.9%   Intravenous Once    atorvastatin  10 mg Oral Daily    diltiaZEM  240 mg Oral Daily    levothyroxine  100 mcg Oral Before breakfast    pantoprazole  40 mg Intravenous BID    riociguat  2 mg Oral TID    selexipag  1,000 mcg Oral BID    sodium chloride 0.9%  3 mL Intravenous Q8H    warfarin  5 mg Oral Daily     PRN Meds:sodium chloride, HYDROcodone-acetaminophen, influenza, ondansetron    Review of patient's allergies indicates:   Allergen Reactions    Penicillins Swelling    Iodine      Other reaction(s): Hives    Sulfamethoxazole-trimethoprim      Other reaction(s): Swelling  Other reaction(s): Hives     Objective:     Vital Signs (Most Recent):  Temp: 98.5 °F (36.9 °C) (09/12/18 1100)  Pulse: 79 (09/12/18 1126)  Resp: (!) 33 (09/12/18 1126)  BP: (!) 161/93 (09/12/18 1100)  SpO2: 96 % (09/12/18 1126) Vital Signs (24h Range):  Temp:  [98.4 °F (36.9 °C)-99.2 °F (37.3 °C)] 98.5 °F (36.9 °C)  Pulse:  [73-92] 79  Resp:  [12-35] 33  SpO2:  [93 %-100 %] 96 %  BP: (136-181)/(67-94) 161/93     Patient Vitals for the past 72 hrs (Last 3 readings):   Weight   09/12/18 0300 74.8 kg (164 lb 14.5 oz)   09/11/18 0400 70.9 kg (156 lb 4.9 oz)   09/10/18 1947 73.7 kg (162 lb 7.7 oz)     Body mass index is 31.16 kg/m².      Intake/Output Summary (Last 24 hours) at 9/12/2018 1225  Last data filed at 9/12/2018 1000  Gross per 24 hour   Intake 990 ml   Output --   Net 990 ml     Physical Exam  Constitutional: laying in bed NAD; family at bedside  Neck: Neck supple. No JVD present.   Cardiovascular: Normal rate, regular rhythm and normal heart sounds.  III/VI holosystolic murmer LUSB.  LUE AVF   Abdominal: Soft. Bowel sounds are normal. She  exhibits no distension. There is no tenderness.   Neurological: She is alert and oriented to person, place, and time. No cranial nerve deficit.   Skin: Skin is warm and dry. No erythema.   Psychiatric: She has a normal mood and affect.     Significant Labs:  CBC:  Recent Labs   Lab  09/11/18   0620 09/11/18   1440  09/12/18 0317   WBC  12.62  8.86  7.98   RBC  2.99*  2.63*  2.53*   HGB  8.4*  7.2*  6.9*   HCT  26.0*  23.7*  22.7*   PLT  197  218  200   MCV  87  90  90   MCH  28.1  27.4  27.3   MCHC  32.3  30.4*  30.4*     BNP:  No results for input(s): BNP in the last 168 hours.    Invalid input(s): BNPTRIAGELBLO  CMP:  Recent Labs   Lab  09/10/18   1242  09/11/18   0620  09/12/18 0317   GLU  123*  107  102   CALCIUM  9.2  8.6*  7.8*   ALBUMIN  3.3*   --    --    PROT  6.7   --    --    NA  139  136  133*   K  5.0  4.2  4.5   CO2  27  19*  19*   CL  100  105  104   BUN  95*  31*  44*   CREATININE  12.0*  5.8*  8.2*   ALKPHOS  43*   --    --    ALT  12   --    --    AST  10   --    --    BILITOT  0.8   --    --       Coagulation:   Recent Labs   Lab  09/10/18   1242  09/11/18   0956  09/12/18 0317   INR  3.1*  2.6*  2.2*   APTT  38.7*   --    --      LDH:  No results for input(s): LDH in the last 72 hours.  Microbiology:  Microbiology Results (last 7 days)     ** No results found for the last 168 hours. **          I have reviewed all pertinent labs within the past 24 hours.    Estimated Creatinine Clearance: 6.3 mL/min (A) (based on SCr of 8.2 mg/dL (H)).    Diagnostic Results:  I have reviewed all pertinent imaging results/findings within the past 24 hours.

## 2018-09-12 NOTE — PLAN OF CARE
Problem: Patient Care Overview  Goal: Plan of Care Review  Outcome: Ongoing (interventions implemented as appropriate)  No acute events throughout day, VS and assessment per flow sheet. Patient is currently getting HD and 2 units of PRBC. She has orders to step down to a regular hospital bed but has not been assigned a room yet. She had one BM today that was brown after suppository was given. Plan of care reviewed with Shivani Sheets and family, all concerns addressed, will continue to monitor.

## 2018-09-12 NOTE — PROGRESS NOTES
[facility]  Heart Transplant  Progress Note    Patient Name: Shivani Sheets  MRN: [id number]  Admission Date: 9/10/2018  Hospital Length of Stay: 2 days  Attending Physician: Gera Jordan Jr.,*  Primary Care Provider: Eula Weiss MD  Principal Problem:<principal problem not specified>    Subjective:     Interval History: EGD done yesterday and no overt signs of cause for bleed (few gastic polyps).  Restarted low dose Coumadin yesterday.  She has not had BM since admission.  Otherwise she feels good with no new complaints.     Continuous Infusions:  Scheduled Meds:   sodium chloride 0.9%   Intravenous Once    atorvastatin  10 mg Oral Daily    diltiaZEM  240 mg Oral Daily    levothyroxine  100 mcg Oral Before breakfast    pantoprazole  40 mg Intravenous BID    riociguat  2 mg Oral TID    selexipag  1,000 mcg Oral BID    sodium chloride 0.9%  3 mL Intravenous Q8H    warfarin  5 mg Oral Daily     PRN Meds:sodium chloride, HYDROcodone-acetaminophen, influenza, ondansetron    Review of patient's allergies indicates:   Allergen Reactions    Penicillins Swelling    Iodine      Other reaction(s): Hives    Sulfamethoxazole-trimethoprim      Other reaction(s): Swelling  Other reaction(s): Hives     Objective:     Vital Signs (Most Recent):  Temp: 98.5 °F (36.9 °C) (09/12/18 1100)  Pulse: 79 (09/12/18 1126)  Resp: (!) 33 (09/12/18 1126)  BP: (!) 161/93 (09/12/18 1100)  SpO2: 96 % (09/12/18 1126) Vital Signs (24h Range):  Temp:  [98.4 °F (36.9 °C)-99.2 °F (37.3 °C)] 98.5 °F (36.9 °C)  Pulse:  [73-92] 79  Resp:  [12-35] 33  SpO2:  [93 %-100 %] 96 %  BP: (136-181)/(67-94) 161/93     Patient Vitals for the past 72 hrs (Last 3 readings):   Weight   09/12/18 0300 74.8 kg (164 lb 14.5 oz)   09/11/18 0400 70.9 kg (156 lb 4.9 oz)   09/10/18 1947 73.7 kg (162 lb 7.7 oz)     Body mass index is 31.16 kg/m².      Intake/Output Summary (Last 24 hours) at 9/12/2018 1225  Last data filed at  9/12/2018 1000  Gross per 24 hour   Intake 990 ml   Output --   Net 990 ml     Physical Exam  Constitutional: laying in bed NAD; family at bedside  Neck: Neck supple. No JVD present.   Cardiovascular: Normal rate, regular rhythm and normal heart sounds.  III/VI holosystolic murmer LUSB.  LUE AVF   Abdominal: Soft. Bowel sounds are normal. She exhibits no distension. There is no tenderness.   Neurological: She is alert and oriented to person, place, and time. No cranial nerve deficit.   Skin: Skin is warm and dry. No erythema.   Psychiatric: She has a normal mood and affect.     Significant Labs:  CBC:  Recent Labs   Lab  09/11/18   0620  09/11/18   1440  09/12/18 0317   WBC  12.62  8.86  7.98   RBC  2.99*  2.63*  2.53*   HGB  8.4*  7.2*  6.9*   HCT  26.0*  23.7*  22.7*   PLT  197  218  200   MCV  87  90  90   MCH  28.1  27.4  27.3   MCHC  32.3  30.4*  30.4*     BNP:  No results for input(s): BNP in the last 168 hours.    Invalid input(s): BNPTRIAGELBLO  CMP:  Recent Labs   Lab  09/10/18   1242  09/11/18   0620  09/12/18 0317   GLU  123*  107  102   CALCIUM  9.2  8.6*  7.8*   ALBUMIN  3.3*   --    --    PROT  6.7   --    --    NA  139  136  133*   K  5.0  4.2  4.5   CO2  27  19*  19*   CL  100  105  104   BUN  95*  31*  44*   CREATININE  12.0*  5.8*  8.2*   ALKPHOS  43*   --    --    ALT  12   --    --    AST  10   --    --    BILITOT  0.8   --    --       Coagulation:   Recent Labs   Lab  09/10/18   1242  09/11/18   0956  09/12/18 0317   INR  3.1*  2.6*  2.2*   APTT  38.7*   --    --      LDH:  No results for input(s): LDH in the last 72 hours.  Microbiology:  Microbiology Results (last 7 days)     ** No results found for the last 168 hours. **          I have reviewed all pertinent labs within the past 24 hours.    Estimated Creatinine Clearance: 6.3 mL/min (A) (based on SCr of 8.2 mg/dL (H)).    Diagnostic Results:  I have reviewed all pertinent imaging results/findings within the past 24  hours.    Assessment and Plan:     63 year old female with a history of pulmonary HTN(on adempas/uptravi), diastolic dysfunction, pAF(on coumadin), central retinal artery occlusion without significant carotid artery stenosis, CAD with remote PCI, ESRD(LUE AVF) secondary to HTN, s/p failed kidney transplant, PUD, STEFFANY on CPAP, hypothyroidism, RA, DLP and obesity who presents to the ED for 4 days of melena. Per patient symptoms started last Thursday and she states she has had only on BM a day with very dark stools. She had a similar presentation in 2/2018 at which time EGD was unremarkable. Colonoscopy done 6/12/18 showed non-bleeding hemorrhoids and some polyps in sigmoid as well as ascending colon.     She is on coumadin for her history of afib and INR on 9/6/18 was 5.6, it is 3.1 today (9/10). She was seen in the ED in 8/2018 for hip pain and given some ibuprofen, the last time she used it was approximately 3-4 days ago. She is also taking ASA 81 mg daily for her CAD history. The last time she ate any food was yesterday morning. Denies abdominal pain, vomiting, hematemesis, BRBPR.     Her Hgb in the ED was 3.8. 2 units of pRBC are pending. No further active bleeding. GI was consulted and she is planned for EGD tomorrow. Missed HD today, nephrology consulted, plan for HD tonight after receiving pRBC.    Anemia    -With reported history of melena. Acute GIB, likely upper, Hgb 3.8 from last 10.7 6/2018 (most values 9-10)  -Hx of GIB, also recent NSAID use; EGD 2/2018 gastric polyps. C-scope 6/2018 with nonbleeding sigmoid polyps, internal hemorrhoids  -GI consulted, EGD done and few gastic polyps; no signs of bleed.  Will monitor patient today.  Will discuss video capsule if she develops melena or H/H drops.  Seems to have stabilized with therapeutic INR  -S/p 80 IV PPI in ED, continue 40 IV BID.  Will transition to po tomorrow if no signs of bleeding  -Transfused total of 4 units, with appropriate rise.  Will  transfuse another 2 units with HD today  -Held coumadin (INR 2.6), ASA on admit  -restarted Coumadin yesterday.  Will resume ASA if H/H stable tomorrow.  However, could consider stopping it in the future if she has another bleed.   -Will need iron studies after discharge (would not do now secondary to recent transfusions)        Pulmonary hypertension    -Stable  -Continue adempas, selexipag  -Supplemental oxygen        Constipation    -Ordered a suppository for today  -Will monitor for signs of melena         ESRD from HTN started RRT 1999    -Nephrology consulted  -Patient gets HD MWF         Atrial fibrillation    -Held home coumadin as INR supra therapeutic.  Restarted at lower dose yesterday  -Not bridging with heparin given recent GI bleed  -Continue diltiazem        Hypothyroidism    -Continue synthroid          Will transfer to the floor today    ANA LILIA Dukes  Heart Transplant  [facility]

## 2018-09-13 LAB
ANION GAP SERPL CALC-SCNC: 8 MMOL/L
BASOPHILS # BLD AUTO: 0.03 K/UL
BASOPHILS NFR BLD: 0.4 %
BUN SERPL-MCNC: 32 MG/DL
CALCIUM SERPL-MCNC: 7.9 MG/DL
CHLORIDE SERPL-SCNC: 104 MMOL/L
CO2 SERPL-SCNC: 23 MMOL/L
CREAT SERPL-MCNC: 6.6 MG/DL
DIFFERENTIAL METHOD: ABNORMAL
EOSINOPHIL # BLD AUTO: 0.4 K/UL
EOSINOPHIL NFR BLD: 4.2 %
ERYTHROCYTE [DISTWIDTH] IN BLOOD BY AUTOMATED COUNT: 17.3 %
EST. GFR  (AFRICAN AMERICAN): 7 ML/MIN/1.73 M^2
EST. GFR  (NON AFRICAN AMERICAN): 6.1 ML/MIN/1.73 M^2
GLUCOSE SERPL-MCNC: 123 MG/DL
HCT VFR BLD AUTO: 28.9 %
HGB BLD-MCNC: 9.2 G/DL
IMM GRANULOCYTES # BLD AUTO: 0.08 K/UL
IMM GRANULOCYTES NFR BLD AUTO: 1 %
INR PPP: 1.8
LYMPHOCYTES # BLD AUTO: 0.9 K/UL
LYMPHOCYTES NFR BLD: 10.5 %
MAGNESIUM SERPL-MCNC: 2 MG/DL
MCH RBC QN AUTO: 28 PG
MCHC RBC AUTO-ENTMCNC: 31.8 G/DL
MCV RBC AUTO: 88 FL
MONOCYTES # BLD AUTO: 1 K/UL
MONOCYTES NFR BLD: 12.3 %
NEUTROPHILS # BLD AUTO: 6 K/UL
NEUTROPHILS NFR BLD: 71.6 %
NRBC BLD-RTO: 0 /100 WBC
PHOSPHATE SERPL-MCNC: 3.2 MG/DL
PLATELET # BLD AUTO: 195 K/UL
PMV BLD AUTO: 11.2 FL
POTASSIUM SERPL-SCNC: 4.1 MMOL/L
PROTHROMBIN TIME: 17.4 SEC
RBC # BLD AUTO: 3.28 M/UL
SODIUM SERPL-SCNC: 135 MMOL/L
WBC # BLD AUTO: 8.4 K/UL

## 2018-09-13 PROCEDURE — 93010 ELECTROCARDIOGRAM REPORT: CPT | Mod: ,,, | Performed by: INTERNAL MEDICINE

## 2018-09-13 PROCEDURE — 25000003 PHARM REV CODE 250: Performed by: INTERNAL MEDICINE

## 2018-09-13 PROCEDURE — 84100 ASSAY OF PHOSPHORUS: CPT

## 2018-09-13 PROCEDURE — C9113 INJ PANTOPRAZOLE SODIUM, VIA: HCPCS | Performed by: STUDENT IN AN ORGANIZED HEALTH CARE EDUCATION/TRAINING PROGRAM

## 2018-09-13 PROCEDURE — 63600175 PHARM REV CODE 636 W HCPCS: Performed by: STUDENT IN AN ORGANIZED HEALTH CARE EDUCATION/TRAINING PROGRAM

## 2018-09-13 PROCEDURE — 80048 BASIC METABOLIC PNL TOTAL CA: CPT

## 2018-09-13 PROCEDURE — 99232 SBSQ HOSP IP/OBS MODERATE 35: CPT | Mod: ,,, | Performed by: INTERNAL MEDICINE

## 2018-09-13 PROCEDURE — 25000003 PHARM REV CODE 250: Performed by: STUDENT IN AN ORGANIZED HEALTH CARE EDUCATION/TRAINING PROGRAM

## 2018-09-13 PROCEDURE — 83735 ASSAY OF MAGNESIUM: CPT

## 2018-09-13 PROCEDURE — 25000003 PHARM REV CODE 250: Performed by: PHYSICIAN ASSISTANT

## 2018-09-13 PROCEDURE — 85610 PROTHROMBIN TIME: CPT

## 2018-09-13 PROCEDURE — A4216 STERILE WATER/SALINE, 10 ML: HCPCS | Performed by: STUDENT IN AN ORGANIZED HEALTH CARE EDUCATION/TRAINING PROGRAM

## 2018-09-13 PROCEDURE — 20600001 HC STEP DOWN PRIVATE ROOM

## 2018-09-13 PROCEDURE — 85025 COMPLETE CBC W/AUTO DIFF WBC: CPT

## 2018-09-13 PROCEDURE — 93005 ELECTROCARDIOGRAM TRACING: CPT

## 2018-09-13 RX ORDER — PANTOPRAZOLE SODIUM 40 MG/1
40 TABLET, DELAYED RELEASE ORAL
Status: DISCONTINUED | OUTPATIENT
Start: 2018-09-13 | End: 2018-09-14 | Stop reason: HOSPADM

## 2018-09-13 RX ORDER — ASPIRIN 81 MG/1
81 TABLET ORAL DAILY
Status: DISCONTINUED | OUTPATIENT
Start: 2018-09-13 | End: 2018-09-14 | Stop reason: HOSPADM

## 2018-09-13 RX ORDER — CYCLOBENZAPRINE HCL 5 MG
5 TABLET ORAL ONCE
Status: COMPLETED | OUTPATIENT
Start: 2018-09-13 | End: 2018-09-13

## 2018-09-13 RX ORDER — WARFARIN SODIUM 5 MG/1
5 TABLET ORAL
Status: DISCONTINUED | OUTPATIENT
Start: 2018-09-13 | End: 2018-09-14 | Stop reason: HOSPADM

## 2018-09-13 RX ORDER — WARFARIN 7.5 MG/1
7.5 TABLET ORAL
Status: DISCONTINUED | OUTPATIENT
Start: 2018-09-14 | End: 2018-09-14 | Stop reason: HOSPADM

## 2018-09-13 RX ADMIN — PANTOPRAZOLE SODIUM 40 MG: 40 TABLET, DELAYED RELEASE ORAL at 04:09

## 2018-09-13 RX ADMIN — DILTIAZEM HYDROCHLORIDE 240 MG: 120 CAPSULE, COATED, EXTENDED RELEASE ORAL at 07:09

## 2018-09-13 RX ADMIN — Medication 3 ML: at 06:09

## 2018-09-13 RX ADMIN — WARFARIN SODIUM 5 MG: 5 TABLET ORAL at 04:09

## 2018-09-13 RX ADMIN — RIOCIGUAT 2 MG: 2 TABLET, FILM COATED ORAL at 08:09

## 2018-09-13 RX ADMIN — CYCLOBENZAPRINE HYDROCHLORIDE 5 MG: 5 TABLET, FILM COATED ORAL at 04:09

## 2018-09-13 RX ADMIN — RIOCIGUAT 2 MG: 2 TABLET, FILM COATED ORAL at 03:09

## 2018-09-13 RX ADMIN — LEVOTHYROXINE SODIUM 100 MCG: 100 TABLET ORAL at 06:09

## 2018-09-13 RX ADMIN — RIOCIGUAT 2 MG: 2 TABLET, FILM COATED ORAL at 09:09

## 2018-09-13 RX ADMIN — ATORVASTATIN CALCIUM 10 MG: 10 TABLET, FILM COATED ORAL at 09:09

## 2018-09-13 RX ADMIN — PANTOPRAZOLE SODIUM 40 MG: 40 INJECTION, POWDER, LYOPHILIZED, FOR SOLUTION INTRAVENOUS at 09:09

## 2018-09-13 RX ADMIN — ASPIRIN 81 MG: 81 TABLET, COATED ORAL at 12:09

## 2018-09-13 NOTE — PROGRESS NOTES
[facility]  Heart Transplant  Progress Note    Patient Name: Shivani Sheets  MRN: [id number]  Admission Date: 9/10/2018  Hospital Length of Stay: 3 days  Attending Physician: Gera Jordan Jr.,*  Primary Care Provider: Eula Weiss MD  Principal Problem:Anemia    Subjective:     Interval History: No complaints this morning. Up in bed eating breakfast    Continuous Infusions:  Scheduled Meds:   sodium chloride 0.9%   Intravenous Once    aspirin  81 mg Oral Daily    atorvastatin  10 mg Oral Daily    diltiaZEM  240 mg Oral Daily    levothyroxine  100 mcg Oral Before breakfast    pantoprazole  40 mg Intravenous BID    riociguat  2 mg Oral TID    selexipag  1,000 mcg Oral BID    sodium chloride 0.9%  3 mL Intravenous Q8H    warfarin  5 mg Oral Every Tues, Thurs, Sat, Sun    [START ON 9/14/2018] warfarin  7.5 mg Oral Every Mon, Wed, Fri     PRN Meds:sodium chloride, sodium chloride 0.9%, HYDROcodone-acetaminophen, influenza, ondansetron    Review of patient's allergies indicates:   Allergen Reactions    Penicillins Swelling    Iodine      Other reaction(s): Hives    Sulfamethoxazole-trimethoprim      Other reaction(s): Swelling  Other reaction(s): Hives     Objective:     Vital Signs (Most Recent):  Temp: 98.8 °F (37.1 °C) (09/13/18 0718)  Pulse: 90 (09/13/18 0732)  Resp: 18 (09/13/18 0718)  BP: (!) 154/79 (09/13/18 0718)  SpO2: 95 % (09/13/18 0718) Vital Signs (24h Range):  Temp:  [98 °F (36.7 °C)-99.3 °F (37.4 °C)] 98.8 °F (37.1 °C)  Pulse:  [75-96] 90  Resp:  [17-51] 18  SpO2:  [91 %-100 %] 95 %  BP: (134-193)/() 154/79     Patient Vitals for the past 72 hrs (Last 3 readings):   Weight   09/13/18 0427 71.1 kg (156 lb 12 oz)   09/12/18 0300 74.8 kg (164 lb 14.5 oz)   09/11/18 0400 70.9 kg (156 lb 4.9 oz)     Body mass index is 29.62 kg/m².      Intake/Output Summary (Last 24 hours) at 9/13/2018 1048  Last data filed at 9/13/2018 0500  Gross per 24 hour   Intake 5289.73  ml   Output 3600 ml   Net 1689.73 ml     Physical Exam  Constitutional: laying in bed NAD  Neck: Neck supple. No JVD present.   Cardiovascular: Normal rate, regular rhythm with ectopy.  III/VI holosystolic murmur LUSB.  LUE AVF   Abdominal: Soft. Bowel sounds are normal. She exhibits no distension. There is no tenderness.   Neurological: She is alert and oriented to person, place, and time. No cranial nerve deficit.   Skin: Skin is warm and dry. No erythema.   Psychiatric: She has a normal mood and affect.     Significant Labs:  CBC:  Recent Labs   Lab  09/11/18   1440  09/12/18 0317 09/13/18 0427   WBC  8.86  7.98  8.40   RBC  2.63*  2.53*  3.28*   HGB  7.2*  6.9*  9.2*   HCT  23.7*  22.7*  28.9*   PLT  218  200  195   MCV  90  90  88   MCH  27.4  27.3  28.0   MCHC  30.4*  30.4*  31.8*     BNP:  No results for input(s): BNP in the last 168 hours.    Invalid input(s): BNPTRIAGELBLO  CMP:  Recent Labs   Lab  09/10/18   1242  09/11/18   0620  09/12/18 0317 09/13/18 0427   GLU  123*  107  102  123*   CALCIUM  9.2  8.6*  7.8*  7.9*   ALBUMIN  3.3*   --    --    --    PROT  6.7   --    --    --    NA  139  136  133*  135*   K  5.0  4.2  4.5  4.1   CO2  27  19*  19*  23   CL  100  105  104  104   BUN  95*  31*  44*  32*   CREATININE  12.0*  5.8*  8.2*  6.6*   ALKPHOS  43*   --    --    --    ALT  12   --    --    --    AST  10   --    --    --    BILITOT  0.8   --    --    --       Coagulation:   Recent Labs   Lab  09/10/18   1242  09/11/18   0956  09/12/18 0317 09/13/18 0427   INR  3.1*  2.6*  2.2*  1.8*   APTT  38.7*   --    --    --      LDH:  No results for input(s): LDH in the last 72 hours.  Microbiology:  Microbiology Results (last 7 days)     ** No results found for the last 168 hours. **          I have reviewed all pertinent labs within the past 24 hours.    Estimated Creatinine Clearance: 7.7 mL/min (A) (based on SCr of 6.6 mg/dL (H)).    Diagnostic Results:  I have reviewed all pertinent imaging  results/findings within the past 24 hours.    Assessment and Plan:     63 year old female with a history of pulmonary HTN(on adempas/uptravi), diastolic dysfunction, pAF(on coumadin), central retinal artery occlusion without significant carotid artery stenosis, CAD with remote PCI, ESRD(LUE AVF) secondary to HTN, s/p failed kidney transplant, PUD, STEFFANY on CPAP, hypothyroidism, RA, DLP and obesity who presents to the ED for 4 days of melena. Per patient symptoms started last Thursday and she states she has had only on BM a day with very dark stools. She had a similar presentation in 2/2018 at which time EGD was unremarkable. Colonoscopy done 6/12/18 showed non-bleeding hemorrhoids and some polyps in sigmoid as well as ascending colon.     She is on coumadin for her history of afib and INR on 9/6/18 was 5.6, it is 3.1 today (9/10). She was seen in the ED in 8/2018 for hip pain and given some ibuprofen, the last time she used it was approximately 3-4 days ago. She is also taking ASA 81 mg daily for her CAD history. The last time she ate any food was yesterday morning. Denies abdominal pain, vomiting, hematemesis, BRBPR.     Her Hgb in the ED was 3.8. 2 units of pRBC are pending. No further active bleeding. GI was consulted and she is planned for EGD tomorrow. Missed HD today, nephrology consulted, plan for HD tonight after receiving pRBC.    * Anemia    -With reported history of melena. Acute GIB, likely upper, Hgb 3.8 from last 10.7 6/2018 (most values 9-10)  -Hx of GIB, also recent NSAID use; EGD 2/2018 gastric polyps. C-scope 6/2018 with nonbleeding sigmoid polyps, internal hemorrhoids  -GI consulted, EGD done and few gastic polyps; no signs of bleed.  Will discuss video capsule if she develops melena or H/H drops.  Seems to have stabilized with therapeutic INR  -S/p 80 IV PPI in ED, continue 40 IV BID.  Transition to po today  -Transfused total of 4 units on admit, with appropriate rise.  Transfused another 2 units  yesterday  -Held coumadin, ASA on admit  -restarted Coumadin.  Will resume ASA today  -Will need iron studies after discharge (would not do now secondary to recent transfusions)        Pulmonary hypertension    -Stable  -Continue adempas, selexipag  -Supplemental oxygen        Atrial fibrillation    -Held home coumadin as INR supra therapeutic.  Now restarted. INR subtherapeutic today  -Not bridging with heparin given recent GI bleed  -Continue diltiazem        Constipation    -Ordered a suppository for today  -Will monitor for signs of melena         Hypothyroidism    -Continue synthroid        ESRD from HTN started RRT 1999    -Nephrology consulted  -Patient gets HD MWF             Quin Krishnamurthy, JOSE  Heart Transplant  [facility]

## 2018-09-13 NOTE — ASSESSMENT & PLAN NOTE
-With reported history of melena. Acute GIB, likely upper, Hgb 3.8 from last 10.7 6/2018 (most values 9-10)  -Hx of GIB, also recent NSAID use; EGD 2/2018 gastric polyps. C-scope 6/2018 with nonbleeding sigmoid polyps, internal hemorrhoids  -GI consulted, EGD done and few gastic polyps; no signs of bleed.  Will discuss video capsule if she develops melena or H/H drops.  Seems to have stabilized with therapeutic INR  -S/p 80 IV PPI in ED, continue 40 IV BID.  Transition to po today  -Transfused total of 4 units on admit, with appropriate rise.  Transfused another 2 units yesterday  -Held coumadin, ASA on admit  -restarted Coumadin.  Will resume ASA today  -Will need iron studies after discharge (would not do now secondary to recent transfusions)

## 2018-09-13 NOTE — PROGRESS NOTES
Patient had a 16 beat run of VTach, Vital signs obtained and all WNL.Ptient asymptomatic and asleep at the time of the event. Electrolytes are all WNL. Quin SUNG notified and ordered to give Diltiazem early. Will carry out order.

## 2018-09-13 NOTE — PLAN OF CARE
Problem: Patient Care Overview  Goal: Plan of Care Review  Outcome: Ongoing (interventions implemented as appropriate)  Plan of care discussed with patient. Patient is free of fall/trauma/injury. INR 1.8 this AM. 16 beat run of VT this AM, Diltiazem given early per MD. Aspirin started. Dialysis still MWF.  Denies CP, SOB, or pain/discomfort. All questions addressed. Will continue to monitor

## 2018-09-13 NOTE — NURSING
Pt transferred into room 310 from ICU via wheelchair. Oriented pt to room, call light and bed function. VSS and heart monitor on and functioning. Non-skid socks applied and side rails up x2. Will continue to monitor.

## 2018-09-13 NOTE — SUBJECTIVE & OBJECTIVE
Interval History: No complaints this morning. Up in bed eating breakfast    Continuous Infusions:  Scheduled Meds:   sodium chloride 0.9%   Intravenous Once    aspirin  81 mg Oral Daily    atorvastatin  10 mg Oral Daily    diltiaZEM  240 mg Oral Daily    levothyroxine  100 mcg Oral Before breakfast    pantoprazole  40 mg Intravenous BID    riociguat  2 mg Oral TID    selexipag  1,000 mcg Oral BID    sodium chloride 0.9%  3 mL Intravenous Q8H    warfarin  5 mg Oral Every Tues, Thurs, Sat, Sun    [START ON 9/14/2018] warfarin  7.5 mg Oral Every Mon, Wed, Fri     PRN Meds:sodium chloride, sodium chloride 0.9%, HYDROcodone-acetaminophen, influenza, ondansetron    Review of patient's allergies indicates:   Allergen Reactions    Penicillins Swelling    Iodine      Other reaction(s): Hives    Sulfamethoxazole-trimethoprim      Other reaction(s): Swelling  Other reaction(s): Hives     Objective:     Vital Signs (Most Recent):  Temp: 98.8 °F (37.1 °C) (09/13/18 0718)  Pulse: 90 (09/13/18 0732)  Resp: 18 (09/13/18 0718)  BP: (!) 154/79 (09/13/18 0718)  SpO2: 95 % (09/13/18 0718) Vital Signs (24h Range):  Temp:  [98 °F (36.7 °C)-99.3 °F (37.4 °C)] 98.8 °F (37.1 °C)  Pulse:  [75-96] 90  Resp:  [17-51] 18  SpO2:  [91 %-100 %] 95 %  BP: (134-193)/() 154/79     Patient Vitals for the past 72 hrs (Last 3 readings):   Weight   09/13/18 0427 71.1 kg (156 lb 12 oz)   09/12/18 0300 74.8 kg (164 lb 14.5 oz)   09/11/18 0400 70.9 kg (156 lb 4.9 oz)     Body mass index is 29.62 kg/m².      Intake/Output Summary (Last 24 hours) at 9/13/2018 1048  Last data filed at 9/13/2018 0500  Gross per 24 hour   Intake 5289.73 ml   Output 3600 ml   Net 1689.73 ml     Physical Exam  Constitutional: laying in bed NAD  Neck: Neck supple. No JVD present.   Cardiovascular: Normal rate, regular rhythm with ectopy.  III/VI holosystolic murmur LUSB.  LUE AVF   Abdominal: Soft. Bowel sounds are normal. She exhibits no distension. There is no  tenderness.   Neurological: She is alert and oriented to person, place, and time. No cranial nerve deficit.   Skin: Skin is warm and dry. No erythema.   Psychiatric: She has a normal mood and affect.     Significant Labs:  CBC:  Recent Labs   Lab  09/11/18   1440  09/12/18 0317 09/13/18 0427   WBC  8.86  7.98  8.40   RBC  2.63*  2.53*  3.28*   HGB  7.2*  6.9*  9.2*   HCT  23.7*  22.7*  28.9*   PLT  218  200  195   MCV  90  90  88   MCH  27.4  27.3  28.0   MCHC  30.4*  30.4*  31.8*     BNP:  No results for input(s): BNP in the last 168 hours.    Invalid input(s): BNPTRIAGELBLO  CMP:  Recent Labs   Lab  09/10/18   1242  09/11/18   0620  09/12/18 0317 09/13/18 0427   GLU  123*  107  102  123*   CALCIUM  9.2  8.6*  7.8*  7.9*   ALBUMIN  3.3*   --    --    --    PROT  6.7   --    --    --    NA  139  136  133*  135*   K  5.0  4.2  4.5  4.1   CO2  27  19*  19*  23   CL  100  105  104  104   BUN  95*  31*  44*  32*   CREATININE  12.0*  5.8*  8.2*  6.6*   ALKPHOS  43*   --    --    --    ALT  12   --    --    --    AST  10   --    --    --    BILITOT  0.8   --    --    --       Coagulation:   Recent Labs   Lab  09/10/18   1242  09/11/18   0956  09/12/18 0317 09/13/18 0427   INR  3.1*  2.6*  2.2*  1.8*   APTT  38.7*   --    --    --      LDH:  No results for input(s): LDH in the last 72 hours.  Microbiology:  Microbiology Results (last 7 days)     ** No results found for the last 168 hours. **          I have reviewed all pertinent labs within the past 24 hours.    Estimated Creatinine Clearance: 7.7 mL/min (A) (based on SCr of 6.6 mg/dL (H)).    Diagnostic Results:  I have reviewed all pertinent imaging results/findings within the past 24 hours.

## 2018-09-13 NOTE — PROGRESS NOTES
Notified Quin SUNG that patient's left arm is cramping to the point where patient's fingers are ambrose and it is difficult to move any part because of pain. No numbness or tingling noticed and patient stated that it sometimes occurs after dialysis when too much fluid is removed but it rarely happens. PA stated a muscle relaxer may help. Will continue to monitor.

## 2018-09-13 NOTE — ASSESSMENT & PLAN NOTE
-Held home coumadin as INR supra therapeutic.  Now restarted. INR subtherapeutic today  -Not bridging with heparin given recent GI bleed  -Continue diltiazem

## 2018-09-14 VITALS
WEIGHT: 160.5 LBS | RESPIRATION RATE: 20 BRPM | HEIGHT: 61 IN | BODY MASS INDEX: 30.3 KG/M2 | OXYGEN SATURATION: 96 % | HEART RATE: 97 BPM | DIASTOLIC BLOOD PRESSURE: 90 MMHG | TEMPERATURE: 99 F | SYSTOLIC BLOOD PRESSURE: 175 MMHG

## 2018-09-14 PROBLEM — D62 ACUTE BLOOD LOSS ANEMIA: Status: ACTIVE | Noted: 2018-09-14

## 2018-09-14 PROBLEM — K59.00 CONSTIPATION: Status: RESOLVED | Noted: 2018-09-12 | Resolved: 2018-09-14

## 2018-09-14 LAB
ANION GAP SERPL CALC-SCNC: 12 MMOL/L
BASOPHILS # BLD AUTO: 0.02 K/UL
BASOPHILS NFR BLD: 0.2 %
BUN SERPL-MCNC: 49 MG/DL
CALCIUM SERPL-MCNC: 7.1 MG/DL
CHLORIDE SERPL-SCNC: 103 MMOL/L
CO2 SERPL-SCNC: 20 MMOL/L
CREAT SERPL-MCNC: 9.1 MG/DL
DIFFERENTIAL METHOD: ABNORMAL
EOSINOPHIL # BLD AUTO: 0.4 K/UL
EOSINOPHIL NFR BLD: 4 %
ERYTHROCYTE [DISTWIDTH] IN BLOOD BY AUTOMATED COUNT: 17.3 %
EST. GFR  (AFRICAN AMERICAN): 4.7 ML/MIN/1.73 M^2
EST. GFR  (NON AFRICAN AMERICAN): 4.1 ML/MIN/1.73 M^2
GLUCOSE SERPL-MCNC: 215 MG/DL
HCT VFR BLD AUTO: 28.3 %
HGB BLD-MCNC: 9 G/DL
IMM GRANULOCYTES # BLD AUTO: 0.08 K/UL
IMM GRANULOCYTES NFR BLD AUTO: 0.9 %
INR PPP: 1.5
LYMPHOCYTES # BLD AUTO: 0.8 K/UL
LYMPHOCYTES NFR BLD: 8.4 %
MAGNESIUM SERPL-MCNC: 2 MG/DL
MCH RBC QN AUTO: 28 PG
MCHC RBC AUTO-ENTMCNC: 31.8 G/DL
MCV RBC AUTO: 88 FL
MONOCYTES # BLD AUTO: 1 K/UL
MONOCYTES NFR BLD: 11.2 %
NEUTROPHILS # BLD AUTO: 7 K/UL
NEUTROPHILS NFR BLD: 75.3 %
NRBC BLD-RTO: 0 /100 WBC
PHOSPHATE SERPL-MCNC: 2.6 MG/DL
PLATELET # BLD AUTO: 212 K/UL
PMV BLD AUTO: 11 FL
POTASSIUM SERPL-SCNC: 4.1 MMOL/L
PROTHROMBIN TIME: 15 SEC
RBC # BLD AUTO: 3.21 M/UL
SODIUM SERPL-SCNC: 135 MMOL/L
WBC # BLD AUTO: 9.32 K/UL

## 2018-09-14 PROCEDURE — 25000003 PHARM REV CODE 250: Performed by: PHYSICIAN ASSISTANT

## 2018-09-14 PROCEDURE — 85610 PROTHROMBIN TIME: CPT

## 2018-09-14 PROCEDURE — 99238 HOSP IP/OBS DSCHRG MGMT 30/<: CPT | Mod: ,,, | Performed by: INTERNAL MEDICINE

## 2018-09-14 PROCEDURE — 90935 HEMODIALYSIS ONE EVALUATION: CPT | Mod: ,,, | Performed by: INTERNAL MEDICINE

## 2018-09-14 PROCEDURE — A4216 STERILE WATER/SALINE, 10 ML: HCPCS | Performed by: STUDENT IN AN ORGANIZED HEALTH CARE EDUCATION/TRAINING PROGRAM

## 2018-09-14 PROCEDURE — 85025 COMPLETE CBC W/AUTO DIFF WBC: CPT

## 2018-09-14 PROCEDURE — 90935 HEMODIALYSIS ONE EVALUATION: CPT

## 2018-09-14 PROCEDURE — 86901 BLOOD TYPING SEROLOGIC RH(D): CPT

## 2018-09-14 PROCEDURE — 80048 BASIC METABOLIC PNL TOTAL CA: CPT

## 2018-09-14 PROCEDURE — 25000003 PHARM REV CODE 250: Performed by: STUDENT IN AN ORGANIZED HEALTH CARE EDUCATION/TRAINING PROGRAM

## 2018-09-14 PROCEDURE — 99232 SBSQ HOSP IP/OBS MODERATE 35: CPT | Mod: ,,, | Performed by: PHYSICIAN ASSISTANT

## 2018-09-14 PROCEDURE — 84100 ASSAY OF PHOSPHORUS: CPT

## 2018-09-14 PROCEDURE — 83735 ASSAY OF MAGNESIUM: CPT

## 2018-09-14 RX ORDER — SODIUM CHLORIDE 9 MG/ML
INJECTION, SOLUTION INTRAVENOUS
Status: DISCONTINUED | OUTPATIENT
Start: 2018-09-14 | End: 2018-09-14 | Stop reason: HOSPADM

## 2018-09-14 RX ORDER — SODIUM CHLORIDE 9 MG/ML
INJECTION, SOLUTION INTRAVENOUS ONCE
Status: DISCONTINUED | OUTPATIENT
Start: 2018-09-14 | End: 2018-09-14 | Stop reason: HOSPADM

## 2018-09-14 RX ADMIN — PANTOPRAZOLE SODIUM 40 MG: 40 TABLET, DELAYED RELEASE ORAL at 06:09

## 2018-09-14 RX ADMIN — LEVOTHYROXINE SODIUM 100 MCG: 100 TABLET ORAL at 06:09

## 2018-09-14 RX ADMIN — ATORVASTATIN CALCIUM 10 MG: 10 TABLET, FILM COATED ORAL at 11:09

## 2018-09-14 RX ADMIN — Medication 3 ML: at 06:09

## 2018-09-14 RX ADMIN — ASPIRIN 81 MG: 81 TABLET, COATED ORAL at 11:09

## 2018-09-14 RX ADMIN — RIOCIGUAT 2 MG: 2 TABLET, FILM COATED ORAL at 10:09

## 2018-09-14 RX ADMIN — DILTIAZEM HYDROCHLORIDE 240 MG: 120 CAPSULE, COATED, EXTENDED RELEASE ORAL at 10:09

## 2018-09-14 NOTE — PLAN OF CARE
Problem: Patient Care Overview  Goal: Plan of Care Review  Outcome: Ongoing (interventions implemented as appropriate)  Plan of care reviewed with pt. VSS and AAOX4. Pt remains free of falls/injury. Pt had asymptomatic 14 beat run of vtach overnight. HD MWF. Pt has midline in CHIKIS and a LAVF. Plan for D/C today after dialysis.

## 2018-09-14 NOTE — PROGRESS NOTES
Patient arrived on unit via stretcher. Standing weight obtained @ 72.8kg. Dialysis tx initiated via left arm AVF with 15g needles x without difficulty. Lines connected and secured. Orders and machine settings verified. Tx started. Good blood flows established.

## 2018-09-14 NOTE — PROGRESS NOTES
The pt has been admitted since 9/10 for an acute GI bleed following NSAID use.  She had 4 days of melena prior to admit.  See calendar for recent INRs and warfarin doses while admitted.  We will contact the pt once she is discharged.

## 2018-09-14 NOTE — PROGRESS NOTES
Notified  of pt's 14 beat run of vtach. Pt asymptomatic and VSS. No new orders at this time. Will continue to monitor pt.

## 2018-09-14 NOTE — PHYSICIAN QUERY
"PT Name: Shivani Sheets  MR #: [id number]    Physician Query Form - Heart  Condition Clarification     CDS/: Madai Olea               Contact information: [email or web address]    This form is a permanent document in the medical record.     Query Date: September 14, 2018    By submitting this query, we are merely seeking further clarification of documentation. Please utilize your independent clinical judgment when addressing the question(s) below.    The medical record contains the following   Indicators     Supporting Clinical Findings Location in Medical Record    BNP      EF      Radiology findings      Echo Results      "Ascites" documented      "SOB" or "MORA" documented      "Hypoxia" documented     x Heart Failure documented CHF (congestive heart failure) Cardiology consult H&P    "Edema" documented      Diuretics/Meds      Treatment:     x Other:  diastolic dysfunction    history of pulmonary HTN(on adempas/uptravi), diastolic dysfunction, pAF(on coumadin), central retinal artery occlusion without significant carotid artery stenosis, CAD with remote PCI, ESRD(LUE AVF) secondary to HTN, s/p failed kidney transplant, PUD, STEFFANY on CPAP, hypothyroidism, RA, DLP and obesity      Consult progress notes H&P   Heart failure (HF) can be acute, chronic or both. It is generally further specificed as systolic, diastolic, or combined. Lastly, it is important to identify an underlying etiology if known or suspected.     Common clues to acute exacerbation:  Rapidly progressive symptoms (w/in 2 weeks of presentation), using IV diuretics to treat, using supplemental O2, pulmonary edema on Xray, MI w/in 4 weeks, and/or BNP >500    Systolic Heart Failure: is defined as chart documentation of a left ventricular ejection fraction (LVEF) less than 40%     Diastolic Heart Failure: is defined as a left ventricular ejection fraction (LVEF) greater than 40%   +      Evidence of diastolic dysfunction on echocardiography OR "    Right heart catheterization wedge pressure above 12 mm Hg OR    Left heart catheterization left ventricular end diastolic pressure 18 mm Hg or above.    References: *American Heart Association    The clinical guidelines noted below are only system guidelines, and do not replace the providers clinical judgment.     Provider, please specify the diagnosis associated with above clinical findings    [   ] Chronic Systolic Heart Failure - Pre-existing systolic HF diagnosis.  EF < 40%  without  acute HF symptoms documented                                 [xxxx   ] Chronic Diastolic Heart Failure - Pre-existing diastolic HF diagnosis.  EF > 40%  without  acute HF symptoms documented                   [   ] Chronic Combined Systolic and Diastolic Heart Failure    [   ] Other Type of Heart Failure (please specify type): _________________________  [   ] Heart Failure Ruled Out  [   ] Other (please specify): ___________________________________  [   ] Clinically Undetermined                          Please document in your progress notes daily for the duration of treatment until resolved and include in your discharge summary.

## 2018-09-14 NOTE — PROGRESS NOTES
Patient noted to be d/c per nurse notes. LMFCB on cell. Patient usually cannot make INRs on Mondays due to dialysis. Additionally, she is planned for RHC 9/20 and is scheduled to be off coumadin 9/17-9/19. Will need to discuss with patient if she is keeping RHC as planned and d/c dose. Message left to call asap today or first thing Monday morning.    Update 9/17: Patient reports missing dose on Friday thinking it was given in [facility]. She then took 7.5mg Sat, 5mg Sun. She is scheduled for RHC on Thu and confirmed she is still planning to have it. She will hold coumadin starting today. Advised on dose to resume post procedure. We will not need repeat INR today since she is holding. We will follow up after the procedure on 9/25.

## 2018-09-14 NOTE — SUBJECTIVE & OBJECTIVE
Interval History: Just got back from HD. No complaints. Happy to be going home    Continuous Infusions:  Scheduled Meds:   sodium chloride 0.9%   Intravenous Once    aspirin  81 mg Oral Daily    atorvastatin  10 mg Oral Daily    diltiaZEM  240 mg Oral Daily    levothyroxine  100 mcg Oral Before breakfast    pantoprazole  40 mg Oral BID AC    riociguat  2 mg Oral TID    selexipag  1,000 mcg Oral BID    sodium chloride 0.9%  3 mL Intravenous Q8H    warfarin  5 mg Oral Every Tues, Thurs, Sat, Sun    warfarin  7.5 mg Oral Every Mon, Wed, Fri     PRN Meds:sodium chloride, sodium chloride 0.9%, HYDROcodone-acetaminophen, influenza, ondansetron    Review of patient's allergies indicates:   Allergen Reactions    Penicillins Swelling    Iodine      Other reaction(s): Hives    Sulfamethoxazole-trimethoprim      Other reaction(s): Swelling  Other reaction(s): Hives     Objective:     Vital Signs (Most Recent):  Temp: 98.5 °F (36.9 °C) (09/14/18 1147)  Pulse: 97 (09/14/18 1147)  Resp: 20 (09/14/18 1147)  BP: (!) 175/90 (09/14/18 1147)  SpO2: 96 % (09/14/18 1147) Vital Signs (24h Range):  Temp:  [98 °F (36.7 °C)-99.1 °F (37.3 °C)] 98.5 °F (36.9 °C)  Pulse:  [60-97] 97  Resp:  [18-20] 20  SpO2:  [95 %-100 %] 96 %  BP: (133-193)/() 175/90     Patient Vitals for the past 72 hrs (Last 3 readings):   Weight   09/14/18 0401 72.8 kg (160 lb 7.9 oz)   09/13/18 0427 71.1 kg (156 lb 12 oz)   09/12/18 0300 74.8 kg (164 lb 14.5 oz)     Body mass index is 30.33 kg/m².      Intake/Output Summary (Last 24 hours) at 9/14/2018 1209  Last data filed at 9/14/2018 0500  Gross per 24 hour   Intake 420 ml   Output 0 ml   Net 420 ml     Physical Exam   Constitutional: laying in bed NAD  Neck: Neck supple. No JVD present.   Cardiovascular: Normal rate, regular rhythm with ectopy.  III/VI holosystolic murmur LUSB.  LUE AVF   Abdominal: Soft. Bowel sounds are normal. She exhibits no distension. There is no tenderness.   Neurological:  She is alert and oriented to person, place, and time. No cranial nerve deficit.   Skin: Skin is warm and dry. No erythema.   Psychiatric: She has a normal mood and affect.     Significant Labs:  CBC:  Recent Labs   Lab  09/12/18 0317 09/13/18 0427 09/14/18 0416   WBC  7.98  8.40  9.32   RBC  2.53*  3.28*  3.21*   HGB  6.9*  9.2*  9.0*   HCT  22.7*  28.9*  28.3*   PLT  200  195  212   MCV  90  88  88   MCH  27.3  28.0  28.0   MCHC  30.4*  31.8*  31.8*     BNP:  No results for input(s): BNP in the last 168 hours.    Invalid input(s): BNPTRIAGELBLO  CMP:  Recent Labs   Lab  09/10/18   1242 09/12/18 0317 09/13/18 0427 09/14/18 0416   GLU  123*   < >  102  123*  215*   CALCIUM  9.2   < >  7.8*  7.9*  7.1*   ALBUMIN  3.3*   --    --    --    --    PROT  6.7   --    --    --    --    NA  139   < >  133*  135*  135*   K  5.0   < >  4.5  4.1  4.1   CO2  27   < >  19*  23  20*   CL  100   < >  104  104  103   BUN  95*   < >  44*  32*  49*   CREATININE  12.0*   < >  8.2*  6.6*  9.1*   ALKPHOS  43*   --    --    --    --    ALT  12   --    --    --    --    AST  10   --    --    --    --    BILITOT  0.8   --    --    --    --     < > = values in this interval not displayed.      Coagulation:   Recent Labs   Lab  09/10/18   1242   09/12/18   0317  09/13/18   0427  09/14/18   0416   INR  3.1*   < >  2.2*  1.8*  1.5*   APTT  38.7*   --    --    --    --     < > = values in this interval not displayed.     LDH:  No results for input(s): LDH in the last 72 hours.  Microbiology:  Microbiology Results (last 7 days)     ** No results found for the last 168 hours. **          I have reviewed all pertinent labs within the past 24 hours.    Estimated Creatinine Clearance: 5.6 mL/min (A) (based on SCr of 9.1 mg/dL (H)).    Diagnostic Results:  I have reviewed all pertinent imaging results/findings within the past 24 hours.

## 2018-09-14 NOTE — ASSESSMENT & PLAN NOTE
-With reported history of melena. Acute GIB, likely upper, Hgb 3.8 from last 10.7 6/2018 (most values 9-10)  -Hx of GIB, also recent NSAID use; EGD 2/2018 gastric polyps. C-scope 6/2018 with nonbleeding sigmoid polyps, internal hemorrhoids  -GI consulted, EGD done and few gastic polyps; no signs of bleed.  Will discuss video capsule if she develops melena or H/H drops.  Seems to have stabilized with therapeutic INR  -PPI  -Transfused total of 4 units on admit, with appropriate rise.  Transfused another 2 units 9/12/18  -Held coumadin, ASA on admit. Restarted Coumadin and ASA  -Will need iron studies after discharge (would not do now secondary to recent transfusions)

## 2018-09-14 NOTE — PROGRESS NOTES
[facility]  Nephrology  Progress Note    Patient Name: Shivani Sheets  MRN: [id number]  Admission Date: 9/10/2018  Hospital Length of Stay: 4 days  Attending Provider: Gera Jordan Jr.,*   Primary Care Physician: Eula Weiss MD  Principal Problem:Anemia    Subjective:     HPI: 62 yo AAF h/o ESRD (MWF), did not dialyze today due to feeling week, was noted with Hgb of 3.8, also has experienced black tarry stools over the past few days. Nephrology consulted to help with dialysis needs while in the hospital.    Interval History: Patient evaluated bedside at HD unit, currently on treatment, with no acute distress tolerating well.  No chest pain, muscle cramps, SOB.  Night was uneventful.  Target UF ~     Review of patient's allergies indicates:   Allergen Reactions    Penicillins Swelling    Iodine      Other reaction(s): Hives    Sulfamethoxazole-trimethoprim      Other reaction(s): Swelling  Other reaction(s): Hives     Current Facility-Administered Medications   Medication Frequency    0.9%  NaCl infusion (for blood administration) Q24H PRN    0.9%  NaCl infusion Once    0.9%  NaCl infusion PRN    aspirin EC tablet 81 mg Daily    atorvastatin tablet 10 mg Daily    diltiaZEM 24 hr capsule 240 mg Daily    HYDROcodone-acetaminophen 7.5-325 mg per tablet 1 tablet Q6H PRN    influenza (FLUZONE HIGH-DOSE) vaccine 0.5 mL vaccine x 1 dose    levothyroxine tablet 100 mcg Before breakfast    ondansetron disintegrating tablet 4 mg Q6H PRN    pantoprazole EC tablet 40 mg BID AC    riociguat (ADEMPAS) tablet 2 mg TID    selexipag Tab 1,000 mcg BID    sodium chloride 0.9% flush 3 mL Q8H    warfarin (COUMADIN) tablet 5 mg Every Tues, Thurs, Sat, Sun    warfarin (COUMADIN) tablet 7.5 mg Every Mon, Wed, Fri       Objective:     Vital Signs (Most Recent):  Temp: 98.5 °F (36.9 °C) (09/14/18 0401)  Pulse: 85 (09/14/18 0701)  Resp: 18 (09/13/18 2011)  BP: 139/86 (09/14/18 0401)  SpO2: 95  % (09/14/18 0401)  O2 Device (Oxygen Therapy): nasal cannula (09/14/18 0401) Vital Signs (24h Range):  Temp:  [98 °F (36.7 °C)-99.1 °F (37.3 °C)] 98.5 °F (36.9 °C)  Pulse:  [78-93] 85  Resp:  [18-20] 18  SpO2:  [95 %-100 %] 95 %  BP: (133-168)/(75-86) 139/86     Weight: 72.8 kg (160 lb 7.9 oz) (09/14/18 0401)  Body mass index is 30.33 kg/m².  Body surface area is 1.77 meters squared.    I/O last 3 completed shifts:  In: 4201.7 [P.O.:870; Blood:3331.7]  Out: 0     Physical Exam  Constitutional: laying in bed NAD  Neck: Neck supple. No JVD present.   Cardiovascular: Normal rate, regular rhythm with ectopy.  III/VI holosystolic murmur LUSB.  LUE AVF good thrill/bruit   Abdominal: Soft. Bowel sounds are normal. She exhibits no distension. There is no tenderness.   Neurological: She is alert and oriented to person, place, and time. No cranial nerve deficit.   Skin: Skin is warm and dry. No erythema.   Psychiatric: She has a normal mood and affect.    Significant Labs:  CBC:   Recent Labs   Lab  09/14/18   0416   WBC  9.32   RBC  3.21*   HGB  9.0*   HCT  28.3*   PLT  212   MCV  88   MCH  28.0   MCHC  31.8*     CMP:   Recent Labs   Lab  09/10/18   1242   09/14/18   0416   GLU  123*   < >  215*   CALCIUM  9.2   < >  7.1*   ALBUMIN  3.3*   --    --    PROT  6.7   --    --    NA  139   < >  135*   K  5.0   < >  4.1   CO2  27   < >  20*   CL  100   < >  103   BUN  95*   < >  49*   CREATININE  12.0*   < >  9.1*   ALKPHOS  43*   --    --    ALT  12   --    --    AST  10   --    --    BILITOT  0.8   --    --     < > = values in this interval not displayed.     All labs within the past 24 hours have been reviewed.       Assessment/Plan:     ESRD from HTN started RRT 1999    Etiology: ?HTN    Modality: iHD  Days: MWF (last full session this past Friday, did not go today 2ndary weakness)  Access: LUE fistula, g  Unit: [facility]  Tx Duration: ?3-4hrs  EDW: patient not sure  UF : ?2-3L  Residual fxn: none  Transplant list:  no    Admitted with GIB, no source identified at this time, negative EGD, hemodynamically stable    End-Stage Renal Disease on HD  - Will provide dialysis for metabolic clearance and volume management  - Seen and examined today during hemodialysis; tolerating treatment well without issues. Denied headaches, chest pain, abdominal pain, or muscle cramps   - Target ultrafiltration ~2L  - Dialysate adjusted to current labs   - Avoid nephrotoxic medications  - Medications to renal parameters  - Strict Input and Output and chart  - Daily standing weights    GI Bleed:  - Stable Hb levels  - GI on the case, has done EGD and no active bleed observed  - On medical management    Anemia of Chronic Kidney Disease   - Hb to target level 10 gm/dL  - To follow iron studies as outpatient due to recent blood transfusions x 4 which can alter results    Mineral Bone Disease in CKD   - Renal Function Panel for electrolytes monitoring  - Stable Calcium levels    HTN   - Stable BP, will continue to monitor. Goal for BP is <140 mmHg SBP and BDP <90 mmHg, maintain MAP > 65.    Nutrition   - Renal Diet    Case discussed with staff further recs with attestation.                  Thank you for your consult. I will follow-up with patient. Please contact us if you have any additional questions.    Anthony Luna MD  Nephrology  [facility]

## 2018-09-14 NOTE — NURSING
Pt discharged per MD orders.  Tele and IV discontinued.  Catheter tip intact x.  Medication list and prescriptions reviewed; prescriptions sent to pt preferred pharmacy and printed prescriptions provided.  Pt verbalizes understanding of all written and verbal discharge instructions.  MIS performed and documented in chart. Pt awaiting family/escort arrival.  Will continue to monitor.

## 2018-09-14 NOTE — PHYSICIAN QUERY
PT Name: Shivani Sheets  MR #: [id number]    Physician Query Form - CardioPulmonary Clarification      CDS/: Madai Olea               Contact information:  [email or web address]      This form is a permanent document in the medical record.    Query Date: September 14, 2018    By submitting this query, we are merely seeking further clarification of documentation. Please utilize your independent clinical judgment when addressing the question(s) below.    The Medical record contains the following:   Indicators   Supporting Clinical Findings Location in Medical Record   x Pulmonary Hypertension documented pulmonary HTN(on adempas/uptravi),       Pulmonary hypertension    -Stable  -Continue adempas, selexipag  -Supplemental oxygen            Progress note 9/14   x Acute/Chronic Illness diastolic dysfunction, pAF(on coumadin), central retinal artery occlusion without significant carotid artery stenosis, CAD with remote PCI, ESRD(LUE AVF) secondary to HTN, s/p failed kidney transplant, PUD, STEFFANY on CPAP, hypothyroidism, RA, DLP and obesity      Progress note 9/14     Echo and/or Heart Cath Findings      BiPAP/Intubation/Supplemental O2      SOB, MORA, Fatigue, Dizziness, LE Edema, Cyanosis, Chest Pain, Respiratory Distress, Hypoxia, etc.     x Treatment         Medication riociguat  2 mg Oral TID  selexipag 1,000 mcg Oral BID     Progress note 9/14     Other     Provider, please specify the type of pulmonary hypertension:    [  xxxx ]  Group 1:  Pulmonary Arterial Hypertension - includes Primary, Idiopathic, Inheritable, and Secondary (due to drugs, toxins, congenital heart diease, HIV infection, etc.)  Her PAH is out of proportion to diastolic dysfunction as confirmed Dr. Hnenessy notes  [   ]  Group 2:  Pulmonary Hypertension due to Left Heart Disease, including left heart failure and/or left heart valve disease    [   ]  Group 5:  Pulmonary Hypertension due to other, multifactorial, or unclear mechanisms    [    ]  Pulmonary Hypertension, unspecified    [   ]  Other Cardiopulmonary Condition (please specify):  _____________________________________    [   ]  Clinically Undetermined    Please document in your progress notes daily for the duration of treatment, until resolved, and include in your discharge summary.

## 2018-09-14 NOTE — PROGRESS NOTES
Discharge    Pt presents in room in acute dialysis unit. Pt aaox4 with pleasant affect. Pt states in agreement with plan to discharge to home today. Pt reports [facility] HD [facility] [place], is aware she will resume chair time on Monday. Per pt her brother will transport her. Pt reports coping well and denies further needs, questions, concerns at this time and none indicated. Providing psychosocial and counseling support, education, resources, assistance and discharge planning as indicated. SW remains available.

## 2018-09-14 NOTE — PROGRESS NOTES
[facility]  Heart Transplant  Progress Note    Patient Name: Shivani Sheets  MRN: [id number]  Admission Date: 9/10/2018  Hospital Length of Stay: 4 days  Attending Physician: Gera Jordan Jr.,*  Primary Care Provider: Eula Weiss MD  Principal Problem:Anemia    Subjective:     Interval History: Just got back from HD. No complaints. Happy to be going home    Continuous Infusions:  Scheduled Meds:   sodium chloride 0.9%   Intravenous Once    aspirin  81 mg Oral Daily    atorvastatin  10 mg Oral Daily    diltiaZEM  240 mg Oral Daily    levothyroxine  100 mcg Oral Before breakfast    pantoprazole  40 mg Oral BID AC    riociguat  2 mg Oral TID    selexipag  1,000 mcg Oral BID    sodium chloride 0.9%  3 mL Intravenous Q8H    warfarin  5 mg Oral Every Tues, Thurs, Sat, Sun    warfarin  7.5 mg Oral Every Mon, Wed, Fri     PRN Meds:sodium chloride, sodium chloride 0.9%, HYDROcodone-acetaminophen, influenza, ondansetron    Review of patient's allergies indicates:   Allergen Reactions    Penicillins Swelling    Iodine      Other reaction(s): Hives    Sulfamethoxazole-trimethoprim      Other reaction(s): Swelling  Other reaction(s): Hives     Objective:     Vital Signs (Most Recent):  Temp: 98.5 °F (36.9 °C) (09/14/18 1147)  Pulse: 97 (09/14/18 1147)  Resp: 20 (09/14/18 1147)  BP: (!) 175/90 (09/14/18 1147)  SpO2: 96 % (09/14/18 1147) Vital Signs (24h Range):  Temp:  [98 °F (36.7 °C)-99.1 °F (37.3 °C)] 98.5 °F (36.9 °C)  Pulse:  [60-97] 97  Resp:  [18-20] 20  SpO2:  [95 %-100 %] 96 %  BP: (133-193)/() 175/90     Patient Vitals for the past 72 hrs (Last 3 readings):   Weight   09/14/18 0401 72.8 kg (160 lb 7.9 oz)   09/13/18 0427 71.1 kg (156 lb 12 oz)   09/12/18 0300 74.8 kg (164 lb 14.5 oz)     Body mass index is 30.33 kg/m².      Intake/Output Summary (Last 24 hours) at 9/14/2018 1209  Last data filed at 9/14/2018 0500  Gross per 24 hour   Intake 420 ml   Output 0 ml   Net  420 ml     Physical Exam   Constitutional: laying in bed NAD  Neck: Neck supple. No JVD present.   Cardiovascular: Normal rate, regular rhythm with ectopy.  III/VI holosystolic murmur LUSB.  LUE AVF   Abdominal: Soft. Bowel sounds are normal. She exhibits no distension. There is no tenderness.   Neurological: She is alert and oriented to person, place, and time. No cranial nerve deficit.   Skin: Skin is warm and dry. No erythema.   Psychiatric: She has a normal mood and affect.     Significant Labs:  CBC:  Recent Labs   Lab  09/12/18 0317 09/13/18 0427 09/14/18 0416   WBC  7.98  8.40  9.32   RBC  2.53*  3.28*  3.21*   HGB  6.9*  9.2*  9.0*   HCT  22.7*  28.9*  28.3*   PLT  200  195  212   MCV  90  88  88   MCH  27.3  28.0  28.0   MCHC  30.4*  31.8*  31.8*     BNP:  No results for input(s): BNP in the last 168 hours.    Invalid input(s): BNPTRIAGELBLO  CMP:  Recent Labs   Lab  09/10/18   1242   09/12/18   0317  09/13/18   0427  09/14/18   0416   GLU  123*   < >  102  123*  215*   CALCIUM  9.2   < >  7.8*  7.9*  7.1*   ALBUMIN  3.3*   --    --    --    --    PROT  6.7   --    --    --    --    NA  139   < >  133*  135*  135*   K  5.0   < >  4.5  4.1  4.1   CO2  27   < >  19*  23  20*   CL  100   < >  104  104  103   BUN  95*   < >  44*  32*  49*   CREATININE  12.0*   < >  8.2*  6.6*  9.1*   ALKPHOS  43*   --    --    --    --    ALT  12   --    --    --    --    AST  10   --    --    --    --    BILITOT  0.8   --    --    --    --     < > = values in this interval not displayed.      Coagulation:   Recent Labs   Lab  09/10/18   1242   09/12/18 0317 09/13/18 0427 09/14/18 0416   INR  3.1*   < >  2.2*  1.8*  1.5*   APTT  38.7*   --    --    --    --     < > = values in this interval not displayed.     LDH:  No results for input(s): LDH in the last 72 hours.  Microbiology:  Microbiology Results (last 7 days)     ** No results found for the last 168 hours. **          I have reviewed all pertinent labs  within the past 24 hours.    Estimated Creatinine Clearance: 5.6 mL/min (A) (based on SCr of 9.1 mg/dL (H)).    Diagnostic Results:  I have reviewed all pertinent imaging results/findings within the past 24 hours.    Assessment and Plan:     63 year old female with a history of pulmonary HTN(on adempas/uptravi), diastolic dysfunction, pAF(on coumadin), central retinal artery occlusion without significant carotid artery stenosis, CAD with remote PCI, ESRD(LUE AVF) secondary to HTN, s/p failed kidney transplant, PUD, STEFFANY on CPAP, hypothyroidism, RA, DLP and obesity who presents to the ED for 4 days of melena. Per patient symptoms started last Thursday and she states she has had only on BM a day with very dark stools. She had a similar presentation in 2/2018 at which time EGD was unremarkable. Colonoscopy done 6/12/18 showed non-bleeding hemorrhoids and some polyps in sigmoid as well as ascending colon.     She is on coumadin for her history of afib and INR on 9/6/18 was 5.6, it is 3.1 today (9/10). She was seen in the ED in 8/2018 for hip pain and given some ibuprofen, the last time she used it was approximately 3-4 days ago. She is also taking ASA 81 mg daily for her CAD history. The last time she ate any food was yesterday morning. Denies abdominal pain, vomiting, hematemesis, BRBPR.     Her Hgb in the ED was 3.8. 2 units of pRBC are pending. No further active bleeding. GI was consulted and she is planned for EGD tomorrow. Missed HD today, nephrology consulted, plan for HD tonight after receiving pRBC.    * Anemia    -With reported history of melena. Acute GIB, likely upper, Hgb 3.8 from last 10.7 6/2018 (most values 9-10)  -Hx of GIB, also recent NSAID use; EGD 2/2018 gastric polyps. C-scope 6/2018 with nonbleeding sigmoid polyps, internal hemorrhoids  -GI consulted, EGD done and few gastic polyps; no signs of bleed.  Will discuss video capsule if she develops melena or H/H drops.  Seems to have stabilized with  therapeutic INR  -PPI  -Transfused total of 4 units on admit, with appropriate rise.  Transfused another 2 units 9/12/18  -Held coumadin, ASA on admit. Restarted Coumadin and ASA  -Will need iron studies after discharge (would not do now secondary to recent transfusions)        Pulmonary hypertension    -Stable  -Continue adempas, selexipag  -Supplemental oxygen        Atrial fibrillation    -Held home coumadin as INR supra therapeutic.  Now restarted. INR subtherapeutic today  -Not bridging with heparin given recent GI bleed  -Continue diltiazem        Hypothyroidism    -Continue synthroid        ESRD from HTN started RRT 1999    -Nephrology consulted  -Patient gets HD MWF             Quin Krishnamurthy, JOSE  Heart Transplant  [facility]

## 2018-09-14 NOTE — PROGRESS NOTES
Dialysis tx completed. 3 hours. 2 liters removed. Needles pulled from left arm AVF. Hemostasis achieved. Gauze and tape to sites. Tolerated tx well. Report given to Beverly MURILLO.

## 2018-09-14 NOTE — PROGRESS NOTES
P/Delonte (ext)([id number]) called 09/14/18 to inform us that pt will be dc today from ([facility]) she was admitted with a  (GI/BLEED). She is going home on (Warfarin 7.5mg) today, 09/15 (5mg) and 09/16 (5mg).  request labs on 09/17/18. Any questions please call. Advise

## 2018-09-17 ENCOUNTER — PATIENT OUTREACH (OUTPATIENT)
Dept: ADMINISTRATIVE | Facility: CLINIC | Age: 63
End: 2018-09-17

## 2018-09-17 RX ORDER — DILTIAZEM HYDROCHLORIDE 240 MG/1
CAPSULE, EXTENDED RELEASE ORAL
Qty: 90 CAPSULE | Refills: 0 | Status: SHIPPED | OUTPATIENT
Start: 2018-09-17 | End: 2018-11-13

## 2018-09-17 NOTE — PROGRESS NOTES
Dr. Weiss, Patient needs a hosp. F/u appointment.  Please let us know when you can see this patient---thanks

## 2018-09-17 NOTE — PROGRESS NOTES
C3 nurse attempted to contact patient. No answer. The following message was left for the patient to return the call:  Good morning  I am a nurse calling on behalf of [facility] from the Care Coordination Center.  This is a Transitional Care Call for Shivani Sheets . When you have a moment please contact us at [phone] or [phone] Monday through Friday, between the hours of 8 am to 4 pm. We look forward to speaking with you. On behalf of [facility] have a nice day.    The patient has a scheduled HEARTTX appointment with Adela Wang on 9\25 @ 1330. Message sent to PCP staff for HOSPFU.

## 2018-09-17 NOTE — PATIENT INSTRUCTIONS

## 2018-09-18 NOTE — PLAN OF CARE
Patient did not complete SARAI today.  Deon ACHARYA       Problem: Patient Care Overview  Goal: Plan of Care Review  Outcome: Ongoing (interventions implemented as appropriate)  Pt arrived to the floor around 2000 last night. Pt weak and SOB with exertion. Pt unable transfer self to bed. Pt dialyzed and 1500 was removed. Pt given 3 units of blood. Last unit finished at 0523. Pt also had a 6 beat run of v tach and ectopy throughout the night. Pt family needs to bring med bottles from home. Pharmacy needs to verify meds and pt needs to take meds. rx unable to provide meds. Pt given prn pain med. Pain resolved. NPO after MN. Pt has meds in a daily planner in her suitcase that needs to go home with pt family this AM. Attempted to contact family and no answer. Labs will be drawn at [facility] and MD to be notified of the results ASAP. Pt last H/H was 4.2/14.2 with an INR of greater than 3. Pt voices she feels better and ectopy is noted but minimal and no lethal ectopy noted since run of v tach.

## 2018-09-18 NOTE — DISCHARGE SUMMARY
[facility]  Heart Transplant  Discharge Summary      Patient Name: Shivani Sheets  MRN: [id number]  Admission Date: 9/10/2018  Hospital Length of Stay: 4 days  Discharge Date and Time: 09/17/2018 8:49 PM  Attending Physician: Marybeth att. providers found   Discharging Provider: Quin Krishnamurthy PA-C  Primary Care Provider: Eula Weiss MD     HPI: 63 year old female with a history of pulmonary HTN(on adempas/uptravi), diastolic dysfunction, pAF(on coumadin), central retinal artery occlusion without significant carotid artery stenosis, CAD with remote PCI, ESRD(LUE AVF) secondary to HTN, s/p failed kidney transplant, PUD, STEFFANY on CPAP, hypothyroidism, RA, DLP and obesity who presents to the ED for 4 days of melena. Per patient symptoms started last Thursday and she states she has had only on BM a day with very dark stools. She had a similar presentation in 2/2018 at which time EGD was unremarkable. Colonoscopy done 6/12/18 showed non-bleeding hemorrhoids and some polyps in sigmoid as well as ascending colon.     She is on coumadin for her history of afib and INR on 9/6/18 was 5.6, it is 3.1 today (9/10). She was seen in the ED in 8/2018 for hip pain and given some ibuprofen, the last time she used it was approximately 3-4 days ago. She is also taking ASA 81 mg daily for her CAD history. The last time she ate any food was yesterday morning. Denies abdominal pain, vomiting, hematemesis, BRBPR.     Her Hgb in the ED was 3.8. 2 units of pRBC are pending. No further active bleeding. GI was consulted and she is planned for EGD tomorrow. Missed HD today, nephrology consulted, plan for HD tonight after receiving pRBC.    Procedure(s) (LRB):  EGD (ESOPHAGOGASTRODUODENOSCOPY) (N/A)     Hospital Course: Pt was admitted to [facility] and was transfused 4 units PRBCs on admit. INR was supratherapeutic, and coumadin was held. GI was consulted and EGD was done on 9/11/18 which showed a few gastic polyps; no signs of  bleed. With pt's h/o CAD and A Fib, she was restarted on both ASA 81 and coumadin. She was educated on not taking NSAIDs. Pt was transfused a total of 6 units PRBCs during hospitalization, and H and H is stable on day of d/c. She will have a repeat CBC next week. She will have f/u in PH clinic on 9/25/18.    Of note, pt had an episode of NSVT. She has h/o CAD and so we will schedule pt for Echo, Holter, and ischemic eval as an outpatient.      Consults (From admission, onward)        Status Ordering Provider     Inpatient consult to Gastroenterology  Once     Provider:  (Not yet assigned)    Completed TIERRA GUEVARA     Inpatient consult to Midline team  Once     Provider:  (Not yet assigned)    Completed CHUCKIE SHEFFIELD JR     Inpatient consult to Nephrology  Once     Provider:  (Not yet assigned)    Completed ANA ROSA ROACH              Pending Diagnostic Studies:     None        Final Active Diagnoses:    Diagnosis Date Noted POA    PRINCIPAL PROBLEM:  Acute blood loss anemia [D62] 09/14/2018 Yes    Pulmonary hypertension [I27.20] 02/11/2014 Yes    Atrial fibrillation [I48.91] 02/11/2014 Yes    Melena [K92.1]  Yes    Supratherapeutic INR [R79.1] 09/10/2018 Yes    Controlled type 2 diabetes mellitus with both eyes affected by proliferative retinopathy without macular edema, without long-term current use of insulin [E11.3593] 08/21/2018 Yes    Hypothyroidism [E03.9]  Yes    Anticoagulation monitoring by pharmacist [Z79.01] 09/18/2012 Not Applicable    S/P PTCA (percutaneous transluminal coronary angioplasty) [Z98.61] 08/10/2012 Not Applicable    ESRD from HTN started RRT 1999 [N18.6] 01/01/1999 Yes      Problems Resolved During this Admission:    Diagnosis Date Noted Date Resolved POA    Constipation [K59.00] 09/12/2018 09/14/2018 Unknown      Discharged Condition: stable    Disposition: Home or Self Care      Patient Instructions:      Diet renal     Diet Cardiac     Notify your health care  provider if you experience any of the following:  temperature >100.4     Notify your health care provider if you experience any of the following:  persistent nausea and vomiting or diarrhea     Notify your health care provider if you experience any of the following:  severe uncontrolled pain     Notify your health care provider if you experience any of the following:  redness, tenderness, or signs of infection (pain, swelling, redness, odor or green/yellow discharge around incision site)     Notify your health care provider if you experience any of the following:  difficulty breathing or increased cough     Notify your health care provider if you experience any of the following:  severe persistent headache     Notify your health care provider if you experience any of the following:  persistent dizziness, light-headedness, or visual disturbances     Notify your health care provider if you experience any of the following:  worsening rash     Notify your health care provider if you experience any of the following:  increased confusion or weakness     Holter monitor - 24 hour     2D echo with color flow doppler   Standing Status: Future Standing Exp. Date: 09/14/19     Activity as tolerated     Medications:  Reconciled Home Medications:      Medication List      CONTINUE taking these medications    ADEMPAS 2 mg Tab tablet  Generic drug:  riociguat  Take 2 mg by mouth 3 (three) times daily.     ALPHAGAN P 0.1 % Drop  Generic drug:  brimonidine 0.1%     aspirin 81 MG Chew  Take 81 mg by mouth Daily. 1  By mouth Every day     dorzolamide-timolol 2-0.5% 22.3-6.8 mg/mL ophthalmic solution  Commonly known as:  COSOPT  INSTILL 1 DROP IN BOTH EYES TWICE DAILY     HYDROcodone-acetaminophen 7.5-325 mg per tablet  Commonly known as:  NORCO  TK 1 T PO Q 6 H PRN     latanoprost 0.005 % ophthalmic solution  INSTILL 1 DROP IN BOTH EYES EVERY DAY AT BEDTIME     levothyroxine 100 MCG tablet  Commonly known as:  SYNTHROID  Take 100 mcg  by mouth. 1 Tablet Oral Every day     lidocaine-prilocaine cream  Commonly known as:  EMLA  Apply topically as needed.     LIPITOR 10 MG tablet  Generic drug:  atorvastatin  TAKE 1 BY MOUTH ONCE A DAY     pantoprazole 40 MG tablet  Commonly known as:  PROTONIX  TAKE 1 TABLET BY MOUTH ONCE DAILY     UPTRAVI 1,000 mcg Tab  Generic drug:  selexipag  Take 1,000 mcg by mouth 2 (two) times daily.     warfarin 5 MG tablet  Commonly known as:  COUMADIN  TAKE 1 1/2 TABLETS BY MOUTH DAILY ON TUESDAY, THURSDAY AND SATURDAY, THEN TAKE 1 TABLET DAILY ON MONDAY, WEDNESDAY, FRIDAY AND SUNDAY        STOP taking these medications    ibuprofen 400 MG tablet  Commonly known as:  STEVE OLIVARES PA-C  Heart Transplant  [facility]

## 2018-09-18 NOTE — HOSPITAL COURSE
Pt was admitted to [facility] and was transfused 4 units PRBCs on admit. INR was supratherapeutic, and coumadin was held. GI was consulted and EGD was done on 9/11/18 which showed a few gastic polyps; no signs of bleed. With pt's h/o CAD and A Fib, she was restarted on both ASA 81 and coumadin. She was educated on not taking NSAIDs. Pt was transfused a total of 6 units PRBCs during hospitalization, and H and H is stable on day of d/c. She will have a repeat CBC next week. She will have f/u in PH clinic on 9/25/18.    Of note, pt had an episode of NSVT. She has h/o CAD and so we will schedule pt for Echo, Holter, and ischemic eval as an outpatient.

## 2018-09-19 DIAGNOSIS — I50.32 CHRONIC DIASTOLIC HEART FAILURE: ICD-10-CM

## 2018-09-19 DIAGNOSIS — I27.20 PULMONARY HYPERTENSION: Primary | ICD-10-CM

## 2018-09-19 DIAGNOSIS — I25.10 CORONARY ARTERY DISEASE, ANGINA PRESENCE UNSPECIFIED, UNSPECIFIED VESSEL OR LESION TYPE, UNSPECIFIED WHETHER NATIVE OR TRANSPLANTED HEART: Primary | ICD-10-CM

## 2018-09-20 ENCOUNTER — HOSPITAL ENCOUNTER (OUTPATIENT)
Facility: HOSPITAL | Age: 63
Discharge: HOME | End: 2018-09-20
Attending: INTERNAL MEDICINE | Admitting: INTERNAL MEDICINE
Payer: MEDICARE

## 2018-09-20 PROCEDURE — 93451 RIGHT HEART CATH: CPT | Mod: 26,,, | Performed by: INTERNAL MEDICINE

## 2018-09-20 PROCEDURE — 25000003 PHARM REV CODE 250

## 2018-09-20 PROCEDURE — C1751 CATH, INF, PER/CENT/MIDLINE: HCPCS

## 2018-09-20 NOTE — INTERVAL H&P NOTE
Here for RHC to reassess PAP     RHC R IJ, 7 Fr sheath with local lidocaine, micropuncture kit and US guidance.    I have explained the risks, benefits and alternatives of the procedure in detail. The patient voices understanding and all questions have been answered,. The patient agrees to proceed as planned.

## 2018-09-20 NOTE — DISCHARGE SUMMARY
Date of admit to cath lab: 9/20/2018      Date of discharge from cath lab: 9/20/2018      Principal diagnosis: PH    Discharge attending physician: Jenny Hennessy MD    Hospital Course/Outcome of the treatment, procedures or surgery: Pt admitted for RHC.   See CVIS/cath lab procedure report in EPIC  for full report of today's procedure.    Disposition of the case (d/c disposition): home    Discharge Medication List: see med card    Plan for follow up care, diet, activity level: F/U as scheduled. Resume low Na diet.  Activity as tolerated    Condition on discharge from Cath lab: Stable.

## 2018-09-20 NOTE — DISCHARGE INSTRUCTIONS

## 2018-09-25 ENCOUNTER — LAB VISIT (OUTPATIENT)
Dept: LAB | Facility: HOSPITAL | Age: 63
End: 2018-09-25
Attending: INTERNAL MEDICINE
Payer: MEDICARE

## 2018-09-25 ENCOUNTER — OFFICE VISIT (OUTPATIENT)
Dept: TRANSPLANT | Facility: CLINIC | Age: 63
End: 2018-09-25
Payer: MEDICARE

## 2018-09-25 ENCOUNTER — ANTI-COAG VISIT (OUTPATIENT)
Dept: CARDIOLOGY | Facility: CLINIC | Age: 63
End: 2018-09-25

## 2018-09-25 VITALS
SYSTOLIC BLOOD PRESSURE: 155 MMHG | OXYGEN SATURATION: 95 % | HEIGHT: 61 IN | DIASTOLIC BLOOD PRESSURE: 97 MMHG | WEIGHT: 160.06 LBS | HEART RATE: 78 BPM | BODY MASS INDEX: 30.22 KG/M2

## 2018-09-25 DIAGNOSIS — I50.32 CHRONIC DIASTOLIC HEART FAILURE: ICD-10-CM

## 2018-09-25 DIAGNOSIS — Z79.01 ANTICOAGULATION MONITORING BY PHARMACIST: ICD-10-CM

## 2018-09-25 DIAGNOSIS — I48.20 CHRONIC ATRIAL FIBRILLATION: ICD-10-CM

## 2018-09-25 DIAGNOSIS — D62 ACUTE BLOOD LOSS ANEMIA: Primary | ICD-10-CM

## 2018-09-25 DIAGNOSIS — E11.3593 CONTROLLED TYPE 2 DIABETES MELLITUS WITH BOTH EYES AFFECTED BY PROLIFERATIVE RETINOPATHY WITHOUT MACULAR EDEMA, WITHOUT LONG-TERM CURRENT USE OF INSULIN: ICD-10-CM

## 2018-09-25 LAB
INR PPP: 1.5
PROTHROMBIN TIME: 15 SEC

## 2018-09-25 PROCEDURE — 85610 PROTHROMBIN TIME: CPT

## 2018-09-25 PROCEDURE — 99214 OFFICE O/P EST MOD 30 MIN: CPT | Mod: S$PBB,,, | Performed by: PHYSICIAN ASSISTANT

## 2018-09-25 PROCEDURE — 99213 OFFICE O/P EST LOW 20 MIN: CPT | Mod: PBBFAC | Performed by: PHYSICIAN ASSISTANT

## 2018-09-25 PROCEDURE — 99999 PR PBB SHADOW E&M-EST. PATIENT-LVL III: CPT | Mod: PBBFAC,,, | Performed by: PHYSICIAN ASSISTANT

## 2018-09-25 PROCEDURE — 36415 COLL VENOUS BLD VENIPUNCTURE: CPT

## 2018-09-25 NOTE — PROGRESS NOTES
Subjective:     HPI:  Ms. Sheets is a 63 y.o. year old Black or  female who presents for [facility] follow up. She has history of pulmonary HTN(on adempas/uptravi), diastolic dysfunction, pAF(on coumadin), central retinal artery occlusion without significant carotid artery stenosis, CAD with remote PCI, ESRD(LUE AVF) secondary to HTN, s/p failed kidney transplant, PUD, STEFFANY on CPAP, hypothyroidism, RA, DLP and obesity who presented to the ED for 4 days of melena.    Her Hgb in the ED was 3.8.  Pt [facility] and was transfused 4 units PRBCs on admit. INR was supratherapeutic, and coumadin was held. GI was consulted and EGD was done on 18 which showed a few gastic polyps; no signs of bleed. With pt's h/o CAD and A Fib, she was restarted on both ASA 81 and coumadin. She was educated on not taking NSAIDs. Pt was transfused a total of 6 units PRBCs during hospitalization, and H and H is stable on day of d/c. She had RHC before discharge which showed   BP: 133/74  HR: 81  PW:  (20)  PA: 65/22 (39)  AO_SAT: 90  PA_SAT: 61  RV: 62/-3  RVEDP: 12     RA: 9/10 (9)    CONDITION 1 (2018 11:45:16):  FICKCI: 3.1800  FICKCO: 5.4400  PVR: 368.0000  Feels well today. Went over results of RHC. Has had no more issues with bleeding, has not taken any more NSAIDS  Past Medical History:   Diagnosis Date    Allergy     Anemia     Anticoagulant long-term use     4 years coumadin, asa    Arthritis     Awaiting organ transplant 2013    Cataract     Central serous chorioretinopathy of eye, right 2018    CHF (congestive heart failure)     Chronic kidney disease     Colon polyps     COPD (chronic obstructive pulmonary disease)     Coronary artery disease     Diabetes mellitus     Diabetic retinopathy     Diverticulosis     Encounter for blood transfusion     ESRD from HTN strtied RRT 1999    Failed  donor kidney transplant      Glaucoma     History  of bleeding peptic ulcer      as stated per pt    Hyperlipidemia     Hypertension     Hypothyroidism     Morbid obesity 8/10/2012    Renal hypertension     Stroke          Past Surgical History:   Procedure Laterality Date    CATARACT EXTRACTION W/  INTRAOCULAR LENS IMPLANT Bilateral     COLON SURGERY      COLONOSCOPY      COLONOSCOPY N/A 2018    Procedure: COLONOSCOPY;  Surgeon: Jose Falk MD;  Location: [facility] (2ND FLR);  Service: Endoscopy;  Laterality: N/A;  PA pressure >50     Ok to hold Coumadin 3 days prior per Matty MckeonD   Dialysis M-W-  Labs ordered    COLONOSCOPY N/A 2018    Performed by Jose Falk MD at [facility]O (2ND FLR)    EGD (ESOPHAGOGASTRODUODENOSCOPY) N/A 2018    Performed by Luis Washington MD at [facility] (2ND FLR)    ESOPHAGOGASTRODUODENOSCOPY N/A 2018    Procedure: EGD (ESOPHAGOGASTRODUODENOSCOPY);  Surgeon: Luis Washington MD;  Location: [facility] (2ND FLR);  Service: Endoscopy;  Laterality: N/A;    ESOPHAGOGASTRODUODENOSCOPY (EGD) N/A 2018    Performed by Kenji Desir MD at [facility] (2ND FLR)    EYE SURGERY      FRACTURE SURGERY      R arm    HERNIA REPAIR      HYSTERECTOMY      INSERTION-IMPLANT-GLAUCOMA Left 10/12/2017    Performed by Junaid Kern MD at [facility] OR 1ST FLR    KIDNEY TRANSPLANT      NEPHRECTOMY  2008    transplant     PARATHYROID GLAND SURGERY      TONSILLECTOMY      UPPER GASTROINTESTINAL ENDOSCOPY       OB History      Para Term  AB Living    3 3       3    SAB TAB Ectopic Multiple Live Births                     Review of Systems   Constitution: Negative for chills, decreased appetite, diaphoresis, fever, weakness and weight gain.   Eyes: Negative for blurred vision.   Cardiovascular: Negative for chest pain, dyspnea on exertion, paroxysmal nocturnal dyspnea and syncope.   Respiratory: Negative for cough, hemoptysis and shortness of breath.    Musculoskeletal: Negative for arthritis.  "  Gastrointestinal: Negative for bloating, abdominal pain, constipation, diarrhea, nausea and vomiting.   Genitourinary: Negative for flank pain.       Objective:   Height 5' 1" (1.549 m).body mass index is 30.33 kg/m².  Physical Exam   Constitutional: She appears well-developed and well-nourished. No distress.   HENT:   Head: Normocephalic.   Eyes: Pupils are equal, round, and reactive to light.   Neck: Normal range of motion. No JVD present.   Cardiovascular: Normal rate. Exam reveals no friction rub.   No murmur heard.  Pulmonary/Chest: Effort normal. No respiratory distress.   Abdominal: Soft. She exhibits no distension. There is no tenderness.   Musculoskeletal: Normal range of motion. She exhibits no edema.   Neurological: She is alert. No cranial nerve deficit.   Skin: Skin is warm. She is not diaphoretic. No erythema.   Psychiatric: She has a normal mood and affect.       Labs:    Chemistry        Component Value Date/Time     09/20/2018 1054     09/20/2018 1054    K 3.9 09/20/2018 1054    K 3.9 09/20/2018 1054    CL 97 09/20/2018 1054    CL 97 09/20/2018 1054    CO2 29 09/20/2018 1054    CO2 29 09/20/2018 1054    BUN 20 09/20/2018 1054    BUN 20 09/20/2018 1054    CREATININE 7.5 (H) 09/20/2018 1054    CREATININE 7.5 (H) 09/20/2018 1054     (H) 09/20/2018 1054     (H) 09/20/2018 1054        Component Value Date/Time    CALCIUM 9.1 09/20/2018 1054    CALCIUM 9.1 09/20/2018 1054    ALKPHOS 58 09/20/2018 1054    ALKPHOS 58 09/20/2018 1054    AST 12 09/20/2018 1054    AST 12 09/20/2018 1054    ALT 8 (L) 09/20/2018 1054    ALT 8 (L) 09/20/2018 1054    BILITOT 0.6 09/20/2018 1054    BILITOT 0.6 09/20/2018 1054    ESTGFRAFRICA 6.0 (A) 09/20/2018 1054    ESTGFRAFRICA 6.0 (A) 09/20/2018 1054    EGFRNONAA 5.2 (A) 09/20/2018 1054    EGFRNONAA 5.2 (A) 09/20/2018 1054          Magnesium   Date Value Ref Range Status   09/14/2018 2.0 1.6 - 2.6 mg/dL Final     Lab Results   Component Value Date    " WBC 11.28 09/20/2018    WBC 11.28 09/20/2018    HGB 10.6 (L) 09/20/2018    HGB 10.6 (L) 09/20/2018    HCT 35.2 (L) 09/20/2018    HCT 35.2 (L) 09/20/2018     09/20/2018     09/20/2018     Lab Results   Component Value Date    INR 2.1 (H) 09/20/2018    INR 1.5 (H) 09/14/2018    INR 1.8 (H) 09/13/2018     BNP   Date Value Ref Range Status   06/19/2018 1,613 (H) 0 - 99 pg/mL Final     Comment:     Values of less than 100 pg/ml are consistent with non-CHF populations.   03/27/2018 1,699 (H) 0 - 99 pg/mL Final     Comment:     Values of less than 100 pg/ml are consistent with non-CHF populations.   02/08/2018 572 (H) 0 - 99 pg/mL Final     Comment:     Values of less than 100 pg/ml are consistent with non-CHF populations.     No results found for: LDH  No results found for this or any previous visit.  No results found for this or any previous visit.    Assessment:      1. Acute blood loss anemia    2. Anticoagulation monitoring by pharmacist    3. Chronic atrial fibrillation    4. Chronic diastolic heart failure    5. Controlled type 2 diabetes mellitus with both eyes affected by proliferative retinopathy without macular edema, without long-term current use of insulin        Plan:   CBC not done today, will get next visit  Patient being seen by Dr. Hennessy every 6 months, will continue.  6MWT next visit  Patient is now NYHA II  Recommend 2 gram sodium restriction and 1500cc fluid restriction.  Encourage physical activity with graded exercise program.  Requested patient to weigh themselves daily, and to notify us if their weight increases by more than 3 lbs in 1 day or 5 lbs in 1 week.

## 2018-09-25 NOTE — LETTER
September 25, 2018        Tye Deleon  [address] [place] [address] [address]  [address]  [place] [address]  Phone: [phone]  Fax: [phone]             [facility]  [address]  [place] [address]  Phone: [phone]   Patient: Shivani Sheets   MR Number: [id number]   YOB: 1955   Date of Visit: 9/25/2018       Dear Dr. Tye Deleon    Thank you for referring Shivani Sheets to me for evaluation. Attached you will find relevant portions of my assessment and plan of care.    If you have questions, please do not hesitate to call me. I look forward to following Shivani Sheets along with you.    Sincerely,    ANA LILIA Cota    Enclosure    If you would like to receive this communication electronically, please contact [email or web address] or [phone] to request [employer] access.    [employer] is a tool which provides read-only access to select patient information with whom you have a relationship. Its easy to use and provides real time access to review your patients record including encounter summaries, notes, results, and demographic information.    If you feel you have received this communication in error or would no longer like to receive these types of communications, please e-mail [email or web address]

## 2018-10-02 ENCOUNTER — ANTI-COAG VISIT (OUTPATIENT)
Dept: CARDIOLOGY | Facility: CLINIC | Age: 63
End: 2018-10-02

## 2018-10-02 ENCOUNTER — LAB VISIT (OUTPATIENT)
Dept: LAB | Facility: HOSPITAL | Age: 63
End: 2018-10-02
Attending: INTERNAL MEDICINE
Payer: MEDICARE

## 2018-10-02 DIAGNOSIS — Z79.01 ANTICOAGULATION MONITORING BY PHARMACIST: ICD-10-CM

## 2018-10-02 LAB
INR PPP: 2.5
PROTHROMBIN TIME: 24.1 SEC

## 2018-10-02 PROCEDURE — 36415 COLL VENOUS BLD VENIPUNCTURE: CPT | Mod: PO

## 2018-10-02 PROCEDURE — 85610 PROTHROMBIN TIME: CPT

## 2018-10-05 ENCOUNTER — PATIENT OUTREACH (OUTPATIENT)
Dept: ADMINISTRATIVE | Facility: HOSPITAL | Age: 63
End: 2018-10-05

## 2018-10-05 DIAGNOSIS — Z13.820 OSTEOPOROSIS SCREENING: Primary | ICD-10-CM

## 2018-10-05 DIAGNOSIS — Z78.0 ASYMPTOMATIC MENOPAUSAL STATE: ICD-10-CM

## 2018-10-05 NOTE — PROGRESS NOTES
MMG Scheduled 10-9-18, Pt will call to schedule her DEXA SCAN, She will discuss vaccines due at PCP visit 10-9-18.

## 2018-10-06 DIAGNOSIS — Z79.01 ANTICOAGULATION MONITORING BY PHARMACIST: ICD-10-CM

## 2018-10-08 RX ORDER — WARFARIN SODIUM 5 MG/1
TABLET ORAL
Qty: 120 TABLET | Refills: 0 | Status: ON HOLD | OUTPATIENT
Start: 2018-10-08 | End: 2019-01-13 | Stop reason: SDUPTHER

## 2018-10-09 ENCOUNTER — TELEPHONE (OUTPATIENT)
Dept: INTERNAL MEDICINE | Facility: CLINIC | Age: 63
End: 2018-10-09

## 2018-10-09 ENCOUNTER — HOSPITAL ENCOUNTER (OUTPATIENT)
Dept: RADIOLOGY | Facility: HOSPITAL | Age: 63
Discharge: HOME | End: 2018-10-09
Attending: INTERNAL MEDICINE
Payer: MEDICARE

## 2018-10-09 ENCOUNTER — CLINICAL SUPPORT (OUTPATIENT)
Dept: CARDIOLOGY | Facility: CLINIC | Age: 63
End: 2018-10-09
Attending: PHYSICIAN ASSISTANT
Payer: MEDICARE

## 2018-10-09 ENCOUNTER — ANTI-COAG VISIT (OUTPATIENT)
Dept: CARDIOLOGY | Facility: CLINIC | Age: 63
End: 2018-10-09

## 2018-10-09 DIAGNOSIS — I25.10 CORONARY ARTERY DISEASE, ANGINA PRESENCE UNSPECIFIED, UNSPECIFIED VESSEL OR LESION TYPE, UNSPECIFIED WHETHER NATIVE OR TRANSPLANTED HEART: ICD-10-CM

## 2018-10-09 DIAGNOSIS — Z79.01 ANTICOAGULATION MONITORING BY PHARMACIST: ICD-10-CM

## 2018-10-09 DIAGNOSIS — Z12.39 SCREENING FOR BREAST CANCER: ICD-10-CM

## 2018-10-09 PROCEDURE — 93016 CV STRESS TEST SUPVJ ONLY: CPT | Mod: S$PBB,,, | Performed by: INTERNAL MEDICINE

## 2018-10-09 PROCEDURE — 77067 SCR MAMMO BI INCL CAD: CPT | Mod: 26,,, | Performed by: RADIOLOGY

## 2018-10-09 PROCEDURE — 93017 CV STRESS TEST TRACING ONLY: CPT | Mod: PBBFAC | Performed by: INTERNAL MEDICINE

## 2018-10-09 PROCEDURE — 77067 SCR MAMMO BI INCL CAD: CPT | Mod: TC

## 2018-10-09 PROCEDURE — 93018 CV STRESS TEST I&R ONLY: CPT | Mod: S$PBB,,, | Performed by: INTERNAL MEDICINE

## 2018-10-09 PROCEDURE — 78492 MYOCRD IMG PET MLT RST&STRS: CPT | Mod: 26,S$PBB,, | Performed by: INTERNAL MEDICINE

## 2018-10-09 NOTE — TELEPHONE ENCOUNTER
----- Message from Romi Alvarez sent at 10/9/2018 11:59 AM CDT -----  Contact: self  Patient need to speak with nurse patient running behind from  appointment for cardiac pet in route to doctor visit.   Please call pt if any questions [phone]

## 2018-10-09 NOTE — TELEPHONE ENCOUNTER
----- Message from Romi Alvarez sent at 10/9/2018 12:08 PM CDT -----  Contact: self   Patient need to speak with nurse.   Pt states need to reschedule hospital follow up need a  Tuesday or Thursday.   Pt states goes to dialysis on Mon, Wed, and Friday.   Please call pt at [phone]    FYI:   I was not able to schedule appointment

## 2018-10-11 ENCOUNTER — HOSPITAL ENCOUNTER (OUTPATIENT)
Dept: RADIOLOGY | Facility: HOSPITAL | Age: 63
Discharge: HOME | End: 2018-10-11
Attending: INTERNAL MEDICINE
Payer: MEDICARE

## 2018-10-11 ENCOUNTER — OFFICE VISIT (OUTPATIENT)
Dept: INTERNAL MEDICINE | Facility: CLINIC | Age: 63
End: 2018-10-11
Payer: MEDICARE

## 2018-10-11 VITALS
SYSTOLIC BLOOD PRESSURE: 138 MMHG | HEART RATE: 93 BPM | BODY MASS INDEX: 30.47 KG/M2 | HEIGHT: 61 IN | DIASTOLIC BLOOD PRESSURE: 86 MMHG | WEIGHT: 161.38 LBS

## 2018-10-11 DIAGNOSIS — K92.2 GASTROINTESTINAL HEMORRHAGE, UNSPECIFIED GASTROINTESTINAL HEMORRHAGE TYPE: Primary | ICD-10-CM

## 2018-10-11 DIAGNOSIS — Z13.820 OSTEOPOROSIS SCREENING: ICD-10-CM

## 2018-10-11 DIAGNOSIS — I10 HYPERTENSION, UNSPECIFIED TYPE: ICD-10-CM

## 2018-10-11 DIAGNOSIS — M06.9 RHEUMATOID ARTHRITIS, INVOLVING UNSPECIFIED SITE, UNSPECIFIED RHEUMATOID FACTOR PRESENCE: ICD-10-CM

## 2018-10-11 DIAGNOSIS — Z78.0 ASYMPTOMATIC MENOPAUSAL STATE: ICD-10-CM

## 2018-10-11 PROCEDURE — 77080 DXA BONE DENSITY AXIAL: CPT | Mod: 26,,, | Performed by: RADIOLOGY

## 2018-10-11 PROCEDURE — 99999 PR PBB SHADOW E&M-EST. PATIENT-LVL V: CPT | Mod: PBBFAC,,, | Performed by: INTERNAL MEDICINE

## 2018-10-11 PROCEDURE — 99215 OFFICE O/P EST HI 40 MIN: CPT | Mod: PBBFAC,25 | Performed by: INTERNAL MEDICINE

## 2018-10-11 PROCEDURE — 99214 OFFICE O/P EST MOD 30 MIN: CPT | Mod: S$PBB,,, | Performed by: INTERNAL MEDICINE

## 2018-10-11 PROCEDURE — 77080 DXA BONE DENSITY AXIAL: CPT | Mod: TC

## 2018-10-15 NOTE — PROGRESS NOTES
Subjective:       Patient ID: Shivani Sheets is a 63 y.o. female.    Chief Complaint: Hypertension    HPI  She returns for management of hypertension.  She has had hypertension for over a year.  Current treatment has included medications outlined in medication list.  She denies chest pain or shortness of breath.  No palpitations.  Denies left arm or neck pain. She was recently hospitalized with a GI bleed.  Please see hospitalization records.  She denies abdominal pain.  No blood in stool    Past medical history: Hypertension, end-stage renal disease on dialysis, status post failed kidney transplant, pulmonary hypertension, atrial fibrillation, coronary artery disease, rheumatoid arthritis, sleep apnea, hypothyroidism, status post GI bleed, glaucoma, status post hernia repair, colon adenoma.  She had a colonoscopy June 2018     Medications: Xalatan, Synthroid 0.1 mg daily, Lipitor 10 mg daily,selexipag,Coumadin as monitored by Coumadin clinic, diltiazem 240 mg daily     ALLERGIES: Penicillin, iodine, Bactrim       Review of Systems   Constitutional: Negative for chills, fatigue, fever and unexpected weight change.   Respiratory: Negative for chest tightness and shortness of breath.    Cardiovascular: Negative for chest pain and palpitations.   Gastrointestinal: Negative for abdominal pain and blood in stool.   Neurological: Negative for dizziness, syncope, numbness and headaches.       Objective:      Physical Exam   HENT:   Right Ear: External ear normal.   Left Ear: External ear normal.   Nose: Nose normal.   Mouth/Throat: Oropharynx is clear and moist.   Eyes: Pupils are equal, round, and reactive to light.   Neck: Normal range of motion.   Cardiovascular: Normal rate and regular rhythm.   No murmur heard.  Pulmonary/Chest: Breath sounds normal.   Abdominal: She exhibits no distension. There is no hepatosplenomegaly. There is no tenderness.   Lymphadenopathy:     She has no cervical adenopathy.     She has no  axillary adenopathy.   Neurological: She has normal strength and normal reflexes. No cranial nerve deficit or sensory deficit.       Assessment/Plan       Assessment and plan:  1.  Hypertension:  Controlled  2.  GI bleed:  Follow up with Gastroenterology  3.  Discussed Pap smear, pelvic exam.  She declined all

## 2018-10-16 ENCOUNTER — ANTI-COAG VISIT (OUTPATIENT)
Dept: CARDIOLOGY | Facility: CLINIC | Age: 63
End: 2018-10-16

## 2018-10-16 ENCOUNTER — OFFICE VISIT (OUTPATIENT)
Dept: OPHTHALMOLOGY | Facility: CLINIC | Age: 63
End: 2018-10-16
Payer: MEDICARE

## 2018-10-16 ENCOUNTER — LAB VISIT (OUTPATIENT)
Dept: LAB | Facility: HOSPITAL | Age: 63
End: 2018-10-16
Payer: MEDICARE

## 2018-10-16 DIAGNOSIS — H35.711 CENTRAL SEROUS CHORIORETINOPATHY OF EYE, RIGHT: ICD-10-CM

## 2018-10-16 DIAGNOSIS — Z79.01 ANTICOAGULATION MONITORING BY PHARMACIST: ICD-10-CM

## 2018-10-16 DIAGNOSIS — E11.3593 CONTROLLED TYPE 2 DIABETES MELLITUS WITH BOTH EYES AFFECTED BY PROLIFERATIVE RETINOPATHY WITHOUT MACULAR EDEMA, WITHOUT LONG-TERM CURRENT USE OF INSULIN: Primary | ICD-10-CM

## 2018-10-16 DIAGNOSIS — H40.1133 PRIMARY OPEN-ANGLE GLAUCOMA, BILATERAL, SEVERE STAGE: ICD-10-CM

## 2018-10-16 LAB
INR PPP: 2.3
PROTHROMBIN TIME: 22.5 SEC

## 2018-10-16 PROCEDURE — 92014 COMPRE OPH EXAM EST PT 1/>: CPT | Mod: S$PBB,,, | Performed by: OPHTHALMOLOGY

## 2018-10-16 PROCEDURE — 36415 COLL VENOUS BLD VENIPUNCTURE: CPT

## 2018-10-16 PROCEDURE — 92134 CPTRZ OPH DX IMG PST SGM RTA: CPT | Mod: PBBFAC | Performed by: OPHTHALMOLOGY

## 2018-10-16 PROCEDURE — 99212 OFFICE O/P EST SF 10 MIN: CPT | Mod: PBBFAC | Performed by: OPHTHALMOLOGY

## 2018-10-16 PROCEDURE — 99999 PR PBB SHADOW E&M-EST. PATIENT-LVL II: CPT | Mod: PBBFAC,,, | Performed by: OPHTHALMOLOGY

## 2018-10-16 PROCEDURE — 92226 PR SPECIAL EYE EXAM, SUBSEQUENT: CPT | Mod: 50,PBBFAC | Performed by: OPHTHALMOLOGY

## 2018-10-16 PROCEDURE — 92226 PR SPECIAL EYE EXAM, SUBSEQUENT: CPT | Mod: 50,S$PBB,, | Performed by: OPHTHALMOLOGY

## 2018-10-16 PROCEDURE — 85610 PROTHROMBIN TIME: CPT

## 2018-10-16 NOTE — PROGRESS NOTES
Looks like patient doing well with this dose. If w/in range next week would like to space out visits

## 2018-10-16 NOTE — PROGRESS NOTES
"HPI     DLS 8/21/18---Dr Hilario     8 weeks ck         Pt c/o "my vision in the right eye is more blur than it was 3 mons ago"no   eye pain, flashes or floaters            Meds: Cosopt BID OD             Xalatan qhs OD             Alphagan BID OD             Prednisolone OS BID     Last edited by Zaira Schimdt on 10/16/2018  9:00 AM. (History)          OCT  OD - ERM with small subfoveal SRF pocket  OS - no ME    Prior FA - smoldering regressed NVD OU  NO NVE      Plan     1. Neovascular Glaucoma OU  PDR/OIS OU - significant vascular disease -?RVO OD  OD - s/p GDD with [facility] 2013  OS  S/p BV with JDN 10/17    10/18 increased ME OS - ?CME from RVO vs DME  Pt ASx - will repeat FA in 2 months      Both eyes  Cosopt BID  Xal q day  Alphagan BID  IOP improved today from 44 to 24 OD    2.  OD  Recent PO steroids - pt taking 0.5 on taper  ERM possibly contributing  Recent elevated BP, also predominant pulmonary HTN with recent exacerbation - could contribute to small uveal effusiions - will  Monitor    3. ERM OD  No MMP  Follow    4. HTN Ret OU  Contributing to #2    5. ESRD on HD    6. PCIOL OU        8 weeks OCT [place] FA OS      "

## 2018-10-21 DIAGNOSIS — H40.50X3: ICD-10-CM

## 2018-10-22 RX ORDER — LATANOPROST 50 UG/ML
SOLUTION/ DROPS OPHTHALMIC
Qty: 7.5 ML | Refills: 0 | Status: SHIPPED | OUTPATIENT
Start: 2018-10-22

## 2018-10-23 ENCOUNTER — ANTI-COAG VISIT (OUTPATIENT)
Dept: CARDIOLOGY | Facility: CLINIC | Age: 63
End: 2018-10-23

## 2018-10-23 ENCOUNTER — LAB VISIT (OUTPATIENT)
Dept: LAB | Facility: HOSPITAL | Age: 63
End: 2018-10-23
Attending: INTERNAL MEDICINE
Payer: MEDICARE

## 2018-10-23 DIAGNOSIS — Z79.01 ANTICOAGULATION MONITORING BY PHARMACIST: ICD-10-CM

## 2018-10-23 LAB
INR PPP: 2.7
PROTHROMBIN TIME: 26.2 SEC

## 2018-10-23 PROCEDURE — 36415 COLL VENOUS BLD VENIPUNCTURE: CPT | Mod: PO

## 2018-10-23 PROCEDURE — 85610 PROTHROMBIN TIME: CPT

## 2018-10-23 NOTE — PROGRESS NOTES
Patient advised to continue current dose   Advised follow up INR on 10/30/18   Patient verbalized understanding

## 2018-10-25 DIAGNOSIS — I49.3 PVC'S (PREMATURE VENTRICULAR CONTRACTIONS): Primary | ICD-10-CM

## 2018-10-30 ENCOUNTER — ANTI-COAG VISIT (OUTPATIENT)
Dept: CARDIOLOGY | Facility: CLINIC | Age: 63
End: 2018-10-30

## 2018-10-30 ENCOUNTER — HOSPITAL ENCOUNTER (EMERGENCY)
Facility: HOSPITAL | Age: 63
Discharge: HOME | End: 2018-10-30
Attending: EMERGENCY MEDICINE
Payer: MEDICARE

## 2018-10-30 VITALS
HEIGHT: 61 IN | BODY MASS INDEX: 29.97 KG/M2 | DIASTOLIC BLOOD PRESSURE: 85 MMHG | HEART RATE: 70 BPM | RESPIRATION RATE: 18 BRPM | WEIGHT: 158.75 LBS | TEMPERATURE: 98 F | OXYGEN SATURATION: 95 % | SYSTOLIC BLOOD PRESSURE: 157 MMHG

## 2018-10-30 DIAGNOSIS — M25.552 PAIN OF LEFT HIP JOINT: Primary | ICD-10-CM

## 2018-10-30 DIAGNOSIS — Z79.01 ANTICOAGULATION MONITORING BY PHARMACIST: ICD-10-CM

## 2018-10-30 PROCEDURE — 99283 EMERGENCY DEPT VISIT LOW MDM: CPT | Mod: ,,, | Performed by: EMERGENCY MEDICINE

## 2018-10-30 PROCEDURE — 99283 EMERGENCY DEPT VISIT LOW MDM: CPT

## 2018-10-30 NOTE — ED PROVIDER NOTES
Encounter Date: 10/30/2018    SCRIBE #1 NOTE: I, Son Desire, am scribing for, and in the presence of,  Dr. Weeks . I have scribed the entire note.       History     Chief Complaint   Patient presents with    Hip Pain     L hip pain, denies injury, had this in the past     Time patient was seen by the provider: 11:57 AM      The patient is a 63 y.o. female with co-morbidities including: arthritis, CAD, HTN, DM who presents to the ED with a complaint of severe left hip pain that started last 6 days ago. Denies any recent fall or trauma to her hip. Denies radiation to her legs. Her pain is constant that worsens with movement. Notes that the pain is similar her rheumatoid arthritis. Reports of similar symptoms in the past and was evaluated in the ED. Pt has been taking her prescribed medicines for the pain, but gave her no relief for the past few days. Pt uses a cane to ambulate. Denies fever and chills. Reports of using a lidocaine patch this morning. Now, her pain is improving.       The history is provided by the patient and medical records.     Review of patient's allergies indicates:   Allergen Reactions    Penicillins Swelling    Iodine      Other reaction(s): Hives    Sulfamethoxazole-trimethoprim      Other reaction(s): Swelling  Other reaction(s): Hives     Past Medical History:   Diagnosis Date    Allergy     Anemia     Anticoagulant long-term use     4 years coumadin, asa    Arthritis     Awaiting organ transplant 2013    Cataract     Central serous chorioretinopathy of eye, right 2018    CHF (congestive heart failure)     Chronic kidney disease     Colon polyps     COPD (chronic obstructive pulmonary disease)     Coronary artery disease     Diabetes mellitus     Diabetic retinopathy     Diverticulosis     Encounter for blood transfusion     ESRD from HTN strtied RRT 1999    Failed  donor kidney transplant      Glaucoma     History of bleeding  peptic ulcer     1970's as stated per pt    Hyperlipidemia     Hypertension     Hypothyroidism     Morbid obesity 8/10/2012    Renal hypertension     Stroke     1987     Past Surgical History:   Procedure Laterality Date    CATARACT EXTRACTION W/  INTRAOCULAR LENS IMPLANT Bilateral     COLON SURGERY      COLONOSCOPY      COLONOSCOPY N/A 6/12/2018    Procedure: COLONOSCOPY;  Surgeon: Jose Falk MD;  Location: [facility] (2ND FLR);  Service: Endoscopy;  Laterality: N/A;  PA pressure >50     Ok to hold Coumadin 3 days prior per Matty MckeonD   Dialysis M-W-F  Labs ordered    COLONOSCOPY N/A 6/12/2018    [facility] ([facility])    EGD (ESOPHAGOGASTRODUODENOSCOPY) N/A 9/11/2018    Performed by Luis Washington MD at [facility] (2ND FLR)    ESOPHAGOGASTRODUODENOSCOPY N/A 9/11/2018    Procedure: EGD (ESOPHAGOGASTRODUODENOSCOPY);  Surgeon: Luis Washington MD;  Location: [facility] (2ND FLR);  Service: Endoscopy;  Laterality: N/A;    ESOPHAGOGASTRODUODENOSCOPY (EGD) N/A 2/27/2018    Performed by Kenji Desir MD at [facility] (2ND FLR)    EYE SURGERY      FRACTURE SURGERY      R arm    HERNIA REPAIR      HYSTERECTOMY      INSERTION-IMPLANT-GLAUCOMA Left 10/12/2017    Performed by Junaid Kern [facility]    KIDNEY TRANSPLANT      NEPHRECTOMY  11/2008    transplant     PARATHYROID GLAND SURGERY      TONSILLECTOMY      UPPER GASTROINTESTINAL ENDOSCOPY       Family History   Problem Relation Age of Onset    Heart attack Father     Heart failure Father     Hypertension Father     Blindness Father     Heart attack Mother     Heart failure Mother     Hypertension Mother     Asthma Sister     Depression Sister     Hypertension Sister     Hypertension Brother     Kidney disease Brother         ESRD    Diabetes Maternal Aunt     Breast cancer Maternal Aunt     Amblyopia Neg Hx     Cataracts Neg Hx     Macular degeneration Neg Hx     Retinal detachment  Neg Hx     Strabismus Neg Hx     Colon cancer Neg Hx     Esophageal cancer Neg Hx      Social History     Tobacco Use    Smoking status: Former Smoker     Types: Cigarettes     Last attempt to quit: 8/10/2000     Years since quittin.2    Smokeless tobacco: Never Used   Substance Use Topics    Alcohol use: No     Comment: hx of etoh     Drug use: No     Review of Systems   Constitutional: Negative for chills and fever.   HENT: Negative.    Eyes: Negative.    Respiratory: Negative.    Cardiovascular: Negative.    Gastrointestinal: Negative.    Genitourinary: Negative.    Musculoskeletal:        + left hip pain    Skin: Negative.    Neurological: Negative.        Physical Exam     Initial Vitals [10/30/18 1106]   BP Pulse Resp Temp SpO2   (!) 157/85 70 18 98.3 °F (36.8 °C) 95 %      MAP       --         Physical Exam    Nursing note and vitals reviewed.  Constitutional: She appears well-developed and well-nourished. No distress.   HENT:   Head: Normocephalic and atraumatic.   Eyes: EOM are normal. Pupils are equal, round, and reactive to light.   Neck: Normal range of motion. Neck supple.   Cardiovascular: Normal rate, regular rhythm and normal heart sounds. Exam reveals no gallop and no friction rub.    No murmur heard.  Pulmonary/Chest: Breath sounds normal. No respiratory distress.   Abdominal: Soft. She exhibits no distension. There is no tenderness.   Musculoskeletal: Normal range of motion. She exhibits tenderness. She exhibits no edema.   Normal hip ROM, mild tenderness to the along the lateral aspect. Pt is able to stand and march in place with normal strength and without difficulty.   Neurological: She is alert and oriented to person, place, and time. She has normal strength.   Skin: Skin is warm and dry.         ED Course   Procedures  Labs Reviewed - No data to display       Imaging Results    None          Medical Decision Making:   History:   Old Medical Records: I decided to obtain old medical  records.  Initial Assessment:   Acute on chronic left hip pain vs fracture vs dislocation. Exam consistent to acute on chronic hip pain that is resolve at this time with topical lidocaine patch per pt. Pt has f/u with rheumatology next month. Repeat imagining is not indicated at this time. Fracture is not suspected with no report of falls. Will discharge with symptomatic treatment with return precautions.             Scribe Attestation:   Scribe #1: I performed the above scribed service and the documentation accurately describes the services I performed. I attest to the accuracy of the note.               Clinical Impression:   The encounter diagnosis was Pain of left hip joint.      Disposition:   Disposition: Discharged  Condition: Stable       I, Dr. Mela Weeks, personally performed the services described in this documentation. All medical record entries made by the scribe were at my direction and in my presence.  I have reviewed the chart and agree that the record reflects my personal performance and is accurate and complete. eMla Weeks MD.  2:56 PM 10/30/2018                   Mela Weeks MD  10/30/18 7238

## 2018-10-30 NOTE — ED TRIAGE NOTES
Shivani Sheets, a 63 y.o. female presents to the ED via personal transport with CC left hip pain, denies injury, denies numbness or tingling, reports hx of arthritis and sciatica, rates pain 9/10 at this time.    Patient identifiers verified verbally with patient and correct for Shivani Sheets.    LOC/ APPEARANCE: The patient is awake, alert and oriented x 4. Pt is speaking appropriately, no slurred speech. Patient resting comfortably and in no acute distress. Pt is clean and well groomed. No JVD visible. Pt updated on POC. Bed low and locked with side rails up x2, call bell in pt reach.  SKIN: Skin is warm dry and intact, and color is consistent with ethnicity. Capillary refill <3 seconds. No breakdown or brusing visible and mucus membranes moist and acyanotic.  MUSCULOSKELETAL: Pt arrived on motor scooter. Full ROM noted to BUE, limited ROM to BLE s/t hip pain per pt.  RESPIRATORY: Airway is open and patent. Respirations-unlabored, regular rate, equal bilaterally on inspiration and expiration. No accessory muscle use noted. Lungs clear to auscultation in all fields bilaterally anterior and posterior.   CARDIAC: Patient has regular heart rate.  No peripheral edema noted, and patient has no c/o chest pain. Peripheral pulses present equal and strong throughout.  ABDOMEN: Soft and non-tender to palpation with no distention noted.   NEUROLOGIC: Eyes open spontaneously and facial expression symmetrical. Pt behavior appropriate to situation, and pt follows commands.  Pt reports sensation present in all extremities when touched with a finger. RICKY

## 2018-10-30 NOTE — PROGRESS NOTES
Patient advised to maintain current dose   Advised follow up 11/8/18    Patient verbalized understanding

## 2018-11-02 RX ORDER — PANTOPRAZOLE SODIUM 40 MG/1
TABLET, DELAYED RELEASE ORAL
Qty: 30 TABLET | Refills: 0 | Status: SHIPPED | OUTPATIENT
Start: 2018-11-02 | End: 2018-12-04 | Stop reason: SDUPTHER

## 2018-11-07 ENCOUNTER — TELEPHONE (OUTPATIENT)
Dept: ELECTROPHYSIOLOGY | Facility: CLINIC | Age: 63
End: 2018-11-07

## 2018-11-07 NOTE — TELEPHONE ENCOUNTER
----- Message from Rosalee Valencia MA sent at 11/6/2018  4:44 PM CST -----  Please call pt to schedule a new pt appt with Dr Bentley  ----- Message -----  From: Elisha Hallman  Sent: 11/6/2018   4:27 PM  To: [facility] Arrhythmia Clinical Support Staff    The Pt has a referral to schedule. Thanks, Elisha

## 2018-11-08 ENCOUNTER — OFFICE VISIT (OUTPATIENT)
Dept: GASTROENTEROLOGY | Facility: CLINIC | Age: 63
End: 2018-11-08
Payer: MEDICARE

## 2018-11-08 ENCOUNTER — HOSPITAL ENCOUNTER (OUTPATIENT)
Dept: CARDIOLOGY | Facility: CLINIC | Age: 63
Discharge: HOME | End: 2018-11-08
Attending: PHYSICIAN ASSISTANT
Payer: MEDICARE

## 2018-11-08 ENCOUNTER — ANTI-COAG VISIT (OUTPATIENT)
Dept: CARDIOLOGY | Facility: CLINIC | Age: 63
End: 2018-11-08

## 2018-11-08 VITALS
WEIGHT: 157.88 LBS | DIASTOLIC BLOOD PRESSURE: 101 MMHG | HEART RATE: 73 BPM | BODY MASS INDEX: 29.81 KG/M2 | SYSTOLIC BLOOD PRESSURE: 174 MMHG | HEIGHT: 61 IN

## 2018-11-08 VITALS
SYSTOLIC BLOOD PRESSURE: 174 MMHG | WEIGHT: 157 LBS | HEIGHT: 61 IN | DIASTOLIC BLOOD PRESSURE: 101 MMHG | HEART RATE: 79 BPM | BODY MASS INDEX: 29.64 KG/M2

## 2018-11-08 DIAGNOSIS — I27.20 PULMONARY HYPERTENSION: ICD-10-CM

## 2018-11-08 DIAGNOSIS — D64.9 ANEMIA, UNSPECIFIED TYPE: Primary | ICD-10-CM

## 2018-11-08 DIAGNOSIS — K92.1 MELENA: ICD-10-CM

## 2018-11-08 DIAGNOSIS — Z79.01 ANTICOAGULATION MONITORING BY PHARMACIST: ICD-10-CM

## 2018-11-08 LAB
ASCENDING AORTA: 2.87 CM
AV MEAN GRADIENT: 6.17 MMHG
AV PEAK GRADIENT: 12.53 MMHG
AV VALVE AREA: 1.74 CM2
BSA FOR ECHO PROCEDURE: 1.75 M2
CV ECHO LV RWT: 0.45 CM
DOP CALC AO PEAK VEL: 1.77 M/S
DOP CALC AO VTI: 33.46 CM
DOP CALC LVOT AREA: 3.4 CM2
DOP CALC LVOT DIAMETER: 2.08 CM
DOP CALC LVOT STROKE VOLUME: 58.31 CM3
DOP CALCLVOT PEAK VEL VTI: 17.17 CM
E WAVE DECELERATION TIME: 127.13 MSEC
E/A RATIO: 1.33
E/E' RATIO: 14.75
ECHO LV POSTERIOR WALL: 1.02 CM (ref 0.6–1.1)
FRACTIONAL SHORTENING: 23 % (ref 28–44)
INTERVENTRICULAR SEPTUM: 1.51 CM (ref 0.6–1.1)
IVRT: 0.07 MSEC
LA MAJOR: 5.6 CM
LA MINOR: 5.96 CM
LA WIDTH: 4.13 CM
LEFT ATRIUM SIZE: 4.88 CM
LEFT ATRIUM VOLUME INDEX: 56.5 ML/M2
LEFT ATRIUM VOLUME: 98.92 CM3
LEFT INTERNAL DIMENSION IN SYSTOLE: 3.5 CM (ref 2.1–4)
LEFT VENTRICLE DIASTOLIC VOLUME INDEX: 54.88 ML/M2
LEFT VENTRICLE DIASTOLIC VOLUME: 96.04 ML
LEFT VENTRICLE MASS INDEX: 125.1 G/M2
LEFT VENTRICLE SYSTOLIC VOLUME INDEX: 29.1 ML/M2
LEFT VENTRICLE SYSTOLIC VOLUME: 50.99 ML
LEFT VENTRICULAR INTERNAL DIMENSION IN DIASTOLE: 4.57 CM (ref 3.5–6)
LEFT VENTRICULAR MASS: 218.97 G
LV LATERAL E/E' RATIO: 10.73
LV SEPTAL E/E' RATIO: 23.6
MV PEAK A VEL: 0.89 M/S
MV PEAK E VEL: 1.18 M/S
MV STENOSIS PRESSURE HALF TIME: 36.87 MS
MV VALVE AREA P 1/2 METHOD: 5.97 CM2
PISA TR MAX VEL: 4.77 M/S
PULM VEIN S/D RATIO: 1.15
PV PEAK D VEL: 0.46 M/S
PV PEAK S VEL: 0.53 M/S
RA MAJOR: 5.67 CM
RA PRESSURE: 8 MMHG
RA WIDTH: 4.93 CM
RETIRED EF AND QEF - SEE NOTES: 50 %
RIGHT VENTRICULAR END-DIASTOLIC DIMENSION: 5.58 CM
SINUS: 2.79 CM
STJ: 2.87 CM
TDI LATERAL: 0.11
TDI SEPTAL: 0.05
TDI: 0.08
TR MAX PG: 91.01 MMHG
TRICUSPID ANNULAR PLANE SYSTOLIC EXCURSION: 1.84 CM
TV REST PULMONARY ARTERY PRESSURE: 99.01 MMHG

## 2018-11-08 PROCEDURE — 99214 OFFICE O/P EST MOD 30 MIN: CPT | Mod: PBBFAC,25 | Performed by: PHYSICIAN ASSISTANT

## 2018-11-08 PROCEDURE — 99999 PR PBB SHADOW E&M-EST. PATIENT-LVL IV: CPT | Mod: PBBFAC,,, | Performed by: PHYSICIAN ASSISTANT

## 2018-11-08 PROCEDURE — 93306 TTE W/DOPPLER COMPLETE: CPT | Mod: PBBFAC | Performed by: INTERNAL MEDICINE

## 2018-11-08 PROCEDURE — 99214 OFFICE O/P EST MOD 30 MIN: CPT | Mod: S$PBB,,, | Performed by: PHYSICIAN ASSISTANT

## 2018-11-08 NOTE — LETTER
November 8, 2018      Eula Weiss MD  [address]  [place] [address]           [facility] Gastroenter[facility]  [address]  [place] [address]  Phone: [phone]  Fax: [phone]          Patient: Shivani Sheets   MR Number: [id number]   YOB: 1955   Date of Visit: 11/8/2018       Dear Dr. Eula Weiss:    Thank you for referring Shivani Sheets to me for evaluation. Attached you will find relevant portions of my assessment and plan of care.    If you have questions, please do not hesitate to call me. I look forward to following Shivani Sheets along with you.    Sincerely,    Regina Rodriguez PA-C    Enclosure  CC:  No Recipients    If you would like to receive this communication electronically, please contact [email or web address] or [phone] to request more information on [employer] Link access.    For providers and/or their staff who would like to refer a patient to [facility], please contact us through our one-stop-shop provider referral line, [facility] [place], at [phone].    If you feel you have received this communication in error or would no longer like to receive these types of communications, please e-mail [email or web address]

## 2018-11-08 NOTE — PROGRESS NOTES
[facility] Consultation Note    Reason for Consult:  The primary encounter diagnosis was Anemia, unspecified type. A diagnosis of Melena was also pertinent to this visit.    PCP:   Eula Weiss   [address] / [place] [address]    Referring MD:  Eula Weiss Md  [address] [place] [address]    HPI:  This is a 63 y.o. female here for a hospital follow.  Hospitalized last month for melena and anemia.    GI consult notes  Patient presented with 4 days of melena in the setting of high INR and recent NSAID use. Presented with Hb of 3.8 from a baseline of ~11. Responded well to transfusion. She is hemodynamically stable.     EGD done 9/11/18  Impression:           - Normal esophagus.                        - A few gastric polyps.                        - Normal examined duodenum.                        - No specimens collected.  Recommendation:         Continue to monitor clinically and trend H/H. If continues to show signs of active GI bleeding will consider inpatient VCE.     6/2018 colonoscopy for GI bleeding    - Hemorrhoids found on perianal exam.                        - Two 5 mm polyps in the sigmoid colon and in the                         ascending colon, removed with a cold biopsy                         forceps. Resected and retrieved.                        - Non-bleeding internal hemorrhoids.    Interval Hx  Today she is doing well.  Denies melena or rectal bleeding since being in the hospital  Did see mucus in the stool 2 weeks ago on 2 occasions  9/20/18 Hgb 10.6    She reports hx of intestinal perforation requiring surgery, which was a complication during a peritneal dailysis / hernia repair surgery    5/2018 office visit notes   for a hospital f/u to f/u on her melena, CARMEN, and heme positive stool. PMH Pum HTN with PA pressures of 71, afib on coumadin, remote PUD, and ESRD 2/2 HTN (LUE AVF) s/p failed transplant (re-listed).    She saw  "anticoagulation clinic  and her INR was noted to be elevated with patient having melena. She was encouraged to go to the ER and adviced to hold warfarin. In triage, Hgb was noted to 4.7 (down from 7.8 one week prior with baseline around 9-10), the patient was admitted to the ICU, and "given blood transfusion and FFP". Per notes, at least 2U FFP and 3U PRBCs were given.     2008 colonoscopy - internal hermorhoid and diverticulosis  2018 EGD revealed gastric polyps, and a small hiatal hernia    Plan was to do a colonoscopy as an outpatient    Today she dnies melena  Some intermittent abdominal cramping  Bowels alternate between bristol type 1, normal stool, and diarrhea with fecal incontinence  + poor appetite and early satiety  + abdominal bloating    Has fluid restriction  Getting iron in dialysis    ROS:  Constitutional: No fevers, chills, No weight loss  ENT: No allergies  CV: No chest pain  Pulm: No cough, No shortness of breath  Ophtho: No vision changes  GI: see HPI  Derm: No rash  Heme: No lymphadenopathy, No bruising  MSK: No arthritis  : No dysuria, No hematuria  Endo: No hot or cold intolerance  Neuro: No syncope, No seizure  Psych: No anxiety, No depression    Medical History:  has a past medical history of Allergy, Anemia, Anticoagulant long-term use, Arthritis, Awaiting organ transplant (2013), Cataract, Central serous chorioretinopathy of eye, right (2018), CHF (congestive heart failure), Chronic kidney disease, Colon polyps, COPD (chronic obstructive pulmonary disease), Coronary artery disease, Diabetes mellitus, Diabetic retinopathy, Diverticulosis, Encounter for blood transfusion, ESRD from HTN strtied RRT  (1999), Failed  donor kidney transplant -, Glaucoma, History of bleeding peptic ulcer, Hyperlipidemia, Hypertension, Hypothyroidism, Morbid obesity (8/10/2012), Renal hypertension, and Stroke.    Surgical History:  has a past surgical history that " includes Kidney transplant; Hysterectomy; Eye surgery; Nephrectomy (11/2008); Tonsillectomy; Cataract extraction w/  intraocular lens implant (Bilateral); Upper gastrointestinal endoscopy; Colonoscopy; Parathyroid gland surgery; Colon surgery; Hernia repair; Fracture surgery; EGD (ESOPHAGOGASTRODUODENOSCOPY) (N/A, 9/11/2018); COLONOSCOPY (N/A, 6/12/2018); ESOPHAGOGASTRODUODENOSCOPY (EGD) (N/A, 2/27/2018); and INSERTION-IMPLANT-GLAUCOMA (Left, 10/12/2017).    Family History: family history includes Asthma in her sister; Blindness in her father; Breast cancer in her maternal aunt; Depression in her sister; Diabetes in her maternal aunt; Heart attack in her father and mother; Heart failure in her father and mother; Hypertension in her brother, father, mother, and sister; Kidney disease in her brother..     Social History:  reports that she quit smoking about 18 years ago. Her smoking use included cigarettes. she has never used smokeless tobacco. She reports that she does not drink alcohol or use drugs.    Review of patient's allergies indicates:   Allergen Reactions    Penicillins Swelling    Iodine      Other reaction(s): Hives    Sulfamethoxazole-trimethoprim      Other reaction(s): Swelling  Other reaction(s): Hives       Current Outpatient Medications on File Prior to Visit   Medication Sig Dispense Refill    ALPHAGAN P 0.1 % Drop       aspirin 81 MG chewable tablet Take 81 mg by mouth Daily. 1  By mouth Every day      CARTIA  mg 24 hr capsule TAKE ONE CAPSULE BY MOUTH DAILY 90 capsule 0    dorzolamide-timolol 2-0.5% (COSOPT) 22.3-6.8 mg/mL ophthalmic solution INSTILL 1 DROP IN BOTH EYES TWICE DAILY 10 mL 0    hydrocodone-acetaminophen 7.5-325mg (NORCO) 7.5-325 mg per tablet TK 1 T PO Q 6 H PRN  0    latanoprost 0.005 % ophthalmic solution INSTILL 1 DROP IN BOTH EYES EVERY DAY AT BEDTIME 7.5 mL 0    levothyroxine (SYNTHROID) 100 MCG tablet Take 100 mcg by mouth. 1 Tablet Oral Every day       "lidocaine-prilocaine (EMLA) cream Apply topically as needed.      LIPITOR 10 mg tablet TAKE 1 BY MOUTH ONCE A DAY 90 tablet 2    pantoprazole (PROTONIX) 40 MG tablet TAKE 1 TABLET BY MOUTH ONCE DAILY 30 tablet 0    riociguat (ADEMPAS) 2 mg Tab tablet Take 2 mg by mouth 3 (three) times daily.       selexipag (UPTRAVI) 1,000 mcg Tab Take 1,000 mcg by mouth 2 (two) times daily.      warfarin (COUMADIN) 5 MG tablet TAKE 1 1/2 TABLETS BY MOUTH DAILY ON TUESDAY, THURSDAY AND SATURDAY, THEN TAKE 1 TABLET DAILY ON MONDAY, WEDNESDAY, FRIDAY AND SUNDAY 120 tablet 0     No current facility-administered medications on file prior to visit.          Objective Findings:    Vital Signs:  BP (!) 174/101   Pulse 73   Ht 5' 1" (1.549 m)   Wt 71.6 kg (157 lb 13.6 oz)   LMP  (LMP Unknown)   BMI 29.83 kg/m²   Body mass index is 29.83 kg/m².    Physical Exam:  General Appearance: Well appearing in no acute distress  Head:   Normocephalic, without obvious abnormality  Eyes:    No scleral icterus  ENT: Neck supple, Lips, mucosa, and tongue normal  Lungs: CTA bilaterally in anterior and posterior fields, no wheezes, no crackles.  Heart:  Regular rate and rhythm, S1, S2 normal, no murmurs heard  Abdomen: Soft, non tender, non distended with positive bowel sounds in all four quadrants.   Extremities: no edema  Skin: No rash  Neurologic: AAO x 3      Labs:  Lab Results   Component Value Date    WBC 11.28 09/20/2018    WBC 11.28 09/20/2018    HGB 10.6 (L) 09/20/2018    HGB 10.6 (L) 09/20/2018    HCT 35.2 (L) 09/20/2018    HCT 35.2 (L) 09/20/2018     09/20/2018     09/20/2018    CHOL 161 02/01/2014    TRIG 122 02/01/2014    HDL 61 02/01/2014    ALT 8 (L) 09/20/2018    ALT 8 (L) 09/20/2018    AST 12 09/20/2018    AST 12 09/20/2018     09/20/2018     09/20/2018    K 3.9 09/20/2018    K 3.9 09/20/2018    CL 97 09/20/2018    CL 97 09/20/2018    CREATININE 7.5 (H) 09/20/2018    CREATININE 7.5 (H) 09/20/2018    BUN " 20 09/20/2018    BUN 20 09/20/2018    CO2 29 09/20/2018    CO2 29 09/20/2018    TSH 0.793 02/23/2018    INR 4.4 (H) 11/08/2018    GLUF 126 (H) 03/16/2010    HGBA1C 10.8 (H) 08/26/2011       Imaging:    Endoscopy:    11/2008 colonoscopy - internal hermorhoid and diverticulosis  2/2018 EGD revealed gastric polyps, and a small hiatal hernia    Assessment:  1. Anemia, unspecified type    2. Melena       63yo F with PMH Pum HTN with PA pressures of 71, afib on coumadin, remote PUD, and ESRD 2/2 HTN (LUE AVF) s/p failed transplant (re-listed). Hospitalized 2/2018 and 9/2018 for anemia requiring blood transfusion. EGD and colonoscopy was unrevealing.     Hx of intestinal perforation requiring surgery >10yrs ago. She may be higher risk for capsule retention.    Recommendations:  If SBFT is normal will proceed with VCE with golytely prep. I discussed with the patient the risks and benefits of the VCE that include the retention of pill and possible surgical retrieval. I also gave her the option of not proceeding with the VCE given the risk of potentially missing a tumor, ordering a CTE, and monitoring the H/H. She agreed to proceed with the VCE. VCE consent was signed in office today by my self and the patient and witnessed by medical assistant Romi Camara.    Her BP is elevated today She says she did not take her BP med today. Has echo cardiogram this afternoon.     CBC to evaluate anemia.      No Follow-up on file.      Order summary:  Orders Placed This Encounter    FL Small Bowel Follow Through         Thank you so much for allowing me to participate in the care of Shivani Rodriguez PA-C

## 2018-11-08 NOTE — PROGRESS NOTES
INR elevated today.  Patient reports that she is having hip pain and taking tylenol for relief.  She also reports decrease in appetite and possibly doubling her coumadin dose on Tuesday but isn't sure.  She denies bleeding or any other changes but has experienced melena in the recent past.  High doses and extended length use of tylenol could create pharmacodynamic interaction with coumadin.  Will decrease dose

## 2018-11-13 ENCOUNTER — HOSPITAL ENCOUNTER (OUTPATIENT)
Dept: CARDIOLOGY | Facility: CLINIC | Age: 63
Discharge: HOME | End: 2018-11-13
Payer: MEDICARE

## 2018-11-13 ENCOUNTER — TELEPHONE (OUTPATIENT)
Dept: ELECTROPHYSIOLOGY | Facility: CLINIC | Age: 63
End: 2018-11-13

## 2018-11-13 ENCOUNTER — INITIAL CONSULT (OUTPATIENT)
Dept: ELECTROPHYSIOLOGY | Facility: CLINIC | Age: 63
End: 2018-11-13
Payer: MEDICARE

## 2018-11-13 VITALS
HEART RATE: 89 BPM | WEIGHT: 167.13 LBS | BODY MASS INDEX: 31.55 KG/M2 | SYSTOLIC BLOOD PRESSURE: 146 MMHG | HEIGHT: 61 IN | DIASTOLIC BLOOD PRESSURE: 86 MMHG

## 2018-11-13 DIAGNOSIS — Z98.61 S/P PTCA (PERCUTANEOUS TRANSLUMINAL CORONARY ANGIOPLASTY): ICD-10-CM

## 2018-11-13 DIAGNOSIS — I27.20 PULMONARY HYPERTENSION: ICD-10-CM

## 2018-11-13 DIAGNOSIS — I49.3 PVC (PREMATURE VENTRICULAR CONTRACTION): ICD-10-CM

## 2018-11-13 DIAGNOSIS — N18.6 ESRD (END STAGE RENAL DISEASE): ICD-10-CM

## 2018-11-13 DIAGNOSIS — I27.20 PULMONARY HYPERTENSION: Primary | ICD-10-CM

## 2018-11-13 DIAGNOSIS — T86.12 FAILED KIDNEY TRANSPLANT: Chronic | ICD-10-CM

## 2018-11-13 DIAGNOSIS — E78.2 MIXED HYPERLIPIDEMIA: ICD-10-CM

## 2018-11-13 DIAGNOSIS — Z79.01 ANTICOAGULATION MONITORING BY PHARMACIST: ICD-10-CM

## 2018-11-13 PROCEDURE — 93010 ELECTROCARDIOGRAM REPORT: CPT | Mod: S$PBB,,, | Performed by: INTERNAL MEDICINE

## 2018-11-13 PROCEDURE — 99999 PR PBB SHADOW E&M-EST. PATIENT-LVL III: CPT | Mod: PBBFAC,,, | Performed by: INTERNAL MEDICINE

## 2018-11-13 PROCEDURE — 93005 ELECTROCARDIOGRAM TRACING: CPT | Mod: PBBFAC | Performed by: INTERNAL MEDICINE

## 2018-11-13 PROCEDURE — 99205 OFFICE O/P NEW HI 60 MIN: CPT | Mod: S$PBB,,, | Performed by: INTERNAL MEDICINE

## 2018-11-13 PROCEDURE — 99213 OFFICE O/P EST LOW 20 MIN: CPT | Mod: PBBFAC | Performed by: INTERNAL MEDICINE

## 2018-11-13 RX ORDER — DILTIAZEM HYDROCHLORIDE 300 MG/1
300 CAPSULE, COATED, EXTENDED RELEASE ORAL DAILY
Qty: 90 CAPSULE | Refills: 3 | Status: ON HOLD | OUTPATIENT
Start: 2018-11-13 | End: 2019-04-27 | Stop reason: HOSPADM

## 2018-11-13 NOTE — LETTER
November 13, 2018      Jenny Hennessy MD  [address]  [facility] [address]           [facility]  [address]  [facility] [address]  Phone: [phone]  Fax: [phone]          Patient: Shivani Sheets   MR Number: [id number]   YOB: 1955   Date of Visit: 11/13/2018       Dear Dr. Jenny Hennessy:    Thank you for referring Shivani Sheets to me for evaluation. Attached you will find relevant portions of my assessment and plan of care.    If you have questions, please do not hesitate to call me. I look forward to following Shivani Sheets along with you.    Sincerely,    Sotero Bentley MD    Enclosure  CC:  No Recipients    If you would like to receive this communication electronically, please contact [email or web address] or [phone] to request more information on [employer] Link access.    For providers and/or their staff who would like to refer a patient to [facility], please contact us through our one-stop-shop provider referral line, [facility], at [phone].    If you feel you have received this communication in error or would no longer like to receive these types of communications, please e-mail [email or web address]

## 2018-11-13 NOTE — PROGRESS NOTES
Subjective:     HPI    Cardiologist: Jenny Hennessy MD    I had the pleasure of seeing Shivani Sheets in consultation at your request for the evaluation of PVCs. She is a 63 year old female with a history of HLD, ESRD on HD, pulmonary hypertension (on adempas/uptravi), PAF on coumadin, STEFFANY, RA, and recent GIB requiring 6U PRBCs, who recently underwent a 24 hour Holter monitor which showed sinus rhythm with an average HR of 86 bpm (range  bpm), with 47811 wide complex beats (10.7% of total QRS complexes, at least some representing PACs with aberrant conduction but many representing PVCs). She presents to me to discuss management options.    Ms. Sheets denies skipped beats, extra beats, palpitations, or other symptoms which could be attributed to PVCs. Notably, she says she was started on Cartia  mg daily roughly 2 years ago, and before that would occasionally note extrasystoles.    Recent cardiac studies include an echo done in 10/2018 which showed an EF of 55%, with severe biatrial dilatation, and a PASP of 99 mmHg. A PET stress test done in 10/2018 showed no evidence of stress induced ischemia.    I reviewed all ECGs and rhythm strips in the EMR in preparation for today's visit. Multiple PVC morphologies were seen during her stress test (right bundle right superior axis, left bundle right inferior axis, right bundle right inferior axis). On today's rhythm strip, several PVC morphologies are also seen.    My interpretation of today's ECG is sinus rhythm at 86 bpm with two PVCs (right bundle right inferior axis).    Review of Systems   Constitution: Positive for malaise/fatigue. Negative for decreased appetite, weight gain and weight loss.   HENT: Negative for sore throat.    Eyes: Negative for blurred vision.   Cardiovascular: Positive for dyspnea on exertion. Negative for chest pain, irregular heartbeat, leg swelling, near-syncope, orthopnea, palpitations, paroxysmal nocturnal dyspnea and syncope.    Respiratory: Negative for shortness of breath.    Skin: Negative for rash.   Musculoskeletal: Negative for arthritis.   Gastrointestinal: Negative for abdominal pain.   Neurological: Negative for focal weakness.   Psychiatric/Behavioral: Negative for altered mental status.        Objective:    Physical Exam   Constitutional: She is oriented to person, place, and time. She appears well-developed and well-nourished. No distress.   HENT:   Head: Normocephalic and atraumatic.   Mouth/Throat: Oropharynx is clear and moist.   Eyes: Conjunctivae are normal. Pupils are equal, round, and reactive to light. No scleral icterus.   Neck: Normal range of motion. Neck supple. No JVD present. No thyromegaly present.   Cardiovascular: Normal rate, regular rhythm, normal heart sounds and intact distal pulses.  Occasional extrasystoles are present. Exam reveals no gallop and no friction rub.   No murmur heard.  Pulmonary/Chest: Effort normal and breath sounds normal. No respiratory distress.   Abdominal: Soft. Bowel sounds are normal. She exhibits no distension.   Musculoskeletal: She exhibits no edema.   Neurological: She is alert and oriented to person, place, and time.   Skin: Skin is warm and dry.   Psychiatric: She has a normal mood and affect. Her behavior is normal.   Vitals reviewed.        Assessment:       1. Pulmonary hypertension    2. S/P PTCA (percutaneous transluminal coronary angioplasty)    3. Mixed hyperlipidemia    4. Failed  donor kidney transplant -    5. ESRD from HTN started RRT     6. Anticoagulation monitoring by pharmacist         Plan:       In summary, Shivani Sheets is a 63 year old female with a history of ESRD on HD, pulmonary HTN on on adempas/uptravi, RA, PAF on coumadin, and frequent PVCs. We discussed in some depth the pathophysiology of PVCs and treatment options available to manage them. At this time, her PVC burden is not high enough that she is at risk of developing a  PVC induced cardiomyopathy. Given that PVCs are multifocal, and furthermore given her underlying pulmonary and renal disease, I think the best course of action at this point is to increase Cartia XT from 240 mg to 300 mg daily, and for her to see me again in 6 months. A repeat 24 hour Holter will be performed prior to this visit.    Thank you for allowing me to participate in the care of this patient. Please do not hesitate to call me with any questions or concerns.

## 2018-11-16 ENCOUNTER — ANTI-COAG VISIT (OUTPATIENT)
Dept: CARDIOLOGY | Facility: CLINIC | Age: 63
End: 2018-11-16

## 2018-11-16 ENCOUNTER — LAB VISIT (OUTPATIENT)
Dept: LAB | Facility: HOSPITAL | Age: 63
End: 2018-11-16
Attending: INTERNAL MEDICINE
Payer: MEDICARE

## 2018-11-16 ENCOUNTER — TELEPHONE (OUTPATIENT)
Dept: OPHTHALMOLOGY | Facility: CLINIC | Age: 63
End: 2018-11-16

## 2018-11-16 DIAGNOSIS — Z79.01 ANTICOAGULATION MONITORING BY PHARMACIST: ICD-10-CM

## 2018-11-16 DIAGNOSIS — D64.9 ANEMIA, UNSPECIFIED TYPE: ICD-10-CM

## 2018-11-16 LAB
BASOPHILS # BLD AUTO: 0.02 K/UL
BASOPHILS NFR BLD: 0.3 %
DIFFERENTIAL METHOD: ABNORMAL
EOSINOPHIL # BLD AUTO: 0.3 K/UL
EOSINOPHIL NFR BLD: 4 %
ERYTHROCYTE [DISTWIDTH] IN BLOOD BY AUTOMATED COUNT: 22.6 %
HCT VFR BLD AUTO: 31.7 %
HGB BLD-MCNC: 9.5 G/DL
INR PPP: 2.8
LYMPHOCYTES # BLD AUTO: 1.7 K/UL
LYMPHOCYTES NFR BLD: 22.2 %
MCH RBC QN AUTO: 27.6 PG
MCHC RBC AUTO-ENTMCNC: 30 G/DL
MCV RBC AUTO: 92 FL
MONOCYTES # BLD AUTO: 0.8 K/UL
MONOCYTES NFR BLD: 10.4 %
NEUTROPHILS # BLD AUTO: 4.7 K/UL
NEUTROPHILS NFR BLD: 63.1 %
PLATELET # BLD AUTO: 189 K/UL
PLATELET BLD QL SMEAR: ABNORMAL
PMV BLD AUTO: 10.7 FL
PROTHROMBIN TIME: 27.1 SEC
RBC # BLD AUTO: 3.44 M/UL
WBC # BLD AUTO: 7.49 K/UL

## 2018-11-16 PROCEDURE — 85025 COMPLETE CBC W/AUTO DIFF WBC: CPT | Mod: PO

## 2018-11-16 PROCEDURE — 85610 PROTHROMBIN TIME: CPT

## 2018-11-16 PROCEDURE — 36415 COLL VENOUS BLD VENIPUNCTURE: CPT | Mod: PO

## 2018-11-19 ENCOUNTER — TELEPHONE (OUTPATIENT)
Dept: ENDOSCOPY | Facility: HOSPITAL | Age: 63
End: 2018-11-19

## 2018-11-19 NOTE — TELEPHONE ENCOUNTER
----- Message from Regina Rodriguez PA-C sent at 11/18/2018  8:52 AM CST -----  Please notify the pt that her blood count has decreased in the past month. The VCE we are preparing to do will rule out a GI causes for this.

## 2018-11-20 ENCOUNTER — CLINICAL SUPPORT (OUTPATIENT)
Dept: OPHTHALMOLOGY | Facility: CLINIC | Age: 63
End: 2018-11-20
Payer: MEDICARE

## 2018-11-20 ENCOUNTER — TELEPHONE (OUTPATIENT)
Dept: INTERNAL MEDICINE | Facility: CLINIC | Age: 63
End: 2018-11-20

## 2018-11-20 ENCOUNTER — OFFICE VISIT (OUTPATIENT)
Dept: OPHTHALMOLOGY | Facility: CLINIC | Age: 63
End: 2018-11-20
Payer: MEDICARE

## 2018-11-20 DIAGNOSIS — H40.1130 PRIMARY OPEN ANGLE GLAUCOMA OF BOTH EYES, UNSPECIFIED GLAUCOMA STAGE: Primary | ICD-10-CM

## 2018-11-20 DIAGNOSIS — H40.2232 CHRONIC ANGLE-CLOSURE GLAUCOMA OF BOTH EYES, MODERATE STAGE: ICD-10-CM

## 2018-11-20 DIAGNOSIS — Z98.83 GLAUCOMA FILTERING BLEB OF BOTH EYES: ICD-10-CM

## 2018-11-20 DIAGNOSIS — Z98.49 STATUS POST CATARACT EXTRACTION AND INSERTION OF INTRAOCULAR LENS, UNSPECIFIED LATERALITY: ICD-10-CM

## 2018-11-20 DIAGNOSIS — H40.2232 CHRONIC ANGLE-CLOSURE GLAUCOMA OF BOTH EYES, MODERATE STAGE: Primary | ICD-10-CM

## 2018-11-20 DIAGNOSIS — Z96.1 STATUS POST CATARACT EXTRACTION AND INSERTION OF INTRAOCULAR LENS, UNSPECIFIED LATERALITY: ICD-10-CM

## 2018-11-20 DIAGNOSIS — E11.3593 CONTROLLED TYPE 2 DIABETES MELLITUS WITH BOTH EYES AFFECTED BY PROLIFERATIVE RETINOPATHY WITHOUT MACULAR EDEMA, WITHOUT LONG-TERM CURRENT USE OF INSULIN: ICD-10-CM

## 2018-11-20 PROCEDURE — 92012 INTRM OPH EXAM EST PATIENT: CPT | Mod: S$PBB,,, | Performed by: OPHTHALMOLOGY

## 2018-11-20 PROCEDURE — 92083 EXTENDED VISUAL FIELD XM: CPT | Mod: 26,S$PBB,, | Performed by: OPHTHALMOLOGY

## 2018-11-20 PROCEDURE — 99213 OFFICE O/P EST LOW 20 MIN: CPT | Mod: PBBFAC | Performed by: OPHTHALMOLOGY

## 2018-11-20 PROCEDURE — 92083 EXTENDED VISUAL FIELD XM: CPT | Mod: PBBFAC

## 2018-11-20 PROCEDURE — 99999 PR PBB SHADOW E&M-EST. PATIENT-LVL III: CPT | Mod: PBBFAC,,, | Performed by: OPHTHALMOLOGY

## 2018-11-20 RX ORDER — BRINZOLAMIDE 10 MG/ML
1 SUSPENSION/ DROPS OPHTHALMIC 3 TIMES DAILY
Qty: 10 ML | Refills: 6 | Status: CANCELLED | OUTPATIENT
Start: 2018-11-20 | End: 2019-11-20

## 2018-11-20 NOTE — TELEPHONE ENCOUNTER
Spoke with pt. Pt stated the eye doctor hasn't came in the room yet, she's still waiting to be seen. Advised pt she would need to reschedule per Dr. Weiss. Appt rescheduled for November 27th.

## 2018-11-20 NOTE — TELEPHONE ENCOUNTER
----- Message from Joseline Nolan sent at 11/20/2018  2:26 PM CST -----  Contact: Pt Mobile/Home [phone]   Patient is calling to let you know that she's still at her eye doctors appointment in the primary care and wellness building.

## 2018-11-20 NOTE — PROGRESS NOTES
Assessment /Plan     For exam results, see Encounter Report.    Chronic angle-closure glaucoma of both eyes, moderate stage    Controlled type 2 diabetes mellitus with both eyes affected by proliferative retinopathy without macular edema, without long-term current use of insulin    Status post cataract extraction and insertion of intraocular lens, unspecified laterality    Glaucoma shunt device of both eyes    Glaucoma filtering bleb of both eyes        3 sons  17 grandchildren  2 great grandchildren    Mom  when 11 yo the --> 11 grade 1st child --> dropped out HS --> GED and raised a multitude of family members  Highly resilient     HTN --> Dialysis M, W, F 18 years  --> renal Tx / steroids --> DM2 --> NVG OU --> open         Complex eye Hx    NVG quiet  Sp avastin OS with sulaiman    CCT  553 // 568    <24    Ayalla  Bilateral Canaloplasty --> PAS / scarred  ST Ahmed Sulcus OD --> not functioning  S Ex-press shunt OD --> scarred    Left eye  / PPG 10/12/2017  JDN  Anticipate tube opening 2017 --> uneventful @ 2017 ??    Both eyes --> good adherence --> Hold Left eye  Cosopt BID  Xal q day  Alphagan  BID    Left eye  PF 1% BID    PC IOL OU  quiet      HTN retinopathy OU  ONH shunt vessels OU    DM2  Hx NVG OS  Avastin OS per Sulaiman  Hx PRP OS    ? RVO OD        Plan  RTC 6 months IOP  Keep fu with Dr Hilario --> Dec 2018  RTC sooner prn with good understanding

## 2018-11-27 ENCOUNTER — ANTI-COAG VISIT (OUTPATIENT)
Dept: CARDIOLOGY | Facility: CLINIC | Age: 63
End: 2018-11-27

## 2018-11-27 ENCOUNTER — HOSPITAL ENCOUNTER (OUTPATIENT)
Dept: RADIOLOGY | Facility: HOSPITAL | Age: 63
Discharge: HOME | End: 2018-11-27
Attending: PHYSICIAN ASSISTANT
Payer: MEDICARE

## 2018-11-27 ENCOUNTER — OFFICE VISIT (OUTPATIENT)
Dept: INTERNAL MEDICINE | Facility: CLINIC | Age: 63
End: 2018-11-27
Payer: MEDICARE

## 2018-11-27 DIAGNOSIS — K92.1 MELENA: ICD-10-CM

## 2018-11-27 DIAGNOSIS — D64.9 ANEMIA, UNSPECIFIED TYPE: ICD-10-CM

## 2018-11-27 DIAGNOSIS — M25.559 ARTHRALGIA OF HIP, UNSPECIFIED LATERALITY: Primary | ICD-10-CM

## 2018-11-27 DIAGNOSIS — Z79.01 ANTICOAGULATION MONITORING BY PHARMACIST: ICD-10-CM

## 2018-11-27 DIAGNOSIS — I10 HYPERTENSION, UNSPECIFIED TYPE: ICD-10-CM

## 2018-11-27 PROCEDURE — 99999 PR PBB SHADOW E&M-EST. PATIENT-LVL V: CPT | Mod: PBBFAC,,, | Performed by: INTERNAL MEDICINE

## 2018-11-27 PROCEDURE — 99214 OFFICE O/P EST MOD 30 MIN: CPT | Mod: S$PBB,,, | Performed by: INTERNAL MEDICINE

## 2018-11-27 PROCEDURE — 99215 OFFICE O/P EST HI 40 MIN: CPT | Mod: PBBFAC | Performed by: INTERNAL MEDICINE

## 2018-11-27 NOTE — PROGRESS NOTES
INR a bit elevated today. Patient confirms correct dosing of warfarin, however she does report an increase in pain medication use. Will hold dose today and resume plan with f/u in 2 weeks.

## 2018-11-28 ENCOUNTER — TELEPHONE (OUTPATIENT)
Dept: RADIOLOGY | Facility: HOSPITAL | Age: 63
End: 2018-11-28

## 2018-11-29 ENCOUNTER — HOSPITAL ENCOUNTER (OUTPATIENT)
Dept: RADIOLOGY | Facility: HOSPITAL | Age: 63
Discharge: HOME | End: 2018-11-29
Attending: PHYSICIAN ASSISTANT
Payer: MEDICARE

## 2018-11-29 PROCEDURE — 74250 X-RAY XM SM INT 1CNTRST STD: CPT | Mod: 26,,, | Performed by: RADIOLOGY

## 2018-11-29 PROCEDURE — 74250 X-RAY XM SM INT 1CNTRST STD: CPT | Mod: TC,FY

## 2018-12-01 VITALS
DIASTOLIC BLOOD PRESSURE: 78 MMHG | OXYGEN SATURATION: 99 % | SYSTOLIC BLOOD PRESSURE: 138 MMHG | BODY MASS INDEX: 30.55 KG/M2 | HEIGHT: 61 IN | WEIGHT: 161.81 LBS | HEART RATE: 68 BPM

## 2018-12-02 NOTE — PROGRESS NOTES
Subjective:       Patient ID: Shivani Sheets is a 63 y.o. female.    Chief Complaint: Hypertension    HPI  She returns for management of hypertension.  She has had hypertension for over a year.  Current treatment has included medications outlined in medication list.  She denies chest pain or shortness of breath.  No palpitations.  Denies left arm or neck pain.    Past medical history: Hypertension, end-stage renal disease on dialysis, status post failed kidney transplant, pulmonary hypertension, atrial fibrillation, coronary artery disease, rheumatoid arthritis, sleep apnea, hypothyroidism, status post GI bleed, glaucoma, status post hernia repair, colon adenoma.  She had a colonoscopy June 2018     Medications: Xalatan, Synthroid 0.1 mg daily, Lipitor 10 mg daily,selexipag,Coumadin as monitored by Coumadin clinic, diltiazem 300 mg daily     ALLERGIES: Penicillin, iodine, Bactrim     Review of Systems   Constitutional: Negative for chills, fatigue, fever and unexpected weight change.   Respiratory: Negative for chest tightness and shortness of breath.    Cardiovascular: Negative for chest pain and palpitations.   Gastrointestinal: Negative for abdominal pain and blood in stool.   Neurological: Negative for dizziness, syncope, numbness and headaches.       Objective:      Physical Exam   HENT:   Right Ear: External ear normal.   Left Ear: External ear normal.   Nose: Nose normal.   Mouth/Throat: Oropharynx is clear and moist.   Eyes: Pupils are equal, round, and reactive to light.   Neck: Normal range of motion.   Cardiovascular: Normal rate and regular rhythm.   No murmur heard.  Pulmonary/Chest: Breath sounds normal.   Abdominal: She exhibits no distension. There is no hepatosplenomegaly. There is no tenderness.   Lymphadenopathy:     She has no cervical adenopathy.     She has no axillary adenopathy.   Neurological: She has normal strength and normal reflexes. No cranial nerve deficit or sensory deficit.        Assessment/Plan        Assessment and plan:  Hypertension:  Controlled.  She was here for urgent care only appointment.  She will return to clinic for a physical

## 2018-12-03 ENCOUNTER — TELEPHONE (OUTPATIENT)
Dept: GASTROENTEROLOGY | Facility: CLINIC | Age: 63
End: 2018-12-03

## 2018-12-03 DIAGNOSIS — K92.1 MELENA: ICD-10-CM

## 2018-12-03 DIAGNOSIS — D64.9 ANEMIA, UNSPECIFIED TYPE: ICD-10-CM

## 2018-12-03 DIAGNOSIS — R19.5 HEME POSITIVE STOOL: Primary | ICD-10-CM

## 2018-12-04 RX ORDER — PANTOPRAZOLE SODIUM 40 MG/1
TABLET, DELAYED RELEASE ORAL
Qty: 30 TABLET | Refills: 0 | Status: SHIPPED | OUTPATIENT
Start: 2018-12-04 | End: 2019-01-03 | Stop reason: SDUPTHER

## 2018-12-06 ENCOUNTER — OFFICE VISIT (OUTPATIENT)
Dept: ORTHOPEDICS | Facility: CLINIC | Age: 63
End: 2018-12-06
Payer: MEDICARE

## 2018-12-06 VITALS
HEIGHT: 61 IN | WEIGHT: 157.31 LBS | HEART RATE: 86 BPM | SYSTOLIC BLOOD PRESSURE: 152 MMHG | DIASTOLIC BLOOD PRESSURE: 92 MMHG | BODY MASS INDEX: 29.7 KG/M2

## 2018-12-06 DIAGNOSIS — M54.42 ACUTE LEFT-SIDED LOW BACK PAIN WITH LEFT-SIDED SCIATICA: Primary | ICD-10-CM

## 2018-12-06 PROCEDURE — 99999 PR PBB SHADOW E&M-EST. PATIENT-LVL III: CPT | Mod: PBBFAC,,, | Performed by: PHYSICIAN ASSISTANT

## 2018-12-06 PROCEDURE — 99213 OFFICE O/P EST LOW 20 MIN: CPT | Mod: PBBFAC | Performed by: PHYSICIAN ASSISTANT

## 2018-12-06 PROCEDURE — 99214 OFFICE O/P EST MOD 30 MIN: CPT | Mod: S$PBB,,, | Performed by: PHYSICIAN ASSISTANT

## 2018-12-06 RX ORDER — CYCLOBENZAPRINE HCL 5 MG
TABLET ORAL
Qty: 30 TABLET | Refills: 0 | Status: ON HOLD | OUTPATIENT
Start: 2018-12-06 | End: 2019-01-14

## 2018-12-06 NOTE — PROGRESS NOTES
SUBJECTIVE:     Chief Complaint & History of Present Illness:  Shivani Sheets is a New  patient 63 y.o. female who is seen here today with a complaint of    Chief Complaint   Patient presents with    Left Hip - Pain    Also presenting with Low back pain which started 6 months ago. She states she received a cortisone shot on side of her left hip in 11/18 which did not provide relief for longer than 3 days. On a scale of 1-10, with 10 being worst pain imaginable, she rates this pain as 10.  she describes the pain as sharp, radiating down the back of her left leg to her knee.  Associated symptoms include antalgic gait to reduce pain on her side. She mentions any weight bearing will make the pain worse.  She has tried cortisone injections on side of left hip, OTC NSAIDS, and topical creams which have provided minimal to no relief of pain.  She uses a cane to assist with ambulation.    Review of patient's allergies indicates:   Allergen Reactions    Penicillins Swelling    Iodine      Other reaction(s): Hives    Sulfamethoxazole-trimethoprim      Other reaction(s): Swelling  Other reaction(s): Hives         Current Outpatient Medications   Medication Sig Dispense Refill    ALPHAGAN P 0.1 % Drop once daily.       aspirin 81 MG chewable tablet Take 81 mg by mouth Daily. 1  By mouth Every day      diltiaZEM (CARDIZEM CD) 300 MG 24 hr capsule Take 1 capsule (300 mg total) by mouth once daily. 90 capsule 3    dorzolamide-timolol 2-0.5% (COSOPT) 22.3-6.8 mg/mL ophthalmic solution INSTILL 1 DROP IN BOTH EYES TWICE DAILY 10 mL 0    hydrocodone-acetaminophen 7.5-325mg (NORCO) 7.5-325 mg per tablet TK 1 T PO Q 6 H PRN  0    latanoprost 0.005 % ophthalmic solution INSTILL 1 DROP IN BOTH EYES EVERY DAY AT BEDTIME 7.5 mL 0    levothyroxine (SYNTHROID) 100 MCG tablet Take 100 mcg by mouth. 1 Tablet Oral Every day      lidocaine-prilocaine (EMLA) cream Apply topically as needed.      LIPITOR 10 mg tablet TAKE 1 BY  MOUTH ONCE A DAY 90 tablet 2    pantoprazole (PROTONIX) 40 MG tablet TAKE 1 TABLET BY MOUTH ONCE DAILY 30 tablet 0    polyethylene glycol (COLYTE) 240-22.72-6.72 -5.84 gram SolR Drink half bottle.Take 8 oz q 15 minutes until half bottle complete the night before procedure 4000 mL 0    riociguat (ADEMPAS) 2 mg Tab tablet Take 2 mg by mouth 3 (three) times daily.       selexipag (UPTRAVI) 1,000 mcg Tab Take 1,000 mcg by mouth 2 (two) times daily.      warfarin (COUMADIN) 5 MG tablet TAKE 1 1/2 TABLETS BY MOUTH DAILY ON TUESDAY, THURSDAY AND SATURDAY, THEN TAKE 1 TABLET DAILY ON MONDAY, WEDNESDAY, FRIDAY AND  120 tablet 0    cyclobenzaprine (FLEXERIL) 5 MG tablet Take three tablets per day for 2 days, followed by one tablet per day at night as needed 30 tablet 0     No current facility-administered medications for this visit.        Past Medical History:   Diagnosis Date    Allergy     Anemia     Anticoagulant long-term use     4 years coumadin, asa    Arthritis     Awaiting organ transplant 2013    Cataract     Central serous chorioretinopathy of eye, right 2018    CHF (congestive heart failure)     Chronic kidney disease     Colon polyps     COPD (chronic obstructive pulmonary disease)     Coronary artery disease     Diabetes mellitus     Diabetic retinopathy     Diverticulosis     Encounter for blood transfusion     ESRD from HTN strtied RRT 1999    Failed  donor kidney transplant -     Glaucoma     History of bleeding peptic ulcer     's as stated per pt    Hyperlipidemia     Hypertension     Hypothyroidism     Morbid obesity 8/10/2012    Renal hypertension     Stroke            Past Surgical History:   Procedure Laterality Date    CATARACT EXTRACTION W/  INTRAOCULAR LENS IMPLANT Bilateral     COLON SURGERY      COLONOSCOPY      COLONOSCOPY N/A 2018    Procedure: COLONOSCOPY;  Surgeon: Jose Flak MD;  Location: [facility]  "(2ND FLR);  Service: Endoscopy;  Laterality: N/A;  PA pressure >50     Ok to hold Coumadin 3 days prior per Matty MckeonD   Dialysis M-W-F  Labs ordered    COLONOSCOPY N/A 6/12/2018    [facility] at [facility] (2ND FLR)    EGD (ESOPHAGOGASTRODUODENOSCOPY) N/A 9/11/2018    Performed by Luis Washington MD at [facility] (2ND FLR)    ESOPHAGOGASTRODUODENOSCOPY N/A 9/11/2018    Procedure: EGD (ESOPHAGOGASTRODUODENOSCOPY);  Surgeon: Luis Washington MD;  Location: [facility] (2ND FLR);  Service: Endoscopy;  Laterality: N/A;    ESOPHAGOGASTRODUODENOSCOPY (EGD) N/A 2/27/2018    Performed by Kenji Desir MD at [facility] (2ND FLR)    EYE SURGERY      FRACTURE SURGERY      R arm    HERNIA REPAIR      HYSTERECTOMY      INSERTION-IMPLANT-GLAUCOMA Left 10/12/2017    Performed by Junaid Kern [facility] at [facility] OR 1ST FLR    KIDNEY TRANSPLANT      NEPHRECTOMY  11/2008    transplant     PARATHYROID GLAND SURGERY      TONSILLECTOMY      UPPER GASTROINTESTINAL ENDOSCOPY         Vital Signs (Most Recent)  Vitals:    12/06/18 0954   BP: (!) 152/92   Pulse: 86           Review of Systems:  ROS:  Constitutional: no fever or chills  Eyes: no visual changes, positive for glaucoma  ENT: no nasal congestion or sore throat  Respiratory: no cough or shortness of breath, positive for pulmonary HTN  Cardiovascular: no chest pain or palpitations, positive for Chronic diastolic heart failure, AFib  Gastrointestinal: no nausea or vomiting, tolerating diet, positive for Gastrointestinal bleed  Genitourinary: no hematuria or dysuria, positive for ESRD  Integument/Breast: no rash or pruritis  Hematologic/Lymphatic: no easy bruising or lymphadenopathy, positive for anemia  Musculoskeletal: positive for low back, left hip pain  Neurological: no seizures or tremors  Behavioral/Psych: no auditory or visual hallucinations  Endocrine: no heat or cold intolerance                OBJECTIVE:     PHYSICAL EXAM:  Height: 5' 1" (154.9 cm) " Weight: 71.3 kg (157 lb 4.8 oz), General Appearance: Well nourished, well developed, in no acute distress.  Neurological: Mood & affect are normal.  Back Exam:  -antalgic gait, limited range of motion, pain with motion noted during exam, tenderness noted left lower back down sciatic nerve pathway, positive straight-leg raise on left side, sensory exam intact bilateral lower extremities.  -tightness in low back, lumber region, appreciated on palpation.  no kyphosis or scoliosis    Hip Examination:  Pain in low back and down posterior left leg with 90 degrees flexion.  Other movements with full ROM without pain.    RADIOGRAPHS:  Xrays of Lumbar spine taken on 8/9/18, reviewed by me in clinic, demonstrates well maintained spine column alignment with lower lumbar facet arthropathy. No evidence of fracture or dislocation.    Xrays of hip taken on 8/9/18, reviewed by me in clinic, demonstrates minimal degenerative changes to bilateral hip joint spaces.  No evidence of fracture or dislocation.    ASSESSMENT/PLAN:     Plan: We discussed with the patient at length all the different treatment options available for her spine including anti-inflammatories, acetaminophen, rest, ice, physical therapy, strengthening and range of motion exercise, occasional cortisone injections for temporary relief, and finally possible surgical interventions  I will prescribe Mrs. Sudeep mattson to be taken as prescribed, 3 tablets per day for 2 days followed by one tablet per day at night as needed afterwards, for muscle tightness in low back along with Physical Therapy 2x/week for 4 weeks for stretching and strengthening exercises.  Patient is to follow up in 3 weeks, after she has completed some of her physical therapy sessions.  I informed her to follow up sooner if symptoms worsen or do not begin to resolve before follow up appointment.    I have reviewed the patients notes and discussed the physical exam, documentation, diagnosis, and  treatment plan with Aidan Sun PA-C and I am in agreement with the documentation and treatment plan.

## 2018-12-06 NOTE — LETTER
December 6, 2018      Eula Wesis MD  [address]  [facility] [address]           [facility] Orthoped[facility]  [address]  [facility] [address]  Phone: [phone]          Patient: Shivani Sheets   MR Number: [id number]   YOB: 1955   Date of Visit: 12/6/2018       Dear Dr. Eula Weiss:    Thank you for referring Shivani Sheets to me for evaluation. Attached you will find relevant portions of my assessment and plan of care.    If you have questions, please do not hesitate to call me. I look forward to following Shivani Sheets along with you.    Sincerely,    Cullen Sun PA-C    Enclosure  CC:  No Recipients    If you would like to receive this communication electronically, please contact [email or web address] or [phone] to request more information on [employer] access.    For providers and/or their staff who would like to refer a patient to [facility], please contact us through our one-stop-shop provider referral line, [facility], at [phone].    If you feel you have received this communication in error or would no longer like to receive these types of communications, please e-mail [email or web address]

## 2018-12-11 ENCOUNTER — LAB VISIT (OUTPATIENT)
Dept: LAB | Facility: HOSPITAL | Age: 63
End: 2018-12-11
Attending: OPHTHALMOLOGY
Payer: MEDICARE

## 2018-12-11 ENCOUNTER — ANTI-COAG VISIT (OUTPATIENT)
Dept: CARDIOLOGY | Facility: CLINIC | Age: 63
End: 2018-12-11

## 2018-12-11 ENCOUNTER — OFFICE VISIT (OUTPATIENT)
Dept: OPHTHALMOLOGY | Facility: CLINIC | Age: 63
End: 2018-12-11
Payer: MEDICARE

## 2018-12-11 VITALS — DIASTOLIC BLOOD PRESSURE: 86 MMHG | SYSTOLIC BLOOD PRESSURE: 131 MMHG | HEART RATE: 91 BPM

## 2018-12-11 DIAGNOSIS — Z79.01 ANTICOAGULATION MONITORING BY PHARMACIST: ICD-10-CM

## 2018-12-11 DIAGNOSIS — H35.711 CENTRAL SEROUS CHORIORETINOPATHY OF EYE, RIGHT: ICD-10-CM

## 2018-12-11 DIAGNOSIS — E11.3513 CONTROLLED TYPE 2 DIABETES MELLITUS WITH BOTH EYES AFFECTED BY PROLIFERATIVE RETINOPATHY AND MACULAR EDEMA, WITHOUT LONG-TERM CURRENT USE OF INSULIN: Primary | ICD-10-CM

## 2018-12-11 DIAGNOSIS — E11.3593 CONTROLLED TYPE 2 DIABETES MELLITUS WITH BOTH EYES AFFECTED BY PROLIFERATIVE RETINOPATHY WITHOUT MACULAR EDEMA, WITHOUT LONG-TERM CURRENT USE OF INSULIN: ICD-10-CM

## 2018-12-11 DIAGNOSIS — I27.20 PULMONARY HYPERTENSION: ICD-10-CM

## 2018-12-11 LAB
BASOPHILS # BLD AUTO: 0.04 K/UL
BASOPHILS NFR BLD: 0.7 %
DIFFERENTIAL METHOD: ABNORMAL
EOSINOPHIL # BLD AUTO: 0.3 K/UL
EOSINOPHIL NFR BLD: 5.8 %
ERYTHROCYTE [DISTWIDTH] IN BLOOD BY AUTOMATED COUNT: 19.2 %
HCT VFR BLD AUTO: 36.7 %
HGB BLD-MCNC: 11.2 G/DL
IMM GRANULOCYTES # BLD AUTO: 0.04 K/UL
IMM GRANULOCYTES NFR BLD AUTO: 0.7 %
INR PPP: 3.7
LYMPHOCYTES # BLD AUTO: 1.3 K/UL
LYMPHOCYTES NFR BLD: 23.6 %
MCH RBC QN AUTO: 27.1 PG
MCHC RBC AUTO-ENTMCNC: 30.5 G/DL
MCV RBC AUTO: 89 FL
MONOCYTES # BLD AUTO: 0.6 K/UL
MONOCYTES NFR BLD: 10.6 %
NEUTROPHILS # BLD AUTO: 3.2 K/UL
NEUTROPHILS NFR BLD: 58.6 %
NRBC BLD-RTO: 0 /100 WBC
PLATELET # BLD AUTO: 176 K/UL
PMV BLD AUTO: 12 FL
PROTHROMBIN TIME: 35.9 SEC
RBC # BLD AUTO: 4.13 M/UL
WBC # BLD AUTO: 5.39 K/UL

## 2018-12-11 PROCEDURE — 92134 CPTRZ OPH DX IMG PST SGM RTA: CPT | Mod: PBBFAC | Performed by: OPHTHALMOLOGY

## 2018-12-11 PROCEDURE — 92014 COMPRE OPH EXAM EST PT 1/>: CPT | Mod: 25,S$PBB,, | Performed by: OPHTHALMOLOGY

## 2018-12-11 PROCEDURE — 85025 COMPLETE CBC W/AUTO DIFF WBC: CPT

## 2018-12-11 PROCEDURE — 67028 INJECTION EYE DRUG: CPT | Mod: S$PBB,LT,, | Performed by: OPHTHALMOLOGY

## 2018-12-11 PROCEDURE — 36415 COLL VENOUS BLD VENIPUNCTURE: CPT

## 2018-12-11 PROCEDURE — 85610 PROTHROMBIN TIME: CPT

## 2018-12-11 PROCEDURE — 99213 OFFICE O/P EST LOW 20 MIN: CPT | Mod: PBBFAC,25 | Performed by: OPHTHALMOLOGY

## 2018-12-11 PROCEDURE — 67028 INJECTION EYE DRUG: CPT | Mod: PBBFAC,LT | Performed by: OPHTHALMOLOGY

## 2018-12-11 PROCEDURE — C9257 BEVACIZUMAB INJECTION: HCPCS | Mod: PBBFAC,JG | Performed by: OPHTHALMOLOGY

## 2018-12-11 PROCEDURE — 99999 PR PBB SHADOW E&M-EST. PATIENT-LVL III: CPT | Mod: PBBFAC,,, | Performed by: OPHTHALMOLOGY

## 2018-12-11 PROCEDURE — 92235 FLUORESCEIN ANGRPH MLTIFRAME: CPT | Mod: 50,PBBFAC | Performed by: OPHTHALMOLOGY

## 2018-12-11 RX ORDER — TRAMADOL HYDROCHLORIDE 50 MG/1
TABLET ORAL
Refills: 0 | COMMUNITY
Start: 2018-11-08 | End: 2019-02-02

## 2018-12-11 RX ADMIN — BEVACIZUMAB 1.25 MG: 100 INJECTION, SOLUTION INTRAVENOUS at 11:12

## 2018-12-11 NOTE — PROGRESS NOTES
HPI     Dls: 10/16/18 dr guzman  OCT, FA OS TODAY    +floaters od- new onset x 1 week  -no flashes  +blurred va distance and near x 2 weeks  (only wears reading glasses)  +od pain am upon waking under od- pt states possibly sinus        PCIOL OU   [place]erveldt  glaucoma shunt implant 250  with a pericardial patch graft in the superior temporal quadrant of the left eye 10/12/2017    RIGHT EYE   Cosopt BID   Latanoprost Q HS   Alphagan BID     LEFT EYE   PF 1% qd       NIDDM  Hemoglobin A1C       Date                     Value               Ref Range             Status                08/26/2011               10.8 (H)            4.0 - 6.2 %           Final                 07/22/2011               6.9 (H)             4.0 - 6.2 %           Final                 03/16/2010               7.3 (H)             4.0 - 6.2 %           Final            ----------       OCT  OD - ERM with small subfoveal SRF pocket  OS - no ME     FA - increased NV OU  Late macular leakage OU      Plan     1. Neovascular Glaucoma OU  PDR/OIS OU - significant vascular disease -?RVO OD  OD - s/p GDD with Morgan 2013  OS  S/p BV with JDN 10/17    10/18 increased ME OS - ?CME from RVO vs DME  12/18 - increased DME OS    Avastin OS today  Get approval for Ozurdex OS - now with BV    Plan PRP OD and PRP fill in OS  Continue to inject OS q 4-6 weeks during laser      Both eyes  Cosopt BID  Xal q day  Alphagan BID      2. ? OD  Recent PO steroids - pt taking 0.5 on taper  ERM possibly contributing  Recent elevated BP, also predominant pulmonary HTN with recent exacerbation - could contribute to small uveal effusiions - will  Monitor    3. ERM OD  No MMP  Follow    4. HTN Ret OU  Contributing to #2    5. ESRD on HD    6. PCIOL OU        2 weeks for PRP OD      Risks, benefits, and alternatives to treatment discussed in detail with the patient.  The patient voiced understanding and wished to proceed with the procedure    Injection Procedure  Note:  Diagnosis: DME OS    Patient Identified and Time Out complete  Topical Proparacaine and Betadine.  Inject Avastin OS at 6:00 @ 3.5-4mm posterior to limbus  Post Operative Dx: Same  Complications: None  Follow up as above.

## 2018-12-11 NOTE — PATIENT INSTRUCTIONS
Fluorescein Angiogram procedure explained to pt. FA handout attached to AVS below.      Fluorescein Angiography  Fluorescein angiography is an eye test. It is done to look at the back of your eye, including:  · The blood vessels in your eye  · The layer of tissue at the back of your eye (the retina)  · The center of your retina (the macula)  · The optic nerve  This test can diagnose diseases found in these areas. It can also diagnose other conditions that affect these areas. To do this test, a dye called fluorescein is shot (injected) into your arm. The dye goes into your bloodstream and up into the blood vessels in your eyes. A special camera is then used to take images (angiograms) of your eyes.     A fluorescein angiogram of the retina   Getting ready for your test  Tell your healthcare provider if you:  · Are pregnant or think you may be pregnant  · Are breastfeeding  · Have a history of severe allergic reactions, including to X-ray dye or other medicines  · Have kidney problems  Tell your provider about any medicines you are taking. You may need to stop taking all or some of these before the test. This includes:  · All prescription medicines  · Over-the-counter medicines such as aspirin or ibuprofen  · Street drugs  · Herbs, vitamins, and other supplements  You should arrange for an adult family member or friend to drive you home after your test. Your vision will be blurry for up to 12 hours.  Follow any other instructions from your healthcare provider.  During your test  · You are given eye drops to enlarge (dilate) your pupils.  · You then sit in front of a special camera. You place your chin on the chin rest and look into the camera.  · Images are taken of your eyes, one eye at a time.  · Fluorescein dye is then injected into your arm. The lights in the room are turned off. You may have mild nausea. You may have a warm feeling in your arm or upper body. Tell your provider if your skin feels itchy or if you  are having trouble breathing. If so, you could be having an allergic reaction to the dye.  · More pictures of your eyes are taken over 15 to 30 minutes. The camera shines a bright light into your eyes. Try to keep your head still and your eyes open.  · When enough images have been taken, the test is over.  After your test  Your vision will be blurry for up to 4 to 12 hours. This is because of your dilated pupils. Your eye will be more sensitive to light for up to 12 hours. You may want to wear sunglasses during this time. Do not drive if your vision is very blurry. You may also find it uncomfortable to read. Your skin may look yellow for a few hours. This is from the dye. Your urine will be bright yellow or orange for 24 to 48 hours after the test.     Risks and possible complications  All procedures have some risks. Possible risks of fluorescein angiography include:  · Upset stomach (nausea) and vomiting  · Leaking dye around the injection site that causes pain and swelling  · Metallic taste in your mouth  · Infection at injection site  · Allergic reaction to the dye  · Dry mouth or too much saliva  · Faster heart rate  · Sweating  · Lower back pain   © 8160-6965 [employer]. [address] [place] [address]. All rights reserved. This information is not intended as a substitute for professional medical care. Always follow your healthcare professional's instructions.        .POST INTRAVITREAL INJECTION PATIENT INSTRUCTIONS   Below are some guidelines for what to expect after your treatment and additional care instructions.   * you may experience mild discomfort in your eye after the treatment. Your eye usually feels better within 24 to 48 hours after the procedure.   * you have been given drops that numb the surface of your eye.   DO NOT rub or touch your eye, DO NOT wear contacts until numbness goes away.   * you may experience small spots that appear in your field of vision, these are  usually caused by an air bubble or from the medication. It taked a few hours or days for these to go away.   * use of sunglasses will help reduce light sensitivity and glare.   * DO NOT swim or put sink water ( tap water ) or swin for at least 24 hours after the injection   * If you have a gritty, burning, irritating or stinging feeling in the injected eye. This may be a result of the antiseptic used. Use artifical tears or eye lubricant ( from over- the- counter from your local pharmacy ). If you have some at home already please check the expiration date, so not to use anything contaminated in your eye. A cool pack over your eye brow above the injected eye may also relieve discomfort.   Call us right away or go to the Emergency Department if you have a dramatic decrease in vision or extreme pain in the eye.   [facility] [phone]

## 2018-12-18 ENCOUNTER — LAB VISIT (OUTPATIENT)
Dept: LAB | Facility: HOSPITAL | Age: 63
End: 2018-12-18
Attending: INTERNAL MEDICINE
Payer: MEDICARE

## 2018-12-18 ENCOUNTER — ANTI-COAG VISIT (OUTPATIENT)
Dept: CARDIOLOGY | Facility: CLINIC | Age: 63
End: 2018-12-18

## 2018-12-18 DIAGNOSIS — Z79.01 ANTICOAGULATION MONITORING BY PHARMACIST: ICD-10-CM

## 2018-12-18 LAB
INR PPP: 4.2
PROTHROMBIN TIME: 40.6 SEC

## 2018-12-18 PROCEDURE — 85610 PROTHROMBIN TIME: CPT

## 2018-12-18 PROCEDURE — 36415 COLL VENOUS BLD VENIPUNCTURE: CPT | Mod: PO

## 2018-12-18 NOTE — PROGRESS NOTES
"INR supratherapeutic.  Patient reports eating less greens as she has been battling a cold.  She is using "home remedy" for cold symptoms( inclusive of a shot of whisky).  She denies any bleeding but does feel that her appetite has decreased.  Dose was lowered last week and INR continued to increase.  Will hold today and resume that newly lowered dose.   "

## 2018-12-28 ENCOUNTER — LAB VISIT (OUTPATIENT)
Dept: LAB | Facility: HOSPITAL | Age: 63
End: 2018-12-28
Attending: INTERNAL MEDICINE
Payer: MEDICARE

## 2018-12-28 ENCOUNTER — ANTI-COAG VISIT (OUTPATIENT)
Dept: CARDIOLOGY | Facility: CLINIC | Age: 63
End: 2018-12-28

## 2018-12-28 DIAGNOSIS — Z79.01 ANTICOAGULATION MONITORING BY PHARMACIST: ICD-10-CM

## 2018-12-28 LAB
INR PPP: 1.2
PROTHROMBIN TIME: 12.4 SEC

## 2018-12-28 PROCEDURE — 36415 COLL VENOUS BLD VENIPUNCTURE: CPT

## 2018-12-28 PROCEDURE — 85610 PROTHROMBIN TIME: CPT

## 2018-12-28 NOTE — PROGRESS NOTES
Confirmed holding dose 12/18 and Incorrect dose 5mg daily except 7.5mg Monday    Reports missed dose 12/25    Reports lack of appetite for past couple weeks    No greens, dietary supplements or new meds reported

## 2019-01-02 ENCOUNTER — ANTI-COAG VISIT (OUTPATIENT)
Dept: CARDIOLOGY | Facility: CLINIC | Age: 64
End: 2019-01-02

## 2019-01-02 ENCOUNTER — LAB VISIT (OUTPATIENT)
Dept: LAB | Facility: HOSPITAL | Age: 64
End: 2019-01-02
Attending: INTERNAL MEDICINE
Payer: MEDICARE

## 2019-01-02 DIAGNOSIS — Z79.01 ANTICOAGULATION MONITORING BY PHARMACIST: ICD-10-CM

## 2019-01-02 LAB
INR PPP: 2.1
PROTHROMBIN TIME: 20.1 SEC

## 2019-01-02 PROCEDURE — 85610 PROTHROMBIN TIME: CPT

## 2019-01-02 PROCEDURE — 36415 COLL VENOUS BLD VENIPUNCTURE: CPT | Mod: PO

## 2019-01-04 RX ORDER — PANTOPRAZOLE SODIUM 40 MG/1
TABLET, DELAYED RELEASE ORAL
Qty: 30 TABLET | Refills: 6 | Status: SHIPPED | OUTPATIENT
Start: 2019-01-04

## 2019-01-12 ENCOUNTER — HOSPITAL ENCOUNTER (INPATIENT)
Facility: HOSPITAL | Age: 64
LOS: 6 days | Discharge: HOME | DRG: 377 | End: 2019-01-18
Attending: EMERGENCY MEDICINE | Admitting: HOSPITALIST
Payer: MEDICARE

## 2019-01-12 DIAGNOSIS — E87.6 HYPOKALEMIA: ICD-10-CM

## 2019-01-12 DIAGNOSIS — M79.89 LEG SWELLING: ICD-10-CM

## 2019-01-12 DIAGNOSIS — D64.9 SYMPTOMATIC ANEMIA: ICD-10-CM

## 2019-01-12 DIAGNOSIS — K92.2 GASTROINTESTINAL HEMORRHAGE, UNSPECIFIED GASTROINTESTINAL HEMORRHAGE TYPE: Primary | ICD-10-CM

## 2019-01-12 DIAGNOSIS — I50.32 CHRONIC DIASTOLIC HEART FAILURE: ICD-10-CM

## 2019-01-12 DIAGNOSIS — E83.51 HYPOCALCEMIA: ICD-10-CM

## 2019-01-12 DIAGNOSIS — Z99.2 ESRD (END STAGE RENAL DISEASE) ON DIALYSIS: ICD-10-CM

## 2019-01-12 DIAGNOSIS — N18.6 ESRD (END STAGE RENAL DISEASE): ICD-10-CM

## 2019-01-12 DIAGNOSIS — D62 ACUTE BLOOD LOSS ANEMIA: ICD-10-CM

## 2019-01-12 DIAGNOSIS — Z79.01 ANTICOAGULATION MONITORING BY PHARMACIST: ICD-10-CM

## 2019-01-12 DIAGNOSIS — I25.10 CORONARY ARTERY DISEASE INVOLVING NATIVE CORONARY ARTERY OF NATIVE HEART WITHOUT ANGINA PECTORIS: ICD-10-CM

## 2019-01-12 DIAGNOSIS — R06.02 SOB (SHORTNESS OF BREATH): ICD-10-CM

## 2019-01-12 DIAGNOSIS — K92.2 GASTROINTESTINAL HEMORRHAGE: ICD-10-CM

## 2019-01-12 DIAGNOSIS — I27.20 PULMONARY HTN: ICD-10-CM

## 2019-01-12 DIAGNOSIS — I49.9 ARRHYTHMIA: ICD-10-CM

## 2019-01-12 DIAGNOSIS — N18.6 ESRD (END STAGE RENAL DISEASE) ON DIALYSIS: ICD-10-CM

## 2019-01-12 LAB
ABO + RH BLD: NORMAL
ALBUMIN SERPL BCP-MCNC: 2.3 G/DL
ALP SERPL-CCNC: 41 U/L
ALT SERPL W/O P-5'-P-CCNC: 12 U/L
ANION GAP SERPL CALC-SCNC: 11 MMOL/L
APTT BLDCRRT: 29.2 SEC
AST SERPL-CCNC: 11 U/L
BASOPHILS # BLD AUTO: 0.05 K/UL
BASOPHILS NFR BLD: 0.6 %
BILIRUB SERPL-MCNC: 0.4 MG/DL
BLD GP AB SCN CELLS X3 SERPL QL: NORMAL
BUN SERPL-MCNC: 26 MG/DL
CALCIUM SERPL-MCNC: 6.9 MG/DL
CHLORIDE SERPL-SCNC: 101 MMOL/L
CO2 SERPL-SCNC: 29 MMOL/L
CREAT SERPL-MCNC: 7.3 MG/DL
DIFFERENTIAL METHOD: ABNORMAL
EOSINOPHIL # BLD AUTO: 0.2 K/UL
EOSINOPHIL NFR BLD: 2.6 %
ERYTHROCYTE [DISTWIDTH] IN BLOOD BY AUTOMATED COUNT: 21.9 %
EST. GFR  (AFRICAN AMERICAN): 6.2 ML/MIN/1.73 M^2
EST. GFR  (NON AFRICAN AMERICAN): 5.4 ML/MIN/1.73 M^2
GLUCOSE SERPL-MCNC: 113 MG/DL
HCT VFR BLD AUTO: 19.8 %
HGB BLD-MCNC: 5.8 G/DL
IMM GRANULOCYTES # BLD AUTO: 0.05 K/UL
IMM GRANULOCYTES NFR BLD AUTO: 0.6 %
INR PPP: 2.2
LYMPHOCYTES # BLD AUTO: 1.6 K/UL
LYMPHOCYTES NFR BLD: 18.3 %
MCH RBC QN AUTO: 26.5 PG
MCHC RBC AUTO-ENTMCNC: 29.3 G/DL
MCV RBC AUTO: 90 FL
MONOCYTES # BLD AUTO: 0.6 K/UL
MONOCYTES NFR BLD: 7 %
NEUTROPHILS # BLD AUTO: 6.1 K/UL
NEUTROPHILS NFR BLD: 70.9 %
NRBC BLD-RTO: 0 /100 WBC
PLATELET # BLD AUTO: 193 K/UL
PMV BLD AUTO: 12.9 FL
POTASSIUM SERPL-SCNC: 2.9 MMOL/L
PROT SERPL-MCNC: 5 G/DL
PROTHROMBIN TIME: 20.9 SEC
RBC # BLD AUTO: 2.19 M/UL
SODIUM SERPL-SCNC: 141 MMOL/L
WBC # BLD AUTO: 8.58 K/UL

## 2019-01-12 PROCEDURE — 36556 PR INSERT NON-TUNNEL CV CATH 5+ YRS OLD: ICD-10-PCS | Mod: ,,, | Performed by: EMERGENCY MEDICINE

## 2019-01-12 PROCEDURE — 63600175 PHARM REV CODE 636 W HCPCS: Performed by: EMERGENCY MEDICINE

## 2019-01-12 PROCEDURE — 99292 CRITICAL CARE ADDL 30 MIN: CPT | Mod: 25,,, | Performed by: EMERGENCY MEDICINE

## 2019-01-12 PROCEDURE — 99292 PR CRITICAL CARE, ADDL 30 MIN: ICD-10-PCS | Mod: 25,,, | Performed by: EMERGENCY MEDICINE

## 2019-01-12 PROCEDURE — 36556 INSERT NON-TUNNEL CV CATH: CPT | Mod: 52

## 2019-01-12 PROCEDURE — 99291 CRITICAL CARE FIRST HOUR: CPT | Mod: 25

## 2019-01-12 PROCEDURE — 36556 INSERT NON-TUNNEL CV CATH: CPT | Mod: ,,, | Performed by: EMERGENCY MEDICINE

## 2019-01-12 PROCEDURE — 93010 EKG 12-LEAD: ICD-10-PCS | Mod: ,,, | Performed by: INTERNAL MEDICINE

## 2019-01-12 PROCEDURE — 99292 CRITICAL CARE ADDL 30 MIN: CPT

## 2019-01-12 PROCEDURE — 12000002 HC ACUTE/MED SURGE SEMI-PRIVATE ROOM

## 2019-01-12 PROCEDURE — 86922 COMPATIBILITY TEST ANTIGLOB: CPT

## 2019-01-12 PROCEDURE — 85025 COMPLETE CBC W/AUTO DIFF WBC: CPT

## 2019-01-12 PROCEDURE — 85730 THROMBOPLASTIN TIME PARTIAL: CPT

## 2019-01-12 PROCEDURE — 86870 RBC ANTIBODY IDENTIFICATION: CPT

## 2019-01-12 PROCEDURE — 93005 ELECTROCARDIOGRAM TRACING: CPT

## 2019-01-12 PROCEDURE — 80053 COMPREHEN METABOLIC PANEL: CPT

## 2019-01-12 PROCEDURE — 83735 ASSAY OF MAGNESIUM: CPT

## 2019-01-12 PROCEDURE — 36680 INSERT NEEDLE BONE CAVITY: CPT

## 2019-01-12 PROCEDURE — 93010 ELECTROCARDIOGRAM REPORT: CPT | Mod: ,,, | Performed by: INTERNAL MEDICINE

## 2019-01-12 PROCEDURE — 99291 CRITICAL CARE FIRST HOUR: CPT | Mod: 25,,, | Performed by: EMERGENCY MEDICINE

## 2019-01-12 PROCEDURE — 99291 PR CRITICAL CARE, E/M 30-74 MINUTES: ICD-10-PCS | Mod: 25,,, | Performed by: EMERGENCY MEDICINE

## 2019-01-12 PROCEDURE — 86901 BLOOD TYPING SEROLOGIC RH(D): CPT

## 2019-01-12 PROCEDURE — 85610 PROTHROMBIN TIME: CPT

## 2019-01-12 RX ORDER — LIDOCAINE HCL/PF 100 MG/5ML
50 SYRINGE (ML) INTRAVENOUS ONCE
Status: COMPLETED | OUTPATIENT
Start: 2019-01-12 | End: 2019-01-12

## 2019-01-12 RX ORDER — LIDOCAINE HYDROCHLORIDE 10 MG/ML
5 INJECTION, SOLUTION EPIDURAL; INFILTRATION; INTRACAUDAL; PERINEURAL
Status: DISCONTINUED | OUTPATIENT
Start: 2019-01-12 | End: 2019-01-12

## 2019-01-12 RX ORDER — PANTOPRAZOLE SODIUM 40 MG/10ML
80 INJECTION, POWDER, LYOPHILIZED, FOR SOLUTION INTRAVENOUS
Status: ACTIVE | OUTPATIENT
Start: 2019-01-12 | End: 2019-01-13

## 2019-01-12 RX ORDER — HYDROCODONE BITARTRATE AND ACETAMINOPHEN 500; 5 MG/1; MG/1
TABLET ORAL
Status: DISCONTINUED | OUTPATIENT
Start: 2019-01-12 | End: 2019-01-13

## 2019-01-12 RX ORDER — SODIUM CHLORIDE 0.9 % (FLUSH) 0.9 %
5 SYRINGE (ML) INJECTION
Status: CANCELLED | OUTPATIENT
Start: 2019-01-13

## 2019-01-12 RX ORDER — LIDOCAINE HCL/PF 100 MG/5ML
50 SYRINGE (ML) INTRAVENOUS ONCE
Status: DISCONTINUED | OUTPATIENT
Start: 2019-01-13 | End: 2019-01-12

## 2019-01-12 RX ADMIN — LIDOCAINE HYDROCHLORIDE 50 MG: 20 INJECTION, SOLUTION INTRAVENOUS at 11:01

## 2019-01-13 DIAGNOSIS — Z79.01 ANTICOAGULATION MONITORING BY PHARMACIST: ICD-10-CM

## 2019-01-13 LAB
ALBUMIN SERPL BCP-MCNC: 2.1 G/DL
ALBUMIN SERPL BCP-MCNC: 2.2 G/DL
ALP SERPL-CCNC: 37 U/L
ALT SERPL W/O P-5'-P-CCNC: 11 U/L
ANION GAP SERPL CALC-SCNC: 12 MMOL/L
ANION GAP SERPL CALC-SCNC: 12 MMOL/L
ANISOCYTOSIS BLD QL SMEAR: SLIGHT
AST SERPL-CCNC: 10 U/L
BASOPHILS # BLD AUTO: 0.01 K/UL
BASOPHILS # BLD AUTO: 0.02 K/UL
BASOPHILS # BLD AUTO: 0.02 K/UL
BASOPHILS # BLD AUTO: 0.05 K/UL
BASOPHILS NFR BLD: 0.1 %
BASOPHILS NFR BLD: 0.1 %
BASOPHILS NFR BLD: 0.2 %
BASOPHILS NFR BLD: 0.4 %
BILIRUB SERPL-MCNC: 0.4 MG/DL
BLD PROD TYP BPU: NORMAL
BLOOD GROUP ANTIBODIES SERPL: NORMAL
BLOOD UNIT EXPIRATION DATE: NORMAL
BLOOD UNIT TYPE CODE: 6200
BLOOD UNIT TYPE: NORMAL
BUN SERPL-MCNC: 29 MG/DL
BUN SERPL-MCNC: 29 MG/DL
CALCIUM SERPL-MCNC: 6.5 MG/DL
CALCIUM SERPL-MCNC: 6.7 MG/DL
CHLORIDE SERPL-SCNC: 103 MMOL/L
CHLORIDE SERPL-SCNC: 103 MMOL/L
CO2 SERPL-SCNC: 23 MMOL/L
CO2 SERPL-SCNC: 25 MMOL/L
CODING SYSTEM: NORMAL
CREAT SERPL-MCNC: 7.3 MG/DL
CREAT SERPL-MCNC: 7.5 MG/DL
DIFFERENTIAL METHOD: ABNORMAL
DISPENSE STATUS: NORMAL
EOSINOPHIL # BLD AUTO: 0.1 K/UL
EOSINOPHIL # BLD AUTO: 0.1 K/UL
EOSINOPHIL # BLD AUTO: 0.2 K/UL
EOSINOPHIL # BLD AUTO: 0.3 K/UL
EOSINOPHIL NFR BLD: 0.8 %
EOSINOPHIL NFR BLD: 1 %
EOSINOPHIL NFR BLD: 1.5 %
EOSINOPHIL NFR BLD: 1.8 %
ERYTHROCYTE [DISTWIDTH] IN BLOOD BY AUTOMATED COUNT: 15.5 %
ERYTHROCYTE [DISTWIDTH] IN BLOOD BY AUTOMATED COUNT: 15.6 %
ERYTHROCYTE [DISTWIDTH] IN BLOOD BY AUTOMATED COUNT: 17.2 %
ERYTHROCYTE [DISTWIDTH] IN BLOOD BY AUTOMATED COUNT: 17.4 %
ERYTHROCYTE [DISTWIDTH] IN BLOOD BY AUTOMATED COUNT: 19.9 %
EST. GFR  (AFRICAN AMERICAN): 6 ML/MIN/1.73 M^2
EST. GFR  (AFRICAN AMERICAN): 6.2 ML/MIN/1.73 M^2
EST. GFR  (NON AFRICAN AMERICAN): 5.2 ML/MIN/1.73 M^2
EST. GFR  (NON AFRICAN AMERICAN): 5.4 ML/MIN/1.73 M^2
ESTIMATED AVG GLUCOSE: 103 MG/DL
GLUCOSE SERPL-MCNC: 146 MG/DL
GLUCOSE SERPL-MCNC: 180 MG/DL
HBA1C MFR BLD HPLC: 5.2 %
HCT VFR BLD AUTO: 15.9 %
HCT VFR BLD AUTO: 17 %
HCT VFR BLD AUTO: 17.9 %
HCT VFR BLD AUTO: 26.4 %
HCT VFR BLD AUTO: 27.8 %
HGB BLD-MCNC: 4.5 G/DL
HGB BLD-MCNC: 5.1 G/DL
HGB BLD-MCNC: 5.3 G/DL
HGB BLD-MCNC: 8.4 G/DL
HGB BLD-MCNC: 8.7 G/DL
HYPOCHROMIA BLD QL SMEAR: ABNORMAL
IMM GRANULOCYTES # BLD AUTO: 0.05 K/UL
IMM GRANULOCYTES # BLD AUTO: 0.06 K/UL
IMM GRANULOCYTES # BLD AUTO: 0.07 K/UL
IMM GRANULOCYTES # BLD AUTO: 0.07 K/UL
IMM GRANULOCYTES NFR BLD AUTO: 0.4 %
IMM GRANULOCYTES NFR BLD AUTO: 0.5 %
IMM GRANULOCYTES NFR BLD AUTO: 0.6 %
IMM GRANULOCYTES NFR BLD AUTO: 0.6 %
INR PPP: 2.2
LYMPHOCYTES # BLD AUTO: 1.1 K/UL
LYMPHOCYTES # BLD AUTO: 1.3 K/UL
LYMPHOCYTES # BLD AUTO: 1.3 K/UL
LYMPHOCYTES # BLD AUTO: 1.4 K/UL
LYMPHOCYTES NFR BLD: 12.1 %
LYMPHOCYTES NFR BLD: 13.7 %
LYMPHOCYTES NFR BLD: 9.6 %
LYMPHOCYTES NFR BLD: 9.9 %
MAGNESIUM SERPL-MCNC: 1.6 MG/DL
MAGNESIUM SERPL-MCNC: 1.8 MG/DL
MCH RBC QN AUTO: 27.1 PG
MCH RBC QN AUTO: 28.1 PG
MCH RBC QN AUTO: 28.5 PG
MCH RBC QN AUTO: 28.8 PG
MCH RBC QN AUTO: 29.1 PG
MCHC RBC AUTO-ENTMCNC: 28.3 G/DL
MCHC RBC AUTO-ENTMCNC: 29.6 G/DL
MCHC RBC AUTO-ENTMCNC: 30 G/DL
MCHC RBC AUTO-ENTMCNC: 30.2 G/DL
MCHC RBC AUTO-ENTMCNC: 33 G/DL
MCV RBC AUTO: 88 FL
MCV RBC AUTO: 93 FL
MCV RBC AUTO: 96 FL
MONOCYTES # BLD AUTO: 0.6 K/UL
MONOCYTES # BLD AUTO: 0.8 K/UL
MONOCYTES # BLD AUTO: 1.1 K/UL
MONOCYTES # BLD AUTO: 1.1 K/UL
MONOCYTES NFR BLD: 7.4 %
MONOCYTES NFR BLD: 7.4 %
MONOCYTES NFR BLD: 7.8 %
MONOCYTES NFR BLD: 7.8 %
NEUTROPHILS # BLD AUTO: 11.1 K/UL
NEUTROPHILS # BLD AUTO: 11.5 K/UL
NEUTROPHILS # BLD AUTO: 6.3 K/UL
NEUTROPHILS # BLD AUTO: 8.6 K/UL
NEUTROPHILS NFR BLD: 77.2 %
NEUTROPHILS NFR BLD: 78.2 %
NEUTROPHILS NFR BLD: 80 %
NEUTROPHILS NFR BLD: 80.9 %
NRBC BLD-RTO: 0 /100 WBC
NUM UNITS TRANS FFP: NORMAL
NUM UNITS TRANS FFP: NORMAL
OVALOCYTES BLD QL SMEAR: ABNORMAL
PHOSPHATE SERPL-MCNC: 3.5 MG/DL
PHOSPHATE SERPL-MCNC: 3.5 MG/DL
PLATELET # BLD AUTO: 101 K/UL
PLATELET # BLD AUTO: 101 K/UL
PLATELET # BLD AUTO: 157 K/UL
PLATELET # BLD AUTO: 75 K/UL
PLATELET # BLD AUTO: 78 K/UL
PMV BLD AUTO: 11.4 FL
PMV BLD AUTO: 11.9 FL
PMV BLD AUTO: 12 FL
PMV BLD AUTO: 12.4 FL
PMV BLD AUTO: 12.8 FL
POIKILOCYTOSIS BLD QL SMEAR: SLIGHT
POLYCHROMASIA BLD QL SMEAR: ABNORMAL
POTASSIUM SERPL-SCNC: 3.8 MMOL/L
POTASSIUM SERPL-SCNC: 4.3 MMOL/L
PROT SERPL-MCNC: 4.5 G/DL
PROTHROMBIN TIME: 21.4 SEC
RBC # BLD AUTO: 1.66 M/UL
RBC # BLD AUTO: 1.77 M/UL
RBC # BLD AUTO: 1.86 M/UL
RBC # BLD AUTO: 2.99 M/UL
RBC # BLD AUTO: 2.99 M/UL
SODIUM SERPL-SCNC: 138 MMOL/L
SODIUM SERPL-SCNC: 140 MMOL/L
SPHEROCYTES BLD QL SMEAR: ABNORMAL
TRANS ERYTHROCYTES VOL PATIENT: NORMAL ML
WBC # BLD AUTO: 10.66 K/UL
WBC # BLD AUTO: 11.01 K/UL
WBC # BLD AUTO: 13.84 K/UL
WBC # BLD AUTO: 14.19 K/UL
WBC # BLD AUTO: 8.22 K/UL

## 2019-01-13 PROCEDURE — P9021 RED BLOOD CELLS UNIT: HCPCS

## 2019-01-13 PROCEDURE — 96360 HYDRATION IV INFUSION INIT: CPT

## 2019-01-13 PROCEDURE — 27201040 HC RC 50 FILTER: Mod: 91

## 2019-01-13 PROCEDURE — 99223 PR INITIAL HOSPITAL CARE,LEVL III: ICD-10-PCS | Mod: GC,,, | Performed by: INTERNAL MEDICINE

## 2019-01-13 PROCEDURE — 84100 ASSAY OF PHOSPHORUS: CPT

## 2019-01-13 PROCEDURE — 36415 COLL VENOUS BLD VENIPUNCTURE: CPT

## 2019-01-13 PROCEDURE — 99223 1ST HOSP IP/OBS HIGH 75: CPT | Mod: ,,, | Performed by: INTERNAL MEDICINE

## 2019-01-13 PROCEDURE — 20000000 HC ICU ROOM

## 2019-01-13 PROCEDURE — 80069 RENAL FUNCTION PANEL: CPT

## 2019-01-13 PROCEDURE — 93010 ELECTROCARDIOGRAM REPORT: CPT | Mod: ,,, | Performed by: INTERNAL MEDICINE

## 2019-01-13 PROCEDURE — 83036 HEMOGLOBIN GLYCOSYLATED A1C: CPT

## 2019-01-13 PROCEDURE — P9017 PLASMA 1 DONOR FRZ W/IN 8 HR: HCPCS

## 2019-01-13 PROCEDURE — 25000003 PHARM REV CODE 250: Performed by: EMERGENCY MEDICINE

## 2019-01-13 PROCEDURE — 25000003 PHARM REV CODE 250: Performed by: STUDENT IN AN ORGANIZED HEALTH CARE EDUCATION/TRAINING PROGRAM

## 2019-01-13 PROCEDURE — 85027 COMPLETE CBC AUTOMATED: CPT

## 2019-01-13 PROCEDURE — 25000003 PHARM REV CODE 250: Performed by: INTERNAL MEDICINE

## 2019-01-13 PROCEDURE — 27000221 HC OXYGEN, UP TO 24 HOURS

## 2019-01-13 PROCEDURE — 83735 ASSAY OF MAGNESIUM: CPT

## 2019-01-13 PROCEDURE — 63600175 PHARM REV CODE 636 W HCPCS: Performed by: INTERNAL MEDICINE

## 2019-01-13 PROCEDURE — 99223 PR INITIAL HOSPITAL CARE,LEVL III: ICD-10-PCS | Mod: ,,, | Performed by: INTERNAL MEDICINE

## 2019-01-13 PROCEDURE — 99223 1ST HOSP IP/OBS HIGH 75: CPT | Mod: GC,,, | Performed by: INTERNAL MEDICINE

## 2019-01-13 PROCEDURE — 93010 EKG 12-LEAD: ICD-10-PCS | Mod: ,,, | Performed by: INTERNAL MEDICINE

## 2019-01-13 PROCEDURE — 80053 COMPREHEN METABOLIC PANEL: CPT

## 2019-01-13 PROCEDURE — 86644 CMV ANTIBODY: CPT

## 2019-01-13 PROCEDURE — 85025 COMPLETE CBC W/AUTO DIFF WBC: CPT | Mod: 91

## 2019-01-13 PROCEDURE — 93005 ELECTROCARDIOGRAM TRACING: CPT

## 2019-01-13 PROCEDURE — C9113 INJ PANTOPRAZOLE SODIUM, VIA: HCPCS | Performed by: INTERNAL MEDICINE

## 2019-01-13 PROCEDURE — 94761 N-INVAS EAR/PLS OXIMETRY MLT: CPT

## 2019-01-13 PROCEDURE — 96361 HYDRATE IV INFUSION ADD-ON: CPT

## 2019-01-13 PROCEDURE — 85610 PROTHROMBIN TIME: CPT

## 2019-01-13 RX ORDER — SODIUM CHLORIDE 0.9 % (FLUSH) 0.9 %
5 SYRINGE (ML) INJECTION
Status: DISCONTINUED | OUTPATIENT
Start: 2019-01-13 | End: 2019-01-18 | Stop reason: HOSPADM

## 2019-01-13 RX ORDER — ACETAMINOPHEN 325 MG/1
650 TABLET ORAL EVERY 8 HOURS PRN
Status: DISCONTINUED | OUTPATIENT
Start: 2019-01-13 | End: 2019-01-18 | Stop reason: HOSPADM

## 2019-01-13 RX ORDER — ATORVASTATIN CALCIUM 10 MG/1
10 TABLET, FILM COATED ORAL DAILY
Status: DISCONTINUED | OUTPATIENT
Start: 2019-01-13 | End: 2019-01-18 | Stop reason: HOSPADM

## 2019-01-13 RX ORDER — IBUPROFEN 200 MG
24 TABLET ORAL
Status: DISCONTINUED | OUTPATIENT
Start: 2019-01-13 | End: 2019-01-18 | Stop reason: HOSPADM

## 2019-01-13 RX ORDER — POTASSIUM CHLORIDE 20 MEQ/15ML
40 SOLUTION ORAL ONCE
Status: COMPLETED | OUTPATIENT
Start: 2019-01-13 | End: 2019-01-13

## 2019-01-13 RX ORDER — DILTIAZEM HYDROCHLORIDE 300 MG/1
300 CAPSULE, COATED, EXTENDED RELEASE ORAL DAILY
Status: DISCONTINUED | OUTPATIENT
Start: 2019-01-13 | End: 2019-01-13

## 2019-01-13 RX ORDER — RAMELTEON 8 MG/1
8 TABLET ORAL NIGHTLY PRN
Status: DISCONTINUED | OUTPATIENT
Start: 2019-01-13 | End: 2019-01-18 | Stop reason: HOSPADM

## 2019-01-13 RX ORDER — LIDOCAINE HCL/PF 100 MG/5ML
20 SYRINGE (ML) INTRAVENOUS
Status: DISPENSED | OUTPATIENT
Start: 2019-01-13 | End: 2019-01-13

## 2019-01-13 RX ORDER — LEVOTHYROXINE SODIUM 100 UG/1
100 TABLET ORAL
Status: DISCONTINUED | OUTPATIENT
Start: 2019-01-13 | End: 2019-01-18 | Stop reason: HOSPADM

## 2019-01-13 RX ORDER — IBUPROFEN 200 MG
16 TABLET ORAL
Status: DISCONTINUED | OUTPATIENT
Start: 2019-01-13 | End: 2019-01-18 | Stop reason: HOSPADM

## 2019-01-13 RX ORDER — HYDROCODONE BITARTRATE AND ACETAMINOPHEN 500; 5 MG/1; MG/1
TABLET ORAL
Status: DISCONTINUED | OUTPATIENT
Start: 2019-01-13 | End: 2019-01-18 | Stop reason: HOSPADM

## 2019-01-13 RX ORDER — GLUCAGON 1 MG
1 KIT INJECTION
Status: DISCONTINUED | OUTPATIENT
Start: 2019-01-13 | End: 2019-01-18 | Stop reason: HOSPADM

## 2019-01-13 RX ORDER — NAPROXEN SODIUM 220 MG/1
81 TABLET, FILM COATED ORAL DAILY
Status: DISCONTINUED | OUTPATIENT
Start: 2019-01-13 | End: 2019-01-13

## 2019-01-13 RX ORDER — HYDROCODONE BITARTRATE AND ACETAMINOPHEN 500; 5 MG/1; MG/1
TABLET ORAL
Status: DISCONTINUED | OUTPATIENT
Start: 2019-01-13 | End: 2019-01-13

## 2019-01-13 RX ORDER — TRAMADOL HYDROCHLORIDE 50 MG/1
50 TABLET ORAL EVERY 8 HOURS PRN
Status: DISCONTINUED | OUTPATIENT
Start: 2019-01-13 | End: 2019-01-18 | Stop reason: HOSPADM

## 2019-01-13 RX ORDER — PANTOPRAZOLE SODIUM 40 MG/10ML
40 INJECTION, POWDER, LYOPHILIZED, FOR SOLUTION INTRAVENOUS 2 TIMES DAILY
Status: DISCONTINUED | OUTPATIENT
Start: 2019-01-14 | End: 2019-01-13

## 2019-01-13 RX ORDER — POTASSIUM CHLORIDE 20 MEQ/15ML
40 SOLUTION ORAL
Status: COMPLETED | OUTPATIENT
Start: 2019-01-13 | End: 2019-01-13

## 2019-01-13 RX ORDER — PANTOPRAZOLE SODIUM 40 MG/10ML
40 INJECTION, POWDER, LYOPHILIZED, FOR SOLUTION INTRAVENOUS 2 TIMES DAILY
Status: DISCONTINUED | OUTPATIENT
Start: 2019-01-13 | End: 2019-01-15

## 2019-01-13 RX ORDER — DEXTROSE 50 % IN WATER (D50W) INTRAVENOUS SYRINGE
12.5
Status: DISCONTINUED | OUTPATIENT
Start: 2019-01-13 | End: 2019-01-18 | Stop reason: HOSPADM

## 2019-01-13 RX ORDER — PANTOPRAZOLE SODIUM 40 MG/1
40 TABLET, DELAYED RELEASE ORAL 2 TIMES DAILY
Status: DISCONTINUED | OUTPATIENT
Start: 2019-01-13 | End: 2019-01-13

## 2019-01-13 RX ORDER — DEXTROSE 50 % IN WATER (D50W) INTRAVENOUS SYRINGE
25
Status: DISCONTINUED | OUTPATIENT
Start: 2019-01-13 | End: 2019-01-18 | Stop reason: HOSPADM

## 2019-01-13 RX ADMIN — RIOCIGUAT 2 MG: 2 TABLET, FILM COATED ORAL at 11:01

## 2019-01-13 RX ADMIN — RIOCIGUAT 2 MG: 2 TABLET, FILM COATED ORAL at 08:01

## 2019-01-13 RX ADMIN — MAGNESIUM SULFATE HEPTAHYDRATE 1 G: 500 INJECTION, SOLUTION INTRAMUSCULAR; INTRAVENOUS at 10:01

## 2019-01-13 RX ADMIN — LEVOTHYROXINE SODIUM 100 MCG: 100 TABLET ORAL at 09:01

## 2019-01-13 RX ADMIN — ATORVASTATIN CALCIUM 10 MG: 10 TABLET, FILM COATED ORAL at 09:01

## 2019-01-13 RX ADMIN — POTASSIUM CHLORIDE 40 MEQ: 20 SOLUTION ORAL at 10:01

## 2019-01-13 RX ADMIN — CALCIUM GLUCONATE 1000 MG: 98 INJECTION, SOLUTION INTRAVENOUS at 01:01

## 2019-01-13 RX ADMIN — PANTOPRAZOLE SODIUM 40 MG: 40 INJECTION, POWDER, FOR SOLUTION INTRAVENOUS at 05:01

## 2019-01-13 RX ADMIN — CALCIUM GLUCONATE 1 G: 98 INJECTION, SOLUTION INTRAVENOUS at 05:01

## 2019-01-13 RX ADMIN — POTASSIUM CHLORIDE 40 MEQ: 20 SOLUTION ORAL at 02:01

## 2019-01-13 RX ADMIN — SODIUM CHLORIDE 1000 ML: 0.9 INJECTION, SOLUTION INTRAVENOUS at 12:01

## 2019-01-13 NOTE — ASSESSMENT & PLAN NOTE
ICU team was consulted for lower GI bleeding; patient was seen she is hemodynamically stable. Patient is going to the heart transplant unit. Kindly monitor vitals, check H&H regularly, transfuse PRBC as needed, monitor volume status, continue on PPI and consult GI team urgently.

## 2019-01-13 NOTE — PLAN OF CARE
Problem: Adult Inpatient Plan of Care  Goal: Plan of Care Review  AAOX4. Pt currently resting, asymptomatic. Pt received 1 unit of RBC in the ED. H&H resulted as 4.5/15.9 after infusion was complete. Med 5 notified. Instructed to hang 2nd unit of RBC. Currently receiving 2nd infusion of RBC through 20 g right FA. Vital signs remain stable. NPO status maintained. Pt had 1 bloody BM upon arrival to unit. Calcium level resulted as 6.5. MD Jasper on heart transplant team notified at bedside. Plan is to hang total of 3 units of RBC, FFP, and consult critical care. WCTM.

## 2019-01-13 NOTE — ASSESSMENT & PLAN NOTE
SYMPTOMATIC ANEMIA   ACUTE BLOOD LOSS ANEMIA     Pt with h/o of melena thought d/t upper GI bleed presents with two day history of juliann red blood per rectum. Initial Hgb of 5.8 g/dL. Received two units while in the ED along with pantoprazole 80 mg IV. No chest pain, left arm pain, shortness of breath or palpitations. Will keep transfusion goal of 7 g/dL.     Plan  - Consult gastroenterology   - Continue ASA, 81 mg  - Hold warfarin (INR 2.2)  - NPO  - Pantoprazole 40 mg IV BID  - Transfusion goal of 7 g/dL

## 2019-01-13 NOTE — H&P (VIEW-ONLY)
[facility]  Gastroenterology  Consult Note    Patient Name: Shivani Sheets  MRN: [id number]  Admission Date: 1/12/2019  Hospital Length of Stay: 1 days  Code Status: Full Code   Attending Provider: Meredith Pugh MD   Consulting Provider: Song Fountain MD  Primary Care Physician: Eula Weiss MD  Principal Problem:Gastrointestinal hemorrhage    Inpatient consult to Gastroenterology  Consult performed by: Song Fountain MD  Consult ordered by: Behram Khan, MD        Subjective:     HPI:  Ms Sheets is a 63 year old female with history of Afib on coumadin, pulmonary HTN, ESRD (failed transplant), 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    She was previously hospitalized (9/2018) with melena and Hgb of 3.8. She underwent EGD which was unremarkable for etiology of bleed and was seen as outpatient with plan for VCE (not yet done).    She reports that she was well until Saturday morning (1/12) when she had a bowel movement (~530AM) which was orange and then aftewards started to see blood clots (multiple episodes) so presented for evaluation. She denies any preceding melenic stool. Denies any abdominal pain. Endorses feeling nauseated yesterday with one episode of clear vomitus. On review endorses feeling weak but has improved since transfusion.    On admission labs were notable for H&H of 5.8 (11.2 12/2018) which declined to 4.5. She received 1u pRBC for hgb 5.8 but per nursing the line infiltrated and she did not receive any of the blood. She was transferred to ICU (hemodynamically stable, no vasopressor requirement) for further management. In ICU she was given 2u pRBC with minimal improvement (4.5 -> 5.3/5.1) so she was ordered for 3 additional units.    Prior Endo Hx  EGD. (9/11/18). Dr. Washington. Indication:Melena.  - gastric polyps    Colonoscopy. (6/12/18) Provider:   Dileep. Indication: Hx of colon polyps. Extent: Cecum. Preparation:Good.  - 2 semi-sessile polyps in sigmoid and ascending (5mm)    EGD. (18). Dr. Desir. Indication:Melena.  - small hiatal hernia  - gastric polyps    Past Medical History:   Diagnosis Date    Allergy     Anemia     Anticoagulant long-term use     4 years coumadin, asa    Arthritis     Awaiting organ transplant 2013    Cataract     Central serous chorioretinopathy of eye, right 2018    CHF (congestive heart failure)     Chronic kidney disease     Colon polyps     COPD (chronic obstructive pulmonary disease)     Coronary artery disease     Diabetes mellitus     Diabetic retinopathy     Diverticulosis     Encounter for blood transfusion     ESRD from HTN strtied RRT 1999    Failed  donor kidney transplant      Glaucoma     History of bleeding peptic ulcer      as stated per pt    Hyperlipidemia     Hypertension     Hypothyroidism     Morbid obesity 8/10/2012    Renal hypertension     Stroke            Past Surgical History:   Procedure Laterality Date    CATARACT EXTRACTION W/  INTRAOCULAR LENS IMPLANT Bilateral     COLON SURGERY      COLONOSCOPY      COLONOSCOPY N/A 2018    [facility]formed by [facility] (2ND F[facility])    EGD (ESOPHAGOGASTRODUODENOSCOPY) N/A 2018    Performed by Luis Washington MD at [facility] ENDO (2ND FLR)    ESOPHAGOGASTRODUODENOSCOPY (EGD) N/A 2018    Performed by Kenji Desir [facility] at [facility] ENDO (2ND FLR)    EYE SURGERY      FRACTURE SURGERY      R arm    HERNIA REPAIR      HYSTERECTOMY      INSERTION-IMPLANT-GLAUCOMA Left 10/12/2017    Performed by Junaid Kern [facility] at [facility] OR 1ST FLR    KIDNEY TRANSPLANT      NEPHRECTOMY  2008    transplant     PARATHYROID GLAND SURGERY      TONSILLECTOMY      UPPER GASTROINTESTINAL ENDOSCOPY         Review of patient's allergies indicates:   Allergen Reactions    Penicillins  Swelling    Iodine      Other reaction(s): Hives    Sulfamethoxazole-trimethoprim      Other reaction(s): Swelling  Other reaction(s): Hives     Family History     Problem Relation (Age of Onset)    Asthma Sister    Blindness Father    Breast cancer Maternal Aunt    Depression Sister    Diabetes Maternal Aunt    Heart attack Father, Mother    Heart failure Father, Mother    Hypertension Father, Mother, Sister, Brother    Kidney disease Brother        Tobacco Use    Smoking status: Former Smoker     Types: Cigarettes     Last attempt to quit: 8/10/2000     Years since quittin.4    Smokeless tobacco: Never Used   Substance and Sexual Activity    Alcohol use: No     Comment: hx of etoh     Drug use: No    Sexual activity: No     Review of Systems   Constitutional: Positive for fatigue. Negative for activity change, appetite change, chills, fever and unexpected weight change.   HENT: Negative for sore throat and trouble swallowing.    Respiratory: Negative for cough, chest tightness and shortness of breath.    Cardiovascular: Negative for chest pain.   Gastrointestinal: Positive for anal bleeding and blood in stool. Negative for abdominal distention, abdominal pain, constipation, diarrhea, nausea and vomiting.   Genitourinary: Negative for dysuria.   Musculoskeletal: Negative for arthralgias and myalgias.   Skin: Negative for pallor.   Neurological: Positive for weakness (improved). Negative for dizziness, syncope and light-headedness.   Psychiatric/Behavioral: Negative for agitation and confusion.     Objective:     Vital Signs (Most Recent):  Temp: 97.9 °F (36.6 °C) (19 1630)  Pulse: 87 (19 1630)  Resp: 15 (19 1630)  BP: (!) 140/82 (19 1630)  SpO2: 97 % (19 1630) Vital Signs (24h Range):  Temp:  [97.7 °F (36.5 °C)-98.7 °F (37.1 °C)] 97.9 °F (36.6 °C)  Pulse:  [] 87  Resp:  [12-40] 15  SpO2:  [70 %-100 %] 97 %  BP: (107-176)/(57-98) 140/82     Weight: 71.7 kg (158 lb 1.1  oz) (01/13/19 0500)  Body mass index is 29.87 kg/m².      Intake/Output Summary (Last 24 hours) at 1/13/2019 1700  Last data filed at 1/13/2019 1630  Gross per 24 hour   Intake 1594 ml   Output --   Net 1594 ml       Lines/Drains/Airways     Central Venous Catheter Line                 RETIRED! DO NOT USE: Hemodialysis Catheter Arteriovenous fistula Left Forearm -- days          Drain                 Hemodialysis AV Fistula Left upper arm -- days          Peripheral Intravenous Line                 Peripheral IV - Single Lumen 01/12/19 2116 Right Upper Arm less than 1 day         Peripheral IV - Single Lumen 01/13/19 0412 Hand less than 1 day                Physical Exam   Constitutional: She is oriented to person, place, and time. She appears well-developed and well-nourished. No distress.   Appears well and comfortable in no acute distress.   HENT:   Head: Normocephalic and atraumatic.   Mouth/Throat: Oropharynx is clear and moist. No oropharyngeal exudate.   Eyes: Conjunctivae are normal. No scleral icterus.   Cardiovascular: Normal rate, regular rhythm, normal heart sounds and intact distal pulses.   Pulmonary/Chest: Effort normal and breath sounds normal. No respiratory distress.   Abdominal: Soft. Bowel sounds are normal. She exhibits no distension and no mass. There is no tenderness. There is no rebound and no guarding.   Melena with some blood present.   Musculoskeletal: She exhibits no edema or tenderness.   Lymphadenopathy:     She has no cervical adenopathy.   Neurological: She is alert and oriented to person, place, and time.   Skin: Skin is warm. Capillary refill takes less than 2 seconds. She is not diaphoretic.   Psychiatric: She has a normal mood and affect. Her behavior is normal. Judgment and thought content normal.   Nursing note and vitals reviewed.      Significant Labs:  CBC:   Recent Labs   Lab 01/13/19  0506 01/13/19  1110 01/13/19  1330   WBC 8.22 14.19* 11.01   HGB 4.5* 5.3* 5.1*   HCT  15.9* 17.9* 17.0*    101* 101*     CMP:   Recent Labs   Lab 01/13/19  0506 01/13/19  1110   * 180*   CALCIUM 6.5* 6.7*   ALBUMIN 2.1* 2.2*   PROT 4.5*  --     138   K 3.8 4.3   CO2 25 23    103   BUN 29* 29*   CREATININE 7.5* 7.3*   ALKPHOS 37*  --    ALT 11  --    AST 10  --    BILITOT 0.4  --      Coagulation:   Recent Labs   Lab 01/12/19  2118 01/13/19  0506   INR 2.2* 2.2*   APTT 29.2  --        Significant Imaging:  Imaging results within the past 24 hours have been reviewed.    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    Previously hospitalized (9/2018) with melena and Hgb 3.8 with negative EGD, recently negative colonoscopy (6/2018) with plan for outpatient VCE (not yet done). She is presenting with 1 day of bleeding (melena with some blood present) with poor response to initial resuscitation. Despite poor response to initial 3u pRBC she remains normotensive without tachycardia and asymptomatic (feels better since admission).    Plan  - protonix drip  - continue resuscitation with pRBC  - will follow-up after completion of current units and pending response will determine urgency of EGD (unless evidence of hemodynamic instability given underlying cardiac and respiratory disease would like patient to be appropriately resuscitated prior to EGD)  - will plan for EGD today or tomorrow pending clinical status  - if EGD negative will discuss colonoscopy vs VCE (has history of intestinal resection but negative SBFT 11/2018)  - if develops overt BRBPR with hemodynamic instability obtain CTA  - continue to trend H&H  - maintain 2 large bore peripheral IV  - reverse any coagulopathy, hold anticoagulants (INR 2.2  reversed in AM; indication Afib)  - plan discussed with primary team           Thank you for your consult. I will follow-up with patient. Please contact us if you have any additional questions.    Song Fountain MD  Gastroenterology  [facility]

## 2019-01-13 NOTE — H&P
[facility] Hospital Medicine  History & Physical    Patient Name: Shivani Sheets  MRN: [id number]  Admission Date: 1/12/2019  Attending Physician: Brent Brown IV, MD   Primary Care Provider: Eula Weiss MD    [facility] Medicine Team: [facility] HOSP MED 5 Behram Khan, MD     Patient information was obtained from patient, relative(s), past medical records and ER records.     Subjective:     Principal Problem:Gastrointestinal hemorrhage    Chief Complaint:   Chief Complaint   Patient presents with    Rectal Bleeding        HPI: Ms Sheets is a 64 yo F w/ PMHx significant pulmonary HTN (on adempas/uptravi), diastolic dysfunction, pAF(on coumadin), central retinal artery occlusion without significant carotid artery stenosis, CAD with remote PCI, ESRD(LUE AVF) secondary to HTN, s/p failed kidney transplant, PUD, STEFFANY on CPAP, hypothyroidism, RA, HLD and obesity.    She presents with a two day history of bright red blood per rectum. She has had five episodes of bloody bowel movements since yesterday. Blood in her bowel movements initially presented as light-yellow but progressed to orange and then dark red in color. She states she also had a single episode of non-bilious non bloody emesis earlier today after she became dizzy from standing up too quickly. Her dizziness and lightheadedness improves with sitting and lying down. She was admitted to the CICU in the past for a similar episode 9/2018 and had to be resuscitated with six units of blood. She denies any abdominal pain, cough, dyspnea, chest pain, or leg pain.     She is followed by ophthalmology who have been treating her with monthly eye injections.     Past Medical History:   Diagnosis Date    Allergy     Anemia     Anticoagulant long-term use     4 years coumadin, asa    Arthritis     Awaiting organ transplant 4/30/2013    Cataract     Central serous chorioretinopathy of eye, right 8/21/2018    CHF (congestive heart failure)      Chronic kidney disease     Colon polyps     COPD (chronic obstructive pulmonary disease)     Coronary artery disease     Diabetes mellitus     Diabetic retinopathy     Diverticulosis     Encounter for blood transfusion     ESRD from HTN strtied RRT 1999    Failed  donor kidney transplant      Glaucoma     History of bleeding peptic ulcer      as stated per pt    Hyperlipidemia     Hypertension     Hypothyroidism     Morbid obesity 8/10/2012    Renal hypertension     Stroke     1987       Past Surgical History:   Procedure Laterality Date    CATARACT EXTRACTION W/  INTRAOCULAR LENS IMPLANT Bilateral     COLON SURGERY      COLONOSCOPY      COLONOSCOPY N/A 2018    [facility]formed by [facility] (2ND FLR)    EGD (ESOPHAGOGASTRODUODENOSCOPY) N/A 2018    Performed by Luis Washington MD at [facility] ENDO (2ND FLR)    ESOPHAGOGASTRODUODENOSCOPY (EGD) N/A 2018    Performed by Kenji Desir MD at [facility] ENDO (2ND FLR)    EYE SURGERY      FRACTURE SURGERY      R arm    HERNIA REPAIR      HYSTERECTOMY      INSERTION-IMPLANT-GLAUCOMA Left 10/12/2017    Performed by [facility]id Kern [facility] at [facility] OR 1ST FLR    KIDNEY TRANSPLANT      NEPHRECTOMY  2008    transplant     PARATHYROID GLAND SURGERY      TONSILLECTOMY      UPPER GASTROINTESTINAL ENDOSCOPY         Review of patient's allergies indicates:   Allergen Reactions    Penicillins Swelling    Iodine      Other reaction(s): Hives    Sulfamethoxazole-trimethoprim      Other reaction(s): Swelling  Other reaction(s): Hives       No current facility-administered medications on file prior to encounter.      Current Outpatient Medications on File Prior to Encounter   Medication Sig    ALPHAGAN P 0.1 % Drop once daily.     aspirin 81 MG chewable tablet Take 81 mg by mouth Daily. 1  By mouth Every day    cyclobenzaprine (FLEXERIL) 5 MG tablet Take three tablets per day for 2 days, followed by  one tablet per day at night as needed    diltiaZEM (CARDIZEM CD) 300 MG 24 hr capsule Take 1 capsule (300 mg total) by mouth once daily.    dorzolamide-timolol 2-0.5% (COSOPT) 22.3-6.8 mg/mL ophthalmic solution INSTILL 1 DROP IN BOTH EYES TWICE DAILY    hydrocodone-acetaminophen 7.5-325mg (NORCO) 7.5-325 mg per tablet TK 1 T PO Q 6 H PRN    latanoprost 0.005 % ophthalmic solution INSTILL 1 DROP IN BOTH EYES EVERY DAY AT BEDTIME    levothyroxine (SYNTHROID) 100 MCG tablet Take 100 mcg by mouth. 1 Tablet Oral Every day    lidocaine-prilocaine (EMLA) cream Apply topically as needed.    LIPITOR 10 mg tablet TAKE 1 BY MOUTH ONCE A DAY    pantoprazole (PROTONIX) 40 MG tablet TAKE 1 TABLET BY MOUTH ONCE DAILY    polyethylene glycol (COLYTE) 240-22.72-6.72 -5.84 gram SolR Drink half bottle.Take 8 oz q 15 minutes until half bottle complete the night before procedure    riociguat (ADEMPAS) 2 mg Tab tablet Take 2 mg by mouth 3 (three) times daily.     selexipag (UPTRAVI) 1,000 mcg Tab Take 1,000 mcg by mouth 2 (two) times daily.    traMADol (ULTRAM) 50 mg tablet TK 1 T PO  Q 8 H PRN    warfarin (COUMADIN) 5 MG tablet TAKE 1 1/2 TABLETS BY MOUTH DAILY ON TUESDAY, THURSDAY AND SATURDAY, THEN TAKE 1 TABLET DAILY ON MONDAY, WEDNESDAY, FRIDAY AND      Family History     Problem Relation (Age of Onset)    Asthma Sister    Blindness Father    Breast cancer Maternal Aunt    Depression Sister    Diabetes Maternal Aunt    Heart attack Father, Mother    Heart failure Father, Mother    Hypertension Father, Mother, Sister, Brother    Kidney disease Brother        Tobacco Use    Smoking status: Former Smoker     Types: Cigarettes     Last attempt to quit: 8/10/2000     Years since quittin.4    Smokeless tobacco: Never Used   Substance and Sexual Activity    Alcohol use: No     Comment: hx of etoh     Drug use: No    Sexual activity: No     Review of Systems   Constitutional: Negative for fever and unexpected  weight change.   HENT: Negative for ear pain, sinus pressure, sneezing and sore throat.    Eyes: Positive for visual disturbance. Negative for pain.   Respiratory: Negative for cough, chest tightness, shortness of breath and wheezing.    Cardiovascular: Positive for palpitations. Negative for chest pain and leg swelling.   Gastrointestinal: Positive for nausea and vomiting. Negative for abdominal pain, constipation and diarrhea.   Endocrine: Negative for polydipsia and polyuria.   Genitourinary: Negative for decreased urine volume, difficulty urinating, dysuria and urgency.   Musculoskeletal: Positive for arthralgias. Negative for myalgias.   Skin: Negative for color change and rash.   Allergic/Immunologic: Negative for environmental allergies and food allergies.   Neurological: Positive for light-headedness. Negative for dizziness, syncope, weakness and headaches.   Psychiatric/Behavioral: Negative for confusion and dysphoric mood. The patient is not nervous/anxious.      Objective:     Vital Signs (Most Recent):  Temp: 98.7 °F (37.1 °C) (01/12/19 1853)  Pulse: 91 (01/12/19 2002)  Resp: 20 (01/12/19 2002)  BP: 128/74 (01/12/19 2002)  SpO2: (!) 93 % (01/12/19 2002) Vital Signs (24h Range):  Temp:  [98.7 °F (37.1 °C)] 98.7 °F (37.1 °C)  Pulse:  [] 91  Resp:  [15-20] 20  SpO2:  [93 %-96 %] 93 %  BP: (128-131)/(74-87) 128/74     Weight: 71.1 kg (156 lb 12 oz)  Body mass index is 29.62 kg/m².    Physical Exam   Constitutional: She is oriented to person, place, and time. She appears well-developed and well-nourished.   Body mass index is 29.62 kg/m².     HENT:   Head: Normocephalic and atraumatic.   Eyes: EOM are normal. Pupils are equal, round, and reactive to light. Left conjunctiva is injected. Left conjunctiva has a hemorrhage. No scleral icterus.   Neck: Normal range of motion. Neck supple.   Puncture site from attempted central line   Cardiovascular: Normal rate, regular rhythm, normal heart sounds and intact  distal pulses.   No murmur heard.  Pulmonary/Chest: Effort normal and breath sounds normal. No respiratory distress. She has no wheezes.   Abdominal: Soft. Bowel sounds are normal. She exhibits no distension. There is no tenderness.   Musculoskeletal:   Intraosseous access in right tibia   Neurological: She is alert and oriented to person, place, and time.   Skin: Skin is warm and dry.   Psychiatric: She has a normal mood and affect. Her behavior is normal.   Vitals reviewed.        CRANIAL NERVES     CN III, IV, VI   Pupils are equal, round, and reactive to light.  Extraocular motions are normal.        Significant Labs: All pertinent labs within the past 24 hours have been reviewed.    Significant Imaging: I have reviewed all pertinent imaging results/findings within the past 24 hours.    Assessment/Plan:     * Gastrointestinal hemorrhage    SYMPTOMATIC ANEMIA   ACUTE BLOOD LOSS ANEMIA     Pt with h/o of melena thought d/t upper GI bleed presents with two day history of juliann red blood per rectum. Initial Hgb of 5.8 g/dL. Received two units while in the ED along with pantoprazole 80 mg IV. No chest pain, left arm pain, shortness of breath or palpitations. Will keep transfusion goal of 7 g/dL.     Plan  - Consult gastroenterology   - Continue ASA, 81 mg  - Hold warfarin (INR 2.2)  - NPO  - Pantoprazole 40 mg IV BID  - Transfusion goal of 7 g/dL     Pulmonary hypertension    Pt with pulmonary hypertension (PASP of 99.01 mmHg) on riociquat and selexipag. Do not have clearance for these medications.   - Consulted cardiology       Chronic diastolic heart failure    Pt with HFpEF. Appears euvolemic and without hypertension. Denies dyspnea and saturating well on room air.    - Stable will continue to monitor.       CAD (coronary artery disease)    S/P PTCA  HYPERLIPIDEMIA    Pt with CAD and h/o remote PCI on ASA and statin.   - Continue aspirin 81 mg  - Continue statin 10 mg daily  - No lipid panel on file           ESRD  from HTN started RRT 1999    Receives dialysis MWF.   - Consult nephrology to initiate inpatient dialysis per OP schedule       Obstructive sleep apnea    Continue home CPAP       Rheumatoid arthritis    Stable without current DMARD or anti-inflammatory use       Atrial fibrillation    ANTICOAGULATION MONITORED BY PHARMACIST    CHADSVASc = 3 corresponding to annual risk of 3.2%. INR of 2.2 on admission.  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

## 2019-01-13 NOTE — ASSESSMENT & PLAN NOTE
Pt with pulmonary hypertension (PASP of 99.01 mmHg) on riociquat and selexipag. Do not have clearance for these medications.   - Consulted cardiology

## 2019-01-13 NOTE — SUBJECTIVE & OBJECTIVE
Past Medical History:   Diagnosis Date    Allergy     Anemia     Anticoagulant long-term use     4 years coumadin, asa    Arthritis     Awaiting organ transplant 2013    Cataract     Central serous chorioretinopathy of eye, right 2018    CHF (congestive heart failure)     Chronic kidney disease     Colon polyps     COPD (chronic obstructive pulmonary disease)     Coronary artery disease     Diabetes mellitus     Diabetic retinopathy     Diverticulosis     Encounter for blood transfusion     ESRD from HTN strtied RRT 1999    Failed  donor kidney transplant      Glaucoma     History of bleeding peptic ulcer      as stated per pt    Hyperlipidemia     Hypertension     Hypothyroidism     Morbid obesity 8/10/2012    Renal hypertension     Stroke          Past Medical History:   Diagnosis Date    Allergy     Anemia     Anticoagulant long-term use     4 years coumadin, asa    Arthritis     Awaiting organ transplant 2013    Cataract     Central serous chorioretinopathy of eye, right 2018    CHF (congestive heart failure)     Chronic kidney disease     Colon polyps     COPD (chronic obstructive pulmonary disease)     Coronary artery disease     Diabetes mellitus     Diabetic retinopathy     Diverticulosis     Encounter for blood transfusion     ESRD from HTN strtied RRT 1999    Failed  donor kidney transplant      Glaucoma     History of bleeding peptic ulcer      as stated per pt    Hyperlipidemia     Hypertension     Hypothyroidism     Morbid obesity 8/10/2012    Renal hypertension     Stroke            Past Surgical History:   Procedure Laterality Date    CATARACT EXTRACTION W/  INTRAOCULAR LENS IMPLANT Bilateral     COLON SURGERY      COLONOSCOPY      COLONOSCOPY N/A 2018    Performed by Jose [facility] MD at [facility] ([address] [place] [address])    EGD (ESOPHAGOGASTRODUODENOSCOPY) N/A  9/11/2018    Performed by [facility] at [facility] ENDO (2ND FLR)    ESOPHAGOGASTRODUODENOSCOPY (EGD) N/A 2/27/2018    Performed by Kenji Desir MD at [facility] ENDO (2ND FLR)    EYE SURGERY      FRACTURE SURGERY      R arm    HERNIA REPAIR      HYSTERECTOMY      INSERTION-IMPLANT-GLAUCOMA Left 10/12/2017    Performed by [facility]ern [facility] at [facility] OR 1ST FLR    KIDNEY TRANSPLANT      NEPHRECTOMY  11/2008    transplant     PARATHYROID GLAND SURGERY      TONSILLECTOMY      UPPER GASTROINTESTINAL ENDOSCOPY         Review of patient's allergies indicates:   Allergen Reactions    Penicillins Swelling    Iodine      Other reaction(s): Hives    Sulfamethoxazole-trimethoprim      Other reaction(s): Swelling  Other reaction(s): Hives       No current facility-administered medications on file prior to encounter.      Current Outpatient Medications on File Prior to Encounter   Medication Sig    ALPHAGAN P 0.1 % Drop once daily.     aspirin 81 MG chewable tablet Take 81 mg by mouth Daily. 1  By mouth Every day    cyclobenzaprine (FLEXERIL) 5 MG tablet Take three tablets per day for 2 days, followed by one tablet per day at night as needed    diltiaZEM (CARDIZEM CD) 300 MG 24 hr capsule Take 1 capsule (300 mg total) by mouth once daily.    dorzolamide-timolol 2-0.5% (COSOPT) 22.3-6.8 mg/mL ophthalmic solution INSTILL 1 DROP IN BOTH EYES TWICE DAILY    latanoprost 0.005 % ophthalmic solution INSTILL 1 DROP IN BOTH EYES EVERY DAY AT BEDTIME    levothyroxine (SYNTHROID) 100 MCG tablet Take 100 mcg by mouth. 1 Tablet Oral Every day    lidocaine-prilocaine (EMLA) cream Apply topically as needed.    LIPITOR 10 mg tablet TAKE 1 BY MOUTH ONCE A DAY    pantoprazole (PROTONIX) 40 MG tablet TAKE 1 TABLET BY MOUTH ONCE DAILY    polyethylene glycol (COLYTE) 240-22.72-6.72 -5.84 gram SolR Drink half bottle.Take 8 oz q 15 minutes until half bottle complete the night before procedure    riociguat (ADEMPAS) 2 mg Tab  tablet Take 2 mg by mouth 3 (three) times daily.     selexipag (UPTRAVI) 1,000 mcg Tab Take 1,000 mcg by mouth 2 (two) times daily.    traMADol (ULTRAM) 50 mg tablet TK 1 T PO  Q 8 H PRN    warfarin (COUMADIN) 5 MG tablet TAKE 1 1/2 TABLETS BY MOUTH DAILY ON TUESDAY, THURSDAY AND SATURDAY, THEN TAKE 1 TABLET DAILY ON MONDAY, WEDNESDAY, FRIDAY AND     hydrocodone-acetaminophen 7.5-325mg (NORCO) 7.5-325 mg per tablet TK 1 T PO Q 6 H PRN     Family History     Problem Relation (Age of Onset)    Asthma Sister    Blindness Father    Breast cancer Maternal Aunt    Depression Sister    Diabetes Maternal Aunt    Heart attack Father, Mother    Heart failure Father, Mother    Hypertension Father, Mother, Sister, Brother    Kidney disease Brother        Tobacco Use    Smoking status: Former Smoker     Types: Cigarettes     Last attempt to quit: 8/10/2000     Years since quittin.4    Smokeless tobacco: Never Used   Substance and Sexual Activity    Alcohol use: No     Comment: hx of etoh     Drug use: No    Sexual activity: No     Review of Systems   Constitutional: Negative for fever and unexpected weight change.   HENT: Negative for ear pain, sinus pressure, sneezing and sore throat.    Eyes: Positive for visual disturbance. Negative for pain.   Respiratory: Negative for cough, chest tightness, shortness of breath and wheezing.    Cardiovascular: Positive for palpitations. Negative for chest pain and leg swelling.   Gastrointestinal: Positive for nausea and vomiting and blood in stool. Negative for abdominal pain, constipation and diarrhea.   Endocrine: Negative for polydipsia and polyuria.   Genitourinary: Negative for decreased urine volume, difficulty urinating, dysuria and urgency.   Musculoskeletal: Positive for arthralgias. Negative for myalgias.   Skin: Negative for color change and rash.   Allergic/Immunologic: Negative for environmental allergies and food allergies.   Neurological: Positive for  light-headedness. Negative for dizziness, syncope, weakness and headaches.   Psychiatric/Behavioral: Negative for confusion and dysphoric mood. The patient is not nervous/anxious.      Objective:     Vital Signs (Most Recent):  Temp: 97.7 °F (36.5 °C) (01/13/19 0613)  Pulse: 104 (01/13/19 0633)  Resp: 18 (01/13/19 0633)  BP: (!) 123/90 (01/13/19 0633)  SpO2: 100 % (01/13/19 0633) Vital Signs (24h Range):  Temp:  [97.7 °F (36.5 °C)-98.7 °F (37.1 °C)] 97.7 °F (36.5 °C)  Pulse:  [] 104  Resp:  [12-27] 18  SpO2:  [79 %-100 %] 100 %  BP: (121-176)/(60-98) 123/90     Weight: 71.7 kg (158 lb 1.1 oz)  Body mass index is 29.87 kg/m².    Physical Exam   Constitutional: She is oriented to person, place, and time. She appears well-developed and well-nourished.   Body mass index is 29.62 kg/m².     HENT:   Head: Normocephalic and atraumatic.   Eyes: EOM are normal. Pupils are equal, round, and reactive to light. Left conjunctiva is injected. Left conjunctiva has a hemorrhage. No scleral icterus.   Neck: Normal range of motion. Neck supple.   Puncture site from attempted central line   Cardiovascular: Normal rate, regular rhythm, normal heart sounds and intact distal pulses.   No murmur heard.  Pulmonary/Chest: Effort normal and breath sounds normal. No respiratory distress. She has no wheezes.   Abdominal: Soft. Bowel sounds are normal. She exhibits no distension. There is no tenderness.   Musculoskeletal:   Intraosseous access in right tibia   Neurological: She is alert and oriented to person, place, and time.   Skin: Skin is warm and dry.   Psychiatric: She has a normal mood and affect. Her behavior is normal.   Vitals reviewed.        CRANIAL NERVES     CN III, IV, VI   Pupils are equal, round, and reactive to light.  Extraocular motions are normal.        Significant Labs: All pertinent labs within the past 24 hours have been reviewed.    Significant Imaging: I have reviewed all pertinent imaging results/findings  within the past 24 hours.    Past Surgical History:   Procedure Laterality Date    CATARACT EXTRACTION W/  INTRAOCULAR LENS IMPLANT Bilateral     COLON SURGERY      COLONOSCOPY      COLONOSCOPY N/A 2018    [facility]formed by [facility] at [facility] ENDO (2ND FLR)    EGD (ESOPHAGOGASTRODUODENOSCOPY) N/A 2018    Performed by Luis Washington MD at [facility] ENDO (2ND FLR)    ESOPHAGOGASTRODUODENOSCOPY (EGD) N/A 2018    Performed by Kenji Desir MD at [facility] ENDO (2ND FLR)    EYE SURGERY      FRACTURE SURGERY      R arm    HERNIA REPAIR      HYSTERECTOMY      INSERTION-IMPLANT-GLAUCOMA Left 10/12/2017    Performed by [facility] at [facility] OR 1ST FLR    KIDNEY TRANSPLANT      NEPHRECTOMY  2008    transplant     PARATHYROID GLAND SURGERY      TONSILLECTOMY      UPPER GASTROINTESTINAL ENDOSCOPY         Review of patient's allergies indicates:   Allergen Reactions    Penicillins Swelling    Iodine      Other reaction(s): Hives    Sulfamethoxazole-trimethoprim      Other reaction(s): Swelling  Other reaction(s): Hives       Family History     Problem Relation (Age of Onset)    Asthma Sister    Blindness Father    Breast cancer Maternal Aunt    Depression Sister    Diabetes Maternal Aunt    Heart attack Father, Mother    Heart failure Father, Mother    Hypertension Father, Mother, Sister, Brother    Kidney disease Brother        Tobacco Use    Smoking status: Former Smoker     Types: Cigarettes     Last attempt to quit: 8/10/2000     Years since quittin.4    Smokeless tobacco: Never Used   Substance and Sexual Activity    Alcohol use: No     Comment: hx of etoh     Drug use: No    Sexual activity: No      Review of Systems   Gastrointestinal: Positive for blood in stool.     Objective:     Vital Signs (Most Recent):  Temp: 97.7 °F (36.5 °C) (19)  Pulse: 104 (19)  Resp: 18 (19)  BP: (!) 123/90 (19)  SpO2: 100 % (19) Vital Signs  (24h Range):  Temp:  [97.7 °F (36.5 °C)-98.7 °F (37.1 °C)] 97.7 °F (36.5 °C)  Pulse:  [] 104  Resp:  [12-27] 18  SpO2:  [79 %-100 %] 100 %  BP: (121-176)/(60-98) 123/90   Weight: 71.7 kg (158 lb 1.1 oz)  Body mass index is 29.87 kg/m².    No intake or output data in the 24 hours ending 01/13/19 0722    Physical Exam    Vents:     Lines/Drains/Airways     Central Venous Catheter Line                 RETIRED! DO NOT USE: Hemodialysis Catheter Arteriovenous fistula Left Forearm -- days          Drain                 Hemodialysis AV Fistula Left upper arm -- days          Peripheral Intravenous Line                 Peripheral IV - Single Lumen 01/12/19 2116 Right Upper Arm less than 1 day         Peripheral IV - Single Lumen 01/13/19 0412 Hand less than 1 day              Significant Labs:    CBC/Anemia Profile:  Recent Labs   Lab 01/12/19 2050 01/13/19  0506   WBC 8.58 8.22   HGB 5.8* 4.5*   HCT 19.8* 15.9*    157   MCV 90 96   RDW 21.9* 19.9*        Chemistries:  Recent Labs   Lab 01/12/19 2118 01/13/19  0506    140   K 2.9* 3.8    103   CO2 29 25   BUN 26* 29*   CREATININE 7.3* 7.5*   CALCIUM 6.9* 6.5*   ALBUMIN 2.3* 2.1*   PROT 5.0* 4.5*   BILITOT 0.4 0.4   ALKPHOS 41* 37*   ALT 12 11   AST 11 10   MG  --  1.6   PHOS  --  3.5       A1C:   Recent Labs   Lab 01/13/19  0506   HGBA1C 5.2     Bilirubin:   Recent Labs   Lab 01/12/19 2118 01/13/19  0506   BILITOT 0.4 0.4     BMP:   Recent Labs   Lab 01/13/19  0506   *      K 3.8      CO2 25   BUN 29*   CREATININE 7.5*   CALCIUM 6.5*   MG 1.6     CMP:   Recent Labs   Lab 01/12/19 2118 01/13/19  0506    140   K 2.9* 3.8    103   CO2 29 25   * 146*   BUN 26* 29*   CREATININE 7.3* 7.5*   CALCIUM 6.9* 6.5*   PROT 5.0* 4.5*   ALBUMIN 2.3* 2.1*   BILITOT 0.4 0.4   ALKPHOS 41* 37*   AST 11 10   ALT 12 11   ANIONGAP 11 12   EGFRNONAA 5.4* 5.2*     Coagulation:   Recent Labs   Lab 01/12/19  2118 01/13/19  0506   INR  2.2* 2.2*   APTT 29.2  --      All pertinent labs within the past 24 hours have been reviewed.    Significant Imaging: I have reviewed all pertinent imaging results/findings within the past 24 hours.  I have reviewed and interpreted all pertinent imaging results/findings within the past 24 hours.

## 2019-01-13 NOTE — ASSESSMENT & PLAN NOTE
Is being dialyzed M/W/F, last dialysis Friday 1/11/19. Had multiple units of PRBCs today and also received some KCl today for cardiac ectopy and low K.   Will send for stat renal profile and see if she needs dialysis for hyperkalemia. Clinically she does not.

## 2019-01-13 NOTE — SUBJECTIVE & OBJECTIVE
Past Medical History:   Diagnosis Date    Allergy     Anemia     Anticoagulant long-term use     4 years coumadin, asa    Arthritis     Awaiting organ transplant 2013    Cataract     Central serous chorioretinopathy of eye, right 2018    CHF (congestive heart failure)     Chronic kidney disease     Colon polyps     COPD (chronic obstructive pulmonary disease)     Coronary artery disease     Diabetes mellitus     Diabetic retinopathy     Diverticulosis     Encounter for blood transfusion     ESRD from HTN strtied RRT 1999    Failed  donor kidney transplant      Glaucoma     History of bleeding peptic ulcer      as stated per pt    Hyperlipidemia     Hypertension     Hypothyroidism     Morbid obesity 8/10/2012    Renal hypertension     Stroke            Past Surgical History:   Procedure Laterality Date    CATARACT EXTRACTION W/  INTRAOCULAR LENS IMPLANT Bilateral     COLON SURGERY      COLONOSCOPY      COLONOSCOPY N/A 2018    Performed by Jose Falk [facility] at [facility] (2ND [facility])    EGD (ESOPHAGOGASTRODUODENOSCOPY) N/A 2018    Performed by Luis Washington MD at [facility] (2ND [facility]LR)    ESOPHAGOGASTRODUODENOSCOPY (EGD) N/A 2018    Performed by Kenji Desir MD at [facility] END[facility] (2ND FLR)    EYE SURGERY      FRACTURE SURGERY      R arm    HERNIA REPAIR      HYSTERECTOMY      INSERTION-IMPLANT-GLAUCOMA Left 10/12/2017    Performed by [facility]id Kern [facility] at [facility] OR [facility]ST FLR    KIDNEY TRANSPLANT      NEPHRECTOMY  2008    transplant     PARATHYROID GLAND SURGERY      TONSILLECTOMY      UPPER GASTROINTESTINAL ENDOSCOPY         Review of patient's allergies indicates:   Allergen Reactions    Penicillins Swelling    Iodine      Other reaction(s): Hives    Sulfamethoxazole-trimethoprim      Other reaction(s): Swelling  Other reaction(s): Hives       Current Facility-Administered Medications   Medication    0.9%  NaCl infusion  (for blood administration)    acetaminophen tablet 650 mg    atorvastatin tablet 10 mg    calcium gluconate 1g in dextrose 5% 100mL (ready to mix system)    dextrose 50 % in water (D50W) injection 12.5 g    dextrose 50 % in water (D50W) injection 25 g    glucagon (human recombinant) injection 1 mg    glucose chewable tablet 16 g    glucose chewable tablet 24 g    levothyroxine tablet 100 mcg    lidocaine (cardiac) injection 20 mg    NON FORMULARY MEDICATION 1,000 mcg    NON FORMULARY MEDICATION 2 mg    [START ON 2019] pantoprazole injection 40 mg    pantoprazole injection 80 mg    ramelteon tablet 8 mg    sodium chloride 0.9% flush 5 mL    traMADol tablet 50 mg     Family History     Problem Relation (Age of Onset)    Asthma Sister    Blindness Father    Breast cancer Maternal Aunt    Depression Sister    Diabetes Maternal Aunt    Heart attack Father, Mother    Heart failure Father, Mother    Hypertension Father, Mother, Sister, Brother    Kidney disease Brother        Tobacco Use    Smoking status: Former Smoker     Types: Cigarettes     Last attempt to quit: 8/10/2000     Years since quittin.4    Smokeless tobacco: Never Used   Substance and Sexual Activity    Alcohol use: No     Comment: hx of etoh     Drug use: No    Sexual activity: No     Review of Systems   All other systems reviewed and are negative.    Objective:     Vital Signs (Most Recent):  Temp: 97.7 °F (36.5 °C) (19)  Pulse: 93 (19)  Resp: 18 (19)  BP: 123/72 (19)  SpO2: 100 % (19) Vital Signs (24h Range):  Temp:  [97.7 °F (36.5 °C)-98.7 °F (37.1 °C)] 97.7 °F (36.5 °C)  Pulse:  [] 93  Resp:  [12-27] 18  SpO2:  [79 %-100 %] 100 %  BP: (121-176)/(60-98) 123/72     Patient Vitals for the past 72 hrs (Last 3 readings):   Weight   19 0500 71.7 kg (158 lb 1.1 oz)   19 1853 71.1 kg (156 lb 12 oz)     Body mass index is 29.87 kg/m².    No intake or output  data in the 24 hours ending 01/13/19 0534    Physical Exam  General: alert, awake and oriented x 3  Eyes:PERRL.   Neck:no JVD   Lungs:  clear to auscultation bilaterally   Cardiovascular: Heart: Irregular rate and rhythm, S1, S2 normal, no murmur, click, rub or gallop.   Chest Wall: no tenderness.   Pulses-2+ radial, femoral, DP, PT b/l  Extremities: no cyanosis or edema. Right leg swelling  Abdomen/Rectal: Abdomen: soft, non-tender non-distented; bowel sounds normal  Neurologic: Normal strength and tone. No focal numbness or weakness  Significant Labs:  CBC:  Recent Labs   Lab 01/12/19 2050   WBC 8.58   RBC 2.19*   HGB 5.8*   HCT 19.8*      MCV 90   MCH 26.5*   MCHC 29.3*     BNP:  No results for input(s): BNP in the last 168 hours.    Invalid input(s): BNPTRIAGELBLO  CMP:  Recent Labs   Lab 01/12/19 2118   *   CALCIUM 6.9*   ALBUMIN 2.3*   PROT 5.0*      K 2.9*   CO2 29      BUN 26*   CREATININE 7.3*   ALKPHOS 41*   ALT 12   AST 11   BILITOT 0.4      Coagulation:   Recent Labs   Lab 01/12/19 2118   INR 2.2*   APTT 29.2     LDH:  No results for input(s): LDH in the last 72 hours.  Microbiology:  Microbiology Results (last 7 days)     ** No results found for the last 168 hours. **          I have reviewed all pertinent labs within the past 24 hours.    Diagnostic Results:  I have reviewed all pertinent imaging results/findings within the past 24 hours.

## 2019-01-13 NOTE — SUBJECTIVE & OBJECTIVE
Past Medical History:   Diagnosis Date    Allergy     Anemia     Anticoagulant long-term use     4 years coumadin, asa    Arthritis     Awaiting organ transplant 2013    Cataract     Central serous chorioretinopathy of eye, right 2018    CHF (congestive heart failure)     Chronic kidney disease     Colon polyps     COPD (chronic obstructive pulmonary disease)     Coronary artery disease     Diabetes mellitus     Diabetic retinopathy     Diverticulosis     Encounter for blood transfusion     ESRD from HTN strtied RRT 1999    Failed  donor kidney transplant      Glaucoma     History of bleeding peptic ulcer      as stated per pt    Hyperlipidemia     Hypertension     Hypothyroidism     Morbid obesity 8/10/2012    Renal hypertension     Stroke            Past Surgical History:   Procedure Laterality Date    CATARACT EXTRACTION W/  INTRAOCULAR LENS IMPLANT Bilateral     COLON SURGERY      COLONOSCOPY      COLONOSCOPY N/A 2018    [facility]formed by [facility] at [facility] (2ND FLR)    EGD (ESOPHAGOGASTRODUODENOSCOPY) N/A 2018    Performed by Luis Washington MD at [facility] END[facility] (2ND FLR)    ESOPHAGOGASTRODUODENOSCOPY (EGD) N/A 2018    Performed by Kenji Desir MD at [facility] END[facility] (2ND FLR)    EYE SURGERY      FRACTURE SURGERY      R arm    HERNIA REPAIR      HYSTERECTOMY      INSERTION-IMPLANT-GLAUCOMA Left 10/12/2017    Performed by [facility] at [facility] 1ST FLR    KIDNEY TRANSPLANT      NEPHRECTOMY  2008    transplant     PARATHYROID GLAND SURGERY      TONSILLECTOMY      UPPER GASTROINTESTINAL ENDOSCOPY         Review of patient's allergies indicates:   Allergen Reactions    Penicillins Swelling    Iodine      Other reaction(s): Hives    Sulfamethoxazole-trimethoprim      Other reaction(s): Swelling  Other reaction(s): Hives       No current facility-administered medications on file prior to encounter.       Current Outpatient Medications on File Prior to Encounter   Medication Sig    ALPHAGAN P 0.1 % Drop once daily.     aspirin 81 MG chewable tablet Take 81 mg by mouth Daily. 1  By mouth Every day    cyclobenzaprine (FLEXERIL) 5 MG tablet Take three tablets per day for 2 days, followed by one tablet per day at night as needed    diltiaZEM (CARDIZEM CD) 300 MG 24 hr capsule Take 1 capsule (300 mg total) by mouth once daily.    dorzolamide-timolol 2-0.5% (COSOPT) 22.3-6.8 mg/mL ophthalmic solution INSTILL 1 DROP IN BOTH EYES TWICE DAILY    hydrocodone-acetaminophen 7.5-325mg (NORCO) 7.5-325 mg per tablet TK 1 T PO Q 6 H PRN    latanoprost 0.005 % ophthalmic solution INSTILL 1 DROP IN BOTH EYES EVERY DAY AT BEDTIME    levothyroxine (SYNTHROID) 100 MCG tablet Take 100 mcg by mouth. 1 Tablet Oral Every day    lidocaine-prilocaine (EMLA) cream Apply topically as needed.    LIPITOR 10 mg tablet TAKE 1 BY MOUTH ONCE A DAY    pantoprazole (PROTONIX) 40 MG tablet TAKE 1 TABLET BY MOUTH ONCE DAILY    polyethylene glycol (COLYTE) 240-22.72-6.72 -5.84 gram SolR Drink half bottle.Take 8 oz q 15 minutes until half bottle complete the night before procedure    riociguat (ADEMPAS) 2 mg Tab tablet Take 2 mg by mouth 3 (three) times daily.     selexipag (UPTRAVI) 1,000 mcg Tab Take 1,000 mcg by mouth 2 (two) times daily.    traMADol (ULTRAM) 50 mg tablet TK 1 T PO  Q 8 H PRN    warfarin (COUMADIN) 5 MG tablet TAKE 1 1/2 TABLETS BY MOUTH DAILY ON TUESDAY, THURSDAY AND SATURDAY, THEN TAKE 1 TABLET DAILY ON MONDAY, WEDNESDAY, FRIDAY AND SUNDAY     Family History     Problem Relation (Age of Onset)    Asthma Sister    Blindness Father    Breast cancer Maternal Aunt    Depression Sister    Diabetes Maternal Aunt    Heart attack Father, Mother    Heart failure Father, Mother    Hypertension Father, Mother, Sister, Brother    Kidney disease Brother        Tobacco Use    Smoking status: Former Smoker     Types: Cigarettes      Last attempt to quit: 8/10/2000     Years since quittin.4    Smokeless tobacco: Never Used   Substance and Sexual Activity    Alcohol use: No     Comment: hx of etoh     Drug use: No    Sexual activity: No     Review of Systems   Constitutional: Negative for fever and unexpected weight change.   HENT: Negative for ear pain, sinus pressure, sneezing and sore throat.    Eyes: Positive for visual disturbance. Negative for pain.   Respiratory: Negative for cough, chest tightness, shortness of breath and wheezing.    Cardiovascular: Positive for palpitations. Negative for chest pain and leg swelling.   Gastrointestinal: Positive for nausea and vomiting. Negative for abdominal pain, constipation and diarrhea.   Endocrine: Negative for polydipsia and polyuria.   Genitourinary: Negative for decreased urine volume, difficulty urinating, dysuria and urgency.   Musculoskeletal: Positive for arthralgias. Negative for myalgias.   Skin: Negative for color change and rash.   Allergic/Immunologic: Negative for environmental allergies and food allergies.   Neurological: Positive for light-headedness. Negative for dizziness, syncope, weakness and headaches.   Psychiatric/Behavioral: Negative for confusion and dysphoric mood. The patient is not nervous/anxious.      Objective:     Vital Signs (Most Recent):  Temp: 98.7 °F (37.1 °C) (19 1853)  Pulse: 91 (19)  Resp: 20 (19)  BP: 128/74 (19)  SpO2: (!) 93 % (19) Vital Signs (24h Range):  Temp:  [98.7 °F (37.1 °C)] 98.7 °F (37.1 °C)  Pulse:  [] 91  Resp:  [15-20] 20  SpO2:  [93 %-96 %] 93 %  BP: (128-131)/(74-87) 128/74     Weight: 71.1 kg (156 lb 12 oz)  Body mass index is 29.62 kg/m².    Physical Exam   Constitutional: She is oriented to person, place, and time. She appears well-developed and well-nourished.   Body mass index is 29.62 kg/m².     HENT:   Head: Normocephalic and atraumatic.   Eyes: EOM are normal. Pupils  are equal, round, and reactive to light. Left conjunctiva is injected. Left conjunctiva has a hemorrhage. No scleral icterus.   Neck: Normal range of motion. Neck supple.   Puncture site from attempted central line   Cardiovascular: Normal rate, regular rhythm, normal heart sounds and intact distal pulses.   No murmur heard.  Pulmonary/Chest: Effort normal and breath sounds normal. No respiratory distress. She has no wheezes.   Abdominal: Soft. Bowel sounds are normal. She exhibits no distension. There is no tenderness.   Musculoskeletal:   Intraosseous access in right tibia   Neurological: She is alert and oriented to person, place, and time.   Skin: Skin is warm and dry.   Psychiatric: She has a normal mood and affect. Her behavior is normal.   Vitals reviewed.        CRANIAL NERVES     CN III, IV, VI   Pupils are equal, round, and reactive to light.  Extraocular motions are normal.        Significant Labs: All pertinent labs within the past 24 hours have been reviewed.    Significant Imaging: I have reviewed all pertinent imaging results/findings within the past 24 hours.

## 2019-01-13 NOTE — ASSESSMENT & PLAN NOTE
ANTICOAGULATION MONITORED BY PHARMACIST    CHADSVASc = 3 corresponding to annual risk of 3.2%. INR of 2.2 on admission.  - Will hold anticoagulation in setting of acute GI bleed

## 2019-01-13 NOTE — ED PROVIDER NOTES
Encounter Date: 1/12/2019    SCRIBE #1 NOTE: I, Brayan Hardy, am scribing for, and in the presence of,  Dr. Velasco. I have scribed the entire note.       History     Chief Complaint   Patient presents with    Rectal Bleeding     Time patient was seen by the provider: 7:19 PM      63 y.o. female with past surgical history of a kidney transplant and co-morbidities including: HTN, HLD, CHF, and a bleeding peptic ulcer, presents to the ED with a complaint of rectal bleeding. Patient states that she began bleeding this morning which was initially yellow and orange in color, but then turned into red clots in following two episodes. She reports similar sx in the past which constituted admission to the hospital for blood transfusion. Patient states she has been experiencing slight episodes of dizziness on standing earlier today which alleviates when laying down. Patient also reports two episodes of emesis, no hematemesis. Patient states she received her dialysis treatment yesterday without complications. Patient endorses nausea exacerbated with ambulation. Patient denies any falls or loss of consciousness, associated abdominal pain, CP, dyspnea, or leg pain. Patient is compliant with warfarin for her history of strokes. Patient also reports that she received a shot in her left eye for treatment of her glaucoma.       The history is provided by the patient.     Review of patient's allergies indicates:   Allergen Reactions    Penicillins Swelling    Iodine      Other reaction(s): Hives    Sulfamethoxazole-trimethoprim      Other reaction(s): Swelling  Other reaction(s): Hives     Past Medical History:   Diagnosis Date    Allergy     Anemia     Anticoagulant long-term use     4 years coumadin, asa    Arthritis     Awaiting organ transplant 4/30/2013    Cataract     Central serous chorioretinopathy of eye, right 8/21/2018    CHF (congestive heart failure)     Chronic kidney disease     Colon polyps     COPD  (chronic obstructive pulmonary disease)     Coronary artery disease     Diabetes mellitus     Diabetic retinopathy     Diverticulosis     Encounter for blood transfusion     ESRD from HTN strtied RRT 1999    Failed  donor kidney transplant -     Glaucoma     History of bleeding peptic ulcer      as stated per pt    Hyperlipidemia     Hypertension     Hypothyroidism     Morbid obesity 8/10/2012    Renal hypertension     Stroke     1987     Past Surgical History:   Procedure Laterality Date    CATARACT EXTRACTION W/  INTRAOCULAR LENS IMPLANT Bilateral     COLON SURGERY      COLONOSCOPY      COLONOSCOPY N/A 2018    Performed by [facility] MD at [facility] ENDO (2ND FLR)    EGD (ESOPHAGOGASTRODUODENOSCOPY) N/A 2018    Performed by Luis Washington MD at [facility] ENDO (2ND FLR)    ESOPHAGOGASTRODUODENOSCOPY (EGD) N/A 2018    Performed by Kenji Desir MD at [facility] ENDO (2ND FLR)    EYE SURGERY      FRACTURE SURGERY      R arm    HERNIA REPAIR      HYSTERECTOMY      INSERTION-IMPLANT-GLAUCOMA Left 10/12/2017    Performed by J[facility]id [facility]ern MD at [facility] OR 1ST FLR    KIDNEY TRANSPLANT      NEPHRECTOMY  2008    transplant     PARATHYROID GLAND SURGERY      TONSILLECTOMY      UPPER GASTROINTESTINAL ENDOSCOPY       Family History   Problem Relation Age of Onset    Heart attack Father     Heart failure Father     Hypertension Father     Blindness Father     Heart attack Mother     Heart failure Mother     Hypertension Mother     Asthma Sister     Depression Sister     Hypertension Sister     Hypertension Brother     Kidney disease Brother         ESRD    Diabetes Maternal Aunt     Breast cancer Maternal Aunt     Amblyopia Neg Hx     Cataracts Neg Hx     Macular degeneration Neg Hx     Retinal detachment Neg Hx     Strabismus Neg Hx     Colon cancer Neg Hx     Esophageal cancer Neg Hx      Social History     Tobacco Use    Smoking  status: Former Smoker     Types: Cigarettes     Last attempt to quit: 8/10/2000     Years since quittin.4    Smokeless tobacco: Never Used   Substance Use Topics    Alcohol use: No     Comment: hx of etoh     Drug use: No     Review of Systems   Constitutional: Negative for chills and fever.   HENT: Negative for rhinorrhea and sinus pain.    Eyes: Negative for visual disturbance.   Respiratory: Negative for cough and shortness of breath.    Cardiovascular: Negative for chest pain.   Gastrointestinal: Positive for anal bleeding, blood in stool, nausea and vomiting. Negative for abdominal pain.   Genitourinary: Negative for dysuria, flank pain and hematuria.   Musculoskeletal: Negative for back pain and myalgias.   Skin: Negative for rash.   Neurological: Negative for weakness and numbness.   All other systems reviewed and are negative.      Physical Exam     Initial Vitals [19 1853]   BP Pulse Resp Temp SpO2   131/87 100 15 98.7 °F (37.1 °C) 96 %      MAP       --         Physical Exam  GENERAL APPEARANCE: Well developed, well nourished, in no acute distress.  HENT: Normocephalic, atraumatic    EYES: Sclerae anicteric. Pale conjunctiva.  NECK: Supple  LUNGS: Breathing comfortably. Speaking in full sentences.  : Rectal exam: No external hemorrhoids or fissures visual. Bright red blood guaiac positive.  NEUROLOGIC: Alert, interacting normally. No facial droop.   MSK: Moving all four extremities.  Skin: Warm and dry. No visible rash on exposed areas of skin.    Psych: Mood and affect normal.     ED Course   Central Line  Date/Time: 2019 10:45 PM  Performed by: Cullen Velasco MD  Indications: vascular access  Anesthesia: local infiltration    Anesthesia:  Local Anesthetic: lidocaine 1% without epinephrine  Preparation: skin prepped with ChloraPrep  Location details: right internal jugular  Catheter type: triple lumen  Ultrasound guidance: yes  Number of attempts: 3  Estimated blood loss (mL):  30  Comments: Procedure was not successful. Vein was cannulated, but unable to thread wire despite good draw back on multiple attempts to thread wire.         Labs Reviewed   CBC W/ AUTO DIFFERENTIAL - Abnormal; Notable for the following components:       Result Value    RBC 2.19 (*)     Hemoglobin 5.8 (*)     Hematocrit 19.8 (*)     MCH 26.5 (*)     MCHC 29.3 (*)     RDW 21.9 (*)     Immature Granulocytes 0.6 (*)     Immature Grans (Abs) 0.05 (*)     All other components within normal limits    Narrative:     hct & hgb   critical result(s) called and verbal readback obtained   from rabia hill rn , 01/12/2019 21:33   COMPREHENSIVE METABOLIC PANEL - Abnormal; Notable for the following components:    Potassium 2.9 (*)     Glucose 113 (*)     BUN, Bld 26 (*)     Creatinine 7.3 (*)     Calcium 6.9 (*)     Total Protein 5.0 (*)     Albumin 2.3 (*)     Alkaline Phosphatase 41 (*)     eGFR if  6.2 (*)     eGFR if non  5.4 (*)     All other components within normal limits   PROTIME-INR - Abnormal; Notable for the following components:    Prothrombin Time 20.9 (*)     INR 2.2 (*)     All other components within normal limits   APTT   TYPE & SCREEN   PREPARE RBC SOFT     EKG Readings: (Independently Interpreted)   Rhythm: Normal Sinus Rhythm. Heart Rate: 94.   No significant ST changes. Normal axis. 2 PAC's. Prolonged QT.       Imaging Results    None          Medical Decision Making:   History:   Old Medical Records: I decided to obtain old medical records.  Initial Assessment:   Patient with complaint of new rectal bleeding. Pending rectal exam. Will get a set of labs type and screen and re-evaluate based on workup. Patient sx more consistent with a lower GI bleed given red apperance in stool.  Independently Interpreted Test(s):   I have ordered and independently interpreted EKG Reading(s) - see prior notes  Clinical Tests:   Lab Tests: Ordered and Reviewed  Medical Tests: Ordered and  Reviewed  ED Management:  9:11 PM  Patient very difficult IV access.  Primary nurse was not able to get access.  Ultrasound trained nurse attempted of not able to get access.  Attempted several times to get access via ultrasound.  Vein cannulated, however as soon as attempted to be thread there is kinking of the catheter, this happen multiple spots.  This MD under to care the right midline catheter, which initially was not returning blood, but later was able to get good return.  Flushing well. Blood is dark and appears venous.  Will get a VBG from this catheter.  Sent off labs.  Patient remains stable.     Ultrasound-guided IV procedure note:  Ultrasound was used to place a right mid brachial vein IV.  Skin was sterilized with chlorhexidine.  IV was guided into the vein with the ultrasound.  Midline guiding wire was advanced, catheter was advanced over this guidewire.  Initially there was no return of blood, however upon manipulation of catheter there was good return.     Update:  Right midline no longer functioning.  Attempted right IJ unsuccessfully.  Given low hemoglobin, left-sided fistula, will not attempt left IJ, Looked at her femoral vasculature, it is also very poor.  Given lack of other workable IV access, decision was made to place an IO in her right tibia.  This will be a bridged to vascular access team which can see her in the morning.  IO obtain with good drawback.  Will start transfusion as soon as blood is available.  Throughout all this patient remained hemodynamically stable, low-grade tachycardic.   Endorse the hospital medicine is safe for inpatient tele bed.     Critical Care Time:     The high probability of sudden, clinically significant deterioration in the patient's condition required the highest level of my preparedness to intervene urgently.    Services included the following: chart data review, reviewing nursing notes and/or old charts, documentation time, consultant collaboration  regarding findings and treatment options, medication orders and management, direct patient care, vital sign assessments and ordering, interpreting and reviewing diagnostic studies/lab tests.     I spent 80 minutes on total Critical Care time, which includes only time during which I was engaged in work directly related to the patient's care, as described above, whether at the bedside or elsewhere in the Emergency Department.  It did not include time spent on separately billable procedures nor did it include the time spent by residents, students, nurses or physician assistants on this patient's care.    Critical Care was needed secondary to the following conditions:  GI bleed, difficult IV access.               Scribe Attestation:   Scribe #1: I performed the above scribed service and the documentation accurately describes the services I performed. I attest to the accuracy of the note.               Clinical Impression:   The encounter diagnosis was SOB (shortness of breath).                             Cullen Velasco MD  01/12/19 1186

## 2019-01-13 NOTE — CONSULTS
[facility]  Nephrology  Consult Note    Patient Name: Shivani Sheets  MRN: [id number]  Admission Date: 2019  Hospital Length of Stay: 1 days  Attending Provider: Meredith Pugh MD   Primary Care Physician: Eula Weiss MD  Principal Problem:Gastrointestinal hemorrhage    Inpatient consult to Nephrology  Consult performed by: Nella Anderson MD  Consult ordered by: Behram Khan, MD  Reason for consult: ESRD        Subjective:     HPI:  64 yo F w/ PMHx of ESRD x 20 yrs secondary to HTN, dialysis M/W/F ([facility]),  pulmonary HTN (on adempas/uptravi), diastolic dysfunction, central retinal artery occlusion, CAD with remote PCI, s/p failed kidney transplant [id number], PUD, STEFFANY on CPAP, hypothyroidism, RA, HLD and obesity who presents with bright red per rectum that started 2 days ago.  She has a hx of pAFand is coumadin, had a hx of GI bleeds in the past. Was found to have a Hgb of 4.5 mg/dl today and received 3 units of PRBC. No signicant SOB or CP. Patient also received 40 meql of KCl and 1 gm of Magnesium because of ectopies.      Past Medical History:   Diagnosis Date    Allergy     Anemia     Anticoagulant long-term use     4 years coumadin, asa    Arthritis     Awaiting organ transplant 2013    Cataract     Central serous chorioretinopathy of eye, right 2018    CHF (congestive heart failure)     Chronic kidney disease     Colon polyps     COPD (chronic obstructive pulmonary disease)     Coronary artery disease     Diabetes mellitus     Diabetic retinopathy     Diverticulosis     Encounter for blood transfusion     ESRD from HTN strtied RRT 1999    Failed  donor kidney transplant      Glaucoma     History of bleeding peptic ulcer      as stated per pt    Hyperlipidemia     Hypertension     Hypothyroidism     Morbid obesity 8/10/2012    Renal hypertension     Stroke     1987       Past Surgical History:   Procedure  Laterality Date    CATARACT EXTRACTION W/  INTRAOCULAR LENS IMPLANT Bilateral     COLON SURGERY      COLONOSCOPY      COLONOSCOPY N/A 6/12/2018    Performed by [facility] at [facility] (2ND FLR)    EGD (ESOPHAGOGASTRODUODENOSCOPY) N/A 9/11/2018    Performed by Luis Washington MD at [facility] (2ND FLR)    ESOPHAGOGASTRODUODENOSCOPY (EGD) N/A 2/27/2018    Performed by Kenji Desir MD at [facility] (2ND FLR)    EYE SURGERY      FRACTURE SURGERY      R arm    HERNIA REPAIR      HYSTERECTOMY      INSERTION-IMPLANT-GLAUCOMA Left 10/12/2017    Performed by Junaid Kern MD at [facility] OR 1ST FLR    KIDNEY TRANSPLANT      NEPHRECTOMY  11/2008    transplant     PARATHYROID GLAND SURGERY      TONSILLECTOMY      UPPER GASTROINTESTINAL ENDOSCOPY         Review of patient's allergies indicates:   Allergen Reactions    Penicillins Swelling    Iodine      Other reaction(s): Hives    Sulfamethoxazole-trimethoprim      Other reaction(s): Swelling  Other reaction(s): Hives     Current Facility-Administered Medications   Medication Frequency    0.9%  NaCl infusion (for blood administration) Q24H PRN    0.9%  NaCl infusion (for blood administration) Q24H PRN    0.9%  NaCl infusion (for blood administration) Q24H PRN    acetaminophen tablet 650 mg Q8H PRN    atorvastatin tablet 10 mg Daily    dextrose 50 % in water (D50W) injection 12.5 g PRN    dextrose 50 % in water (D50W) injection 25 g PRN    glucagon (human recombinant) injection 1 mg PRN    glucose chewable tablet 16 g PRN    glucose chewable tablet 24 g PRN    levothyroxine tablet 100 mcg Before breakfast    lidocaine (cardiac) injection 20 mg ED 1 Time    magnesium sulfate 1 g in dextrose 5 % 50 mL IVPB Once    [START ON 1/14/2019] pantoprazole injection 40 mg BID    ramelteon tablet 8 mg Nightly PRN    riociguat (ADEMPAS) tablet 2 mg TID    selexipag Tab 800 mcg BID    And    selexipag Tab 200 mcg BID    sodium chloride 0.9% flush 5  mL PRN    traMADol tablet 50 mg Q8H PRN     Family History     Problem Relation (Age of Onset)    Asthma Sister    Blindness Father    Breast cancer Maternal Aunt    Depression Sister    Diabetes Maternal Aunt    Heart attack Father, Mother    Heart failure Father, Mother    Hypertension Father, Mother, Sister, Brother    Kidney disease Brother        Tobacco Use    Smoking status: Former Smoker     Types: Cigarettes     Last attempt to quit: 8/10/2000     Years since quittin.4    Smokeless tobacco: Never Used   Substance and Sexual Activity    Alcohol use: No     Comment: hx of etoh     Drug use: No    Sexual activity: No     Review of Systems   Constitutional: Positive for fatigue.   HENT: Negative for congestion.    Respiratory: Negative for cough, chest tightness and shortness of breath.    Cardiovascular: Positive for palpitations.   Gastrointestinal: Positive for blood in stool and vomiting. Negative for abdominal pain.   Endocrine: Negative for polyuria.   Genitourinary:        Anuric   Musculoskeletal: Negative for arthralgias.   Skin: Negative for color change.   Neurological: Positive for dizziness and light-headedness. Negative for tremors, syncope and speech difficulty.   Hematological: Negative for adenopathy.   Psychiatric/Behavioral: Negative for agitation and behavioral problems.     Objective:     Vital Signs (Most Recent):  Temp: 98 °F (36.7 °C) (19 1100)  Pulse: 95 (19 1115)  Resp: 20 (19 1115)  BP: 130/83 (19 1115)  SpO2: 99 % (19 1115)  O2 Device (Oxygen Therapy): nasal cannula (19 1100) Vital Signs (24h Range):  Temp:  [97.7 °F (36.5 °C)-98.7 °F (37.1 °C)] 98 °F (36.7 °C)  Pulse:  [] 95  Resp:  [12-40] 20  SpO2:  [79 %-100 %] 99 %  BP: (119-176)/(60-98) 130/83     Weight: 71.7 kg (158 lb 1.1 oz) (19 0500)  Body mass index is 29.87 kg/m².  Body surface area is 1.76 meters squared.    I/O last 3 completed shifts:  In: 193  [Blood:193]  Out: -     Physical Exam   Constitutional: She is oriented to person, place, and time. She appears well-developed and well-nourished.   HENT:   Head: Normocephalic and atraumatic.   Eyes: EOM are normal. Pupils are equal, round, and reactive to light.   Neck: No JVD present.   Cardiovascular: Normal rate.   Murmur heard.  Left upper extremity AV fistula with a good thrill. 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 No stridor. She has no wheezes. She has no rales.   Abdominal: Soft. She exhibits no distension.   Musculoskeletal: She exhibits edema (trace).   Lymphadenopathy:     She has no cervical adenopathy.   Neurological: She is alert and oriented to person, place, and time.   Skin: Skin is dry.       Significant Labs:  All labs within the past 24 hours have been reviewed.    Significant Imaging:  X-Ray: Reviewed  mild increased interstitial markings    Assessment/Plan:     * Gastrointestinal hemorrhage    Low H/H, GI is following. Patient on coumadin for Afib. Obviously hold coumadin, would give vitamin K if the patient continues to bleed today. Will need to discuss the benefits of coumadin for the future with cardiology as she has had GI bleeds in the past and is on dialysis.      ESRD from HTN started RRT 1999    Is being dialyzed M/W/F, last dialysis Friday 1/11/19. Had multiple units of PRBCs today and also received some KCl today for cardiac ectopy and low K.   Will send for stat renal profile and see if she needs dialysis for hyperkalemia. Clinically she does not.          Thank you for your consult. I will follow-up with patient. Please contact us if you have any additional questions.    Nella Anderson MD  Nephrology  [facility]

## 2019-01-13 NOTE — SUBJECTIVE & OBJECTIVE
Past Medical History:   Diagnosis Date    Allergy     Anemia     Anticoagulant long-term use     4 years coumadin, asa    Arthritis     Awaiting organ transplant 2013    Cataract     Central serous chorioretinopathy of eye, right 2018    CHF (congestive heart failure)     Chronic kidney disease     Colon polyps     COPD (chronic obstructive pulmonary disease)     Coronary artery disease     Diabetes mellitus     Diabetic retinopathy     Diverticulosis     Encounter for blood transfusion     ESRD from HTN strtied RRT 1999    Failed  donor kidney transplant      Glaucoma     History of bleeding peptic ulcer      as stated per pt    Hyperlipidemia     Hypertension     Hypothyroidism     Morbid obesity 8/10/2012    Renal hypertension     Stroke            Past Surgical History:   Procedure Laterality Date    CATARACT EXTRACTION W/  INTRAOCULAR LENS IMPLANT Bilateral     COLON SURGERY      COLONOSCOPY      COLONOSCOPY N/A 2018    Performed by Jose Falk MD at [facility] (2ND FLR)    EGD (ESOPHAGOGASTRODUODENOSCOPY) N/A 2018    Performed by Luis Washington MD at [facility] (2ND FLR)    ESOPHAGOGASTRODUODENOSCOPY (EGD) N/A 2018    Performed by Kenji Desir MD at [facility] (2ND FLR)    EYE SURGERY      FRACTURE SURGERY      R arm    HERNIA REPAIR      HYSTERECTOMY      INSERTION-IMPLANT-GLAUCOMA Left 10/12/2017    Performed by J[facility]id Kern MD at [facility] OR 1ST FLR    KIDNEY TRANSPLANT      NEPHRECTOMY  2008    transplant     PARATHYROID GLAND SURGERY      TONSILLECTOMY      UPPER GASTROINTESTINAL ENDOSCOPY         Review of patient's allergies indicates:   Allergen Reactions    Penicillins Swelling    Iodine      Other reaction(s): Hives    Sulfamethoxazole-trimethoprim      Other reaction(s): Swelling  Other reaction(s): Hives     Current Facility-Administered Medications   Medication Frequency    0.9%  NaCl  infusion (for blood administration) Q24H PRN    0.9%  NaCl infusion (for blood administration) Q24H PRN    0.9%  NaCl infusion (for blood administration) Q24H PRN    acetaminophen tablet 650 mg Q8H PRN    atorvastatin tablet 10 mg Daily    dextrose 50 % in water (D50W) injection 12.5 g PRN    dextrose 50 % in water (D50W) injection 25 g PRN    glucagon (human recombinant) injection 1 mg PRN    glucose chewable tablet 16 g PRN    glucose chewable tablet 24 g PRN    levothyroxine tablet 100 mcg Before breakfast    lidocaine (cardiac) injection 20 mg ED 1 Time    magnesium sulfate 1 g in dextrose 5 % 50 mL IVPB Once    [START ON 2019] pantoprazole injection 40 mg BID    ramelteon tablet 8 mg Nightly PRN    riociguat (ADEMPAS) tablet 2 mg TID    selexipag Tab 800 mcg BID    And    selexipag Tab 200 mcg BID    sodium chloride 0.9% flush 5 mL PRN    traMADol tablet 50 mg Q8H PRN     Family History     Problem Relation (Age of Onset)    Asthma Sister    Blindness Father    Breast cancer Maternal Aunt    Depression Sister    Diabetes Maternal Aunt    Heart attack Father, Mother    Heart failure Father, Mother    Hypertension Father, Mother, Sister, Brother    Kidney disease Brother        Tobacco Use    Smoking status: Former Smoker     Types: Cigarettes     Last attempt to quit: 8/10/2000     Years since quittin.4    Smokeless tobacco: Never Used   Substance and Sexual Activity    Alcohol use: No     Comment: hx of etoh     Drug use: No    Sexual activity: No     Review of Systems   Constitutional: Positive for fatigue.   HENT: Negative for congestion.    Respiratory: Negative for cough, chest tightness and shortness of breath.    Cardiovascular: Positive for palpitations.   Gastrointestinal: Positive for blood in stool and vomiting. Negative for abdominal pain.   Endocrine: Negative for polyuria.   Genitourinary:        Anuric   Musculoskeletal: Negative for arthralgias.   Skin: Negative  for color change.   Neurological: Positive for dizziness and light-headedness. Negative for tremors, syncope and speech difficulty.   Hematological: Negative for adenopathy.   Psychiatric/Behavioral: Negative for agitation and behavioral problems.     Objective:     Vital Signs (Most Recent):  Temp: 98 °F (36.7 °C) (01/13/19 1100)  Pulse: 95 (01/13/19 1115)  Resp: 20 (01/13/19 1115)  BP: 130/83 (01/13/19 1115)  SpO2: 99 % (01/13/19 1115)  O2 Device (Oxygen Therapy): nasal cannula (01/13/19 1100) Vital Signs (24h Range):  Temp:  [97.7 °F (36.5 °C)-98.7 °F (37.1 °C)] 98 °F (36.7 °C)  Pulse:  [] 95  Resp:  [12-40] 20  SpO2:  [79 %-100 %] 99 %  BP: (119-176)/(60-98) 130/83     Weight: 71.7 kg (158 lb 1.1 oz) (01/13/19 0500)  Body mass index is 29.87 kg/m².  Body surface area is 1.76 meters squared.    I/O last 3 completed shifts:  In: 193 [Blood:193]  Out: -     Physical Exam   Constitutional: She is oriented to person, place, and time. She appears well-developed and well-nourished.   HENT:   Head: Normocephalic and atraumatic.   Eyes: EOM are normal. Pupils are equal, round, and reactive to light.   Neck: No JVD present.   Cardiovascular: Normal rate.   Murmur heard.  Left upper extremity AV fistula with a good thrill. 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 No stridor. She has no wheezes. She has no rales.   Abdominal: Soft. She exhibits no distension.   Musculoskeletal: She exhibits edema (trace).   Lymphadenopathy:     She has no cervical adenopathy.   Neurological: She is alert and oriented to person, place, and time.   Skin: Skin is dry.       Significant Labs:  All labs within the past 24 hours have been reviewed.    Significant Imaging:  X-Ray: Reviewed  mild increased interstitial markings

## 2019-01-13 NOTE — SUBJECTIVE & OBJECTIVE
Past Medical History:   Diagnosis Date    Allergy     Anemia     Anticoagulant long-term use     4 years coumadin, asa    Arthritis     Awaiting organ transplant 2013    Cataract     Central serous chorioretinopathy of eye, right 2018    CHF (congestive heart failure)     Chronic kidney disease     Colon polyps     COPD (chronic obstructive pulmonary disease)     Coronary artery disease     Diabetes mellitus     Diabetic retinopathy     Diverticulosis     Encounter for blood transfusion     ESRD from HTN strtied RRT 1999    Failed  donor kidney transplant -     Glaucoma     History of bleeding peptic ulcer      as stated per pt    Hyperlipidemia     Hypertension     Hypothyroidism     Morbid obesity 8/10/2012    Renal hypertension     Stroke            Past Surgical History:   Procedure Laterality Date    CATARACT EXTRACTION W/  INTRAOCULAR LENS IMPLANT Bilateral     COLON SURGERY      COLONOSCOPY      COLONOSCOPY N/A 2018    Performed by Jose Falk MD at [facility] (2ND FLR)    EGD (ESOPHAGOGASTRODUODENOSCOPY) N/A 2018    Performed by Luis Washington MD at [facility] (2ND FLR)    ESOPHAGOGASTRODUODENOSCOPY (EGD) N/A 2018    Performed by Kenji Desir MD at [facility] (2ND FLR)    EYE SURGERY      FRACTURE SURGERY      R arm    HERNIA REPAIR      HYSTERECTOMY      INSERTION-IMPLANT-GLAUCOMA Left 10/12/2017    Performed by J[facility]id [facility]ern MD at [facility] OR 1ST FLR    KIDNEY TRANSPLANT      NEPHRECTOMY  2008    transplant     PARATHYROID GLAND SURGERY      TONSILLECTOMY      UPPER GASTROINTESTINAL ENDOSCOPY         Review of patient's allergies indicates:   Allergen Reactions    Penicillins Swelling    Iodine      Other reaction(s): Hives    Sulfamethoxazole-trimethoprim      Other reaction(s): Swelling  Other reaction(s): Hives     Family History     Problem Relation (Age of Onset)    Asthma Sister    Blindness  Father    Breast cancer Maternal Aunt    Depression Sister    Diabetes Maternal Aunt    Heart attack Father, Mother    Heart failure Father, Mother    Hypertension Father, Mother, Sister, Brother    Kidney disease Brother        Tobacco Use    Smoking status: Former Smoker     Types: Cigarettes     Last attempt to quit: 8/10/2000     Years since quittin.4    Smokeless tobacco: Never Used   Substance and Sexual Activity    Alcohol use: No     Comment: hx of etoh     Drug use: No    Sexual activity: No     Review of Systems   Constitutional: Positive for fatigue. Negative for activity change, appetite change, chills, fever and unexpected weight change.   HENT: Negative for sore throat and trouble swallowing.    Respiratory: Negative for cough, chest tightness and shortness of breath.    Cardiovascular: Negative for chest pain.   Gastrointestinal: Positive for anal bleeding and blood in stool. Negative for abdominal distention, abdominal pain, constipation, diarrhea, nausea and vomiting.   Genitourinary: Negative for dysuria.   Musculoskeletal: Negative for arthralgias and myalgias.   Skin: Negative for pallor.   Neurological: Positive for weakness (improved). Negative for dizziness, syncope and light-headedness.   Psychiatric/Behavioral: Negative for agitation and confusion.     Objective:     Vital Signs (Most Recent):  Temp: 97.9 °F (36.6 °C) (19 1630)  Pulse: 87 (19 1630)  Resp: 15 (19 1630)  BP: (!) 140/82 (19 1630)  SpO2: 97 % (19 1630) Vital Signs (24h Range):  Temp:  [97.7 °F (36.5 °C)-98.7 °F (37.1 °C)] 97.9 °F (36.6 °C)  Pulse:  [] 87  Resp:  [12-40] 15  SpO2:  [70 %-100 %] 97 %  BP: (107-176)/(57-98) 140/82     Weight: 71.7 kg (158 lb 1.1 oz) (19 0500)  Body mass index is 29.87 kg/m².      Intake/Output Summary (Last 24 hours) at 2019 1700  Last data filed at 2019 1630  Gross per 24 hour   Intake 1594 ml   Output --   Net 1594 ml        Lines/Drains/Airways     Central Venous Catheter Line                 RETIRED! DO NOT USE: Hemodialysis Catheter Arteriovenous fistula Left Forearm -- days          Drain                 Hemodialysis AV Fistula Left upper arm -- days          Peripheral Intravenous Line                 Peripheral IV - Single Lumen 01/12/19 2116 Right Upper Arm less than 1 day         Peripheral IV - Single Lumen 01/13/19 0412 Hand less than 1 day                Physical Exam   Constitutional: She is oriented to person, place, and time. She appears well-developed and well-nourished. No distress.   Appears well and comfortable in no acute distress.   HENT:   Head: Normocephalic and atraumatic.   Mouth/Throat: Oropharynx is clear and moist. No oropharyngeal exudate.   Eyes: Conjunctivae are normal. No scleral icterus.   Cardiovascular: Normal rate, regular rhythm, normal heart sounds and intact distal pulses.   Pulmonary/Chest: Effort normal and breath sounds normal. No respiratory distress.   Abdominal: Soft. Bowel sounds are normal. She exhibits no distension and no mass. There is no tenderness. There is no rebound and no guarding.   Melena with some blood present.   Musculoskeletal: She exhibits no edema or tenderness.   Lymphadenopathy:     She has no cervical adenopathy.   Neurological: She is alert and oriented to person, place, and time.   Skin: Skin is warm. Capillary refill takes less than 2 seconds. She is not diaphoretic.   Psychiatric: She has a normal mood and affect. Her behavior is normal. Judgment and thought content normal.   Nursing note and vitals reviewed.      Significant Labs:  CBC:   Recent Labs   Lab 01/13/19  0506 01/13/19  1110 01/13/19  1330   WBC 8.22 14.19* 11.01   HGB 4.5* 5.3* 5.1*   HCT 15.9* 17.9* 17.0*    101* 101*     CMP:   Recent Labs   Lab 01/13/19  0506 01/13/19  1110   * 180*   CALCIUM 6.5* 6.7*   ALBUMIN 2.1* 2.2*   PROT 4.5*  --     138   K 3.8 4.3   CO2 25 23     103   BUN 29* 29*   CREATININE 7.5* 7.3*   ALKPHOS 37*  --    ALT 11  --    AST 10  --    BILITOT 0.4  --      Coagulation:   Recent Labs   Lab 01/12/19 2118 01/13/19  0506   INR 2.2* 2.2*   APTT 29.2  --        Significant Imaging:  Imaging results within the past 24 hours have been reviewed.

## 2019-01-13 NOTE — ASSESSMENT & PLAN NOTE
Pt with HFpEF. Appears euvolemic and without hypertension. Denies dyspnea and saturating well on room air.    - Stable will continue to monitor.

## 2019-01-13 NOTE — PROVIDER PROGRESS NOTES - EMERGENCY DEPT.
Encounter Date: 1/12/2019    ED Physician Progress Notes        Physician Note:   There is delay in blood transfusion secondary to multiple antibodies.   Call blood bank and they are currently working on the situation.  On further evaluation blood pressure is currently 80/50, 1 L of IV fluids ordered.   Sat noted, there is poor waveform when I entered the room, however she is in the high 80s with a better waveform.  Patient is on home O2.  Will start on 4 L nasal cannula.     Will continue monitor very closely, and IV fluids to help blood pressure, blood bank will release emergency units to start given to patient.  IO still functioning at this time, although this is our only access given her incredibly difficult IV access titration.

## 2019-01-13 NOTE — HOSPITAL COURSE
Patient eceived 1 unit of RBC in the ED. H&H resulted as 4.5/15.9 after infusion was complete. Currently receiving 2nd infusion of RBC through 20 g right FA. Vital signs remain stable. NPO status maintained. Patient had 1 bloody BM upon arrival to unit. Plan is total of 3 units of RBC, FFP, and gastroenterology consult

## 2019-01-13 NOTE — PROGRESS NOTES
[facility]  Heart Transplant  Progress Note    Patient Name: Shivani Sheets  MRN: [id number]  Admission Date: 1/12/2019  Hospital Length of Stay: 1 days  Attending Physician: Meredith Pugh MD  Primary Care Provider: Eula Weiss MD  Principal Problem:Gastrointestinal hemorrhage    Subjective:     Interval History:    Had three episode of melena in the last two days with one episode this morning. Hb was initially 4.8 on presentation then 4.5 on repeat. She is undergoing transfusion (two units completed) and FFP administration while GI evaluation has been completed with plan for EGD tomorrow. She is MWF HD and nephrology has been contacted for in patient fu. Report feeling better with transfusion but still fatigued. Denies chest pain, SOB or dizziness.    Continuous Infusions:  Scheduled Meds:   atorvastatin  10 mg Oral Daily    calcium gluconate IVPB  1,000 mg Intravenous Once    levothyroxine  100 mcg Oral Before breakfast    lidocaine (cardiac)  20 mg Intraosseous ED 1 Time    [START ON 1/14/2019] pantoprazole  40 mg Intravenous BID    riociguat  2 mg Oral TID    selexipag  800 mcg Oral BID    And    selexipag  200 mcg Oral BID     PRN Meds:sodium chloride, sodium chloride, sodium chloride, acetaminophen, dextrose 50 % in water (D50W), dextrose 50 % in water (D50W), glucagon (human recombinant), glucose, glucose, ramelteon, sodium chloride 0.9%, traMADol    Review of patient's allergies indicates:   Allergen Reactions    Penicillins Swelling    Iodine      Other reaction(s): Hives    Sulfamethoxazole-trimethoprim      Other reaction(s): Swelling  Other reaction(s): Hives     Objective:     Vital Signs (Most Recent):  Temp: 98 °F (36.7 °C) (01/13/19 1100)  Pulse: 91 (01/13/19 1130)  Resp: 14 (01/13/19 1130)  BP: 130/83 (01/13/19 1115)  SpO2: 99 % (01/13/19 1130) Vital Signs (24h Range):  Temp:  [97.7 °F (36.5 °C)-98.7 °F (37.1 °C)] 98 °F (36.7 °C)  Pulse:  [] 91  Resp:   [12-40] 14  SpO2:  [79 %-100 %] 99 %  BP: (119-176)/(60-98) 130/83     Patient Vitals for the past 72 hrs (Last 3 readings):   Weight   01/13/19 0500 71.7 kg (158 lb 1.1 oz)   01/12/19 1853 71.1 kg (156 lb 12 oz)     Body mass index is 29.87 kg/m².      Intake/Output Summary (Last 24 hours) at 1/13/2019 1209  Last data filed at 1/13/2019 0900  Gross per 24 hour   Intake 862 ml   Output --   Net 862 ml       Hemodynamic Parameters:       Telemetry: occasional PVC on NSR.    Physical Exam   Constitutional: She is oriented to person, place, and time.   Fatigued but AAOx3.    HENT:   Head: Normocephalic.   Eyes: No scleral icterus.   conjunctival pallor   Neck: No JVD present.   Cardiovascular: Normal rate and regular rhythm.   No murmur heard.  +P2   Pulmonary/Chest: Effort normal and breath sounds normal. She has no wheezes. She has no rales.   Abdominal: Soft. Bowel sounds are normal. She exhibits no distension. There is no tenderness.   Musculoskeletal: She exhibits no edema or tenderness.   Neurological: She is alert and oriented to person, place, and time.   Skin: Skin is warm and dry.   Nursing note and vitals reviewed.     Significant Labs:  CBC:  Recent Labs   Lab 01/12/19 2050 01/13/19  0506   WBC 8.58 8.22   RBC 2.19* 1.66*   HGB 5.8* 4.5*   HCT 19.8* 15.9*    157   MCV 90 96   MCH 26.5* 27.1   MCHC 29.3* 28.3*     BNP:  No results for input(s): BNP in the last 168 hours.    Invalid input(s): BNPTRIAGELBLO  CMP:  Recent Labs   Lab 01/12/19  2118 01/13/19  0506 01/13/19  1110   * 146* 180*   CALCIUM 6.9* 6.5* 6.7*   ALBUMIN 2.3* 2.1* 2.2*   PROT 5.0* 4.5*  --     140 138   K 2.9* 3.8 4.3   CO2 29 25 23    103 103   BUN 26* 29* 29*   CREATININE 7.3* 7.5* 7.3*   ALKPHOS 41* 37*  --    ALT 12 11  --    AST 11 10  --    BILITOT 0.4 0.4  --       Coagulation:   Recent Labs   Lab 01/12/19 2118 01/13/19  0506   INR 2.2* 2.2*   APTT 29.2  --      LDH:  No results for input(s): LDH in the  last 72 hours.  Microbiology:  Microbiology Results (last 7 days)     ** No results found for the last 168 hours. **        ECG- NSR    I have reviewed all pertinent labs within the past 24 hours.    Estimated Creatinine Clearance: 7 mL/min (A) (based on SCr of 7.3 mg/dL (H)).    Diagnostic Results:        Assessment and Plan:     62 yo F w/ PMHx  pulmonary HTN (on adempas/uptravi), diastolic dysfunction, pAF(on coumadin), central retinal artery occlusion without significant carotid artery stenosis, CAD with remote PCI, ESRD(LUE AVF) secondary to HTN, s/p failed kidney transplant, PUD, STEFFANY on CPAP, hypothyroidism, RA, HLD and obesity who presents with bright red per rectum that started 2 days ago.She had a single episode of non bloody emesis earlier today after she became dizzy from standing up too quickly. Her dizziness and lightheadedness improves with sitting and lying down. She was admitted to the CICU in the past for a similar episode 9/2018 and received 6 U PRBC.Upper gi endoscopy revealed  Normal esophagus,a few medium sessile polyps with no bleeding and no stigmata of recent bleeding were found in the gastric antrum.       [facility] consulted for Transfer of service as she has history of PH and on meds.  Patient developed right lower extremity swelling due to an IO in Right leg where she was being transfused. IO was dislodged and removed. Will do US venous doppler.    * Gastrointestinal hemorrhage    Possibly from angiodysplasia associated with chronic HD  Had negative C'scope on 6/18 and negative egd on 9/18. Recently had capsule endoscopy but reading no populated yet  Continue PRB transfusion and FFP. Monitor H/H Q 8 until after EGD  PPI IV  Hold coumadin and ASA currently  Will keep NPO for now.     Acute blood loss anemia    Transfuse 3 U PRBC to keep hb >7  H/H check Q8 until in stable range  See anticoagulation reversal for GI bleed     Atrial fibrillation    Hold warfarin and aspirin.     Pulmonary  hypertension    - Continue adempas  - Patient can use her own Uptravi     ESRD from HTN started RRT 1999    Nephrology on board for HD on MWF     CAD (coronary artery disease)    - continue Lipitor   - Hold ASA for now         Denilson Tyson MD  Heart Transplant  [facility]

## 2019-01-13 NOTE — ASSESSMENT & PLAN NOTE
Possibly from angiodysplasia associated with chronic HD  Had negative C'scope on 6/18 and negative egd on 9/18. Recently had capsule endoscopy but reading no populated yet  Continue PRB transfusion and FFP. Monitor H/H Q 8 until after EGD  PPI IV  Hold coumadin and ASA currently  Will keep NPO for now.

## 2019-01-13 NOTE — ASSESSMENT & PLAN NOTE
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    Previously hospitalized (9/2018) with melena and Hgb 3.8 with negative EGD, recently negative colonoscopy (6/2018) with plan for outpatient VCE (not yet done). She is presenting with 1 day of bleeding (melena with some blood present) with poor response to initial resuscitation. Despite poor response to initial 3u pRBC she remains normotensive without tachycardia and asymptomatic (feels better since admission).    Plan  - protonix drip  - continue resuscitation with pRBC  - will follow-up after completion of current units and pending response will determine urgency of EGD (unless evidence of hemodynamic instability given underlying cardiac and respiratory disease would like patient to be appropriately resuscitated prior to EGD)  - will plan for EGD today or tomorrow pending clinical status  - if EGD negative will discuss colonoscopy vs VCE (has history of intestinal resection but negative SBFT 11/2018)  - if develops overt BRBPR with hemodynamic instability obtain CTA  - continue to trend H&H  - maintain 2 large bore peripheral IV  - reverse any coagulopathy, hold anticoagulants (INR 2.2 reversed in AM; indication Afib)  - plan discussed with primary team

## 2019-01-13 NOTE — CARE UPDATE
Spoke with Dr. JERRICA Hutchinson with hospitals.  Ms. Sheets is being transferred to hospitals as primary.  No consultation needed from critical care at this time.     ERNST CHAMBERS, [facility]  Critical Care Medicine  [phone]

## 2019-01-13 NOTE — PROVIDER PROGRESS NOTES - EMERGENCY DEPT.
Encounter Date: 1/12/2019    ED Physician Progress Notes        Physician Note:   Potassium (oral) and Calcium (IO) ordered.  Upon re-evaluation patient. BP is elevated. No new bloody bowel movements in the emergency department.   Sat is 100 on 4L NC when there is a good waveform.   CXR with no pneumothorax

## 2019-01-13 NOTE — PROVIDER PROGRESS NOTES - EMERGENCY DEPT.
Encounter Date: 1/12/2019    ED Physician Progress Notes        Physician Note:   Lidocaine orders were/are for pain control with IO infusions.

## 2019-01-13 NOTE — PROVIDER PROGRESS NOTES - EMERGENCY DEPT.
Encounter Date: 1/12/2019    ED Physician Progress Notes        Physician Note:   Pt getting blood now.   Given only one access and primary issue at this time is GIB, will infuse 1 unit of blood. After first unit of blood, will infuse calcium before giving second unit of blood.   Given that this is not massive transfusion, there should be minimal effect on calcium with only 1 unit of blood. She is on monitor and continues to be watched closely. Occational PVCs but otherwise stable HR.   Night team attending and EM resident is aware and following the patient.

## 2019-01-13 NOTE — ASSESSMENT & PLAN NOTE
Low H/H, GI is following. Patient on coumadin for Afib. Obviously hold coumadin, would give vitamin K if the patient continues to bleed today. Will need to discuss the benefits of coumadin for the future with cardiology as she has had GI bleeds in the past and is on dialysis.

## 2019-01-13 NOTE — ASSESSMENT & PLAN NOTE
Transfuse 3 U PRBC to keep hb >7  H/H check Q8 until in stable range  See anticoagulation reversal for GI bleed

## 2019-01-13 NOTE — SUBJECTIVE & OBJECTIVE
Interval History:    Had three episode of melena in the last two days with one episode this morning. Hb was initially 4.8 on presentation then 4.5 on repeat. She is undergoing transfusion (two units completed) and FFP administration while GI evaluation has been completed with plan for EGD tomorrow. She is MWF HD and nephrology has been contacted for in patient fu. Report feeling better with transfusion but still fatigued. Denies chest pain, SOB or dizziness.    Continuous Infusions:  Scheduled Meds:   atorvastatin  10 mg Oral Daily    calcium gluconate IVPB  1,000 mg Intravenous Once    levothyroxine  100 mcg Oral Before breakfast    lidocaine (cardiac)  20 mg Intraosseous ED 1 Time    [START ON 1/14/2019] pantoprazole  40 mg Intravenous BID    riociguat  2 mg Oral TID    selexipag  800 mcg Oral BID    And    selexipag  200 mcg Oral BID     PRN Meds:sodium chloride, sodium chloride, sodium chloride, acetaminophen, dextrose 50 % in water (D50W), dextrose 50 % in water (D50W), glucagon (human recombinant), glucose, glucose, ramelteon, sodium chloride 0.9%, traMADol    Review of patient's allergies indicates:   Allergen Reactions    Penicillins Swelling    Iodine      Other reaction(s): Hives    Sulfamethoxazole-trimethoprim      Other reaction(s): Swelling  Other reaction(s): Hives     Objective:     Vital Signs (Most Recent):  Temp: 98 °F (36.7 °C) (01/13/19 1100)  Pulse: 91 (01/13/19 1130)  Resp: 14 (01/13/19 1130)  BP: 130/83 (01/13/19 1115)  SpO2: 99 % (01/13/19 1130) Vital Signs (24h Range):  Temp:  [97.7 °F (36.5 °C)-98.7 °F (37.1 °C)] 98 °F (36.7 °C)  Pulse:  [] 91  Resp:  [12-40] 14  SpO2:  [79 %-100 %] 99 %  BP: (119-176)/(60-98) 130/83     Patient Vitals for the past 72 hrs (Last 3 readings):   Weight   01/13/19 0500 71.7 kg (158 lb 1.1 oz)   01/12/19 1853 71.1 kg (156 lb 12 oz)     Body mass index is 29.87 kg/m².      Intake/Output Summary (Last 24 hours) at 1/13/2019 5643  Last data filed  at 1/13/2019 0900  Gross per 24 hour   Intake 862 ml   Output --   Net 862 ml       Hemodynamic Parameters:       Telemetry: occasional PVC on NSR.    Physical Exam   Constitutional: She is oriented to person, place, and time.   Fatigued but AAOx3.    HENT:   Head: Normocephalic.   Eyes: No scleral icterus.   conjunctival pallor   Neck: No JVD present.   Cardiovascular: Normal rate and regular rhythm.   No murmur heard.  +P2   Pulmonary/Chest: Effort normal and breath sounds normal. She has no wheezes. She has no rales.   Abdominal: Soft. Bowel sounds are normal. She exhibits no distension. There is no tenderness.   Musculoskeletal: She exhibits no edema or tenderness.   Neurological: She is alert and oriented to person, place, and time.   Skin: Skin is warm and dry.   Nursing note and vitals reviewed.     Significant Labs:  CBC:  Recent Labs   Lab 01/12/19 2050 01/13/19  0506   WBC 8.58 8.22   RBC 2.19* 1.66*   HGB 5.8* 4.5*   HCT 19.8* 15.9*    157   MCV 90 96   MCH 26.5* 27.1   MCHC 29.3* 28.3*     BNP:  No results for input(s): BNP in the last 168 hours.    Invalid input(s): BNPTRIAGELBLO  CMP:  Recent Labs   Lab 01/12/19 2118 01/13/19 0506 01/13/19  1110   * 146* 180*   CALCIUM 6.9* 6.5* 6.7*   ALBUMIN 2.3* 2.1* 2.2*   PROT 5.0* 4.5*  --     140 138   K 2.9* 3.8 4.3   CO2 29 25 23    103 103   BUN 26* 29* 29*   CREATININE 7.3* 7.5* 7.3*   ALKPHOS 41* 37*  --    ALT 12 11  --    AST 11 10  --    BILITOT 0.4 0.4  --       Coagulation:   Recent Labs   Lab 01/12/19 2118 01/13/19  0506   INR 2.2* 2.2*   APTT 29.2  --      LDH:  No results for input(s): LDH in the last 72 hours.  Microbiology:  Microbiology Results (last 7 days)     ** No results found for the last 168 hours. **        ECG- NSR    I have reviewed all pertinent labs within the past 24 hours.    Estimated Creatinine Clearance: 7 mL/min (A) (based on SCr of 7.3 mg/dL (H)).    Diagnostic Results:

## 2019-01-13 NOTE — CONSULTS
[facility]  Critical Care Medicine  Consult Note    Patient Name: Shivani Sheets  MRN: [id number]  Admission Date: 1/12/2019  Hospital Length of Stay: 1 days  Code Status: Full Code  Attending Physician: Meredith Pugh MD   Primary Care Provider: Eula Weiss MD   Principal Problem: Gastrointestinal hemorrhage    Consults  Subjective:     HPI:  64 yo F w/ PMHx  pulmonary HTN (on riociguat/selexipag), diastolic dysfunction, pAF(on warfarin), central retinal artery occlusion without significant carotid artery stenosis, CAD with remote PCI, ESRD(LUE AVF) secondary to HTN, s/p failed kidney transplant, PUD, STEFFANY on CPAP, hypothyroidism, RA, HLD and obesity who presents with bright red per rectum that started 2 days ago.She had a single episode of non bloody emesis earlier today after she became dizzy from standing up too quickly. Her dizziness and lightheadedness improves with sitting and lying down. She was admitted to the CICU in the past for a similar episode 9/2018 and received 6 U PRBC.Upper GI endoscopy revealed  Normal esophagus,a few medium sessile polyps with no bleeding and no stigmata of recent bleeding were found in the gastric antrum.       \[facility] [place] [facility]\"" consulted for Transfer of service as she has history of PH and on meds.  Patient developed right lower extremity swelling due to an IO in Right leg where she was being transfused. IO was dislodged and removed. Swelling likely to extravascular infusion of blood.She had 2 episodes of Bright blood per rectum while in ED.        Hospital/ICU Course:  Patient eceived 1 unit of RBC in the ED. H&H resulted as 4.5/15.9 after infusion was complete. Currently receiving 2nd infusion of RBC through 20 g right FA. Vital signs remain stable. NPO status maintained. Patient had 1 bloody BM upon arrival to unit. Plan is total of 3 units of RBC, FFP, and gastroenterology consult    Past Medical History:   Diagnosis Date    Allergy     Anemia      Anticoagulant long-term use     4 years coumadin, asa    Arthritis     Awaiting organ transplant 2013    Cataract     Central serous chorioretinopathy of eye, right 2018    CHF (congestive heart failure)     Chronic kidney disease     Colon polyps     COPD (chronic obstructive pulmonary disease)     Coronary artery disease     Diabetes mellitus     Diabetic retinopathy     Diverticulosis     Encounter for blood transfusion     ESRD from HTN strtied RRT 1999    Failed  donor kidney transplant      Glaucoma     History of bleeding peptic ulcer      as stated per pt    Hyperlipidemia     Hypertension     Hypothyroidism     Morbid obesity 8/10/2012    Renal hypertension     Stroke          Past Medical History:   Diagnosis Date    Allergy     Anemia     Anticoagulant long-term use     4 years coumadin, asa    Arthritis     Awaiting organ transplant 2013    Cataract     Central serous chorioretinopathy of eye, right 2018    CHF (congestive heart failure)     Chronic kidney disease     Colon polyps     COPD (chronic obstructive pulmonary disease)     Coronary artery disease     Diabetes mellitus     Diabetic retinopathy     Diverticulosis     Encounter for blood transfusion     ESRD from HTN strtied RRT 1999    Failed  donor kidney transplant      Glaucoma     History of bleeding peptic ulcer      as stated per pt    Hyperlipidemia     Hypertension     Hypothyroidism     Morbid obesity 8/10/2012    Renal hypertension     Stroke            Past Surgical History:   Procedure Laterality Date    CATARACT EXTRACTION W/  INTRAOCULAR LENS IMPLANT Bilateral     COLON SURGERY      COLONOSCOPY      COLONOSCOPY N/A 2018    [facility] ([facility]ND FLR[facility]    EGD (ESOPHAGOGASTRODUODENOSCOPY) N/A 2018    [facility] at [facility] END[facility] (2ND FLR)     ESOPHAGOGASTRODUODENOSCOPY (EGD) N/A 2/27/2018    [facility] (2ND FLR)    EYE SURGERY      FRACTURE SURGERY      R arm    HERNIA REPAIR      HYSTERECTOMY      INSERTION-IMPLANT-GLAUCOMA Left 10/12/2017    [facility] 1ST FLR    KIDNEY TRANSPLANT      NEPHRECTOMY  11/2008    transplant     PARATHYROID GLAND SURGERY      TONSILLECTOMY      UPPER GASTROINTESTINAL ENDOSCOPY         Review of patient's allergies indicates:   Allergen Reactions    Penicillins Swelling    Iodine      Other reaction(s): Hives    Sulfamethoxazole-trimethoprim      Other reaction(s): Swelling  Other reaction(s): Hives       No current facility-administered medications on file prior to encounter.      Current Outpatient Medications on File Prior to Encounter   Medication Sig    ALPHAGAN P 0.1 % Drop once daily.     aspirin 81 MG chewable tablet Take 81 mg by mouth Daily. 1  By mouth Every day    cyclobenzaprine (FLEXERIL) 5 MG tablet Take three tablets per day for 2 days, followed by one tablet per day at night as needed    diltiaZEM (CARDIZEM CD) 300 MG 24 hr capsule Take 1 capsule (300 mg total) by mouth once daily.    dorzolamide-timolol 2-0.5% (COSOPT) 22.3-6.8 mg/mL ophthalmic solution INSTILL 1 DROP IN BOTH EYES TWICE DAILY    latanoprost 0.005 % ophthalmic solution INSTILL 1 DROP IN BOTH EYES EVERY DAY AT BEDTIME    levothyroxine (SYNTHROID) 100 MCG tablet Take 100 mcg by mouth. 1 Tablet Oral Every day    lidocaine-prilocaine (EMLA) cream Apply topically as needed.    LIPITOR 10 mg tablet TAKE 1 BY MOUTH ONCE A DAY    pantoprazole (PROTONIX) 40 MG tablet TAKE 1 TABLET BY MOUTH ONCE DAILY    polyethylene glycol (COLYTE) 240-22.72-6.72 -5.84 gram SolR Drink half bottle.Take 8 oz q 15 minutes until half bottle complete the night before procedure    riociguat (ADEMPAS) 2 mg Tab tablet Take 2 mg by mouth 3 (three) times daily.     selexipag  (UPTRAVI) 1,000 mcg Tab Take 1,000 mcg by mouth 2 (two) times daily.    traMADol (ULTRAM) 50 mg tablet TK 1 T PO  Q 8 H PRN    warfarin (COUMADIN) 5 MG tablet TAKE 1 1/2 TABLETS BY MOUTH DAILY ON TUESDAY, THURSDAY AND SATURDAY, THEN TAKE 1 TABLET DAILY ON MONDAY, WEDNESDAY, FRIDAY AND     hydrocodone-acetaminophen 7.5-325mg (NORCO) 7.5-325 mg per tablet TK 1 T PO Q 6 H PRN     Family History     Problem Relation (Age of Onset)    Asthma Sister    Blindness Father    Breast cancer Maternal Aunt    Depression Sister    Diabetes Maternal Aunt    Heart attack Father, Mother    Heart failure Father, Mother    Hypertension Father, Mother, Sister, Brother    Kidney disease Brother        Tobacco Use    Smoking status: Former Smoker     Types: Cigarettes     Last attempt to quit: 8/10/2000     Years since quittin.4    Smokeless tobacco: Never Used   Substance and Sexual Activity    Alcohol use: No     Comment: hx of etoh     Drug use: No    Sexual activity: No     Review of Systems   Constitutional: Negative for fever and unexpected weight change.   HENT: Negative for ear pain, sinus pressure, sneezing and sore throat.    Eyes: Positive for visual disturbance. Negative for pain.   Respiratory: Negative for cough, chest tightness, shortness of breath and wheezing.    Cardiovascular: Positive for palpitations. Negative for chest pain and leg swelling.   Gastrointestinal: Positive for nausea and vomiting and blood in stool. Negative for abdominal pain, constipation and diarrhea.   Endocrine: Negative for polydipsia and polyuria.   Genitourinary: Negative for decreased urine volume, difficulty urinating, dysuria and urgency.   Musculoskeletal: Positive for arthralgias. Negative for myalgias.   Skin: Negative for color change and rash.   Allergic/Immunologic: Negative for environmental allergies and food allergies.   Neurological: Positive for light-headedness. Negative for dizziness, syncope, weakness and  headaches.   Psychiatric/Behavioral: Negative for confusion and dysphoric mood. The patient is not nervous/anxious.      Objective:     Vital Signs (Most Recent):  Temp: 97.7 °F (36.5 °C) (01/13/19 0613)  Pulse: 104 (01/13/19 0633)  Resp: 18 (01/13/19 0633)  BP: (!) 123/90 (01/13/19 0633)  SpO2: 100 % (01/13/19 0633) Vital Signs (24h Range):  Temp:  [97.7 °F (36.5 °C)-98.7 °F (37.1 °C)] 97.7 °F (36.5 °C)  Pulse:  [] 104  Resp:  [12-27] 18  SpO2:  [79 %-100 %] 100 %  BP: (121-176)/(60-98) 123/90     Weight: 71.7 kg (158 lb 1.1 oz)  Body mass index is 29.87 kg/m².    Physical Exam   Constitutional: She is oriented to person, place, and time. She appears well-developed and well-nourished.   Body mass index is 29.62 kg/m².     HENT:   Head: Normocephalic and atraumatic.   Eyes: EOM are normal. Pupils are equal, round, and reactive to light. Left conjunctiva is injected. Left conjunctiva has a hemorrhage. No scleral icterus.   Neck: Normal range of motion. Neck supple.   Puncture site from attempted central line   Cardiovascular: Normal rate, regular rhythm, normal heart sounds and intact distal pulses.   No murmur heard.  Pulmonary/Chest: Effort normal and breath sounds normal. No respiratory distress. She has no wheezes.   Abdominal: Soft. Bowel sounds are normal. She exhibits no distension. There is no tenderness.   Musculoskeletal:   Intraosseous access in right tibia   Neurological: She is alert and oriented to person, place, and time.   Skin: Skin is warm and dry.   Psychiatric: She has a normal mood and affect. Her behavior is normal.   Vitals reviewed.        CRANIAL NERVES     CN III, IV, VI   Pupils are equal, round, and reactive to light.  Extraocular motions are normal.        Significant Labs: All pertinent labs within the past 24 hours have been reviewed.    Significant Imaging: I have reviewed all pertinent imaging results/findings within the past 24 hours.    Past Surgical History:   Procedure  Laterality Date    CATARACT EXTRACTION W/  INTRAOCULAR LENS IMPLANT Bilateral     COLON SURGERY      COLONOSCOPY      COLONOSCOPY N/A 2018    [facility]formed [facility] at [facility] ENDO (2ND FLR)    EGD (ESOPHAGOGASTRODUODENOSCOPY) N/A 2018    Performed by Luis [facility] at [facility] ENDO (2ND FLR)    ESOPHAGOGASTRODUODENOSCOPY (EGD) N/A 2018    Performed by [facility]ji [facility]ir [facility] at [facility] ENDO (2ND FLR)    EYE SURGERY      FRACTURE SURGERY      R arm    HERNIA REPAIR      HYSTERECTOMY      INSERTION-IMPLANT-GLAUCOMA Left 10/12/2017    [facility] at [facility] OR 1ST FLR    KIDNEY TRANSPLANT      NEPHRECTOMY  2008    transplant     PARATHYROID GLAND SURGERY      TONSILLECTOMY      UPPER GASTROINTESTINAL ENDOSCOPY         Review of patient's allergies indicates:   Allergen Reactions    Penicillins Swelling    Iodine      Other reaction(s): Hives    Sulfamethoxazole-trimethoprim      Other reaction(s): Swelling  Other reaction(s): Hives       Family History     Problem Relation (Age of Onset)    Asthma Sister    Blindness Father    Breast cancer Maternal Aunt    Depression Sister    Diabetes Maternal Aunt    Heart attack Father, Mother    Heart failure Father, Mother    Hypertension Father, Mother, Sister, Brother    Kidney disease Brother        Tobacco Use    Smoking status: Former Smoker     Types: Cigarettes     Last attempt to quit: 8/10/2000     Years since quittin.4    Smokeless tobacco: Never Used   Substance and Sexual Activity    Alcohol use: No     Comment: hx of etoh     Drug use: No    Sexual activity: No      Review of Systems   Gastrointestinal: Positive for blood in stool.     Objective:     Vital Signs (Most Recent):  Temp: 97.7 °F (36.5 °C) (19)  Pulse: 104 (19)  Resp: 18 (19)  BP: (!) 123/90 (19)  SpO2: 100 % (19) Vital Signs (24h Range):  Temp:  [97.7 °F (36.5 °C)-98.7 °F (37.1 °C)] 97.7  °F (36.5 °C)  Pulse:  [] 104  Resp:  [12-27] 18  SpO2:  [79 %-100 %] 100 %  BP: (121-176)/(60-98) 123/90   Weight: 71.7 kg (158 lb 1.1 oz)  Body mass index is 29.87 kg/m².    No intake or output data in the 24 hours ending 01/13/19 0722    Physical Exam    Vents:     Lines/Drains/Airways     Central Venous Catheter Line                 RETIRED! DO NOT USE: Hemodialysis Catheter Arteriovenous fistula Left Forearm -- days          Drain                 Hemodialysis AV Fistula Left upper arm -- days          Peripheral Intravenous Line                 Peripheral IV - Single Lumen 01/12/19 2116 Right Upper Arm less than 1 day         Peripheral IV - Single Lumen 01/13/19 0412 Hand less than 1 day              Significant Labs:    CBC/Anemia Profile:  Recent Labs   Lab 01/12/19 2050 01/13/19  0506   WBC 8.58 8.22   HGB 5.8* 4.5*   HCT 19.8* 15.9*    157   MCV 90 96   RDW 21.9* 19.9*        Chemistries:  Recent Labs   Lab 01/12/19 2118 01/13/19  0506    140   K 2.9* 3.8    103   CO2 29 25   BUN 26* 29*   CREATININE 7.3* 7.5*   CALCIUM 6.9* 6.5*   ALBUMIN 2.3* 2.1*   PROT 5.0* 4.5*   BILITOT 0.4 0.4   ALKPHOS 41* 37*   ALT 12 11   AST 11 10   MG  --  1.6   PHOS  --  3.5       A1C:   Recent Labs   Lab 01/13/19  0506   HGBA1C 5.2     Bilirubin:   Recent Labs   Lab 01/12/19 2118 01/13/19  0506   BILITOT 0.4 0.4     BMP:   Recent Labs   Lab 01/13/19  0506   *      K 3.8      CO2 25   BUN 29*   CREATININE 7.5*   CALCIUM 6.5*   MG 1.6     CMP:   Recent Labs   Lab 01/12/19 2118 01/13/19  0506    140   K 2.9* 3.8    103   CO2 29 25   * 146*   BUN 26* 29*   CREATININE 7.3* 7.5*   CALCIUM 6.9* 6.5*   PROT 5.0* 4.5*   ALBUMIN 2.3* 2.1*   BILITOT 0.4 0.4   ALKPHOS 41* 37*   AST 11 10   ALT 12 11   ANIONGAP 11 12   EGFRNONAA 5.4* 5.2*     Coagulation:   Recent Labs   Lab 01/12/19 2118 01/13/19  0506   INR 2.2* 2.2*   APTT 29.2  --      All pertinent labs within the  past 24 hours have been reviewed.    Significant Imaging: I have reviewed all pertinent imaging results/findings within the past 24 hours.  I have reviewed and interpreted all pertinent imaging results/findings within the past 24 hours.      ABG  No results for input(s): PH, PO2, PCO2, HCO3, BE in the last 168 hours.  Assessment/Plan:     GI   * Gastrointestinal hemorrhage    ICU team was consulted for lower GI bleeding; patient was seen she is hemodynamically stable. Patient is going to the heart transplant unit. Kindly monitor vitals, check H&H regularly, transfuse PRBC as needed, monitor volume status, continue on PPI and consult GI team urgently.          Critical Care Daily Checklist:    A: Awake: RASS Goal/Actual Goal:    Actual:     B: Spontaneous Breathing Trial Performed?     C: SAT & SBT Coordinated?  n/a                      D: Delirium: CAM-ICU     E: Early Mobility Performed? No   F: Feeding Goal:    Status:     Current Diet Order   Procedures    Diet NPO      AS: Analgesia/Sedation n/a   T: Thromboembolic Prophylaxis    H: HOB > 300 Yes   U: Stress Ulcer Prophylaxis (if needed) Pantoprazole   G: Glucose Control Monitor   B: Bowel Function Stool Occurrence: 1   I: Indwelling Catheter (Lines & Lam) Necessity    D: De-escalation of Antimicrobials/Pharmacotherapies     Plan for the day/ETD GI consult, monitor CBC, continue on PPI    Code Status:  Family/Goals of Care: Full Code         Critical secondary to Patient has a condition that poses threat to life and bodily function: GI bleeding     Critical care was time spent personally by me on the following activities: development of treatment plan with patient or surrogate and bedside caregivers, discussions with consultants, evaluation of patient's response to treatment, examination of patient, ordering and performing treatments and interventions, ordering and review of laboratory studies, ordering and review of radiographic studies, pulse oximetry,  re-evaluation of patient's condition. This critical care time did not overlap with that of any other provider or involve time for any procedures.    Thank you for your consult. I will sign off. Please contact us if you have any additional questions.     Judith Robles MD  Critical Care Medicine  [facility]

## 2019-01-13 NOTE — ASSESSMENT & PLAN NOTE
S/P PTCA  HYPERLIPIDEMIA    Pt with CAD and h/o remote PCI on ASA and statin.   - Continue aspirin 81 mg  - Continue statin 10 mg daily  - No lipid panel on file

## 2019-01-13 NOTE — PROVIDER PROGRESS NOTES - EMERGENCY DEPT.
Encounter Date: 1/12/2019    ED Physician Progress Notes        Physician Note:   Re-evaluated patient.  Blood pressure is now improved to 120s over 60s, IV fluids going in.   Placed on nasal cannula.   Stat is very poor waveform.  Still attempting to get good reading.  Patient denies chest pain or shortness of breath. Do not think iatrogenic pneumothorax at this time however obtaining chest x-ray.   Still pending blood transfusion.

## 2019-01-13 NOTE — H&P
[facility]  Heart Transplant  H&P    Patient Name: Shivani Sheets  MRN: [id number]  Admission Date: 1/12/2019  Attending Physician: Brent Brown IV, MD  Primary Care Provider: Eula Weiss MD  Principal Problem:Gastrointestinal hemorrhage    Subjective:   CC blood in stool  History of Present Illness:  62 yo F w/ PMHx  pulmonary HTN (on adempas/uptravi), diastolic dysfunction, pAF(on coumadin), central retinal artery occlusion without significant carotid artery stenosis, CAD with remote PCI, ESRD(LUE AVF) secondary to HTN, s/p failed kidney transplant, PUD, STEFFANY on CPAP, hypothyroidism, RA, HLD and obesity who presents with bright red per rectum that started 2 days ago.She had a single episode of non bloody emesis earlier today after she became dizzy from standing up too quickly. Her dizziness and lightheadedness improves with sitting and lying down. She was admitted to the CICU in the past for a similar episode 9/2018 and received 6 U PRBC.Upper gi endoscopy revealed  Normal esophagus,a few medium sessile polyps with no bleeding and no stigmata of recent bleeding were found in the gastric antrum.       [facility] [place] consulted for Transfer of service as she has history of PH and on meds.  Patient developed right lower extremity swelling due to an IO in Right leg where she was being transfused. IO was dislodged and removed. Swelling likely to extravascular infusion of blood.She had 2 episodes of Bright blood per rectum while in ED.    Past Medical History:   Diagnosis Date    Allergy     Anemia     Anticoagulant long-term use     4 years coumadin, asa    Arthritis     Awaiting organ transplant 4/30/2013    Cataract     Central serous chorioretinopathy of eye, right 8/21/2018    CHF (congestive heart failure)     Chronic kidney disease     Colon polyps     COPD (chronic obstructive pulmonary disease)     Coronary artery disease     Diabetes mellitus     Diabetic retinopathy      Diverticulosis     Encounter for blood transfusion     ESRD from HTN strtied RRT 1999    Failed  donor kidney transplant      Glaucoma     History of bleeding peptic ulcer      as stated per pt    Hyperlipidemia     Hypertension     Hypothyroidism     Morbid obesity 8/10/2012    Renal hypertension     Stroke     1987       Past Surgical History:   Procedure Laterality Date    CATARACT EXTRACTION W/  INTRAOCULAR LENS IMPLANT Bilateral     COLON SURGERY      COLONOSCOPY      COLONOSCOPY N/A 2018    Performed by [facility] at [facility] ENDO (2ND FLR)    EGD (ESOPHAGOGASTRODUODENOSCOPY) N/A 2018    Performed by Luis Washington MD at [facility] ENDO (2ND FLR)    ESOPHAGOGASTRODUODENOSCOPY (EGD) N/A 2018    Performed by Kenji Desir MD at [facility] ENDO (2ND FLR)    EYE SURGERY      FRACTURE SURGERY      R arm    HERNIA REPAIR      HYSTERECTOMY      INSERTION-IMPLANT-GLAUCOMA Left 10/12/2017    [facility] at [facility] OR 1ST FLR    KIDNEY TRANSPLANT      NEPHRECTOMY  2008    transplant     PARATHYROID GLAND SURGERY      TONSILLECTOMY      UPPER GASTROINTESTINAL ENDOSCOPY         Review of patient's allergies indicates:   Allergen Reactions    Penicillins Swelling    Iodine      Other reaction(s): Hives    Sulfamethoxazole-trimethoprim      Other reaction(s): Swelling  Other reaction(s): Hives       Current Facility-Administered Medications   Medication    0.9%  NaCl infusion (for blood administration)    acetaminophen tablet 650 mg    atorvastatin tablet 10 mg    calcium gluconate 1g in dextrose 5% 100mL (ready to mix system)    dextrose 50 % in water (D50W) injection 12.5 g    dextrose 50 % in water (D50W) injection 25 g    glucagon (human recombinant) injection 1 mg    glucose chewable tablet 16 g    glucose chewable tablet 24 g    levothyroxine tablet 100 mcg    lidocaine (cardiac) injection 20 mg    NON FORMULARY  MEDICATION 1,000 mcg    NON FORMULARY MEDICATION 2 mg    [START ON 2019] pantoprazole injection 40 mg    pantoprazole injection 80 mg    ramelteon tablet 8 mg    sodium chloride 0.9% flush 5 mL    traMADol tablet 50 mg     Family History     Problem Relation (Age of Onset)    Asthma Sister    Blindness Father    Breast cancer Maternal Aunt    Depression Sister    Diabetes Maternal Aunt    Heart attack Father, Mother    Heart failure Father, Mother    Hypertension Father, Mother, Sister, Brother    Kidney disease Brother        Tobacco Use    Smoking status: Former Smoker     Types: Cigarettes     Last attempt to quit: 8/10/2000     Years since quittin.4    Smokeless tobacco: Never Used   Substance and Sexual Activity    Alcohol use: No     Comment: hx of etoh     Drug use: No    Sexual activity: No     Review of Systems   All other systems reviewed and are negative.    Objective:     Vital Signs (Most Recent):  Temp: 97.7 °F (36.5 °C) (19)  Pulse: 93 (19)  Resp: 18 (19)  BP: 123/72 (19)  SpO2: 100 % (19) Vital Signs (24h Range):  Temp:  [97.7 °F (36.5 °C)-98.7 °F (37.1 °C)] 97.7 °F (36.5 °C)  Pulse:  [] 93  Resp:  [12-27] 18  SpO2:  [79 %-100 %] 100 %  BP: (121-176)/(60-98) 123/72     Patient Vitals for the past 72 hrs (Last 3 readings):   Weight   19 0500 71.7 kg (158 lb 1.1 oz)   19 1853 71.1 kg (156 lb 12 oz)     Body mass index is 29.87 kg/m².    No intake or output data in the 24 hours ending 19 0534    Physical Exam  General: alert, awake and oriented x 3  Eyes:PERRL.   Neck:no JVD   Lungs:  clear to auscultation bilaterally   Cardiovascular: Heart: Irregular rate and rhythm, S1, S2 normal, no murmur, click, rub or gallop.   Chest Wall: no tenderness.   Pulses-2+ radial, femoral, DP, PT b/l  Extremities: no cyanosis or edema. Right leg swelling  Abdomen/Rectal: Abdomen: soft, non-tender non-distented; bowel  sounds normal  Neurologic: Normal strength and tone. No focal numbness or weakness  Significant Labs:  CBC:  Recent Labs   Lab 01/12/19 2050   WBC 8.58   RBC 2.19*   HGB 5.8*   HCT 19.8*      MCV 90   MCH 26.5*   MCHC 29.3*     BNP:  No results for input(s): BNP in the last 168 hours.    Invalid input(s): BNPTRIAGELBLO  CMP:  Recent Labs   Lab 01/12/19 2118   *   CALCIUM 6.9*   ALBUMIN 2.3*   PROT 5.0*      K 2.9*   CO2 29      BUN 26*   CREATININE 7.3*   ALKPHOS 41*   ALT 12   AST 11   BILITOT 0.4      Coagulation:   Recent Labs   Lab 01/12/19 2118   INR 2.2*   APTT 29.2     LDH:  No results for input(s): LDH in the last 72 hours.  Microbiology:  Microbiology Results (last 7 days)     ** No results found for the last 168 hours. **          I have reviewed all pertinent labs within the past 24 hours.    Diagnostic Results:  I have reviewed all pertinent imaging results/findings within the past 24 hours.    Assessment/Plan:     * Gastrointestinal hemorrhage    GI consult  Protonix  Hold warfarin and aspirin  Monitor hb and hct   Acute blood loss anemia    Transfuse 3 U PRBC to keep hb >7  Frequent checks     Atrial fibrillation    Hold warfarin and aspirin     Pulmonary hypertension    -continue adempas and Uptravi     ESRD from HTN started RRT 1999    Nephrology consult for HD-may require earlier than her usual M, W, F due to transfusion     Right leg swelling  -likely due to dislodged IO-will check U/S    Rinku Hutchinson MD  Heart Transplant  [facility]

## 2019-01-13 NOTE — CARE UPDATE
Received call from nursing staff [id number]: repeat Hb is 4.5 from 5.8 following 2U PRBC. Nurse states had additional bloody bowel movement with clots. Hemodynamically stable 123/72 HR 93 , pt remains otherwise asymptomatic.    3rd unit PRBC now being transfused with 2U FFP and vitamin k for warfarin reversal with INR 2.2    - ICU has been consulted and will assess  - GI consult pending  - IM5 team made aware at 0600  - Currently pending transfer from hospital medicine to heart transplant primary service

## 2019-01-13 NOTE — CONSULTS
[facility]  Gastroenterology  Consult Note    Patient Name: Shivani Sheets  MRN: [id number]  Admission Date: 1/12/2019  Hospital Length of Stay: 1 days  Code Status: Full Code   Attending Provider: Meredith Pguh MD   Consulting Provider: Song Fountain MD  Primary Care Physician: Eula Weiss MD  Principal Problem:Gastrointestinal hemorrhage    Inpatient consult to Gastroenterology  Consult performed by: Song Fountain MD  Consult ordered by: Behram Khan, MD        Subjective:     HPI:  Ms Sheets is a 63 year old female with history of Afib on coumadin, pulmonary HTN, ESRD (failed transplant), 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    She was previously hospitalized (9/2018) with melena and Hgb of 3.8. She underwent EGD which was unremarkable for etiology of bleed and was seen as outpatient with plan for VCE (not yet done).    She reports that she was well until Saturday morning (1/12) when she had a bowel movement (~530AM) which was orange and then aftewards started to see blood clots (multiple episodes) so presented for evaluation. She denies any preceding melenic stool. Denies any abdominal pain. Endorses feeling nauseated yesterday with one episode of clear vomitus. On review endorses feeling weak but has improved since transfusion.    On admission labs were notable for H&H of 5.8 (11.2 12/2018) which declined to 4.5. She received 1u pRBC for hgb 5.8 but per nursing the line infiltrated and she did not receive any of the blood. She was transferred to ICU (hemodynamically stable, no vasopressor requirement) for further management. In ICU she was given 2u pRBC with minimal improvement (4.5 -> 5.3/5.1) so she was ordered for 3 additional units.    Prior Endo Hx  EGD. (9/11/18). Dr. Washington. Indication:Melena.  - gastric polyps    Colonoscopy. (6/12/18) Provider:   Dileep. Indication: Hx of colon polyps. Extent: Cecum. Preparation:Good.  - 2 semi-sessile polyps in sigmoid and ascending (5mm)    EGD. (18). Dr. Desir. Indication:Melena.  - small hiatal hernia  - gastric polyps    Past Medical History:   Diagnosis Date    Allergy     Anemia     Anticoagulant long-term use     4 years coumadin, asa    Arthritis     Awaiting organ transplant 2013    Cataract     Central serous chorioretinopathy of eye, right 2018    CHF (congestive heart failure)     Chronic kidney disease     Colon polyps     COPD (chronic obstructive pulmonary disease)     Coronary artery disease     Diabetes mellitus     Diabetic retinopathy     Diverticulosis     Encounter for blood transfusion     ESRD from HTN strtied RRT 1999    Failed  donor kidney transplant      Glaucoma     History of bleeding peptic ulcer      as stated per pt    Hyperlipidemia     Hypertension     Hypothyroidism     Morbid obesity 8/10/2012    Renal hypertension     Stroke            Past Surgical History:   Procedure Laterality Date    CATARACT EXTRACTION W/  INTRAOCULAR LENS IMPLANT Bilateral     COLON SURGERY      COLONOSCOPY      COLONOSCOPY N/A 2018    Performed by Jose Falk [facility] at [facility] ENDO (2ND FLR)    EGD (ESOPHAGOGASTRODUODENOSCOPY) N/A 2018    Performed by Luis Washington MD at [facility] ENDO (2ND FLR)    ESOPHAGOGASTRODUODENOSCOPY (EGD) N/A 2018    Performed by Kenji Desir MD at [facility] ENDO (2ND FLR)    EYE SURGERY      FRACTURE SURGERY      R arm    HERNIA REPAIR      HYSTERECTOMY      INSERTION-IMPLANT-GLAUCOMA Left 10/12/2017    Performed by Junaid Kern MD at [facility] OR 1ST FLR    KIDNEY TRANSPLANT      NEPHRECTOMY  2008    transplant     PARATHYROID GLAND SURGERY      TONSILLECTOMY      UPPER GASTROINTESTINAL ENDOSCOPY         Review of patient's allergies indicates:   Allergen Reactions    Penicillins  Swelling    Iodine      Other reaction(s): Hives    Sulfamethoxazole-trimethoprim      Other reaction(s): Swelling  Other reaction(s): Hives     Family History     Problem Relation (Age of Onset)    Asthma Sister    Blindness Father    Breast cancer Maternal Aunt    Depression Sister    Diabetes Maternal Aunt    Heart attack Father, Mother    Heart failure Father, Mother    Hypertension Father, Mother, Sister, Brother    Kidney disease Brother        Tobacco Use    Smoking status: Former Smoker     Types: Cigarettes     Last attempt to quit: 8/10/2000     Years since quittin.4    Smokeless tobacco: Never Used   Substance and Sexual Activity    Alcohol use: No     Comment: hx of etoh     Drug use: No    Sexual activity: No     Review of Systems   Constitutional: Positive for fatigue. Negative for activity change, appetite change, chills, fever and unexpected weight change.   HENT: Negative for sore throat and trouble swallowing.    Respiratory: Negative for cough, chest tightness and shortness of breath.    Cardiovascular: Negative for chest pain.   Gastrointestinal: Positive for anal bleeding and blood in stool. Negative for abdominal distention, abdominal pain, constipation, diarrhea, nausea and vomiting.   Genitourinary: Negative for dysuria.   Musculoskeletal: Negative for arthralgias and myalgias.   Skin: Negative for pallor.   Neurological: Positive for weakness (improved). Negative for dizziness, syncope and light-headedness.   Psychiatric/Behavioral: Negative for agitation and confusion.     Objective:     Vital Signs (Most Recent):  Temp: 97.9 °F (36.6 °C) (19 1630)  Pulse: 87 (19 1630)  Resp: 15 (19 1630)  BP: (!) 140/82 (19 1630)  SpO2: 97 % (19 1630) Vital Signs (24h Range):  Temp:  [97.7 °F (36.5 °C)-98.7 °F (37.1 °C)] 97.9 °F (36.6 °C)  Pulse:  [] 87  Resp:  [12-40] 15  SpO2:  [70 %-100 %] 97 %  BP: (107-176)/(57-98) 140/82     Weight: 71.7 kg (158 lb 1.1  oz) (01/13/19 0500)  Body mass index is 29.87 kg/m².      Intake/Output Summary (Last 24 hours) at 1/13/2019 1700  Last data filed at 1/13/2019 1630  Gross per 24 hour   Intake 1594 ml   Output --   Net 1594 ml       Lines/Drains/Airways     Central Venous Catheter Line                 RETIRED! DO NOT USE: Hemodialysis Catheter Arteriovenous fistula Left Forearm -- days          Drain                 Hemodialysis AV Fistula Left upper arm -- days          Peripheral Intravenous Line                 Peripheral IV - Single Lumen 01/12/19 2116 Right Upper Arm less than 1 day         Peripheral IV - Single Lumen 01/13/19 0412 Hand less than 1 day                Physical Exam   Constitutional: She is oriented to person, place, and time. She appears well-developed and well-nourished. No distress.   Appears well and comfortable in no acute distress.   HENT:   Head: Normocephalic and atraumatic.   Mouth/Throat: Oropharynx is clear and moist. No oropharyngeal exudate.   Eyes: Conjunctivae are normal. No scleral icterus.   Cardiovascular: Normal rate, regular rhythm, normal heart sounds and intact distal pulses.   Pulmonary/Chest: Effort normal and breath sounds normal. No respiratory distress.   Abdominal: Soft. Bowel sounds are normal. She exhibits no distension and no mass. There is no tenderness. There is no rebound and no guarding.   Melena with some blood present.   Musculoskeletal: She exhibits no edema or tenderness.   Lymphadenopathy:     She has no cervical adenopathy.   Neurological: She is alert and oriented to person, place, and time.   Skin: Skin is warm. Capillary refill takes less than 2 seconds. She is not diaphoretic.   Psychiatric: She has a normal mood and affect. Her behavior is normal. Judgment and thought content normal.   Nursing note and vitals reviewed.      Significant Labs:  CBC:   Recent Labs   Lab 01/13/19  0506 01/13/19  1110 01/13/19  1330   WBC 8.22 14.19* 11.01   HGB 4.5* 5.3* 5.1*   HCT  15.9* 17.9* 17.0*    101* 101*     CMP:   Recent Labs   Lab 01/13/19  0506 01/13/19  1110   * 180*   CALCIUM 6.5* 6.7*   ALBUMIN 2.1* 2.2*   PROT 4.5*  --     138   K 3.8 4.3   CO2 25 23    103   BUN 29* 29*   CREATININE 7.5* 7.3*   ALKPHOS 37*  --    ALT 11  --    AST 10  --    BILITOT 0.4  --      Coagulation:   Recent Labs   Lab 01/12/19  2118 01/13/19  0506   INR 2.2* 2.2*   APTT 29.2  --        Significant Imaging:  Imaging results within the past 24 hours have been reviewed.    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    Previously hospitalized (9/2018) with melena and Hgb 3.8 with negative EGD, recently negative colonoscopy (6/2018) with plan for outpatient VCE (not yet done). She is presenting with 1 day of bleeding (melena with some blood present) with poor response to initial resuscitation. Despite poor response to initial 3u pRBC she remains normotensive without tachycardia and asymptomatic (feels better since admission).    Plan  - protonix drip  - continue resuscitation with pRBC  - will follow-up after completion of current units and pending response will determine urgency of EGD (unless evidence of hemodynamic instability given underlying cardiac and respiratory disease would like patient to be appropriately resuscitated prior to EGD)  - will plan for EGD today or tomorrow pending clinical status  - if EGD negative will discuss colonoscopy vs VCE (has history of intestinal resection but negative SBFT 11/2018)  - if develops overt BRBPR with hemodynamic instability obtain CTA  - continue to trend H&H  - maintain 2 large bore peripheral IV  - reverse any coagulopathy, hold anticoagulants (INR 2.2  reversed in AM; indication Afib)  - plan discussed with primary team           Thank you for your consult. I will follow-up with patient. Please contact us if you have any additional questions.    Song Fountain MD  Gastroenterology  [facility]

## 2019-01-14 ENCOUNTER — CLINICAL SUPPORT (OUTPATIENT)
Dept: CARDIOLOGY | Facility: CLINIC | Age: 64
DRG: 377 | End: 2019-01-14
Attending: EMERGENCY MEDICINE
Payer: MEDICARE

## 2019-01-14 ENCOUNTER — ANESTHESIA (OUTPATIENT)
Dept: ENDOSCOPY | Facility: HOSPITAL | Age: 64
DRG: 377 | End: 2019-01-14
Payer: MEDICARE

## 2019-01-14 ENCOUNTER — TELEPHONE (OUTPATIENT)
Dept: OPHTHALMOLOGY | Facility: CLINIC | Age: 64
End: 2019-01-14

## 2019-01-14 ENCOUNTER — ANESTHESIA EVENT (OUTPATIENT)
Dept: ENDOSCOPY | Facility: HOSPITAL | Age: 64
DRG: 377 | End: 2019-01-14
Payer: MEDICARE

## 2019-01-14 LAB
ALBUMIN SERPL BCP-MCNC: 2 G/DL
ALP SERPL-CCNC: 32 U/L
ALT SERPL W/O P-5'-P-CCNC: 10 U/L
ANION GAP SERPL CALC-SCNC: 10 MMOL/L
AST SERPL-CCNC: 19 U/L
BASOPHILS # BLD AUTO: 0.03 K/UL
BASOPHILS # BLD AUTO: 0.04 K/UL
BASOPHILS # BLD AUTO: 0.05 K/UL
BASOPHILS # BLD AUTO: 0.05 K/UL
BASOPHILS NFR BLD: 0.3 %
BASOPHILS NFR BLD: 0.4 %
BASOPHILS NFR BLD: 0.5 %
BASOPHILS NFR BLD: 0.5 %
BILIRUB SERPL-MCNC: 0.7 MG/DL
BUN SERPL-MCNC: 37 MG/DL
CALCIUM SERPL-MCNC: 6.5 MG/DL
CHLORIDE SERPL-SCNC: 105 MMOL/L
CO2 SERPL-SCNC: 26 MMOL/L
CREAT SERPL-MCNC: 8.2 MG/DL
DIFFERENTIAL METHOD: ABNORMAL
EOSINOPHIL # BLD AUTO: 0.2 K/UL
EOSINOPHIL # BLD AUTO: 0.3 K/UL
EOSINOPHIL # BLD AUTO: 0.3 K/UL
EOSINOPHIL # BLD AUTO: 0.4 K/UL
EOSINOPHIL NFR BLD: 1.6 %
EOSINOPHIL NFR BLD: 2.9 %
EOSINOPHIL NFR BLD: 3 %
EOSINOPHIL NFR BLD: 4.2 %
ERYTHROCYTE [DISTWIDTH] IN BLOOD BY AUTOMATED COUNT: 16.2 %
ERYTHROCYTE [DISTWIDTH] IN BLOOD BY AUTOMATED COUNT: 16.5 %
ERYTHROCYTE [DISTWIDTH] IN BLOOD BY AUTOMATED COUNT: 16.8 %
ERYTHROCYTE [DISTWIDTH] IN BLOOD BY AUTOMATED COUNT: 17.1 %
EST. GFR  (AFRICAN AMERICAN): 5.4 ML/MIN/1.73 M^2
EST. GFR  (NON AFRICAN AMERICAN): 4.7 ML/MIN/1.73 M^2
GLUCOSE SERPL-MCNC: 122 MG/DL
HCT VFR BLD AUTO: 23.3 %
HCT VFR BLD AUTO: 25.1 %
HCT VFR BLD AUTO: 26.2 %
HCT VFR BLD AUTO: 28.7 %
HGB BLD-MCNC: 7.6 G/DL
HGB BLD-MCNC: 8 G/DL
HGB BLD-MCNC: 8 G/DL
HGB BLD-MCNC: 9.1 G/DL
IMM GRANULOCYTES # BLD AUTO: 0.04 K/UL
IMM GRANULOCYTES # BLD AUTO: 0.05 K/UL
IMM GRANULOCYTES # BLD AUTO: 0.05 K/UL
IMM GRANULOCYTES # BLD AUTO: 0.06 K/UL
IMM GRANULOCYTES NFR BLD AUTO: 0.4 %
IMM GRANULOCYTES NFR BLD AUTO: 0.5 %
IMM GRANULOCYTES NFR BLD AUTO: 0.6 %
IMM GRANULOCYTES NFR BLD AUTO: 0.6 %
LYMPHOCYTES # BLD AUTO: 1 K/UL
LYMPHOCYTES # BLD AUTO: 1 K/UL
LYMPHOCYTES # BLD AUTO: 1.1 K/UL
LYMPHOCYTES # BLD AUTO: 1.2 K/UL
LYMPHOCYTES NFR BLD: 10.5 %
LYMPHOCYTES NFR BLD: 10.8 %
LYMPHOCYTES NFR BLD: 12 %
LYMPHOCYTES NFR BLD: 12.9 %
MAGNESIUM SERPL-MCNC: 2 MG/DL
MCH RBC QN AUTO: 28.6 PG
MCH RBC QN AUTO: 29 PG
MCH RBC QN AUTO: 29.1 PG
MCH RBC QN AUTO: 29.4 PG
MCHC RBC AUTO-ENTMCNC: 30.5 G/DL
MCHC RBC AUTO-ENTMCNC: 31.7 G/DL
MCHC RBC AUTO-ENTMCNC: 31.9 G/DL
MCHC RBC AUTO-ENTMCNC: 32.6 G/DL
MCV RBC AUTO: 89 FL
MCV RBC AUTO: 91 FL
MCV RBC AUTO: 93 FL
MCV RBC AUTO: 94 FL
MONOCYTES # BLD AUTO: 0.9 K/UL
MONOCYTES # BLD AUTO: 0.9 K/UL
MONOCYTES # BLD AUTO: 1 K/UL
MONOCYTES # BLD AUTO: 1.1 K/UL
MONOCYTES NFR BLD: 10.4 %
MONOCYTES NFR BLD: 10.9 %
MONOCYTES NFR BLD: 9.5 %
MONOCYTES NFR BLD: 9.7 %
NEUTROPHILS # BLD AUTO: 6.7 K/UL
NEUTROPHILS # BLD AUTO: 6.8 K/UL
NEUTROPHILS # BLD AUTO: 7.3 K/UL
NEUTROPHILS # BLD AUTO: 7.3 K/UL
NEUTROPHILS NFR BLD: 71.7 %
NEUTROPHILS NFR BLD: 74.5 %
NEUTROPHILS NFR BLD: 75.7 %
NEUTROPHILS NFR BLD: 75.9 %
NRBC BLD-RTO: 0 /100 WBC
PHOSPHATE SERPL-MCNC: 4.5 MG/DL
PLATELET # BLD AUTO: 78 K/UL
PLATELET # BLD AUTO: 83 K/UL
PLATELET # BLD AUTO: 87 K/UL
PLATELET # BLD AUTO: 93 K/UL
PMV BLD AUTO: 11.7 FL
PMV BLD AUTO: 11.9 FL
PMV BLD AUTO: 12.2 FL
PMV BLD AUTO: 12.4 FL
POTASSIUM SERPL-SCNC: 4.4 MMOL/L
PROT SERPL-MCNC: 4.1 G/DL
RBC # BLD AUTO: 2.61 M/UL
RBC # BLD AUTO: 2.76 M/UL
RBC # BLD AUTO: 2.8 M/UL
RBC # BLD AUTO: 3.09 M/UL
SODIUM SERPL-SCNC: 141 MMOL/L
WBC # BLD AUTO: 9.05 K/UL
WBC # BLD AUTO: 9.43 K/UL
WBC # BLD AUTO: 9.56 K/UL
WBC # BLD AUTO: 9.59 K/UL

## 2019-01-14 PROCEDURE — 20000000 HC ICU ROOM

## 2019-01-14 PROCEDURE — 25000003 PHARM REV CODE 250: Performed by: INTERNAL MEDICINE

## 2019-01-14 PROCEDURE — 80053 COMPREHEN METABOLIC PANEL: CPT

## 2019-01-14 PROCEDURE — 25000003 PHARM REV CODE 250: Performed by: STUDENT IN AN ORGANIZED HEALTH CARE EDUCATION/TRAINING PROGRAM

## 2019-01-14 PROCEDURE — 44360 SMALL BOWEL ENDOSCOPY: CPT | Mod: ,,, | Performed by: INTERNAL MEDICINE

## 2019-01-14 PROCEDURE — 37000009 HC ANESTHESIA EA ADD 15 MINS: Performed by: INTERNAL MEDICINE

## 2019-01-14 PROCEDURE — 93971 CV US DOPPLER VENOUS LEG RIGHT (CUPID ONLY): ICD-10-PCS | Mod: 26,RT,, | Performed by: INTERNAL MEDICINE

## 2019-01-14 PROCEDURE — 99233 PR SUBSEQUENT HOSPITAL CARE,LEVL III: ICD-10-PCS | Mod: ,,, | Performed by: NURSE PRACTITIONER

## 2019-01-14 PROCEDURE — 37000008 HC ANESTHESIA 1ST 15 MINUTES: Performed by: INTERNAL MEDICINE

## 2019-01-14 PROCEDURE — 99291 CRITICAL CARE FIRST HOUR: CPT | Mod: ,,, | Performed by: INTERNAL MEDICINE

## 2019-01-14 PROCEDURE — 85025 COMPLETE CBC W/AUTO DIFF WBC: CPT | Mod: 91

## 2019-01-14 PROCEDURE — 93971 EXTREMITY STUDY: CPT | Mod: RT

## 2019-01-14 PROCEDURE — D9220A PRA ANESTHESIA: ICD-10-PCS | Mod: CRNA,,, | Performed by: NURSE ANESTHETIST, CERTIFIED REGISTERED

## 2019-01-14 PROCEDURE — 93971 EXTREMITY STUDY: CPT | Mod: 26,RT,, | Performed by: INTERNAL MEDICINE

## 2019-01-14 PROCEDURE — 63600175 PHARM REV CODE 636 W HCPCS: Performed by: NURSE ANESTHETIST, CERTIFIED REGISTERED

## 2019-01-14 PROCEDURE — D9220A PRA ANESTHESIA: Mod: CRNA,,, | Performed by: NURSE ANESTHETIST, CERTIFIED REGISTERED

## 2019-01-14 PROCEDURE — 25000003 PHARM REV CODE 250: Performed by: NURSE ANESTHETIST, CERTIFIED REGISTERED

## 2019-01-14 PROCEDURE — C1751 CATH, INF, PER/CENT/MIDLINE: HCPCS

## 2019-01-14 PROCEDURE — 63600175 PHARM REV CODE 636 W HCPCS: Performed by: INTERNAL MEDICINE

## 2019-01-14 PROCEDURE — 44360 SMALL BOWEL ENDOSCOPY: CPT | Performed by: INTERNAL MEDICINE

## 2019-01-14 PROCEDURE — D9220A PRA ANESTHESIA: ICD-10-PCS | Mod: ANES,,, | Performed by: ANESTHESIOLOGY

## 2019-01-14 PROCEDURE — 99233 SBSQ HOSP IP/OBS HIGH 50: CPT | Mod: ,,, | Performed by: NURSE PRACTITIONER

## 2019-01-14 PROCEDURE — D9220A PRA ANESTHESIA: Mod: ANES,,, | Performed by: ANESTHESIOLOGY

## 2019-01-14 PROCEDURE — 99291 PR CRITICAL CARE, E/M 30-74 MINUTES: ICD-10-PCS | Mod: ,,, | Performed by: INTERNAL MEDICINE

## 2019-01-14 PROCEDURE — 83735 ASSAY OF MAGNESIUM: CPT

## 2019-01-14 PROCEDURE — 36430 TRANSFUSION BLD/BLD COMPNT: CPT

## 2019-01-14 PROCEDURE — 44360 PR SMALL BOWEL ENDOSCOPY,PAST 2ND DUOD: ICD-10-PCS | Mod: ,,, | Performed by: INTERNAL MEDICINE

## 2019-01-14 PROCEDURE — 36410 VNPNXR 3YR/> PHY/QHP DX/THER: CPT

## 2019-01-14 PROCEDURE — 84100 ASSAY OF PHOSPHORUS: CPT

## 2019-01-14 PROCEDURE — C9113 INJ PANTOPRAZOLE SODIUM, VIA: HCPCS | Performed by: INTERNAL MEDICINE

## 2019-01-14 PROCEDURE — 76937 US GUIDE VASCULAR ACCESS: CPT

## 2019-01-14 RX ORDER — ETOMIDATE 2 MG/ML
INJECTION INTRAVENOUS
Status: DISCONTINUED | OUTPATIENT
Start: 2019-01-14 | End: 2019-01-14

## 2019-01-14 RX ORDER — POLYETHYLENE GLYCOL 3350, SODIUM SULFATE ANHYDROUS, SODIUM BICARBONATE, SODIUM CHLORIDE, POTASSIUM CHLORIDE 236; 22.74; 6.74; 5.86; 2.97 G/4L; G/4L; G/4L; G/4L; G/4L
2000 POWDER, FOR SOLUTION ORAL ONCE
Status: COMPLETED | OUTPATIENT
Start: 2019-01-14 | End: 2019-01-14

## 2019-01-14 RX ORDER — LATANOPROST 50 UG/ML
1 SOLUTION/ DROPS OPHTHALMIC NIGHTLY
Status: DISCONTINUED | OUTPATIENT
Start: 2019-01-14 | End: 2019-01-18 | Stop reason: HOSPADM

## 2019-01-14 RX ORDER — KETAMINE HCL IN 0.9 % NACL 50 MG/5 ML
SYRINGE (ML) INTRAVENOUS
Status: DISCONTINUED | OUTPATIENT
Start: 2019-01-14 | End: 2019-01-14

## 2019-01-14 RX ORDER — LIDOCAINE HCL/PF 100 MG/5ML
SYRINGE (ML) INTRAVENOUS
Status: DISCONTINUED | OUTPATIENT
Start: 2019-01-14 | End: 2019-01-14

## 2019-01-14 RX ORDER — DORZOLAMIDE HYDROCHLORIDE AND TIMOLOL MALEATE 20; 5 MG/ML; MG/ML
1 SOLUTION/ DROPS OPHTHALMIC 2 TIMES DAILY
Status: DISCONTINUED | OUTPATIENT
Start: 2019-01-14 | End: 2019-01-14

## 2019-01-14 RX ORDER — BRIMONIDINE TARTRATE 1 MG/ML
1 SOLUTION/ DROPS OPHTHALMIC 2 TIMES DAILY
Status: DISCONTINUED | OUTPATIENT
Start: 2019-01-14 | End: 2019-01-14

## 2019-01-14 RX ORDER — WARFARIN SODIUM 5 MG/1
TABLET ORAL
Qty: 120 TABLET | Refills: 0 | Status: ON HOLD | OUTPATIENT
Start: 2019-01-14 | End: 2019-02-08 | Stop reason: HOSPADM

## 2019-01-14 RX ORDER — SODIUM CHLORIDE 9 MG/ML
INJECTION, SOLUTION INTRAVENOUS CONTINUOUS PRN
Status: DISCONTINUED | OUTPATIENT
Start: 2019-01-14 | End: 2019-01-14

## 2019-01-14 RX ORDER — BRIMONIDINE TARTRATE 1.5 MG/ML
1 SOLUTION/ DROPS OPHTHALMIC 2 TIMES DAILY
Status: DISCONTINUED | OUTPATIENT
Start: 2019-01-14 | End: 2019-01-18 | Stop reason: HOSPADM

## 2019-01-14 RX ORDER — TIMOLOL MALEATE 5 MG/ML
1 SOLUTION/ DROPS OPHTHALMIC 2 TIMES DAILY
Status: DISCONTINUED | OUTPATIENT
Start: 2019-01-14 | End: 2019-01-18 | Stop reason: HOSPADM

## 2019-01-14 RX ORDER — DORZOLAMIDE HCL 20 MG/ML
1 SOLUTION/ DROPS OPHTHALMIC 2 TIMES DAILY
Status: DISCONTINUED | OUTPATIENT
Start: 2019-01-14 | End: 2019-01-18 | Stop reason: HOSPADM

## 2019-01-14 RX ADMIN — ETOMIDATE 2 MG: 2 INJECTION, SOLUTION INTRAVENOUS at 06:01

## 2019-01-14 RX ADMIN — PANTOPRAZOLE SODIUM 40 MG: 40 INJECTION, POWDER, FOR SOLUTION INTRAVENOUS at 10:01

## 2019-01-14 RX ADMIN — RIOCIGUAT 2 MG: 2 TABLET, FILM COATED ORAL at 09:01

## 2019-01-14 RX ADMIN — ETOMIDATE 6 MG: 2 INJECTION, SOLUTION INTRAVENOUS at 05:01

## 2019-01-14 RX ADMIN — SODIUM CHLORIDE: 0.9 INJECTION, SOLUTION INTRAVENOUS at 05:01

## 2019-01-14 RX ADMIN — PANTOPRAZOLE SODIUM 40 MG: 40 INJECTION, POWDER, FOR SOLUTION INTRAVENOUS at 09:01

## 2019-01-14 RX ADMIN — LIDOCAINE HYDROCHLORIDE 50 MG: 20 INJECTION, SOLUTION INTRAVENOUS at 05:01

## 2019-01-14 RX ADMIN — POLYETHYLENE GLYCOL 3350, SODIUM SULFATE ANHYDROUS, SODIUM BICARBONATE, SODIUM CHLORIDE, POTASSIUM CHLORIDE 2000 ML: 236; 22.74; 6.74; 5.86; 2.97 POWDER, FOR SOLUTION ORAL at 10:01

## 2019-01-14 RX ADMIN — LATANOPROST 1 DROP: 50 SOLUTION OPHTHALMIC at 10:01

## 2019-01-14 RX ADMIN — ATORVASTATIN CALCIUM 10 MG: 10 TABLET, FILM COATED ORAL at 09:01

## 2019-01-14 RX ADMIN — BRIMONIDINE TARTRATE 1 DROP: 1.5 SOLUTION OPHTHALMIC at 10:01

## 2019-01-14 RX ADMIN — TIMOLOL MALEATE 1 DROP: 5 SOLUTION/ DROPS OPHTHALMIC at 10:01

## 2019-01-14 RX ADMIN — Medication 20 MG: at 05:01

## 2019-01-14 RX ADMIN — ETOMIDATE 4 MG: 2 INJECTION, SOLUTION INTRAVENOUS at 06:01

## 2019-01-14 RX ADMIN — LEVOTHYROXINE SODIUM 100 MCG: 100 TABLET ORAL at 08:01

## 2019-01-14 RX ADMIN — DORZOLAMIDE HYDROCHLORIDE 1 DROP: 20 SOLUTION/ DROPS OPHTHALMIC at 10:01

## 2019-01-14 RX ADMIN — Medication 10 MG: at 05:01

## 2019-01-14 RX ADMIN — RIOCIGUAT 2 MG: 2 TABLET, FILM COATED ORAL at 10:01

## 2019-01-14 RX ADMIN — RIOCIGUAT 2 MG: 2 TABLET, FILM COATED ORAL at 03:01

## 2019-01-14 NOTE — INTERVAL H&P NOTE
The patient has been examined and the H&P has been reviewed:    I concur with the findings and no changes have occurred since H&P was written.    Anesthesia/Surgery risks, benefits and alternative options discussed and understood by patient/family.    Pre-Procedure H and P Addendum    Patient seen and examined.  History and exam unchanged from prior history and physical.      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  · Left ventricle shows concentric hypertrophy.  · Pulmonic valve shows mild regurgitation.  · Right atrium is severely dilated.  · Moderate tricuspid regurgitation.  · Right ventricular cavity size is moderately dilated.  · RV systolic function is mildly reduced.  · Left ventricle ejection fraction is normal at 55%  · Grade II (moderate) left ventricular diastolic dysfunction consistent with pseudonormalization.  · LA pressure is elevated.  · Moderately elevated central venous pressure (8 mm Hg).  · The estimated PA systolic pressure is 99.01 mm Hg  · Pulmonary hypertension present.      CAD (coronary artery disease) [I25.10]  Yes     S/P PTCA (percutaneous transluminal coronary angioplasty) [Z98.61]  Not Applicable    Obstructive sleep apnea [G47.33]  Yes    Rheumatoid arthritis [M06.9]  Yes    Hyperlipidemia [E78.5]  Yes    ESRD from HTN started RRT 1999 [N18.6]  Yes      Resolved Hospital Problems   No resolved problems to display.

## 2019-01-14 NOTE — ASSESSMENT & PLAN NOTE
64 yo AAF on iHD MWF who presented for BRBPR. GI planning for endoscopy today.    Plan/recommendations:  HD today for metabolic clearance/volume management.  Will schedule around her endoscopy procedure.  -UF 2L as tolerated.  -will re-evaluate in AM for further needs.  Reporting an EDW of 70.5 kg.

## 2019-01-14 NOTE — SUBJECTIVE & OBJECTIVE
Interval History:    No acute overnight event.  Last melena around 10 o'cklock last night and hemoglobin has trended up to 9.1 and stable. ESRD on HD MWF with nephrology on board. Denies dizziness, SOB, or abd pain.     Continuous Infusions:  Scheduled Meds:   atorvastatin  10 mg Oral Daily    levothyroxine  100 mcg Oral Before breakfast    pantoprazole  40 mg Intravenous BID    riociguat  2 mg Oral TID    selexipag  800 mcg Oral BID    And    selexipag  200 mcg Oral BID     PRN Meds:sodium chloride, sodium chloride, acetaminophen, dextrose 50 % in water (D50W), dextrose 50 % in water (D50W), glucagon (human recombinant), glucose, glucose, ramelteon, sodium chloride 0.9%, traMADol    Review of patient's allergies indicates:   Allergen Reactions    Penicillins Swelling    Iodine      Other reaction(s): Hives    Sulfamethoxazole-trimethoprim      Other reaction(s): Swelling  Other reaction(s): Hives     Objective:     Vital Signs (Most Recent):  Temp: 98.6 °F (37 °C) (01/14/19 1100)  Pulse: 94 (01/14/19 1400)  Resp: (!) 22 (01/14/19 1400)  BP: (!) 141/71 (01/14/19 1400)  SpO2: 98 % (01/14/19 1400) Vital Signs (24h Range):  Temp:  [97.9 °F (36.6 °C)-98.6 °F (37 °C)] 98.6 °F (37 °C)  Pulse:  [] 94  Resp:  [7-38] 22  SpO2:  [83 %-100 %] 98 %  BP: ()/() 141/71     Patient Vitals for the past 72 hrs (Last 3 readings):   Weight   01/13/19 0500 71.7 kg (158 lb 1.1 oz)   01/12/19 1853 71.1 kg (156 lb 12 oz)     Body mass index is 29.87 kg/m².      Intake/Output Summary (Last 24 hours) at 1/14/2019 1437  Last data filed at 1/14/2019 1100  Gross per 24 hour   Intake 1259 ml   Output --   Net 1259 ml       Hemodynamic Parameters:       Telemetry:  NSR.    Physical Exam     Constitutional: She is oriented to person, place, and time.   HENT:   Head: Normocephalic.   Eyes: No scleral icterus.   conjunctival pallor   Neck: No JVD present.   Cardiovascular: Normal rate and regular rhythm.   No murmur  heard.  +P2   Pulmonary/Chest: Effort normal and breath sounds normal. She has no wheezes. She has no rales.   Abdominal: Soft. Bowel sounds are normal. She exhibits no distension. There is no tenderness.   Musculoskeletal: She exhibits no edema or tenderness.   Neurological: She is alert and oriented to person, place, and time.   Skin: Skin is warm and dry.   Nursing note and vitals reviewed.    Significant Labs:  CBC:  Recent Labs   Lab 01/13/19  2300 01/14/19  0420 01/14/19  0920   WBC 10.66 9.59 9.56   RBC 2.99* 2.76* 3.09*   HGB 8.7* 8.0* 9.1*   HCT 26.4* 25.1* 28.7*   PLT 75* 93* 78*   MCV 88 91 93   MCH 29.1 29.0 29.4   MCHC 33.0 31.9* 31.7*     BNP:  No results for input(s): BNP in the last 168 hours.    Invalid input(s): BNPTRIAGELBLO  CMP:  Recent Labs   Lab 01/12/19 2118 01/13/19  0506 01/13/19  1110 01/14/19  0420   * 146* 180* 122*   CALCIUM 6.9* 6.5* 6.7* 6.5*   ALBUMIN 2.3* 2.1* 2.2* 2.0*   PROT 5.0* 4.5*  --  4.1*    140 138 141   K 2.9* 3.8 4.3 4.4   CO2 29 25 23 26    103 103 105   BUN 26* 29* 29* 37*   CREATININE 7.3* 7.5* 7.3* 8.2*   ALKPHOS 41* 37*  --  32*   ALT 12 11  --  10   AST 11 10  --  19   BILITOT 0.4 0.4  --  0.7      Coagulation:   Recent Labs   Lab 01/12/19 2118 01/13/19  0506   INR 2.2* 2.2*   APTT 29.2  --      LDH:  No results for input(s): LDH in the last 72 hours.  Microbiology:  Microbiology Results (last 7 days)     ** No results found for the last 168 hours. **          I have reviewed all pertinent labs within the past 24 hours.    Estimated Creatinine Clearance: 6.2 mL/min (A) (based on SCr of 8.2 mg/dL (H)).    Diagnostic Results:

## 2019-01-14 NOTE — SUBJECTIVE & OBJECTIVE
Interval History:   NAEON.  She voiced no BRBPR overnight.  Hgb stable. Plans for EGD/push enteroscopy today by GI.  Oxygenating well on 3L NC.  No c/o SOB on exam.    Review of patient's allergies indicates:   Allergen Reactions    Penicillins Swelling    Iodine      Other reaction(s): Hives    Sulfamethoxazole-trimethoprim      Other reaction(s): Swelling  Other reaction(s): Hives     Current Facility-Administered Medications   Medication Frequency    0.9%  NaCl infusion (for blood administration) Q24H PRN    0.9%  NaCl infusion (for blood administration) Q24H PRN    acetaminophen tablet 650 mg Q8H PRN    atorvastatin tablet 10 mg Daily    dextrose 50 % in water (D50W) injection 12.5 g PRN    dextrose 50 % in water (D50W) injection 25 g PRN    glucagon (human recombinant) injection 1 mg PRN    glucose chewable tablet 16 g PRN    glucose chewable tablet 24 g PRN    levothyroxine tablet 100 mcg Before breakfast    pantoprazole injection 40 mg BID    ramelteon tablet 8 mg Nightly PRN    riociguat (ADEMPAS) tablet 2 mg TID    selexipag Tab 800 mcg BID    And    selexipag Tab 200 mcg BID    sodium chloride 0.9% flush 5 mL PRN    traMADol tablet 50 mg Q8H PRN       Objective:     Vital Signs (Most Recent):  Temp: 98.6 °F (37 °C) (01/14/19 1100)  Pulse: 104 (01/14/19 1100)  Resp: (!) 36 (01/14/19 1100)  BP: (!) 105/57 (01/14/19 1100)  SpO2: (!) 94 % (01/14/19 1100)  O2 Device (Oxygen Therapy): nasal cannula (01/14/19 1100) Vital Signs (24h Range):  Temp:  [97.9 °F (36.6 °C)-98.6 °F (37 °C)] 98.6 °F (37 °C)  Pulse:  [] 104  Resp:  [7-38] 36  SpO2:  [83 %-100 %] 94 %  BP: ()/() 105/57     Weight: 71.7 kg (158 lb 1.1 oz) (01/13/19 0500)  Body mass index is 29.87 kg/m².  Body surface area is 1.76 meters squared.    I/O last 3 completed shifts:  In: 2314 [P.O.:20; Blood:2294]  Out: -     Physical Exam   Constitutional: She is oriented to person, place, and time. She appears  well-developed. No distress. Nasal cannula in place.   HENT:   Head: Normocephalic and atraumatic.   Right Ear: External ear normal.   Left Ear: External ear normal.   Facial edema   Eyes: Conjunctivae and EOM are normal. Right eye exhibits no discharge. Left eye exhibits no discharge.   Neck: Normal range of motion. Neck supple.   Cardiovascular: Normal rate and regular rhythm. Exam reveals no gallop and no friction rub.   Murmur heard.  Pulmonary/Chest: Effort normal. No respiratory distress. She has no wheezes. She has rales (bibasilar).   Abdominal: Soft. Bowel sounds are normal. She exhibits no distension. There is no tenderness.   Musculoskeletal: She exhibits edema. She exhibits no deformity.   Neurological: She is alert and oriented to person, place, and time.   Skin: Skin is warm and dry. She is not diaphoretic.       Significant Labs:  CBC:   Recent Labs   Lab 01/14/19  0920   WBC 9.56   RBC 3.09*   HGB 9.1*   HCT 28.7*   PLT 78*   MCV 93   MCH 29.4   MCHC 31.7*     CMP:   Recent Labs   Lab 01/14/19  0420   *   CALCIUM 6.5*   ALBUMIN 2.0*   PROT 4.1*      K 4.4   CO2 26      BUN 37*   CREATININE 8.2*   ALKPHOS 32*   ALT 10   AST 19   BILITOT 0.7

## 2019-01-14 NOTE — PROGRESS NOTES
[facility]  Heart Transplant  Progress Note    Patient Name: Shivani Sheets  MRN: [id number]  Admission Date: 1/12/2019  Hospital Length of Stay: 2 days  Attending Physician: Meredith Pugh MD  Primary Care Provider: Eula Weiss MD  Principal Problem:Gastrointestinal hemorrhage    Subjective:     Interval History:    No acute overnight event.  Last melena around 10 o'cklock last night and hemoglobin has trended up to 9.1 and stable after 6 PRBC transfusion. ESRD on HD MWF with nephrology on board. Denies dizziness, SOB, or abd pain.     Continuous Infusions:  Scheduled Meds:   atorvastatin  10 mg Oral Daily    levothyroxine  100 mcg Oral Before breakfast    pantoprazole  40 mg Intravenous BID    riociguat  2 mg Oral TID    selexipag  800 mcg Oral BID    And    selexipag  200 mcg Oral BID     PRN Meds:sodium chloride, sodium chloride, acetaminophen, dextrose 50 % in water (D50W), dextrose 50 % in water (D50W), glucagon (human recombinant), glucose, glucose, ramelteon, sodium chloride 0.9%, traMADol    Review of patient's allergies indicates:   Allergen Reactions    Penicillins Swelling    Iodine      Other reaction(s): Hives    Sulfamethoxazole-trimethoprim      Other reaction(s): Swelling  Other reaction(s): Hives     Objective:     Vital Signs (Most Recent):  Temp: 98.6 °F (37 °C) (01/14/19 1100)  Pulse: 94 (01/14/19 1400)  Resp: (!) 22 (01/14/19 1400)  BP: (!) 141/71 (01/14/19 1400)  SpO2: 98 % (01/14/19 1400) Vital Signs (24h Range):  Temp:  [97.9 °F (36.6 °C)-98.6 °F (37 °C)] 98.6 °F (37 °C)  Pulse:  [] 94  Resp:  [7-38] 22  SpO2:  [83 %-100 %] 98 %  BP: ()/() 141/71     Patient Vitals for the past 72 hrs (Last 3 readings):   Weight   01/13/19 0500 71.7 kg (158 lb 1.1 oz)   01/12/19 1853 71.1 kg (156 lb 12 oz)     Body mass index is 29.87 kg/m².      Intake/Output Summary (Last 24 hours) at 1/14/2019 1438  Last data filed at 1/14/2019 1100  Gross per 24 hour    Intake 1259 ml   Output --   Net 1259 ml       Hemodynamic Parameters:       Telemetry:  NSR.    Physical Exam     Constitutional: She is oriented to person, place, and time.   HENT:   Head: Normocephalic.   Eyes: No scleral icterus.   conjunctival pallor   Neck: No JVD present.   Cardiovascular: Normal rate and regular rhythm.   No murmur heard.  +P2   Pulmonary/Chest: Effort normal and breath sounds normal. She has no wheezes. She has no rales.   Abdominal: Soft. Bowel sounds are normal. She exhibits no distension. There is no tenderness.   Musculoskeletal: She exhibits no edema or tenderness.   Neurological: She is alert and oriented to person, place, and time.   Skin: Skin is warm and dry.   Nursing note and vitals reviewed.    Significant Labs:  CBC:  Recent Labs   Lab 01/13/19  2300 01/14/19  0420 01/14/19  0920   WBC 10.66 9.59 9.56   RBC 2.99* 2.76* 3.09*   HGB 8.7* 8.0* 9.1*   HCT 26.4* 25.1* 28.7*   PLT 75* 93* 78*   MCV 88 91 93   MCH 29.1 29.0 29.4   MCHC 33.0 31.9* 31.7*     BNP:  No results for input(s): BNP in the last 168 hours.    Invalid input(s): BNPTRIAGELBLO  CMP:  Recent Labs   Lab 01/12/19 2118 01/13/19  0506 01/13/19  1110 01/14/19  0420   * 146* 180* 122*   CALCIUM 6.9* 6.5* 6.7* 6.5*   ALBUMIN 2.3* 2.1* 2.2* 2.0*   PROT 5.0* 4.5*  --  4.1*    140 138 141   K 2.9* 3.8 4.3 4.4   CO2 29 25 23 26    103 103 105   BUN 26* 29* 29* 37*   CREATININE 7.3* 7.5* 7.3* 8.2*   ALKPHOS 41* 37*  --  32*   ALT 12 11  --  10   AST 11 10  --  19   BILITOT 0.4 0.4  --  0.7      Coagulation:   Recent Labs   Lab 01/12/19 2118 01/13/19  0506   INR 2.2* 2.2*   APTT 29.2  --      LDH:  No results for input(s): LDH in the last 72 hours.  Microbiology:  Microbiology Results (last 7 days)     ** No results found for the last 168 hours. **          I have reviewed all pertinent labs within the past 24 hours.    Estimated Creatinine Clearance: 6.2 mL/min (A) (based on SCr of 8.2 mg/dL  (H)).    Diagnostic Results:          Assessment and Plan:     64 yo F w/ PMHx  pulmonary HTN (on adempas/uptravi), diastolic dysfunction, pAF(on coumadin), central retinal artery occlusion without significant carotid artery stenosis, CAD with remote PCI, ESRD(LUE AVF) secondary to HTN, s/p failed kidney transplant, PUD, STEFFANY on CPAP, hypothyroidism, RA, HLD and obesity who presents with bright red per rectum that started 2 days ago.She had a single episode of non bloody emesis earlier today after she became dizzy from standing up too quickly. Her dizziness and lightheadedness improves with sitting and lying down. She was admitted to the CICU in the past for a similar episode 9/2018 and received 6 U PRBC.Upper gi endoscopy revealed  Normal esophagus,a few medium sessile polyps with no bleeding and no stigmata of recent bleeding were found in the gastric antrum.       [facility] consulted for Transfer of service as she has history of PH and on meds.  Patient developed right lower extremity swelling due to an IO in Right leg where she was being transfused. IO was dislodged and removed. Will do US venous doppler.    * Gastrointestinal hemorrhage    Possibly from angiodysplasia associated with chronic HD  Had negative C'scope on 6/18 and negative egd on 9/18. Capsule endo not yet done  S/P 6 units of PRBC and 2 units of FFP with good response. HB 9.1 this morning  Continue PPI IV  Undergoing Push enteroscope with GI today  Continue to hold coumadin and ASA currently  Will keep NPO for now and resume diet once gi make final recommendations  Transfer out of ICU  Discussed with patient that she may need to  Get off ASA mean for chronic cardiac stent to reduce hemorrhagic risk     Acute blood loss anemia    Transfuse 6 U PRBC and H/H stable  Will check H/H check to BID       Controlled type 2 diabetes mellitus with both eyes affected by proliferative retinopathy and macular edema, without long-term current use of insulin    - on  ISS     Atrial fibrillation    Hold warfarin and aspirin.     Pulmonary HTN    - Continue adempas  - Patient can use her own Uptravi to use in patient     ESRD from HTN started RRT 1999    Nephrology on board for HD on MWF     CAD (coronary artery disease)    - Stent placed in 1999. Previously on ASA and plavix  - continue Lipitor   - Discussed may need to discontinue ASA completely due to GI bleed         Denilson Tyson MD  Heart Transplant  [facility]

## 2019-01-14 NOTE — PROGRESS NOTES
Patient to get EGD and Push enteroscope at bedside. Equipments in the room but team including anesthesia still not present. Will plan to complete procedure in ICU and transfer put of ICU tomorrow first thing in the AM

## 2019-01-14 NOTE — ANESTHESIA PREPROCEDURE EVALUATION
[facility]  Anesthesia Pre-Operative Evaluation         Patient Name: Shivani Sheets  YOB: 1955  MRN: [id number]    SUBJECTIVE:     Pre-operative evaluation for Procedure(s) (LRB):  EGD (ESOPHAGOGASTRODUODENOSCOPY) (N/A)     01/14/2019    Shivani Sheets is a 63 y.o. female w/ a significant PMHx of pulmonary HTN (on adempas/uptravi), diastolic dysfunction, pAF(on coumadin), COPD (2L O2 at home), central retinal artery occlusion without significant carotid artery stenosis, CAD with remote PCI, ESRD(LUE AVF; HD MWF) secondary to HTN, s/p failed kidney transplant, PUD, STEFFANY on CPAP, hypothyroidism, RA, HLD and obesity who presents with BRBPR that started 2 days ago.     Hgb 4.5 on admission. S/p 6U PRBC. Hgb 9.1 today 1/14/19.    Patient now presents for the above procedure(s).      LDA: None documented.       RETIRED! DO NOT USE: Hemodialysis Catheter Arteriovenous fistula Left Forearm (Active)   Number of days:             Peripheral IV - Single Lumen 01/12/19 2116 Right Upper Arm (Active)   Site Assessment Clean;Dry;Intact 1/14/2019 11:00 AM   Line Status Flushed 1/14/2019 11:00 AM   Dressing Status Clean;Dry;Intact 1/14/2019 11:00 AM   Dressing Intervention Dressing reinforced 1/14/2019 11:00 AM   Dressing Change Due 01/16/19 1/14/2019 11:00 AM   Site Change Due 01/16/19 1/14/2019  7:10 AM   Reason Not Rotated Not due 1/14/2019 11:00 AM   Number of days: 1            Peripheral IV - Single Lumen 01/13/19 0412 Right Hand (Active)   Site Assessment Clean;Dry;Intact 1/14/2019 11:00 AM   Line Status Flushed 1/14/2019 11:00 AM   Dressing Status Clean;Dry;Intact 1/14/2019 11:00 AM   Dressing Intervention Dressing reinforced 1/14/2019 11:00 AM   Dressing Change Due 01/17/19 1/14/2019 11:00 AM   Site Change Due 01/17/19 1/14/2019  7:10 AM   Reason Not Rotated Not due 1/14/2019 11:00 AM   Number of days: 1            Midline Catheter Insertion/Assessment  - Single Lumen 01/14/19 0930  Right basilic vein (medial side of arm) 18g x 10cm (Active)   Site Assessment Clean;Dry;Intact;No redness;No swelling 2019  2:16 PM   IV Device Securement catheter securement device 2019  2:16 PM   Line Status Blood return noted;Flushed;Saline locked 2019  2:16 PM   Dressing Status Biopatch in place;Clean;Dry;Intact 2019  2:16 PM   Dressing Intervention New dressing 2019  2:16 PM   Dressing Change Due 19  2:16 PM   Site Change Due 19  2:16 PM   Number of days: 0            Hemodialysis AV Fistula Left upper arm (Active)   Number of days:        Prev airway: none undocumented    Drips: None documented.      Patient Active Problem List   Diagnosis    Chronic diastolic heart failure    CAD (coronary artery disease)    S/P PTCA (percutaneous transluminal coronary angioplasty)    Central retinal artery occlusion    ESRD from HTN started RRT     Obstructive sleep apnea    Hyperlipidemia    Obesity    Rheumatoid arthritis    Anticoagulation monitoring by pharmacist    Complication of vascular access for dialysis    Hand pain, left    Failed  donor kidney transplant -    Renal hypertension diagnosed age 15    Hypothyroidism    Awaiting organ transplant    Pulmonary HTN    Atrial fibrillation    Hypoxia    Low back pain    Other chronic pulmonary heart diseases    Chest wall pain    Status post cataract extraction and insertion of intraocular lens    Glaucoma filtering bleb of both eyes    Chronic angle-closure glaucoma of both eyes, moderate stage    Atypical pneumonia    GIB (gastrointestinal bleeding)    Symptomatic anemia    Glaucoma shunt device of both eyes    Colon polyp    Controlled type 2 diabetes mellitus with both eyes affected by proliferative retinopathy and macular edema, without long-term current use of insulin    Central serous chorioretinopathy of eye, right    Supratherapeutic INR    Melena     Acute blood loss anemia    PVC (premature ventricular contraction)    Gastrointestinal hemorrhage       Review of patient's allergies indicates:   Allergen Reactions    Penicillins Swelling    Iodine      Other reaction(s): Hives    Sulfamethoxazole-trimethoprim      Other reaction(s): Swelling  Other reaction(s): Hives       Current Inpatient Medications:   atorvastatin  10 mg Oral Daily    levothyroxine  100 mcg Oral Before breakfast    pantoprazole  40 mg Intravenous BID    riociguat  2 mg Oral TID    selexipag  800 mcg Oral BID    And    selexipag  200 mcg Oral BID       No current facility-administered medications on file prior to encounter.      Current Outpatient Medications on File Prior to Encounter   Medication Sig Dispense Refill    ALPHAGAN P 0.1 % Drop once daily.       aspirin 81 MG chewable tablet Take 81 mg by mouth Daily. 1  By mouth Every day      cyclobenzaprine (FLEXERIL) 5 MG tablet Take three tablets per day for 2 days, followed by one tablet per day at night as needed 30 tablet 0    diltiaZEM (CARDIZEM CD) 300 MG 24 hr capsule Take 1 capsule (300 mg total) by mouth once daily. 90 capsule 3    dorzolamide-timolol 2-0.5% (COSOPT) 22.3-6.8 mg/mL ophthalmic solution INSTILL 1 DROP IN BOTH EYES TWICE DAILY 10 mL 0    latanoprost 0.005 % ophthalmic solution INSTILL 1 DROP IN BOTH EYES EVERY DAY AT BEDTIME 7.5 mL 0    levothyroxine (SYNTHROID) 100 MCG tablet Take 100 mcg by mouth. 1 Tablet Oral Every day      lidocaine-prilocaine (EMLA) cream Apply topically as needed.      LIPITOR 10 mg tablet TAKE 1 BY MOUTH ONCE A DAY 90 tablet 2    pantoprazole (PROTONIX) 40 MG tablet TAKE 1 TABLET BY MOUTH ONCE DAILY 30 tablet 6    polyethylene glycol (COLYTE) 240-22.72-6.72 -5.84 gram SolR Drink half bottle.Take 8 oz q 15 minutes until half bottle complete the night before procedure 4000 mL 0    riociguat (ADEMPAS) 2 mg Tab tablet Take 2 mg by mouth 3 (three) times daily.       selexipag  (UPTRAVI) 1,000 mcg Tab Take 1,000 mcg by mouth 2 (two) times daily.      traMADol (ULTRAM) 50 mg tablet TK 1 T PO  Q 8 H PRN  0    hydrocodone-acetaminophen 7.5-325mg (NORCO) 7.5-325 mg per tablet TK 1 T PO Q 6 H PRN  0       Past Surgical History:   Procedure Laterality Date    CATARACT EXTRACTION W/  INTRAOCULAR LENS IMPLANT Bilateral     COLON SURGERY      COLONOSCOPY      COLONOSCOPY N/A 2018    Performed by [facility] at [facility] (2ND FLR)    EGD (ESOPHAGOGASTRODUODENOSCOPY) N/A 2018    Performed by Luis Washington MD at [facility] ENDO (2ND FLR)    ESOPHAGOGASTRODUODENOSCOPY (EGD) N/A 2018    Performed by Kenji Desir MD at [facility] ENDO (2ND FLR)    EYE SURGERY      FRACTURE SURGERY      R arm    HERNIA REPAIR      HYSTERECTOMY      INSERTION-IMPLANT-GLAUCOMA Left 10/12/2017    Performed by Junaid Kern [facility] at [facility] OR 1ST FLR    KIDNEY TRANSPLANT      NEPHRECTOMY  2008    transplant     PARATHYROID GLAND SURGERY      TONSILLECTOMY      UPPER GASTROINTESTINAL ENDOSCOPY         Social History     Socioeconomic History    Marital status:      Spouse name: Not on file    Number of children: Not on file    Years of education: Not on file    Highest education level: Not on file   Social Needs    Financial resource strain: Not on file    Food insecurity - worry: Not on file    Food insecurity - inability: Not on file    Transportation needs - medical: Not on file    Transportation needs - non-medical: Not on file   Occupational History    Not on file   Tobacco Use    Smoking status: Former Smoker     Types: Cigarettes     Last attempt to quit: 8/10/2000     Years since quittin.4    Smokeless tobacco: Never Used   Substance and Sexual Activity    Alcohol use: No     Comment: hx of etoh     Drug use: No    Sexual activity: No   Other Topics Concern    Not on file   Social History Narrative    Shivani had three children and 11grandchildren.  She lives  alone.  She is on disability.        OBJECTIVE:     Vital Signs Range (Last 24H):  Temp:  [36.6 °C (97.9 °F)-37 °C (98.6 °F)]   Pulse:  []   Resp:  [7-38]   BP: ()/()   SpO2:  [83 %-100 %]       Significant Labs:  Lab Results   Component Value Date    WBC 9.56 01/14/2019    HGB 9.1 (L) 01/14/2019    HCT 28.7 (L) 01/14/2019    PLT 78 (L) 01/14/2019    CHOL 161 02/01/2014    TRIG 122 02/01/2014    HDL 61 02/01/2014    ALT 10 01/14/2019    AST 19 01/14/2019     01/14/2019    K 4.4 01/14/2019     01/14/2019    CREATININE 8.2 (H) 01/14/2019    BUN 37 (H) 01/14/2019    CO2 26 01/14/2019    TSH 0.793 02/23/2018    INR 2.2 (H) 01/13/2019    GLUF 126 (H) 03/16/2010    HGBA1C 5.2 01/13/2019       Diagnostic Studies: No relevant studies.    EKG: No recent studies available.    2D ECHO:  Results for orders placed or performed during the hospital encounter of 02/08/18   2D echo with color flow doppler   Result Value Ref Range    QEF 60 55 - 65    Mitral Valve Regurgitation TRIVIAL     Diastolic Dysfunction Yes (A)     Est. PA Systolic Pressure 70.9 (A)     Tricuspid Valve Regurgitation MILD          ASSESSMENT/PLAN:         Anesthesia Evaluation    I have reviewed the Patient Summary Reports.     I have reviewed the Medications.     Review of Systems  Anesthesia Hx:  No problems with previous Anesthesia  History of prior surgery of interest to airway management or planning: Previous anesthesia: General Denies Family Hx of Anesthesia complications.   Denies Personal Hx of Anesthesia complications.   Social:  Former Smoker    Cardiovascular:   Hypertension, poorly controlled CAD   CHF    Pulmonary:   COPD, severe Sleep Apnea On 2L home O2    Renal/:   Chronic Renal Disease, ESRD, Dialysis    Endocrine:   Diabetes, poorly controlled, type 2, using insulin        Physical Exam  General:  Well nourished    Airway/Jaw/Neck:  Airway Findings: Mouth Opening: Normal Tongue: Normal  General Airway  Assessment: Adult  Mallampati: III  TM Distance: Normal, at least 6 cm  Jaw/Neck Findings:  Neck ROM: Normal ROM  Neck Findings: Normal    Eyes/Ears/Nose:  EYES/EARS/NOSE FINDINGS: Normal   Dental:  Dental Findings: In tact   Chest/Lungs:  Chest/Lungs Findings: Normal Respiratory Rate     Heart/Vascular:  Heart Findings: Rate: Normal  Rhythm: Regular Rhythm  Vascular Findings:  Existing Vascular Access  Dialysis Access: AV Fistula LUE        Mental Status:  Mental Status Findings: Normal        Anesthesia Plan  Type of Anesthesia, risks & benefits discussed:  Anesthesia Type:  general, MAC  Patient's Preference:   Intra-op Monitoring Plan: standard ASA monitors  Intra-op Monitoring Plan Comments:   Post Op Pain Control Plan: multimodal analgesia, IV/PO Opioids PRN and per primary service following discharge from PACU  Post Op Pain Control Plan Comments:   Induction:   IV  Beta Blocker:  Patient is not currently on a Beta-Blocker (No further documentation required).       Informed Consent: Patient understands risks and agrees with Anesthesia plan.  Questions answered. Anesthesia consent signed with patient.  ASA Score: 4     Day of Surgery Review of History & Physical:    H&P update referred to the surgeon.         Ready For Surgery From Anesthesia Perspective.

## 2019-01-14 NOTE — PROGRESS NOTES
Admit Note     Met with patient to assess needs. Patient is a 63 y.o.  female, admitted for gastrointestinal hemorrhage, acute blood loss, atrial fib, and pulmonary hypertension.      Patient admitted from emergency on 1/12/2019 .  At this time, patient presents as alert and oriented x 4, good eye contact, calm and communicative.  At this time, patients caregiver is not currently in attendance.     Household/Family Systems     Patient resides with patient's two adult sons and brother and  At     [address] [place] [address].        Support system includes adult children, brother and extended family.    Patient does not have dependents that are need of being cared for.     Patients primary caregiver is self with help from her brother/sons as needed.   Pt's cell:  [phone]    Emergency contactgsl  Kenji Zaldivar (brother):  [phone]  Ben (48 yr old son) :  [phone]  Frederick ( 49 yr old son): [phone]    During admission, patient's caregiver plans to visit.  Confirmed patient and patients caregivers do have access to reliable transportation.  The pt's brother and adult children drive.     Cognitive Status/Learning     Patient reports reading ability as 12th grade and states patient does not have difficulty with writing, hearing, comprehension and learning. Pt does report difficulty with her eyesight (blurry), reading due to blurry vision and with short term memory.  Patient reports patient learns best by one on one.     Needed: No.   Highest education level: High School (9-12) or GED    Vocation/Disability   .  Working for Income: No  If no, reason not working: Patient Choice - Retired  Patient reports she retired in 2002 or 2003.  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   The pt's brother is retired and is at home with the pt.   The pt's children are employed.     Adherence     Patient reports a medium level of adherence to patients  health care regimen.  Pt reports she is not eating well due to a poor appetite.  The pt reports she  forgets  to take her medication at times, however she will usually remember by the evening and is not sure if she should take it late or wait until the following day.  Pt reports she will usually wait until the following day.  Pt reports she does have an AM and PM pill box, however she will replace the pill after she takes it which is  creating confusion regarding wether or not the medication has been taken.  Worker provided support. Patient verbalizes understanding.    Substance Use    Patient reports the following substance usage.    Tobacco: none.  Pt reports she quit smoking socially in 1998  Alcohol: none recently.  Pt reports she only drank socially.   Illicit Drugs/Non-prescribed Medications: none, patient denies any use.  Patient states clear understanding of the potential impact of substance use.  Substance abstinence/cessation counseling, education and resources provided and reviewed.     Services Utilizing/ADLS    Infusion Service: Prior to admission, patient utilizing? no  Home Health: Prior to admission, patient utilizing? no Pt has used [facility] in the past.  If HH is needed at D/C pt would like to use [facility] again.  DME: Prior to admission, yes 02 set up with portables at 2LPM.  Pt reports her portable 02 is at home, however her brother/sons will be able to bring it to her at discharge.  Pt can't remember name of her 02 company.  Pt also has a walker, cane, CPAP, scooter and shower chair. Pt reports she does use her CPAP.   Pulmonary/Cardiac Rehab: Prior to admission, no Pt does go to out PT physical therapy 2x a week for hip pain  Dialysis:  Prior to admission, yes Pt goes to [facility] on MWF from 5:30am until 10:00am.Pt reports she will drive her self to dialysis or her brother will take her.  Transplant Specialty Pharmacy:  Prior to admission, no.    Prior to admission, patient reports patient was  mostly independent with ADLS and was driving.  Patient reports patient is not able to care for self at this time due to compromised medical condition (as documented in medical record) and physical weakness..  Patient indicates a willingness to care for self once medically cleared to do so.    Insurance/Medications    Insured by   Payor/Plan Subscr  Sex Relation Sub. Ins. ID Effective Group Num   1. [place]* MIGUEL ANGEL AMADO* 1955 Female  [id number] 01                                    [address]   2. [place] ANG[place]AD[place]* 1955 Female  [id number]* 17                                    [address] [address]      Primary Insurance (for UNOS reporting): Public Insurance - Medicare FFS (Fee For Service)  Secondary Insurance (for UNOS reporting): Public Insurance - Medicaid    Patient reports patient is able to obtain and afford medications at this time and at time of discharge.    Living Will/Healthcare Power of     Patient states patient has a LW and/or HCPA. Pt reports she is not sure which son has been listed as her HCPA.   provided education regarding LW and HCPA and the completion of forms.    Coping/Mental Health    Patient is coping adequately with the aid of  family members and mely.  Patient indicates mental health difficulties.   Pt reports some difficulty with depression due to her illness and lack of mobility.  Pt reports she becomes frustration when she is not able to auguste the activities she was once able to do. Worker provided support.     Discharge Planning    At time of discharge, patient plans to return to patient's home under the care of self and family.  Patients brother or sons will transport patient.  Per rounds today, expected discharge date has not been medically determined at this time. Patient and patients caregiver  verbalize understanding and are involved in treatment planning and discharge process.    Additional Concerns    Patient is  being followed for needs, education, resources, information, emotional support, supportive counseling, and for supportive and skilled discharge plan of care.  providing ongoing psychosocial support, education, resources and d/c planning as needed.  SW remains available. Patient verbalizes understanding and agreement with information reviewed, social work availability, and how to access available resources as needed.

## 2019-01-14 NOTE — CONSULTS
Midline placed in right basilic vein, 18g x 10cm size.  Max dwell date 2/12/2019.  Lot# [id number].  Needle advanced using realtime u/s guidance.

## 2019-01-14 NOTE — PLAN OF CARE
Patient continues to have large liquid blood bowel movements, 5 total during shift  6 units PRBCs and 2 FFP given thoughout shift   Last H & H 8.4/27.8 at 1830  Patient continues to be hemodynamically stable  SBP 110s-170s  HR 90s with some ectopy  Sats > 95% on 3 L NC  Patient drowsy but responsive and oriented x 4  Plan to recheck CBC and transfuse as needed  Plan of care reviewed with patient and family at bedside  Will continue to monitor

## 2019-01-14 NOTE — PROGRESS NOTES
[facility]  Nephrology  Progress Note    Patient Name: Shivani Sheets  MRN: [id number]  Admission Date: 1/12/2019  Hospital Length of Stay: 2 days  Attending Provider: Meredith Pugh MD   Primary Care Physician: Eula Weiss MD  Principal Problem:Gastrointestinal hemorrhage    Subjective:     Interval History:   NAEON.  She voiced no BRBPR overnight.  Hgb stable. Plans for EGD/push enteroscopy today by GI.  Oxygenating well on 3L NC.  No c/o SOB on exam.    Review of patient's allergies indicates:   Allergen Reactions    Penicillins Swelling    Iodine      Other reaction(s): Hives    Sulfamethoxazole-trimethoprim      Other reaction(s): Swelling  Other reaction(s): Hives     Current Facility-Administered Medications   Medication Frequency    0.9%  NaCl infusion (for blood administration) Q24H PRN    0.9%  NaCl infusion (for blood administration) Q24H PRN    acetaminophen tablet 650 mg Q8H PRN    atorvastatin tablet 10 mg Daily    dextrose 50 % in water (D50W) injection 12.5 g PRN    dextrose 50 % in water (D50W) injection 25 g PRN    glucagon (human recombinant) injection 1 mg PRN    glucose chewable tablet 16 g PRN    glucose chewable tablet 24 g PRN    levothyroxine tablet 100 mcg Before breakfast    pantoprazole injection 40 mg BID    ramelteon tablet 8 mg Nightly PRN    riociguat (ADEMPAS) tablet 2 mg TID    selexipag Tab 800 mcg BID    And    selexipag Tab 200 mcg BID    sodium chloride 0.9% flush 5 mL PRN    traMADol tablet 50 mg Q8H PRN       Objective:     Vital Signs (Most Recent):  Temp: 98.6 °F (37 °C) (01/14/19 1100)  Pulse: 104 (01/14/19 1100)  Resp: (!) 36 (01/14/19 1100)  BP: (!) 105/57 (01/14/19 1100)  SpO2: (!) 94 % (01/14/19 1100)  O2 Device (Oxygen Therapy): nasal cannula (01/14/19 1100) Vital Signs (24h Range):  Temp:  [97.9 °F (36.6 °C)-98.6 °F (37 °C)] 98.6 °F (37 °C)  Pulse:  [] 104  Resp:  [7-38] 36  SpO2:  [83 %-100 %] 94 %  BP:  ()/() 105/57     Weight: 71.7 kg (158 lb 1.1 oz) (01/13/19 0500)  Body mass index is 29.87 kg/m².  Body surface area is 1.76 meters squared.    I/O last 3 completed shifts:  In: 2314 [P.O.:20; Blood:2294]  Out: -     Physical Exam   Constitutional: She is oriented to person, place, and time. She appears well-developed. No distress. Nasal cannula in place.   HENT:   Head: Normocephalic and atraumatic.   Right Ear: External ear normal.   Left Ear: External ear normal.   Facial edema   Eyes: Conjunctivae and EOM are normal. Right eye exhibits no discharge. Left eye exhibits no discharge.   Neck: Normal range of motion. Neck supple.   Cardiovascular: Normal rate and regular rhythm. Exam reveals no gallop and no friction rub.   Murmur heard.  Pulmonary/Chest: Effort normal. No respiratory distress. She has no wheezes. She has rales (bibasilar).   Abdominal: Soft. Bowel sounds are normal. She exhibits no distension. There is no tenderness.   Musculoskeletal: She exhibits edema. She exhibits no deformity.   Neurological: She is alert and oriented to person, place, and time.   Skin: Skin is warm and dry. She is not diaphoretic.       Significant Labs:  CBC:   Recent Labs   Lab 01/14/19  0920   WBC 9.56   RBC 3.09*   HGB 9.1*   HCT 28.7*   PLT 78*   MCV 93   MCH 29.4   MCHC 31.7*     CMP:   Recent Labs   Lab 01/14/19  0420   *   CALCIUM 6.5*   ALBUMIN 2.0*   PROT 4.1*      K 4.4   CO2 26      BUN 37*   CREATININE 8.2*   ALKPHOS 32*   ALT 10   AST 19   BILITOT 0.7            Assessment/Plan:     ESRD from HTN started RRT 1999    64 yo AAF on iHD MWF who presented for BRBPR. GI planning for endoscopy today.    Plan/recommendations:  HD today for metabolic clearance/volume management.  Will schedule around her endoscopy procedure.  -UF 2L as tolerated.  -will re-evaluate in AM for further needs.  Reporting an EDW of 70.5 kg.  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  She is followed by  [facility] surgery.  Will monitor for changes while in-patient.       Kiet Landaverde, NEETU  Nephrology  [facility]

## 2019-01-14 NOTE — ASSESSMENT & PLAN NOTE
Possibly from angiodysplasia associated with chronic HD  Had negative C'scope on 6/18 and negative egd on 9/18. Recently had capsule endoscopy but reading no populated yet  S/P 6 units of PRBC and 2 units of FFP with good response. HB 9.1 this morning  Continue PPI IV  Undergoing Push enteroscope with GI  Continue to hold coumadin and ASA currently  Will keep NPO for now and resume diet once gi make final recommendations  Discussed with patient that she may need to  Get off ASA mean for chronic cardiac stent to reduce hemorrhagic risk

## 2019-01-14 NOTE — ASSESSMENT & PLAN NOTE
- Stent placed in 1999. Previously on ASA and plavix  - continue Lipitor   - Discussed may need to discontinue ASA completely due to GI bleed

## 2019-01-14 NOTE — TREATMENT PLAN
GI Treatment Plan    Shivani Sheets is a 63 y.o. female admitted to hospital 1/12/2019 (Hospital Day: 2) due to Gastrointestinal hemorrhage.     Interval History  - H&H improved from 5 -> 8.4 with 3 u pRBC  - hemodynamics remain stable, not tachycardic, no hypotension, no vasopressor requirement  - last episode of melena ~1.5hr ago    Laboratory    Recent Labs   Lab 01/13/19  1110 01/13/19  1330 01/13/19  1830   HGB 5.3* 5.1* 8.4*     Plan  - given response to recent transfusion and remains HDS will continue to monitor and plan for EGD in the morning unless acute changes in clinical status overnight  - further management per consult note  - Plan of care was discussed with primary team and reviewed with attending.  - We will continue to follow.    Thank you for involving us in the care of Shivani Sheets. Please call with any additional questions, concerns or changes in the patient's clinical status.    Song Fountain MD  Gastroenterology Fellow, PGY IV  Pager: [phone]

## 2019-01-14 NOTE — TELEPHONE ENCOUNTER
----- Message from Mary Arevalo sent at 1/14/2019  4:40 PM CST -----  Contact: Jah(Heart Transplant)  Needs Advice    Reason for call:Heart Transplant called to confirm instructions on eye drops. Pt is admitted in the hospital in ICU.        Communication Preference:[phone]    Additional Information:

## 2019-01-15 ENCOUNTER — TELEPHONE (OUTPATIENT)
Dept: GASTROENTEROLOGY | Facility: CLINIC | Age: 64
End: 2019-01-15

## 2019-01-15 LAB
25(OH)D3+25(OH)D2 SERPL-MCNC: 5 NG/ML
ALBUMIN SERPL BCP-MCNC: 2.2 G/DL
ALP SERPL-CCNC: 39 U/L
ALT SERPL W/O P-5'-P-CCNC: 10 U/L
ANION GAP SERPL CALC-SCNC: 12 MMOL/L
AST SERPL-CCNC: 13 U/L
BASOPHILS # BLD AUTO: 0.03 K/UL
BASOPHILS # BLD AUTO: 0.05 K/UL
BASOPHILS NFR BLD: 0.3 %
BASOPHILS NFR BLD: 0.5 %
BILIRUB SERPL-MCNC: 0.6 MG/DL
BUN SERPL-MCNC: 44 MG/DL
CALCIUM SERPL-MCNC: 6.5 MG/DL
CHLORIDE SERPL-SCNC: 104 MMOL/L
CO2 SERPL-SCNC: 25 MMOL/L
CREAT SERPL-MCNC: 9.7 MG/DL
DIFFERENTIAL METHOD: ABNORMAL
DIFFERENTIAL METHOD: ABNORMAL
EOSINOPHIL # BLD AUTO: 0.3 K/UL
EOSINOPHIL # BLD AUTO: 0.4 K/UL
EOSINOPHIL NFR BLD: 3 %
EOSINOPHIL NFR BLD: 3.6 %
ERYTHROCYTE [DISTWIDTH] IN BLOOD BY AUTOMATED COUNT: 17.1 %
ERYTHROCYTE [DISTWIDTH] IN BLOOD BY AUTOMATED COUNT: 17.2 %
EST. GFR  (AFRICAN AMERICAN): 4.4 ML/MIN/1.73 M^2
EST. GFR  (NON AFRICAN AMERICAN): 3.8 ML/MIN/1.73 M^2
GLUCOSE SERPL-MCNC: 89 MG/DL
HCT VFR BLD AUTO: 23.4 %
HCT VFR BLD AUTO: 25.2 %
HGB BLD-MCNC: 7.4 G/DL
HGB BLD-MCNC: 7.8 G/DL
IMM GRANULOCYTES # BLD AUTO: 0.05 K/UL
IMM GRANULOCYTES # BLD AUTO: 0.05 K/UL
IMM GRANULOCYTES NFR BLD AUTO: 0.5 %
IMM GRANULOCYTES NFR BLD AUTO: 0.5 %
INR PPP: 1.7
LYMPHOCYTES # BLD AUTO: 1 K/UL
LYMPHOCYTES # BLD AUTO: 1.1 K/UL
LYMPHOCYTES NFR BLD: 11.6 %
LYMPHOCYTES NFR BLD: 9.1 %
MAGNESIUM SERPL-MCNC: 2 MG/DL
MCH RBC QN AUTO: 28.6 PG
MCH RBC QN AUTO: 28.7 PG
MCHC RBC AUTO-ENTMCNC: 31 G/DL
MCHC RBC AUTO-ENTMCNC: 31.6 G/DL
MCV RBC AUTO: 91 FL
MCV RBC AUTO: 92 FL
MONOCYTES # BLD AUTO: 0.9 K/UL
MONOCYTES # BLD AUTO: 0.9 K/UL
MONOCYTES NFR BLD: 8.7 %
MONOCYTES NFR BLD: 9.3 %
NEUTROPHILS # BLD AUTO: 6.9 K/UL
NEUTROPHILS # BLD AUTO: 8.1 K/UL
NEUTROPHILS NFR BLD: 75.3 %
NEUTROPHILS NFR BLD: 77.6 %
NRBC BLD-RTO: 0 /100 WBC
NRBC BLD-RTO: 0 /100 WBC
PHOSPHATE SERPL-MCNC: 5.2 MG/DL
PLATELET # BLD AUTO: 96 K/UL
PLATELET # BLD AUTO: 96 K/UL
PMV BLD AUTO: 11.8 FL
PMV BLD AUTO: 12 FL
POTASSIUM SERPL-SCNC: 4.2 MMOL/L
PROT SERPL-MCNC: 4.5 G/DL
PROTHROMBIN TIME: 16.2 SEC
PTH-INTACT SERPL-MCNC: 93 PG/ML
RBC # BLD AUTO: 2.58 M/UL
RBC # BLD AUTO: 2.73 M/UL
SODIUM SERPL-SCNC: 141 MMOL/L
WBC # BLD AUTO: 10.44 K/UL
WBC # BLD AUTO: 9.23 K/UL

## 2019-01-15 PROCEDURE — 80053 COMPREHEN METABOLIC PANEL: CPT

## 2019-01-15 PROCEDURE — 82306 VITAMIN D 25 HYDROXY: CPT

## 2019-01-15 PROCEDURE — 90935 PR HEMODIALYSIS, ONE EVALUATION: ICD-10-PCS | Mod: ,,, | Performed by: INTERNAL MEDICINE

## 2019-01-15 PROCEDURE — 85025 COMPLETE CBC W/AUTO DIFF WBC: CPT | Mod: 91

## 2019-01-15 PROCEDURE — 90935 HEMODIALYSIS ONE EVALUATION: CPT

## 2019-01-15 PROCEDURE — 90935 HEMODIALYSIS ONE EVALUATION: CPT | Mod: ,,, | Performed by: INTERNAL MEDICINE

## 2019-01-15 PROCEDURE — 36415 COLL VENOUS BLD VENIPUNCTURE: CPT

## 2019-01-15 PROCEDURE — 99232 PR SUBSEQUENT HOSPITAL CARE,LEVL II: ICD-10-PCS | Mod: ,,, | Performed by: INTERNAL MEDICINE

## 2019-01-15 PROCEDURE — 99232 SBSQ HOSP IP/OBS MODERATE 35: CPT | Mod: ,,, | Performed by: INTERNAL MEDICINE

## 2019-01-15 PROCEDURE — 85610 PROTHROMBIN TIME: CPT

## 2019-01-15 PROCEDURE — 20600001 HC STEP DOWN PRIVATE ROOM

## 2019-01-15 PROCEDURE — 84100 ASSAY OF PHOSPHORUS: CPT

## 2019-01-15 PROCEDURE — 83970 ASSAY OF PARATHORMONE: CPT

## 2019-01-15 PROCEDURE — 83735 ASSAY OF MAGNESIUM: CPT

## 2019-01-15 PROCEDURE — 25000003 PHARM REV CODE 250: Performed by: INTERNAL MEDICINE

## 2019-01-15 PROCEDURE — 91110 GI TRC IMG INTRAL ESOPH-ILE: CPT | Mod: 26,GC,, | Performed by: INTERNAL MEDICINE

## 2019-01-15 PROCEDURE — 25000003 PHARM REV CODE 250: Performed by: STUDENT IN AN ORGANIZED HEALTH CARE EDUCATION/TRAINING PROGRAM

## 2019-01-15 PROCEDURE — 91110 PR GI TRACT CAPSULE ENDOSCOPY: ICD-10-PCS | Mod: 26,GC,, | Performed by: INTERNAL MEDICINE

## 2019-01-15 RX ORDER — PANTOPRAZOLE SODIUM 40 MG/1
40 TABLET, DELAYED RELEASE ORAL 2 TIMES DAILY
Status: DISCONTINUED | OUTPATIENT
Start: 2019-01-15 | End: 2019-01-18 | Stop reason: HOSPADM

## 2019-01-15 RX ADMIN — RIOCIGUAT 2 MG: 2 TABLET, FILM COATED ORAL at 09:01

## 2019-01-15 RX ADMIN — ATORVASTATIN CALCIUM 10 MG: 10 TABLET, FILM COATED ORAL at 02:01

## 2019-01-15 RX ADMIN — BRIMONIDINE TARTRATE 1 DROP: 1.5 SOLUTION OPHTHALMIC at 02:01

## 2019-01-15 RX ADMIN — DORZOLAMIDE HYDROCHLORIDE 1 DROP: 20 SOLUTION/ DROPS OPHTHALMIC at 08:01

## 2019-01-15 RX ADMIN — TIMOLOL MALEATE 1 DROP: 5 SOLUTION/ DROPS OPHTHALMIC at 08:01

## 2019-01-15 RX ADMIN — PANTOPRAZOLE SODIUM 40 MG: 40 TABLET, DELAYED RELEASE ORAL at 02:01

## 2019-01-15 RX ADMIN — RIOCIGUAT 2 MG: 2 TABLET, FILM COATED ORAL at 02:01

## 2019-01-15 RX ADMIN — LATANOPROST 1 DROP: 50 SOLUTION OPHTHALMIC at 08:01

## 2019-01-15 RX ADMIN — TIMOLOL MALEATE 1 DROP: 5 SOLUTION/ DROPS OPHTHALMIC at 02:01

## 2019-01-15 RX ADMIN — BRIMONIDINE TARTRATE 1 DROP: 1.5 SOLUTION OPHTHALMIC at 08:01

## 2019-01-15 RX ADMIN — PANTOPRAZOLE SODIUM 40 MG: 40 TABLET, DELAYED RELEASE ORAL at 08:01

## 2019-01-15 RX ADMIN — DORZOLAMIDE HYDROCHLORIDE 1 DROP: 20 SOLUTION/ DROPS OPHTHALMIC at 02:01

## 2019-01-15 NOTE — PLAN OF CARE
Problem: Adult Inpatient Plan of Care  Goal: Plan of Care Review  Dx: GastroIntestinal Hemorrhage    Hx: pulmonary HTN (on Adempas/Uptravi), diastolic dysfunction, pAF (on coumadin), retinal artery occlusion, CAD, ESRD 2 HTN, failed kidney tx, PUD, STEFFANY on CPAP, hypothyroidism, RA, HLD, obesity    1/12/19: admit to ED for bright red blood per rectum x2 days  1/13/19: admit to ICU     Nursing:  CBC q8h  PM bath   Outcome: Ongoing (interventions implemented as appropriate)  Pt had EGD this evening, EGD was negative for bleeding. Pt to have capsule study tomorrow to obtain source of bleeding. CBC decreased this evening at 7.6/23.3. CBC monitored q8h. Pt did not have a bowel movement or emesis today.  Vitals stable throughout shift. Pt denied pain throughout shift and remained A&O x4. Pt and brother both updated frequently on pt condition and plan of care.

## 2019-01-15 NOTE — TELEPHONE ENCOUNTER
Patient and nurse given instructions for today.  All questions answered to their satisfaction.  Patient swallowed capsule without incident.

## 2019-01-15 NOTE — ASSESSMENT & PLAN NOTE
Hold warfarin and aspirin.  Will discuss on anticoagulation mode on d/c  Resolved not to continue Adempas for her pulmonary protection.

## 2019-01-15 NOTE — ANESTHESIA POSTPROCEDURE EVALUATION
"Anesthesia Post Evaluation    Patient: Shivani Sheets    Procedure(s) Performed: Procedure(s) (LRB):  EGD (ESOPHAGOGASTRODUODENOSCOPY) (N/A)    Final Anesthesia Type: general  Patient location during evaluation: ICU  Patient participation: Yes- Able to Participate  Level of consciousness: awake and alert  Post-procedure vital signs: reviewed and stable  Pain management: adequate  Airway patency: patent  PONV status at discharge: No PONV  Anesthetic complications: no      Cardiovascular status: blood pressure returned to baseline and hemodynamically stable  Respiratory status: unassisted, spontaneous ventilation and room air  Hydration status: euvolemic  Follow-up not needed.        Visit Vitals  /85 (Patient Position: Lying)   Pulse 89   Temp 36.7 °C (98 °F) (Oral)   Resp 18   Ht 5' 1" (1.549 m)   Wt 71.7 kg (158 lb 1.1 oz)   LMP  (LMP Unknown)   SpO2 100%   Breastfeeding? No   BMI 29.87 kg/m²       Pain/Suzie Score: No Data Recorded      "

## 2019-01-15 NOTE — PROGRESS NOTES
Notified Dr Tyson that H/H dropped from 9.1/28.7 to 7.6/23.3. No bowel movements or emesis noted throughout shift. GI scope at bedside was negative. Will continue to monitor pt.

## 2019-01-15 NOTE — PROGRESS NOTES
[facility] HEMODIALYSIS NOTE     Patient currently on hemodialysis for removal of uremic toxins and volume.    Patient seen and evaluated on hemodialysis, tolerating treatment, see HD flowsheet for vitals and assessments.      Ultrafiltration goal is 2 L as tolerated     Labs have been reviewed and the dialysate bath has been adjusted.     Assessment/Plan:     HD today for removal of uremic toxins and volume.  serum calcium 6.5 corrected still 7.5   will check ionized calcium, pth and vit d levels   will ddjust dialysate bath as per protocol

## 2019-01-15 NOTE — TRANSFER OF CARE
"Anesthesia Transfer of Care Note    Patient: Shivani Sheets    Procedure(s) Performed: Procedure(s) (LRB):  EGD (ESOPHAGOGASTRODUODENOSCOPY) (N/A)    Patient location: ICU    Anesthesia Type: MAC    Transport from OR: Transported from OR on 2-3 L/min O2 by NC with adequate spontaneous ventilation. Continuous ECG monitoring in transport. Continuous SpO2 monitoring in transport. Continuos invasive BP monitoring in transport    Post pain: adequate analgesia    Post assessment: no apparent anesthetic complications    Post vital signs: stable    Level of consciousness: awake, alert and oriented    Nausea/Vomiting: no nausea/vomiting    Complications: none    Transfer of care protocol was followed      Last vitals:   Visit Vitals  BP (!) 149/69 (BP Location: Left leg, Patient Position: Lying)   Pulse 89   Temp 36.8 °C (98.3 °F) (Oral)   Resp 15   Ht 5' 1" (1.549 m)   Wt 71.7 kg (158 lb 1.1 oz)   LMP  (LMP Unknown)   SpO2 95%   Breastfeeding? No   BMI 29.87 kg/m²     "

## 2019-01-15 NOTE — SUBJECTIVE & OBJECTIVE
Interval History:     Had small bloody stool and fall in HB from above 8 to 7.4 this morning. Had negative Push enteroscope yesterday and undergoing capsule endo today. Will also have HD session today. Denies dizziness, sob or chest pain    Continuous Infusions:  Scheduled Meds:   atorvastatin  10 mg Oral Daily    brimonidine 0.15 % OPTH DROP  1 drop Both Eyes BID    dorzolamide  1 drop Both Eyes BID    And    timolol maleate 0.5%  1 drop Both Eyes BID    latanoprost  1 drop Both Eyes QHS    levothyroxine  100 mcg Oral Before breakfast    pantoprazole  40 mg Oral BID    riociguat  2 mg Oral TID    selexipag  800 mcg Oral BID    And    selexipag  200 mcg Oral BID     PRN Meds:sodium chloride, sodium chloride, acetaminophen, dextrose 50 % in water (D50W), dextrose 50 % in water (D50W), glucagon (human recombinant), glucose, glucose, ramelteon, sodium chloride 0.9%, traMADol    Review of patient's allergies indicates:   Allergen Reactions    Penicillins Swelling    Iodine      Other reaction(s): Hives    Sulfamethoxazole-trimethoprim      Other reaction(s): Swelling  Other reaction(s): Hives     Objective:     Vital Signs (Most Recent):  Temp: 97.5 °F (36.4 °C) (01/15/19 1434)  Pulse: 91 (01/15/19 1434)  Resp: 16 (01/15/19 1434)  BP: (!) 141/95 (01/15/19 1434)  SpO2: 100 % (01/15/19 1434) Vital Signs (24h Range):  Temp:  [97.5 °F (36.4 °C)-98.7 °F (37.1 °C)] 97.5 °F (36.4 °C)  Pulse:  [] 91  Resp:  [12-30] 16  SpO2:  [91 %-100 %] 100 %  BP: (120-199)/() 141/95     Patient Vitals for the past 72 hrs (Last 3 readings):   Weight   01/13/19 0500 71.7 kg (158 lb 1.1 oz)   01/12/19 1853 71.1 kg (156 lb 12 oz)     Body mass index is 29.87 kg/m².      Intake/Output Summary (Last 24 hours) at 1/15/2019 1454  Last data filed at 1/15/2019 1305  Gross per 24 hour   Intake 1700 ml   Output 2600 ml   Net -900 ml       Hemodynamic Parameters:       Telemetry: no event noted    Physical Exam      Constitutional: She is oriented to person, place, and time.   HENT:   Head: Normocephalic.   Eyes: No scleral icterus.   conjunctival pallor   Neck: No JVD present.   Cardiovascular: Normal rate and regular rhythm.   No murmur heard.  +P2   Pulmonary/Chest: Effort normal and breath sounds normal. She has no wheezes. She has no rales.   Abdominal: Soft. Bowel sounds are normal. She exhibits no distension. There is no tenderness.   Musculoskeletal: She exhibits no edema or tenderness. LUE fistula patent  Neurological: She is alert and oriented to person, place, and time.   Skin: Skin is warm and dry.   Nursing note and vitals reviewed.    Significant Labs:  CBC:  Recent Labs   Lab 01/14/19  2330 01/15/19  0255 01/15/19  0700   WBC 9.43 10.44 9.23   RBC 2.80* 2.73* 2.58*   HGB 8.0* 7.8* 7.4*   HCT 26.2* 25.2* 23.4*   PLT 87* 96* 96*   MCV 94 92 91   MCH 28.6 28.6 28.7   MCHC 30.5* 31.0* 31.6*     BNP:  No results for input(s): BNP in the last 168 hours.    Invalid input(s): BNPTRIAGELBLO  CMP:  Recent Labs   Lab 01/13/19  0506 01/13/19  1110 01/14/19  0420 01/15/19  0255   * 180* 122* 89   CALCIUM 6.5* 6.7* 6.5* 6.5*   ALBUMIN 2.1* 2.2* 2.0* 2.2*   PROT 4.5*  --  4.1* 4.5*    138 141 141   K 3.8 4.3 4.4 4.2   CO2 25 23 26 25    103 105 104   BUN 29* 29* 37* 44*   CREATININE 7.5* 7.3* 8.2* 9.7*   ALKPHOS 37*  --  32* 39*   ALT 11  --  10 10   AST 10  --  19 13   BILITOT 0.4  --  0.7 0.6      Coagulation:   Recent Labs   Lab 01/12/19  2118 01/13/19  0506 01/15/19  0734   INR 2.2* 2.2* 1.7*   APTT 29.2  --   --      LDH:  No results for input(s): LDH in the last 72 hours.  Microbiology:  Microbiology Results (last 7 days)     ** No results found for the last 168 hours. **          I have reviewed all pertinent labs within the past 24 hours.      Estimated Creatinine Clearance: 5.2 mL/min (A) (based on SCr of 9.7 mg/dL (H)).    Diagnostic Results:    Push enteroscope summary    - s/p push without  evidence of bleeding etiology  - given recent negative colonoscopy will proceed with VCE. Given history of small bowel resection, discussed increased risk of capsule retention though had a negative small bowel follow-through which decreases this risk.

## 2019-01-15 NOTE — NURSING
Spoke with Dr Mejia, in reference to transfer orders.  Instructed to repeat cbc at 2300 and call with results.  Pending transfer noted.

## 2019-01-15 NOTE — NURSING
Heart transplant service made aware of moderate dark red BM. VSS. MD Mooney ordered CBC for 0700.

## 2019-01-15 NOTE — TREATMENT PLAN
GI Treatment Plan    Shivani Sheets is a 63 y.o. female admitted to hospital 1/12/2019 (Hospital Day: 3) due to Gastrointestinal hemorrhage.     Interval History  Push enteroscopy today  - see formal report  - no evidence of bleeding    Laboratory    Recent Labs   Lab 01/14/19  0420 01/14/19  0920 01/14/19  1614   HGB 8.0* 9.1* 7.6*       Plan  - s/p push without evidence of bleeding etiology  - given recent negative colonoscopy will proceed with VCE. Given history of small bowel resection, discussed increased risk of capsule retention though had a negative small bowel follow-through which decreases this risk.  - clear liquid diet, NPO at MN  - 1/2 colon prep tonight  - capsule in AM, recs to follow  - can resume home PPI  - Plan of care was discussed with primary team.  - We will continue to follow.    Thank you for involving us in the care of Shivani Sheets. Please call with any additional questions, concerns or changes in the patient's clinical status.    Song Fountain MD  Gastroenterology Fellow, PGY IV  Pager: [phone]

## 2019-01-15 NOTE — ASSESSMENT & PLAN NOTE
Possibly from angiodysplasia associated with chronic HD  Had negative C'scope on 6/18 and negative egd on 9/18.   S/P 6 units of PRBC and 2 units of FFP with good response. HB now at 7.4 but stable this morning  Change PPI to Po  Push enteroscopy on 1/14 negative. Undergoing capsule endoscope  Await GI final recomendations  Continue to hold coumadin and ASA currently  Discussed with patient that she may need to  Get off ASA mean for chronic cardiac stent to reduce hemorrhagic risk

## 2019-01-15 NOTE — PROGRESS NOTES
[facility]  Heart Transplant  Progress Note    Patient Name: Shivani Sheets  MRN: [id number]  Admission Date: 1/12/2019  Hospital Length of Stay: 3 days  Attending Physician: Meredith Pugh MD  Primary Care Provider: Eula Weiss MD  Principal Problem:Gastrointestinal hemorrhage    Subjective:     Interval History:     Had small bloody stool and fall in HB from above 8 to 7.4 this morning. Had negative Push enteroscope yesterday and undergoing capsule endo today. Will also have HD session today. Denies dizziness, sob or chest pain    Continuous Infusions:  Scheduled Meds:   atorvastatin  10 mg Oral Daily    brimonidine 0.15 % OPTH DROP  1 drop Both Eyes BID    dorzolamide  1 drop Both Eyes BID    And    timolol maleate 0.5%  1 drop Both Eyes BID    latanoprost  1 drop Both Eyes QHS    levothyroxine  100 mcg Oral Before breakfast    pantoprazole  40 mg Oral BID    riociguat  2 mg Oral TID    selexipag  800 mcg Oral BID    And    selexipag  200 mcg Oral BID     PRN Meds:sodium chloride, sodium chloride, acetaminophen, dextrose 50 % in water (D50W), dextrose 50 % in water (D50W), glucagon (human recombinant), glucose, glucose, ramelteon, sodium chloride 0.9%, traMADol    Review of patient's allergies indicates:   Allergen Reactions    Penicillins Swelling    Iodine      Other reaction(s): Hives    Sulfamethoxazole-trimethoprim      Other reaction(s): Swelling  Other reaction(s): Hives     Objective:     Vital Signs (Most Recent):  Temp: 97.5 °F (36.4 °C) (01/15/19 1434)  Pulse: 91 (01/15/19 1434)  Resp: 16 (01/15/19 1434)  BP: (!) 141/95 (01/15/19 1434)  SpO2: 100 % (01/15/19 1434) Vital Signs (24h Range):  Temp:  [97.5 °F (36.4 °C)-98.7 °F (37.1 °C)] 97.5 °F (36.4 °C)  Pulse:  [] 91  Resp:  [12-30] 16  SpO2:  [91 %-100 %] 100 %  BP: (120-199)/() 141/95     Patient Vitals for the past 72 hrs (Last 3 readings):   Weight   01/13/19 0500 71.7 kg (158 lb 1.1 oz)   01/12/19  1853 71.1 kg (156 lb 12 oz)     Body mass index is 29.87 kg/m².      Intake/Output Summary (Last 24 hours) at 1/15/2019 1454  Last data filed at 1/15/2019 1305  Gross per 24 hour   Intake 1700 ml   Output 2600 ml   Net -900 ml       Hemodynamic Parameters:       Telemetry: no event noted    Physical Exam     Constitutional: She is oriented to person, place, and time.   HENT:   Head: Normocephalic.   Eyes: No scleral icterus.   conjunctival pallor   Neck: No JVD present.   Cardiovascular: Normal rate and regular rhythm.   No murmur heard.  +P2   Pulmonary/Chest: Effort normal and breath sounds normal. She has no wheezes. She has no rales.   Abdominal: Soft. Bowel sounds are normal. She exhibits no distension. There is no tenderness.   Musculoskeletal: She exhibits no edema or tenderness. LUE fistula patent  Neurological: She is alert and oriented to person, place, and time.   Skin: Skin is warm and dry.   Nursing note and vitals reviewed.    Significant Labs:  CBC:  Recent Labs   Lab 01/14/19  2330 01/15/19  0255 01/15/19  0700   WBC 9.43 10.44 9.23   RBC 2.80* 2.73* 2.58*   HGB 8.0* 7.8* 7.4*   HCT 26.2* 25.2* 23.4*   PLT 87* 96* 96*   MCV 94 92 91   MCH 28.6 28.6 28.7   MCHC 30.5* 31.0* 31.6*     BNP:  No results for input(s): BNP in the last 168 hours.    Invalid input(s): BNPTRIAGELBLO  CMP:  Recent Labs   Lab 01/13/19  0506 01/13/19  1110 01/14/19  0420 01/15/19  0255   * 180* 122* 89   CALCIUM 6.5* 6.7* 6.5* 6.5*   ALBUMIN 2.1* 2.2* 2.0* 2.2*   PROT 4.5*  --  4.1* 4.5*    138 141 141   K 3.8 4.3 4.4 4.2   CO2 25 23 26 25    103 105 104   BUN 29* 29* 37* 44*   CREATININE 7.5* 7.3* 8.2* 9.7*   ALKPHOS 37*  --  32* 39*   ALT 11  --  10 10   AST 10  --  19 13   BILITOT 0.4  --  0.7 0.6      Coagulation:   Recent Labs   Lab 01/12/19  2118 01/13/19  0506 01/15/19  0734   INR 2.2* 2.2* 1.7*   APTT 29.2  --   --      LDH:  No results for input(s): LDH in the last 72  hours.  Microbiology:  Microbiology Results (last 7 days)     ** No results found for the last 168 hours. **          I have reviewed all pertinent labs within the past 24 hours.      Estimated Creatinine Clearance: 5.2 mL/min (A) (based on SCr of 9.7 mg/dL (H)).    Diagnostic Results:    Push enteroscope summary    - s/p push without evidence of bleeding etiology  - given recent negative colonoscopy will proceed with VCE. Given history of small bowel resection, discussed increased risk of capsule retention though had a negative small bowel follow-through which decreases this risk.            Assessment and Plan:     62 yo F w/ PMHx  pulmonary HTN (on adempas/uptravi), diastolic dysfunction, pAF(on coumadin), central retinal artery occlusion without significant carotid artery stenosis, CAD with remote PCI, ESRD(LUE AVF) secondary to HTN, s/p failed kidney transplant, PUD, STEFFANY on CPAP, hypothyroidism, RA, HLD and obesity who presents with bright red per rectum that started 2 days ago.She had a single episode of non bloody emesis earlier today after she became dizzy from standing up too quickly. Her dizziness and lightheadedness improves with sitting and lying down. She was admitted to the CICU in the past for a similar episode 9/2018 and received 6 U PRBC.Upper gi endoscopy revealed  Normal esophagus,a few medium sessile polyps with no bleeding and no stigmata of recent bleeding were found in the gastric antrum.       [facility] consulted for Transfer of service as she has history of PH and on meds.  Patient developed right lower extremity swelling due to an IO in Right leg where she was being transfused. IO was dislodged and removed. Will do US venous doppler.    * Gastrointestinal hemorrhage    Possibly from angiodysplasia associated with chronic HD  Had negative C'scope on 6/18 and negative egd on 9/18.   S/P 6 units of PRBC and 2 units of FFP with good response. HB now at 7.4 but stable this morning  Change PPI to  Po  Push enteroscopy on 1/14 negative. 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  Will discuss on anticoagulation mode on d/c  Resolved not to continue Adempas for her pulmonary protection.     Pulmonary HTN    - Continue adempas  - Patient can use her own Uptravi to use in patient     ESRD from HTN started RRT 1999    Nephrology on board for HD on MWF  Had one treatment session today     CAD (coronary artery disease)    - Stent placed in 1999. Previously on ASA and plavix  - continue Lipitor   - Discussed may need to discontinue ASA completely due to GI bleed         Denilson Tyson MD  Heart Transplant  [facility]

## 2019-01-15 NOTE — NURSING TRANSFER
Nursing Transfer Note      1/15/2019     Transfer To: [id number] from [id number]    Transfer via bed    Transfer with  O2    Transported by ADITI Escalante RN    Medicines sent: yes    Chart send with patient: Yes    Notified: pt will call family in a.m.    Patient reassessed at: 1/15/19 at 0300    Upon arrival to floor: Oriented to room.  Call light given to pt.  RN updated.  O2 connected to 4 l n/c and tele box intact and on.

## 2019-01-15 NOTE — [employer]OVATION PATIENT INSTRUCTIONS
Discharge Summary/Instructions after an Endoscopic Procedure  Patient Name: Shivani Sheets  Patient MRN: [id number]  Patient YOB: 1955 Monday, January 14, 2019  Miranda Maradiaga MD  RESTRICTIONS:  During your procedure today, you received medications for sedation.  These   medications may affect your judgment, balance and coordination.  Therefore,   for 24 hours, you have the following restrictions:   - DO NOT drive a car, operate machinery, make legal/financial decisions,   sign important papers or drink alcohol.    ACTIVITY:  Today: no heavy lifting, straining or running due to procedural   sedation/anesthesia.  The following day: return to full activity including work.  DIET:  Eat and drink normally unless instructed otherwise.     TREATMENT FOR COMMON SIDE EFFECTS:  - Mild abdominal pain, nausea, belching, bloating or excessive gas:  rest,   eat lightly and use a heating pad.  - Sore Throat: treat with throat lozenges and/or gargle with warm salt   water.  - Because air was used during the procedure, expelling large amounts of air   from your rectum or belching is normal.  - If a bowel prep was taken, you may not have a bowel movement for 1-3 days.    This is normal.  SYMPTOMS TO WATCH FOR AND REPORT TO YOUR PHYSICIAN:  1. Abdominal pain or bloating, other than gas cramps.  2. Chest pain.  3. Back pain.  4. Signs of infection such as: chills or fever occurring within 24 hours   after the procedure.  5. Rectal bleeding, which would show as bright red, maroon, or black stools.   (A tablespoon of blood from the rectum is not serious, especially if   hemorrhoids are present.)  6. Vomiting.  7. Weakness or dizziness.  GO DIRECTLY TO THE NEAREST EMERGENCY ROOM IF YOU HAVE ANY OF THE FOLLOWING:      Difficulty breathing              Chills and/or fever over 101 F   Persistent vomiting and/or vomiting blood   Severe abdominal pain   Severe chest pain   Black, tarry stools   Bleeding- more than one  tablespoon   Any other symptom or condition that you feel may need urgent attention  Your doctor recommends these additional instructions:  If any biopsies were taken, your doctors clinic will contact you in 1 to 2   weeks with any results.  - Return patient to ICU for ongoing care.   - Continue present medications.   - To visualize the small bowel, perform video capsule endoscopy tomorrow.   - The findings and recommendations were discussed with the patient.  For questions, problems or results please call your physician - Miranda Maradiaga MD at Work:  [phone].  [facility], EMERGENCY ROOM PHONE NUMBER: [phone]  IF A COMPLICATION OR EMERGENCY SITUATION ARISES AND YOU ARE UNABLE TO REACH   YOUR PHYSICIAN - GO DIRECTLY TO THE EMERGENCY ROOM.  Miranda Maradiaga MD  1/14/2019 7:00:11 PM  This report has been verified and signed electronically.  [employer]

## 2019-01-15 NOTE — PLAN OF CARE
Problem: Adult Inpatient Plan of Care  Goal: Plan of Care Review  Dx: GastroIntestinal Hemorrhage    Hx: pulmonary HTN (on Adempas/Uptravi), diastolic dysfunction, pAF (on coumadin), retinal artery occlusion, CAD, ESRD 2 HTN, failed kidney tx, PUD, STEFFANY on CPAP, hypothyroidism, RA, HLD, obesity    1/12/19: admit to ED for bright red blood per rectum x2 days  1/13/19: admit to ICU     Nursing:  CBC q8h  PM bath   Outcome: Outcome(s) achieved Date Met: 01/15/19  Discharged back to unit stable, respirations even and unlabored,no distress observed,no concerns voiced. 3hr treatment completed without difficulty. 2L uf goal obtained.  Hemostasis achieved less than 20min, pressure, gauze, and tape applied. Bruit, thrill present.

## 2019-01-16 LAB
ABO + RH BLD: NORMAL
ALBUMIN SERPL BCP-MCNC: 2.2 G/DL
ALP SERPL-CCNC: 40 U/L
ALT SERPL W/O P-5'-P-CCNC: 10 U/L
ANION GAP SERPL CALC-SCNC: 9 MMOL/L
AST SERPL-CCNC: 14 U/L
BILIRUB SERPL-MCNC: 0.7 MG/DL
BLD GP AB SCN CELLS X3 SERPL QL: NORMAL
BUN SERPL-MCNC: 24 MG/DL
CALCIUM SERPL-MCNC: 7.4 MG/DL
CHLORIDE SERPL-SCNC: 105 MMOL/L
CO2 SERPL-SCNC: 24 MMOL/L
CREAT SERPL-MCNC: 6.7 MG/DL
ERYTHROCYTE [DISTWIDTH] IN BLOOD BY AUTOMATED COUNT: 17.2 %
EST. GFR  (AFRICAN AMERICAN): 6.9 ML/MIN/1.73 M^2
EST. GFR  (NON AFRICAN AMERICAN): 5.9 ML/MIN/1.73 M^2
GLUCOSE SERPL-MCNC: 78 MG/DL
HAV IGM SERPL QL IA: NEGATIVE
HBV CORE IGM SERPL QL IA: NEGATIVE
HBV SURFACE AB SER-ACNC: ABNORMAL M[IU]/ML
HBV SURFACE AG SERPL QL IA: NEGATIVE
HBV SURFACE AG SERPL QL IA: NEGATIVE
HCT VFR BLD AUTO: 24.3 %
HCV AB SERPL QL IA: NEGATIVE
HGB BLD-MCNC: 7.6 G/DL
INR PPP: 1.4
MAGNESIUM SERPL-MCNC: 2 MG/DL
MCH RBC QN AUTO: 29.8 PG
MCHC RBC AUTO-ENTMCNC: 31.3 G/DL
MCV RBC AUTO: 95 FL
PHOSPHATE SERPL-MCNC: 3.5 MG/DL
PLATELET # BLD AUTO: 97 K/UL
PMV BLD AUTO: 11.9 FL
POTASSIUM SERPL-SCNC: 4 MMOL/L
PROT SERPL-MCNC: 4.7 G/DL
PROTHROMBIN TIME: 14 SEC
RBC # BLD AUTO: 2.55 M/UL
SODIUM SERPL-SCNC: 138 MMOL/L
WBC # BLD AUTO: 8.63 K/UL

## 2019-01-16 PROCEDURE — 97165 OT EVAL LOW COMPLEX 30 MIN: CPT

## 2019-01-16 PROCEDURE — 80074 ACUTE HEPATITIS PANEL: CPT

## 2019-01-16 PROCEDURE — 20600001 HC STEP DOWN PRIVATE ROOM

## 2019-01-16 PROCEDURE — 36415 COLL VENOUS BLD VENIPUNCTURE: CPT

## 2019-01-16 PROCEDURE — 80053 COMPREHEN METABOLIC PANEL: CPT

## 2019-01-16 PROCEDURE — 99232 SBSQ HOSP IP/OBS MODERATE 35: CPT | Mod: ,,, | Performed by: INTERNAL MEDICINE

## 2019-01-16 PROCEDURE — 25000003 PHARM REV CODE 250: Performed by: STUDENT IN AN ORGANIZED HEALTH CARE EDUCATION/TRAINING PROGRAM

## 2019-01-16 PROCEDURE — 85027 COMPLETE CBC AUTOMATED: CPT

## 2019-01-16 PROCEDURE — 86706 HEP B SURFACE ANTIBODY: CPT

## 2019-01-16 PROCEDURE — 85610 PROTHROMBIN TIME: CPT

## 2019-01-16 PROCEDURE — 83735 ASSAY OF MAGNESIUM: CPT

## 2019-01-16 PROCEDURE — 25000003 PHARM REV CODE 250: Performed by: INTERNAL MEDICINE

## 2019-01-16 PROCEDURE — 86850 RBC ANTIBODY SCREEN: CPT

## 2019-01-16 PROCEDURE — 99232 PR SUBSEQUENT HOSPITAL CARE,LEVL II: ICD-10-PCS | Mod: ,,, | Performed by: INTERNAL MEDICINE

## 2019-01-16 PROCEDURE — 84100 ASSAY OF PHOSPHORUS: CPT

## 2019-01-16 RX ORDER — WARFARIN 2.5 MG/1
5 TABLET ORAL
Status: DISCONTINUED | OUTPATIENT
Start: 2019-01-16 | End: 2019-01-18

## 2019-01-16 RX ORDER — SODIUM CHLORIDE 9 MG/ML
INJECTION, SOLUTION INTRAVENOUS ONCE
Status: DISCONTINUED | OUTPATIENT
Start: 2019-01-16 | End: 2019-01-18 | Stop reason: HOSPADM

## 2019-01-16 RX ORDER — DILTIAZEM HYDROCHLORIDE 300 MG/1
300 CAPSULE, COATED, EXTENDED RELEASE ORAL DAILY
Status: DISCONTINUED | OUTPATIENT
Start: 2019-01-16 | End: 2019-01-18 | Stop reason: HOSPADM

## 2019-01-16 RX ORDER — WARFARIN 7.5 MG/1
7.5 TABLET ORAL
Status: DISCONTINUED | OUTPATIENT
Start: 2019-01-17 | End: 2019-01-18

## 2019-01-16 RX ORDER — SODIUM CHLORIDE 9 MG/ML
INJECTION, SOLUTION INTRAVENOUS
Status: DISCONTINUED | OUTPATIENT
Start: 2019-01-16 | End: 2019-01-18 | Stop reason: HOSPADM

## 2019-01-16 RX ADMIN — ATORVASTATIN CALCIUM 10 MG: 10 TABLET, FILM COATED ORAL at 09:01

## 2019-01-16 RX ADMIN — RIOCIGUAT 2 MG: 2 TABLET, FILM COATED ORAL at 04:01

## 2019-01-16 RX ADMIN — DORZOLAMIDE HYDROCHLORIDE 1 DROP: 20 SOLUTION/ DROPS OPHTHALMIC at 09:01

## 2019-01-16 RX ADMIN — TIMOLOL MALEATE 1 DROP: 5 SOLUTION/ DROPS OPHTHALMIC at 10:01

## 2019-01-16 RX ADMIN — WARFARIN SODIUM 5 MG: 2.5 TABLET ORAL at 04:01

## 2019-01-16 RX ADMIN — LEVOTHYROXINE SODIUM 100 MCG: 100 TABLET ORAL at 05:01

## 2019-01-16 RX ADMIN — PANTOPRAZOLE SODIUM 40 MG: 40 TABLET, DELAYED RELEASE ORAL at 09:01

## 2019-01-16 RX ADMIN — DORZOLAMIDE HYDROCHLORIDE 1 DROP: 20 SOLUTION/ DROPS OPHTHALMIC at 10:01

## 2019-01-16 RX ADMIN — RIOCIGUAT 2 MG: 2 TABLET, FILM COATED ORAL at 09:01

## 2019-01-16 RX ADMIN — LATANOPROST 1 DROP: 50 SOLUTION OPHTHALMIC at 10:01

## 2019-01-16 RX ADMIN — BRIMONIDINE TARTRATE 1 DROP: 1.5 SOLUTION OPHTHALMIC at 10:01

## 2019-01-16 RX ADMIN — BRIMONIDINE TARTRATE 1 DROP: 1.5 SOLUTION OPHTHALMIC at 09:01

## 2019-01-16 RX ADMIN — DILTIAZEM HYDROCHLORIDE 300 MG: 300 CAPSULE, COATED, EXTENDED RELEASE ORAL at 09:01

## 2019-01-16 RX ADMIN — TIMOLOL MALEATE 1 DROP: 5 SOLUTION/ DROPS OPHTHALMIC at 09:01

## 2019-01-16 NOTE — ASSESSMENT & PLAN NOTE
Restart coumadin tomorrow at home dose  Will discuss on anticoagulation mode on d/c  Resolved not to continue Adempas for her pulmonary protection.

## 2019-01-16 NOTE — SUBJECTIVE & OBJECTIVE
Interval History:    No acute overnight event. Had VCE that did not identify the source of recent bleed (await formal reading). H/H stable and no further melena/BRPR. Denies abdominal pain, SOB or dizziness.  Discussed about stopping ASA and keeping coumadin on board to try and mitigate bleeding episodes. HD per nephrology. Plan to do colonoscopy tomorrow.    Continuous Infusions:  Scheduled Meds:   atorvastatin  10 mg Oral Daily    brimonidine 0.15 % OPTH DROP  1 drop Both Eyes BID    diltiaZEM  300 mg Oral Daily    dorzolamide  1 drop Both Eyes BID    And    timolol maleate 0.5%  1 drop Both Eyes BID    latanoprost  1 drop Both Eyes QHS    levothyroxine  100 mcg Oral Before breakfast    pantoprazole  40 mg Oral BID    riociguat  2 mg Oral TID    selexipag  800 mcg Oral BID    And    selexipag  200 mcg Oral BID    warfarin  5 mg Oral Every Mon, Wed, Fri, Sun    [START ON 1/17/2019] warfarin  7.5 mg Oral Every Tues, Thurs, Sat     PRN Meds:sodium chloride, sodium chloride, acetaminophen, dextrose 50 % in water (D50W), dextrose 50 % in water (D50W), glucagon (human recombinant), glucose, glucose, ramelteon, sodium chloride 0.9%, traMADol    Review of patient's allergies indicates:   Allergen Reactions    Penicillins Swelling    Iodine      Other reaction(s): Hives    Sulfamethoxazole-trimethoprim      Other reaction(s): Swelling  Other reaction(s): Hives     Objective:     Vital Signs (Most Recent):  Temp: 98.6 °F (37 °C) (01/16/19 1145)  Pulse: 96 (01/16/19 1145)  Resp: 18 (01/16/19 1145)  BP: (!) 161/82 (01/16/19 1145)  SpO2: 96 % (01/16/19 1145) Vital Signs (24h Range):  Temp:  [97.5 °F (36.4 °C)-98.6 °F (37 °C)] 98.6 °F (37 °C)  Pulse:  [77-96] 96  Resp:  [16-18] 18  SpO2:  [96 %-100 %] 96 %  BP: (115-182)/(62-95) 161/82     No data found.  Body mass index is 29.87 kg/m².      Intake/Output Summary (Last 24 hours) at 1/16/2019 1256  Last data filed at 1/15/2019 2000  Gross per 24 hour   Intake  1000 ml   Output 2600 ml   Net -1600 ml       Hemodynamic Parameters:       Telemetry: no event noted    Physical Exam   Constitutional: She is oriented to person, place, and time.   HENT:   Head: Normocephalic.   Eyes: No scleral icterus.   conjunctival pallor   Neck: No JVD present.   Cardiovascular: Normal rate and regular rhythm.   No murmur heard.  +P2   Pulmonary/Chest: Effort normal and breath sounds normal. She has no wheezes. She has no rales.   Abdominal: Soft. Bowel sounds are normal. She exhibits no distension. There is no tenderness.   Musculoskeletal: She exhibits no edema or tenderness. LUE fistula patent  Neurological: She is alert and oriented to person, place, and time.   Skin: Skin is warm and dry.   Nursing note and vitals reviewed.    Significant Labs:  CBC:  Recent Labs   Lab 01/15/19  0255 01/15/19  0700 01/16/19  0443   WBC 10.44 9.23 8.63   RBC 2.73* 2.58* 2.55*   HGB 7.8* 7.4* 7.6*   HCT 25.2* 23.4* 24.3*   PLT 96* 96* 97*   MCV 92 91 95   MCH 28.6 28.7 29.8   MCHC 31.0* 31.6* 31.3*     BNP:  No results for input(s): BNP in the last 168 hours.    Invalid input(s): BNPTRIAGELBLO  CMP:  Recent Labs   Lab 01/14/19  0420 01/15/19  0255 01/16/19  0443   * 89 78   CALCIUM 6.5* 6.5* 7.4*   ALBUMIN 2.0* 2.2* 2.2*   PROT 4.1* 4.5* 4.7*    141 138   K 4.4 4.2 4.0   CO2 26 25 24    104 105   BUN 37* 44* 24*   CREATININE 8.2* 9.7* 6.7*   ALKPHOS 32* 39* 40*   ALT 10 10 10   AST 19 13 14   BILITOT 0.7 0.6 0.7      Coagulation:   Recent Labs   Lab 01/12/19  2118 01/13/19  0506 01/15/19  0734 01/16/19  0442   INR 2.2* 2.2* 1.7* 1.4*   APTT 29.2  --   --   --      LDH:  No results for input(s): LDH in the last 72 hours.  Microbiology:  Microbiology Results (last 7 days)     ** No results found for the last 168 hours. **          I have reviewed all pertinent labs within the past 24 hours.    Estimated Creatinine Clearance: 7.6 mL/min (A) (based on SCr of 6.7 mg/dL (H)).    Diagnostic  Results:

## 2019-01-16 NOTE — CONSULTS
Evaluated Midline to CHIKIS.  Midline infuses well without difficulty and patient has no complaint of pain or discomfort while flushing.  No blood return present.

## 2019-01-16 NOTE — PLAN OF CARE
Problem: Adult Inpatient Plan of Care  Goal: Plan of Care Review  Dx: GastroIntestinal Hemorrhage    Hx: pulmonary HTN (on Adempas/Uptravi), diastolic dysfunction, pAF (on coumadin), retinal artery occlusion, CAD, ESRD 2 HTN, failed kidney tx, PUD, STEFFANY on CPAP, hypothyroidism, RA, HLD, obesity    1/12/19: admit to ED for bright red blood per rectum x2 days  1/13/19: admit to ICU     Nursing:  CBC q8h  PM bath   Outcome: Ongoing (interventions implemented as appropriate)  POC reviewed with patient. AAOx4, VSS. Patient transferred to Dialysis this AM, 2L removed per report. Video capsule with sensors study performed, awaiting results. Up with assistance to bathroom, remains free of falls. Blood noted in patient's BMs during shift. Clear liquid diet. No evidence of distress, safety maintained, hourly rounding performed. Will continue to monitor.

## 2019-01-16 NOTE — PLAN OF CARE
Problem: Adult Inpatient Plan of Care  Goal: Plan of Care Review  Dx: GastroIntestinal Hemorrhage    Hx: pulmonary HTN (on Adempas/Uptravi), diastolic dysfunction, pAF (on coumadin), retinal artery occlusion, CAD, ESRD 2 HTN, failed kidney tx, PUD, STEFFANY on CPAP, hypothyroidism, RA, HLD, obesity    19: admit to ED for bright red blood per rectum x2 days  19: admit to ICU     Nursing:  CBC q8h  PM bath    Outcome: Ongoing (interventions implemented as appropriate)  PT AAOx4. VSS.O2 titrated from 4 to 2 LPM via NC and pt tolerating well. No acute changes overnight. No BM this shift or bloody emesis. Pt continues on CLD. Type and screen , ordered with AM labs. Callbell palced within reach and use encouraged.

## 2019-01-16 NOTE — PT/OT/SLP EVAL
Occupational Therapy   Evaluation    Name: Shivani Sheets  MRN: [id number]  Admitting Diagnosis:  Gastrointestinal hemorrhage 2 Days Post-Op    Recommendations:     Discharge Recommendations: (HH)  Discharge Equipment Recommendations:  none  Barriers to discharge:  Inaccessible home environment    Assessment:     Shivani Sheets is a 63 y.o. female with a medical diagnosis of Gastrointestinal hemorrhage. Pt. currently demonstrates decreased (I) with ADLs, functional mobility & t/fs as well as decreased overall endurance and balance. Pt would benefit from skilled OT services to address these deficits and to facilitate improving (I) with daily tasks.   Performance deficits affecting function: impaired endurance, impaired functional mobilty, gait instability, impaired self care skills, impaired balance.      Rehab Prognosis: Good; patient would benefit from acute skilled OT services to address these deficits and reach maximum level of function.       Plan:     Patient to be seen 2 x/week to address the above listed problems via self-care/home management, therapeutic activities, therapeutic exercises  · Plan of Care Expires: 02/15/19  · Plan of Care Reviewed with: patient    Subjective     Occupational Profile:  Living Environment: Pt lives with 3 family members in a 2SH with B/B upstairs and 12 steps with BHRs to the 2nd floor. Walk-in in shower present.  Previous level of function: Pt was (I) with ADLs, driving, cooking and mostly with walking but would use a RW/SPC when having hip pain.  Equipment Used at Home:  walker, rolling, shower chair, wheelchair, cane, straight  Assistance upon Discharge: family    Pain/Comfort:  · Pain Rating 1: 0/10    Patients cultural, spiritual, Sabianist conflicts given the current situation:      Objective:     Communicated with: RN prior to session.  Patient found up in chair with   upon OT entry to room.    General Precautions: Standard,     Orthopedic Precautions:    Braces:        Occupational Performance:    Bed Mobility:    · Patient completed Rolling/Turning to Left with  modified independence  · Patient completed Scooting/Bridging with supervision  · Patient completed Supine to Sit with supervision    Functional Mobility/Transfers:  · Patient completed Sit <> Stand Transfer with stand by assistance  with  no assistive device   · Patient completed Bed <> Chair Transfer using Step Transfer technique with stand by assistance with rolling walker  · Functional Mobility: Pt walked 75' SBA with a RW.    Activities of Daily Living:  · Upper Body Dressing: supervision   · Lower Body Dressing: stand by assistance     Cognitive/Visual Perceptual:  Cognitive/Psychosocial Skills:     -       Oriented to: Person, Place, Time and Situation   -       Safety awareness/insight to disability: intact     Physical Exam:  BUE AROM/MMT: WNL    AMPAC 6 Click ADL:  AMPAC Total Score: 22    Treatment & Education:  UE ROM/MMT  Bed mobility training / assessment  Functional mobility assessment  Sit/standing balance assessment  Educated on importance of sitting OOB in bedside chair to promote increased strength, endurance & breathing.  Discussed OT POC / Post-acute plan  Education:    Patient left up in chair with call button in reach    GOALS:   Multidisciplinary Problems     Occupational Therapy Goals        Problem: Occupational Therapy Goal    Goal Priority Disciplines Outcome Interventions   Occupational Therapy Goal     OT, PT/OT Ongoing (interventions implemented as appropriate)    Description:  Goals to be met by: 1/23/19    Patient will increase functional independence with ADLs by performing:    Grooming while standing at sink with Supervision.  Toileting from toilet with Supervision for hygiene and clothing management.   Supine to sit with Towner.  Toilet transfer to toilet with Supervision.                      History:     Past Medical History:   Diagnosis Date    Allergy     Anemia      "Anticoagulant long-term use     4 years coumadin, asa    Arthritis     Awaiting organ transplant 2013    Cataract     Central serous chorioretinopathy of eye, right 2018    CHF (congestive heart failure)     Chronic kidney disease     Colon polyps     COPD (chronic obstructive pulmonary disease)     Coronary artery disease     Diabetes mellitus     Diabetic retinopathy     Diverticulosis     Encounter for blood transfusion     ESRD from HTN strtied RRT 1999    Failed  donor kidney transplant -     Glaucoma     History of bleeding peptic ulcer      as stated per pt    Hyperlipidemia     Hypertension     Hypothyroidism     Morbid obesity 8/10/2012    Renal hypertension     Stroke     1987       Past Surgical History:   Procedure Laterality Date    CATARACT EXTRACTION W/  INTRAOCULAR LENS IMPLANT Bilateral     COLON SURGERY      COLONOSCOPY      COLONOSCOPY N/A 2018    Performed by [facility] at [facility] (2ND F[facility])    EGD (ESOPHAGOGASTRODUODENOSCOPY) N/A 2019    Performed by Miranda Maradiaga MD at [facility] END[facility] (2ND FLR)    EGD (ESOPHAGOGASTRODUODENOSCOPY) N/A 2018    Performed by Luis Washington MD at [facility] END[facility] (2ND FLR)    ESOPHAGOGASTRODUODENOSCOPY (EGD) N/A 2018    Performed by Kenji Desir MD at [facility] END[facility] (2ND FLR)    EYE SURGERY      FRACTURE SURGERY      R arm    HERNIA REPAIR      HYSTERECTOMY      INSERTION-IMPLANT-GLAUCOMA Left 10/12/2017    Performed by Junaid Kern MD at [facility] OR 1ST FLR    KIDNEY TRANSPLANT      NEPHRECTOMY  2008    transplant     PARATHYROID GLAND SURGERY      TONSILLECTOMY      UPPER GASTROINTESTINAL ENDOSCOPY         Clinical Decision Makin.  OT Low:  "Pt evaluation falls under low complexity for evaluation coding due to performance deficits noted in 1-3 areas as stated above and 0 co-morbities affecting current functional status. Data obtained from problem focused " "assessments. No modifications or assistance was required for completion of evaluation. Only brief occupational profile and history review completed."     Time Tracking:     OT Date of Treatment: 01/16/19  OT Start Time: 1045  OT Stop Time: 1100  OT Total Time (min): 15 min    Billable Minutes:Evaluation 15    MARTHA Winslow  1/16/2019    "

## 2019-01-16 NOTE — ASSESSMENT & PLAN NOTE
Possibly from angiodysplasia associated with chronic HD  Had negative C'scope on 6/18 and negative egd on 9/18.   S/P 6 units of PRBC and 2 units of FFP with good response. HB now at 7.4 but stable this morning  Continue PPI to Po  Push enteroscopy on 1/14 negative. Preliminary VCE negative. Await formal reading  Possibly a colonoscopy tomorrow depending on pending VCE results  Discussed with patient that ASA needs to be stopped to decrease risk for recurrent bleeding. Last CVA was in 1999.

## 2019-01-16 NOTE — PLAN OF CARE
Problem: Occupational Therapy Goal  Goal: Occupational Therapy Goal  Goals to be met by: 1/23/19    Patient will increase functional independence with ADLs by performing:    Grooming while standing at sink with Supervision.  Toileting from toilet with Supervision for hygiene and clothing management.   Supine to sit with Vesper.  Toilet transfer to toilet with Supervision.    Outcome: Ongoing (interventions implemented as appropriate)  Evaluation completed and POC established.    MARTHA Juarez

## 2019-01-16 NOTE — TREATMENT PLAN
GI Treatment Plan    Shivani Sheets is a 63 y.o. female admitted to hospital 1/12/2019 (Hospital Day: 5) due to Gastrointestinal hemorrhage.     Interval History  - notes she feels well without any complaints.  - denies abdominal pain, n/v/d. No BM overnight  - H&H remains stable    Objective  Temp:  [97.5 °F (36.4 °C)-98.3 °F (36.8 °C)] 97.8 °F (36.6 °C) (01/16 0524)  Pulse:  [72-99] 90 (01/16 0710)  BP: (115-199)/(62-95) 159/72 (01/16 0524)  Resp:  [16-18] 18 (01/16 0524)  SpO2:  [96 %-100 %] 99 % (01/16 0524)    General: Alert, Oriented x3, no distress  Abdomen: Normoactive bowel sounds. Non-distended. Normal tympany. Soft. Non-tender. No peritoneal signs.    Laboratory    Recent Labs   Lab 01/15/19  0255 01/15/19  0700 01/16/19  0443   HGB 7.8* 7.4* 7.6*       Lab Results   Component Value Date    WBC 8.63 01/16/2019    HGB 7.6 (L) 01/16/2019    HCT 24.3 (L) 01/16/2019    MCV 95 01/16/2019    PLT 97 (L) 01/16/2019       Lab Results   Component Value Date     01/16/2019    K 4.0 01/16/2019     01/16/2019    CO2 24 01/16/2019    BUN 24 (H) 01/16/2019    CREATININE 6.7 (H) 01/16/2019    CALCIUM 7.4 (L) 01/16/2019    ANIONGAP 9 01/16/2019    ESTGFRAFRICA 6.9 (A) 01/16/2019    EGFRNONAA 5.9 (A) 01/16/2019       Lab Results   Component Value Date    ALT 10 01/16/2019    AST 14 01/16/2019    ALKPHOS 40 (L) 01/16/2019    BILITOT 0.7 01/16/2019       Lab Results   Component Value Date    INR 1.4 (H) 01/16/2019    INR 1.7 (H) 01/15/2019    INR 2.2 (H) 01/13/2019       Plan  - s/p VCE yesterday (1/15), preliminary reading without evidence of bleeding. Formal read to follow  - please keep on clear liquid diet pending formal review of the capsule  - Plan of care was discussed with primary team.  - We will continue to follow.    Thank you for involving us in the care of Shivani Sheets. Please call with any additional questions, concerns or changes in the patient's clinical status.    oSng Fountain,  MD  Gastroenterology Fellow, PGY IV  Spectralink: [phone]

## 2019-01-16 NOTE — PROGRESS NOTES
[facility]  Heart Transplant  Progress Note    Patient Name: Shivani Sheets  MRN: [id number]  Admission Date: 1/12/2019  Hospital Length of Stay: 4 days  Attending Physician: Meredith Pugh MD  Primary Care Provider: Eula Weiss MD  Principal Problem:Gastrointestinal hemorrhage    Subjective:     Interval History:    No acute overnight event. Had VCE that did not identify the source of recent bleed (await formal reading). H/H stable and no further melena/BRPR. Denies abdominal pain, SOB or dizziness.  Discussed about stopping ASA and keeping coumadin on board to try and mitigate bleeding episodes. HD per nephrology. Plan to do colonoscopy tomorrow.    Continuous Infusions:  Scheduled Meds:   atorvastatin  10 mg Oral Daily    brimonidine 0.15 % OPTH DROP  1 drop Both Eyes BID    diltiaZEM  300 mg Oral Daily    dorzolamide  1 drop Both Eyes BID    And    timolol maleate 0.5%  1 drop Both Eyes BID    latanoprost  1 drop Both Eyes QHS    levothyroxine  100 mcg Oral Before breakfast    pantoprazole  40 mg Oral BID    riociguat  2 mg Oral TID    selexipag  800 mcg Oral BID    And    selexipag  200 mcg Oral BID    warfarin  5 mg Oral Every Mon, Wed, Fri, Sun    [START ON 1/17/2019] warfarin  7.5 mg Oral Every Tues, Thurs, Sat     PRN Meds:sodium chloride, sodium chloride, acetaminophen, dextrose 50 % in water (D50W), dextrose 50 % in water (D50W), glucagon (human recombinant), glucose, glucose, ramelteon, sodium chloride 0.9%, traMADol    Review of patient's allergies indicates:   Allergen Reactions    Penicillins Swelling    Iodine      Other reaction(s): Hives    Sulfamethoxazole-trimethoprim      Other reaction(s): Swelling  Other reaction(s): Hives     Objective:     Vital Signs (Most Recent):  Temp: 98.6 °F (37 °C) (01/16/19 1145)  Pulse: 96 (01/16/19 1145)  Resp: 18 (01/16/19 1145)  BP: (!) 161/82 (01/16/19 1145)  SpO2: 96 % (01/16/19 1145) Vital Signs (24h Range):  Temp:   [97.5 °F (36.4 °C)-98.6 °F (37 °C)] 98.6 °F (37 °C)  Pulse:  [77-96] 96  Resp:  [16-18] 18  SpO2:  [96 %-100 %] 96 %  BP: (115-182)/(62-95) 161/82     No data found.  Body mass index is 29.87 kg/m².      Intake/Output Summary (Last 24 hours) at 1/16/2019 1256  Last data filed at 1/15/2019 2000  Gross per 24 hour   Intake 1000 ml   Output 2600 ml   Net -1600 ml       Hemodynamic Parameters:       Telemetry: no event noted    Physical Exam   Constitutional: She is oriented to person, place, and time.   HENT:   Head: Normocephalic.   Eyes: No scleral icterus.   conjunctival pallor   Neck: No JVD present.   Cardiovascular: Normal rate and regular rhythm.   No murmur heard.  +P2   Pulmonary/Chest: Effort normal and breath sounds normal. She has no wheezes. She has no rales.   Abdominal: Soft. Bowel sounds are normal. She exhibits no distension. There is no tenderness.   Musculoskeletal: She exhibits no edema or tenderness. LUE fistula patent  Neurological: She is alert and oriented to person, place, and time.   Skin: Skin is warm and dry.   Nursing note and vitals reviewed.    Significant Labs:  CBC:  Recent Labs   Lab 01/15/19  0255 01/15/19  0700 01/16/19  0443   WBC 10.44 9.23 8.63   RBC 2.73* 2.58* 2.55*   HGB 7.8* 7.4* 7.6*   HCT 25.2* 23.4* 24.3*   PLT 96* 96* 97*   MCV 92 91 95   MCH 28.6 28.7 29.8   MCHC 31.0* 31.6* 31.3*     BNP:  No results for input(s): BNP in the last 168 hours.    Invalid input(s): BNPTRIAGELBLO  CMP:  Recent Labs   Lab 01/14/19  0420 01/15/19  0255 01/16/19  0443   * 89 78   CALCIUM 6.5* 6.5* 7.4*   ALBUMIN 2.0* 2.2* 2.2*   PROT 4.1* 4.5* 4.7*    141 138   K 4.4 4.2 4.0   CO2 26 25 24    104 105   BUN 37* 44* 24*   CREATININE 8.2* 9.7* 6.7*   ALKPHOS 32* 39* 40*   ALT 10 10 10   AST 19 13 14   BILITOT 0.7 0.6 0.7      Coagulation:   Recent Labs   Lab 01/12/19  2118 01/13/19  0506 01/15/19  0734 01/16/19  0442   INR 2.2* 2.2* 1.7* 1.4*   APTT 29.2  --   --   --       LDH:  No results for input(s): LDH in the last 72 hours.  Microbiology:  Microbiology Results (last 7 days)     ** No results found for the last 168 hours. **          I have reviewed all pertinent labs within the past 24 hours.    Estimated Creatinine Clearance: 7.6 mL/min (A) (based on SCr of 6.7 mg/dL (H)).    Diagnostic Results:        Assessment and Plan:     62 yo F w/ PMHx  pulmonary HTN (on adempas/uptravi), diastolic dysfunction, pAF(on coumadin), central retinal artery occlusion without significant carotid artery stenosis, CAD with remote PCI, ESRD(LUE AVF) secondary to HTN, s/p failed kidney transplant, PUD, STEFFANY on CPAP, hypothyroidism, RA, HLD and obesity who presents with bright red per rectum that started 2 days ago.She had a single episode of non bloody emesis earlier today after she became dizzy from standing up too quickly. Her dizziness and lightheadedness improves with sitting and lying down. She was admitted to the CICU in the past for a similar episode 9/2018 and received 6 U PRBC.Upper gi endoscopy revealed  Normal esophagus,a few medium sessile polyps with no bleeding and no stigmata of recent bleeding were found in the gastric antrum.       [facility] consulted for Transfer of service as she has history of PH and on meds.  Patient developed right lower extremity swelling due to an IO in Right leg where she was being transfused. IO was dislodged and removed. Will do US venous doppler.    * Gastrointestinal hemorrhage    Possibly from angiodysplasia associated with chronic HD  Had negative C'scope on 6/18 and negative egd on 9/18.   S/P 6 units of PRBC and 2 units of FFP with good response. HB now at 7.4 but stable this morning  Continue PPI to Po  Push enteroscopy on 1/14 negative. Preliminary VCE negative. Await formal reading  Possibly a colonoscopy tomorrow depending on pending VCE results  Discussed with patient that ASA needs to be stopped to decrease risk for recurrent bleeding. Last CVA  was in 1999.     Acute blood loss anemia    Transfuse 6 U PRBC and H/H stable  Will check H/H daily       Controlled type 2 diabetes mellitus with both eyes affected by proliferative retinopathy and macular edema, without long-term current use of insulin    - on ISS     Atrial fibrillation    Restart coumadin tomorrow at home dose  Will discuss on anticoagulation mode on d/c  Resolved not to continue Adempas for her pulmonary protection.     Pulmonary HTN    - Continue adempas  - Patient can use her own Uptravi to use in patient     ESRD from HTN started RRT 1999    Nephrology on board for HD on MWF  Had one treatment session today     CAD (coronary artery disease)    - Stent placed in 1999. Previously on ASA and plavix  - continue Lipitor   - Discussed may need to discontinue ASA completely due to GI bleed         Denilson Tyson MD  Heart Transplant  [facility]

## 2019-01-17 LAB
ALBUMIN SERPL BCP-MCNC: 2.4 G/DL
ALP SERPL-CCNC: 46 U/L
ALT SERPL W/O P-5'-P-CCNC: 12 U/L
ANION GAP SERPL CALC-SCNC: 9 MMOL/L
AST SERPL-CCNC: 15 U/L
BILIRUB SERPL-MCNC: 0.7 MG/DL
BUN SERPL-MCNC: 28 MG/DL
CALCIUM SERPL-MCNC: 7.4 MG/DL
CHLORIDE SERPL-SCNC: 104 MMOL/L
CO2 SERPL-SCNC: 21 MMOL/L
CREAT SERPL-MCNC: 8.4 MG/DL
ERYTHROCYTE [DISTWIDTH] IN BLOOD BY AUTOMATED COUNT: 16.7 %
EST. GFR  (AFRICAN AMERICAN): 5.2 ML/MIN/1.73 M^2
EST. GFR  (NON AFRICAN AMERICAN): 4.5 ML/MIN/1.73 M^2
GLUCOSE SERPL-MCNC: 94 MG/DL
HCT VFR BLD AUTO: 26.5 %
HGB BLD-MCNC: 8 G/DL
INR PPP: 1.3
MAGNESIUM SERPL-MCNC: 2 MG/DL
MCH RBC QN AUTO: 28.4 PG
MCHC RBC AUTO-ENTMCNC: 30.2 G/DL
MCV RBC AUTO: 94 FL
PHOSPHATE SERPL-MCNC: 4.2 MG/DL
PLATELET # BLD AUTO: 105 K/UL
PMV BLD AUTO: 12.4 FL
POTASSIUM SERPL-SCNC: 4.3 MMOL/L
PROT SERPL-MCNC: 5.2 G/DL
PROTHROMBIN TIME: 13.4 SEC
RBC # BLD AUTO: 2.82 M/UL
SODIUM SERPL-SCNC: 134 MMOL/L
WBC # BLD AUTO: 6.98 K/UL

## 2019-01-17 PROCEDURE — 99232 PR SUBSEQUENT HOSPITAL CARE,LEVL II: ICD-10-PCS | Mod: ,,, | Performed by: INTERNAL MEDICINE

## 2019-01-17 PROCEDURE — 20600001 HC STEP DOWN PRIVATE ROOM

## 2019-01-17 PROCEDURE — 36415 COLL VENOUS BLD VENIPUNCTURE: CPT

## 2019-01-17 PROCEDURE — 83735 ASSAY OF MAGNESIUM: CPT

## 2019-01-17 PROCEDURE — 85610 PROTHROMBIN TIME: CPT

## 2019-01-17 PROCEDURE — 85027 COMPLETE CBC AUTOMATED: CPT

## 2019-01-17 PROCEDURE — 90935 HEMODIALYSIS ONE EVALUATION: CPT

## 2019-01-17 PROCEDURE — 90935 HEMODIALYSIS ONE EVALUATION: CPT | Mod: ,,, | Performed by: NURSE PRACTITIONER

## 2019-01-17 PROCEDURE — 84100 ASSAY OF PHOSPHORUS: CPT

## 2019-01-17 PROCEDURE — 80053 COMPREHEN METABOLIC PANEL: CPT

## 2019-01-17 PROCEDURE — 25000003 PHARM REV CODE 250: Performed by: INTERNAL MEDICINE

## 2019-01-17 PROCEDURE — 25000003 PHARM REV CODE 250: Performed by: NURSE PRACTITIONER

## 2019-01-17 PROCEDURE — 25000003 PHARM REV CODE 250: Performed by: STUDENT IN AN ORGANIZED HEALTH CARE EDUCATION/TRAINING PROGRAM

## 2019-01-17 PROCEDURE — 90935 PR HEMODIALYSIS, ONE EVALUATION: ICD-10-PCS | Mod: ,,, | Performed by: NURSE PRACTITIONER

## 2019-01-17 PROCEDURE — 99232 SBSQ HOSP IP/OBS MODERATE 35: CPT | Mod: ,,, | Performed by: INTERNAL MEDICINE

## 2019-01-17 PROCEDURE — 97161 PT EVAL LOW COMPLEX 20 MIN: CPT

## 2019-01-17 RX ORDER — ERGOCALCIFEROL 1.25 MG/1
50000 CAPSULE ORAL
Status: DISCONTINUED | OUTPATIENT
Start: 2019-01-17 | End: 2019-01-18 | Stop reason: HOSPADM

## 2019-01-17 RX ADMIN — ATORVASTATIN CALCIUM 10 MG: 10 TABLET, FILM COATED ORAL at 01:01

## 2019-01-17 RX ADMIN — BRIMONIDINE TARTRATE 1 DROP: 1.5 SOLUTION OPHTHALMIC at 01:01

## 2019-01-17 RX ADMIN — DILTIAZEM HYDROCHLORIDE 300 MG: 300 CAPSULE, COATED, EXTENDED RELEASE ORAL at 01:01

## 2019-01-17 RX ADMIN — RAMELTEON 8 MG: 8 TABLET, FILM COATED ORAL at 01:01

## 2019-01-17 RX ADMIN — PANTOPRAZOLE SODIUM 40 MG: 40 TABLET, DELAYED RELEASE ORAL at 01:01

## 2019-01-17 RX ADMIN — TIMOLOL MALEATE 1 DROP: 5 SOLUTION/ DROPS OPHTHALMIC at 09:01

## 2019-01-17 RX ADMIN — DORZOLAMIDE HYDROCHLORIDE 1 DROP: 20 SOLUTION/ DROPS OPHTHALMIC at 01:01

## 2019-01-17 RX ADMIN — LEVOTHYROXINE SODIUM 100 MCG: 100 TABLET ORAL at 06:01

## 2019-01-17 RX ADMIN — WARFARIN SODIUM 7.5 MG: 7.5 TABLET ORAL at 06:01

## 2019-01-17 RX ADMIN — TIMOLOL MALEATE 1 DROP: 5 SOLUTION/ DROPS OPHTHALMIC at 01:01

## 2019-01-17 RX ADMIN — RIOCIGUAT 2 MG: 2 TABLET, FILM COATED ORAL at 09:01

## 2019-01-17 RX ADMIN — RAMELTEON 8 MG: 8 TABLET, FILM COATED ORAL at 09:01

## 2019-01-17 RX ADMIN — SODIUM CHLORIDE 1000 ML: 0.9 INJECTION, SOLUTION INTRAVENOUS at 08:01

## 2019-01-17 RX ADMIN — LATANOPROST 1 DROP: 50 SOLUTION OPHTHALMIC at 09:01

## 2019-01-17 RX ADMIN — ERGOCALCIFEROL 50000 UNITS: 1.25 CAPSULE ORAL at 01:01

## 2019-01-17 RX ADMIN — RIOCIGUAT 2 MG: 2 TABLET, FILM COATED ORAL at 03:01

## 2019-01-17 RX ADMIN — PANTOPRAZOLE SODIUM 40 MG: 40 TABLET, DELAYED RELEASE ORAL at 09:01

## 2019-01-17 RX ADMIN — DORZOLAMIDE HYDROCHLORIDE 1 DROP: 20 SOLUTION/ DROPS OPHTHALMIC at 09:01

## 2019-01-17 RX ADMIN — BRIMONIDINE TARTRATE 1 DROP: 1.5 SOLUTION OPHTHALMIC at 09:01

## 2019-01-17 NOTE — TREATMENT PLAN
GI Treatment Plan    Shivani Sheets is a 63 y.o. female admitted to hospital 1/12/2019 (Hospital Day: 6) due to Gastrointestinal hemorrhage.     Interval History  - no evidence of bleeding on VCE  - H&H remains stable, up-trending    Laboratory    Recent Labs   Lab 01/15/19  0700 01/16/19  0443 01/17/19  0536   HGB 7.4* 7.6* 8.0*       Plan  - given negative VCE and uptrending H&H without further overt signs of GI bleeding will defer further endoscopic procedures at this time  - monitor H&H, call if evidence of further GI bleeding  - Plan of care was discussed with primary team.  - We are signing-off. Please call with any questions.    Thank you for involving us in the care of Shivani Sheets. Please call with any additional questions, concerns or changes in the patient's clinical status.    Song Fountain MD  Gastroenterology Fellow, PGY IV  Spectralink: [id number]

## 2019-01-17 NOTE — PLAN OF CARE
Problem: Adult Inpatient Plan of Care  Goal: Plan of Care Review  Dx: GastroIntestinal Hemorrhage    Hx: pulmonary HTN (on Adempas/Uptravi), diastolic dysfunction, pAF (on coumadin), retinal artery occlusion, CAD, ESRD 2 HTN, failed kidney tx, PUD, STEFFANY on CPAP, hypothyroidism, RA, HLD, obesity    1/12/19: admit to ED for bright red blood per rectum x2 days  1/13/19: admit to ICU     Nursing:  CBC q8h  PM bath    Outcome: Ongoing (interventions implemented as appropriate)  POC reviewed with patient. AAOx4, VSS. Up with OT to chair, remains free of falls. No blood in BMs noted. Coumadin therapy initiated per MD order, tolerating well. No evidence of distress, safety maintained, hourly rounding performed. Will continue to monitor.

## 2019-01-17 NOTE — PLAN OF CARE
Problem: Physical Therapy Goal  Goal: Physical Therapy Goal  Outcome: Outcome(s) achieved Date Met: 01/17/19  No goals set

## 2019-01-17 NOTE — PLAN OF CARE
Problem: Adult Inpatient Plan of Care  Goal: Plan of Care Review  Dx: GastroIntestinal Hemorrhage    Hx: pulmonary HTN (on Adempas/Uptravi), diastolic dysfunction, pAF (on coumadin), retinal artery occlusion, CAD, ESRD 2 HTN, failed kidney tx, PUD, STEFFANY on CPAP, hypothyroidism, RA, HLD, obesity    1/12/19: admit to ED for bright red blood per rectum x2 days  1/13/19: admit to ICU     Nursing:  CBC q8h  PM bath    Outcome: Ongoing (interventions implemented as appropriate)  Hemodialysis tx complete, 2L removed in 3.5 hour tx, tolerated well. Blood returned via ABEL AVF, 15 g needles removed x2, gauze and tape applied, pressure held for 10 minutes, hemostasis achieved

## 2019-01-17 NOTE — PROGRESS NOTES
HEMODIALYSIS NOTE  Patient evaluated while undergoing hemodialysis indicated for ESRD. Tolerating session with current UFR, no complications.     UF goal 2L as tolerated    HTN  -BP stable    Anemia  -Hgb 8 and trending up  -S/P 4 units PRBC 1/12/19  -presented for GI hemorrhage  -managed by Hospital Medicine    Hyponatremia  -sodium adjusted in dialysate bath    Vit D Deficiency  -vit D 5  -start ergocalciferol    PTH 93  Ca 7.4, corrected Ca 8.68

## 2019-01-17 NOTE — ASSESSMENT & PLAN NOTE
Possibly from angiodysplasia associated with chronic HD  Had negative C'scope on 6/18 and negative egd on 9/18.   S/P 6 units of PRBC and 2 units of FFP with good response. HB now at 7.4 but stable this morning  Continue PPI to Po  Push enteroscopy on 1/14 negative. Preliminary VCE negative. Await formal reading  VCE negative and no further bleed. No repeat colonoscope per GI  Advance diet and if stable plan on d/c tomorrow  ASA stopped

## 2019-01-17 NOTE — PROGRESS NOTES
[facility]  Heart Transplant  Progress Note    Patient Name: Shivani Sheets  MRN: [id number]  Admission Date: 1/12/2019  Hospital Length of Stay: 5 days  Attending Physician: Surya Hernandez MD  Primary Care Provider: Eula Weiss MD  Principal Problem:Gastrointestinal hemorrhage    Subjective:     Interval History:     VCE negative for bleeding source (final reading). Discussed with patient and GI that colonoscopy repeat will be deferred unless bleeding recurs. H/H trending up and last stool was well formed and color almost normal. Had HD today and undergoing diet advancement. Denies chest pain, sob or dizziness. An episode of asymptomatic NSVT was noted last night and all electrolytes addressed.    Continuous Infusions:  Scheduled Meds:   sodium chloride 0.9%   Intravenous Once    atorvastatin  10 mg Oral Daily    brimonidine 0.15 % OPTH DROP  1 drop Both Eyes BID    diltiaZEM  300 mg Oral Daily    dorzolamide  1 drop Both Eyes BID    And    timolol maleate 0.5%  1 drop Both Eyes BID    ergocalciferol  50,000 Units Oral Q7 Days    latanoprost  1 drop Both Eyes QHS    levothyroxine  100 mcg Oral Before breakfast    pantoprazole  40 mg Oral BID    riociguat  2 mg Oral TID    selexipag  800 mcg Oral BID    And    selexipag  200 mcg Oral BID    warfarin  5 mg Oral Every Mon, Wed, Fri, Sun    warfarin  7.5 mg Oral Every Tues, Thurs, Sat     PRN Meds:sodium chloride, sodium chloride, sodium chloride 0.9%, acetaminophen, dextrose 50 % in water (D50W), dextrose 50 % in water (D50W), glucagon (human recombinant), glucose, glucose, ramelteon, sodium chloride 0.9%, traMADol    Review of patient's allergies indicates:   Allergen Reactions    Penicillins Swelling    Iodine      Other reaction(s): Hives    Sulfamethoxazole-trimethoprim      Other reaction(s): Swelling  Other reaction(s): Hives     Objective:     Vital Signs (Most Recent):  Temp: 97.7 °F (36.5 °C) (01/17/19  1308)  Pulse: (!) 59 (01/17/19 1308)  Resp: 18 (01/17/19 1308)  BP: (!) 142/78 (01/17/19 1308)  SpO2: (!) 92 % (01/17/19 1308) Vital Signs (24h Range):  Temp:  [97.7 °F (36.5 °C)-98.8 °F (37.1 °C)] 97.7 °F (36.5 °C)  Pulse:  [] 59  Resp:  [16-18] 18  SpO2:  [92 %-97 %] 92 %  BP: (106-177)/(50-81) 142/78     No data found.  Body mass index is 29.87 kg/m².      Intake/Output Summary (Last 24 hours) at 1/17/2019 1455  Last data filed at 1/17/2019 1145  Gross per 24 hour   Intake 650 ml   Output 2650 ml   Net -2000 ml       Hemodynamic Parameters:       Telemetry: NSVT episode about 9 beats long    Physical Exam   Physical Exam   Constitutional: She is oriented to person, place, and time.   HENT:   Head: Normocephalic.   Eyes: No scleral icterus.   conjunctival pallor   Neck: No JVD present.   Cardiovascular: Normal rate and regular rhythm.   No murmur heard.  +P2   Pulmonary/Chest: Effort normal and breath sounds normal. She has no wheezes. She has no rales.   Abdominal: Soft. Bowel sounds are normal. She exhibits no distension. There is no tenderness.   Musculoskeletal: She exhibits no edema or tenderness. LUE fistula patent  Neurological: She is alert and oriented to person, place, and time.   Skin: Skin is warm and dry.   Nursing note and vitals reviewed.    Significant Labs:  CBC:  Recent Labs   Lab 01/15/19  0700 01/16/19  0443 01/17/19  0536   WBC 9.23 8.63 6.98   RBC 2.58* 2.55* 2.82*   HGB 7.4* 7.6* 8.0*   HCT 23.4* 24.3* 26.5*   PLT 96* 97* 105*   MCV 91 95 94   MCH 28.7 29.8 28.4   MCHC 31.6* 31.3* 30.2*     BNP:  No results for input(s): BNP in the last 168 hours.    Invalid input(s): BNPTRIAGELBLO  CMP:  Recent Labs   Lab 01/15/19  0255 01/16/19  0443 01/17/19  0536   GLU 89 78 94   CALCIUM 6.5* 7.4* 7.4*   ALBUMIN 2.2* 2.2* 2.4*   PROT 4.5* 4.7* 5.2*    138 134*   K 4.2 4.0 4.3   CO2 25 24 21*    105 104   BUN 44* 24* 28*   CREATININE 9.7* 6.7* 8.4*   ALKPHOS 39* 40* 46*   ALT 10 10 12    AST 13 14 15   BILITOT 0.6 0.7 0.7      Coagulation:   Recent Labs   Lab 01/12/19  2118  01/15/19  0734 01/16/19  0442 01/17/19  0536   INR 2.2*   < > 1.7* 1.4* 1.3*   APTT 29.2  --   --   --   --     < > = values in this interval not displayed.     LDH:  No results for input(s): LDH in the last 72 hours.  Microbiology:  Microbiology Results (last 7 days)     ** No results found for the last 168 hours. **          I have reviewed all pertinent labs within the past 24 hours.    Estimated Creatinine Clearance: 6.1 mL/min (A) (based on SCr of 8.4 mg/dL (H)).    Diagnostic Results:        Assessment and Plan:     62 yo F w/ PMHx  pulmonary HTN (on adempas/uptravi), diastolic dysfunction, pAF(on coumadin), central retinal artery occlusion without significant carotid artery stenosis, CAD with remote PCI, ESRD(LUE AVF) secondary to HTN, s/p failed kidney transplant, PUD, STEFFANY on CPAP, hypothyroidism, RA, HLD and obesity who presents with bright red per rectum that started 2 days ago.She had a single episode of non bloody emesis earlier today after she became dizzy from standing up too quickly. Her dizziness and lightheadedness improves with sitting and lying down. She was admitted to the CICU in the past for a similar episode 9/2018 and received 6 U PRBC.Upper gi endoscopy revealed  Normal esophagus,a few medium sessile polyps with no bleeding and no stigmata of recent bleeding were found in the gastric antrum.       [facility] consulted for Transfer of service as she has history of PH and on meds.  Patient developed right lower extremity swelling due to an IO in Right leg where she was being transfused. IO was dislodged and removed. Will do US venous doppler.    * Gastrointestinal hemorrhage    Possibly from angiodysplasia associated with chronic HD  Had negative C'scope on 6/18 and negative egd on 9/18.   S/P 6 units of PRBC and 2 units of FFP with good response. HB now at 7.4 but stable this morning  Continue PPI to Po  Push  enteroscopy on 1/14 negative. Preliminary VCE negative. Await formal reading  VCE negative and no further bleed. No repeat colonoscope per GI  Advance diet and if stable plan on d/c tomorrow  ASA stopped     Acute blood loss anemia    S/P 5 prbc.H/H stable       Controlled type 2 diabetes mellitus with both eyes affected by proliferative retinopathy and macular edema, without long-term current use of insulin    - on ISS     Atrial fibrillation    Continue coumadin at home dose  Resolved to continue Adempas for her pulmonary HTN protection.     Pulmonary HTN    - Continue adempas  -Patient can use her own Uptravi to use in patient     ESRD from HTN started RRT 1999    Nephrology on board for HD on MWF       CAD (coronary artery disease)    - Stent placed in 1999. Previously on ASA and plavix  - continue Lipitor   - Off Asa to reduce bleeding risk         Denilson Tyson MD  Heart Transplant  [facility]

## 2019-01-17 NOTE — SUBJECTIVE & OBJECTIVE
Interval History:     VCE negative for bleeding source (final reading). Discussed with patient and GI that colonoscopy repeat will be deferred unless bleeding recurs. H/H trending up and last stool was well formed and color almost normal. Had HD today and undergoing diet advancement. Denies chest pain, sob or dizziness. An episode of asymptomatic NSVT was noted last night and all electrolytes addressed.    Continuous Infusions:  Scheduled Meds:   sodium chloride 0.9%   Intravenous Once    atorvastatin  10 mg Oral Daily    brimonidine 0.15 % OPTH DROP  1 drop Both Eyes BID    diltiaZEM  300 mg Oral Daily    dorzolamide  1 drop Both Eyes BID    And    timolol maleate 0.5%  1 drop Both Eyes BID    ergocalciferol  50,000 Units Oral Q7 Days    latanoprost  1 drop Both Eyes QHS    levothyroxine  100 mcg Oral Before breakfast    pantoprazole  40 mg Oral BID    riociguat  2 mg Oral TID    selexipag  800 mcg Oral BID    And    selexipag  200 mcg Oral BID    warfarin  5 mg Oral Every Mon, Wed, Fri, Sun    warfarin  7.5 mg Oral Every Tues, Thurs, Sat     PRN Meds:sodium chloride, sodium chloride, sodium chloride 0.9%, acetaminophen, dextrose 50 % in water (D50W), dextrose 50 % in water (D50W), glucagon (human recombinant), glucose, glucose, ramelteon, sodium chloride 0.9%, traMADol    Review of patient's allergies indicates:   Allergen Reactions    Penicillins Swelling    Iodine      Other reaction(s): Hives    Sulfamethoxazole-trimethoprim      Other reaction(s): Swelling  Other reaction(s): Hives     Objective:     Vital Signs (Most Recent):  Temp: 97.7 °F (36.5 °C) (01/17/19 1308)  Pulse: (!) 59 (01/17/19 1308)  Resp: 18 (01/17/19 1308)  BP: (!) 142/78 (01/17/19 1308)  SpO2: (!) 92 % (01/17/19 1308) Vital Signs (24h Range):  Temp:  [97.7 °F (36.5 °C)-98.8 °F (37.1 °C)] 97.7 °F (36.5 °C)  Pulse:  [] 59  Resp:  [16-18] 18  SpO2:  [92 %-97 %] 92 %  BP: (106-177)/(50-81) 142/78     No data found.  Body  mass index is 29.87 kg/m².      Intake/Output Summary (Last 24 hours) at 1/17/2019 1455  Last data filed at 1/17/2019 1145  Gross per 24 hour   Intake 650 ml   Output 2650 ml   Net -2000 ml       Hemodynamic Parameters:       Telemetry: NSVT episode about 9 beats long    Physical Exam   Physical Exam   Constitutional: She is oriented to person, place, and time.   HENT:   Head: Normocephalic.   Eyes: No scleral icterus.   conjunctival pallor   Neck: No JVD present.   Cardiovascular: Normal rate and regular rhythm.   No murmur heard.  +P2   Pulmonary/Chest: Effort normal and breath sounds normal. She has no wheezes. She has no rales.   Abdominal: Soft. Bowel sounds are normal. She exhibits no distension. There is no tenderness.   Musculoskeletal: She exhibits no edema or tenderness. LUE fistula patent  Neurological: She is alert and oriented to person, place, and time.   Skin: Skin is warm and dry.   Nursing note and vitals reviewed.    Significant Labs:  CBC:  Recent Labs   Lab 01/15/19  0700 01/16/19  0443 01/17/19  0536   WBC 9.23 8.63 6.98   RBC 2.58* 2.55* 2.82*   HGB 7.4* 7.6* 8.0*   HCT 23.4* 24.3* 26.5*   PLT 96* 97* 105*   MCV 91 95 94   MCH 28.7 29.8 28.4   MCHC 31.6* 31.3* 30.2*     BNP:  No results for input(s): BNP in the last 168 hours.    Invalid input(s): BNPTRIAGELBLO  CMP:  Recent Labs   Lab 01/15/19  0255 01/16/19  0443 01/17/19  0536   GLU 89 78 94   CALCIUM 6.5* 7.4* 7.4*   ALBUMIN 2.2* 2.2* 2.4*   PROT 4.5* 4.7* 5.2*    138 134*   K 4.2 4.0 4.3   CO2 25 24 21*    105 104   BUN 44* 24* 28*   CREATININE 9.7* 6.7* 8.4*   ALKPHOS 39* 40* 46*   ALT 10 10 12   AST 13 14 15   BILITOT 0.6 0.7 0.7      Coagulation:   Recent Labs   Lab 01/12/19  2118  01/15/19  0734 01/16/19  0442 01/17/19  0536   INR 2.2*   < > 1.7* 1.4* 1.3*   APTT 29.2  --   --   --   --     < > = values in this interval not displayed.     LDH:  No results for input(s): LDH in the last 72 hours.  Microbiology:  Microbiology  Results (last 7 days)     ** No results found for the last 168 hours. **          I have reviewed all pertinent labs within the past 24 hours.    Estimated Creatinine Clearance: 6.1 mL/min (A) (based on SCr of 8.4 mg/dL (H)).    Diagnostic Results:

## 2019-01-17 NOTE — PT/OT/SLP EVAL
Physical Therapy Evaluation and Discharge Note    Patient Name:  Shivani Sheets   MRN:  [id number]    Recommendations:     Discharge Recommendations:  ( PT)   Discharge Equipment Recommendations: none   Barriers to discharge: None    Assessment:     Shivani Sheets is a 63 y.o. female admitted with a medical diagnosis of Gastrointestinal hemorrhage. .  At this time, patient is functioning at their prior level of function and does not require further acute PT services.     Recent Surgery: Procedure(s) (LRB):  EGD (ESOPHAGOGASTRODUODENOSCOPY) (N/A) 3 Days Post-Op    Plan:     During this hospitalization, patient does not require further acute PT services.  Please re-consult if situation changes.      Subjective     Chief Complaint: generalized weakness after dialysis  Patient/Family Comments/goals: to go home  Pain/Comfort:  · Pain Rating 1: 0/10  · Pain Rating Post-Intervention 1: 0/10    Patients cultural, spiritual, Hinduism conflicts given the current situation: no    Living Environment:  Pt. Lives with brother in 2-story home with 10-12 steps to 2nd floor with (R) handrail. Pt. has a place to sleep downstairs, if needed.  Prior to admission, patients level of function was mod. indep.  Equipment used at home: cane, straight, walker, rolling, shower chair, wheelchair..  Upon discharge, patient will have assistance from brother.    Objective:     Communicated with nursing prior to session.  Patient found seated in bedside chair upon PT entry to room found with: peripheral IV, telemetry     General Precautions: Standard, fall   Orthopedic Precautions:N/A   Braces:       Exams:  · RUE ROM: WFL  · RUE Strength: WFL  · LUE ROM: WFL  · LUE Strength: WFL  · RLE ROM: WFL  · RLE Strength: WFL  · LLE ROM: WFL  · LLE Strength: WFL    Functional Mobility:  · Transfers:     · Sit to Stand:  supervision with rolling walker  · Bed to Chair: supervision with  rolling walker  using  Stand Pivot  · Gait: 140' with RW and  Supervision  · Balance: fair+    AM-PAC 6 CLICK MOBILITY  Total Score:23       Therapeutic Activities and Exercises:   Discussed therapy/DME needs, goals, and POC.    AM-PAC 6 CLICK MOBILITY  Total Score:23     Patient left up in chair with all lines intact and call button in reach.    GOALS:   Multidisciplinary Problems     Physical Therapy Goals     Not on file          Multidisciplinary Problems (Resolved)        Problem: Physical Therapy Goal    Goal Priority Disciplines Outcome Goal Variances Interventions   Physical Therapy Goal   (Resolved)     PT, PT/OT Outcome(s) achieved                     History:     Past Medical History:   Diagnosis Date    Allergy     Anemia     Anticoagulant long-term use     4 years coumadin, asa    Arthritis     Awaiting organ transplant 2013    Cataract     Central serous chorioretinopathy of eye, right 2018    CHF (congestive heart failure)     Chronic kidney disease     Colon polyps     COPD (chronic obstructive pulmonary disease)     Coronary artery disease     Diabetes mellitus     Diabetic retinopathy     Diverticulosis     Encounter for blood transfusion     ESRD from HTN strtied RRT 1999    Failed  donor kidney transplant -     Glaucoma     History of bleeding peptic ulcer      as stated per pt    Hyperlipidemia     Hypertension     Hypothyroidism     Morbid obesity 8/10/2012    Renal hypertension     Stroke            Past Surgical History:   Procedure Laterality Date    CATARACT EXTRACTION W/  INTRAOCULAR LENS IMPLANT Bilateral     COLON SURGERY      COLONOSCOPY      COLONOSCOPY N/A 2018    [facility] by [facility] at [facility] (2ND FLR)    EGD (ESOPHAGOGASTRODUODENOSCOPY) N/A 2019    Performed by Miranda Maradiaga MD at [facility] ENDO (2ND FLR)    EGD (ESOPHAGOGASTRODUODENOSCOPY) N/A 2018    Performed by Luis Washington MD at [facility] ENDO (2ND FLR)    ESOPHAGOGASTRODUODENOSCOPY (EGD) N/A  2/27/2018    Performed by [facility]ji Desir [facility] at [facility] ENDO (2ND FLR)    EYE SURGERY      FRACTURE SURGERY      R arm    HERNIA REPAIR      HYSTERECTOMY      INSERTION-IMPLANT-GLAUCOMA Left 10/12/2017    Performed by Junaid Kern MD at [facility] OR 1ST FLR    KIDNEY TRANSPLANT      NEPHRECTOMY  11/2008    transplant     PARATHYROID GLAND SURGERY      TONSILLECTOMY      UPPER GASTROINTESTINAL ENDOSCOPY         Clinical Decision Making:     History  Co-morbidities and personal factors that may impact the plan of care Examination  Body Structures and Functions, activity limitations and participation restrictions that may impact the plan of care Clinical Presentation   Decision Making/ Complexity Score   Co-morbidities:   [] Time since onset of injury / illness / exacerbation  [] Status of current condition  []Patient's cognitive status and safety concerns    [] Multiple Medical Problems (see med hx)  Personal Factors:   [] Patient's age  [] Prior Level of function   [] Patient's home situation (environment and family support)  [] Patient's level of motivation  [] Expected progression of patient      HISTORY:(criteria)    [] 09586 - no personal factors/history    [] 64311 - has 1-2 personal factor/comorbidity     [] 37715 - has >3 personal factor/comorbidity     Body Regions:  [] Objective examination findings  [] Head     []  Neck  [] Trunk   [] Upper Extremity  [] Lower Extremity    Body Systems:  [] For communication ability, affect, cognition, language, and learning style: the assessment of the ability to make needs known, consciousness, orientation (person, place, and time), expected emotional /behavioral responses, and learning preferences (eg, learning barriers, education  needs)  [] For the neuromuscular system: a general assessment of gross coordinated movement (eg, balance, gait, locomotion, transfers, and transitions) and motor function  (motor control and motor learning)  [] For the  musculoskeletal system: the assessment of gross symmetry, gross range of motion, gross strength, height, and weight  [] For the integumentary system: the assessment of pliability(texture), presence of scar formation, skin color, and skin integrity  [] For cardiovascular/pulmonary system: the assessment of heart rate, respiratory rate, blood pressure, and edema     Activity limitations:    [] Patient's cognitive status and saf ety concerns          [] Status of current condition      [] Weight bearing restriction  [] Cardiopulmunary Restriction    Participation Restrictions:   [] Goals and goal agreement with the patient     [] Rehab potential (prognosis) and probable outcome      Examination of Body System: (criteria)    [] 27613 - addressing 1-2 elements    [] 51448 - addressing a total of 3 or more elements     [] 20633 -  Addressing a total of 4 or more elements         Clinical Presentation: (criteria)  Choose one     On examination of body system using standardized tests and measures patient presents with (CHOOSE ONE) elements from any of the following: body structures and functions, activity limitations, and/or participation restrictions.  Leading to a clinical presentation that is considered (CHOOSE ONE)                              Clinical Decision Making  (Eval Complexity):  Choose One     Time Tracking:     PT Received On: 01/17/19  PT Start Time: 1536     PT Stop Time: 1551  PT Total Time (min): 15 min     Billable Minutes: Evaluation 15      Denilson العلي, PT  01/17/2019

## 2019-01-17 NOTE — NURSING
Notified HTS on call pt is having PVCs and also had 15 beat run of Vtach.  VSS.  Pt is asymptomatic.  No new orders given at this time.  Will continue to monitor.

## 2019-01-17 NOTE — PHYSICIAN QUERY
PT Name: Shivani Sheets  MR #: [id number]    Physician Query Form - CardioPulmonary Clarification      CDS/: Rosa Graff RN, CCDS              Contact information: [email or web address]    This form is a permanent document in the medical record.    Query Date: January 17, 2019    By submitting this query, we are merely seeking further clarification of documentation. Please utilize your independent clinical judgment when addressing the question(s) below.    The Medical record contains the following:   Indicators   Supporting Clinical Findings Location in Medical Record   X Pulmonary Hypertension documented PMHx significant pulmonary HTN (on adempas/uptravi),     1/13 h/p, 1/14 -1/16 prog note   X Acute/Chronic Illness ESRD(LUE AVF) secondary to HTN,   s/p failed kidney transplant, PUD, STEFFANY on CPAP, hypothyroidism, RA, HLD and obesity.  Chronic diastolic HF   1/13 h/p    Echo and/or Heart Cath Findings      BiPAP/Intubation/Supplemental O2      SOB, MORA, Fatigue, Dizziness, LE Edema, Cyanosis, Chest Pain, Respiratory Distress, Hypoxia, etc.     X Treatment         Medication continue adempas and Uptravi 1/13 hts h/p    Other     Provider, please specify the type of pulmonary hypertension:  [   ] Group 1:  Pulmonary Arterial Hypertension - includes Primary, Idiopathic, Inheritable, and Secondary (due to drugs, toxins, congenital heart diease, HIV infection, etc.)     [   ] Group 2:  Pulmonary Hypertension due to Left Heart Disease, including left heart failure and/or left heart valve disease     [   ] Group 3:  Pulmonary Hypertension due to Lung Disease     [   ] Group 4:  Pulmonary Hypertension due to Obstruction of the Pulmonary Vessels caused by Chronic Thromboemboli, Tumor, or Foreign Bodies     [   ] Group 5:  Pulmonary Hypertension due to other, multifactorial, or unclear mechanisms     [  X ] Pulmonary Hypertension, unspecified     [   ] Other Cardiopulmonary Condition (please specify):     [  ]  Clinically Undetermined         Please document in your progress notes daily for the duration of treatment, until resolved, and include in your discharge summary.

## 2019-01-17 NOTE — ASSESSMENT & PLAN NOTE
- Stent placed in 1999. Previously on ASA and plavix  - continue Lipitor   - Off Asa to reduce bleeding risk

## 2019-01-17 NOTE — PLAN OF CARE
"Problem: Adult Inpatient Plan of Care  Goal: Plan of Care Review  Dx: GastroIntestinal Hemorrhage    Hx: pulmonary HTN (on Adempas/Uptravi), diastolic dysfunction, pAF (on coumadin), retinal artery occlusion, CAD, ESRD 2 HTN, failed kidney tx, PUD, STEFFANY on CPAP, hypothyroidism, RA, HLD, obesity    1/12/19: admit to ED for bright red blood per rectum x2 days  1/13/19: admit to ICU     Nursing:  CBC q8h  PM bath    Outcome: Ongoing (interventions implemented as appropriate)   01/17/19 0518   Plan of Care Review   Plan of Care Reviewed With patient   Frequent PVCs with some runs of Vtach noted throughout shift.  Team is aware.  Pt is asymptomatic and states she "feels good."  VSS.  Scheduled for dialysis in AM.  Safety maintained.  Call light within reach.  Will continue to monitor.       "

## 2019-01-18 VITALS
WEIGHT: 158.06 LBS | TEMPERATURE: 99 F | BODY MASS INDEX: 29.84 KG/M2 | HEIGHT: 61 IN | SYSTOLIC BLOOD PRESSURE: 135 MMHG | HEART RATE: 90 BPM | OXYGEN SATURATION: 92 % | DIASTOLIC BLOOD PRESSURE: 67 MMHG | RESPIRATION RATE: 16 BRPM

## 2019-01-18 LAB
ALBUMIN SERPL BCP-MCNC: 2.3 G/DL
ALP SERPL-CCNC: 47 U/L
ALT SERPL W/O P-5'-P-CCNC: 11 U/L
ANION GAP SERPL CALC-SCNC: 6 MMOL/L
AST SERPL-CCNC: 14 U/L
BILIRUB SERPL-MCNC: 0.7 MG/DL
BUN SERPL-MCNC: 16 MG/DL
CALCIUM SERPL-MCNC: 7.3 MG/DL
CHLORIDE SERPL-SCNC: 102 MMOL/L
CO2 SERPL-SCNC: 29 MMOL/L
CREAT SERPL-MCNC: 6.2 MG/DL
ERYTHROCYTE [DISTWIDTH] IN BLOOD BY AUTOMATED COUNT: 16.6 %
EST. GFR  (AFRICAN AMERICAN): 7.5 ML/MIN/1.73 M^2
EST. GFR  (NON AFRICAN AMERICAN): 6.5 ML/MIN/1.73 M^2
GLUCOSE SERPL-MCNC: 99 MG/DL
HCT VFR BLD AUTO: 25.8 %
HGB BLD-MCNC: 7.8 G/DL
INR PPP: 1.7
MAGNESIUM SERPL-MCNC: 1.8 MG/DL
MCH RBC QN AUTO: 28.5 PG
MCHC RBC AUTO-ENTMCNC: 30.2 G/DL
MCV RBC AUTO: 94 FL
PHOSPHATE SERPL-MCNC: 3.4 MG/DL
PLATELET # BLD AUTO: 117 K/UL
PMV BLD AUTO: 11.7 FL
POTASSIUM SERPL-SCNC: 4.2 MMOL/L
PROT SERPL-MCNC: 5.2 G/DL
PROTHROMBIN TIME: 16.6 SEC
RBC # BLD AUTO: 2.74 M/UL
SODIUM SERPL-SCNC: 137 MMOL/L
WBC # BLD AUTO: 6.64 K/UL

## 2019-01-18 PROCEDURE — 85027 COMPLETE CBC AUTOMATED: CPT

## 2019-01-18 PROCEDURE — 99291 CRITICAL CARE FIRST HOUR: CPT | Mod: ,,, | Performed by: INTERNAL MEDICINE

## 2019-01-18 PROCEDURE — 85610 PROTHROMBIN TIME: CPT

## 2019-01-18 PROCEDURE — 63600175 PHARM REV CODE 636 W HCPCS: Performed by: INTERNAL MEDICINE

## 2019-01-18 PROCEDURE — 80053 COMPREHEN METABOLIC PANEL: CPT

## 2019-01-18 PROCEDURE — 83735 ASSAY OF MAGNESIUM: CPT

## 2019-01-18 PROCEDURE — 25000003 PHARM REV CODE 250: Performed by: STUDENT IN AN ORGANIZED HEALTH CARE EDUCATION/TRAINING PROGRAM

## 2019-01-18 PROCEDURE — 25000003 PHARM REV CODE 250: Performed by: INTERNAL MEDICINE

## 2019-01-18 PROCEDURE — 99291 PR CRITICAL CARE, E/M 30-74 MINUTES: ICD-10-PCS | Mod: ,,, | Performed by: INTERNAL MEDICINE

## 2019-01-18 PROCEDURE — 36415 COLL VENOUS BLD VENIPUNCTURE: CPT

## 2019-01-18 PROCEDURE — 84100 ASSAY OF PHOSPHORUS: CPT

## 2019-01-18 RX ORDER — DOXYCYCLINE HYCLATE 100 MG
100 TABLET ORAL EVERY 12 HOURS
Qty: 10 TABLET | Refills: 0 | Status: SHIPPED | OUTPATIENT
Start: 2019-01-21 | End: 2019-01-26

## 2019-01-18 RX ORDER — ONDANSETRON 4 MG/1
4 TABLET, FILM COATED ORAL ONCE
Status: COMPLETED | OUTPATIENT
Start: 2019-01-18 | End: 2019-01-18

## 2019-01-18 RX ORDER — DOXYCYCLINE HYCLATE 100 MG
100 TABLET ORAL EVERY 12 HOURS
Status: DISCONTINUED | OUTPATIENT
Start: 2019-01-21 | End: 2019-01-18 | Stop reason: HOSPADM

## 2019-01-18 RX ORDER — WARFARIN 2.5 MG/1
5 TABLET ORAL DAILY
Status: DISCONTINUED | OUTPATIENT
Start: 2019-01-18 | End: 2019-01-18 | Stop reason: HOSPADM

## 2019-01-18 RX ADMIN — VANCOMYCIN HYDROCHLORIDE 1250 MG: 100 INJECTION, POWDER, LYOPHILIZED, FOR SOLUTION INTRAVENOUS at 10:01

## 2019-01-18 RX ADMIN — TIMOLOL MALEATE 1 DROP: 5 SOLUTION/ DROPS OPHTHALMIC at 09:01

## 2019-01-18 RX ADMIN — ATORVASTATIN CALCIUM 10 MG: 10 TABLET, FILM COATED ORAL at 09:01

## 2019-01-18 RX ADMIN — PANTOPRAZOLE SODIUM 40 MG: 40 TABLET, DELAYED RELEASE ORAL at 09:01

## 2019-01-18 RX ADMIN — LEVOTHYROXINE SODIUM 100 MCG: 100 TABLET ORAL at 06:01

## 2019-01-18 RX ADMIN — BRIMONIDINE TARTRATE 1 DROP: 1.5 SOLUTION OPHTHALMIC at 09:01

## 2019-01-18 RX ADMIN — ONDANSETRON 4 MG: 4 TABLET, FILM COATED ORAL at 01:01

## 2019-01-18 RX ADMIN — DORZOLAMIDE HYDROCHLORIDE 1 DROP: 20 SOLUTION/ DROPS OPHTHALMIC at 09:01

## 2019-01-18 RX ADMIN — DILTIAZEM HYDROCHLORIDE 300 MG: 300 CAPSULE, COATED, EXTENDED RELEASE ORAL at 09:01

## 2019-01-18 NOTE — NURSING
Shortly after completion of vancomycin patient vomited large amount of emesis and c/o diarrhea (no blood present). MD called and notified. Stated ok to continue with discharge. One time dose of zofran ordered and administered. Will monitor.

## 2019-01-18 NOTE — HOSPITAL COURSE
Patient had 5 PRBC and 2 units FFP given with correction of anemia to baseline level of 8 on d/c from admission Hb level of 4.1. She had also had EGD with Push enteroscope and VCE without bleeding source found. We discussed about pruning medication that were increasing her bleeding risk. There include coumadin, ASA and Adempas. Patient agreed to discontinue ASA and continue coumadin and Adempas due to protective value from A fib and therapeutic value for PH. She did develop cellulitis of the R-leg from IO placed during admission for medication infusion. Vancomycin 1250mg one dose was given to last her for the next 48hrs since she is on HD. She will start Doxycycline 500mg BID after her HD on 1/21/2019.

## 2019-01-18 NOTE — NURSING
Discharge instructions provided to patient and spouse. Verbalizes understanding. One time dose of IV vanc given before d/c. Midline removed after. Pt discharging with .

## 2019-01-18 NOTE — PLAN OF CARE
Problem: Adult Inpatient Plan of Care  Goal: Plan of Care Review  Dx: GastroIntestinal Hemorrhage    Hx: pulmonary HTN (on Adempas/Uptravi), diastolic dysfunction, pAF (on coumadin), retinal artery occlusion, CAD, ESRD 2 HTN, failed kidney tx, PUD, STEFFANY on CPAP, hypothyroidism, RA, HLD, obesity    1/12/19: admit to ED for bright red blood per rectum x2 days  1/13/19: admit to ICU     Nursing:  CBC q8h  PM bath    Outcome: Ongoing (interventions implemented as appropriate)   01/18/19 0642   Plan of Care Review   Plan of Care Reviewed With patient   No acute events overnight.  Possible discharge.  Safety maintained.  Call light within reach.  Will continue to monitor.

## 2019-01-18 NOTE — PROGRESS NOTES
DISCHARGE    SW to pt's room for discharge today.  Pt presents as sitting up in bedside chair with brother in room.  Pt and brother present as aao x4, calm, cooperative, and asking and answering questions appropriately.  Pt verbalizes understanding and agreement with plan for d/c to home today.  Pt reports brother will transport her home today.  Pt reports she has been in touch with her home HD unit and has notified them that she is discharging today.  Pt denies any d/c needs, and none identified by medical team.  Pt reports coping adequately at this time, and denies any needs or concerns to SW.  SW providing ongoing psychosocial and counseling support, education, resources, assistance, and discharge planning as indicated.  SW remains available.

## 2019-01-18 NOTE — ASSESSMENT & PLAN NOTE
Possibly from angiodysplasia associated with chronic HD  Had negative C'scope on 6/18 and negative egd on 9/18.   S/P 5 units of PRBC and 2 units of FFP with good response. HB now at 7.4 but stable this morning  Continue PPI to Po  Push enteroscopy on 1/14 negative.   VCE negative and no further bleed.   No repeat colonoscope per GI  Hemoglobin trending up and 8 today  ASA stopped

## 2019-01-18 NOTE — ASSESSMENT & PLAN NOTE
Continue coumadin at home dose 7/5/5 at alternate days  Resolved to continue Adempas for her pulmonary HTN protection.

## 2019-01-21 ENCOUNTER — OUTPATIENT CASE MANAGEMENT (OUTPATIENT)
Dept: ADMINISTRATIVE | Facility: OTHER | Age: 64
End: 2019-01-21

## 2019-01-21 ENCOUNTER — PATIENT OUTREACH (OUTPATIENT)
Dept: ADMINISTRATIVE | Facility: CLINIC | Age: 64
End: 2019-01-21

## 2019-01-21 DIAGNOSIS — L03.90 CELLULITIS, UNSPECIFIED CELLULITIS SITE: ICD-10-CM

## 2019-01-21 DIAGNOSIS — K92.2 GASTROINTESTINAL HEMORRHAGE, UNSPECIFIED GASTROINTESTINAL HEMORRHAGE TYPE: Primary | ICD-10-CM

## 2019-01-21 NOTE — PROGRESS NOTES
Thank you for the referral. The following patient has been assigned to Reina Boyer RN with Outpatient Complex Care Management for high risk screening.    Reason for referral: Gastrointestinal hemorrhage, unspecified gastrointestinal hemorrhage type    Please contact \[facility]\"" at [phone] with any questions.    Thank you,    Leigha Ochoa, [facility]  Outpatient Care Mgmt.  [phone]

## 2019-01-21 NOTE — PROGRESS NOTES
C3 nurse attempted to contact patient. No answer. The following message was left for the patient to return the call:  Good morning, I am a nurse calling on behalf of [facility] from the Care Coordination Center.  This is a Transitional Care Call for Shivani Sheets. When you have a moment please contact us at [phone] or [phone] Monday through Friday, between the hours of 8 am to 4 pm. We look forward to speaking with you. On behalf of [facility] have a nice day.    The patient does not have a scheduled HOSFU appointment within 7 days post hospital discharge date 1\18. Message sent to Physician staff to assist with HOSFU appointment scheduling.

## 2019-01-21 NOTE — PROGRESS NOTES
Summary:  1st missed attempt to complete initial assessments    Interventions:  Left message briefly explaining the purpose of the call - left contact info requesting call back to discuss    Plan:  2nd attempt tomorrow 1-22

## 2019-01-21 NOTE — PATIENT INSTRUCTIONS
Discharge Instructions for Cellulitis  You have been diagnosed with cellulitis. This is an infection in the deepest layer of the skin. In some cases, the infection also affects the muscle. Cellulitis is caused by bacteria. The bacteria can enter the body through broken skin. This can happen with a cut, scratch, animal bite, or an insect bite that has been scratched. You may have been treated in the hospital with antibiotics and fluids. You will likely be given a prescription for antibiotics to take at home. This sheet will help you take care of yourself at home.  Home care  When you are home:  · Take the prescribed antibiotic medicine you are given as directed until it is gone. Take it even if you feel better. It treats the infection and stops it from returning. Not taking all the medicine can make future infections hard to treat.  · Keep the infected area clean.  · When possible, raise the infected area above the level of your heart. This helps keep swelling down.  · Talk with your healthcare provider if you are in pain. Ask what kind of over-the-counter medicine you can take for pain.  · Apply clean bandages as advised.  · Take your temperature once a day for a week.  · Wash your hands often to prevent spreading the infection.  In the future, wash your hands before and after you touch cuts, scratches, or bandages. This will help prevent infection.   When to call your healthcare provider  Call your healthcare provider immediately if you have any of the following:  · Difficulty or pain when moving the joints above or below the infected area  · Discharge or pus draining from the area  · Fever of 100.4°F (38°C) or higher, or as directed by your healthcare provider  · Pain that gets worse in or around the infected   · Redness that gets worse in or around the infected area, particularly if the area of redness expands to a wider area  · Shaking chills  · Swelling of the infected area  · Vomiting   Date Last Reviewed:  8/1/2016 © 2000-2018 [employer]. [address] [place] [address], [place] [address]. All rights reserved. This information is not intended as a substitute for professional medical care. Always follow your healthcare professional's instructions.        When You Have Gastrointestinal (GI) Bleeding    Blood in your vomit or stool can be a sign of gastrointestinal (GI) bleeding. GI bleeding can be scary. But the cause may not be serious. You should always see a doctor if GI bleeding occurs.  The GI tract  The GI tract is the path through which food travels in the body. Food passes from the mouth down the esophagus (the tube from the mouth to the stomach). Food begins to break down in the stomach. It then moves through the duodenum, the first part of the small intestine. Nutrients are absorbed as food travels through the small intestine. What is left passes into the colon (large intestine) as waste. The colon removes water from the waste. Waste continues from the colon to the rectum (where stool is stored). Waste then leaves the body through the anus.  Causes of GI bleeding  GI bleeding can be caused by many different problems. Some of the more common causes include:  · Swollen veins in the anus (hemorrhoids)  · Swollen veins in the esophagus (varices)  · Sore on the lining of the GI tract (ulcer)  · Cuts or scrapes in the mouth or throat  · Infection caused by germs such as bacteria or parasites  · Food allergies, such as milk allergy in young children  · Medicines  · Inflammation of the GI tract (gastritis or esophagitis)  · Colitis (Crohn's disease or ulcerative colitis)  · Cancer (tumors or polyps)  · Abnormal pouches in the colon (diverticula)  · Tears in the esophagus or anus  · Nosebleed  · Abnormal blood vessels in the GI tract (angiodysplasia)  Diagnosing the cause of blood in stool  If blood is coming out in your stool, you may have a lower GI tract problem or a very fast upper GI tract bleed. Bleeding  from the GI tract can be bright red. Or it may look dark and tarry. Tests may also find blood in your stool that cant be seen with the eye (occult blood). To find out the cause, tests that may be ordered include:  · Blood tests. A blood sample is taken and sent to a lab for exam.  · Hemoccult test. Checks a stool sample for blood.  · Stool culture. Checks a stool sample for bacteria or parasites.  · X-ray, ultrasound, or CT scan. Imaging tests that take pictures of the digestive tract.  · Colonoscopy or sigmoidoscopy. This test uses a flexible tube with a tiny camera. The tube is inserted through your anus into your rectum to see the inside of your colon. Your provider can also take a tiny tissue sample (biopsy) and treat a bleeding source  Diagnosing the cause of blood in vomit  If you are vomiting blood or something that looks like coffee grounds, you may have an upper GI tract problem. To find the cause, tests that may be done include:  · Upper Endoscopy. A flexible tube with a tiny camera is inserted through your mouth and throat to see inside your upper GI tract. This lets your provider take a tiny tissue sample (biopsy) and treat a bleeding source.  · Nasogastric lavage. This can tell if you have upper GI or lower GI bleeding.  · X-ray, ultrasound, or CT scan. Imaging tests that take pictures of your digestive tract.  · Upper GI series. X-rays of the upper part of your GI tract taken from inside your body.  · Enteroscopy. This sends a flexible tube or a small, swallowed capsule camera into your small intestine.  When to call your healthcare provider  Call your healthcare provider right away if you have any of the following:  · Bleeding from your mouth or anus that can't be stopped  · Fever of 100.4°F (38.0°) or higher  · Bleeding along with feeling lightheaded or dizzy  · Signs of fluid loss (dehydration). These include a dry, sticky mouth, decreased urine output; and very dark urine.  · Belly (abdominal)  pain   Date Last Reviewed: 7/1/2016  © 7760-4587 [employer]. [address] [place] [address], [place] [address]. All rights reserved. This information is not intended as a substitute for professional medical care. Always follow your healthcare professional's instructions.

## 2019-01-22 ENCOUNTER — OUTPATIENT CASE MANAGEMENT (OUTPATIENT)
Dept: ADMINISTRATIVE | Facility: OTHER | Age: 64
End: 2019-01-22

## 2019-01-22 ENCOUNTER — PROCEDURE VISIT (OUTPATIENT)
Dept: OPHTHALMOLOGY | Facility: CLINIC | Age: 64
End: 2019-01-22
Payer: MEDICARE

## 2019-01-22 VITALS — DIASTOLIC BLOOD PRESSURE: 77 MMHG | SYSTOLIC BLOOD PRESSURE: 123 MMHG | HEART RATE: 79 BPM

## 2019-01-22 DIAGNOSIS — H35.711 CENTRAL SEROUS CHORIORETINOPATHY OF EYE, RIGHT: ICD-10-CM

## 2019-01-22 DIAGNOSIS — E11.3513 CONTROLLED TYPE 2 DIABETES MELLITUS WITH BOTH EYES AFFECTED BY PROLIFERATIVE RETINOPATHY AND MACULAR EDEMA, WITHOUT LONG-TERM CURRENT USE OF INSULIN: Primary | ICD-10-CM

## 2019-01-22 PROCEDURE — 67228 TREATMENT X10SV RETINOPATHY: CPT | Mod: PBBFAC,RT | Performed by: OPHTHALMOLOGY

## 2019-01-22 PROCEDURE — 99499 NO LOS: ICD-10-PCS | Mod: S$PBB,,, | Performed by: OPHTHALMOLOGY

## 2019-01-22 PROCEDURE — 67228 PR TREATMENT EXTENSIVE RETINOPATHY, PHOTOCOAGULATION: ICD-10-PCS | Mod: S$PBB,RT,, | Performed by: OPHTHALMOLOGY

## 2019-01-22 PROCEDURE — 99499 UNLISTED E&M SERVICE: CPT | Mod: S$PBB,,, | Performed by: OPHTHALMOLOGY

## 2019-01-22 PROCEDURE — 67228 TREATMENT X10SV RETINOPATHY: CPT | Mod: S$PBB,RT,, | Performed by: OPHTHALMOLOGY

## 2019-01-22 NOTE — PROGRESS NOTES
HPI     2 wk PRP OD  DLS-12/11/2018     Pt sts no change slight blurred va. Denies pain   In hospital last week for stomach bleeding and was not allowed to use her   eye gtts   (-)Flashes (-)Floaters  (-)Photophobia  (-)Glare        Prior OCT  OD - ERM with small subfoveal SRF pocket  OS - no ME    Prior  FA - increased NV OU  Late macular leakage OU      Plan     1. Neovascular Glaucoma OU  PDR/OIS OU - significant vascular disease -?RVO OD  OD - s/p GDD with [facility] 2013  OS  S/p BV with JDN 10/17    10/18 increased ME OS - ?CME from RVO vs DME  12/18 - increased DME OS  S/p resumed Avastin OS      Get approval for Ozurdex OS - now with BV    Plan PRP OD today   and PRP fill in OS to follow  Continue to inject OS q 4-6 weeks during laser      Both eyes  Cosopt BID  Xal q day  Alphagan BID      2. ? OD  Recent PO steroids - pt taking 0.5 on taper  ERM possibly contributing  Recent elevated BP, also predominant pulmonary HTN with recent exacerbation - could contribute to small uveal effusiions - will  Monitor    3. ERM OD  No MMP  Follow    4. HTN Ret OU  Contributing to #2    5. ESRD on HD    6. PCIOL OU        2 weeks for PRP OS and Avastin OS    Risks, benefits, and alternatives to treatment discussed in detail with the patient.  The patient voiced understanding and wished to proceed with the procedure    Laser Procedure Note  Dx: PDR OD   Laser: PRP OD  Argon  Spot: 200  Power: 150  Dur: 0.05-0.1  #:   730  Complications: None  F/U as above

## 2019-01-22 NOTE — PROGRESS NOTES
Summary:  2nd missed attempt to complete initial assessments    Interventions:  Left message briefly explaining the program and requested a return call to discuss - left contact info  Letter sent via US mail with contact info for OPCM    Plan:  3rd attempt next week 1-29 if no return call

## 2019-01-22 NOTE — LETTER
January 22, 2019    Shivani Sheets  [address] [place] [address]             [facility] [address] [place] [address] Dear Ms. Sheets,    I work with [facility] Outpatient Case Management Department. We received a referral to call you to discuss your medical history.These services are free of charge and are offered to [facility] patients who have recently been discharged from any of our facilities or who have complex medical conditions that may require the skill of a nurse to assist with management.         .      I attempted to reach you by telephone, but I was unsuccessful. Please call our department so that we can go over some questions with you regarding your health.    The Outpatient Case Management Department can be reached at [phone] from 8:00AM to 4:30 PM on Monday thru Friday. [facility] also has a program where a nurse is available 24/7 to answer questions or provide medical advice, their number is [phone].    Thanks,        Eve Boyer ([place]) RN  [phone]  Outpatient Care Management

## 2019-01-29 ENCOUNTER — PROCEDURE VISIT (OUTPATIENT)
Dept: OPHTHALMOLOGY | Facility: CLINIC | Age: 64
End: 2019-01-29
Payer: MEDICARE

## 2019-01-29 ENCOUNTER — OUTPATIENT CASE MANAGEMENT (OUTPATIENT)
Dept: ADMINISTRATIVE | Facility: OTHER | Age: 64
End: 2019-01-29

## 2019-01-29 VITALS — DIASTOLIC BLOOD PRESSURE: 78 MMHG | SYSTOLIC BLOOD PRESSURE: 133 MMHG | HEART RATE: 77 BPM

## 2019-01-29 DIAGNOSIS — E11.3513 CONTROLLED TYPE 2 DIABETES MELLITUS WITH BOTH EYES AFFECTED BY PROLIFERATIVE RETINOPATHY AND MACULAR EDEMA, WITHOUT LONG-TERM CURRENT USE OF INSULIN: Primary | ICD-10-CM

## 2019-01-29 PROCEDURE — C9257 BEVACIZUMAB INJECTION: HCPCS | Mod: PBBFAC,JG | Performed by: OPHTHALMOLOGY

## 2019-01-29 PROCEDURE — 67028 INJECTION EYE DRUG: CPT | Mod: PBBFAC,LT | Performed by: OPHTHALMOLOGY

## 2019-01-29 PROCEDURE — 67028 PR INJECT INTRAVITREAL PHARMCOLOGIC: ICD-10-PCS | Mod: 79,S$PBB,LT, | Performed by: OPHTHALMOLOGY

## 2019-01-29 PROCEDURE — 99499 NO LOS: ICD-10-PCS | Mod: S$PBB,,, | Performed by: OPHTHALMOLOGY

## 2019-01-29 PROCEDURE — 99499 UNLISTED E&M SERVICE: CPT | Mod: S$PBB,,, | Performed by: OPHTHALMOLOGY

## 2019-01-29 PROCEDURE — 67028 INJECTION EYE DRUG: CPT | Mod: 79,S$PBB,LT, | Performed by: OPHTHALMOLOGY

## 2019-01-29 RX ADMIN — BEVACIZUMAB 1.25 MG: 100 INJECTION, SOLUTION INTRAVENOUS at 04:01

## 2019-01-29 NOTE — PROGRESS NOTES
Summary:  3rd attempt to complete initial assessments    Interventions:  Left message with contact info requesting if pt interested in the program to bolivar solorzano call OPCM RN to discuss    Plan:  Case closed

## 2019-01-30 NOTE — PATIENT INSTRUCTIONS

## 2019-01-30 NOTE — PROGRESS NOTES
HPI     2 wk PRP OD  DLS-01/22/2019 Dr. Hilario     Pt sts no change slight blurred va. Denies pain     (-)Flashes (-)Floaters  (-)Photophobia  (-)Glare       Baerveldt  glaucoma shunt implant 250  with a pericardial patch graft in the superior temporal quadrant of the left eye 10/12/2017        Prior OCT  OD - ERM with small subfoveal SRF pocket  OS - no ME    Prior  FA - increased NV OU  Late macular leakage OU      A/P     1. Neovascular Glaucoma OU  PDR/OIS OU - significant vascular disease -?RVO OD  OD - s/p GDD with Morgan 2013  OS  S/p BV with JDN 10/17    10/18 increased ME OS - ?CME from RVO vs DME  12/18 - increased DME OS  S/p resumed Avastin OS  S/p PRP fillin OD 1/19      Get approval for Ozurdex OS - now with BV    Laser not functioning today - Avastin OS only  Plan PRP OS in 2 weeks    Continue to inject OS q 4-6 weeks during laser      Both eyes  Cosopt BID  Xal q day  Alphagan BID      2. ? OD  Recent PO steroids - pt taking 0.5 on taper  ERM possibly contributing  Recent elevated BP, also predominant pulmonary HTN with recent exacerbation - could contribute to small uveal effusiions - will  Monitor    3. ERM OD  No MMP  Follow    4. HTN Ret OU  Contributing to #2    5. ESRD on HD    6. PCIOL OU        2 weeks for PRP OS fillin    Risks, benefits, and alternatives to treatment discussed in detail with the patient.  The patient voiced understanding and wished to proceed with the procedure    Injection Procedure Note:  Diagnosis: PDR/NVG/OIS OS    Patient Identified and Time Out complete  Topical Proparacaine and Betadine.  Inject Avastin OS at 6:00 @ 3.5-4mm posterior to limbus  Post Operative Dx: Same  Complications: None  Follow up as above.

## 2019-02-01 NOTE — PROVATION PATIENT INSTRUCTIONS
Discharge Summary/Instructions for after Video Capsule Endoscopy  Patient Name: Shivani Sheets  Patient MRN: [id number]  Patient YOB: 1955  Tuesday, January 15, 2019  Indio Beltran MD  This is a 63 year old female.  1.  Do Not eat or drink anything for 1 hour.  Try sips of water first.  If   tolerated, resume your regular diet or one recommended by your physician.  2.  Do not drive, operate machinery, make critical decisions, or do   activities that require coordination or balance for 24 hours.  3.   You may experience a sore throat for 24 to 48 hours.  You may use   throat lozenges or gargle with warm salt water to relieve the discomfort.  4.  Because air was put into your stomach during the procedure, you may   experience some belching.  5.  Do not use any medication containing aspirin for 10 days.  6.  Go directly to the emergency room if you notice any of the following:   Chills and/or fever over 101 F   Persistent vomiting or vomiting with blood/nasal regurgitation   Severe abdominal pain, other than gas cramps   Severe chest pain   Black, tarry stools     Your doctor recommends these additional instructions:  Advance your diet as tolerated.  If you have any questions or problems, please call your physician.  EMERGENCY PHONE NUMBER: [phone]  LAB RESULTS: [phone]  Indio Beltran MD  2/1/2019 12:39:49 PM  This report has been verified and signed electronically.  PROVATION

## 2019-02-02 ENCOUNTER — HOSPITAL ENCOUNTER (INPATIENT)
Facility: HOSPITAL | Age: 64
LOS: 6 days | Discharge: HOME | DRG: 811 | End: 2019-02-08
Attending: EMERGENCY MEDICINE | Admitting: INTERNAL MEDICINE
Payer: MEDICARE

## 2019-02-02 DIAGNOSIS — I48.0 PAROXYSMAL A-FIB: ICD-10-CM

## 2019-02-02 DIAGNOSIS — D63.8 ANEMIA OF CHRONIC DISEASE: Chronic | ICD-10-CM

## 2019-02-02 DIAGNOSIS — K92.2 GASTROINTESTINAL HEMORRHAGE, UNSPECIFIED GASTROINTESTINAL HEMORRHAGE TYPE: Primary | ICD-10-CM

## 2019-02-02 DIAGNOSIS — Z51.5 PALLIATIVE CARE ENCOUNTER: ICD-10-CM

## 2019-02-02 DIAGNOSIS — N18.6 ESRD (END STAGE RENAL DISEASE): ICD-10-CM

## 2019-02-02 DIAGNOSIS — I49.3 PVC (PREMATURE VENTRICULAR CONTRACTION): ICD-10-CM

## 2019-02-02 DIAGNOSIS — R57.8 HEMORRHAGIC SHOCK: ICD-10-CM

## 2019-02-02 DIAGNOSIS — Z71.89 GOALS OF CARE, COUNSELING/DISCUSSION: ICD-10-CM

## 2019-02-02 DIAGNOSIS — Z71.89 ADVANCED CARE PLANNING/COUNSELING DISCUSSION: ICD-10-CM

## 2019-02-02 DIAGNOSIS — N18.6 END STAGE RENAL DISEASE ON DIALYSIS: ICD-10-CM

## 2019-02-02 DIAGNOSIS — D64.9 SEVERE ANEMIA: ICD-10-CM

## 2019-02-02 DIAGNOSIS — Z99.2 END STAGE RENAL DISEASE ON DIALYSIS: ICD-10-CM

## 2019-02-02 DIAGNOSIS — D62 ACUTE BLOOD LOSS ANEMIA: ICD-10-CM

## 2019-02-02 DIAGNOSIS — E66.09 CLASS 1 OBESITY DUE TO EXCESS CALORIES WITH SERIOUS COMORBIDITY AND BODY MASS INDEX (BMI) OF 32.0 TO 32.9 IN ADULT: ICD-10-CM

## 2019-02-02 DIAGNOSIS — I48.20 CHRONIC ATRIAL FIBRILLATION: ICD-10-CM

## 2019-02-02 DIAGNOSIS — I25.10 CORONARY ARTERY DISEASE INVOLVING NATIVE CORONARY ARTERY OF NATIVE HEART WITHOUT ANGINA PECTORIS: ICD-10-CM

## 2019-02-02 LAB
ABO + RH BLD: NORMAL
ALBUMIN SERPL BCP-MCNC: 2.1 G/DL
ALP SERPL-CCNC: 41 U/L
ALT SERPL W/O P-5'-P-CCNC: 10 U/L
ANION GAP SERPL CALC-SCNC: 11 MMOL/L
ANISOCYTOSIS BLD QL SMEAR: SLIGHT
APTT BLDCRRT: 35.4 SEC
AST SERPL-CCNC: 20 U/L
BASOPHILS # BLD AUTO: 0.01 K/UL
BASOPHILS NFR BLD: 0.2 %
BILIRUB SERPL-MCNC: 0.3 MG/DL
BLD GP AB SCN CELLS X3 SERPL QL: NORMAL
BLD PROD TYP BPU: NORMAL
BLD PROD TYP BPU: NORMAL
BLOOD GROUP ANTIBODIES SERPL: NORMAL
BLOOD UNIT EXPIRATION DATE: NORMAL
BLOOD UNIT EXPIRATION DATE: NORMAL
BLOOD UNIT TYPE CODE: 6200
BLOOD UNIT TYPE CODE: 6200
BLOOD UNIT TYPE: NORMAL
BLOOD UNIT TYPE: NORMAL
BUN SERPL-MCNC: 22 MG/DL
CALCIUM SERPL-MCNC: 7.1 MG/DL
CHLORIDE SERPL-SCNC: 102 MMOL/L
CO2 SERPL-SCNC: 28 MMOL/L
CODING SYSTEM: NORMAL
CODING SYSTEM: NORMAL
CREAT SERPL-MCNC: 6.8 MG/DL
DIFFERENTIAL METHOD: ABNORMAL
DISPENSE STATUS: NORMAL
DISPENSE STATUS: NORMAL
EOSINOPHIL # BLD AUTO: 0.1 K/UL
EOSINOPHIL NFR BLD: 1.3 %
ERYTHROCYTE [DISTWIDTH] IN BLOOD BY AUTOMATED COUNT: 16.5 %
EST. GFR  (AFRICAN AMERICAN): 6.7 ML/MIN/1.73 M^2
EST. GFR  (NON AFRICAN AMERICAN): 5.8 ML/MIN/1.73 M^2
ESTIMATED AVG GLUCOSE: 100 MG/DL
GLUCOSE SERPL-MCNC: 101 MG/DL
HBA1C MFR BLD HPLC: 5.1 %
HCT VFR BLD AUTO: 15 %
HGB BLD-MCNC: 4.3 G/DL
HYPOCHROMIA BLD QL SMEAR: ABNORMAL
IMM GRANULOCYTES # BLD AUTO: 0.02 K/UL
IMM GRANULOCYTES NFR BLD AUTO: 0.4 %
INR PPP: 3.2
LYMPHOCYTES # BLD AUTO: 1.1 K/UL
LYMPHOCYTES NFR BLD: 19.8 %
MAGNESIUM SERPL-MCNC: 1.9 MG/DL
MCH RBC QN AUTO: 27 PG
MCHC RBC AUTO-ENTMCNC: 28.7 G/DL
MCV RBC AUTO: 94 FL
MONOCYTES # BLD AUTO: 0.5 K/UL
MONOCYTES NFR BLD: 8.5 %
NEUTROPHILS # BLD AUTO: 3.9 K/UL
NEUTROPHILS NFR BLD: 69.8 %
NRBC BLD-RTO: 0 /100 WBC
OVALOCYTES BLD QL SMEAR: ABNORMAL
PHOSPHATE SERPL-MCNC: 3.1 MG/DL
PLATELET # BLD AUTO: 159 K/UL
PLATELET BLD QL SMEAR: ABNORMAL
PMV BLD AUTO: 12.3 FL
POIKILOCYTOSIS BLD QL SMEAR: SLIGHT
POTASSIUM SERPL-SCNC: 3.9 MMOL/L
PROT SERPL-MCNC: 5.4 G/DL
PROTHROMBIN TIME: 31.6 SEC
RBC # BLD AUTO: 1.59 M/UL
SODIUM SERPL-SCNC: 141 MMOL/L
TRANS ERYTHROCYTES VOL PATIENT: NORMAL ML
TRANS ERYTHROCYTES VOL PATIENT: NORMAL ML
WBC # BLD AUTO: 5.55 K/UL

## 2019-02-02 PROCEDURE — 99222 PR INITIAL HOSPITAL CARE,LEVL II: ICD-10-PCS | Mod: ,,, | Performed by: INTERNAL MEDICINE

## 2019-02-02 PROCEDURE — 86850 RBC ANTIBODY SCREEN: CPT

## 2019-02-02 PROCEDURE — 99223 1ST HOSP IP/OBS HIGH 75: CPT | Mod: GC,,, | Performed by: INTERNAL MEDICINE

## 2019-02-02 PROCEDURE — 36556 INSERT NON-TUNNEL CV CATH: CPT | Mod: ,,, | Performed by: EMERGENCY MEDICINE

## 2019-02-02 PROCEDURE — 85610 PROTHROMBIN TIME: CPT

## 2019-02-02 PROCEDURE — 93010 ELECTROCARDIOGRAM REPORT: CPT | Mod: ,,, | Performed by: INTERNAL MEDICINE

## 2019-02-02 PROCEDURE — 85025 COMPLETE CBC W/AUTO DIFF WBC: CPT | Mod: 91

## 2019-02-02 PROCEDURE — 25000003 PHARM REV CODE 250: Performed by: EMERGENCY MEDICINE

## 2019-02-02 PROCEDURE — 86922 COMPATIBILITY TEST ANTIGLOB: CPT

## 2019-02-02 PROCEDURE — 94761 N-INVAS EAR/PLS OXIMETRY MLT: CPT

## 2019-02-02 PROCEDURE — 85730 THROMBOPLASTIN TIME PARTIAL: CPT

## 2019-02-02 PROCEDURE — 99284 EMERGENCY DEPT VISIT MOD MDM: CPT | Mod: 25

## 2019-02-02 PROCEDURE — 83036 HEMOGLOBIN GLYCOSYLATED A1C: CPT

## 2019-02-02 PROCEDURE — 27201040 HC RC 50 FILTER

## 2019-02-02 PROCEDURE — 86870 RBC ANTIBODY IDENTIFICATION: CPT

## 2019-02-02 PROCEDURE — 36556 PR INSERT NON-TUNNEL CV CATH 5+ YRS OLD: ICD-10-PCS | Mod: ,,, | Performed by: EMERGENCY MEDICINE

## 2019-02-02 PROCEDURE — 25000003 PHARM REV CODE 250: Performed by: PHYSICIAN ASSISTANT

## 2019-02-02 PROCEDURE — 93010 EKG 12-LEAD: ICD-10-PCS | Mod: ,,, | Performed by: INTERNAL MEDICINE

## 2019-02-02 PROCEDURE — 99222 1ST HOSP IP/OBS MODERATE 55: CPT | Mod: ,,, | Performed by: INTERNAL MEDICINE

## 2019-02-02 PROCEDURE — 99223 PR INITIAL HOSPITAL CARE,LEVL III: ICD-10-PCS | Mod: GC,,, | Performed by: INTERNAL MEDICINE

## 2019-02-02 PROCEDURE — 80053 COMPREHEN METABOLIC PANEL: CPT

## 2019-02-02 PROCEDURE — 20600001 HC STEP DOWN PRIVATE ROOM

## 2019-02-02 PROCEDURE — 86905 BLOOD TYPING RBC ANTIGENS: CPT

## 2019-02-02 PROCEDURE — 84100 ASSAY OF PHOSPHORUS: CPT

## 2019-02-02 PROCEDURE — P9021 RED BLOOD CELLS UNIT: HCPCS

## 2019-02-02 PROCEDURE — 83735 ASSAY OF MAGNESIUM: CPT

## 2019-02-02 PROCEDURE — 80048 BASIC METABOLIC PNL TOTAL CA: CPT

## 2019-02-02 PROCEDURE — 99291 PR CRITICAL CARE, E/M 30-74 MINUTES: ICD-10-PCS | Mod: 25,,, | Performed by: EMERGENCY MEDICINE

## 2019-02-02 PROCEDURE — 93005 ELECTROCARDIOGRAM TRACING: CPT

## 2019-02-02 PROCEDURE — 36415 COLL VENOUS BLD VENIPUNCTURE: CPT

## 2019-02-02 PROCEDURE — 99291 CRITICAL CARE FIRST HOUR: CPT | Mod: 25,,, | Performed by: EMERGENCY MEDICINE

## 2019-02-02 PROCEDURE — 96360 HYDRATION IV INFUSION INIT: CPT

## 2019-02-02 RX ORDER — DILTIAZEM HYDROCHLORIDE 300 MG/1
300 CAPSULE, COATED, EXTENDED RELEASE ORAL DAILY
Status: DISCONTINUED | OUTPATIENT
Start: 2019-02-03 | End: 2019-02-02

## 2019-02-02 RX ORDER — GLUCAGON 1 MG
1 KIT INJECTION
Status: DISCONTINUED | OUTPATIENT
Start: 2019-02-02 | End: 2019-02-08 | Stop reason: HOSPADM

## 2019-02-02 RX ORDER — HYDROCODONE BITARTRATE AND ACETAMINOPHEN 500; 5 MG/1; MG/1
TABLET ORAL
Status: DISCONTINUED | OUTPATIENT
Start: 2019-02-02 | End: 2019-02-05

## 2019-02-02 RX ORDER — LATANOPROST 50 UG/ML
1 SOLUTION/ DROPS OPHTHALMIC NIGHTLY
Status: DISCONTINUED | OUTPATIENT
Start: 2019-02-02 | End: 2019-02-08 | Stop reason: HOSPADM

## 2019-02-02 RX ORDER — IBUPROFEN 200 MG
16 TABLET ORAL
Status: DISCONTINUED | OUTPATIENT
Start: 2019-02-02 | End: 2019-02-08 | Stop reason: HOSPADM

## 2019-02-02 RX ORDER — SODIUM CHLORIDE 0.9 % (FLUSH) 0.9 %
3 SYRINGE (ML) INJECTION EVERY 8 HOURS
Status: DISCONTINUED | OUTPATIENT
Start: 2019-02-02 | End: 2019-02-08 | Stop reason: HOSPADM

## 2019-02-02 RX ORDER — IBUPROFEN 200 MG
24 TABLET ORAL
Status: DISCONTINUED | OUTPATIENT
Start: 2019-02-02 | End: 2019-02-08 | Stop reason: HOSPADM

## 2019-02-02 RX ORDER — TIMOLOL MALEATE 5 MG/ML
1 SOLUTION/ DROPS OPHTHALMIC 2 TIMES DAILY
Status: DISCONTINUED | OUTPATIENT
Start: 2019-02-02 | End: 2019-02-08 | Stop reason: HOSPADM

## 2019-02-02 RX ORDER — PANTOPRAZOLE SODIUM 40 MG/1
40 TABLET, DELAYED RELEASE ORAL DAILY
Status: DISCONTINUED | OUTPATIENT
Start: 2019-02-03 | End: 2019-02-02

## 2019-02-02 RX ORDER — DORZOLAMIDE HYDROCHLORIDE AND TIMOLOL MALEATE 20; 5 MG/ML; MG/ML
1 SOLUTION/ DROPS OPHTHALMIC 2 TIMES DAILY
Status: DISCONTINUED | OUTPATIENT
Start: 2019-02-02 | End: 2019-02-02

## 2019-02-02 RX ORDER — DORZOLAMIDE HCL 20 MG/ML
1 SOLUTION/ DROPS OPHTHALMIC 2 TIMES DAILY
Status: DISCONTINUED | OUTPATIENT
Start: 2019-02-02 | End: 2019-02-08 | Stop reason: HOSPADM

## 2019-02-02 RX ORDER — SODIUM CHLORIDE 0.9 % (FLUSH) 0.9 %
5 SYRINGE (ML) INJECTION
Status: DISCONTINUED | OUTPATIENT
Start: 2019-02-02 | End: 2019-02-08 | Stop reason: HOSPADM

## 2019-02-02 RX ORDER — PANTOPRAZOLE SODIUM 40 MG/10ML
80 INJECTION, POWDER, LYOPHILIZED, FOR SOLUTION INTRAVENOUS EVERY 12 HOURS
Status: DISCONTINUED | OUTPATIENT
Start: 2019-02-03 | End: 2019-02-03

## 2019-02-02 RX ORDER — LEVOTHYROXINE SODIUM 100 UG/1
100 TABLET ORAL
Status: DISCONTINUED | OUTPATIENT
Start: 2019-02-03 | End: 2019-02-08 | Stop reason: HOSPADM

## 2019-02-02 RX ORDER — ONDANSETRON 8 MG/1
8 TABLET, ORALLY DISINTEGRATING ORAL EVERY 8 HOURS PRN
Status: DISCONTINUED | OUTPATIENT
Start: 2019-02-02 | End: 2019-02-08 | Stop reason: HOSPADM

## 2019-02-02 RX ORDER — ATORVASTATIN CALCIUM 10 MG/1
10 TABLET, FILM COATED ORAL DAILY
Status: DISCONTINUED | OUTPATIENT
Start: 2019-02-03 | End: 2019-02-08 | Stop reason: HOSPADM

## 2019-02-02 RX ADMIN — SODIUM CHLORIDE 1000 ML: 0.9 INJECTION, SOLUTION INTRAVENOUS at 01:02

## 2019-02-02 RX ADMIN — SODIUM CHLORIDE: 0.9 INJECTION, SOLUTION INTRAVENOUS at 06:02

## 2019-02-02 NOTE — HPI
62 yo F w/ PMHx of ESRD x 20 yrs secondary to HTN, dialysis M/W/F ([facility]),  pulmonary HTN, diastolic dysfunction, central retinal artery occlusion, CAD with remote PCI, s/p failed kidney transplant [id number], PUD, STEFFANY on CPAP, hypothyroidism, RA, HLD and obesity who presents with a complaint of rectal bleeding which started 2/1/19 (diarrhea with blood clots). Also has complaining of shortness of breath and weakness. Once arrived to ED had low blood pressures (98/51 mmHg), no oxygen requirement and Hgb 4.3. Nephrology was consulted for inpatient dialysis treatment.

## 2019-02-02 NOTE — ED PROVIDER NOTES
Encounter Date: 2019       History     Chief Complaint   Patient presents with    Rectal Bleeding     recent dc after admit for GI bleed- began bleeding rectally again last night.      63 y.o. female with past surgical history of a kidney transplant (Failed ) and past medical history of HTN, HLD, CHF, CVA, COPD, pulm HTN (adempas/uptravi), glaucoma, bleeding peptic ulcer who presents to the ED with a complaint of rectal bleeding x 1 day with recent hospital admission on 2019 with discharge home 2019 secondary to GI bleed. She is anticoagulated. She reports noticing bright red blood in toilet following BM last night, she reports the rectal bleeding had stopped prior to hospital discharge on 19. Hematochezia on exam with Heme occult guaiac positive. On dialysis M/W/F, had dialysis yesterday. Denies chest pain, nausea, vomiting, SOB, hematemesis, dysuria, abdominal pain, confusion or headache.           Review of patient's allergies indicates:   Allergen Reactions    Penicillins Swelling    Iodine      Other reaction(s): Hives    Sulfamethoxazole-trimethoprim      Other reaction(s): Swelling  Other reaction(s): Hives     Past Medical History:   Diagnosis Date    Allergy     Anemia     Anticoagulant long-term use     4 years coumadin, asa    Arthritis     Awaiting organ transplant 2013    Cataract     Central serous chorioretinopathy of eye, right 2018    CHF (congestive heart failure)     Chronic kidney disease     Colon polyps     COPD (chronic obstructive pulmonary disease)     Coronary artery disease     Diabetes mellitus     Diabetic retinopathy     Diverticulosis     Encounter for blood transfusion     ESRD from HTN strtied RRT 1999    Failed  donor kidney transplant -     Glaucoma     History of bleeding peptic ulcer      as stated per pt    Hyperlipidemia     Hypertension     Hypothyroidism     Morbid obesity 8/10/2012     Renal hypertension     Stroke          Past Surgical History:   Procedure Laterality Date    CATARACT EXTRACTION W/  INTRAOCULAR LENS IMPLANT Bilateral     COLON SURGERY      COLONOSCOPY      COLONOSCOPY N/A 2018    [facility] by [facility] (2ND [facility]LR)    EGD (ESOPHAGOGASTRODUODENOSCOPY) N/A 2019    Performed by Miranda [facility]adiaga MD at [facility] (2ND FLR)    EGD (ESOPHAGOGASTRODUODENOSCOPY) N/A 2018    Performed by Luis Washington MD at [facility] (2ND FLR)    ESOPHAGOGASTRODUODENOSCOPY (EGD) N/A 2018    Performed by Kenji Desir MD at [facility] (2ND FLR)    EYE SURGERY      FRACTURE SURGERY      R arm    HERNIA REPAIR      HYSTERECTOMY      INSERTION-IMPLANT-GLAUCOMA Left 10/12/2017    Performed by [facility] at [facility] OR 1ST FLR    KIDNEY TRANSPLANT      NEPHRECTOMY  2008    transplant     PARATHYROID GLAND SURGERY      TONSILLECTOMY      UPPER GASTROINTESTINAL ENDOSCOPY       Family History   Problem Relation Age of Onset    Heart attack Father     Heart failure Father     Hypertension Father     Blindness Father     Heart attack Mother     Heart failure Mother     Hypertension Mother     Asthma Sister     Depression Sister     Hypertension Sister     Hypertension Brother     Kidney disease Brother         ESRD    Diabetes Maternal Aunt     Breast cancer Maternal Aunt     Amblyopia Neg Hx     Cataracts Neg Hx     Macular degeneration Neg Hx     Retinal detachment Neg Hx     Strabismus Neg Hx     Colon cancer Neg Hx     Esophageal cancer Neg Hx      Social History     Tobacco Use    Smoking status: Former Smoker     Types: Cigarettes     Last attempt to quit: 8/10/2000     Years since quittin.4    Smokeless tobacco: Never Used   Substance Use Topics    Alcohol use: No     Comment: hx of etoh     Drug use: No     Review of Systems   Constitutional: Positive for activity change, appetite change and fatigue.  Negative for chills, diaphoresis, fever and unexpected weight change.   HENT: Negative for congestion, ear pain, facial swelling, postnasal drip, sinus pressure, sinus pain and sore throat.    Eyes: Negative for photophobia, pain and visual disturbance.   Respiratory: Negative for apnea, cough, choking, chest tightness, shortness of breath, wheezing and stridor.    Cardiovascular: Positive for leg swelling. Negative for chest pain and palpitations.   Gastrointestinal: Positive for anal bleeding and blood in stool. Negative for abdominal distention, abdominal pain, constipation, diarrhea, nausea, rectal pain and vomiting.   Genitourinary: Negative for difficulty urinating, dysuria, enuresis, flank pain, frequency, hematuria and urgency.   Musculoskeletal: Negative for arthralgias, back pain, myalgias and neck pain.   Skin: Positive for pallor. Negative for color change, rash and wound.   Neurological: Negative for dizziness, weakness, light-headedness and headaches.   Hematological: Negative for adenopathy.   Psychiatric/Behavioral: Negative for agitation. The patient is not nervous/anxious.        Physical Exam     Initial Vitals [02/02/19 1120]   BP Pulse Resp Temp SpO2   (!) 98/51 73 18 97.8 °F (36.6 °C) (!) 92 %      MAP       --         Physical Exam    Constitutional: She appears well-developed and well-nourished. She is not diaphoretic. No distress.   HENT:   Head: Normocephalic and atraumatic.   Right Ear: External ear normal.   Left Ear: External ear normal.   Eyes: EOM are normal. Pupils are equal, round, and reactive to light.   Pale conjunctiva   Neck: Normal range of motion. Neck supple.   Cardiovascular: Normal rate, regular rhythm and normal heart sounds.   Pulmonary/Chest: Breath sounds normal. No respiratory distress.   Abdominal: Soft. Bowel sounds are normal. She exhibits no distension. There is no tenderness. There is no rebound and no guarding.   Genitourinary: Rectum normal. Rectal exam shows no  external hemorrhoid, no internal hemorrhoid, no fissure, no mass and no tenderness.   Musculoskeletal: Normal range of motion. She exhibits no edema or tenderness.   Neurological: She is alert and oriented to person, place, and time.   Skin: Skin is warm and dry. No rash and no abscess noted. No erythema. There is pallor.   Psychiatric: She has a normal mood and affect.         ED Course   External Jugular IV  Date/Time: 2/2/2019 3:29 PM  Performed by: Vicky Callahan MD  Authorized by: Vicky Callahan MD   Consent Done: Emergent Situation  Location (Ext Jugular): Right.  Area Prepped With: Chlorohexidine.  Catheter Size: 20 ga.  Catheter Type: Jelco.  Number of attempts: 1  Fixation/Dressing: Taped in place.  Patient tolerance: Patient tolerated the procedure well with no immediate complications        Labs Reviewed - No data to display       Imaging Results    None          Medical Decision Making:   Initial Assessment:   63 y.o. F with PMH GI bleed, recently discharged on 01/18/2019 secondary to GI bleed. Hematochezia on exam. Noticed BRB in toilet last night.     1:13 PM labs initiated. 1L NS fluid bolus ordered. Patient discussed with Dr Callahan in ED, will transfer patient to ED bed for further care. Will be signing off now.       I discussed the care of this patient with my supervising MD.                Attending Attestation:     Physician Attestation Statement for NP/PA:   I have conducted a face to face encounter with this patient in addition to the NP/PA, due to Medical Complexity    Other NP/PA Attestation Additions:    History of Present Illness: 63 y.o female with ESRD dialysis M,W,F presents with complaints of rectal bleeding for 1 day. She complains of feeling severe weakness. No exacerbating or alleviating factors. Timing is constant.   Physical Exam: Eyes: Pale conjunctiva  Ext: Dialysis fistula left upper extremity with dilatation  Rectal:per report bloody brown stool heme positive    Medical Decision Making: GI bleeding with signs of acute blood loss and early shock. Immediate resuscitation  Discussed with GI fellow who agrees to evaluate  Discussed with ICU who will evaluate  Discussed with Dr. Fuentes with Nephrology   Procedure Note: External jugular catheter placed by me    Patient was seen immediately upon arrival due to a high probability of imminent or life threatening deterioration in the patient's condition. Initial history, physical exam and assessment done. Case discussed with family, private physician, and consultant. Patient reassessed multiple times and observed for response to treatment during ED stay.    Total Critical Care Time. 40 minutes, excluding billable procedures.                   Clinical Impression:   The encounter diagnosis was Gastrointestinal hemorrhage, unspecified gastrointestinal hemorrhage type.                             ANA LILIA Berger  02/02/19 1615       Vicky Callahan MD  02/02/19 5950

## 2019-02-02 NOTE — SUBJECTIVE & OBJECTIVE
Past Medical History:   Diagnosis Date    Allergy     Anemia     Anticoagulant long-term use     4 years coumadin, asa    Arthritis     Awaiting organ transplant 2013    Cataract     Central serous chorioretinopathy of eye, right 2018    CHF (congestive heart failure)     Chronic kidney disease     Colon polyps     COPD (chronic obstructive pulmonary disease)     Coronary artery disease     Diabetes mellitus     Diabetic retinopathy     Diverticulosis     Encounter for blood transfusion     ESRD from HTN strtied RRT 1999    Failed  donor kidney transplant      Glaucoma     History of bleeding peptic ulcer      as stated per pt    Hyperlipidemia     Hypertension     Hypothyroidism     Morbid obesity 8/10/2012    Renal hypertension     Stroke     1987       Past Surgical History:   Procedure Laterality Date    CATARACT EXTRACTION W/  INTRAOCULAR LENS IMPLANT Bilateral     COLON SURGERY      COLONOSCOPY      COLONOSCOPY N/A 2018    Performed by [facility] MD at [facility] END[facility] (2ND FLR)    EGD (ESOPHAGOGASTRODUODENOSCOPY) N/A 2019    Performed by Miranda Maradiaga MD at [facility] ENDO (2ND FLR)    EGD (ESOPHAGOGASTRODUODENOSCOPY) N/A 2018    Performed by Luis Washington MD at [facility] ENDO (2ND FLR)    ESOPHAGOGASTRODUODENOSCOPY (EGD) N/A 2018    Performed by Kenji Desir MD at [facility] ENDO (2ND FLR)    EYE SURGERY      FRACTURE SURGERY      R arm    HERNIA REPAIR      HYSTERECTOMY      INSERTION-IMPLANT-GLAUCOMA Left 10/12/2017    Performed by J[facility]id [facility]ern MD at [facility] OR 1ST FLR    KIDNEY TRANSPLANT      NEPHRECTOMY  2008    transplant     PARATHYROID GLAND SURGERY      TONSILLECTOMY      UPPER GASTROINTESTINAL ENDOSCOPY         Review of patient's allergies indicates:   Allergen Reactions    Penicillins Swelling    Iodine      Other reaction(s): Hives    Sulfamethoxazole-trimethoprim      Other reaction(s):  Swelling  Other reaction(s): Hives       Family History     Problem Relation (Age of Onset)    Asthma Sister    Blindness Father    Breast cancer Maternal Aunt    Depression Sister    Diabetes Maternal Aunt    Heart attack Father, Mother    Heart failure Father, Mother    Hypertension Father, Mother, Sister, Brother    Kidney disease Brother        Tobacco Use    Smoking status: Former Smoker     Types: Cigarettes     Last attempt to quit: 8/10/2000     Years since quittin.4    Smokeless tobacco: Never Used   Substance and Sexual Activity    Alcohol use: No     Comment: hx of etoh     Drug use: No    Sexual activity: No      Review of Systems   Constitutional: Positive for activity change, appetite change and fatigue. Negative for chills and fever.   HENT: Negative for sore throat and trouble swallowing.    Eyes: Positive for visual disturbance (blurry vision with standing). Negative for photophobia.   Respiratory: Negative for cough and shortness of breath.    Cardiovascular: Positive for leg swelling. Negative for chest pain and palpitations.   Gastrointestinal: Positive for anal bleeding, blood in stool, nausea and vomiting. Negative for abdominal distention, abdominal pain, constipation, diarrhea and rectal pain.   Genitourinary:        Anuric   Musculoskeletal: Negative for back pain and neck pain.   Skin: Negative for rash and wound.   Neurological: Positive for dizziness and light-headedness. Negative for syncope, numbness and headaches.   Psychiatric/Behavioral: Negative for confusion.     Objective:     Vital Signs (Most Recent):  Temp: 98.3 °F (36.8 °C) (19 1236)  Pulse: 77 (19 1345)  Resp: 16 (19 1345)  BP: 133/68 (19 1345)  SpO2: (!) 94 % (19 1345) Vital Signs (24h Range):  Temp:  [97.8 °F (36.6 °C)-98.3 °F (36.8 °C)] 98.3 °F (36.8 °C)  Pulse:  [73-77] 77  Resp:  [16-18] 16  SpO2:  [92 %-95 %] 94 %  BP: ()/(51-68) 133/68   Weight: 71.7 kg (158 lb)  Body mass  index is 29.85 kg/m².    No intake or output data in the 24 hours ending 02/02/19 1648    Physical Exam   Constitutional: She is oriented to person, place, and time. She appears well-developed and well-nourished. No distress.   HENT:   Head: Normocephalic and atraumatic.   Eyes: Conjunctivae are normal. No scleral icterus.   Neck: Normal range of motion.   Cardiovascular: Normal heart sounds and intact distal pulses.   Regular rate and rhythm with frequent PVCs   Pulmonary/Chest: Effort normal. She has rales (mild crackles at bilateral bases).   Abdominal: Soft. Bowel sounds are normal. She exhibits no distension. There is no tenderness.   Musculoskeletal: She exhibits edema (1+ edema of bilateral lower extremities).   Neurological: She is alert and oriented to person, place, and time.   Nursing note and vitals reviewed.      Vents:     Lines/Drains/Airways     Central Venous Catheter Line                 RETIRED! DO NOT USE: Hemodialysis Catheter Arteriovenous fistula Left Forearm -- days          Drain                 Hemodialysis AV Fistula Left upper arm -- days          Peripheral Intravenous Line                 Peripheral IV - Single Lumen 02/02/19 1340 Right Antecubital less than 1 day         Peripheral IV - Single Lumen 02/02/19 1530 Other (Comment) less than 1 day              Significant Labs:    CBC/Anemia Profile:  Recent Labs   Lab 02/02/19  1342   WBC 5.55   HGB 4.3*   HCT 15.0*      MCV 94   RDW 16.5*        Chemistries:  Recent Labs   Lab 02/02/19  1342      K 3.9      CO2 28   BUN 22   CREATININE 6.8*   CALCIUM 7.1*   ALBUMIN 2.1*   PROT 5.4*   BILITOT 0.3   ALKPHOS 41*   ALT 10   AST 20   MG 1.9   PHOS 3.1     Significant Imaging: I have reviewed and interpreted all pertinent imaging results/findings within the past 24 hours.

## 2019-02-02 NOTE — ED TRIAGE NOTES
Patient's name and date of birth checked and is correct.    LOC: The patient is awake, alert, and oriented to place, time, situation. Affect is appropriate.  Speech is appropriate and clear.      APPEARANCE: Patient resting comfortably, and is  in no acute distress.  Patient is clean and well groomed.     SKIN: The skin is warm and dry; color consistent with ethnicity.  Patient has normal skin turgor and moist mucus membranes.  Skin intact; no breakdown or bruising noted.      MUSCULOSKELETAL: Patient moving upper without difficulty.  Has weakness BLE.     RESPIRATORY: Airway is open and patent. Respirations spontaneous, even, easy, and non-labored.  Patient has a normal effort and rate.  No accessory muscle use noted. Denies cough.  BS clear.  Dialysis shunt left arm.     CARDIAC:  No peripheral edema noted. No complaints of chest pain.       ABDOMEN: Soft and non tender to palpation.  No distention noted.      NEUROLOGIC: Eyes open spontaneously.  Behavior appropriate to situation.  Follows commands; facial expression symmetrical.  Purposeful motor response noted; normal sensation in all extremities.

## 2019-02-02 NOTE — HPI
Mrs. Sheets is a 64 yo woman with PMHx significant for pulmonary HTN (on adempas/uptravi), diastolic dysfunction, pAF(on Coumadin, diltiazem), central retinal artery occlusion without significant carotid artery stenosis, CAD with remote PCI, ESRD secondary to HTN (anuric, HD MWF), s/p failed kidney transplant (2005), PUD (dx 1970s by endoscopy for upper GI bleed), STEFFANY on 2L O2 with sleep, hypothyroidism, RA, HLD and obesity who presents to the ED with the CC of BRBPR with clots. Onset of symptoms was the night prior to presentation. Of note, patient was recently admitted for melena and was discharged on 1/18/19 after EGD, push enteroscopy, and video capsule endoscopy failed to show source of bleeding. At the time of discharge, patient's ASA was stopped, Coumadin and Adempas continued. Hg was 7.6. On evaluation in the ED on this presentation, hemoglobin was found to be 4.3.     Patient endorses dizziness and blurred vision with standing. Denies CP, SOB, cough, palpitations, abdominal pain. Does state she had one episode of nausea with non-bloody, non-bilious emesis the morning of presentation.     Critical Care Medicine consulted due to concern for hemodynamically unstable GI bleed.

## 2019-02-02 NOTE — ASSESSMENT & PLAN NOTE
On evaluation in the ED, patient's BP was 133/68, pulse rate 77 following 250 cc ND bolus x 4 (total of 1 L NS) and prior to blood administration. Given patient's clinical history of BRBPR and clots, along with recent EGD and push enteroscopy, lower GI bleed likely. Patient was evaluated by GI in the ED, and are planning for colonoscopy tomorrow.    - Agree with transfusing 2 u PRBCs  - Continue IV PPI BID  - Would recommend Vitamin K to correct supratherapeutic INR  - Maintain 2 large bore peripheral IVs  - Per GI, clear liquid diet now and NPO at midnight  - Closely monitor respiratory status as patient is anuric with diastolic dysfunction and mild crackles present at bilateral bases on exam    Patient currently hemodynamically stable, without ICU needs. Would recommend admission to Hospital Medicine at this time. Should patient clinical worsen, please re-consult Critical Care Service.

## 2019-02-02 NOTE — ASSESSMENT & PLAN NOTE
Anuric, HD MWF. HD completed without complication day prior to presentation.    - Nephro consulted for HD  - Unable to be place on HD due to current level of anemia

## 2019-02-02 NOTE — ASSESSMENT & PLAN NOTE
- Holding BP meds in the setting of hypotension  - Will closely monitor respiratory status during fluid and blood resuscitation  - Restart GDMT as appropriate

## 2019-02-02 NOTE — ED NOTES
Patient requested that an US IV be inserted as she has very thin fragile peripheral veins.  ANA LILIA Leyva notified.

## 2019-02-03 LAB
ALBUMIN SERPL BCP-MCNC: 2.1 G/DL
ALP SERPL-CCNC: 37 U/L
ALT SERPL W/O P-5'-P-CCNC: 10 U/L
ANION GAP SERPL CALC-SCNC: 12 MMOL/L
ANION GAP SERPL CALC-SCNC: 13 MMOL/L
AST SERPL-CCNC: 19 U/L
BASOPHILS # BLD AUTO: 0.03 K/UL
BASOPHILS # BLD AUTO: 0.05 K/UL
BASOPHILS # BLD AUTO: 0.07 K/UL
BASOPHILS NFR BLD: 0.4 %
BASOPHILS NFR BLD: 0.4 %
BASOPHILS NFR BLD: 0.5 %
BILIRUB SERPL-MCNC: 0.5 MG/DL
BNP SERPL-MCNC: 1584 PG/ML
BUN SERPL-MCNC: 24 MG/DL
BUN SERPL-MCNC: 26 MG/DL
CALCIUM SERPL-MCNC: 7 MG/DL
CALCIUM SERPL-MCNC: 7.2 MG/DL
CHLORIDE SERPL-SCNC: 103 MMOL/L
CHLORIDE SERPL-SCNC: 105 MMOL/L
CO2 SERPL-SCNC: 25 MMOL/L
CO2 SERPL-SCNC: 26 MMOL/L
CREAT SERPL-MCNC: 7.1 MG/DL
CREAT SERPL-MCNC: 7.7 MG/DL
DIFFERENTIAL METHOD: ABNORMAL
EOSINOPHIL # BLD AUTO: 0.2 K/UL
EOSINOPHIL # BLD AUTO: 0.2 K/UL
EOSINOPHIL # BLD AUTO: 0.3 K/UL
EOSINOPHIL NFR BLD: 1.2 %
EOSINOPHIL NFR BLD: 2.6 %
EOSINOPHIL NFR BLD: 3.3 %
ERYTHROCYTE [DISTWIDTH] IN BLOOD BY AUTOMATED COUNT: 15.2 %
ERYTHROCYTE [DISTWIDTH] IN BLOOD BY AUTOMATED COUNT: 15.8 %
ERYTHROCYTE [DISTWIDTH] IN BLOOD BY AUTOMATED COUNT: 15.9 %
EST. GFR  (AFRICAN AMERICAN): 5.8 ML/MIN/1.73 M^2
EST. GFR  (AFRICAN AMERICAN): 6.4 ML/MIN/1.73 M^2
EST. GFR  (NON AFRICAN AMERICAN): 5 ML/MIN/1.73 M^2
EST. GFR  (NON AFRICAN AMERICAN): 5.5 ML/MIN/1.73 M^2
GLUCOSE SERPL-MCNC: 101 MG/DL
GLUCOSE SERPL-MCNC: 82 MG/DL
HCT VFR BLD AUTO: 20.1 %
HCT VFR BLD AUTO: 22 %
HCT VFR BLD AUTO: 23.5 %
HGB BLD-MCNC: 6.3 G/DL
HGB BLD-MCNC: 7.1 G/DL
HGB BLD-MCNC: 7.3 G/DL
IMM GRANULOCYTES # BLD AUTO: 0.04 K/UL
IMM GRANULOCYTES # BLD AUTO: 0.08 K/UL
IMM GRANULOCYTES # BLD AUTO: 0.11 K/UL
IMM GRANULOCYTES NFR BLD AUTO: 0.5 %
IMM GRANULOCYTES NFR BLD AUTO: 0.7 %
IMM GRANULOCYTES NFR BLD AUTO: 0.9 %
INR PPP: 3
LYMPHOCYTES # BLD AUTO: 1.1 K/UL
LYMPHOCYTES # BLD AUTO: 1.1 K/UL
LYMPHOCYTES # BLD AUTO: 1.2 K/UL
LYMPHOCYTES NFR BLD: 11.7 %
LYMPHOCYTES NFR BLD: 14.8 %
LYMPHOCYTES NFR BLD: 7.5 %
MAGNESIUM SERPL-MCNC: 2 MG/DL
MCH RBC QN AUTO: 28 PG
MCH RBC QN AUTO: 28.9 PG
MCH RBC QN AUTO: 29.3 PG
MCHC RBC AUTO-ENTMCNC: 31.1 G/DL
MCHC RBC AUTO-ENTMCNC: 31.7 G/DL
MCHC RBC AUTO-ENTMCNC: 32.3 G/DL
MCV RBC AUTO: 89 FL
MCV RBC AUTO: 90 FL
MCV RBC AUTO: 93 FL
MONOCYTES # BLD AUTO: 0.8 K/UL
MONOCYTES # BLD AUTO: 0.8 K/UL
MONOCYTES # BLD AUTO: 1 K/UL
MONOCYTES NFR BLD: 10.3 %
MONOCYTES NFR BLD: 6.2 %
MONOCYTES NFR BLD: 8.3 %
NEUTROPHILS # BLD AUTO: 13.3 K/UL
NEUTROPHILS # BLD AUTO: 5.4 K/UL
NEUTROPHILS # BLD AUTO: 7 K/UL
NEUTROPHILS NFR BLD: 70.7 %
NEUTROPHILS NFR BLD: 76 %
NEUTROPHILS NFR BLD: 84 %
NRBC BLD-RTO: 0 /100 WBC
PLATELET # BLD AUTO: 123 K/UL
PLATELET # BLD AUTO: 128 K/UL
PLATELET # BLD AUTO: 142 K/UL
PMV BLD AUTO: 11.8 FL
PMV BLD AUTO: 12.3 FL
PMV BLD AUTO: 12.7 FL
POTASSIUM SERPL-SCNC: 3.8 MMOL/L
POTASSIUM SERPL-SCNC: 4.1 MMOL/L
PROT SERPL-MCNC: 5.3 G/DL
PROTHROMBIN TIME: 28.6 SEC
RBC # BLD AUTO: 2.15 M/UL
RBC # BLD AUTO: 2.46 M/UL
RBC # BLD AUTO: 2.61 M/UL
SODIUM SERPL-SCNC: 142 MMOL/L
SODIUM SERPL-SCNC: 142 MMOL/L
WBC # BLD AUTO: 15.84 K/UL
WBC # BLD AUTO: 7.64 K/UL
WBC # BLD AUTO: 9.23 K/UL

## 2019-02-03 PROCEDURE — 99232 SBSQ HOSP IP/OBS MODERATE 35: CPT | Mod: ,,, | Performed by: INTERNAL MEDICINE

## 2019-02-03 PROCEDURE — 63600175 PHARM REV CODE 636 W HCPCS: Performed by: INTERNAL MEDICINE

## 2019-02-03 PROCEDURE — 80053 COMPREHEN METABOLIC PANEL: CPT

## 2019-02-03 PROCEDURE — A4216 STERILE WATER/SALINE, 10 ML: HCPCS | Performed by: INTERNAL MEDICINE

## 2019-02-03 PROCEDURE — 99232 PR SUBSEQUENT HOSPITAL CARE,LEVL II: ICD-10-PCS | Mod: ,,, | Performed by: INTERNAL MEDICINE

## 2019-02-03 PROCEDURE — C9113 INJ PANTOPRAZOLE SODIUM, VIA: HCPCS | Performed by: INTERNAL MEDICINE

## 2019-02-03 PROCEDURE — 36430 TRANSFUSION BLD/BLD COMPNT: CPT

## 2019-02-03 PROCEDURE — 20600001 HC STEP DOWN PRIVATE ROOM

## 2019-02-03 PROCEDURE — 36415 COLL VENOUS BLD VENIPUNCTURE: CPT

## 2019-02-03 PROCEDURE — 83880 ASSAY OF NATRIURETIC PEPTIDE: CPT

## 2019-02-03 PROCEDURE — 25000003 PHARM REV CODE 250: Performed by: STUDENT IN AN ORGANIZED HEALTH CARE EDUCATION/TRAINING PROGRAM

## 2019-02-03 PROCEDURE — 25000003 PHARM REV CODE 250: Performed by: INTERNAL MEDICINE

## 2019-02-03 PROCEDURE — 99232 SBSQ HOSP IP/OBS MODERATE 35: CPT | Mod: GC,,, | Performed by: INTERNAL MEDICINE

## 2019-02-03 PROCEDURE — 85610 PROTHROMBIN TIME: CPT

## 2019-02-03 PROCEDURE — 99232 PR SUBSEQUENT HOSPITAL CARE,LEVL II: ICD-10-PCS | Mod: GC,,, | Performed by: INTERNAL MEDICINE

## 2019-02-03 PROCEDURE — 83735 ASSAY OF MAGNESIUM: CPT

## 2019-02-03 PROCEDURE — 85025 COMPLETE CBC W/AUTO DIFF WBC: CPT | Mod: 91

## 2019-02-03 RX ORDER — POLYETHYLENE GLYCOL 3350, SODIUM SULFATE ANHYDROUS, SODIUM BICARBONATE, SODIUM CHLORIDE, POTASSIUM CHLORIDE 236; 22.74; 6.74; 5.86; 2.97 G/4L; G/4L; G/4L; G/4L; G/4L
4000 POWDER, FOR SOLUTION ORAL ONCE
Status: COMPLETED | OUTPATIENT
Start: 2019-02-03 | End: 2019-02-03

## 2019-02-03 RX ORDER — SODIUM CHLORIDE 9 MG/ML
INJECTION, SOLUTION INTRAVENOUS
Status: DISCONTINUED | OUTPATIENT
Start: 2019-02-04 | End: 2019-02-08 | Stop reason: HOSPADM

## 2019-02-03 RX ORDER — SODIUM CHLORIDE 9 MG/ML
INJECTION, SOLUTION INTRAVENOUS ONCE
Status: COMPLETED | OUTPATIENT
Start: 2019-02-04 | End: 2019-02-04

## 2019-02-03 RX ORDER — PANTOPRAZOLE SODIUM 40 MG/10ML
40 INJECTION, POWDER, LYOPHILIZED, FOR SOLUTION INTRAVENOUS EVERY 12 HOURS
Status: DISCONTINUED | OUTPATIENT
Start: 2019-02-03 | End: 2019-02-05

## 2019-02-03 RX ADMIN — Medication 3 ML: at 12:02

## 2019-02-03 RX ADMIN — RIOCIGUAT 2 MG: 2 TABLET, FILM COATED ORAL at 09:02

## 2019-02-03 RX ADMIN — Medication 3 ML: at 02:02

## 2019-02-03 RX ADMIN — Medication 3 ML: at 07:02

## 2019-02-03 RX ADMIN — PANTOPRAZOLE SODIUM 80 MG: 40 INJECTION, POWDER, FOR SOLUTION INTRAVENOUS at 08:02

## 2019-02-03 RX ADMIN — Medication 3 ML: at 09:02

## 2019-02-03 RX ADMIN — TIMOLOL MALEATE 1 DROP: 5 SOLUTION OPHTHALMIC at 12:02

## 2019-02-03 RX ADMIN — LATANOPROST 1 DROP: 50 SOLUTION OPHTHALMIC at 09:02

## 2019-02-03 RX ADMIN — LEVOTHYROXINE SODIUM 100 MCG: 100 TABLET ORAL at 07:02

## 2019-02-03 RX ADMIN — DORZOLAMIDE HYDROCHLORIDE 1 DROP: 20 SOLUTION/ DROPS OPHTHALMIC at 09:02

## 2019-02-03 RX ADMIN — DORZOLAMIDE HYDROCHLORIDE 1 DROP: 20 SOLUTION/ DROPS OPHTHALMIC at 08:02

## 2019-02-03 RX ADMIN — POLYETHYLENE GLYCOL 3350, SODIUM SULFATE ANHYDROUS, SODIUM BICARBONATE, SODIUM CHLORIDE, POTASSIUM CHLORIDE 4000 ML: 236; 22.74; 6.74; 5.86; 2.97 POWDER, FOR SOLUTION ORAL at 06:02

## 2019-02-03 RX ADMIN — PANTOPRAZOLE SODIUM 40 MG: 40 INJECTION, POWDER, FOR SOLUTION INTRAVENOUS at 09:02

## 2019-02-03 RX ADMIN — ATORVASTATIN CALCIUM 10 MG: 10 TABLET, FILM COATED ORAL at 08:02

## 2019-02-03 RX ADMIN — TIMOLOL MALEATE 1 DROP: 5 SOLUTION OPHTHALMIC at 09:02

## 2019-02-03 RX ADMIN — LATANOPROST 1 DROP: 50 SOLUTION OPHTHALMIC at 12:02

## 2019-02-03 RX ADMIN — TIMOLOL MALEATE 1 DROP: 5 SOLUTION OPHTHALMIC at 08:02

## 2019-02-03 RX ADMIN — RIOCIGUAT 2 MG: 2 TABLET, FILM COATED ORAL at 02:02

## 2019-02-03 NOTE — CONSULTS
[facility]  Critical Care Medicine  Consult Note    Patient Name: Shivani Sheets  MRN: [id number]  Admission Date: 2/2/2019  Hospital Length of Stay: 0 days  Code Status: Prior  Attending Physician: Vicky Callahan MD   Primary Care Provider: Eula Weiss MD   Principal Problem: Gastrointestinal hemorrhage    Consults  Subjective:     HPI:  Mrs. Sheets is a 64 yo woman with PMHx significant for pulmonary HTN (on adempas/uptravi), diastolic dysfunction, pAF(on Coumadin, diltiazem), central retinal artery occlusion without significant carotid artery stenosis, CAD with remote PCI, ESRD secondary to HTN (anuric, HD MWF), s/p failed kidney transplant (2005), PUD (dx 1970s by endoscopy for upper GI bleed), STEFFANY on 2L O2 with sleep, hypothyroidism, RA, HLD and obesity who presents to the ED with the CC of BRBPR with clots. Onset of symptoms was the night prior to presentation. Of note, patient was recently admitted for melena and was discharged on 1/18/19 after EGD, push enteroscopy, and video capsule endoscopy failed to show source of bleeding. At the time of discharge, patient's ASA was stopped, Coumadin and Adempas continued. Hg was 7.6. On evaluation in the ED on this presentation, hemoglobin was found to be 4.3.     Patient endorses dizziness and blurred vision with standing. Denies CP, SOB, cough, palpitations, abdominal pain. Does state she had one episode of nausea with non-bloody, non-bilious emesis the morning of presentation.     Critical Care Medicine consulted due to concern for hemodynamically unstable GI bleed.     Hospital/ICU Course:  No notes on file    Past Medical History:   Diagnosis Date    Allergy     Anemia     Anticoagulant long-term use     4 years coumadin, asa    Arthritis     Awaiting organ transplant 4/30/2013    Cataract     Central serous chorioretinopathy of eye, right 8/21/2018    CHF (congestive heart failure)     Chronic kidney disease     Colon  polyps     COPD (chronic obstructive pulmonary disease)     Coronary artery disease     Diabetes mellitus     Diabetic retinopathy     Diverticulosis     Encounter for blood transfusion     ESRD from HTN strtied RRT 1999    Failed  donor kidney transplant      Glaucoma     History of bleeding peptic ulcer      as stated per pt    Hyperlipidemia     Hypertension     Hypothyroidism     Morbid obesity 8/10/2012    Renal hypertension     Stroke     1987       Past Surgical History:   Procedure Laterality Date    CATARACT EXTRACTION W/  INTRAOCULAR LENS IMPLANT Bilateral     COLON SURGERY      COLONOSCOPY      COLONOSCOPY N/A 2018    Performed by [facility] ENDO (2ND FLR)    EGD (ESOPHAGOGASTRODUODENOSCOPY) N/A 2019    Performed by Miranda Mraadiaga MD at [facility] ENDO (2ND FLR)    EGD (ESOPHAGOGASTRODUODENOSCOPY) N/A 2018    Performed by Luis Washington MD at [facility] ENDO (2ND FLR)    ESOPHAGOGASTRODUODENOSCOPY (EGD) N/A 2018    Performed by Kenji Desir MD at [facility] ENDO (2ND FLR)    EYE SURGERY      FRACTURE SURGERY      R arm    HERNIA REPAIR      HYSTERECTOMY      INSERTION-IMPLANT-GLAUCOMA Left 10/12/2017    Performed by Junaid Kern MD at [facility] OR 1ST FLR    KIDNEY TRANSPLANT      NEPHRECTOMY  2008    transplant     PARATHYROID GLAND SURGERY      TONSILLECTOMY      UPPER GASTROINTESTINAL ENDOSCOPY         Review of patient's allergies indicates:   Allergen Reactions    Penicillins Swelling    Iodine      Other reaction(s): Hives    Sulfamethoxazole-trimethoprim      Other reaction(s): Swelling  Other reaction(s): Hives       Family History     Problem Relation (Age of Onset)    Asthma Sister    Blindness Father    Breast cancer Maternal Aunt    Depression Sister    Diabetes Maternal Aunt    Heart attack Father, Mother    Heart failure Father, Mother    Hypertension Father, Mother, Sister, Brother    Kidney disease  Brother        Tobacco Use    Smoking status: Former Smoker     Types: Cigarettes     Last attempt to quit: 8/10/2000     Years since quittin.4    Smokeless tobacco: Never Used   Substance and Sexual Activity    Alcohol use: No     Comment: hx of etoh     Drug use: No    Sexual activity: No      Review of Systems   Constitutional: Positive for activity change, appetite change and fatigue. Negative for chills and fever.   HENT: Negative for sore throat and trouble swallowing.    Eyes: Positive for visual disturbance (blurry vision with standing). Negative for photophobia.   Respiratory: Negative for cough and shortness of breath.    Cardiovascular: Positive for leg swelling. Negative for chest pain and palpitations.   Gastrointestinal: Positive for anal bleeding, blood in stool, nausea and vomiting. Negative for abdominal distention, abdominal pain, constipation, diarrhea and rectal pain.   Genitourinary:        Anuric   Musculoskeletal: Negative for back pain and neck pain.   Skin: Negative for rash and wound.   Neurological: Positive for dizziness and light-headedness. Negative for syncope, numbness and headaches.   Psychiatric/Behavioral: Negative for confusion.     Objective:     Vital Signs (Most Recent):  Temp: 98.3 °F (36.8 °C) (19 1236)  Pulse: 77 (19 1345)  Resp: 16 (19 1345)  BP: 133/68 (19 1345)  SpO2: (!) 94 % (19 1345) Vital Signs (24h Range):  Temp:  [97.8 °F (36.6 °C)-98.3 °F (36.8 °C)] 98.3 °F (36.8 °C)  Pulse:  [73-77] 77  Resp:  [16-18] 16  SpO2:  [92 %-95 %] 94 %  BP: ()/(51-68) 133/68   Weight: 71.7 kg (158 lb)  Body mass index is 29.85 kg/m².    No intake or output data in the 24 hours ending 19 1648    Physical Exam   Constitutional: She is oriented to person, place, and time. She appears well-developed and well-nourished. No distress.   HENT:   Head: Normocephalic and atraumatic.   Eyes: Conjunctivae are normal. No scleral icterus.   Neck:  Normal range of motion.   Cardiovascular: Normal heart sounds and intact distal pulses.   Regular rate and rhythm with frequent PVCs   Pulmonary/Chest: Effort normal. She has rales (mild crackles at bilateral bases).   Abdominal: Soft. Bowel sounds are normal. She exhibits no distension. There is no tenderness.   Musculoskeletal: She exhibits edema (1+ edema of bilateral lower extremities).   Neurological: She is alert and oriented to person, place, and time.   Nursing note and vitals reviewed.      Vents:     Lines/Drains/Airways     Central Venous Catheter Line                 RETIRED! DO NOT USE: Hemodialysis Catheter Arteriovenous fistula Left Forearm -- days          Drain                 Hemodialysis AV Fistula Left upper arm -- days          Peripheral Intravenous Line                 Peripheral IV - Single Lumen 02/02/19 1340 Right Antecubital less than 1 day         Peripheral IV - Single Lumen 02/02/19 1530 Other (Comment) less than 1 day              Significant Labs:    CBC/Anemia Profile:  Recent Labs   Lab 02/02/19  1342   WBC 5.55   HGB 4.3*   HCT 15.0*      MCV 94   RDW 16.5*        Chemistries:  Recent Labs   Lab 02/02/19  1342      K 3.9      CO2 28   BUN 22   CREATININE 6.8*   CALCIUM 7.1*   ALBUMIN 2.1*   PROT 5.4*   BILITOT 0.3   ALKPHOS 41*   ALT 10   AST 20   MG 1.9   PHOS 3.1     Significant Imaging: I have reviewed and interpreted all pertinent imaging results/findings within the past 24 hours.      ABG  No results for input(s): PH, PO2, PCO2, HCO3, BE in the last 168 hours.  Assessment/Plan:     Pulmonary   Pulmonary HTN    - Current regimen is Adempas and Uptravi  - Hold in the setting of hypotension     Cardiac/Vascular   Atrial fibrillation    - On diltiazem and coumadin, holding due to hypotension, GI bleed  - In sinus on presentation     Hyperlipidemia    - Continue statin     CAD (coronary artery disease)    - ASA stopped after GI bleed last admission  - Continue  statin  - No CP at presentation     Chronic diastolic heart failure    - Holding BP meds in the setting of hypotension  - Will closely monitor respiratory status during fluid and blood resuscitation  - Restart GDMT as appropriate     Renal/   ESRD from HTN started RRT 1999    Anuric, HD MWF. HD completed without complication day prior to presentation.    - Nephro consulted for HD  - Unable to be place on HD due to current level of anemia      Hematology   Supratherapeutic INR    - INR 3.2 on presentation  - Would recommend Vitamin K to correct      Endocrine   Hypothyroidism    - Continue home levothyroxine     Obesity    - Would likely benefit from PT/OT     GI   * Gastrointestinal hemorrhage    On evaluation in the ED, patient's BP was 133/68, pulse rate 77 following 250 cc ND bolus x 4 (total of 1 L NS) and prior to blood administration. Given patient's clinical history of BRBPR and clots, along with recent EGD and push enteroscopy, lower GI bleed likely. Patient was evaluated by GI in the ED, and are planning for colonoscopy tomorrow.    - Agree with transfusing 2 u PRBCs  - Continue IV PPI BID  - Would recommend Vitamin K to correct supratherapeutic INR  - Maintain 2 large bore peripheral IVs  - Per GI, clear liquid diet now and NPO at midnight  - Closely monitor respiratory status as patient is anuric with diastolic dysfunction and mild crackles present at bilateral bases on exam    Patient currently hemodynamically stable, without ICU needs. Would recommend admission to Hospital Medicine at this time. Should patient clinical worsen, please re-consult Critical Care Service.      Other   Obstructive sleep apnea    - Has CPAP at home but does not use  - Uses 2L per NC at home while sleeping           Critical care was time spent personally by me on the following activities: development of treatment plan with patient or surrogate and bedside caregivers, discussions with consultants, evaluation of patient's  response to treatment, examination of patient, ordering and performing treatments and interventions, ordering and review of laboratory studies, ordering and review of radiographic studies, pulse oximetry, re-evaluation of patient's condition. This critical care time did not overlap with that of any other provider or involve time for any procedures.    Thank you for your consult. I will sign off. Please contact us if you have any additional questions.     Arjun Jhaveri MD  Critical Care Medicine  [facility]

## 2019-02-03 NOTE — HPI
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     Patient states that she was doing well until last night when she started to have rectal bleeding described as bright red, loose, with large blood clots. This was not associated with abdominal pain. She reports mild nausea, with vomiting of clear gastric content, but no blood or coffee grounds. She felt dizzy and short of breath. Had 4-5x similar BM.     The patient presented to the ED with Hb of 4.3, from 7.8 (2 weeks ago), INR 3.2. 2 units of pRBCs were transfused. Hemodynamically stable. After transfusion, Hb improved to 7.3    Since presentation to the ED the patient did not have a BM and no further bleeding.     On first presentation on 9/2018, the patient presented with melena. EGD was negative at that time. Plan was for VCE if further bleeding.  6/2018 had a colonoscopy that was negative other than 2 x 5mm polyp  1/2019, patient presented with melena, and a push enteroscopy was negative. A VCE was obtained with no active bleeding, or source of bleeding. Plan was for colonoscopy or VCE if bleeding recurs.

## 2019-02-03 NOTE — SUBJECTIVE & OBJECTIVE
Past Medical History:   Diagnosis Date    Allergy     Anemia     Anticoagulant long-term use     4 years coumadin, asa    Arthritis     Awaiting organ transplant 2013    Cataract     Central serous chorioretinopathy of eye, right 2018    CHF (congestive heart failure)     Chronic kidney disease     Colon polyps     COPD (chronic obstructive pulmonary disease)     Coronary artery disease     Diabetes mellitus     Diabetic retinopathy     Diverticulosis     Encounter for blood transfusion     ESRD from HTN strtied RRT 1999    Failed  donor kidney transplant      Glaucoma     History of bleeding peptic ulcer      as stated per pt    Hyperlipidemia     Hypertension     Hypothyroidism     Morbid obesity 8/10/2012    Renal hypertension     Stroke     1987       Past Surgical History:   Procedure Laterality Date    CATARACT EXTRACTION W/  INTRAOCULAR LENS IMPLANT Bilateral     COLON SURGERY      COLONOSCOPY      COLONOSCOPY N/A 2018    [facility]formed by [facility] (2ND FLR)    EGD (ESOPHAGOGASTRODUODENOSCOPY) N/A 2019    Performed by Miranda Maradiaga MD at [facility] ENDO (2ND FLR)    EGD (ESOPHAGOGASTRODUODENOSCOPY) N/A 2018    Performed by Luis Washington MD at [facility] ENDO (2ND FLR)    ESOPHAGOGASTRODUODENOSCOPY (EGD) N/A 2018    Performed by Kenji Desir MD at [facility] ENDO (2ND FLR)    EYE SURGERY      FRACTURE SURGERY      R arm    HERNIA REPAIR      HYSTERECTOMY      INSERTION-IMPLANT-GLAUCOMA Left 10/12/2017    Performed by [facility]id [facility]ern MD at [facility] OR 1ST FLR    KIDNEY TRANSPLANT      NEPHRECTOMY  2008    transplant     PARATHYROID GLAND SURGERY      TONSILLECTOMY      UPPER GASTROINTESTINAL ENDOSCOPY         Review of patient's allergies indicates:   Allergen Reactions    Penicillins Swelling    Iodine      Other reaction(s): Hives    Sulfamethoxazole-trimethoprim      Other reaction(s):  Swelling  Other reaction(s): Hives       Current Facility-Administered Medications   Medication    0.9%  NaCl infusion (for blood administration)    [START ON 2/3/2019] atorvastatin tablet 10 mg    dextrose 50% injection 12.5 g    dextrose 50% injection 25 g    dorzolamide 2 % ophthalmic solution 1 drop    glucagon (human recombinant) injection 1 mg    glucose chewable tablet 16 g    glucose chewable tablet 24 g    latanoprost 0.005 % ophthalmic solution 1 drop    [START ON 2/3/2019] levothyroxine tablet 100 mcg    ondansetron disintegrating tablet 8 mg    [START ON 2/3/2019] pantoprazole injection 80 mg    riociguat (ADEMPAS) tablet 2 mg    sodium chloride 0.9% flush 3 mL    sodium chloride 0.9% flush 5 mL    sodium chloride 0.9% flush 5 mL    timolol maleate 0.5% ophthalmic solution 1 drop     Current Outpatient Medications   Medication Sig    ALPHAGAN P 0.1 % Drop once daily.     diltiaZEM (CARDIZEM CD) 300 MG 24 hr capsule Take 1 capsule (300 mg total) by mouth once daily.    dorzolamide-timolol 2-0.5% (COSOPT) 22.3-6.8 mg/mL ophthalmic solution INSTILL 1 DROP IN BOTH EYES TWICE DAILY    latanoprost 0.005 % ophthalmic solution INSTILL 1 DROP IN BOTH EYES EVERY DAY AT BEDTIME    levothyroxine (SYNTHROID) 100 MCG tablet Take 100 mcg by mouth. 1 Tablet Oral Every day    LIPITOR 10 mg tablet TAKE 1 BY MOUTH ONCE A DAY    pantoprazole (PROTONIX) 40 MG tablet TAKE 1 TABLET BY MOUTH ONCE DAILY    riociguat (ADEMPAS) 2 mg Tab tablet Take 2 mg by mouth 3 (three) times daily.     selexipag (UPTRAVI) 1,000 mcg Tab Take 1,000 mcg by mouth 2 (two) times daily.    warfarin (COUMADIN) 5 MG tablet TAKE 1 1/2 TABLETS BY MOUTH DAILY ON TUESDAY, THURSDAY AND SATURDAY, THEN TAKE 1 TABLET DAILY ON MONDAY, WEDNESDAY, FRIDAY AND SUNDAY     Family History     Problem Relation (Age of Onset)    Asthma Sister    Blindness Father    Breast cancer Maternal Aunt    Depression Sister    Diabetes Maternal Aunt     Heart attack Father, Mother    Heart failure Father, Mother    Hypertension Father, Mother, Sister, Brother    Kidney disease Brother        Tobacco Use    Smoking status: Former Smoker     Types: Cigarettes     Last attempt to quit: 8/10/2000     Years since quittin.4    Smokeless tobacco: Never Used   Substance and Sexual Activity    Alcohol use: No     Comment: hx of etoh     Drug use: No    Sexual activity: No     Review of Systems   Constitutional: Negative for activity change, chills, fever and unexpected weight change.   HENT: Negative for congestion and voice change.    Eyes: Negative for visual disturbance.   Respiratory: Positive for cough. Negative for chest tightness, shortness of breath and wheezing.    Cardiovascular: Positive for palpitations. Negative for chest pain and leg swelling.   Gastrointestinal: Positive for blood in stool, nausea and vomiting. Negative for abdominal distention and abdominal pain.   Endocrine: Negative for polyphagia and polyuria.   Genitourinary: Negative for difficulty urinating and dysuria.   Musculoskeletal: Negative for arthralgias.   Neurological: Positive for dizziness and light-headedness. Negative for syncope.   Hematological: Does not bruise/bleed easily.   Psychiatric/Behavioral: Negative for agitation.     Objective:     Vital Signs (Most Recent):  Temp: 98.4 °F (36.9 °C) (19)  Pulse: 82 (19)  Resp: 16 (19)  BP: (!) 129/57 (19)  SpO2: (!) 94 % (19) Vital Signs (24h Range):  Temp:  [97.8 °F (36.6 °C)-98.4 °F (36.9 °C)] 98.4 °F (36.9 °C)  Pulse:  [73-86] 82  Resp:  [10-28] 16  SpO2:  [92 %-98 %] 94 %  BP: ()/(51-68) 129/57  Arterial Line BP: (126)/(60) 126/60     Patient Vitals for the past 72 hrs (Last 3 readings):   Weight   19 1120 71.7 kg (158 lb)     Body mass index is 29.85 kg/m².      Intake/Output Summary (Last 24 hours) at 2019  Last data filed at 2019  Gross per  24 hour   Intake 196 ml   Output --   Net 196 ml       Physical Exam   Constitutional: She is oriented to person, place, and time. She appears well-developed and well-nourished.   HENT:   Mouth/Throat: Oropharynx is clear and moist.   Eyes: EOM are normal.   Neck: No JVD present.   Cardiovascular: Normal rate and regular rhythm.   Murmur (1-2/6 systolic murmur) heard.  Pulmonary/Chest: Effort normal. No respiratory distress. She has no wheezes. She has rales (few at bases).   Abdominal: Soft. Bowel sounds are normal. She exhibits no distension. There is no tenderness. There is no guarding.   Musculoskeletal: She exhibits edema (Trace).   Neurological: She is alert and oriented to person, place, and time.   Skin: Skin is warm.   Bruit left AVF   Psychiatric: She has a normal mood and affect.   Nursing note and vitals reviewed.      Significant Labs:  CBC:  Recent Labs   Lab 02/02/19  1342   WBC 5.55   RBC 1.59*   HGB 4.3*   HCT 15.0*      MCV 94   MCH 27.0   MCHC 28.7*     BNP:  No results for input(s): BNP in the last 168 hours.    Invalid input(s): BNPTRIAGELBLO  CMP:  Recent Labs   Lab 02/02/19  1342      CALCIUM 7.1*   ALBUMIN 2.1*   PROT 5.4*      K 3.9   CO2 28      BUN 22   CREATININE 6.8*   ALKPHOS 41*   ALT 10   AST 20   BILITOT 0.3      Coagulation:   Recent Labs   Lab 02/02/19  1342   INR 3.2*   APTT 35.4*     LDH:  No results for input(s): LDH in the last 72 hours.  Microbiology:  Microbiology Results (last 7 days)     ** No results found for the last 168 hours. **          BMP:   Recent Labs   Lab 02/02/19  1342         K 3.9      CO2 28   BUN 22   CREATININE 6.8*   CALCIUM 7.1*   MG 1.9     Cardiac Markers: No results for input(s): CKMB, TROPONINT, MYOGLOBIN in the last 72 hours.  Coagulation:   Recent Labs   Lab 02/02/19  1342   INR 3.2*   APTT 35.4*     I have reviewed all pertinent labs within the past 24 hours.    Diagnostic Results:  I have reviewed all  pertinent imaging results/findings within the past 24 hours.

## 2019-02-03 NOTE — H&P (VIEW-ONLY)
[facility]  Gastroenterology  Consult Note    Patient Name: Shivani Sheets  MRN: [id number]  Admission Date: 2/2/2019  Hospital Length of Stay: 1 days  Code Status: Full Code   Attending Provider: Gera Jordan Jr.,*   Consulting Provider: Sergo Magdaleno MD  Primary Care Physician: Eula Weiss MD  Principal Problem:Acute blood loss anemia    Inpatient consult to Gastroenterology  Consult performed by: Sergo Magdaleno MD  Consult ordered by: Curry Grider MD        Subjective:     HPI:  Ms Sheets is a 63 year old female with history of Afib on coumadin, pulmonary HTN, ESRD on HD (failed transplant), 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     Patient states that she was doing well until last night when she started to have rectal bleeding described as bright red, loose, with large blood clots. This was not associated with abdominal pain. She reports mild nausea, with vomiting of clear gastric content, but no blood or coffee grounds. She felt dizzy and short of breath. Had 4-5x similar BM.     The patient presented to the ED with Hb of 4.3, from 7.8 (2 weeks ago), INR 3.2. 2 units of pRBCs were transfused. Hemodynamically stable. After transfusion, Hb improved to 7.3    Since presentation to the ED the patient did not have a BM and no further bleeding.     On first presentation on 9/2018, the patient presented with melena. EGD was negative at that time. Plan was for VCE if further bleeding.  6/2018 had a colonoscopy that was negative other than 2 x 5mm polyp  1/2019, patient presented with melena, and a push enteroscopy was negative. A VCE was obtained with no active bleeding, or source of bleeding. Plan was for colonoscopy or VCE if bleeding recurs.      Past Medical History:   Diagnosis Date    Allergy     Anemia      Anticoagulant long-term use     4 years coumadin, asa    Arthritis     Awaiting organ transplant 2013    Cataract     Central serous chorioretinopathy of eye, right 2018    CHF (congestive heart failure)     Chronic kidney disease     Colon polyps     COPD (chronic obstructive pulmonary disease)     Coronary artery disease     Diabetes mellitus     Diabetic retinopathy     Diverticulosis     Encounter for blood transfusion     ESRD from HTN strtied RRT 1999    Failed  donor kidney transplant      Glaucoma     History of bleeding peptic ulcer      as stated per pt    Hyperlipidemia     Hypertension     Hypothyroidism     Morbid obesity 8/10/2012    Renal hypertension     Stroke     1987       Past Surgical History:   Procedure Laterality Date    CATARACT EXTRACTION W/  INTRAOCULAR LENS IMPLANT Bilateral     COLON SURGERY      COLONOSCOPY      COLONOSCOPY N/A 2018    [facility] ENDO (2ND FLR)    EGD (ESOPHAGOGASTRODUODENOSCOPY) N/A 2019    Performed by Miranda [facility]aga MD at [facility] ENDO (2ND FLR)    EGD (ESOPHAGOGASTRODUODENOSCOPY) N/A 2018    Performed by Luis Washington MD at [facility] ENDO (2ND FLR)    ESOPHAGOGASTRODUODENOSCOPY (EGD) N/A 2018    Performed by Kenji Desir MD at [facility] ENDO (2ND FLR)    EYE SURGERY      FRACTURE SURGERY      R arm    HERNIA REPAIR      HYSTERECTOMY      INSERTION-IMPLANT-GLAUCOMA Left 10/12/2017    Performed by Junaid Kern MD at [facility] OR 1ST FLR    KIDNEY TRANSPLANT      NEPHRECTOMY  2008    transplant     PARATHYROID GLAND SURGERY      TONSILLECTOMY      UPPER GASTROINTESTINAL ENDOSCOPY         Review of patient's allergies indicates:   Allergen Reactions    Penicillins Swelling    Iodine      Other reaction(s): Hives    Sulfamethoxazole-trimethoprim      Other reaction(s): Swelling  Other reaction(s): Hives     Family History     Problem Relation  (Age of Onset)    Asthma Sister    Blindness Father    Breast cancer Maternal Aunt    Depression Sister    Diabetes Maternal Aunt    Heart attack Father, Mother    Heart failure Father, Mother    Hypertension Father, Mother, Sister, Brother    Kidney disease Brother        Tobacco Use    Smoking status: Former Smoker     Types: Cigarettes     Last attempt to quit: 8/10/2000     Years since quittin.4    Smokeless tobacco: Never Used   Substance and Sexual Activity    Alcohol use: No     Comment: hx of etoh     Drug use: No    Sexual activity: No     Review of Systems   Constitutional: Positive for activity change and fatigue. Negative for appetite change, chills and fever.   HENT: Negative for trouble swallowing.    Respiratory: Positive for shortness of breath. Negative for cough, choking and chest tightness.    Cardiovascular: Negative for chest pain and leg swelling.   Gastrointestinal: Positive for blood in stool. Negative for abdominal distention, abdominal pain, anal bleeding, constipation, diarrhea, nausea and vomiting.   Genitourinary: Negative for difficulty urinating and dysuria.   Musculoskeletal: Negative for arthralgias and back pain.   Skin: Positive for pallor. Negative for color change.   Neurological: Positive for dizziness. Negative for headaches.   Psychiatric/Behavioral: Negative for agitation and confusion.     Objective:     Vital Signs (Most Recent):  Temp: 98.2 °F (36.8 °C) (19)  Pulse: 84 (19 1100)  Resp: 20 (19)  BP: 134/70 (19)  SpO2: 100 % (19) Vital Signs (24h Range):  Temp:  [97.8 °F (36.6 °C)-98.5 °F (36.9 °C)] 98.2 °F (36.8 °C)  Pulse:  [75-92] 84  Resp:  [10-28] 20  SpO2:  [92 %-100 %] 100 %  BP: (103-141)/(56-70) 134/70  Arterial Line BP: (126)/(60) 126/60     Weight: 75 kg (165 lb 5.5 oz) (19)  Body mass index is 31.24 kg/m².      Intake/Output Summary (Last 24 hours) at 2/3/2019 1124  Last data filed at 2/3/2019  0300  Gross per 24 hour   Intake 876.83 ml   Output 0 ml   Net 876.83 ml       Lines/Drains/Airways     Central Venous Catheter Line                 RETIRED! DO NOT USE: Hemodialysis Catheter Arteriovenous fistula Left Forearm -- days          Drain                 Hemodialysis AV Fistula Left upper arm -- days          Peripheral Intravenous Line                 Peripheral IV - Single Lumen 02/02/19 1530 Other (Comment) less than 1 day                Physical Exam   Constitutional: She is oriented to person, place, and time. She appears well-developed and well-nourished. No distress.   HENT:   Head: Normocephalic.   Eyes: Conjunctivae are normal. No scleral icterus.   Neck: Normal range of motion. Neck supple.   Cardiovascular: Normal rate and regular rhythm.   Pulmonary/Chest: Effort normal and breath sounds normal.   Abdominal: Soft. Bowel sounds are normal. She exhibits no distension and no mass. There is no tenderness. There is no rebound and no guarding.   Musculoskeletal: Normal range of motion.   Neurological: She is alert and oriented to person, place, and time.   Skin: Skin is warm and dry. There is pallor.   Psychiatric: She has a normal mood and affect.       Significant Labs:  CBC:   Recent Labs   Lab 02/02/19  1342 02/02/19  2347 02/03/19  0916   WBC 5.55 15.84* 9.23   HGB 4.3* 7.3* 7.1*   HCT 15.0* 23.5* 22.0*    128* 123*     BMP:   Recent Labs   Lab 02/03/19  0624   GLU 82      K 4.1      CO2 25   BUN 26*   CREATININE 7.7*   CALCIUM 7.0*   MG 2.0     CMP:   Recent Labs   Lab 02/03/19  0624   GLU 82   CALCIUM 7.0*   ALBUMIN 2.1*   PROT 5.3*      K 4.1   CO2 25      BUN 26*   CREATININE 7.7*   ALKPHOS 37*   ALT 10   AST 19   BILITOT 0.5     Coagulation:   Recent Labs   Lab 02/02/19  1342 02/03/19  0624   INR 3.2* 3.0*   APTT 35.4*  --      Lipase: No results for input(s): LIPASE in the last 48 hours.    Significant Imaging:  Imaging results within the past 24 hours have  been reviewed.    Assessment/Plan:     * Acute blood loss anemia    Patient presented with rectal bleeding and blood loss anemia, with recurrent presentations due to blood loss anemia with no identified source.   Patient is currently HDS.   Given presentation with LGIB, will plan colonoscopy to identify source of bleeding    -If the patient has bleeding overnight, please contact GI and obtain CTA abdomen with GI bleeding protocol.  -Protonix IV BID  -Intravascular resuscitation/support with IVFs and pRBCs as needed.  -Serial H/H's transfuse as needed.  -Discontinue all NSAIDs and Heparin products  -Please correct any coagulopathy with platelets and FFP to a goal of platelets >50K and INR <2.5  -Maintain IV access with 2 large bore IVs  -Keep on Clear liquid diet. Keep NPO after MN for colonoscopy. Will order bowel prep.         Thank you for your consult. I will follow-up with patient. Please contact us if you have any additional questions.    Júnior Magdaleno MD  Gastroenterology  [facility]

## 2019-02-03 NOTE — CONSULTS
[facility]  Nephrology  Consult Note    Patient Name: Shviani Sheets  MRN: [id number]  Admission Date: 2019  Hospital Length of Stay: 0 days  Attending Provider: Vicky Callahan MD   Primary Care Physician: Eula Weiss MD  Principal Problem:Gastrointestinal hemorrhage    Inpatient consult to Nephrology  Consult performed by: Lefty Armstrong MD  Consult ordered by: Vicky Callahan MD  Reason for consult: ESRD on HD, inpatient HD treatment         Subjective:     HPI: 64 yo F w/ PMHx of ESRD x 20 yrs secondary to HTN, dialysis M/W/F ([facility]),  pulmonary HTN, diastolic dysfunction, central retinal artery occlusion, CAD with remote PCI, s/p failed kidney transplant [id number], PUD, STEFFANY on CPAP, hypothyroidism, RA, HLD and obesity who presents with a complaint of rectal bleeding which started 19 (diarrhea with blood clots). Also has complaining of shortness of breath and weakness. Once arrived to ED had low blood pressures (98/51 mmHg), no oxygen requirement and Hgb 4.3. Nephrology was consulted for inpatient dialysis treatment.       Past Medical History:   Diagnosis Date    Allergy     Anemia     Anticoagulant long-term use     4 years coumadin, asa    Arthritis     Awaiting organ transplant 2013    Cataract     Central serous chorioretinopathy of eye, right 2018    CHF (congestive heart failure)     Chronic kidney disease     Colon polyps     COPD (chronic obstructive pulmonary disease)     Coronary artery disease     Diabetes mellitus     Diabetic retinopathy     Diverticulosis     Encounter for blood transfusion     ESRD from HTN strtied RRT 1999    Failed  donor kidney transplant      Glaucoma     History of bleeding peptic ulcer      as stated per pt    Hyperlipidemia     Hypertension     Hypothyroidism     Morbid obesity 8/10/2012    Renal hypertension     Stroke     1987       Past Surgical  History:   Procedure Laterality Date    CATARACT EXTRACTION W/  INTRAOCULAR LENS IMPLANT Bilateral     COLON SURGERY      COLONOSCOPY      COLONOSCOPY N/A 6/12/2018    Performed by [facility] MD at [facility] ENDO (2ND FLR)    EGD (ESOPHAGOGASTRODUODENOSCOPY) N/A 1/14/2019    Performed by Miranda [facility]adiaga MD at [facility] ENDO (2ND FLR)    EGD (ESOPHAGOGASTRODUODENOSCOPY) N/A 9/11/2018    Performed by Luis Washington MD at [facility] ENDO (2ND FLR)    ESOPHAGOGASTRODUODENOSCOPY (EGD) N/A 2/27/2018    Performed by [facility] Desir MD at [facility] ENDO (2ND FLR)    EYE SURGERY      FRACTURE SURGERY      R arm    HERNIA REPAIR      HYSTERECTOMY      INSERTION-IMPLANT-GLAUCOMA Left 10/12/2017    [facility] at [facility] 1ST FLR    KIDNEY TRANSPLANT      NEPHRECTOMY  11/2008    transplant     PARATHYROID GLAND SURGERY      TONSILLECTOMY      UPPER GASTROINTESTINAL ENDOSCOPY         Review of patient's allergies indicates:   Allergen Reactions    Penicillins Swelling    Iodine      Other reaction(s): Hives    Sulfamethoxazole-trimethoprim      Other reaction(s): Swelling  Other reaction(s): Hives     Current Facility-Administered Medications   Medication Frequency    0.9%  NaCl infusion (for blood administration) Q24H PRN    dextrose 50% injection 12.5 g PRN    dextrose 50% injection 25 g PRN    glucagon (human recombinant) injection 1 mg PRN    glucose chewable tablet 16 g PRN    glucose chewable tablet 24 g PRN    ondansetron disintegrating tablet 8 mg Q8H PRN    sodium chloride 0.9% flush 5 mL PRN     Current Outpatient Medications   Medication    ALPHAGAN P 0.1 % Drop    diltiaZEM (CARDIZEM CD) 300 MG 24 hr capsule    dorzolamide-timolol 2-0.5% (COSOPT) 22.3-6.8 mg/mL ophthalmic solution    latanoprost 0.005 % ophthalmic solution    levothyroxine (SYNTHROID) 100 MCG tablet    LIPITOR 10 mg tablet    pantoprazole (PROTONIX) 40 MG tablet    riociguat (ADEMPAS) 2 mg Tab tablet     selexipag (UPTRAVI) 1,000 mcg Tab    warfarin (COUMADIN) 5 MG tablet     Family History     Problem Relation (Age of Onset)    Asthma Sister    Blindness Father    Breast cancer Maternal Aunt    Depression Sister    Diabetes Maternal Aunt    Heart attack Father, Mother    Heart failure Father, Mother    Hypertension Father, Mother, Sister, Brother    Kidney disease Brother        Tobacco Use    Smoking status: Former Smoker     Types: Cigarettes     Last attempt to quit: 8/10/2000     Years since quittin.4    Smokeless tobacco: Never Used   Substance and Sexual Activity    Alcohol use: No     Comment: hx of etoh     Drug use: No    Sexual activity: No     Review of Systems   Constitutional: Positive for activity change, appetite change and fatigue. Negative for chills and fever.   HENT: Negative for sore throat and trouble swallowing.    Eyes: Negative for photophobia.   Respiratory: Negative for cough and shortness of breath.    Cardiovascular: Positive for leg swelling. Negative for chest pain and palpitations.   Gastrointestinal: Positive for anal bleeding, blood in stool and diarrhea. Negative for abdominal distention, abdominal pain, constipation and rectal pain.   Genitourinary:        Anuric   Musculoskeletal: Negative for back pain and neck pain.   Skin: Negative for rash and wound.   Neurological: Positive for weakness. Negative for syncope, numbness and headaches.   Psychiatric/Behavioral: Negative for confusion.     Objective:     Vital Signs (Most Recent):  Temp: 98.3 °F (36.8 °C) (19 1236)  Pulse: 87 (19 174)  Resp: 20 (19)  BP: (!) 114/56 (19)  SpO2: 96 % (19)  O2 Device (Oxygen Therapy): room air (19) Vital Signs (24h Range):  Temp:  [97.8 °F (36.6 °C)-98.3 °F (36.8 °C)] 98.3 °F (36.8 °C)  Pulse:  [73-87] 87  Resp:  [16-20] 20  SpO2:  [92 %-96 %] 96 %  BP: ()/(51-68) 114/56     Weight: 71.7 kg (158 lb) (19 1120)  Body mass  index is 29.85 kg/m².  Body surface area is 1.76 meters squared.    No intake/output data recorded.    Physical Exam   Constitutional: She is oriented to person, place, and time. She appears well-developed and well-nourished. No distress.   HENT:   Head: Normocephalic and atraumatic.   Eyes: Conjunctivae are normal. No scleral icterus.   Neck: Normal range of motion.   Cardiovascular: Normal heart sounds and intact distal pulses.   ABEL AVF   Pulmonary/Chest: Effort normal. She has rales (mild crackles at bilateral bases).   Abdominal: Soft. Bowel sounds are normal. She exhibits no distension. There is no tenderness.   Musculoskeletal: She exhibits edema (1+ edema of bilateral lower extremities).   Neurological: She is alert and oriented to person, place, and time.   Nursing note and vitals reviewed.      Significant Labs:  ABGs: No results for input(s): PH, PCO2, HCO3, POCSATURATED, BE in the last 168 hours.  BMP:   Recent Labs   Lab 02/02/19  1342         CO2 28   BUN 22   CREATININE 6.8*   CALCIUM 7.1*   MG 1.9     CBC:   Recent Labs   Lab 02/02/19  1342   WBC 5.55   RBC 1.59*   HGB 4.3*   HCT 15.0*      MCV 94   MCH 27.0   MCHC 28.7*     CMP:   Recent Labs   Lab 02/02/19  1342      CALCIUM 7.1*   ALBUMIN 2.1*   PROT 5.4*      K 3.9   CO2 28      BUN 22   CREATININE 6.8*   ALKPHOS 41*   ALT 10   AST 20   BILITOT 0.3     All labs within the past 24 hours have been reviewed.      Assessment/Plan:     * Gastrointestinal hemorrhage    - Low hemoglobin 4.3   - Patient on coumadin for Afib.  - GI consulted which will follow recommendations  - Will get blood transfusion today, follow H+h     ESRD (end stage renal disease) on dialysis    Shivani Sheets 63 year old woman with ESRD who arrived with GI bleeding since yesterday 2/1/19.     iHD MWF  Dialyzes at [place]   Duration 3.5 hrs   Access at [facility]   EDW 70.5 kg  Last HD treatment was on 2/1/18    Assessment/Plan:  - Patient  currently at room air with no respiratory distress. She will get multiple blood transfusion and evaluate respiratory status. Any sign of overload will start patient on HD.   - Lytes at normal range. Will need to follow RFT   - Will order chest x ray to assess volume status   - Currently NPO, once started will need to be on renal diet          Lefty Feng  Nephrology  Fellow  [facility]    Pager [phone]    ATTENDING PHYSICIAN ATTESTATION  I have personally interviewed and examined the patient. I thoroughly reviewed the demographic, clinical, laboratorial and imaging information available in medical records. I agree with the assessment and recommendations provided by the subspecialty resident. Dr. Armstrong was under my supervision.

## 2019-02-03 NOTE — ASSESSMENT & PLAN NOTE
- Will continue with adempas, pt brought her selexipeg with her.  - Last TTE 11/2018: LVEF 55%, RVSF mildly decreased, PASP 99, moderate TR

## 2019-02-03 NOTE — ED NOTES
Assigned to care for patient at this time.  Report received from Cailin Angel RN.  Patient does not appear to be in any acute distress.  Patient denies any chest pain, SOB, N/V/D or generalized pain.   remains at the bedside.  First blood transfusion running.  Consents for procedure at the bedside.  Will continue to monitor patient for any acute changes or distress.

## 2019-02-03 NOTE — SUBJECTIVE & OBJECTIVE
Interval History: Patient reports feeling well, no BRBPR since in ER. Asymptomatic at this time.    Continuous Infusions:  Scheduled Meds:   atorvastatin  10 mg Oral Daily    dorzolamide  1 drop Both Eyes BID    latanoprost  1 drop Both Eyes QHS    levothyroxine  100 mcg Oral Before breakfast    pantoprozole (PROTONIX) IV  80 mg Intravenous Q12H    polyethylene glycol  4,000 mL Oral Once    riociguat  2 mg Oral TID    sodium chloride 0.9%  3 mL Intravenous Q8H    timolol maleate 0.5%  1 drop Both Eyes BID     PRN Meds:sodium chloride, dextrose 50%, dextrose 50%, glucagon (human recombinant), glucose, glucose, ondansetron, sodium chloride 0.9%, sodium chloride 0.9%    Review of patient's allergies indicates:   Allergen Reactions    Penicillins Swelling    Iodine      Other reaction(s): Hives    Sulfamethoxazole-trimethoprim      Other reaction(s): Swelling  Other reaction(s): Hives     Objective:     Vital Signs (Most Recent):  Temp: 98.2 °F (36.8 °C) (02/03/19 1229)  Pulse: 80 (02/03/19 1229)  Resp: 18 (02/03/19 1229)  BP: 126/72 (02/03/19 1229)  SpO2: 100 % (02/03/19 1229) Vital Signs (24h Range):  Temp:  [97.8 °F (36.6 °C)-98.5 °F (36.9 °C)] 98.2 °F (36.8 °C)  Pulse:  [76-92] 80  Resp:  [10-28] 18  SpO2:  [92 %-100 %] 100 %  BP: (109-141)/(56-72) 126/72  Arterial Line BP: (126)/(60) 126/60     Patient Vitals for the past 72 hrs (Last 3 readings):   Weight   02/02/19 2253 75 kg (165 lb 5.5 oz)   02/02/19 1120 71.7 kg (158 lb)     Body mass index is 31.24 kg/m².      Intake/Output Summary (Last 24 hours) at 2/3/2019 1341  Last data filed at 2/3/2019 0800  Gross per 24 hour   Intake 876.83 ml   Output 0 ml   Net 876.83 ml       Hemodynamic Parameters:       Telemetry: NSR    Physical Exam   Constitutional: She is oriented to person, place, and time. She appears well-developed and well-nourished.   HENT:   Head: Normocephalic and atraumatic.   Eyes: EOM are normal. Pupils are equal, round, and reactive to  light.   Neck: Normal range of motion. No JVD present. Carotid bruit is not present.   Cardiovascular: Normal rate, regular rhythm, normal heart sounds and intact distal pulses. Exam reveals no gallop, no S3, no S4, no distant heart sounds and no friction rub.   No murmur heard.  Left AVF   Pulmonary/Chest: Effort normal and breath sounds normal. No respiratory distress. She has no wheezes.   Abdominal: Soft. Bowel sounds are normal. She exhibits no distension. There is no tenderness.   Neurological: She is alert and oriented to person, place, and time.   Skin: Skin is warm. She is not diaphoretic. No erythema.   Psychiatric: She has a normal mood and affect. Her behavior is normal.       Significant Labs:  CBC:  Recent Labs   Lab 02/02/19  1342 02/02/19  2347 02/03/19  0916   WBC 5.55 15.84* 9.23   RBC 1.59* 2.61* 2.46*   HGB 4.3* 7.3* 7.1*   HCT 15.0* 23.5* 22.0*    128* 123*   MCV 94 90 89   MCH 27.0 28.0 28.9   MCHC 28.7* 31.1* 32.3     BNP:  Recent Labs   Lab 02/03/19  0624   BNP 1,584*     CMP:  Recent Labs   Lab 02/02/19  1342 02/02/19  2347 02/03/19  0624    101 82   CALCIUM 7.1* 7.2* 7.0*   ALBUMIN 2.1*  --  2.1*   PROT 5.4*  --  5.3*    142 142   K 3.9 3.8 4.1   CO2 28 26 25    103 105   BUN 22 24* 26*   CREATININE 6.8* 7.1* 7.7*   ALKPHOS 41*  --  37*   ALT 10  --  10   AST 20  --  19   BILITOT 0.3  --  0.5      Coagulation:   Recent Labs   Lab 02/02/19  1342 02/03/19  0624   INR 3.2* 3.0*   APTT 35.4*  --      LDH:  No results for input(s): LDH in the last 72 hours.  Microbiology:  Microbiology Results (last 7 days)     ** No results found for the last 168 hours. **          I have reviewed all pertinent labs within the past 24 hours.    Estimated Creatinine Clearance: 6.7 mL/min (A) (based on SCr of 7.7 mg/dL (H)).    Diagnostic Results:  I have reviewed all pertinent imaging results/findings within the past 24 hours.

## 2019-02-03 NOTE — ASSESSMENT & PLAN NOTE
Patient with BRBPR (previously with melena)  Also with chronic anemia 2/2 renal disease with low Hb baseline  Hb 4.8 on admission  -4U prepped, 2 ordered to transfused (although patient with frequent transfusions has developed antibodies)  -BP was very responsive to fluids  -EJ inserted, will consult Midline team with difficult stick  -GI consulted for probable intervention

## 2019-02-03 NOTE — ASSESSMENT & PLAN NOTE
-Patient has severe medication that are not prescribable by hospital medicine      -will consult [facility] and possibly transfer, she has been directly admitted to [facility] in past

## 2019-02-03 NOTE — SUBJECTIVE & OBJECTIVE
Past Medical History:   Diagnosis Date    Allergy     Anemia     Anticoagulant long-term use     4 years coumadin, asa    Arthritis     Awaiting organ transplant 2013    Cataract     Central serous chorioretinopathy of eye, right 2018    CHF (congestive heart failure)     Chronic kidney disease     Colon polyps     COPD (chronic obstructive pulmonary disease)     Coronary artery disease     Diabetes mellitus     Diabetic retinopathy     Diverticulosis     Encounter for blood transfusion     ESRD from HTN strtied RRT 1999    Failed  donor kidney transplant      Glaucoma     History of bleeding peptic ulcer      as stated per pt    Hyperlipidemia     Hypertension     Hypothyroidism     Morbid obesity 8/10/2012    Renal hypertension     Stroke     1987       Past Surgical History:   Procedure Laterality Date    CATARACT EXTRACTION W/  INTRAOCULAR LENS IMPLANT Bilateral     COLON SURGERY      COLONOSCOPY      COLONOSCOPY N/A 2018    Performed by [facility] MD at [facility] (2ND FLR)    EGD (ESOPHAGOGASTRODUODENOSCOPY) N/A 2019    Performed by Miranda Maradiaga MD at [facility] ENDO (2ND FLR)    EGD (ESOPHAGOGASTRODUODENOSCOPY) N/A 2018    Performed by Luis Washington MD at [facility] ENDO (2ND FLR)    ESOPHAGOGASTRODUODENOSCOPY (EGD) N/A 2018    Performed by Kenji Desir MD at [facility] ENDO (2ND FLR)    EYE SURGERY      FRACTURE SURGERY      R arm    HERNIA REPAIR      HYSTERECTOMY      INSERTION-IMPLANT-GLAUCOMA Left 10/12/2017    Performed by Junaid Kern MD at [facility] OR 1ST FLR    KIDNEY TRANSPLANT      NEPHRECTOMY  2008    transplant     PARATHYROID GLAND SURGERY      TONSILLECTOMY      UPPER GASTROINTESTINAL ENDOSCOPY         Review of patient's allergies indicates:   Allergen Reactions    Penicillins Swelling    Iodine      Other reaction(s): Hives    Sulfamethoxazole-trimethoprim      Other reaction(s):  Swelling  Other reaction(s): Hives       No current facility-administered medications on file prior to encounter.      Current Outpatient Medications on File Prior to Encounter   Medication Sig    ALPHAGAN P 0.1 % Drop once daily.     diltiaZEM (CARDIZEM CD) 300 MG 24 hr capsule Take 1 capsule (300 mg total) by mouth once daily.    dorzolamide-timolol 2-0.5% (COSOPT) 22.3-6.8 mg/mL ophthalmic solution INSTILL 1 DROP IN BOTH EYES TWICE DAILY    latanoprost 0.005 % ophthalmic solution INSTILL 1 DROP IN BOTH EYES EVERY DAY AT BEDTIME    levothyroxine (SYNTHROID) 100 MCG tablet Take 100 mcg by mouth. 1 Tablet Oral Every day    LIPITOR 10 mg tablet TAKE 1 BY MOUTH ONCE A DAY    pantoprazole (PROTONIX) 40 MG tablet TAKE 1 TABLET BY MOUTH ONCE DAILY    riociguat (ADEMPAS) 2 mg Tab tablet Take 2 mg by mouth 3 (three) times daily.     selexipag (UPTRAVI) 1,000 mcg Tab Take 1,000 mcg by mouth 2 (two) times daily.    warfarin (COUMADIN) 5 MG tablet TAKE 1 1/2 TABLETS BY MOUTH DAILY ON TUESDAY, THURSDAY AND SATURDAY, THEN TAKE 1 TABLET DAILY ON MONDAY, WEDNESDAY, FRIDAY AND     [DISCONTINUED] traMADol (ULTRAM) 50 mg tablet TK 1 T PO  Q 8 H PRN    [DISCONTINUED] hydrocodone-acetaminophen 7.5-325mg (NORCO) 7.5-325 mg per tablet TK 1 T PO Q 6 H PRN     Family History     Problem Relation (Age of Onset)    Asthma Sister    Blindness Father    Breast cancer Maternal Aunt    Depression Sister    Diabetes Maternal Aunt    Heart attack Father, Mother    Heart failure Father, Mother    Hypertension Father, Mother, Sister, Brother    Kidney disease Brother        Tobacco Use    Smoking status: Former Smoker     Types: Cigarettes     Last attempt to quit: 8/10/2000     Years since quittin.4    Smokeless tobacco: Never Used   Substance and Sexual Activity    Alcohol use: No     Comment: hx of etoh     Drug use: No    Sexual activity: No     Review of Systems   Constitutional: Positive for activity change,  appetite change and fatigue. Negative for chills, fever and unexpected weight change.   HENT: Negative for sore throat, trouble swallowing and voice change.    Eyes: Positive for visual disturbance (blurry vision with standing). Negative for photophobia.   Respiratory: Negative for cough, chest tightness and shortness of breath.    Cardiovascular: Positive for leg swelling. Negative for chest pain and palpitations.   Gastrointestinal: Positive for anal bleeding, blood in stool, nausea and vomiting. Negative for abdominal distention, abdominal pain, constipation, diarrhea and rectal pain.   Endocrine: Negative for polyphagia and polyuria.   Genitourinary: Negative for difficulty urinating, dysuria and hematuria.        Anuric   Musculoskeletal: Negative for back pain and neck pain.   Skin: Negative for rash and wound.   Allergic/Immunologic: Negative for immunocompromised state.   Neurological: Positive for dizziness and light-headedness. Negative for tremors, seizures, syncope, facial asymmetry, numbness and headaches.   Psychiatric/Behavioral: Negative for agitation, behavioral problems and confusion.     Objective:     Vital Signs (Most Recent):  Temp: 98.4 °F (36.9 °C) (02/02/19 1837)  Pulse: 81 (02/02/19 1837)  Resp: 18 (02/02/19 1837)  BP: 126/60 (02/02/19 1837)  SpO2: 96 % (02/02/19 1745) Vital Signs (24h Range):  Temp:  [97.8 °F (36.6 °C)-98.4 °F (36.9 °C)] 98.4 °F (36.9 °C)  Pulse:  [73-87] 81  Resp:  [16-20] 18  SpO2:  [92 %-96 %] 96 %  BP: ()/(51-68) 126/60  Arterial Line BP: (126)/(60) 126/60     Weight: 71.7 kg (158 lb)  Body mass index is 29.85 kg/m².    Physical Exam   Constitutional: She is oriented to person, place, and time. She appears well-developed and well-nourished. No distress.   HENT:   Head: Normocephalic and atraumatic.   Mouth/Throat: No oropharyngeal exudate.   Eyes: Conjunctivae are normal. Pupils are equal, round, and reactive to light. No scleral icterus.   Neck: Normal range of  motion. Neck supple. No tracheal deviation present. No thyromegaly present.   Cardiovascular: Normal heart sounds and intact distal pulses. Exam reveals no gallop and no friction rub.   Regular rate and rhythm with frequent PVCs   Pulmonary/Chest: Effort normal. She has rales (mild crackles at bilateral bases).   Abdominal: Soft. Bowel sounds are normal. She exhibits no distension. There is no tenderness.   Musculoskeletal: She exhibits edema (1+ edema of bilateral lower extremities).   Neurological: She is alert and oriented to person, place, and time.   Skin: Capillary refill takes 2 to 3 seconds. She is not diaphoretic.   Psychiatric: She has a normal mood and affect. Her behavior is normal.   Nursing note and vitals reviewed.        CRANIAL NERVES     CN III, IV, VI   Pupils are equal, round, and reactive to light.       Significant Labs:    Recent Results (from the past 24 hour(s))   CBC auto differential    Collection Time: 02/02/19  1:42 PM   Result Value Ref Range    WBC 5.55 3.90 - 12.70 K/uL    RBC 1.59 (L) 4.00 - 5.40 M/uL    Hemoglobin 4.3 (LL) 12.0 - 16.0 g/dL    Hematocrit 15.0 (LL) 37.0 - 48.5 %    MCV 94 82 - 98 fL    MCH 27.0 27.0 - 31.0 pg    MCHC 28.7 (L) 32.0 - 36.0 g/dL    RDW 16.5 (H) 11.5 - 14.5 %    Platelets 159 150 - 350 K/uL    MPV 12.3 9.2 - 12.9 fL    Immature Granulocytes 0.4 0.0 - 0.5 %    Gran # (ANC) 3.9 1.8 - 7.7 K/uL    Immature Grans (Abs) 0.02 0.00 - 0.04 K/uL    Lymph # 1.1 1.0 - 4.8 K/uL    Mono # 0.5 0.3 - 1.0 K/uL    Eos # 0.1 0.0 - 0.5 K/uL    Baso # 0.01 0.00 - 0.20 K/uL    nRBC 0 0 /100 WBC    Gran% 69.8 38.0 - 73.0 %    Lymph% 19.8 18.0 - 48.0 %    Mono% 8.5 4.0 - 15.0 %    Eosinophil% 1.3 0.0 - 8.0 %    Basophil% 0.2 0.0 - 1.9 %    Platelet Estimate Appears normal     Aniso Slight     Poik Slight     Hypo Occasional     Ovalocytes Occasional     Differential Method Automated    Comprehensive metabolic panel    Collection Time: 02/02/19  1:42 PM   Result Value Ref Range     Sodium 141 136 - 145 mmol/L    Potassium 3.9 3.5 - 5.1 mmol/L    Chloride 102 95 - 110 mmol/L    CO2 28 23 - 29 mmol/L    Glucose 101 70 - 110 mg/dL    BUN, Bld 22 8 - 23 mg/dL    Creatinine 6.8 (H) 0.5 - 1.4 mg/dL    Calcium 7.1 (L) 8.7 - 10.5 mg/dL    Total Protein 5.4 (L) 6.0 - 8.4 g/dL    Albumin 2.1 (L) 3.5 - 5.2 g/dL    Total Bilirubin 0.3 0.1 - 1.0 mg/dL    Alkaline Phosphatase 41 (L) 55 - 135 U/L    AST 20 10 - 40 U/L    ALT 10 10 - 44 U/L    Anion Gap 11 8 - 16 mmol/L    eGFR if African American 6.7 (A) >60 mL/min/1.73 m^2    eGFR if non African American 5.8 (A) >60 mL/min/1.73 m^2   Protime-INR    Collection Time: 02/02/19  1:42 PM   Result Value Ref Range    Prothrombin Time 31.6 (H) 9.0 - 12.5 sec    INR 3.2 (H) 0.8 - 1.2   Type & Screen    Collection Time: 02/02/19  1:42 PM   Result Value Ref Range    Group & Rh A POS     Indirect Denae POS    APTT    Collection Time: 02/02/19  1:42 PM   Result Value Ref Range    aPTT 35.4 (H) 21.0 - 32.0 sec   Magnesium    Collection Time: 02/02/19  1:42 PM   Result Value Ref Range    Magnesium 1.9 1.6 - 2.6 mg/dL   Phosphorus    Collection Time: 02/02/19  1:42 PM   Result Value Ref Range    Phosphorus 3.1 2.7 - 4.5 mg/dL   Prepare RBC 2 Units; severe anemia, GI bleeding    Collection Time: 02/02/19  1:42 PM   Result Value Ref Range    UNIT NUMBER [id number]     PRODUCT CODE D7896E64     DISPENSE STATUS ISSUED     CODING SYSTEM DOBF310     Unit Blood Type Code 6200     Unit Blood Type A POS     Unit Expiration [id number]     UNIT NUMBER [id number]     PRODUCT CODE V3270U53     DISPENSE STATUS CROSSMATCHED     CODING SYSTEM IFJI553     Unit Blood Type Code 6200     Unit Blood Type A POS     Unit Expiration [id number]    Antibody identification    Collection Time: 02/02/19  1:42 PM   Result Value Ref Range    Antibody Identification POS    Hemoglobin A1c    Collection Time: 02/02/19  1:42 PM   Result Value Ref Range    Hemoglobin A1C 5.1 4.0 - 5.6 %     Estimated Avg Glucose 100 68 - 131 mg/dL     Microbiology Results (last 7 days)     ** No results found for the last 168 hours. **        Imaging Results          X-Ray Chest AP Portable (Final result)  Result time 02/02/19 18:33:53    Final result by Asif Sanabria MD (02/02/19 18:33:53)                 Impression:      Findings suggesting pulmonary edema/CHF pattern, slightly worse from 01/13/2019 exam, without focal consolidation.      Electronically signed by: Asif Sanabria MD  Date:    02/02/2019  Time:    18:33             Narrative:    EXAMINATION:  XR CHEST AP PORTABLE    CLINICAL HISTORY:  volume assessment;    TECHNIQUE:  Single frontal view of the chest was performed.    COMPARISON:  Chest radiograph 01/13/2019    FINDINGS:  Monitoring leads overlie the chest.  Patient is slightly rotated.  Clothing artifact and metallic safety pin artifacts overlie the chest.    Cardiomediastinal silhouette remains prominent with continued prominence of the central pulmonary vasculature and bilateral diffuse interstitial coarsening suggesting pulmonary edema/CHF pattern, slightly worse from 01/13/2019 exam.  Calcific atherosclerosis of the thoracic aorta.  No large pleural effusion, pneumothorax or consolidation seen.  Lower left neck surgical clips and upper left axillary vascular stent appears stable.  No acute osseous process seen.  PA and lateral views can be obtained.

## 2019-02-03 NOTE — ASSESSMENT & PLAN NOTE
Patient presented with rectal bleeding and blood loss anemia, with recurrent presentations due to blood loss anemia with no identified source.   Patient is currently HDS.   Given presentation with LGIB, will plan colonoscopy to identify source of bleeding    -If the patient has bleeding overnight, please contact GI and obtain CTA abdomen with GI bleeding protocol.  -Protonix IV BID  -Intravascular resuscitation/support with IVFs and pRBCs as needed.  -Serial H/H's transfuse as needed.  -Discontinue all NSAIDs and Heparin products  -Please correct any coagulopathy with platelets and FFP to a goal of platelets >50K and INR <2.5  -Maintain IV access with 2 large bore IVs  -Keep on Clear liquid diet. Keep NPO after MN for colonoscopy. Will order bowel prep.

## 2019-02-03 NOTE — ASSESSMENT & PLAN NOTE
- Low hemoglobin 4.3   - Patient on coumadin for Afib.  - GI consulted which will follow recommendations  - Will get blood transfusion today, follow H+h

## 2019-02-03 NOTE — TREATMENT PLAN
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    Patient states that she was doing well until last night when she started to have rectal bleeding described as bright red, loose, with large blood clots. This was not associated with abdominal pain. She reports mild nausea, with vomiting of clear gastric content, but no blood or coffee grounds. She felt dizzy and short of breath. Had 4-5x similar BM.    The patient presented to the ED with Hb of 4.3, from 7.8 (2 weeks ago), INR 3.2. 2 units of pRBCs were transfused. Hemodynamically stable.    On first presentation on 9/2018, the patient presented with melena. EGD was negative at that time. Plan was for VCE if further bleeding.  6/2018 had a colonoscopy that was negative other than 2 x 5mm polyp  1/2019, patient presented with melena, and a push enteroscopy was negative. A VCE was obtained with no active bleeding, or source of bleeding. Plan was for colonoscopy.     Objective  Temp:  [97.8 °F (36.6 °C)-98.4 °F (36.9 °C)] 98.4 °F (36.9 °C) (02/02 1837)  Pulse:  [73-86] 82 (02/02 2031)  BP: ()/(51-68) 129/57 (02/02 2031)  Resp:  [10-28] 16 (02/02 2031)  SpO2:  [92 %-98 %] 94 % (02/02 2031)  Arterial Line BP: (126)/(60) 126/60 (02/02 1837)    General: Alert, Oriented x3, no distress  Abdomen: Normoactive bowel sounds. Non-distended. Normal tympany. Soft. Non-tender. No peritoneal signs.    Laboratory    Recent Labs   Lab 02/02/19  1342   HGB 4.3*       Lab Results   Component Value Date    WBC 5.55 02/02/2019    HGB 4.3 (LL) 02/02/2019    HCT 15.0 (LL) 02/02/2019    MCV 94 02/02/2019     02/02/2019       Lab Results   Component Value Date     02/02/2019    K 3.9  02/02/2019     02/02/2019    CO2 28 02/02/2019    BUN 22 02/02/2019    CREATININE 6.8 (H) 02/02/2019    CALCIUM 7.1 (L) 02/02/2019    ANIONGAP 11 02/02/2019    ESTGFRAFRICA 6.7 (A) 02/02/2019    EGFRNONAA 5.8 (A) 02/02/2019       Lab Results   Component Value Date    ALT 10 02/02/2019    AST 20 02/02/2019    ALKPHOS 41 (L) 02/02/2019    BILITOT 0.3 02/02/2019       Lab Results   Component Value Date    INR 3.2 (H) 02/02/2019    INR 1.7 (H) 01/18/2019    INR 1.3 (H) 01/17/2019       Plan  Patient presented with rectal bleeding and blood loss anemia, with recurrent presentations due to blood loss anemia with no identified source.  Patient is currently HDS.   -If the patient has bleeding overnight, please contact GI and obtain CTA abdomen with GI bleeding protocol.  -Protonix IV BID  -Intravascular resuscitation/support with IVFs and pRBCs as needed.  -Serial H/H's transfuse as needed.  -Discontinue all NSAIDs and Heparin products  -Please correct any coagulopathy with platelets and FFP to a goal of platelets >50K and INR <2.5  -Maintain IV access with 2 large bore IVs  -Keep NPO  - Plan of care was discussed with primary team.  - We will continue to follow.    Thank you for involving us in the care of Shivani Sheets. Please call with any additional questions, concerns or changes in the patient's clinical status.    Sergo Magdaleno MD  Gastroenterology Fellow, PGY IV  Spectralink: [id number]

## 2019-02-03 NOTE — SUBJECTIVE & OBJECTIVE
Past Medical History:   Diagnosis Date    Allergy     Anemia     Anticoagulant long-term use     4 years coumadin, asa    Arthritis     Awaiting organ transplant 2013    Cataract     Central serous chorioretinopathy of eye, right 2018    CHF (congestive heart failure)     Chronic kidney disease     Colon polyps     COPD (chronic obstructive pulmonary disease)     Coronary artery disease     Diabetes mellitus     Diabetic retinopathy     Diverticulosis     Encounter for blood transfusion     ESRD from HTN strtied RRT 1999    Failed  donor kidney transplant      Glaucoma     History of bleeding peptic ulcer      as stated per pt    Hyperlipidemia     Hypertension     Hypothyroidism     Morbid obesity 8/10/2012    Renal hypertension     Stroke     1987       Past Surgical History:   Procedure Laterality Date    CATARACT EXTRACTION W/  INTRAOCULAR LENS IMPLANT Bilateral     COLON SURGERY      COLONOSCOPY      COLONOSCOPY N/A 2018    [facility]formed by [facility] at [facility] (2ND FLR)    EGD (ESOPHAGOGASTRODUODENOSCOPY) N/A 2019    Performed by Miranda [facility]adiaga MD at [facility] ENDO (2ND FLR)    EGD (ESOPHAGOGASTRODUODENOSCOPY) N/A 2018    Performed by Luis Washington MD at [facility] ENDO (2ND FLR)    ESOPHAGOGASTRODUODENOSCOPY (EGD) N/A 2018    Performed by Kenji Desir MD at [facility] ENDO (2ND FLR)    EYE SURGERY      FRACTURE SURGERY      R arm    HERNIA REPAIR      HYSTERECTOMY      INSERTION-IMPLANT-GLAUCOMA Left 10/12/2017    Performed by [facility]id [facility]ern [facility] at [facility] OR 1ST FLR    KIDNEY TRANSPLANT      NEPHRECTOMY  2008    transplant     PARATHYROID GLAND SURGERY      TONSILLECTOMY      UPPER GASTROINTESTINAL ENDOSCOPY         Review of patient's allergies indicates:   Allergen Reactions    Penicillins Swelling    Iodine      Other reaction(s): Hives    Sulfamethoxazole-trimethoprim      Other reaction(s):  Swelling  Other reaction(s): Hives     Family History     Problem Relation (Age of Onset)    Asthma Sister    Blindness Father    Breast cancer Maternal Aunt    Depression Sister    Diabetes Maternal Aunt    Heart attack Father, Mother    Heart failure Father, Mother    Hypertension Father, Mother, Sister, Brother    Kidney disease Brother        Tobacco Use    Smoking status: Former Smoker     Types: Cigarettes     Last attempt to quit: 8/10/2000     Years since quittin.4    Smokeless tobacco: Never Used   Substance and Sexual Activity    Alcohol use: No     Comment: hx of etoh     Drug use: No    Sexual activity: No     Review of Systems   Constitutional: Positive for activity change and fatigue. Negative for appetite change, chills and fever.   HENT: Negative for trouble swallowing.    Respiratory: Positive for shortness of breath. Negative for cough, choking and chest tightness.    Cardiovascular: Negative for chest pain and leg swelling.   Gastrointestinal: Positive for blood in stool. Negative for abdominal distention, abdominal pain, anal bleeding, constipation, diarrhea, nausea and vomiting.   Genitourinary: Negative for difficulty urinating and dysuria.   Musculoskeletal: Negative for arthralgias and back pain.   Skin: Positive for pallor. Negative for color change.   Neurological: Positive for dizziness. Negative for headaches.   Psychiatric/Behavioral: Negative for agitation and confusion.     Objective:     Vital Signs (Most Recent):  Temp: 98.2 °F (36.8 °C) (19 08)  Pulse: 84 (19 1100)  Resp: 20 (19)  BP: 134/70 (19)  SpO2: 100 % (19) Vital Signs (24h Range):  Temp:  [97.8 °F (36.6 °C)-98.5 °F (36.9 °C)] 98.2 °F (36.8 °C)  Pulse:  [75-92] 84  Resp:  [10-28] 20  SpO2:  [92 %-100 %] 100 %  BP: (103-141)/(56-70) 134/70  Arterial Line BP: (126)/(60) 126/60     Weight: 75 kg (165 lb 5.5 oz) (19 2253)  Body mass index is 31.24  kg/m².      Intake/Output Summary (Last 24 hours) at 2/3/2019 1124  Last data filed at 2/3/2019 0300  Gross per 24 hour   Intake 876.83 ml   Output 0 ml   Net 876.83 ml       Lines/Drains/Airways     Central Venous Catheter Line                 RETIRED! DO NOT USE: Hemodialysis Catheter Arteriovenous fistula Left Forearm -- days          Drain                 Hemodialysis AV Fistula Left upper arm -- days          Peripheral Intravenous Line                 Peripheral IV - Single Lumen 02/02/19 1530 Other (Comment) less than 1 day                Physical Exam   Constitutional: She is oriented to person, place, and time. She appears well-developed and well-nourished. No distress.   HENT:   Head: Normocephalic.   Eyes: Conjunctivae are normal. No scleral icterus.   Neck: Normal range of motion. Neck supple.   Cardiovascular: Normal rate and regular rhythm.   Pulmonary/Chest: Effort normal and breath sounds normal.   Abdominal: Soft. Bowel sounds are normal. She exhibits no distension and no mass. There is no tenderness. There is no rebound and no guarding.   Musculoskeletal: Normal range of motion.   Neurological: She is alert and oriented to person, place, and time.   Skin: Skin is warm and dry. There is pallor.   Psychiatric: She has a normal mood and affect.       Significant Labs:  CBC:   Recent Labs   Lab 02/02/19  1342 02/02/19  2347 02/03/19  0916   WBC 5.55 15.84* 9.23   HGB 4.3* 7.3* 7.1*   HCT 15.0* 23.5* 22.0*    128* 123*     BMP:   Recent Labs   Lab 02/03/19  0624   GLU 82      K 4.1      CO2 25   BUN 26*   CREATININE 7.7*   CALCIUM 7.0*   MG 2.0     CMP:   Recent Labs   Lab 02/03/19  0624   GLU 82   CALCIUM 7.0*   ALBUMIN 2.1*   PROT 5.3*      K 4.1   CO2 25      BUN 26*   CREATININE 7.7*   ALKPHOS 37*   ALT 10   AST 19   BILITOT 0.5     Coagulation:   Recent Labs   Lab 02/02/19  1342 02/03/19  0624   INR 3.2* 3.0*   APTT 35.4*  --      Lipase: No results for input(s):  LIPASE in the last 48 hours.    Significant Imaging:  Imaging results within the past 24 hours have been reviewed.

## 2019-02-03 NOTE — PROGRESS NOTES
[facility]  Heart Transplant  Progress Note    Patient Name: Shivani Sheets  MRN: [id number]  Admission Date: 2/2/2019  Hospital Length of Stay: 1 days  Attending Physician: Gera Jordan Jr.,*  Primary Care Provider: Eula Weiss MD  Principal Problem:Acute blood loss anemia    Subjective:     Interval History: Patient reports feeling well, no BRBPR since in ER. Asymptomatic at this time.    Continuous Infusions:  Scheduled Meds:   atorvastatin  10 mg Oral Daily    dorzolamide  1 drop Both Eyes BID    latanoprost  1 drop Both Eyes QHS    levothyroxine  100 mcg Oral Before breakfast    pantoprozole (PROTONIX) IV  80 mg Intravenous Q12H    polyethylene glycol  4,000 mL Oral Once    riociguat  2 mg Oral TID    sodium chloride 0.9%  3 mL Intravenous Q8H    timolol maleate 0.5%  1 drop Both Eyes BID     PRN Meds:sodium chloride, dextrose 50%, dextrose 50%, glucagon (human recombinant), glucose, glucose, ondansetron, sodium chloride 0.9%, sodium chloride 0.9%    Review of patient's allergies indicates:   Allergen Reactions    Penicillins Swelling    Iodine      Other reaction(s): Hives    Sulfamethoxazole-trimethoprim      Other reaction(s): Swelling  Other reaction(s): Hives     Objective:     Vital Signs (Most Recent):  Temp: 98.2 °F (36.8 °C) (02/03/19 1229)  Pulse: 80 (02/03/19 1229)  Resp: 18 (02/03/19 1229)  BP: 126/72 (02/03/19 1229)  SpO2: 100 % (02/03/19 1229) Vital Signs (24h Range):  Temp:  [97.8 °F (36.6 °C)-98.5 °F (36.9 °C)] 98.2 °F (36.8 °C)  Pulse:  [76-92] 80  Resp:  [10-28] 18  SpO2:  [92 %-100 %] 100 %  BP: (109-141)/(56-72) 126/72  Arterial Line BP: (126)/(60) 126/60     Patient Vitals for the past 72 hrs (Last 3 readings):   Weight   02/02/19 2253 75 kg (165 lb 5.5 oz)   02/02/19 1120 71.7 kg (158 lb)     Body mass index is 31.24 kg/m².      Intake/Output Summary (Last 24 hours) at 2/3/2019 1341  Last data filed at 2/3/2019 0800  Gross per 24 hour   Intake  876.83 ml   Output 0 ml   Net 876.83 ml       Hemodynamic Parameters:       Telemetry: NSR    Physical Exam   Constitutional: She is oriented to person, place, and time. She appears well-developed and well-nourished.   HENT:   Head: Normocephalic and atraumatic.   Eyes: EOM are normal. Pupils are equal, round, and reactive to light.   Neck: Normal range of motion. No JVD present. Carotid bruit is not present.   Cardiovascular: Normal rate, regular rhythm, normal heart sounds and intact distal pulses. Exam reveals no gallop, no S3, no S4, no distant heart sounds and no friction rub.   No murmur heard.  Left AVF   Pulmonary/Chest: Effort normal and breath sounds normal. No respiratory distress. She has no wheezes.   Abdominal: Soft. Bowel sounds are normal. She exhibits no distension. There is no tenderness.   Neurological: She is alert and oriented to person, place, and time.   Skin: Skin is warm. She is not diaphoretic. No erythema.   Psychiatric: She has a normal mood and affect. Her behavior is normal.       Significant Labs:  CBC:  Recent Labs   Lab 02/02/19  1342 02/02/19  2347 02/03/19  0916   WBC 5.55 15.84* 9.23   RBC 1.59* 2.61* 2.46*   HGB 4.3* 7.3* 7.1*   HCT 15.0* 23.5* 22.0*    128* 123*   MCV 94 90 89   MCH 27.0 28.0 28.9   MCHC 28.7* 31.1* 32.3     BNP:  Recent Labs   Lab 02/03/19  0624   BNP 1,584*     CMP:  Recent Labs   Lab 02/02/19  1342 02/02/19  2347 02/03/19  0624    101 82   CALCIUM 7.1* 7.2* 7.0*   ALBUMIN 2.1*  --  2.1*   PROT 5.4*  --  5.3*    142 142   K 3.9 3.8 4.1   CO2 28 26 25    103 105   BUN 22 24* 26*   CREATININE 6.8* 7.1* 7.7*   ALKPHOS 41*  --  37*   ALT 10  --  10   AST 20  --  19   BILITOT 0.3  --  0.5      Coagulation:   Recent Labs   Lab 02/02/19  1342 02/03/19  0624   INR 3.2* 3.0*   APTT 35.4*  --      LDH:  No results for input(s): LDH in the last 72 hours.  Microbiology:  Microbiology Results (last 7 days)     ** No results found for the last 168  hours. **          I have reviewed all pertinent labs within the past 24 hours.    Estimated Creatinine Clearance: 6.7 mL/min (A) (based on SCr of 7.7 mg/dL (H)).    Diagnostic Results:  I have reviewed all pertinent imaging results/findings within the past 24 hours.    Assessment and Plan:       * Acute blood loss anemia    - 63 yr old AA female with multiple recurrent GI bleed with no obvious source identified admitted with BRBPR  - Internal hemorrhoids vs. AVM (ESRD), no active abdominal pain ( H/o pAF on AC), supra therapeutic INR  - Recent antibiotic use for cellulitis  - Consider stool studies if diarrhea persists as recent antibiotic use.  - NPO past midnight for possible endoscopy tomorrow  - IV Protonix bid  - ASA on hold from last admission  - coumadin held  - 1/14 to 1/15:Had EGD, SBE: normal stomach, esophagus non bleeding erosive gastropathy.   VCE: No obvious source, erosive gastropathy, gastric polyp and SB lymphangiectasia.   Last c-scope 6/18 with 2 polyps, 5-mm, resected, adenomas, recommended repeat in 5 years   Controlled type 2 diabetes mellitus with both eyes affected by proliferative retinopathy and macular edema, without long-term current use of insulin    - HbAIC of 5.1  - target 140-180 BG in hospital     Atrial fibrillation    - Paroxysmal  - Pt with h/o CVA and central retinal artery occlusion  - NSR, rate controlled     Pulmonary HTN    - Will continue with adempas, pt brought her selexipeg with her.  - Last TTE 11/2018: LVEF 55%, RVSF mildly decreased, PASP 99, moderate TR     ESRD from HTN started RRT 1999    - Nephrology on board  - Had HD [facility]/2019 ( [facility])  - Will get HD in am.     CAD (coronary artery disease)    - Aspirin on hold  - C/W statin  - PET stress test from 2018 nonischemic         Curry Chavez MD  Heart Transplant  [facility]

## 2019-02-03 NOTE — ASSESSMENT & PLAN NOTE
Supratherapuetic at INR 3.2   -Holding anticoagulation in setting of GIB  -Continue rate control   -tele

## 2019-02-03 NOTE — CONSULTS
[facility]  Gastroenterology  Consult Note    Patient Name: Shivani Sheets  MRN: [id number]  Admission Date: 2/2/2019  Hospital Length of Stay: 1 days  Code Status: Full Code   Attending Provider: Gera Jordan Jr.,*   Consulting Provider: Sergo Magdaleno MD  Primary Care Physician: Eula Weiss MD  Principal Problem:Acute blood loss anemia    Inpatient consult to Gastroenterology  Consult performed by: Sergo Magdaleno MD  Consult ordered by: Curry Grider MD        Subjective:     HPI:  Ms Sheets is a 63 year old female with history of Afib on coumadin, pulmonary HTN, ESRD on HD (failed transplant), 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     Patient states that she was doing well until last night when she started to have rectal bleeding described as bright red, loose, with large blood clots. This was not associated with abdominal pain. She reports mild nausea, with vomiting of clear gastric content, but no blood or coffee grounds. She felt dizzy and short of breath. Had 4-5x similar BM.     The patient presented to the ED with Hb of 4.3, from 7.8 (2 weeks ago), INR 3.2. 2 units of pRBCs were transfused. Hemodynamically stable. After transfusion, Hb improved to 7.3    Since presentation to the ED the patient did not have a BM and no further bleeding.     On first presentation on 9/2018, the patient presented with melena. EGD was negative at that time. Plan was for VCE if further bleeding.  6/2018 had a colonoscopy that was negative other than 2 x 5mm polyp  1/2019, patient presented with melena, and a push enteroscopy was negative. A VCE was obtained with no active bleeding, or source of bleeding. Plan was for colonoscopy or VCE if bleeding recurs.      Past Medical History:   Diagnosis Date    Allergy     Anemia      Anticoagulant long-term use     4 years coumadin, asa    Arthritis     Awaiting organ transplant 2013    Cataract     Central serous chorioretinopathy of eye, right 2018    CHF (congestive heart failure)     Chronic kidney disease     Colon polyps     COPD (chronic obstructive pulmonary disease)     Coronary artery disease     Diabetes mellitus     Diabetic retinopathy     Diverticulosis     Encounter for blood transfusion     ESRD from HTN strtied RRT 1999    Failed  donor kidney transplant      Glaucoma     History of bleeding peptic ulcer      as stated per pt    Hyperlipidemia     Hypertension     Hypothyroidism     Morbid obesity 8/10/2012    Renal hypertension     Stroke     1987       Past Surgical History:   Procedure Laterality Date    CATARACT EXTRACTION W/  INTRAOCULAR LENS IMPLANT Bilateral     COLON SURGERY      COLONOSCOPY      COLONOSCOPY N/A 2018    Performed by [facility] (2ND FLR)    EGD (ESOPHAGOGASTRODUODENOSCOPY) N/A 2019    Performed by Miranda Maradiaga [facility] at [facility] END[facility] (2ND FLR)    EGD (ESOPHAGOGASTRODUODENOSCOPY) N/A 2018    Performed by Luis Washington MD at [facility] END[facility] (2ND FLR)    ESOPHAGOGASTRODUODENOSCOPY (EGD) N/A 2018    Performed by Kenji Desir MD at [facility] END[facility] (2ND FLR)    EYE SURGERY      FRACTURE SURGERY      R arm    HERNIA REPAIR      HYSTERECTOMY      INSERTION-IMPLANT-GLAUCOMA Left 10/12/2017    Performed by Junaid Kern MD at [facility] OR 1ST FLR    KIDNEY TRANSPLANT      NEPHRECTOMY  2008    transplant     PARATHYROID GLAND SURGERY      TONSILLECTOMY      UPPER GASTROINTESTINAL ENDOSCOPY         Review of patient's allergies indicates:   Allergen Reactions    Penicillins Swelling    Iodine      Other reaction(s): Hives    Sulfamethoxazole-trimethoprim      Other reaction(s): Swelling  Other reaction(s): Hives     Family History     Problem Relation  (Age of Onset)    Asthma Sister    Blindness Father    Breast cancer Maternal Aunt    Depression Sister    Diabetes Maternal Aunt    Heart attack Father, Mother    Heart failure Father, Mother    Hypertension Father, Mother, Sister, Brother    Kidney disease Brother        Tobacco Use    Smoking status: Former Smoker     Types: Cigarettes     Last attempt to quit: 8/10/2000     Years since quittin.4    Smokeless tobacco: Never Used   Substance and Sexual Activity    Alcohol use: No     Comment: hx of etoh     Drug use: No    Sexual activity: No     Review of Systems   Constitutional: Positive for activity change and fatigue. Negative for appetite change, chills and fever.   HENT: Negative for trouble swallowing.    Respiratory: Positive for shortness of breath. Negative for cough, choking and chest tightness.    Cardiovascular: Negative for chest pain and leg swelling.   Gastrointestinal: Positive for blood in stool. Negative for abdominal distention, abdominal pain, anal bleeding, constipation, diarrhea, nausea and vomiting.   Genitourinary: Negative for difficulty urinating and dysuria.   Musculoskeletal: Negative for arthralgias and back pain.   Skin: Positive for pallor. Negative for color change.   Neurological: Positive for dizziness. Negative for headaches.   Psychiatric/Behavioral: Negative for agitation and confusion.     Objective:     Vital Signs (Most Recent):  Temp: 98.2 °F (36.8 °C) (19)  Pulse: 84 (19 1100)  Resp: 20 (19)  BP: 134/70 (19)  SpO2: 100 % (19) Vital Signs (24h Range):  Temp:  [97.8 °F (36.6 °C)-98.5 °F (36.9 °C)] 98.2 °F (36.8 °C)  Pulse:  [75-92] 84  Resp:  [10-28] 20  SpO2:  [92 %-100 %] 100 %  BP: (103-141)/(56-70) 134/70  Arterial Line BP: (126)/(60) 126/60     Weight: 75 kg (165 lb 5.5 oz) (19)  Body mass index is 31.24 kg/m².      Intake/Output Summary (Last 24 hours) at 2/3/2019 1124  Last data filed at 2/3/2019  0300  Gross per 24 hour   Intake 876.83 ml   Output 0 ml   Net 876.83 ml       Lines/Drains/Airways     Central Venous Catheter Line                 RETIRED! DO NOT USE: Hemodialysis Catheter Arteriovenous fistula Left Forearm -- days          Drain                 Hemodialysis AV Fistula Left upper arm -- days          Peripheral Intravenous Line                 Peripheral IV - Single Lumen 02/02/19 1530 Other (Comment) less than 1 day                Physical Exam   Constitutional: She is oriented to person, place, and time. She appears well-developed and well-nourished. No distress.   HENT:   Head: Normocephalic.   Eyes: Conjunctivae are normal. No scleral icterus.   Neck: Normal range of motion. Neck supple.   Cardiovascular: Normal rate and regular rhythm.   Pulmonary/Chest: Effort normal and breath sounds normal.   Abdominal: Soft. Bowel sounds are normal. She exhibits no distension and no mass. There is no tenderness. There is no rebound and no guarding.   Musculoskeletal: Normal range of motion.   Neurological: She is alert and oriented to person, place, and time.   Skin: Skin is warm and dry. There is pallor.   Psychiatric: She has a normal mood and affect.       Significant Labs:  CBC:   Recent Labs   Lab 02/02/19  1342 02/02/19  2347 02/03/19  0916   WBC 5.55 15.84* 9.23   HGB 4.3* 7.3* 7.1*   HCT 15.0* 23.5* 22.0*    128* 123*     BMP:   Recent Labs   Lab 02/03/19  0624   GLU 82      K 4.1      CO2 25   BUN 26*   CREATININE 7.7*   CALCIUM 7.0*   MG 2.0     CMP:   Recent Labs   Lab 02/03/19  0624   GLU 82   CALCIUM 7.0*   ALBUMIN 2.1*   PROT 5.3*      K 4.1   CO2 25      BUN 26*   CREATININE 7.7*   ALKPHOS 37*   ALT 10   AST 19   BILITOT 0.5     Coagulation:   Recent Labs   Lab 02/02/19  1342 02/03/19  0624   INR 3.2* 3.0*   APTT 35.4*  --      Lipase: No results for input(s): LIPASE in the last 48 hours.    Significant Imaging:  Imaging results within the past 24 hours have  been reviewed.    Assessment/Plan:     * Acute blood loss anemia    Patient presented with rectal bleeding and blood loss anemia, with recurrent presentations due to blood loss anemia with no identified source.   Patient is currently HDS.   Given presentation with LGIB, will plan colonoscopy to identify source of bleeding    -If the patient has bleeding overnight, please contact GI and obtain CTA abdomen with GI bleeding protocol.  -Protonix IV BID  -Intravascular resuscitation/support with IVFs and pRBCs as needed.  -Serial H/H's transfuse as needed.  -Discontinue all NSAIDs and Heparin products  -Please correct any coagulopathy with platelets and FFP to a goal of platelets >50K and INR <2.5  -Maintain IV access with 2 large bore IVs  -Keep on Clear liquid diet. Keep NPO after MN for colonoscopy. Will order bowel prep.         Thank you for your consult. I will follow-up with patient. Please contact us if you have any additional questions.    Júnior Magdaleno MD  Gastroenterology  [facility]

## 2019-02-03 NOTE — ASSESSMENT & PLAN NOTE
- Paroxysmal  - Pt with h/o CVA and central retinal artery occlusion  - Current sinus at bedside.   - Supra therapeutic INR 3.2, Repeat in am  - Once stable risk benefits to be assessed.

## 2019-02-03 NOTE — ASSESSMENT & PLAN NOTE
- 63 yr old AA female with multiple recurrent GI bleed with no obvious source identified admitted with BRBPR  - Internal hemorrhoids vs. AVM ( ESRD), no active abdominal pain ( H/o pAF on AC), supra therapeutic INR  - Recent antibiotic use for cellulitis  - Hb of 4.3 from 7.8 on 1/18  - ED: started first unit PRBC through rt IJ, second unit ordered. Nephrology with transfuse with HD in am. ( Received 1 lt NS in ED when SBP in 90)  - Will watch closely for fluid overload, not tachycardia, blood pressure stable  - Obtain 2 peripheral IV, CBC Q6 hours, lactate level.   - Consider stool studies if diarrhea persists as recent antibiotic use.  - Informed pt to let us know if any further episodes to consider for CTA  - Appreciate Nephrology, CCU and GI input. May do colonoscopy or CTA. NPO.  - IV Protonix bid  - ASA on hold from last admission  - INR 3.2, will hold coumadin. Repeat INR in am.  - 1/14 to 1/15:Had EGD, SBE: normal stomach, esophagus non bleeding erosive gastropathy.   VCE: No obvious source, erosive gastropathy, gastric polyp and SB lymphangiectasia.   Had recurrent admissions for GI bleed with no obvious source- Admit 9/2018, 6/2018, 2/2018: Colonoscopy : Internal hemorrhoids, 5 mm polyp in sigmoid colon.

## 2019-02-03 NOTE — CONSULTS
Consult to Critical Care medicine acknowledged, patient evaluated and discussed with Critical Care team. Patient is hemodynamically stable and without ICU needs at this time. Would recommend admission to Hospital Medicine. Should patient worsen, please re-consult Critical Care medicine. Full consult note to follow.        Arjun Jhaveri MD  Internal Medicine, PGY-2  Critical Care

## 2019-02-03 NOTE — ASSESSMENT & PLAN NOTE
- Nephrology on board  - Had HD 2/1/2019 ( MWF)  - Will get HD in am.  - ? erythropoietin role once stable

## 2019-02-03 NOTE — HPI
64 yo F w/ PMHx  pulmonary HTN (on adempas/uptravi), HFpEF, pAF(on coumadin), central retinal artery occlusion without significant carotid artery stenosis, CAD with remote PCI, ESRD(LUE AVF) secondary to HTN, s/p failed kidney transplant, PUD, STEFFANY on CPAP, hypothyroidism, RA, HLD and obesity who presents to the ED with a complaint of rectal bleeding x 1 day with recent hospital admission on 1/12/2019 with discharge home 01/18/2019 secondary to GI bleed. She is anticoagulated. She reports noticing bright red blood in toilet following BM last night, she reports the rectal bleeding had stopped prior to hospital discharge on 1/18/19. She has had an extensive GI workup. Patient was recently admitted for melena and was discharged on 1/18/19 after EGD, push enteroscopy, and video capsule endoscopy failed to show source of bleeding. Now having BRBPR, however. She reports dizziness and lightheadedness. Denies fevers or chills. Denies abdo pain. Patient has been admitted multiple times in the past, each time directly to [facility] service. Patient denies any  n/v/d/c,  dysuria, hematuria, cough, wheezing, SOB, CP, HA at this time.

## 2019-02-03 NOTE — HPI
62 yo F w/ PMHx  pulmonary HTN (on adempas/selexipeg), diastolic dysfunction, pAF(on coumadin), CVA 1987, COPD on 2 litre nocturnal oxygen, central retinal artery occlusion without significant carotid artery stenosis, CAD with remote PCI, ESRD(LUE AVF) secondary to HTN, s/p failed kidney transplant, PUD, STEFFANY on CPAP, hypothyroidism, RA, HLD and obesity who presents with rectal bleeding BRBPR which started 2/1/19 (diarrhea with blood clots - 4-5 episodes ) and anemia with Hb of 4.3 ( 7.8 on 1/18).     She was feeling lightheaded on getting up. Last admission had bill. Had some irregular HR occasionally. One episode of vomiting and some nausea.  She finished course of doxycyline last week for cellulitis of her leg from IO access, now swelling reduced. After discharge on 1/18 she did not have any episodes since yesterday. Had HD on 2/1/2019. Took 5 mg coumadin yesterday. No chest pain, SOB, leg swelling, PND, orthopnea, syncope, F/C, phlegm, abdominal pain, bruising or bleeding from other sites.    In ED had low blood pressures (98/51 mmHg), but no oxygen requirement and Hgb 4.3. Seen by CCU- no ICU needs at this time. Nephrology saw - will transfuse with HD in am. GI evaluated- Recommended NPO past midnight and CTA if actively bleeds (last BRBPR in the morning).     Last admission : 1/12- 1/18: Melena received 6  Units PBRC and 2 units FFP. Had EGD, SBE: normal stomach, esophagus non bleeding erosive gastropathy. VCE: No obvious source, erosive gastropathy, gastric polyp and SB lymphangiectasia. Discharged with hb of 7.8 and INR 1.7. Discontinued aspirin.   Had recurrent admissions for GI bleed with no obvious source- Admit 9/2018, 6/2018, 2/2018: Colonoscopy : Internal hemorrhoids, 5 mm polyp in sigmoid colon.    11/2018: TTE: LVEF 55%, LA severely enlarged, moderate TR, RV moderately dilated, RVSF mild decreased, PASP -99, severe pulmonary hypertension.   PET stress: No evidence of ischemia at rest and  stress

## 2019-02-03 NOTE — NURSING
Pt has Right EJ in place. No other access at this time. Pt is refusing other PIVs. Pt requests Midline due to poor venous access. Call out to anesthesia team as PICC team not available.

## 2019-02-03 NOTE — H&P
[facility] Medicine  History & Physical    Patient Name: Shivani Sheets  MRN: [id number]  Admission Date: 2/2/2019  Attending Physician: Vicky Callahan MD   Primary Care Provider: Eula Weiss MD    [facility] Medicine Team: [facility] MED 3 Christian Kim MD     Patient information was obtained from patient, spouse/SO, past medical records and ER records.     Subjective:     Principal Problem:Acute blood loss anemia    Chief Complaint:   Chief Complaint   Patient presents with    Rectal Bleeding     recent dc after admit for GI bleed- began bleeding rectally again last night.         HPI: 62 yo F w/ PMHx  pulmonary HTN (on adempas/uptravi), HFpEF, pAF(on coumadin), central retinal artery occlusion without significant carotid artery stenosis, CAD with remote PCI, ESRD(LUE AVF) secondary to HTN, s/p failed kidney transplant, PUD, STEFFANY on CPAP, hypothyroidism, RA, HLD and obesity who presents to the ED with a complaint of rectal bleeding x 1 day with recent hospital admission on 1/12/2019 with discharge home 01/18/2019 secondary to GI bleed. She is anticoagulated. She reports noticing bright red blood in toilet following BM last night, she reports the rectal bleeding had stopped prior to hospital discharge on 1/18/19. She has had an extensive GI workup. Patient was recently admitted for melena and was discharged on 1/18/19 after EGD, push enteroscopy, and video capsule endoscopy failed to show source of bleeding. Now having BRBPR, however. She reports dizziness and lightheadedness. Denies fevers or chills. Denies abdo pain. Patient has been admitted multiple times in the past, each time directly to [facility] service. Patient denies any  n/v/d/c,  dysuria, hematuria, cough, wheezing, SOB, CP, HA at this time.              Past Medical History:   Diagnosis Date    Allergy     Anemia     Anticoagulant long-term use     4 years coumadin, asa    Arthritis     Awaiting organ transplant  2013    Cataract     Central serous chorioretinopathy of eye, right 2018    CHF (congestive heart failure)     Chronic kidney disease     Colon polyps     COPD (chronic obstructive pulmonary disease)     Coronary artery disease     Diabetes mellitus     Diabetic retinopathy     Diverticulosis     Encounter for blood transfusion     ESRD from HTN strtied RRT 1999    Failed  donor kidney transplant      Glaucoma     History of bleeding peptic ulcer      as stated per pt    Hyperlipidemia     Hypertension     Hypothyroidism     Morbid obesity 8/10/2012    Renal hypertension     Stroke            Past Surgical History:   Procedure Laterality Date    CATARACT EXTRACTION W/  INTRAOCULAR LENS IMPLANT Bilateral     COLON SURGERY      COLONOSCOPY      COLONOSCOPY N/A 2018    Performed by [facility] Falk MD at [facility] END[facility] (2ND FLR)    EGD (ESOPHAGOGASTRODUODENOSCOPY) N/A 2019    Performed by Miranda Maradiaga MD at [facility] END[facility] (2ND FLR)    EGD (ESOPHAGOGASTRODUODENOSCOPY) N/A 2018    Performed by Luis Washington MD at [facility] END[facility] (2ND FLR)    ESOPHAGOGASTRODUODENOSCOPY (EGD) N/A 2018    Performed by Kenji Desir MD at [facility] END[facility] (2ND FLR)    EYE SURGERY      FRACTURE SURGERY      R arm    HERNIA REPAIR      HYSTERECTOMY      INSERTION-IMPLANT-GLAUCOMA Left 10/12/2017    Performed by Junaid Kern MD at [facility] OR 1ST FLR    KIDNEY TRANSPLANT      NEPHRECTOMY  2008    transplant     PARATHYROID GLAND SURGERY      TONSILLECTOMY      UPPER GASTROINTESTINAL ENDOSCOPY         Review of patient's allergies indicates:   Allergen Reactions    Penicillins Swelling    Iodine      Other reaction(s): Hives    Sulfamethoxazole-trimethoprim      Other reaction(s): Swelling  Other reaction(s): Hives       No current facility-administered medications on file prior to encounter.      Current Outpatient Medications on File Prior to Encounter    Medication Sig    ALPHAGAN P 0.1 % Drop once daily.     diltiaZEM (CARDIZEM CD) 300 MG 24 hr capsule Take 1 capsule (300 mg total) by mouth once daily.    dorzolamide-timolol 2-0.5% (COSOPT) 22.3-6.8 mg/mL ophthalmic solution INSTILL 1 DROP IN BOTH EYES TWICE DAILY    latanoprost 0.005 % ophthalmic solution INSTILL 1 DROP IN BOTH EYES EVERY DAY AT BEDTIME    levothyroxine (SYNTHROID) 100 MCG tablet Take 100 mcg by mouth. 1 Tablet Oral Every day    LIPITOR 10 mg tablet TAKE 1 BY MOUTH ONCE A DAY    pantoprazole (PROTONIX) 40 MG tablet TAKE 1 TABLET BY MOUTH ONCE DAILY    riociguat (ADEMPAS) 2 mg Tab tablet Take 2 mg by mouth 3 (three) times daily.     selexipag (UPTRAVI) 1,000 mcg Tab Take 1,000 mcg by mouth 2 (two) times daily.    warfarin (COUMADIN) 5 MG tablet TAKE 1 1/2 TABLETS BY MOUTH DAILY ON TUESDAY, THURSDAY AND SATURDAY, THEN TAKE 1 TABLET DAILY ON MONDAY, WEDNESDAY, FRIDAY AND     [DISCONTINUED] traMADol (ULTRAM) 50 mg tablet TK 1 T PO  Q 8 H PRN    [DISCONTINUED] hydrocodone-acetaminophen 7.5-325mg (NORCO) 7.5-325 mg per tablet TK 1 T PO Q 6 H PRN     Family History     Problem Relation (Age of Onset)    Asthma Sister    Blindness Father    Breast cancer Maternal Aunt    Depression Sister    Diabetes Maternal Aunt    Heart attack Father, Mother    Heart failure Father, Mother    Hypertension Father, Mother, Sister, Brother    Kidney disease Brother        Tobacco Use    Smoking status: Former Smoker     Types: Cigarettes     Last attempt to quit: 8/10/2000     Years since quittin.4    Smokeless tobacco: Never Used   Substance and Sexual Activity    Alcohol use: No     Comment: hx of etoh     Drug use: No    Sexual activity: No     Review of Systems   Constitutional: Positive for activity change, appetite change and fatigue. Negative for chills, fever and unexpected weight change.   HENT: Negative for sore throat, trouble swallowing and voice change.    Eyes: Positive for  visual disturbance (blurry vision with standing). Negative for photophobia.   Respiratory: Negative for cough, chest tightness and shortness of breath.    Cardiovascular: Positive for leg swelling. Negative for chest pain and palpitations.   Gastrointestinal: Positive for anal bleeding, blood in stool, nausea and vomiting. Negative for abdominal distention, abdominal pain, constipation, diarrhea and rectal pain.   Endocrine: Negative for polyphagia and polyuria.   Genitourinary: Negative for difficulty urinating, dysuria and hematuria.        Anuric   Musculoskeletal: Negative for back pain and neck pain.   Skin: Negative for rash and wound.   Allergic/Immunologic: Negative for immunocompromised state.   Neurological: Positive for dizziness and light-headedness. Negative for tremors, seizures, syncope, facial asymmetry, numbness and headaches.   Psychiatric/Behavioral: Negative for agitation, behavioral problems and confusion.     Objective:     Vital Signs (Most Recent):  Temp: 98.4 °F (36.9 °C) (02/02/19 1837)  Pulse: 81 (02/02/19 1837)  Resp: 18 (02/02/19 1837)  BP: 126/60 (02/02/19 1837)  SpO2: 96 % (02/02/19 1745) Vital Signs (24h Range):  Temp:  [97.8 °F (36.6 °C)-98.4 °F (36.9 °C)] 98.4 °F (36.9 °C)  Pulse:  [73-87] 81  Resp:  [16-20] 18  SpO2:  [92 %-96 %] 96 %  BP: ()/(51-68) 126/60  Arterial Line BP: (126)/(60) 126/60     Weight: 71.7 kg (158 lb)  Body mass index is 29.85 kg/m².    Physical Exam   Constitutional: She is oriented to person, place, and time. She appears well-developed and well-nourished. No distress.   HENT:   Head: Normocephalic and atraumatic.   Mouth/Throat: No oropharyngeal exudate.   Eyes: Conjunctivae are normal. Pupils are equal, round, and reactive to light. No scleral icterus.   Neck: Normal range of motion. Neck supple. No tracheal deviation present. No thyromegaly present.   Cardiovascular: Normal heart sounds and intact distal pulses. Exam reveals no gallop and no friction  rub.   Regular rate and rhythm with frequent PVCs   Pulmonary/Chest: Effort normal. She has rales (mild crackles at bilateral bases).   Abdominal: Soft. Bowel sounds are normal. She exhibits no distension. There is no tenderness.   Musculoskeletal: She exhibits edema (1+ edema of bilateral lower extremities).   Neurological: She is alert and oriented to person, place, and time.   Skin: Capillary refill takes 2 to 3 seconds. She is not diaphoretic.   Psychiatric: She has a normal mood and affect. Her behavior is normal.   Nursing note and vitals reviewed.        CRANIAL NERVES     CN III, IV, VI   Pupils are equal, round, and reactive to light.       Significant Labs:    Recent Results (from the past 24 hour(s))   CBC auto differential    Collection Time: 02/02/19  1:42 PM   Result Value Ref Range    WBC 5.55 3.90 - 12.70 K/uL    RBC 1.59 (L) 4.00 - 5.40 M/uL    Hemoglobin 4.3 (LL) 12.0 - 16.0 g/dL    Hematocrit 15.0 (LL) 37.0 - 48.5 %    MCV 94 82 - 98 fL    MCH 27.0 27.0 - 31.0 pg    MCHC 28.7 (L) 32.0 - 36.0 g/dL    RDW 16.5 (H) 11.5 - 14.5 %    Platelets 159 150 - 350 K/uL    MPV 12.3 9.2 - 12.9 fL    Immature Granulocytes 0.4 0.0 - 0.5 %    Gran # (ANC) 3.9 1.8 - 7.7 K/uL    Immature Grans (Abs) 0.02 0.00 - 0.04 K/uL    Lymph # 1.1 1.0 - 4.8 K/uL    Mono # 0.5 0.3 - 1.0 K/uL    Eos # 0.1 0.0 - 0.5 K/uL    Baso # 0.01 0.00 - 0.20 K/uL    nRBC 0 0 /100 WBC    Gran% 69.8 38.0 - 73.0 %    Lymph% 19.8 18.0 - 48.0 %    Mono% 8.5 4.0 - 15.0 %    Eosinophil% 1.3 0.0 - 8.0 %    Basophil% 0.2 0.0 - 1.9 %    Platelet Estimate Appears normal     Aniso Slight     Poik Slight     Hypo Occasional     Ovalocytes Occasional     Differential Method Automated    Comprehensive metabolic panel    Collection Time: 02/02/19  1:42 PM   Result Value Ref Range    Sodium 141 136 - 145 mmol/L    Potassium 3.9 3.5 - 5.1 mmol/L    Chloride 102 95 - 110 mmol/L    CO2 28 23 - 29 mmol/L    Glucose 101 70 - 110 mg/dL    BUN, Bld 22 8 - 23 mg/dL     Creatinine 6.8 (H) 0.5 - 1.4 mg/dL    Calcium 7.1 (L) 8.7 - 10.5 mg/dL    Total Protein 5.4 (L) 6.0 - 8.4 g/dL    Albumin 2.1 (L) 3.5 - 5.2 g/dL    Total Bilirubin 0.3 0.1 - 1.0 mg/dL    Alkaline Phosphatase 41 (L) 55 - 135 U/L    AST 20 10 - 40 U/L    ALT 10 10 - 44 U/L    Anion Gap 11 8 - 16 mmol/L    eGFR if African American 6.7 (A) >60 mL/min/1.73 m^2    eGFR if non African American 5.8 (A) >60 mL/min/1.73 m^2   Protime-INR    Collection Time: 02/02/19  1:42 PM   Result Value Ref Range    Prothrombin Time 31.6 (H) 9.0 - 12.5 sec    INR 3.2 (H) 0.8 - 1.2   Type & Screen    Collection Time: 02/02/19  1:42 PM   Result Value Ref Range    Group & Rh A POS     Indirect Denae POS    APTT    Collection Time: 02/02/19  1:42 PM   Result Value Ref Range    aPTT 35.4 (H) 21.0 - 32.0 sec   Magnesium    Collection Time: 02/02/19  1:42 PM   Result Value Ref Range    Magnesium 1.9 1.6 - 2.6 mg/dL   Phosphorus    Collection Time: 02/02/19  1:42 PM   Result Value Ref Range    Phosphorus 3.1 2.7 - 4.5 mg/dL   Prepare RBC 2 Units; severe anemia, GI bleeding    Collection Time: 02/02/19  1:42 PM   Result Value Ref Range    UNIT NUMBER [id number]     PRODUCT CODE W9203Y24     DISPENSE STATUS ISSUED     CODING SYSTEM [id number]     Unit Blood Type Code 6200     Unit Blood Type A POS     Unit Expiration [id number]     UNIT NUMBER [id number]     PRODUCT CODE R6686V50     DISPENSE STATUS CROSSMATCHED     CODING SYSTEM [id number]     Unit Blood Type Code 6200     Unit Blood Type A POS     Unit Expiration [id number]    Antibody identification    Collection Time: 02/02/19  1:42 PM   Result Value Ref Range    Antibody Identification POS    Hemoglobin A1c    Collection Time: 02/02/19  1:42 PM   Result Value Ref Range    Hemoglobin A1C 5.1 4.0 - 5.6 %    Estimated Avg Glucose 100 68 - 131 mg/dL     Microbiology Results (last 7 days)     ** No results found for the last 168 hours. **        Imaging Results          X-Ray Chest AP  Portable (Final result)  Result time 02/02/19 18:33:53    Final result by Asif Sanabria MD (02/02/19 18:33:53)                 Impression:      Findings suggesting pulmonary edema/CHF pattern, slightly worse from 01/13/2019 exam, without focal consolidation.      Electronically signed by: Asif Sanabria MD  Date:    02/02/2019  Time:    18:33             Narrative:    EXAMINATION:  XR CHEST AP PORTABLE    CLINICAL HISTORY:  volume assessment;    TECHNIQUE:  Single frontal view of the chest was performed.    COMPARISON:  Chest radiograph 01/13/2019    FINDINGS:  Monitoring leads overlie the chest.  Patient is slightly rotated.  Clothing artifact and metallic safety pin artifacts overlie the chest.    Cardiomediastinal silhouette remains prominent with continued prominence of the central pulmonary vasculature and bilateral diffuse interstitial coarsening suggesting pulmonary edema/CHF pattern, slightly worse from 01/13/2019 exam.  Calcific atherosclerosis of the thoracic aorta.  No large pleural effusion, pneumothorax or consolidation seen.  Lower left neck surgical clips and upper left axillary vascular stent appears stable.  No acute osseous process seen.  PA and lateral views can be obtained.                                  Assessment/Plan:     * Acute blood loss anemia    Patient with BRBPR (previously with melena)  Also with chronic anemia 2/2 renal disease with low Hb baseline  Hb 4.8 on admission  -4U prepped, 2 ordered to transfused (although patient with frequent transfusions has developed antibodies)  -BP was very responsive to fluids  -IV protonix  -EJ inserted, will consult Midline team with difficult stick  -GI consulted for probable intervention        ESRD (end stage renal disease) on dialysis    -Will consult nephrology for dialysis, may require CRRT       Atrial fibrillation    Supratherapuetic at INR 3.2   -Holding anticoagulation in setting of GIB  -Consider Vit K  -Continue rate control when BP  stable  -tele     Pulmonary HTN    -Patient has severe PulmHTN with medication that are not prescribable by hospital medicine      -will consult \[facility]\"" and possibly transfer, she has been directly admitted to \[facility]\"" in past         VTE Risk Mitigation (From admission, onward)        Ordered     IP VTE HIGH RISK PATIENT  Once      02/02/19 1821     Place sequential compression device  Until discontinued      02/02/19 1742             Christian Kim MD  Department of Hospital Medicine   [facility]

## 2019-02-03 NOTE — SUBJECTIVE & OBJECTIVE
Past Medical History:   Diagnosis Date    Allergy     Anemia     Anticoagulant long-term use     4 years coumadin, asa    Arthritis     Awaiting organ transplant 2013    Cataract     Central serous chorioretinopathy of eye, right 2018    CHF (congestive heart failure)     Chronic kidney disease     Colon polyps     COPD (chronic obstructive pulmonary disease)     Coronary artery disease     Diabetes mellitus     Diabetic retinopathy     Diverticulosis     Encounter for blood transfusion     ESRD from HTN strtied RRT 1999    Failed  donor kidney transplant      Glaucoma     History of bleeding peptic ulcer      as stated per pt    Hyperlipidemia     Hypertension     Hypothyroidism     Morbid obesity 8/10/2012    Renal hypertension     Stroke     1987       Past Surgical History:   Procedure Laterality Date    CATARACT EXTRACTION W/  INTRAOCULAR LENS IMPLANT Bilateral     COLON SURGERY      COLONOSCOPY      COLONOSCOPY N/A 2018    Performed by [facility] MD at [facility] ENDO (2ND FLR)    EGD (ESOPHAGOGASTRODUODENOSCOPY) N/A 2019    Performed by Miranda Maradiaga MD at [facility] ENDO (2ND FLR)    EGD (ESOPHAGOGASTRODUODENOSCOPY) N/A 2018    Performed by Luis Washington MD at [facility] ENDO (2ND FLR)    ESOPHAGOGASTRODUODENOSCOPY (EGD) N/A 2018    Performed by Kenji Desir MD at [facility] ENDO (2ND FLR)    EYE SURGERY      FRACTURE SURGERY      R arm    HERNIA REPAIR      HYSTERECTOMY      INSERTION-IMPLANT-GLAUCOMA Left 10/12/2017    Performed by J[facility]id Kern MD at [facility] OR 1ST FLR    KIDNEY TRANSPLANT      NEPHRECTOMY  2008    transplant     PARATHYROID GLAND SURGERY      TONSILLECTOMY      UPPER GASTROINTESTINAL ENDOSCOPY         Review of patient's allergies indicates:   Allergen Reactions    Penicillins Swelling    Iodine      Other reaction(s): Hives    Sulfamethoxazole-trimethoprim      Other reaction(s):  Swelling  Other reaction(s): Hives     Current Facility-Administered Medications   Medication Frequency    0.9%  NaCl infusion (for blood administration) Q24H PRN    dextrose 50% injection 12.5 g PRN    dextrose 50% injection 25 g PRN    glucagon (human recombinant) injection 1 mg PRN    glucose chewable tablet 16 g PRN    glucose chewable tablet 24 g PRN    ondansetron disintegrating tablet 8 mg Q8H PRN    sodium chloride 0.9% flush 5 mL PRN     Current Outpatient Medications   Medication    ALPHAGAN P 0.1 % Drop    diltiaZEM (CARDIZEM CD) 300 MG 24 hr capsule    dorzolamide-timolol 2-0.5% (COSOPT) 22.3-6.8 mg/mL ophthalmic solution    latanoprost 0.005 % ophthalmic solution    levothyroxine (SYNTHROID) 100 MCG tablet    LIPITOR 10 mg tablet    pantoprazole (PROTONIX) 40 MG tablet    riociguat (ADEMPAS) 2 mg Tab tablet    selexipag (UPTRAVI) 1,000 mcg Tab    warfarin (COUMADIN) 5 MG tablet     Family History     Problem Relation (Age of Onset)    Asthma Sister    Blindness Father    Breast cancer Maternal Aunt    Depression Sister    Diabetes Maternal Aunt    Heart attack Father, Mother    Heart failure Father, Mother    Hypertension Father, Mother, Sister, Brother    Kidney disease Brother        Tobacco Use    Smoking status: Former Smoker     Types: Cigarettes     Last attempt to quit: 8/10/2000     Years since quittin.4    Smokeless tobacco: Never Used   Substance and Sexual Activity    Alcohol use: No     Comment: hx of etoh     Drug use: No    Sexual activity: No     Review of Systems   Constitutional: Positive for activity change, appetite change and fatigue. Negative for chills and fever.   HENT: Negative for sore throat and trouble swallowing.    Eyes: Negative for photophobia.   Respiratory: Negative for cough and shortness of breath.    Cardiovascular: Positive for leg swelling. Negative for chest pain and palpitations.   Gastrointestinal: Positive for anal bleeding, blood in  stool and diarrhea. Negative for abdominal distention, abdominal pain, constipation and rectal pain.   Genitourinary:        Anuric   Musculoskeletal: Negative for back pain and neck pain.   Skin: Negative for rash and wound.   Neurological: Positive for weakness. Negative for syncope, numbness and headaches.   Psychiatric/Behavioral: Negative for confusion.     Objective:     Vital Signs (Most Recent):  Temp: 98.3 °F (36.8 °C) (02/02/19 1236)  Pulse: 87 (02/02/19 1745)  Resp: 20 (02/02/19 1745)  BP: (!) 114/56 (02/02/19 1745)  SpO2: 96 % (02/02/19 1745)  O2 Device (Oxygen Therapy): room air (02/02/19 1745) Vital Signs (24h Range):  Temp:  [97.8 °F (36.6 °C)-98.3 °F (36.8 °C)] 98.3 °F (36.8 °C)  Pulse:  [73-87] 87  Resp:  [16-20] 20  SpO2:  [92 %-96 %] 96 %  BP: ()/(51-68) 114/56     Weight: 71.7 kg (158 lb) (02/02/19 1120)  Body mass index is 29.85 kg/m².  Body surface area is 1.76 meters squared.    No intake/output data recorded.    Physical Exam   Constitutional: She is oriented to person, place, and time. She appears well-developed and well-nourished. No distress.   HENT:   Head: Normocephalic and atraumatic.   Eyes: Conjunctivae are normal. No scleral icterus.   Neck: Normal range of motion.   Cardiovascular: Normal heart sounds and intact distal pulses.   ABEL AVF   Pulmonary/Chest: Effort normal. She has rales (mild crackles at bilateral bases).   Abdominal: Soft. Bowel sounds are normal. She exhibits no distension. There is no tenderness.   Musculoskeletal: She exhibits edema (1+ edema of bilateral lower extremities).   Neurological: She is alert and oriented to person, place, and time.   Nursing note and vitals reviewed.      Significant Labs:  ABGs: No results for input(s): PH, PCO2, HCO3, POCSATURATED, BE in the last 168 hours.  BMP:   Recent Labs   Lab 02/02/19  1342         CO2 28   BUN 22   CREATININE 6.8*   CALCIUM 7.1*   MG 1.9     CBC:   Recent Labs   Lab 02/02/19  1342   WBC 5.55    RBC 1.59*   HGB 4.3*   HCT 15.0*      MCV 94   MCH 27.0   MCHC 28.7*     CMP:   Recent Labs   Lab 02/02/19  1342      CALCIUM 7.1*   ALBUMIN 2.1*   PROT 5.4*      K 3.9   CO2 28      BUN 22   CREATININE 6.8*   ALKPHOS 41*   ALT 10   AST 20   BILITOT 0.3     All labs within the past 24 hours have been reviewed.

## 2019-02-03 NOTE — NURSING
Pt oriented to room . VSS. No acute events. Pt finished 2nd  Transfusion without incident. No bloody bowel movements/rectal bleeding this shift.  NPO since midnight besides meds with small sips of water. For HD today. Callbell placed within reach and use encouraged. Family at bedside.

## 2019-02-03 NOTE — H&P
[facility]  Heart Transplant  H&P    Patient Name: Shivani Sheets  MRN: [id number]  Admission Date: 2/2/2019  Attending Physician: Vicky Callahan MD  Primary Care Provider: Eula Weiss MD  Principal Problem:Acute blood loss anemia    Subjective:     History of Present Illness:  62 yo F w/ PMHx  pulmonary HTN (on adempas/selexipeg), diastolic dysfunction, pAF(on coumadin), CVA 1987, COPD on 2 litre nocturnal oxygen, central retinal artery occlusion without significant carotid artery stenosis, CAD with remote PCI, ESRD(LUE AVF) secondary to HTN, s/p failed kidney transplant, PUD, STEFFANY on CPAP, hypothyroidism, RA, HLD and obesity who presents with rectal bleeding BRBPR which started 2/1/19 (diarrhea with blood clots - 4-5 episodes ) and anemia with Hb of 4.3 ( 7.8 on 1/18).     She was feeling lightheaded on getting up. Last admission had bill. Had some irregular HR occasionally. One episode of vomiting and some nausea.  She finished course of doxycyline last week for cellulitis of her leg from IO access, now swelling reduced. After discharge on 1/18 she did not have any episodes since yesterday. Had HD on 2/1/2019. Took 5 mg coumadin yesterday. No chest pain, SOB, leg swelling, PND, orthopnea, syncope, F/C, phlegm, abdominal pain, bruising or bleeding from other sites.    In ED had low blood pressures (98/51 mmHg), but no oxygen requirement and Hgb 4.3. Seen by CCU- no ICU needs at this time. Nephrology saw - will transfuse with HD in am. GI evaluated- Recommended NPO past midnight and CTA if actively bleeds (last BRBPR in the morning).     Last admission : 1/12- 1/18: Melena received 6  Units PBRC and 2 units FFP. Had EGD, SBE: normal stomach, esophagus non bleeding erosive gastropathy. VCE: No obvious source, erosive gastropathy, gastric polyp and SB lymphangiectasia. Discharged with hb of 7.8 and INR 1.7. Discontinued aspirin.   Had recurrent admissions for GI bleed with no obvious  source- Admit 2018, 2018, 2018: Colonoscopy : Internal hemorrhoids, 5 mm polyp in sigmoid colon.    2018: TTE: LVEF 55%, LA severely enlarged, moderate TR, RV moderately dilated, RVSF mild decreased, PASP -99, severe pulmonary hypertension.   PET stress: No evidence of ischemia at rest and stress      Past Medical History:   Diagnosis Date    Allergy     Anemia     Anticoagulant long-term use     4 years coumadin, asa    Arthritis     Awaiting organ transplant 2013    Cataract     Central serous chorioretinopathy of eye, right 2018    CHF (congestive heart failure)     Chronic kidney disease     Colon polyps     COPD (chronic obstructive pulmonary disease)     Coronary artery disease     Diabetes mellitus     Diabetic retinopathy     Diverticulosis     Encounter for blood transfusion     ESRD from HTN strtied RRT 1999    Failed  donor kidney transplant -     Glaucoma     History of bleeding peptic ulcer      as stated per pt    Hyperlipidemia     Hypertension     Hypothyroidism     Morbid obesity 8/10/2012    Renal hypertension     Stroke            Past Surgical History:   Procedure Laterality Date    CATARACT EXTRACTION W/  INTRAOCULAR LENS IMPLANT Bilateral     COLON SURGERY      COLONOSCOPY      COLONOSCOPY N/A 2018    Performed by Jose Falk [facility] at [facility] END[facility] (2ND FLR)    EGD (ESOPHAGOGASTRODUODENOSCOPY) N/A 2019    Performed by Miranda Maradiaga MD at [facility] END[facility] (2ND FLR)    EGD (ESOPHAGOGASTRODUODENOSCOPY) N/A 2018    Performed by Luis Washington MD at [facility] END[facility] (2ND FLR)    ESOPHAGOGASTRODUODENOSCOPY (EGD) N/A 2018    Performed by Kenji Desir MD at [facility] END[facility] (2ND FLR)    EYE SURGERY      FRACTURE SURGERY      R arm    HERNIA REPAIR      HYSTERECTOMY      INSERTION-IMPLANT-GLAUCOMA Left 10/12/2017    Performed by Junaid Kern MD at [facility] OR 1ST FLR    KIDNEY TRANSPLANT      NEPHRECTOMY   11/2008    transplant     PARATHYROID GLAND SURGERY      TONSILLECTOMY      UPPER GASTROINTESTINAL ENDOSCOPY         Review of patient's allergies indicates:   Allergen Reactions    Penicillins Swelling    Iodine      Other reaction(s): Hives    Sulfamethoxazole-trimethoprim      Other reaction(s): Swelling  Other reaction(s): Hives       Current Facility-Administered Medications   Medication    0.9%  NaCl infusion (for blood administration)    [START ON 2/3/2019] atorvastatin tablet 10 mg    dextrose 50% injection 12.5 g    dextrose 50% injection 25 g    dorzolamide 2 % ophthalmic solution 1 drop    glucagon (human recombinant) injection 1 mg    glucose chewable tablet 16 g    glucose chewable tablet 24 g    latanoprost 0.005 % ophthalmic solution 1 drop    [START ON 2/3/2019] levothyroxine tablet 100 mcg    ondansetron disintegrating tablet 8 mg    [START ON 2/3/2019] pantoprazole injection 80 mg    riociguat (ADEMPAS) tablet 2 mg    sodium chloride 0.9% flush 3 mL    sodium chloride 0.9% flush 5 mL    sodium chloride 0.9% flush 5 mL    timolol maleate 0.5% ophthalmic solution 1 drop     Current Outpatient Medications   Medication Sig    ALPHAGAN P 0.1 % Drop once daily.     diltiaZEM (CARDIZEM CD) 300 MG 24 hr capsule Take 1 capsule (300 mg total) by mouth once daily.    dorzolamide-timolol 2-0.5% (COSOPT) 22.3-6.8 mg/mL ophthalmic solution INSTILL 1 DROP IN BOTH EYES TWICE DAILY    latanoprost 0.005 % ophthalmic solution INSTILL 1 DROP IN BOTH EYES EVERY DAY AT BEDTIME    levothyroxine (SYNTHROID) 100 MCG tablet Take 100 mcg by mouth. 1 Tablet Oral Every day    LIPITOR 10 mg tablet TAKE 1 BY MOUTH ONCE A DAY    pantoprazole (PROTONIX) 40 MG tablet TAKE 1 TABLET BY MOUTH ONCE DAILY    riociguat (ADEMPAS) 2 mg Tab tablet Take 2 mg by mouth 3 (three) times daily.     selexipag (UPTRAVI) 1,000 mcg Tab Take 1,000 mcg by mouth 2 (two) times daily.    warfarin (COUMADIN) 5 MG tablet  TAKE 1 1/2 TABLETS BY MOUTH DAILY ON TUESDAY, THURSDAY AND SATURDAY, THEN TAKE 1 TABLET DAILY ON MONDAY, WEDNESDAY, FRIDAY AND      Family History     Problem Relation (Age of Onset)    Asthma Sister    Blindness Father    Breast cancer Maternal Aunt    Depression Sister    Diabetes Maternal Aunt    Heart attack Father, Mother    Heart failure Father, Mother    Hypertension Father, Mother, Sister, Brother    Kidney disease Brother        Tobacco Use    Smoking status: Former Smoker     Types: Cigarettes     Last attempt to quit: 8/10/2000     Years since quittin.4    Smokeless tobacco: Never Used   Substance and Sexual Activity    Alcohol use: No     Comment: hx of etoh     Drug use: No    Sexual activity: No     Review of Systems   Constitutional: Negative for activity change, chills, fever and unexpected weight change.   HENT: Negative for congestion and voice change.    Eyes: Negative for visual disturbance.   Respiratory: Positive for cough. Negative for chest tightness, shortness of breath and wheezing.    Cardiovascular: Positive for palpitations. Negative for chest pain and leg swelling.   Gastrointestinal: Positive for blood in stool, nausea and vomiting. Negative for abdominal distention and abdominal pain.   Endocrine: Negative for polyphagia and polyuria.   Genitourinary: Negative for difficulty urinating and dysuria.   Musculoskeletal: Negative for arthralgias.   Neurological: Positive for dizziness and light-headedness. Negative for syncope.   Hematological: Does not bruise/bleed easily.   Psychiatric/Behavioral: Negative for agitation.     Objective:     Vital Signs (Most Recent):  Temp: 98.4 °F (36.9 °C) (19)  Pulse: 82 (19)  Resp: 16 (19)  BP: (!) 129/57 (19)  SpO2: (!) 94 % (19) Vital Signs (24h Range):  Temp:  [97.8 °F (36.6 °C)-98.4 °F (36.9 °C)] 98.4 °F (36.9 °C)  Pulse:  [73-86] 82  Resp:  [10-28] 16  SpO2:  [92 %-98 %] 94  %  BP: ()/(51-68) 129/57  Arterial Line BP: (126)/(60) 126/60     Patient Vitals for the past 72 hrs (Last 3 readings):   Weight   02/02/19 1120 71.7 kg (158 lb)     Body mass index is 29.85 kg/m².      Intake/Output Summary (Last 24 hours) at 2/2/2019 2136  Last data filed at 2/2/2019 1837  Gross per 24 hour   Intake 196 ml   Output --   Net 196 ml       Physical Exam   Constitutional: She is oriented to person, place, and time. She appears well-developed and well-nourished.   HENT:   Mouth/Throat: Oropharynx is clear and moist.   Eyes: EOM are normal.   Neck: No JVD present.   Cardiovascular: Normal rate and regular rhythm.   Murmur (1-2/6 systolic murmur) heard.  Pulmonary/Chest: Effort normal. No respiratory distress. She has no wheezes. She has rales (few at bases).   Abdominal: Soft. Bowel sounds are normal. She exhibits no distension. There is no tenderness. There is no guarding.   Musculoskeletal: She exhibits edema (Trace).   Neurological: She is alert and oriented to person, place, and time.   Skin: Skin is warm.   Bruit left AVF   Psychiatric: She has a normal mood and affect.   Nursing note and vitals reviewed.      Significant Labs:  CBC:  Recent Labs   Lab 02/02/19  1342   WBC 5.55   RBC 1.59*   HGB 4.3*   HCT 15.0*      MCV 94   MCH 27.0   MCHC 28.7*     BNP:  No results for input(s): BNP in the last 168 hours.    Invalid input(s): BNPTRIAGELBLO  CMP:  Recent Labs   Lab 02/02/19  1342      CALCIUM 7.1*   ALBUMIN 2.1*   PROT 5.4*      K 3.9   CO2 28      BUN 22   CREATININE 6.8*   ALKPHOS 41*   ALT 10   AST 20   BILITOT 0.3      Coagulation:   Recent Labs   Lab 02/02/19  1342   INR 3.2*   APTT 35.4*     LDH:  No results for input(s): LDH in the last 72 hours.  Microbiology:  Microbiology Results (last 7 days)     ** No results found for the last 168 hours. **          BMP:   Recent Labs   Lab 02/02/19  1342         K 3.9      CO2 28   BUN 22    CREATININE 6.8*   CALCIUM 7.1*   MG 1.9     Cardiac Markers: No results for input(s): CKMB, TROPONINT, MYOGLOBIN in the last 72 hours.  Coagulation:   Recent Labs   Lab 02/02/19  1342   INR 3.2*   APTT 35.4*     I have reviewed all pertinent labs within the past 24 hours.    Diagnostic Results:  I have reviewed all pertinent imaging results/findings within the past 24 hours.    Assessment/Plan:     * Acute blood loss anemia    - 63 yr old AA female with multiple recurrent GI bleed with no obvious source identified admitted with BRBPR  - Internal hemorrhoids vs. AVM ( ESRD), no active abdominal pain ( H/o pAF on AC), supra therapeutic INR  - Recent antibiotic use for cellulitis  - Hb of 4.3 from 7.8 on 1/18  - ED: started first unit PRBC through rt IJ, second unit ordered. Nephrology with transfuse with HD in am. ( Received 1 lt NS in ED when SBP in 90)  - Will watch closely for fluid overload, not tachycardic, blood pressure stable  - Obtain 2 peripheral IV, CBC Q6 hours, lactate level.   - Consider stool studies if diarrhea persists as recent antibiotic use.  - Informed pt to let us know if any further episodes to consider for CTA  - Appreciate Nephrology, CCU and GI input. May do colonoscopy or CTA. NPO.  - IV Protonix bid  - ASA on hold from last admission  - INR 3.2, will hold coumadin. Repeat INR in am.  - 1/14 to 1/15:Had EGD, SBE: normal stomach, esophagus non bleeding erosive gastropathy.   VCE: No obvious source, erosive gastropathy, gastric polyp and SB lymphangiectasia.   Had recurrent admissions for GI bleed with no obvious source- Admit 9/2018, 6/2018, 2/2018: Colonoscopy : Internal hemorrhoids, 5 mm polyp in sigmoid colon.     Controlled type 2 diabetes mellitus with both eyes affected by proliferative retinopathy and macular edema, without long-term current use of insulin    - HbAIC of 5.1     Atrial fibrillation    - Paroxysmal  - Pt with h/o CVA and central retinal artery occlusion  - Current  sinus at bedside.   - Supra therapeutic INR 3.2, Repeat in am  - Once stable risk benefits to be assessed.     Pulmonary HTN    - Will continue with adempas, pt brought her selexipeg with her.  - Last TTE 11/2018: LVEF 55%, RVSF mildly decreased, PASP 99, moderate TR     ESRD from HTN started RRT 1999    - Nephrology on board  - Had HD 2/1/2019 ( [facility])  - Will get HD in am.  - ? erythropoietin role once stable     CAD (coronary artery disease)    - Aspirin on hold  - C/W statin  - PET stress test from 2018 nonischemic       D/w Dr. Julian Mooney MD  Heart Transplant  [facility]

## 2019-02-03 NOTE — ASSESSMENT & PLAN NOTE
Shivani Sheets 63 year old woman with ESRD who arrived with GI bleeding since yesterday 2/1/19.     iHD MWF  Dialyzes at [place]   Duration 3.5 hrs   Access at [facility] AVF   EDW 70.5 kg  Last HD treatment was on 2/1/18    Assessment/Plan:  - Patient currently at room air with no respiratory distress. She will get multiple blood transfusion and evaluate respiratory status. Any sign of overload will start patient on HD.   - Lytes at normal range. Will need to follow RFT   - Will order chest x ray to assess volume status   - Currently NPO, once started will need to be on renal diet

## 2019-02-04 ENCOUNTER — ANESTHESIA EVENT (OUTPATIENT)
Dept: ENDOSCOPY | Facility: HOSPITAL | Age: 64
DRG: 811 | End: 2019-02-04
Payer: MEDICARE

## 2019-02-04 ENCOUNTER — ANESTHESIA (OUTPATIENT)
Dept: ENDOSCOPY | Facility: HOSPITAL | Age: 64
DRG: 811 | End: 2019-02-04
Payer: MEDICARE

## 2019-02-04 LAB
ABO + RH BLD: NORMAL
ALBUMIN SERPL BCP-MCNC: 2.2 G/DL
ALBUMIN SERPL BCP-MCNC: 2.2 G/DL
ALP SERPL-CCNC: 40 U/L
ALT SERPL W/O P-5'-P-CCNC: 7 U/L
ANION GAP SERPL CALC-SCNC: 10 MMOL/L
ANION GAP SERPL CALC-SCNC: 10 MMOL/L
AST SERPL-CCNC: 11 U/L
BASOPHILS # BLD AUTO: 0.03 K/UL
BASOPHILS # BLD AUTO: 0.03 K/UL
BASOPHILS # BLD AUTO: 0.04 K/UL
BASOPHILS # BLD AUTO: 0.05 K/UL
BASOPHILS NFR BLD: 0.5 %
BASOPHILS NFR BLD: 0.5 %
BASOPHILS NFR BLD: 0.6 %
BASOPHILS NFR BLD: 0.6 %
BILIRUB SERPL-MCNC: 0.4 MG/DL
BLD GP AB SCN CELLS X3 SERPL QL: NORMAL
BLD PROD TYP BPU: NORMAL
BLOOD UNIT EXPIRATION DATE: NORMAL
BLOOD UNIT TYPE CODE: 6200
BLOOD UNIT TYPE: NORMAL
BUN SERPL-MCNC: 28 MG/DL
BUN SERPL-MCNC: 29 MG/DL
CALCIUM SERPL-MCNC: 6.6 MG/DL
CALCIUM SERPL-MCNC: 6.6 MG/DL
CHLORIDE SERPL-SCNC: 98 MMOL/L
CHLORIDE SERPL-SCNC: 99 MMOL/L
CO2 SERPL-SCNC: 28 MMOL/L
CO2 SERPL-SCNC: 28 MMOL/L
CODING SYSTEM: NORMAL
CREAT SERPL-MCNC: 8.5 MG/DL
CREAT SERPL-MCNC: 8.7 MG/DL
DIFFERENTIAL METHOD: ABNORMAL
DISPENSE STATUS: NORMAL
EOSINOPHIL # BLD AUTO: 0.3 K/UL
EOSINOPHIL NFR BLD: 3.8 %
EOSINOPHIL NFR BLD: 3.9 %
EOSINOPHIL NFR BLD: 4.7 %
EOSINOPHIL NFR BLD: 5.7 %
ERYTHROCYTE [DISTWIDTH] IN BLOOD BY AUTOMATED COUNT: 15.4 %
ERYTHROCYTE [DISTWIDTH] IN BLOOD BY AUTOMATED COUNT: 15.5 %
ERYTHROCYTE [DISTWIDTH] IN BLOOD BY AUTOMATED COUNT: 15.6 %
ERYTHROCYTE [DISTWIDTH] IN BLOOD BY AUTOMATED COUNT: 15.9 %
EST. GFR  (AFRICAN AMERICAN): 5 ML/MIN/1.73 M^2
EST. GFR  (AFRICAN AMERICAN): 5.1 ML/MIN/1.73 M^2
EST. GFR  (NON AFRICAN AMERICAN): 4.3 ML/MIN/1.73 M^2
EST. GFR  (NON AFRICAN AMERICAN): 4.5 ML/MIN/1.73 M^2
GLUCOSE SERPL-MCNC: 78 MG/DL
GLUCOSE SERPL-MCNC: 79 MG/DL
HCT VFR BLD AUTO: 20.4 %
HCT VFR BLD AUTO: 20.6 %
HCT VFR BLD AUTO: 23.9 %
HCT VFR BLD AUTO: 28 %
HGB BLD-MCNC: 6.4 G/DL
HGB BLD-MCNC: 6.5 G/DL
HGB BLD-MCNC: 7.6 G/DL
HGB BLD-MCNC: 9.1 G/DL
IMM GRANULOCYTES # BLD AUTO: 0.02 K/UL
IMM GRANULOCYTES # BLD AUTO: 0.03 K/UL
IMM GRANULOCYTES # BLD AUTO: 0.03 K/UL
IMM GRANULOCYTES # BLD AUTO: 0.12 K/UL
IMM GRANULOCYTES NFR BLD AUTO: 0.3 %
IMM GRANULOCYTES NFR BLD AUTO: 0.4 %
IMM GRANULOCYTES NFR BLD AUTO: 0.6 %
IMM GRANULOCYTES NFR BLD AUTO: 1.5 %
INR PPP: 2.8
INR PPP: 2.8
LYMPHOCYTES # BLD AUTO: 1 K/UL
LYMPHOCYTES # BLD AUTO: 1.1 K/UL
LYMPHOCYTES # BLD AUTO: 1.3 K/UL
LYMPHOCYTES # BLD AUTO: 1.3 K/UL
LYMPHOCYTES NFR BLD: 16.4 %
LYMPHOCYTES NFR BLD: 16.7 %
LYMPHOCYTES NFR BLD: 17.2 %
LYMPHOCYTES NFR BLD: 20.2 %
MAGNESIUM SERPL-MCNC: 1.7 MG/DL
MCH RBC QN AUTO: 28.4 PG
MCH RBC QN AUTO: 28.5 PG
MCH RBC QN AUTO: 28.9 PG
MCH RBC QN AUTO: 29 PG
MCHC RBC AUTO-ENTMCNC: 31.4 G/DL
MCHC RBC AUTO-ENTMCNC: 31.6 G/DL
MCHC RBC AUTO-ENTMCNC: 31.8 G/DL
MCHC RBC AUTO-ENTMCNC: 32.5 G/DL
MCV RBC AUTO: 89 FL
MCV RBC AUTO: 90 FL
MCV RBC AUTO: 91 FL
MCV RBC AUTO: 92 FL
MONOCYTES # BLD AUTO: 0.5 K/UL
MONOCYTES # BLD AUTO: 0.7 K/UL
MONOCYTES # BLD AUTO: 0.8 K/UL
MONOCYTES # BLD AUTO: 0.8 K/UL
MONOCYTES NFR BLD: 10.1 %
MONOCYTES NFR BLD: 10.3 %
MONOCYTES NFR BLD: 10.3 %
MONOCYTES NFR BLD: 10.7 %
NEUTROPHILS # BLD AUTO: 3.1 K/UL
NEUTROPHILS # BLD AUTO: 4.4 K/UL
NEUTROPHILS # BLD AUTO: 5.2 K/UL
NEUTROPHILS # BLD AUTO: 5.3 K/UL
NEUTROPHILS NFR BLD: 62.2 %
NEUTROPHILS NFR BLD: 67.2 %
NEUTROPHILS NFR BLD: 67.8 %
NEUTROPHILS NFR BLD: 67.8 %
NRBC BLD-RTO: 0 /100 WBC
NUM UNITS TRANS PACKED RBC: NORMAL
PHOSPHATE SERPL-MCNC: 3.5 MG/DL
PLATELET # BLD AUTO: 134 K/UL
PLATELET # BLD AUTO: 136 K/UL
PLATELET # BLD AUTO: 147 K/UL
PLATELET # BLD AUTO: 149 K/UL
PMV BLD AUTO: 12 FL
PMV BLD AUTO: 12.1 FL
PMV BLD AUTO: 12.2 FL
PMV BLD AUTO: 12.8 FL
POCT GLUCOSE: 86 MG/DL (ref 70–110)
POTASSIUM SERPL-SCNC: 3.9 MMOL/L
POTASSIUM SERPL-SCNC: 3.9 MMOL/L
PROT SERPL-MCNC: 5.2 G/DL
PROTHROMBIN TIME: 27.2 SEC
PROTHROMBIN TIME: 27.2 SEC
RBC # BLD AUTO: 2.25 M/UL
RBC # BLD AUTO: 2.25 M/UL
RBC # BLD AUTO: 2.67 M/UL
RBC # BLD AUTO: 3.14 M/UL
SODIUM SERPL-SCNC: 136 MMOL/L
SODIUM SERPL-SCNC: 137 MMOL/L
WBC # BLD AUTO: 4.95 K/UL
WBC # BLD AUTO: 6.41 K/UL
WBC # BLD AUTO: 7.68 K/UL
WBC # BLD AUTO: 7.89 K/UL

## 2019-02-04 PROCEDURE — 86922 COMPATIBILITY TEST ANTIGLOB: CPT

## 2019-02-04 PROCEDURE — 25000003 PHARM REV CODE 250: Performed by: STUDENT IN AN ORGANIZED HEALTH CARE EDUCATION/TRAINING PROGRAM

## 2019-02-04 PROCEDURE — D9220A PRA ANESTHESIA: ICD-10-PCS | Mod: CRNA,,, | Performed by: NURSE ANESTHETIST, CERTIFIED REGISTERED

## 2019-02-04 PROCEDURE — 90935 HEMODIALYSIS ONE EVALUATION: CPT | Mod: ,,, | Performed by: NURSE PRACTITIONER

## 2019-02-04 PROCEDURE — 90935 PR HEMODIALYSIS, ONE EVALUATION: ICD-10-PCS | Mod: ,,, | Performed by: NURSE PRACTITIONER

## 2019-02-04 PROCEDURE — 45378 DIAGNOSTIC COLONOSCOPY: CPT | Mod: ,,, | Performed by: INTERNAL MEDICINE

## 2019-02-04 PROCEDURE — 45378 PR COLONOSCOPY,DIAGNOSTIC: ICD-10-PCS | Mod: ,,, | Performed by: INTERNAL MEDICINE

## 2019-02-04 PROCEDURE — C1751 CATH, INF, PER/CENT/MIDLINE: HCPCS

## 2019-02-04 PROCEDURE — 90935 HEMODIALYSIS ONE EVALUATION: CPT

## 2019-02-04 PROCEDURE — 83735 ASSAY OF MAGNESIUM: CPT

## 2019-02-04 PROCEDURE — D9220A PRA ANESTHESIA: ICD-10-PCS | Mod: ANES,,, | Performed by: ANESTHESIOLOGY

## 2019-02-04 PROCEDURE — 99233 SBSQ HOSP IP/OBS HIGH 50: CPT | Mod: ,,, | Performed by: INTERNAL MEDICINE

## 2019-02-04 PROCEDURE — 25000003 PHARM REV CODE 250: Performed by: NURSE ANESTHETIST, CERTIFIED REGISTERED

## 2019-02-04 PROCEDURE — 25000003 PHARM REV CODE 250: Performed by: INTERNAL MEDICINE

## 2019-02-04 PROCEDURE — 36410 VNPNXR 3YR/> PHY/QHP DX/THER: CPT

## 2019-02-04 PROCEDURE — 80069 RENAL FUNCTION PANEL: CPT

## 2019-02-04 PROCEDURE — 80053 COMPREHEN METABOLIC PANEL: CPT

## 2019-02-04 PROCEDURE — P9016 RBC LEUKOCYTES REDUCED: HCPCS

## 2019-02-04 PROCEDURE — 36415 COLL VENOUS BLD VENIPUNCTURE: CPT

## 2019-02-04 PROCEDURE — 20600001 HC STEP DOWN PRIVATE ROOM

## 2019-02-04 PROCEDURE — 45378 DIAGNOSTIC COLONOSCOPY: CPT | Performed by: INTERNAL MEDICINE

## 2019-02-04 PROCEDURE — 85610 PROTHROMBIN TIME: CPT

## 2019-02-04 PROCEDURE — 63600175 PHARM REV CODE 636 W HCPCS: Performed by: NURSE ANESTHETIST, CERTIFIED REGISTERED

## 2019-02-04 PROCEDURE — D9220A PRA ANESTHESIA: Mod: ANES,,, | Performed by: ANESTHESIOLOGY

## 2019-02-04 PROCEDURE — A4216 STERILE WATER/SALINE, 10 ML: HCPCS | Performed by: INTERNAL MEDICINE

## 2019-02-04 PROCEDURE — C9113 INJ PANTOPRAZOLE SODIUM, VIA: HCPCS | Performed by: INTERNAL MEDICINE

## 2019-02-04 PROCEDURE — 37000008 HC ANESTHESIA 1ST 15 MINUTES: Performed by: INTERNAL MEDICINE

## 2019-02-04 PROCEDURE — 63600175 PHARM REV CODE 636 W HCPCS: Performed by: INTERNAL MEDICINE

## 2019-02-04 PROCEDURE — 99233 PR SUBSEQUENT HOSPITAL CARE,LEVL III: ICD-10-PCS | Mod: ,,, | Performed by: INTERNAL MEDICINE

## 2019-02-04 PROCEDURE — 86850 RBC ANTIBODY SCREEN: CPT

## 2019-02-04 PROCEDURE — D9220A PRA ANESTHESIA: Mod: CRNA,,, | Performed by: NURSE ANESTHETIST, CERTIFIED REGISTERED

## 2019-02-04 PROCEDURE — 37000009 HC ANESTHESIA EA ADD 15 MINS: Performed by: INTERNAL MEDICINE

## 2019-02-04 PROCEDURE — 85025 COMPLETE CBC W/AUTO DIFF WBC: CPT | Mod: 91

## 2019-02-04 PROCEDURE — 76937 US GUIDE VASCULAR ACCESS: CPT

## 2019-02-04 PROCEDURE — 63600175 PHARM REV CODE 636 W HCPCS: Performed by: ANESTHESIOLOGY

## 2019-02-04 RX ORDER — MEPERIDINE HYDROCHLORIDE 50 MG/ML
12.5 INJECTION INTRAMUSCULAR; INTRAVENOUS; SUBCUTANEOUS ONCE AS NEEDED
Status: DISCONTINUED | OUTPATIENT
Start: 2019-02-04 | End: 2019-02-04 | Stop reason: HOSPADM

## 2019-02-04 RX ORDER — ETOMIDATE 2 MG/ML
INJECTION INTRAVENOUS
Status: DISCONTINUED | OUTPATIENT
Start: 2019-02-04 | End: 2019-02-04

## 2019-02-04 RX ORDER — HYDROCODONE BITARTRATE AND ACETAMINOPHEN 500; 5 MG/1; MG/1
TABLET ORAL
Status: DISCONTINUED | OUTPATIENT
Start: 2019-02-04 | End: 2019-02-05

## 2019-02-04 RX ORDER — HYDROMORPHONE HYDROCHLORIDE 1 MG/ML
0.2 INJECTION, SOLUTION INTRAMUSCULAR; INTRAVENOUS; SUBCUTANEOUS EVERY 5 MIN PRN
Status: DISCONTINUED | OUTPATIENT
Start: 2019-02-04 | End: 2019-02-04 | Stop reason: HOSPADM

## 2019-02-04 RX ORDER — DIPHENHYDRAMINE HYDROCHLORIDE 50 MG/ML
25 INJECTION INTRAMUSCULAR; INTRAVENOUS EVERY 6 HOURS PRN
Status: DISCONTINUED | OUTPATIENT
Start: 2019-02-04 | End: 2019-02-04 | Stop reason: HOSPADM

## 2019-02-04 RX ORDER — ACETAMINOPHEN 325 MG/1
650 TABLET ORAL EVERY 6 HOURS PRN
Status: DISCONTINUED | OUTPATIENT
Start: 2019-02-04 | End: 2019-02-08 | Stop reason: HOSPADM

## 2019-02-04 RX ORDER — ONDANSETRON 2 MG/ML
4 INJECTION INTRAMUSCULAR; INTRAVENOUS ONCE AS NEEDED
Status: DISCONTINUED | OUTPATIENT
Start: 2019-02-04 | End: 2019-02-04 | Stop reason: HOSPADM

## 2019-02-04 RX ORDER — KETAMINE HYDROCHLORIDE 10 MG/ML
INJECTION, SOLUTION INTRAMUSCULAR; INTRAVENOUS
Status: DISCONTINUED | OUTPATIENT
Start: 2019-02-04 | End: 2019-02-04

## 2019-02-04 RX ORDER — FENTANYL CITRATE 50 UG/ML
25 INJECTION, SOLUTION INTRAMUSCULAR; INTRAVENOUS EVERY 5 MIN PRN
Status: DISCONTINUED | OUTPATIENT
Start: 2019-02-04 | End: 2019-02-04 | Stop reason: HOSPADM

## 2019-02-04 RX ORDER — MIDAZOLAM HYDROCHLORIDE 1 MG/ML
INJECTION, SOLUTION INTRAMUSCULAR; INTRAVENOUS
Status: DISCONTINUED | OUTPATIENT
Start: 2019-02-04 | End: 2019-02-04

## 2019-02-04 RX ADMIN — MIDAZOLAM HYDROCHLORIDE 2 MG: 1 INJECTION, SOLUTION INTRAMUSCULAR; INTRAVENOUS at 06:02

## 2019-02-04 RX ADMIN — RIOCIGUAT 2 MG: 2 TABLET, FILM COATED ORAL at 09:02

## 2019-02-04 RX ADMIN — ETOMIDATE 4 MG: 2 INJECTION, SOLUTION INTRAVENOUS at 06:02

## 2019-02-04 RX ADMIN — TIMOLOL MALEATE 1 DROP: 5 SOLUTION OPHTHALMIC at 09:02

## 2019-02-04 RX ADMIN — DORZOLAMIDE HYDROCHLORIDE 1 DROP: 20 SOLUTION/ DROPS OPHTHALMIC at 09:02

## 2019-02-04 RX ADMIN — ETOMIDATE 2 MG: 2 INJECTION, SOLUTION INTRAVENOUS at 06:02

## 2019-02-04 RX ADMIN — LEVOTHYROXINE SODIUM 100 MCG: 100 TABLET ORAL at 02:02

## 2019-02-04 RX ADMIN — SODIUM CHLORIDE: 0.9 INJECTION, SOLUTION INTRAVENOUS at 11:02

## 2019-02-04 RX ADMIN — KETAMINE HYDROCHLORIDE 10 MG: 10 INJECTION, SOLUTION INTRAMUSCULAR; INTRAVENOUS at 06:02

## 2019-02-04 RX ADMIN — DORZOLAMIDE HYDROCHLORIDE 1 DROP: 20 SOLUTION/ DROPS OPHTHALMIC at 02:02

## 2019-02-04 RX ADMIN — PANTOPRAZOLE SODIUM 40 MG: 40 INJECTION, POWDER, FOR SOLUTION INTRAVENOUS at 09:02

## 2019-02-04 RX ADMIN — Medication 3 ML: at 02:02

## 2019-02-04 RX ADMIN — ACETAMINOPHEN 650 MG: 325 TABLET ORAL at 05:02

## 2019-02-04 RX ADMIN — Medication 3 ML: at 10:02

## 2019-02-04 RX ADMIN — ATORVASTATIN CALCIUM 10 MG: 10 TABLET, FILM COATED ORAL at 02:02

## 2019-02-04 RX ADMIN — LATANOPROST 1 DROP: 50 SOLUTION OPHTHALMIC at 09:02

## 2019-02-04 RX ADMIN — SODIUM CHLORIDE 350 ML: 0.9 INJECTION, SOLUTION INTRAVENOUS at 09:02

## 2019-02-04 RX ADMIN — SODIUM CHLORIDE: 0.9 INJECTION, SOLUTION INTRAVENOUS at 06:02

## 2019-02-04 RX ADMIN — RIOCIGUAT 2 MG: 2 TABLET, FILM COATED ORAL at 02:02

## 2019-02-04 RX ADMIN — FENTANYL CITRATE 25 MCG: 50 INJECTION INTRAMUSCULAR; INTRAVENOUS at 07:02

## 2019-02-04 RX ADMIN — TIMOLOL MALEATE 1 DROP: 5 SOLUTION OPHTHALMIC at 02:02

## 2019-02-04 RX ADMIN — KETAMINE HYDROCHLORIDE 10 MG: 10 INJECTION, SOLUTION INTRAMUSCULAR; INTRAVENOUS at 07:02

## 2019-02-04 RX ADMIN — PANTOPRAZOLE SODIUM 40 MG: 40 INJECTION, POWDER, FOR SOLUTION INTRAVENOUS at 02:02

## 2019-02-04 RX ADMIN — ONDANSETRON 8 MG: 8 TABLET, ORALLY DISINTEGRATING ORAL at 05:02

## 2019-02-04 NOTE — PLAN OF CARE
Problem: Adult Inpatient Plan of Care  Goal: Plan of Care Review  Outcome: Ongoing (interventions implemented as appropriate)   02/04/19 1738   Plan of Care Review   Plan of Care Reviewed With patient     Pt AAOx4. VSS at this time. Complaints of L hip pain and nausea. PRN medications given which provided moderate relief. HD completed today took off 2.4L. 1 unit PRBCs given. H&H 9.1 and 28.0. No reports of bloody BMs this shift. Pt with remain NPO until tomorrow for colonoscopy. POC reviewed with pt who verbalizes understanding.

## 2019-02-04 NOTE — SUBJECTIVE & OBJECTIVE
Interval History: No acute events overnight. Scheduled for dialysis today. Completed bowel prep overnight. Will transfuse 1U pRBCs today.     Continuous Infusions:  Scheduled Meds:   sodium chloride 0.9%   Intravenous Once    atorvastatin  10 mg Oral Daily    dorzolamide  1 drop Both Eyes BID    latanoprost  1 drop Both Eyes QHS    levothyroxine  100 mcg Oral Before breakfast    pantoprozole (PROTONIX) IV  40 mg Intravenous Q12H    riociguat  2 mg Oral TID    selexipag  1,000 mcg Oral BID    sodium chloride 0.9%  3 mL Intravenous Q8H    timolol maleate 0.5%  1 drop Both Eyes BID     PRN Meds:sodium chloride, sodium chloride, sodium chloride 0.9%, acetaminophen, dextrose 50%, dextrose 50%, glucagon (human recombinant), glucose, glucose, ondansetron, sodium chloride 0.9%, sodium chloride 0.9%    Review of patient's allergies indicates:   Allergen Reactions    Penicillins Swelling    Iodine      Other reaction(s): Hives    Sulfamethoxazole-trimethoprim      Other reaction(s): Swelling  Other reaction(s): Hives     Objective:     Vital Signs (Most Recent):  Temp: 98 °F (36.7 °C) (02/04/19 0818)  Pulse: 85 (02/04/19 1130)  Resp: 18 (02/04/19 1130)  BP: (!) 171/97 (02/04/19 1100)  SpO2: 100 % (02/04/19 0759) Vital Signs (24h Range):  Temp:  [98 °F (36.7 °C)-98.8 °F (37.1 °C)] 98 °F (36.7 °C)  Pulse:  [76-94] 85  Resp:  [16-20] 18  SpO2:  [96 %-100 %] 100 %  BP: (111-171)/(52-97) 171/97     Patient Vitals for the past 72 hrs (Last 3 readings):   Weight   02/02/19 2253 75 kg (165 lb 5.5 oz)   02/02/19 1120 71.7 kg (158 lb)     Body mass index is 31.24 kg/m².      Intake/Output Summary (Last 24 hours) at 2/4/2019 1137  Last data filed at 2/3/2019 1800  Gross per 24 hour   Intake 500 ml   Output --   Net 500 ml       Hemodynamic Parameters:       Telemetry: Reviewed    Physical Exam   Constitutional: She is oriented to person, place, and time. She appears well-developed and well-nourished.   HENT:   Head:  Normocephalic and atraumatic.   Eyes: EOM are normal. Pupils are equal, round, and reactive to light.   Neck: Normal range of motion. No JVD present.   Cardiovascular: Normal rate, regular rhythm, normal heart sounds and intact distal pulses. Exam reveals no gallop and no friction rub.   No murmur heard.  Pulmonary/Chest: Effort normal and breath sounds normal. No stridor. No respiratory distress. She has no wheezes. She has no rales. She exhibits no tenderness.   Abdominal: Soft. Bowel sounds are normal. She exhibits no distension and no mass. There is no tenderness. There is no guarding.   Musculoskeletal: She exhibits no edema.   Neurological: She is alert and oriented to person, place, and time.   Skin: Skin is warm and dry.   Psychiatric: She has a normal mood and affect.   Vitals reviewed.      Significant Labs:  CBC:  Recent Labs   Lab 02/03/19  1818 02/04/19  0006 02/04/19  0852   WBC 7.64 7.68 6.41   RBC 2.15* 2.25* 2.25*   HGB 6.3* 6.5* 6.4*   HCT 20.1* 20.6* 20.4*   * 147* 149*   MCV 93 92 91   MCH 29.3 28.9 28.4   MCHC 31.7* 31.6* 31.4*     BNP:  Recent Labs   Lab 02/03/19  0624   BNP 1,584*     CMP:  Recent Labs   Lab 02/02/19  1342 02/02/19  2347 02/03/19  0624 02/04/19  0852    101 82 79  78   CALCIUM 7.1* 7.2* 7.0* 6.6*  6.6*   ALBUMIN 2.1*  --  2.1* 2.2*  2.2*   PROT 5.4*  --  5.3* 5.2*    142 142 136  137   K 3.9 3.8 4.1 3.9  3.9   CO2 28 26 25 28  28    103 105 98  99   BUN 22 24* 26* 29*  28*   CREATININE 6.8* 7.1* 7.7* 8.7*  8.5*   ALKPHOS 41*  --  37* 40*   ALT 10  --  10 7*   AST 20  --  19 11   BILITOT 0.3  --  0.5 0.4      Coagulation:   Recent Labs   Lab 02/02/19  1342 02/03/19  0624 02/04/19  0913   INR 3.2* 3.0* 2.8*  2.8*   APTT 35.4*  --   --      LDH:  No results for input(s): LDH in the last 72 hours.  Microbiology:  Microbiology Results (last 7 days)     ** No results found for the last 168 hours. **          I have reviewed all pertinent labs  within the past 24 hours.    Estimated Creatinine Clearance: 6.1 mL/min (A) (based on SCr of 8.5 mg/dL (H)).    Diagnostic Results:  CT: No results found in the last 24 hours.  CXR: No results found in the last 24 hours.  U/S: No results found in the last 24 hours.  I have reviewed and interpreted all pertinent imaging results/findings within the past 24 hours.

## 2019-02-04 NOTE — PROGRESS NOTES
[facility] NOTE     Patient currently on hemodialysis for removal of uremic toxins and volume.     Patient seen and evaluated on hemodialysis, tolerating treatment, see HD flowsheet for vitals and assessments.      Ultrafiltration goal is 2-2.5L     Labs have been reviewed and the dialysate bath has been adjusted.     Assessment/Plan:  Seen on dialysis this morning, tolerating well w/o complaints.  Scheduled for colonoscopy today.  H/H low, transfusing 1 unit of PRBC with HD  High calcium bath with HD    REYES Hess, FNP-BC  Nephrology  Pager:  [phone]

## 2019-02-04 NOTE — CONSULTS
Midline placed in right brachial vein, 20g x 10cm size.  Max dwell date 3/5/2019.  Lot# [id number].  Needle advanced using realtime u/s guidance.

## 2019-02-04 NOTE — ASSESSMENT & PLAN NOTE
- multiple recurrent GI bleed with no obvious source identified admitted with BRBPR  - Internal hemorrhoids vs. AVM ( ESRD),   - Hb of 4.3 from 7.8 on 1/18  - IV Protonix bid  - ASA on hold from last admission  - INR 2.8, will hold coumadin.  - 1/14 to 1/15:Had EGD, SBE: normal stomach, esophagus non bleeding erosive gastropathy.   VCE: No obvious source, erosive gastropathy, gastric polyp and SB lymphangiectasia.   Had recurrent admissions for GI bleed with no obvious source- Admit 9/2018, 6/2018, 2/2018: Colonoscopy : Internal hemorrhoids, 5 mm polyp in sigmoid colon.  - Transfuse 1U pRBCs 2/4/18  - Colonoscopy 2/4/18

## 2019-02-04 NOTE — PROGRESS NOTES
[facility]  Heart Transplant  Progress Note    Patient Name: Shivani Sheets  MRN: [id number]  Admission Date: 2/2/2019  Hospital Length of Stay: 2 days  Attending Physician: Gera Jordan Jr.,*  Primary Care Provider: Eula Weiss MD  Principal Problem:Acute blood loss anemia    Subjective:     Interval History: No acute events overnight. Scheduled for dialysis today. Completed bowel prep overnight. Will transfuse 1U pRBCs today.     Continuous Infusions:  Scheduled Meds:   sodium chloride 0.9%   Intravenous Once    atorvastatin  10 mg Oral Daily    dorzolamide  1 drop Both Eyes BID    latanoprost  1 drop Both Eyes QHS    levothyroxine  100 mcg Oral Before breakfast    pantoprozole (PROTONIX) IV  40 mg Intravenous Q12H    riociguat  2 mg Oral TID    selexipag  1,000 mcg Oral BID    sodium chloride 0.9%  3 mL Intravenous Q8H    timolol maleate 0.5%  1 drop Both Eyes BID     PRN Meds:sodium chloride, sodium chloride, sodium chloride 0.9%, acetaminophen, dextrose 50%, dextrose 50%, glucagon (human recombinant), glucose, glucose, ondansetron, sodium chloride 0.9%, sodium chloride 0.9%    Review of patient's allergies indicates:   Allergen Reactions    Penicillins Swelling    Iodine      Other reaction(s): Hives    Sulfamethoxazole-trimethoprim      Other reaction(s): Swelling  Other reaction(s): Hives     Objective:     Vital Signs (Most Recent):  Temp: 98 °F (36.7 °C) (02/04/19 0818)  Pulse: 85 (02/04/19 1130)  Resp: 18 (02/04/19 1130)  BP: (!) 171/97 (02/04/19 1100)  SpO2: 100 % (02/04/19 0759) Vital Signs (24h Range):  Temp:  [98 °F (36.7 °C)-98.8 °F (37.1 °C)] 98 °F (36.7 °C)  Pulse:  [76-94] 85  Resp:  [16-20] 18  SpO2:  [96 %-100 %] 100 %  BP: (111-171)/(52-97) 171/97     Patient Vitals for the past 72 hrs (Last 3 readings):   Weight   02/02/19 2253 75 kg (165 lb 5.5 oz)   02/02/19 1120 71.7 kg (158 lb)     Body mass index is 31.24 kg/m².      Intake/Output Summary (Last  24 hours) at 2/4/2019 1137  Last data filed at 2/3/2019 1800  Gross per 24 hour   Intake 500 ml   Output --   Net 500 ml       Hemodynamic Parameters:       Telemetry: Reviewed    Physical Exam   Constitutional: She is oriented to person, place, and time. She appears well-developed and well-nourished.   HENT:   Head: Normocephalic and atraumatic.   Eyes: EOM are normal. Pupils are equal, round, and reactive to light.   Neck: Normal range of motion. No JVD present.   Cardiovascular: Normal rate, regular rhythm, normal heart sounds and intact distal pulses. Exam reveals no gallop and no friction rub.   No murmur heard.  Pulmonary/Chest: Effort normal and breath sounds normal. No stridor. No respiratory distress. She has no wheezes. She has no rales. She exhibits no tenderness.   Abdominal: Soft. Bowel sounds are normal. She exhibits no distension and no mass. There is no tenderness. There is no guarding.   Musculoskeletal: She exhibits no edema.   Neurological: She is alert and oriented to person, place, and time.   Skin: Skin is warm and dry.   Psychiatric: She has a normal mood and affect.   Vitals reviewed.      Significant Labs:  CBC:  Recent Labs   Lab 02/03/19  1818 02/04/19  0006 02/04/19  0852   WBC 7.64 7.68 6.41   RBC 2.15* 2.25* 2.25*   HGB 6.3* 6.5* 6.4*   HCT 20.1* 20.6* 20.4*   * 147* 149*   MCV 93 92 91   MCH 29.3 28.9 28.4   MCHC 31.7* 31.6* 31.4*     BNP:  Recent Labs   Lab 02/03/19  0624   BNP 1,584*     CMP:  Recent Labs   Lab 02/02/19  1342 02/02/19  2347 02/03/19  0624 02/04/19  0852    101 82 79  78   CALCIUM 7.1* 7.2* 7.0* 6.6*  6.6*   ALBUMIN 2.1*  --  2.1* 2.2*  2.2*   PROT 5.4*  --  5.3* 5.2*    142 142 136  137   K 3.9 3.8 4.1 3.9  3.9   CO2 28 26 25 28  28    103 105 98  99   BUN 22 24* 26* 29*  28*   CREATININE 6.8* 7.1* 7.7* 8.7*  8.5*   ALKPHOS 41*  --  37* 40*   ALT 10  --  10 7*   AST 20  --  19 11   BILITOT 0.3  --  0.5 0.4      Coagulation:   Recent  Labs   Lab 02/02/19  1342 02/03/19  0624 02/04/19  0913   INR 3.2* 3.0* 2.8*  2.8*   APTT 35.4*  --   --      LDH:  No results for input(s): LDH in the last 72 hours.  Microbiology:  Microbiology Results (last 7 days)     ** No results found for the last 168 hours. **          I have reviewed all pertinent labs within the past 24 hours.    Estimated Creatinine Clearance: 6.1 mL/min (A) (based on SCr of 8.5 mg/dL (H)).    Diagnostic Results:  CT: No results found in the last 24 hours.  CXR: No results found in the last 24 hours.  U/S: No results found in the last 24 hours.  I have reviewed and interpreted all pertinent imaging results/findings within the past 24 hours.    Assessment and Plan:     62 yo F w/ PMHx  pulmonary HTN (on adempas/selexipeg), diastolic dysfunction, pAF(on coumadin), CVA 1987, COPD on 2 litre nocturnal oxygen, central retinal artery occlusion without significant carotid artery stenosis, CAD with remote PCI, ESRD(LUE AVF) secondary to HTN, s/p failed kidney transplant, PUD, STEFFANY on CPAP, hypothyroidism, RA, HLD and obesity who presents with rectal bleeding BRBPR which started 2/1/19 (diarrhea with blood clots - 4-5 episodes ) and anemia with Hb of 4.3 ( 7.8 on 1/18).     She was feeling lightheaded on getting up. Last admission had bill. Had some irregular HR occasionally. One episode of vomiting and some nausea.  She finished course of doxycyline last week for cellulitis of her leg from IO access, now swelling reduced. After discharge on 1/18 she did not have any episodes since yesterday. Had HD on 2/1/2019. Took 5 mg coumadin yesterday. No chest pain, SOB, leg swelling, PND, orthopnea, syncope, F/C, phlegm, abdominal pain, bruising or bleeding from other sites.    In ED had low blood pressures (98/51 mmHg), but no oxygen requirement and Hgb 4.3. Seen by CCU- no ICU needs at this time. Nephrology saw - will transfuse with HD in am. GI evaluated- Recommended NPO past midnight and CTA if  actively bleeds (last BRBPR in the morning).     Last admission : 1/12- 1/18: Melena received 6  Units PBRC and 2 units FFP. Had EGD, SBE: normal stomach, esophagus non bleeding erosive gastropathy. VCE: No obvious source, erosive gastropathy, gastric polyp and SB lymphangiectasia. Discharged with hb of 7.8 and INR 1.7. Discontinued aspirin.   Had recurrent admissions for GI bleed with no obvious source- Admit 9/2018, 6/2018, 2/2018: Colonoscopy : Internal hemorrhoids, 5 mm polyp in sigmoid colon.    11/2018: TTE: LVEF 55%, LA severely enlarged, moderate TR, RV moderately dilated, RVSF mild decreased, PASP -99, severe pulmonary hypertension.   PET stress: No evidence of ischemia at rest and stress          * Acute blood loss anemia    - multiple recurrent GI bleed with no obvious source identified admitted with BRBPR  - Internal hemorrhoids vs. AVM ( ESRD),   - Hb of 4.3 from 7.8 on 1/18  - IV Protonix bid  - ASA on hold from last admission  - INR 2.8, will hold coumadin.  - 1/14 to 1/15:Had EGD, SBE: normal stomach, esophagus non bleeding erosive gastropathy.   VCE: No obvious source, erosive gastropathy, gastric polyp and SB lymphangiectasia.   Had recurrent admissions for GI bleed with no obvious source- Admit 9/2018, 6/2018, 2/2018: Colonoscopy : Internal hemorrhoids, 5 mm polyp in sigmoid colon.  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  - Last TTE 11/2018: LVEF 55%, RVSF mildly decreased, PASP 99, moderate TR     ESRD from HTN started RRT 1999    - Nephrology on board  - Hemodialysis MWF     CAD (coronary artery disease)    - Aspirin on hold  - C/W statin  - PET  stress test from 2018 nonischemic         Anatoliy Hartmann MD  Heart Transplant  [facility]

## 2019-02-04 NOTE — PLAN OF CARE
Problem: Adult Inpatient Plan of Care  Goal: Plan of Care Review  Outcome: Ongoing (interventions implemented as appropriate)  POC reviewed with patient.  Pt drinking golytely for colonoscopy.  Needs PRBCs with dialysis today.  PICC team consulted for line placement; currently has an EJ.  On call notified of midnight H&H.  No new orders placed.  Safety maintained.  Call light within reach.  Will continue to monitor.

## 2019-02-04 NOTE — PROGRESS NOTES
Arrived to dialysis via stretcher. NAD noted. AAO x 4. amb to scale to assist. Agreed to 3.5 hour Tx. Aware that she will be receiving 1 unit of PRBCs. Attempting to remove 2.5L

## 2019-02-04 NOTE — PROGRESS NOTES
to see pt in order to assess needs and complete assessment given admission.  The pt is currently not in her room due to a procedure.  Pt's cousin is in attendance, Angela (lives in [place]) [phone] and reports the pt may return later this afternoon.   will follow.

## 2019-02-04 NOTE — ASSESSMENT & PLAN NOTE
- Will continue with adempas 2mg TID  - pt brought her selexipeg with her.  - Last TTE 11/2018: LVEF 55%, RVSF mildly decreased, PASP 99, moderate TR

## 2019-02-05 PROBLEM — R79.1 SUPRATHERAPEUTIC INR: Status: RESOLVED | Noted: 2018-09-10 | Resolved: 2019-02-05

## 2019-02-05 LAB
ALBUMIN SERPL BCP-MCNC: 2.1 G/DL
ALP SERPL-CCNC: 47 U/L
ALT SERPL W/O P-5'-P-CCNC: 8 U/L
ANION GAP SERPL CALC-SCNC: 6 MMOL/L
AST SERPL-CCNC: 16 U/L
BILIRUB SERPL-MCNC: 0.4 MG/DL
BUN SERPL-MCNC: 11 MG/DL
CALCIUM SERPL-MCNC: 7.8 MG/DL
CHLORIDE SERPL-SCNC: 105 MMOL/L
CO2 SERPL-SCNC: 27 MMOL/L
CREAT SERPL-MCNC: 4.7 MG/DL
EST. GFR  (AFRICAN AMERICAN): 10.5 ML/MIN/1.73 M^2
EST. GFR  (NON AFRICAN AMERICAN): 9.1 ML/MIN/1.73 M^2
GLUCOSE SERPL-MCNC: 64 MG/DL
MAGNESIUM SERPL-MCNC: 1.8 MG/DL
POTASSIUM SERPL-SCNC: 4.2 MMOL/L
PROT SERPL-MCNC: 5.2 G/DL
SODIUM SERPL-SCNC: 138 MMOL/L

## 2019-02-05 PROCEDURE — 99233 SBSQ HOSP IP/OBS HIGH 50: CPT | Mod: ,,, | Performed by: INTERNAL MEDICINE

## 2019-02-05 PROCEDURE — 25000003 PHARM REV CODE 250: Performed by: INTERNAL MEDICINE

## 2019-02-05 PROCEDURE — 83735 ASSAY OF MAGNESIUM: CPT

## 2019-02-05 PROCEDURE — 20600001 HC STEP DOWN PRIVATE ROOM

## 2019-02-05 PROCEDURE — 36415 COLL VENOUS BLD VENIPUNCTURE: CPT

## 2019-02-05 PROCEDURE — 80053 COMPREHEN METABOLIC PANEL: CPT

## 2019-02-05 PROCEDURE — 99233 PR SUBSEQUENT HOSPITAL CARE,LEVL III: ICD-10-PCS | Mod: ,,, | Performed by: INTERNAL MEDICINE

## 2019-02-05 PROCEDURE — A4216 STERILE WATER/SALINE, 10 ML: HCPCS | Performed by: INTERNAL MEDICINE

## 2019-02-05 RX ORDER — PANTOPRAZOLE SODIUM 40 MG/1
40 TABLET, DELAYED RELEASE ORAL
Status: DISCONTINUED | OUTPATIENT
Start: 2019-02-05 | End: 2019-02-08 | Stop reason: HOSPADM

## 2019-02-05 RX ORDER — WARFARIN 2.5 MG/1
5 TABLET ORAL DAILY
Status: DISCONTINUED | OUTPATIENT
Start: 2019-02-05 | End: 2019-02-05

## 2019-02-05 RX ORDER — ERGOCALCIFEROL 1.25 MG/1
50000 CAPSULE ORAL
Status: DISCONTINUED | OUTPATIENT
Start: 2019-02-05 | End: 2019-02-08 | Stop reason: HOSPADM

## 2019-02-05 RX ADMIN — RIOCIGUAT 2 MG: 2 TABLET, FILM COATED ORAL at 03:02

## 2019-02-05 RX ADMIN — LEVOTHYROXINE SODIUM 100 MCG: 100 TABLET ORAL at 06:02

## 2019-02-05 RX ADMIN — TIMOLOL MALEATE 1 DROP: 5 SOLUTION OPHTHALMIC at 09:02

## 2019-02-05 RX ADMIN — DORZOLAMIDE HYDROCHLORIDE 1 DROP: 20 SOLUTION/ DROPS OPHTHALMIC at 09:02

## 2019-02-05 RX ADMIN — LATANOPROST 1 DROP: 50 SOLUTION OPHTHALMIC at 09:02

## 2019-02-05 RX ADMIN — PANTOPRAZOLE SODIUM 40 MG: 40 TABLET, DELAYED RELEASE ORAL at 09:02

## 2019-02-05 RX ADMIN — Medication 3 ML: at 03:02

## 2019-02-05 RX ADMIN — Medication 3 ML: at 10:02

## 2019-02-05 RX ADMIN — RIOCIGUAT 2 MG: 2 TABLET, FILM COATED ORAL at 11:02

## 2019-02-05 RX ADMIN — PANTOPRAZOLE SODIUM 40 MG: 40 TABLET, DELAYED RELEASE ORAL at 03:02

## 2019-02-05 RX ADMIN — ERGOCALCIFEROL 50000 UNITS: 1.25 CAPSULE ORAL at 09:02

## 2019-02-05 RX ADMIN — ATORVASTATIN CALCIUM 10 MG: 10 TABLET, FILM COATED ORAL at 09:02

## 2019-02-05 RX ADMIN — RIOCIGUAT 2 MG: 2 TABLET, FILM COATED ORAL at 09:02

## 2019-02-05 NOTE — SUBJECTIVE & OBJECTIVE
Interval History: No acute events overnight. Tolerated colonoscopy yesterday, no acute findings. Feeling well today. Eating and drinking well.      Continuous Infusions:  Scheduled Meds:   atorvastatin  10 mg Oral Daily    dorzolamide  1 drop Both Eyes BID    ergocalciferol  50,000 Units Oral Q7 Days    latanoprost  1 drop Both Eyes QHS    levothyroxine  100 mcg Oral Before breakfast    pantoprazole  40 mg Oral BID AC    riociguat  2 mg Oral TID    selexipag  1,000 mcg Oral BID    sodium chloride 0.9%  3 mL Intravenous Q8H    timolol maleate 0.5%  1 drop Both Eyes BID     PRN Meds:sodium chloride 0.9%, acetaminophen, dextrose 50%, dextrose 50%, glucagon (human recombinant), glucose, glucose, ondansetron, sodium chloride 0.9%, sodium chloride 0.9%    Review of patient's allergies indicates:   Allergen Reactions    Penicillins Swelling    Iodine      Other reaction(s): Hives    Sulfamethoxazole-trimethoprim      Other reaction(s): Swelling  Other reaction(s): Hives     Objective:     Vital Signs (Most Recent):  Temp: 98.7 °F (37.1 °C) (02/05/19 0747)  Pulse: 85 (02/05/19 1128)  Resp: 18 (02/05/19 0747)  BP: (!) 141/78 (02/05/19 0747)  SpO2: 99 % (02/05/19 0747) Vital Signs (24h Range):  Temp:  [98 °F (36.7 °C)-99 °F (37.2 °C)] 98.7 °F (37.1 °C)  Pulse:  [71-92] 85  Resp:  [14-18] 18  SpO2:  [95 %-100 %] 99 %  BP: (123-165)/() 141/78     Patient Vitals for the past 72 hrs (Last 3 readings):   Weight   02/05/19 0500 77 kg (169 lb 12.1 oz)   02/02/19 2253 75 kg (165 lb 5.5 oz)     Body mass index is 32.07 kg/m².      Intake/Output Summary (Last 24 hours) at 2/5/2019 1145  Last data filed at 2/4/2019 1906  Gross per 24 hour   Intake 850 ml   Output 3500 ml   Net -2650 ml       Hemodynamic Parameters:       Telemetry: Reviewed    Physical Exam   Constitutional: She is oriented to person, place, and time. She appears well-developed and well-nourished.   HENT:   Head: Normocephalic and atraumatic.   Eyes:  EOM are normal. Pupils are equal, round, and reactive to light.   Neck: Normal range of motion. No JVD present.   Cardiovascular: Normal rate, regular rhythm, normal heart sounds and intact distal pulses. Exam reveals no gallop and no friction rub.   No murmur heard.  Pulmonary/Chest: Effort normal and breath sounds normal. No stridor. No respiratory distress. She has no wheezes. She has no rales. She exhibits no tenderness.   Abdominal: Soft. Bowel sounds are normal. She exhibits no distension and no mass. There is no tenderness. There is no guarding.   Musculoskeletal: She exhibits no edema.   Neurological: She is alert and oriented to person, place, and time.   Skin: Skin is warm and dry.   Psychiatric: She has a normal mood and affect.   Vitals reviewed.      Significant Labs:  CBC:  Recent Labs   Lab 02/04/19  0852 02/04/19  1511 02/04/19  1600   WBC 6.41 7.89 4.95   RBC 2.25* 3.14* 2.67*   HGB 6.4* 9.1* 7.6*   HCT 20.4* 28.0* 23.9*   * 134* 136*   MCV 91 89 90   MCH 28.4 29.0 28.5   MCHC 31.4* 32.5 31.8*     BNP:  Recent Labs   Lab 02/03/19  0624   BNP 1,584*     CMP:  Recent Labs   Lab 02/03/19  0624 02/04/19  0852 02/05/19  0401   GLU 82 79  78 64*   CALCIUM 7.0* 6.6*  6.6* 7.8*   ALBUMIN 2.1* 2.2*  2.2* 2.1*   PROT 5.3* 5.2* 5.2*    136  137 138   K 4.1 3.9  3.9 4.2   CO2 25 28  28 27    98  99 105   BUN 26* 29*  28* 11   CREATININE 7.7* 8.7*  8.5* 4.7*   ALKPHOS 37* 40* 47*   ALT 10 7* 8*   AST 19 11 16   BILITOT 0.5 0.4 0.4      Coagulation:   Recent Labs   Lab 02/02/19  1342 02/03/19  0624 02/04/19  0913   INR 3.2* 3.0* 2.8*  2.8*   APTT 35.4*  --   --      LDH:  No results for input(s): LDH in the last 72 hours.  Microbiology:  Microbiology Results (last 7 days)     ** No results found for the last 168 hours. **          I have reviewed all pertinent labs within the past 24 hours.    Estimated Creatinine Clearance: 11.2 mL/min (A) (based on SCr of 4.7 mg/dL  (H)).    Diagnostic Results:  CT: No results found in the last 24 hours.  CXR: No results found in the last 24 hours.  U/S: No results found in the last 24 hours.  I have reviewed and interpreted all pertinent imaging results/findings within the past 24 hours.

## 2019-02-05 NOTE — ASSESSMENT & PLAN NOTE
- multiple recurrent GI bleed with no obvious source identified admitted with BRBPR  - Internal hemorrhoids vs. AVM ( ESRD),   - Hb of 4.3 from 7.8 on 1/18  - Protonix bid  - ASA on hold from last admission  - 1/14 to 1/15:Had EGD, SBE: normal stomach, esophagus non bleeding erosive gastropathy.   VCE: No obvious source, erosive gastropathy, gastric polyp and SB lymphangiectasia.   Had recurrent admissions for GI bleed with no obvious source- Admit 9/2018, 6/2018, 2/2018: Colonoscopy : Internal hemorrhoids, 5 mm polyp in sigmoid colon.  - Colonoscopy 2/4/18- unremarkable  - Holding coumadin. Will consider resuming as an outpatient.

## 2019-02-05 NOTE — PLAN OF CARE
Problem: Adult Inpatient Plan of Care  Goal: Plan of Care Review  Outcome: Ongoing (interventions implemented as appropriate)  Pt AAOx4, afebrile, free of falls. Pt completed colonoscopy overnight, no bleeds found. HTS updated. Cardiac diet re-established with fluid restriction, pt verbalized understanding. Denies pain/distress. No overt signs of bleeding overnight. WCTM.

## 2019-02-05 NOTE — ANESTHESIA PREPROCEDURE EVALUATION
[facility]  Anesthesia Pre-Operative Evaluation         Patient Name: Shivani Sheets  YOB: 1955  MRN: [id number]    SUBJECTIVE:       02/04/2019    Shivani Sheets is a 63 y.o. female w/ a significant PMHx of pulmonary HTN (PASP 99), diastolic dysfunction, pAF(on coumadin), COPD, central retinal artery occlusion without significant carotid artery stenosis, CAD with remote PCI, ESRD(LUE AVF; HD MWF) secondary to HTN, s/p failed kidney transplant, PUD, STEFFANY on CPAP, hypothyroidism, RA, HLD and obesity who presents with GI Bleed.      LDA: None documented.       RETIRED! DO NOT USE: Hemodialysis Catheter Arteriovenous fistula Left Forearm (Active)   Number of days:             Peripheral IV - Single Lumen 01/12/19 2116 Right Upper Arm (Active)   Site Assessment Clean;Dry;Intact 1/14/2019 11:00 AM   Line Status Flushed 1/14/2019 11:00 AM   Dressing Status Clean;Dry;Intact 1/14/2019 11:00 AM   Dressing Intervention Dressing reinforced 1/14/2019 11:00 AM   Dressing Change Due 01/16/19 1/14/2019 11:00 AM   Site Change Due 01/16/19 1/14/2019  7:10 AM   Reason Not Rotated Not due 1/14/2019 11:00 AM   Number of days: 1            Peripheral IV - Single Lumen 01/13/19 0412 Right Hand (Active)   Site Assessment Clean;Dry;Intact 1/14/2019 11:00 AM   Line Status Flushed 1/14/2019 11:00 AM   Dressing Status Clean;Dry;Intact 1/14/2019 11:00 AM   Dressing Intervention Dressing reinforced 1/14/2019 11:00 AM   Dressing Change Due 01/17/19 1/14/2019 11:00 AM   Site Change Due 01/17/19 1/14/2019  7:10 AM   Reason Not Rotated Not due 1/14/2019 11:00 AM   Number of days: 1            Midline Catheter Insertion/Assessment  - Single Lumen 01/14/19 0930 Right basilic vein (medial side of arm) 18g x 10cm (Active)   Site Assessment Clean;Dry;Intact;No redness;No swelling 1/14/2019  2:16 PM   IV Device Securement catheter securement device 1/14/2019  2:16 PM   Line Status Blood return noted;Flushed;Saline  locked 2019  2:16 PM   Dressing Status Biopatch in place;Clean;Dry;Intact 2019  2:16 PM   Dressing Intervention New dressing 2019  2:16 PM   Dressing Change Due 19  2:16 PM   Site Change Due 19  2:16 PM   Number of days: 0            Hemodialysis AV Fistula Left upper arm (Active)   Number of days:        Prev airway: none undocumented    Drips: None documented.      Patient Active Problem List   Diagnosis    Chronic diastolic heart failure    CAD (coronary artery disease)    S/P PTCA (percutaneous transluminal coronary angioplasty)    Central retinal artery occlusion    ESRD from HTN started RRT     Obstructive sleep apnea    Hyperlipidemia    Obesity    Rheumatoid arthritis    Anticoagulation monitoring by pharmacist    Complication of vascular access for dialysis    Hand pain, left    Failed  donor kidney transplant     Renal hypertension diagnosed age 15    Hypothyroidism    Awaiting organ transplant    Pulmonary HTN    Atrial fibrillation    Hypoxia    Low back pain    Other chronic pulmonary heart diseases    Chest wall pain    Neovascular glaucoma, both eyes    Status post cataract extraction and insertion of intraocular lens    Glaucoma filtering bleb of both eyes    Chronic angle-closure glaucoma of both eyes, moderate stage    Atypical pneumonia    GIB (gastrointestinal bleeding)    Severe anemia    Glaucoma shunt device of both eyes    Colon polyp    Controlled type 2 diabetes mellitus with both eyes affected by proliferative retinopathy and macular edema, without long-term current use of insulin    Central serous chorioretinopathy of eye, right    Supratherapeutic INR    Melena    Acute blood loss anemia    PVC (premature ventricular contraction)    Gastrointestinal hemorrhage    End stage renal disease on dialysis    Hemorrhagic shock       Review of patient's allergies indicates:   Allergen  Reactions    Penicillins Swelling    Iodine      Other reaction(s): Hives    Sulfamethoxazole-trimethoprim      Other reaction(s): Swelling  Other reaction(s): Hives       Current Inpatient Medications:      Current Facility-Administered Medications on File Prior to Visit   Medication Dose Route Frequency Provider Last Rate Last Dose    0.9%  NaCl infusion (for blood administration)   Intravenous Q24H PRN Vicky Callahan MD 20 mL/hr at 02/02/19 1840      0.9%  NaCl infusion (for blood administration)   Intravenous Q24H PRN Anatoliy Hartmann MD        0.9%  NaCl infusion   Intravenous PRN Lefty Armstrong MD        acetaminophen tablet 650 mg  650 mg Oral Q6H PRN Anatoliy Hartmann MD   650 mg at 02/04/19 1744    atorvastatin tablet 10 mg  10 mg Oral Daily Denilson Tyson MD   10 mg at 02/04/19 1423    dextrose 50% injection 12.5 g  12.5 g Intravenous PRN Christian Kim MD        dextrose 50% injection 25 g  25 g Intravenous PRN Christian Kim MD        dorzolamide 2 % ophthalmic solution 1 drop  1 drop Both Eyes BID Denilson Tyson MD   1 drop at 02/04/19 1432    glucagon (human recombinant) injection 1 mg  1 mg Intramuscular PRN Christian Kim MD        glucose chewable tablet 16 g  16 g Oral PRN Christian Kim MD        glucose chewable tablet 24 g  24 g Oral PRN Christian Kim MD        latanoprost 0.005 % ophthalmic solution 1 drop  1 drop Both Eyes QHS Denilson Tyson MD   1 drop at 02/03/19 2155    levothyroxine tablet 100 mcg  100 mcg Oral Before breakfast Denilson Tyson MD   100 mcg at 02/04/19 1422    ondansetron disintegrating tablet 8 mg  8 mg Oral Q8H PRN Christian Kim MD   8 mg at 02/04/19 1746    pantoprazole injection 40 mg  40 mg Intravenous Q12H Gera Jordan Jr., MD   40 mg at 02/04/19 1424    riociguat (ADEMPAS) tablet 2 mg  2 mg Oral TID Denilson Tyson MD   2 mg at 02/04/19 1423    selexipag Tab 1,000 mcg  1,000 mcg Oral BID Gera Jordan Jr., MD   1,000 mcg  at 02/04/19 1115    sodium chloride 0.9% flush 3 mL  3 mL Intravenous Q8H Denilson Tyson MD   3 mL at 02/04/19 1431    sodium chloride 0.9% flush 5 mL  5 mL Intravenous PRN Curry Grider MD        sodium chloride 0.9% flush 5 mL  5 mL Intravenous PRN Christian Kim MD        timolol maleate 0.5% ophthalmic solution 1 drop  1 drop Both Eyes BID Denilson Tyson MD   1 drop at 02/04/19 1432     Current Outpatient Medications on File Prior to Visit   Medication Sig Dispense Refill    ALPHAGAN P 0.1 % Drop once daily.       diltiaZEM (CARDIZEM CD) 300 MG 24 hr capsule Take 1 capsule (300 mg total) by mouth once daily. 90 capsule 3    dorzolamide-timolol 2-0.5% (COSOPT) 22.3-6.8 mg/mL ophthalmic solution INSTILL 1 DROP IN BOTH EYES TWICE DAILY 10 mL 0    latanoprost 0.005 % ophthalmic solution INSTILL 1 DROP IN BOTH EYES EVERY DAY AT BEDTIME 7.5 mL 0    levothyroxine (SYNTHROID) 100 MCG tablet Take 100 mcg by mouth. 1 Tablet Oral Every day      LIPITOR 10 mg tablet TAKE 1 BY MOUTH ONCE A DAY 90 tablet 2    pantoprazole (PROTONIX) 40 MG tablet TAKE 1 TABLET BY MOUTH ONCE DAILY 30 tablet 6    riociguat (ADEMPAS) 2 mg Tab tablet Take 2 mg by mouth 3 (three) times daily.       selexipag (UPTRAVI) 1,000 mcg Tab Take 1,000 mcg by mouth 2 (two) times daily.      warfarin (COUMADIN) 5 MG tablet TAKE 1 1/2 TABLETS BY MOUTH DAILY ON TUESDAY, THURSDAY AND SATURDAY, THEN TAKE 1 TABLET DAILY ON MONDAY, WEDNESDAY, FRIDAY AND SUNDAY 120 tablet 0       Past Surgical History:   Procedure Laterality Date    CATARACT EXTRACTION W/  INTRAOCULAR LENS IMPLANT Bilateral     COLON SURGERY      COLONOSCOPY      COLONOSCOPY N/A 6/12/2018    Performed by Jose Falk MD at [facility] (2ND FLR)    EGD (ESOPHAGOGASTRODUODENOSCOPY) N/A 1/14/2019    Performed by Miranda Maradiaga MD at [facility] (2ND FLR)    EGD (ESOPHAGOGASTRODUODENOSCOPY) N/A 9/11/2018    [facility] at [facility] ([facility])     ESOPHAGOGASTRODUODENOSCOPY (EGD) N/A 2018    Performed by [facility]ji Desir [facility] at [facility] (2ND FLR)    EYE SURGERY      FRACTURE SURGERY      R arm    HERNIA REPAIR      HYSTERECTOMY      INSERTION-IMPLANT-GLAUCOMA Left 10/12/2017    Performed by Junaid Kern MD at [facility] OR 1ST FLR    KIDNEY TRANSPLANT      NEPHRECTOMY  2008    transplant     PARATHYROID GLAND SURGERY      TONSILLECTOMY      UPPER GASTROINTESTINAL ENDOSCOPY         Social History     Socioeconomic History    Marital status:      Spouse name: Not on file    Number of children: Not on file    Years of education: Not on file    Highest education level: Not on file   Social Needs    Financial resource strain: Not on file    Food insecurity - worry: Not on file    Food insecurity - inability: Not on file    Transportation needs - medical: Not on file    Transportation needs - non-medical: Not on file   Occupational History    Not on file   Tobacco Use    Smoking status: Former Smoker     Types: Cigarettes     Last attempt to quit: 8/10/2000     Years since quittin.4    Smokeless tobacco: Never Used   Substance and Sexual Activity    Alcohol use: No     Comment: hx of etoh     Drug use: No    Sexual activity: No   Other Topics Concern    Not on file   Social History Narrative    Shivani had three children and 11grandchildren.  She lives alone.  She is on disability.        OBJECTIVE:     Vital Signs Range (Last 24H):  Temp:  [36.7 °C (98 °F)-36.9 °C (98.5 °F)]   Pulse:  [76-94]   Resp:  [16-18]   BP: (111-171)/(52-97)   SpO2:  [96 %-100 %]       Significant Labs:  Lab Results   Component Value Date    WBC 7.89 2019    HGB 9.1 (L) 2019    HCT 28.0 (L) 2019     (L) 2019    CHOL 161 2014    TRIG 122 2014    HDL 61 2014    ALT 7 (L) 2019    AST 11 2019     2019     2019    K 3.9 2019    K 3.9 2019    CL 99  02/04/2019    CL 98 02/04/2019    CREATININE 8.5 (H) 02/04/2019    CREATININE 8.7 (H) 02/04/2019    BUN 28 (H) 02/04/2019    BUN 29 (H) 02/04/2019    CO2 28 02/04/2019    CO2 28 02/04/2019    TSH 0.793 02/23/2018    INR 2.8 (H) 02/04/2019    INR 2.8 (H) 02/04/2019    GLUF 126 (H) 03/16/2010    HGBA1C 5.1 02/02/2019           ASSESSMENT/PLAN:         Pre-op Assessment    I have reviewed the Patient Summary Reports.      I have reviewed the Medications.     Review of Systems  Anesthesia Hx:  No problems with previous Anesthesia  History of prior surgery of interest to airway management or planning: Previous anesthesia: General Denies Family Hx of Anesthesia complications.   Denies Personal Hx of Anesthesia complications.   Social:  Former Smoker    Cardiovascular:   Hypertension, poorly controlled CAD   CHF    Pulmonary:   COPD, severe Sleep Apnea On 2L home O2    Renal/:   Chronic Renal Disease, ESRD, Dialysis    Endocrine:   Diabetes, poorly controlled, type 2, using insulin        Physical Exam  General:  Obesity    Airway/Jaw/Neck:  Airway Findings: Mouth Opening: Normal Tongue: Normal  General Airway Assessment: Adult  Mallampati: III  TM Distance: Normal, at least 6 cm  Jaw/Neck Findings:  Neck ROM: Normal ROM  Neck Findings: Normal     Dental:  Dental Findings: In tact    Heart/Vascular:  Heart Findings: Vascular Findings:  Existing Vascular Access  Dialysis Access: AV Fistula LUE             Anesthesia Plan  Type of Anesthesia, risks & benefits discussed:  Anesthesia Type:  general, MAC  Patient's Preference: ga  Intra-op Monitoring Plan: standard ASA monitors  Intra-op Monitoring Plan Comments:   Post Op Pain Control Plan: per primary service following discharge from PACU  Post Op Pain Control Plan Comments:   Induction:   IV  Beta Blocker:  Patient is not currently on a Beta-Blocker (No further documentation required).       Informed Consent: Patient understands risks and agrees with Anesthesia plan.   Questions answered. Anesthesia consent signed with patient.  ASA Score: 4     Day of Surgery Review of History & Physical:    H&P update referred to the provider.         Ready For Surgery From Anesthesia Perspective.

## 2019-02-05 NOTE — PLAN OF CARE
Problem: Adult Inpatient Plan of Care  Goal: Plan of Care Review  Outcome: Ongoing (interventions implemented as appropriate)   02/05/19 1507   Plan of Care Review   Plan of Care Reviewed With Patient     Pt AAOx4. VSS at this time. No acute events this shift. No reports of bloody BMs. Monitoring H&H. HD tomorrow.

## 2019-02-05 NOTE — PROGRESS NOTES
[facility]  Heart Transplant  Progress Note    Patient Name: Shivani Sheets  MRN: [id number]  Admission Date: 2/2/2019  Hospital Length of Stay: 3 days  Attending Physician: Gera Jordan Jr.,*  Primary Care Provider: Eula Weiss MD  Principal Problem:Acute blood loss anemia    Subjective:     Interval History: No acute events overnight. Tolerated colonoscopy yesterday, no acute findings. Feeling well today. Eating and drinking well.      Continuous Infusions:  Scheduled Meds:   atorvastatin  10 mg Oral Daily    dorzolamide  1 drop Both Eyes BID    ergocalciferol  50,000 Units Oral Q7 Days    latanoprost  1 drop Both Eyes QHS    levothyroxine  100 mcg Oral Before breakfast    pantoprazole  40 mg Oral BID AC    riociguat  2 mg Oral TID    selexipag  1,000 mcg Oral BID    sodium chloride 0.9%  3 mL Intravenous Q8H    timolol maleate 0.5%  1 drop Both Eyes BID     PRN Meds:sodium chloride 0.9%, acetaminophen, dextrose 50%, dextrose 50%, glucagon (human recombinant), glucose, glucose, ondansetron, sodium chloride 0.9%, sodium chloride 0.9%    Review of patient's allergies indicates:   Allergen Reactions    Penicillins Swelling    Iodine      Other reaction(s): Hives    Sulfamethoxazole-trimethoprim      Other reaction(s): Swelling  Other reaction(s): Hives     Objective:     Vital Signs (Most Recent):  Temp: 98.7 °F (37.1 °C) (02/05/19 0747)  Pulse: 85 (02/05/19 1128)  Resp: 18 (02/05/19 0747)  BP: (!) 141/78 (02/05/19 0747)  SpO2: 99 % (02/05/19 0747) Vital Signs (24h Range):  Temp:  [98 °F (36.7 °C)-99 °F (37.2 °C)] 98.7 °F (37.1 °C)  Pulse:  [71-92] 85  Resp:  [14-18] 18  SpO2:  [95 %-100 %] 99 %  BP: (123-165)/() 141/78     Patient Vitals for the past 72 hrs (Last 3 readings):   Weight   02/05/19 0500 77 kg (169 lb 12.1 oz)   02/02/19 2258 75 kg (165 lb 5.5 oz)     Body mass index is 32.07 kg/m².      Intake/Output Summary (Last 24 hours) at 2/5/2019 1145  Last data  filed at 2/4/2019 1906  Gross per 24 hour   Intake 850 ml   Output 3500 ml   Net -2650 ml       Hemodynamic Parameters:       Telemetry: Reviewed    Physical Exam   Constitutional: She is oriented to person, place, and time. She appears well-developed and well-nourished.   HENT:   Head: Normocephalic and atraumatic.   Eyes: EOM are normal. Pupils are equal, round, and reactive to light.   Neck: Normal range of motion. No JVD present.   Cardiovascular: Normal rate, regular rhythm, normal heart sounds and intact distal pulses. Exam reveals no gallop and no friction rub.   No murmur heard.  Pulmonary/Chest: Effort normal and breath sounds normal. No stridor. No respiratory distress. She has no wheezes. She has no rales. She exhibits no tenderness.   Abdominal: Soft. Bowel sounds are normal. She exhibits no distension and no mass. There is no tenderness. There is no guarding.   Musculoskeletal: She exhibits no edema.   Neurological: She is alert and oriented to person, place, and time.   Skin: Skin is warm and dry.   Psychiatric: She has a normal mood and affect.   Vitals reviewed.      Significant Labs:  CBC:  Recent Labs   Lab 02/04/19  0852 02/04/19  1511 02/04/19  1600   WBC 6.41 7.89 4.95   RBC 2.25* 3.14* 2.67*   HGB 6.4* 9.1* 7.6*   HCT 20.4* 28.0* 23.9*   * 134* 136*   MCV 91 89 90   MCH 28.4 29.0 28.5   MCHC 31.4* 32.5 31.8*     BNP:  Recent Labs   Lab 02/03/19  0624   BNP 1,584*     CMP:  Recent Labs   Lab 02/03/19  0624 02/04/19  0852 02/05/19  0401   GLU 82 79  78 64*   CALCIUM 7.0* 6.6*  6.6* 7.8*   ALBUMIN 2.1* 2.2*  2.2* 2.1*   PROT 5.3* 5.2* 5.2*    136  137 138   K 4.1 3.9  3.9 4.2   CO2 25 28  28 27    98  99 105   BUN 26* 29*  28* 11   CREATININE 7.7* 8.7*  8.5* 4.7*   ALKPHOS 37* 40* 47*   ALT 10 7* 8*   AST 19 11 16   BILITOT 0.5 0.4 0.4      Coagulation:   Recent Labs   Lab 02/02/19  1342 02/03/19  0624 02/04/19  0913   INR 3.2* 3.0* 2.8*  2.8*   APTT 35.4*  --   --       LDH:  No results for input(s): LDH in the last 72 hours.  Microbiology:  Microbiology Results (last 7 days)     ** No results found for the last 168 hours. **          I have reviewed all pertinent labs within the past 24 hours.    Estimated Creatinine Clearance: 11.2 mL/min (A) (based on SCr of 4.7 mg/dL (H)).    Diagnostic Results:  CT: No results found in the last 24 hours.  CXR: No results found in the last 24 hours.  U/S: No results found in the last 24 hours.  I have reviewed and interpreted all pertinent imaging results/findings within the past 24 hours.    Assessment and Plan:     62 yo F w/ PMHx  pulmonary HTN (on adempas/selexipeg), diastolic dysfunction, pAF(on coumadin), CVA 1987, COPD on 2 litre nocturnal oxygen, central retinal artery occlusion without significant carotid artery stenosis, CAD with remote PCI, ESRD(LUE AVF) secondary to HTN, s/p failed kidney transplant, PUD, STEFFANY on CPAP, hypothyroidism, RA, HLD and obesity who presents with rectal bleeding BRBPR which started 2/1/19 (diarrhea with blood clots - 4-5 episodes ) and anemia with Hb of 4.3 ( 7.8 on 1/18).     She was feeling lightheaded on getting up. Last admission had bill. Had some irregular HR occasionally. One episode of vomiting and some nausea.  She finished course of doxycyline last week for cellulitis of her leg from IO access, now swelling reduced. After discharge on 1/18 she did not have any episodes since yesterday. Had HD on 2/1/2019. Took 5 mg coumadin yesterday. No chest pain, SOB, leg swelling, PND, orthopnea, syncope, F/C, phlegm, abdominal pain, bruising or bleeding from other sites.    In ED had low blood pressures (98/51 mmHg), but no oxygen requirement and Hgb 4.3. Seen by CCU- no ICU needs at this time. Nephrology saw - will transfuse with HD in am. GI evaluated- Recommended NPO past midnight and CTA if actively bleeds (last BRBPR in the morning).     Last admission : 1/12- 1/18: Melena received 6  Units PBRC and  2 units FFP. Had EGD, SBE: normal stomach, esophagus non bleeding erosive gastropathy. VCE: No obvious source, erosive gastropathy, gastric polyp and SB lymphangiectasia. Discharged with hb of 7.8 and INR 1.7. Discontinued aspirin.   Had recurrent admissions for GI bleed with no obvious source- Admit 9/2018, 6/2018, 2/2018: Colonoscopy : Internal hemorrhoids, 5 mm polyp in sigmoid colon.    11/2018: TTE: LVEF 55%, LA severely enlarged, moderate TR, RV moderately dilated, RVSF mild decreased, PASP -99, severe pulmonary hypertension.   PET stress: No evidence of ischemia at rest and stress          * Acute blood loss anemia    - multiple recurrent GI bleed with no obvious source identified admitted with BRBPR  - Internal hemorrhoids vs. AVM ( ESRD),   - Hb of 4.3 from 7.8 on 1/18  - Protonix bid  - ASA on hold from last admission  - 1/14 to 1/15:Had EGD, SBE: normal stomach, esophagus non bleeding erosive gastropathy.   VCE: No obvious source, erosive gastropathy, gastric polyp and SB lymphangiectasia.   Had recurrent admissions for GI bleed with no obvious source- Admit 9/2018, 6/2018, 2/2018: Colonoscopy : Internal hemorrhoids, 5 mm polyp in sigmoid colon.  - Colonoscopy 2/4/18- unremarkable  - Holding coumadin. Will consider resuming as an outpatient.      Controlled type 2 diabetes mellitus with both eyes affected by proliferative retinopathy and macular edema, without long-term current use of insulin    - HbAIC of 5.1     Atrial fibrillation    - Paroxysmal  - holding coumadin in setting of ABLA     Pulmonary HTN    - Will continue with adempas 2mg TID  - pt brought her selexipeg with her.  - Last TTE 11/2018: LVEF 55%, RVSF mildly decreased, PASP 99, moderate TR     ESRD from HTN started RRT 1999    - Nephrology on board  - Hemodialysis MWF     CAD (coronary artery disease)    - Aspirin on hold  - C/W statin  - PET stress test from 2018 nonischemic         Anatoliy Hartmann MD  Heart Transplant  [facility]  [place]

## 2019-02-05 NOTE — DISCHARGE INSTRUCTIONS
Colonoscopy     A camera attached to a flexible tube with a viewing lens is used to take video pictures.     Colonoscopy is a test to view the inside of your lower digestive tract (colon and rectum). Sometimes it can show the last part of the small intestine (ileum). During the test, small pieces of tissue may be removed for testing. This is called a biopsy. Small growths, such as polyps, may also be removed.   Why is colonoscopy done?  The test is done to help look for colon cancer. And it can help find the source of abdominal pain, bleeding, and changes in bowel habits. It may be needed once a year, depending on factors such as your:  · Age  · Health history  · Family health history  · Symptoms  · Results from any prior colonoscopy  Risks and possible complications  These include:  · Bleeding               · A puncture or tear in the colon   · Risks of anesthesia  · A cancer lesion not being seen  Getting ready   To prepare for the test:  · Talk with your healthcare provider about the risks of the test (see below). Also ask your healthcare provider about alternatives to the test.  · Tell your healthcare provider about any medicines you take. Also tell him or her about any health conditions you may have.  · Make sure your rectum and colon are empty for the test. Follow the diet and bowel prep instructions exactly. If you dont, the test may need to be rescheduled.  · Plan for a friend or family member to drive you home after the test.     Colonoscopy provides an inside view of the entire colon.     You may discuss the results with your doctor right away or at a future visit.  During the test   The test is usually done in the hospital on an outpatient basis. This means you go home the same day. The procedure takes about 30 minutes. During that time:  · You are given relaxing (sedating) medicine through an IV line. You may be drowsy, or fully asleep.  · The healthcare provider will first give you a physical exam to  check for anal and rectal problems.  · Then the anus is lubricated and the scope inserted.  · If you are awake, you may have a feeling similar to needing to have a bowel movement. You may also feel pressure as air is pumped into the colon. Its OK to pass gas during the procedure.  · Biopsy, polyp removal, or other treatments may be done during the test.  After the test   You may have gas right after the test. It can help to try to pass it to help prevent later bloating. Your healthcare provider may discuss the results with you right away. Or you may need to schedule a follow-up visit to talk about the results. After the test, you can go back to your normal eating and other activities. You may be tired from the sedation and need to rest for a few hours.  Date Last Reviewed: 11/1/2016 © 2000-2017 [employer]. [address] [place] [address], [place] [address]. All rights reserved. This information is not intended as a substitute for professional medical care. Always follow your healthcare professional's instructions.

## 2019-02-05 NOTE — ASSESSMENT & PLAN NOTE
- Paroxysmal  - Holding coumadin. Will consider resuming as an outpatient.   - Consider EP referral upon discharge for Watchman device

## 2019-02-05 NOTE — NURSING TRANSFER
Nursing Transfer Note      2/4/2019     Transfer To: [id number] From: [facility] 29     Transfer via stretcher    Transfer with 2L to O2, cardiac monitoring    Transported by Transport     Medicines sent: no    Chart send with patient: Yes    Notified: CHIDI Choi     Patient reassessed at: 2000 2/4/2019     Upon arrival to floor: cardiac monitor applied, patient oriented to room and bed in lowest position

## 2019-02-05 NOTE — INTERVAL H&P NOTE
Pre-Procedure H and P Addendum    Patient seen and examined.  History and exam unchanged from prior history and physical.      Procedure: Colonoscopy   Indication: Hematochezia   ASA Class: per anesthesiology  Airway: normal  Neck Mobility: full range of motion  Mallampatti score: per anesthesia  History of anesthesia problems: no  Family history of anesthesia problems: no  Anesthesia Plan: MAC    Anesthesia/Surgery risks, benefits and alternative options discussed and understood by patient/family.          Active Hospital Problems    Diagnosis  POA    *Acute blood loss anemia [D62]  Yes    End stage renal disease on dialysis [N18.6, Z99.2]  Not Applicable    Gastrointestinal hemorrhage [K92.2]  Yes    Supratherapeutic INR [R79.1]  Yes    Controlled type 2 diabetes mellitus with both eyes affected by proliferative retinopathy and macular edema, without long-term current use of insulin [E11.3513]  Yes    Severe anemia [D64.9]  Yes    Pulmonary HTN [I27.20]  Yes    Atrial fibrillation [I48.91]  Yes    Hypothyroidism [E03.9]  Yes    Chronic diastolic heart failure [I50.32]  Yes     Conclusions of TTE 11/2018  · Left atrium is severely dilated.  · Left ventricle shows concentric hypertrophy.  · Pulmonic valve shows mild regurgitation.  · Right atrium is severely dilated.  · Moderate tricuspid regurgitation.  · Right ventricular cavity size is moderately dilated.  · RV systolic function is mildly reduced.  · Left ventricle ejection fraction is normal at 55%  · Grade II (moderate) left ventricular diastolic dysfunction consistent with pseudonormalization.  · LA pressure is elevated.  · Moderately elevated central venous pressure (8 mm Hg).  · The estimated PA systolic pressure is 99.01 mm Hg  · Pulmonary hypertension present.      CAD (coronary artery disease) [I25.10]  Yes    Obstructive sleep apnea [G47.33]  Yes    Hyperlipidemia [E78.5]  Yes    Obesity [E66.9]  Yes    ESRD from HTN started RRT 1999  [N18.6]  Yes      Resolved Hospital Problems   No resolved problems to display.

## 2019-02-05 NOTE — PROVATION PATIENT INSTRUCTIONS
Discharge Summary/Instructions after an Endoscopic Procedure  Patient Name: Shivani Sheets  Patient MRN: [id number]  Patient YOB: 1955 Monday, February 04, 2019  Indio Beltran MD  RESTRICTIONS:  During your procedure today, you received medications for sedation.  These   medications may affect your judgment, balance and coordination.  Therefore,   for 24 hours, you have the following restrictions:   - DO NOT drive a car, operate machinery, make legal/financial decisions,   sign important papers or drink alcohol.    ACTIVITY:  Today: no heavy lifting, straining or running due to procedural   sedation/anesthesia.  The following day: return to full activity including work.  DIET:  Eat and drink normally unless instructed otherwise.     TREATMENT FOR COMMON SIDE EFFECTS:  - Mild abdominal pain, nausea, belching, bloating or excessive gas:  rest,   eat lightly and use a heating pad.  - Sore Throat: treat with throat lozenges and/or gargle with warm salt   water.  - Because air was used during the procedure, expelling large amounts of air   from your rectum or belching is normal.  - If a bowel prep was taken, you may not have a bowel movement for 1-3 days.    This is normal.  SYMPTOMS TO WATCH FOR AND REPORT TO YOUR PHYSICIAN:  1. Abdominal pain or bloating, other than gas cramps.  2. Chest pain.  3. Back pain.  4. Signs of infection such as: chills or fever occurring within 24 hours   after the procedure.  5. Rectal bleeding, which would show as bright red, maroon, or black stools.   (A tablespoon of blood from the rectum is not serious, especially if   hemorrhoids are present.)  6. Vomiting.  7. Weakness or dizziness.  GO DIRECTLY TO THE NEAREST EMERGENCY ROOM IF YOU HAVE ANY OF THE FOLLOWING:      Difficulty breathing              Chills and/or fever over 101 F   Persistent vomiting and/or vomiting blood   Severe abdominal pain   Severe chest pain   Black, tarry stools   Bleeding- more than one  tablespoon   Any other symptom or condition that you feel may need urgent attention  Your doctor recommends these additional instructions:  If any biopsies were taken, your doctors clinic will contact you in 1 to 2   weeks with any results.  - [facility].   - Advance diet as tolerated.   - Continue present medications.   - Resume Coumadin (warfarin) at prior dose today.   - Repeat colonoscopy in 5 years for surveillance.  For questions, problems or results please call your physician - Indio Beltran MD at Work:  [phone].  [facility], EMERGENCY ROOM PHONE NUMBER: [phone]  IF A COMPLICATION OR EMERGENCY SITUATION ARISES AND YOU ARE UNABLE TO REACH   YOUR PHYSICIAN - GO DIRECTLY TO THE EMERGENCY ROOM.  Indio Beltran MD  2/4/2019 7:11:00 PM  This report has been verified and signed electronically.  [employer]

## 2019-02-05 NOTE — TREATMENT PLAN
Treatment Plan  02/04/2019  8:47 PM    Colon completed with impression and recommendations.    Impression:             - The entire examined colon is normal.  - The examined portion of the ileum was normal.  - No specimens collected.    Recommendation:         - [facility].  - Advance diet as tolerated.  - Continue present medications.  - Resume Coumadin (warfarin) at prior dose today.  - Repeat colonoscopy in 5 years for surveillance.    Sergey Hoffman M.D.  Gastroenterology Fellow, PGY-V  Pager: [phone]  [facility]

## 2019-02-05 NOTE — TRANSFER OF CARE
"Anesthesia Transfer of Care Note    Patient: Shivani Sheets    Procedure(s) Performed: Procedure(s) (LRB):  COLONOSCOPY (N/A)    Patient location: [facility]    Anesthesia Type: MAC    Transport from OR: Transported from OR on 2-3 L/min O2 by NC with adequate spontaneous ventilation    Post pain: adequate analgesia    Post assessment: no apparent anesthetic complications    Post vital signs: stable    Level of consciousness: awake, alert and oriented    Nausea/Vomiting: no nausea/vomiting    Complications: none    Transfer of care protocol was followed      Last vitals:   Visit Vitals  BP (!) 165/81 (Patient Position: Lying)   Pulse 85   Temp 36.7 °C (98.1 °F) (Tympanic)   Resp 16   Ht 5' 1" (1.549 m)   Wt 75 kg (165 lb 5.5 oz)   LMP  (LMP Unknown)   SpO2 100%   Breastfeeding? No   BMI 31.24 kg/m²     "

## 2019-02-05 NOTE — PROGRESS NOTES
Tx complete. NAD noted. Tolerated. Removed 2.4L.  Removed 2 needles from ABEL AVF. Pressure applied until hemostasis. Gauze applied.

## 2019-02-06 PROBLEM — D63.8 ANEMIA OF CHRONIC DISEASE: Chronic | Status: ACTIVE | Noted: 2018-09-14

## 2019-02-06 LAB
ABO + RH BLD: NORMAL
ALBUMIN SERPL BCP-MCNC: 2.1 G/DL
ALP SERPL-CCNC: 43 U/L
ALT SERPL W/O P-5'-P-CCNC: 10 U/L
ANION GAP SERPL CALC-SCNC: 8 MMOL/L
ANION GAP SERPL CALC-SCNC: 9 MMOL/L
AST SERPL-CCNC: 15 U/L
BASOPHILS # BLD AUTO: 0.03 K/UL
BASOPHILS NFR BLD: 0.5 %
BILIRUB SERPL-MCNC: 0.4 MG/DL
BLD GP AB SCN CELLS X3 SERPL QL: NORMAL
BLD PROD TYP BPU: NORMAL
BLOOD UNIT EXPIRATION DATE: NORMAL
BLOOD UNIT TYPE CODE: 6200
BLOOD UNIT TYPE: NORMAL
BUN SERPL-MCNC: 17 MG/DL
BUN SERPL-MCNC: 17 MG/DL
CALCIUM SERPL-MCNC: 7.3 MG/DL
CALCIUM SERPL-MCNC: 7.5 MG/DL
CHLORIDE SERPL-SCNC: 104 MMOL/L
CHLORIDE SERPL-SCNC: 104 MMOL/L
CO2 SERPL-SCNC: 25 MMOL/L
CO2 SERPL-SCNC: 26 MMOL/L
CODING SYSTEM: NORMAL
CREAT SERPL-MCNC: 7 MG/DL
CREAT SERPL-MCNC: 7.3 MG/DL
DIFFERENTIAL METHOD: ABNORMAL
DISPENSE STATUS: NORMAL
EOSINOPHIL # BLD AUTO: 0.4 K/UL
EOSINOPHIL NFR BLD: 8 %
ERYTHROCYTE [DISTWIDTH] IN BLOOD BY AUTOMATED COUNT: 16.2 %
EST. GFR  (AFRICAN AMERICAN): 6.2 ML/MIN/1.73 M^2
EST. GFR  (AFRICAN AMERICAN): 6.5 ML/MIN/1.73 M^2
EST. GFR  (NON AFRICAN AMERICAN): 5.4 ML/MIN/1.73 M^2
EST. GFR  (NON AFRICAN AMERICAN): 5.6 ML/MIN/1.73 M^2
FERRITIN SERPL-MCNC: 734 NG/ML
GLUCOSE SERPL-MCNC: 84 MG/DL
GLUCOSE SERPL-MCNC: 87 MG/DL
HCT VFR BLD AUTO: 22 %
HGB BLD-MCNC: 6.7 G/DL
IMM GRANULOCYTES # BLD AUTO: 0.02 K/UL
IMM GRANULOCYTES NFR BLD AUTO: 0.4 %
INR PPP: 1.4
IRON SERPL-MCNC: 22 UG/DL
LYMPHOCYTES # BLD AUTO: 1.1 K/UL
LYMPHOCYTES NFR BLD: 20.4 %
MAGNESIUM SERPL-MCNC: 1.9 MG/DL
MAGNESIUM SERPL-MCNC: 1.9 MG/DL
MCH RBC QN AUTO: 27.8 PG
MCHC RBC AUTO-ENTMCNC: 30.5 G/DL
MCV RBC AUTO: 91 FL
MONOCYTES # BLD AUTO: 0.6 K/UL
MONOCYTES NFR BLD: 10 %
NEUTROPHILS # BLD AUTO: 3.3 K/UL
NEUTROPHILS NFR BLD: 60.7 %
NRBC BLD-RTO: 0 /100 WBC
PLATELET # BLD AUTO: 148 K/UL
PMV BLD AUTO: 11.9 FL
POTASSIUM SERPL-SCNC: 4.1 MMOL/L
POTASSIUM SERPL-SCNC: 4.4 MMOL/L
PROT SERPL-MCNC: 5.3 G/DL
PROTHROMBIN TIME: 13.8 SEC
RBC # BLD AUTO: 2.41 M/UL
RETICS/RBC NFR AUTO: 3.8 %
SATURATED IRON: 15 %
SODIUM SERPL-SCNC: 138 MMOL/L
SODIUM SERPL-SCNC: 138 MMOL/L
TOTAL IRON BINDING CAPACITY: 147 UG/DL
TRANS ERYTHROCYTES VOL PATIENT: NORMAL ML
TRANSFERRIN SERPL-MCNC: 99 MG/DL
WBC # BLD AUTO: 5.5 K/UL

## 2019-02-06 PROCEDURE — 83540 ASSAY OF IRON: CPT

## 2019-02-06 PROCEDURE — 25000003 PHARM REV CODE 250: Performed by: INTERNAL MEDICINE

## 2019-02-06 PROCEDURE — 99232 SBSQ HOSP IP/OBS MODERATE 35: CPT | Mod: ,,, | Performed by: INTERNAL MEDICINE

## 2019-02-06 PROCEDURE — 25000003 PHARM REV CODE 250: Performed by: NURSE PRACTITIONER

## 2019-02-06 PROCEDURE — 36415 COLL VENOUS BLD VENIPUNCTURE: CPT

## 2019-02-06 PROCEDURE — 90935 PR HEMODIALYSIS, ONE EVALUATION: ICD-10-PCS | Mod: ,,, | Performed by: NURSE PRACTITIONER

## 2019-02-06 PROCEDURE — 80048 BASIC METABOLIC PNL TOTAL CA: CPT

## 2019-02-06 PROCEDURE — 90935 HEMODIALYSIS ONE EVALUATION: CPT

## 2019-02-06 PROCEDURE — A4216 STERILE WATER/SALINE, 10 ML: HCPCS | Performed by: INTERNAL MEDICINE

## 2019-02-06 PROCEDURE — 99223 1ST HOSP IP/OBS HIGH 75: CPT | Mod: GC,,, | Performed by: INTERNAL MEDICINE

## 2019-02-06 PROCEDURE — 63600175 PHARM REV CODE 636 W HCPCS: Performed by: INTERNAL MEDICINE

## 2019-02-06 PROCEDURE — 99232 PR SUBSEQUENT HOSPITAL CARE,LEVL II: ICD-10-PCS | Mod: ,,, | Performed by: INTERNAL MEDICINE

## 2019-02-06 PROCEDURE — 83735 ASSAY OF MAGNESIUM: CPT

## 2019-02-06 PROCEDURE — 80053 COMPREHEN METABOLIC PANEL: CPT

## 2019-02-06 PROCEDURE — 83735 ASSAY OF MAGNESIUM: CPT | Mod: 91

## 2019-02-06 PROCEDURE — 25000003 PHARM REV CODE 250

## 2019-02-06 PROCEDURE — 90935 HEMODIALYSIS ONE EVALUATION: CPT | Mod: ,,, | Performed by: NURSE PRACTITIONER

## 2019-02-06 PROCEDURE — P9021 RED BLOOD CELLS UNIT: HCPCS

## 2019-02-06 PROCEDURE — 85025 COMPLETE CBC W/AUTO DIFF WBC: CPT

## 2019-02-06 PROCEDURE — 85045 AUTOMATED RETICULOCYTE COUNT: CPT

## 2019-02-06 PROCEDURE — 86902 BLOOD TYPE ANTIGEN DONOR EA: CPT

## 2019-02-06 PROCEDURE — 99223 PR INITIAL HOSPITAL CARE,LEVL III: ICD-10-PCS | Mod: GC,,, | Performed by: INTERNAL MEDICINE

## 2019-02-06 PROCEDURE — 93005 ELECTROCARDIOGRAM TRACING: CPT

## 2019-02-06 PROCEDURE — 27201040 HC RC 50 FILTER

## 2019-02-06 PROCEDURE — 93010 ELECTROCARDIOGRAM REPORT: CPT | Mod: ,,, | Performed by: INTERNAL MEDICINE

## 2019-02-06 PROCEDURE — 85610 PROTHROMBIN TIME: CPT

## 2019-02-06 PROCEDURE — 93010 EKG 12-LEAD: ICD-10-PCS | Mod: ,,, | Performed by: INTERNAL MEDICINE

## 2019-02-06 PROCEDURE — 82728 ASSAY OF FERRITIN: CPT

## 2019-02-06 PROCEDURE — 63600175 PHARM REV CODE 636 W HCPCS: Mod: JG | Performed by: NURSE PRACTITIONER

## 2019-02-06 PROCEDURE — 86850 RBC ANTIBODY SCREEN: CPT

## 2019-02-06 PROCEDURE — 20600001 HC STEP DOWN PRIVATE ROOM

## 2019-02-06 PROCEDURE — 86922 COMPATIBILITY TEST ANTIGLOB: CPT

## 2019-02-06 RX ORDER — LANOLIN ALCOHOL/MO/W.PET/CERES
800 CREAM (GRAM) TOPICAL ONCE
Status: COMPLETED | OUTPATIENT
Start: 2019-02-06 | End: 2019-02-06

## 2019-02-06 RX ORDER — SODIUM CHLORIDE 9 MG/ML
INJECTION, SOLUTION INTRAVENOUS ONCE
Status: COMPLETED | OUTPATIENT
Start: 2019-02-06 | End: 2019-02-06

## 2019-02-06 RX ORDER — SODIUM FERRIC GLUCONATE COMPLEX IN SUCROSE 12.5 MG/ML
125 INJECTION INTRAVENOUS
Status: DISCONTINUED | OUTPATIENT
Start: 2019-02-06 | End: 2019-02-06

## 2019-02-06 RX ADMIN — ERYTHROPOIETIN 8000 UNITS: 4000 INJECTION, SOLUTION INTRAVENOUS; SUBCUTANEOUS at 10:02

## 2019-02-06 RX ADMIN — PANTOPRAZOLE SODIUM 40 MG: 40 TABLET, DELAYED RELEASE ORAL at 06:02

## 2019-02-06 RX ADMIN — SODIUM CHLORIDE 350 ML: 0.9 INJECTION, SOLUTION INTRAVENOUS at 08:02

## 2019-02-06 RX ADMIN — RIOCIGUAT 2 MG: 2 TABLET, FILM COATED ORAL at 08:02

## 2019-02-06 RX ADMIN — MAGNESIUM OXIDE TAB 400 MG (241.3 MG ELEMENTAL MG) 800 MG: 400 (241.3 MG) TAB at 04:02

## 2019-02-06 RX ADMIN — DORZOLAMIDE HYDROCHLORIDE 1 DROP: 20 SOLUTION/ DROPS OPHTHALMIC at 12:02

## 2019-02-06 RX ADMIN — ATORVASTATIN CALCIUM 10 MG: 10 TABLET, FILM COATED ORAL at 11:02

## 2019-02-06 RX ADMIN — TIMOLOL MALEATE 1 DROP: 5 SOLUTION OPHTHALMIC at 12:02

## 2019-02-06 RX ADMIN — Medication 3 ML: at 01:02

## 2019-02-06 RX ADMIN — LATANOPROST 1 DROP: 50 SOLUTION OPHTHALMIC at 08:02

## 2019-02-06 RX ADMIN — SODIUM FERRIC GLUCONATE COMPLEX 125 MG: 12.5 INJECTION INTRAVENOUS at 11:02

## 2019-02-06 RX ADMIN — LEVOTHYROXINE SODIUM 100 MCG: 100 TABLET ORAL at 06:02

## 2019-02-06 RX ADMIN — Medication 3 ML: at 06:02

## 2019-02-06 RX ADMIN — PANTOPRAZOLE SODIUM 40 MG: 40 TABLET, DELAYED RELEASE ORAL at 04:02

## 2019-02-06 RX ADMIN — DORZOLAMIDE HYDROCHLORIDE 1 DROP: 20 SOLUTION/ DROPS OPHTHALMIC at 08:02

## 2019-02-06 RX ADMIN — TIMOLOL MALEATE 1 DROP: 5 SOLUTION OPHTHALMIC at 08:02

## 2019-02-06 RX ADMIN — Medication 3 ML: at 10:02

## 2019-02-06 RX ADMIN — RIOCIGUAT 2 MG: 2 TABLET, FILM COATED ORAL at 11:02

## 2019-02-06 RX ADMIN — SODIUM CHLORIDE 100 ML/HR: 0.9 INJECTION, SOLUTION INTRAVENOUS at 08:02

## 2019-02-06 NOTE — CONSULTS
Consult Note    Consults  SUBJECTIVE:     History of Present Illness:  Patient is a 63 y.o. female with past medical history of CHF, paroxysmal atrial fibrillation on Coumadin, CVA in 1987, COPD, CAD, ESRD on dialysis presented to emergency room with bright red blood per rectum started on February 1, 2019, with a hemoglobin of 4.3    Patient had multiple admissions in the last year with GI bleed, going back to February 2018, June 2018, September 2018, January 2019 and February 2019.  As per patient the GI bleed started after she was started on Coumadin for paroxysmal atrial fibrillation 3 years ago and her GI bleed improves her results after she was off Coumadin for a while.  As per patient during her previous admissions she gets multiple transfusions and whole GI workup including colonoscopy, EGD and capsule endoscopy have been done in the past without revealing any source of active bleeding so far.  During her last admission with melena from January 12th to January 18th she received 6 units of PRBC, and EGD, small-bowel enteroscopy reveal normal stomach, esophagus nonbleeding erosive gastropathy, video capsule endoscopy revealed no obvious obvious source, erosive gastropathy, gastric polyp, small-bowel lymphangiectasia.  Colonoscopy revealed internal hemorrhoids with a 5 mm polyp in the sigmoid colon.  She was discharged with a hemoglobin of 7.8.  However patient continued to take her Coumadin for her atrial fibrillation.    Patient currently denied of any bright red blood per rectum, melena, abdominal pain, nausea, vomiting, fever, chills, chest pain or palpitations.      Review of patient's allergies indicates:   Allergen Reactions    Penicillins Swelling    Iodine      Other reaction(s): Hives    Sulfamethoxazole-trimethoprim      Other reaction(s): Swelling  Other reaction(s): Hives     Past Medical History:   Diagnosis Date    Allergy     Anemia     Anticoagulant long-term use     4 years coumadin, asa     Arthritis     Awaiting organ transplant 2013    Cataract     Central serous chorioretinopathy of eye, right 2018    CHF (congestive heart failure)     Chronic kidney disease     Colon polyps     COPD (chronic obstructive pulmonary disease)     Coronary artery disease     Diabetes mellitus     Diabetic retinopathy     Diverticulosis     Encounter for blood transfusion     ESRD from HTN strtied RRT 1999    Failed  donor kidney transplant      Glaucoma     History of bleeding peptic ulcer      as stated per pt    Hyperlipidemia     Hypertension     Hypothyroidism     Morbid obesity 8/10/2012    Renal hypertension     Stroke          Past Surgical History:   Procedure Laterality Date    CATARACT EXTRACTION W/  INTRAOCULAR LENS IMPLANT Bilateral     COLON SURGERY      COLONOSCOPY      COLONOSCOPY N/A 2019    Performed by [facility]an [facility] at [facility] (2ND FLR)    COLONOSCOPY N/A 2018    Performed by Jose Falk MD at [facility] (2ND FLR)    EGD (ESOPHAGOGASTRODUODENOSCOPY) N/A 2019    Performed by Miranda Maradiaga MD at [facility] (2ND FLR)    EGD (ESOPHAGOGASTRODUODENOSCOPY) N/A 2018    Performed by Luis Washington MD at [facility] (2ND FLR)    ESOPHAGOGASTRODUODENOSCOPY (EGD) N/A 2018    Performed by Kenji Desir MD at [facility] (2ND FLR)    EYE SURGERY      FRACTURE SURGERY      R arm    HERNIA REPAIR      HYSTERECTOMY      INSERTION-IMPLANT-GLAUCOMA Left 10/12/2017    Performed by Junaid Kern MD at [facility] OR 1ST FLR    KIDNEY TRANSPLANT      NEPHRECTOMY  2008    transplant     PARATHYROID GLAND SURGERY      TONSILLECTOMY      UPPER GASTROINTESTINAL ENDOSCOPY       Family History   Problem Relation Age of Onset    Heart attack Father     Heart failure Father     Hypertension Father     Blindness Father     Heart attack Mother     Heart failure Mother     Hypertension Mother     Asthma  Sister     Depression Sister     Hypertension Sister     Hypertension Brother     Kidney disease Brother         ESRD    Diabetes Maternal Aunt     Breast cancer Maternal Aunt     Amblyopia Neg Hx     Cataracts Neg Hx     Macular degeneration Neg Hx     Retinal detachment Neg Hx     Strabismus Neg Hx     Colon cancer Neg Hx     Esophageal cancer Neg Hx      Social History     Tobacco Use    Smoking status: Former Smoker     Types: Cigarettes     Last attempt to quit: 8/10/2000     Years since quittin.5    Smokeless tobacco: Never Used   Substance Use Topics    Alcohol use: No     Comment: hx of etoh     Drug use: No     Review of Systems   Constitutional: Positive for malaise/fatigue. Negative for chills, fever and weight loss.   HENT: Negative for nosebleeds.    Respiratory: Negative for cough and shortness of breath.    Cardiovascular: Negative for chest pain, palpitations and leg swelling.   Gastrointestinal: Negative for abdominal pain, nausea and vomiting.   Genitourinary: Negative for frequency and urgency.   Musculoskeletal: Negative for back pain and neck pain.   Neurological: Negative for sensory change, seizures and headaches.   Psychiatric/Behavioral: Negative for depression.     OBJECTIVE:     Vital Signs:  Temp:  [98.1 °F (36.7 °C)-98.6 °F (37 °C)]   Pulse:  []   Resp:  [18-20]   BP: (113-179)/(53-95)   SpO2:  [96 %-100 %]     Physical Exam   Constitutional: She is oriented to person, place, and time. She appears well-developed.   obese female sitting in bed   HENT:   Head: Normocephalic and atraumatic.   Eyes: EOM are normal. Pupils are equal, round, and reactive to light. No scleral icterus.   Neck: Normal range of motion. Neck supple.   Cardiovascular: Normal rate.   Murmur heard.  Pulmonary/Chest: She has rales.   Abdominal: Soft. Bowel sounds are normal. She exhibits no distension. There is no tenderness.   Musculoskeletal: Normal range of motion. She exhibits edema. She  exhibits no tenderness or deformity.   Neurological: She is alert and oriented to person, place, and time.   Skin: Skin is warm and dry. No rash noted. No erythema.     Laboratory:  CBC:   Recent Labs   Lab 02/06/19 0619   WBC 5.50   RBC 2.41*   HGB 6.7*   HCT 22.0*   *   MCV 91   MCH 27.8   MCHC 30.5*     CMP:   Recent Labs   Lab 02/06/19 0619   GLU 84   CALCIUM 7.3*   ALBUMIN 2.1*   PROT 5.3*      K 4.1   CO2 26      BUN 17   CREATININE 7.3*   ALKPHOS 43*   ALT 10   AST 15   BILITOT 0.4     Coagulation:   Recent Labs   Lab 02/02/19  1342  02/06/19 0619   LABPROT 31.6*   < > 13.8*   INR 3.2*   < > 1.4*   APTT 35.4*  --   --     < > = values in this interval not displayed.         Diagnostic Results:  CXR  Findings suggesting pulmonary edema/CHF pattern, slightly worse from 01/13/2019 exam, without focal consolidation.    ASSESSMENT/PLAN:     1.  Anemia of chronic disease.  She iss ESRD patient on hemodialysis.  She has low serum iron, low TIBC and a high ferritin.    2.  Anemia likely secondary to GI bleed. She was admitted with a hemoglobin of 4.3 with a normal MCV an active GI bleed.  Her issue with the GI bleeds have started after she was placed on Coumadin for atrial fibrillation.  She has EGD, colonoscopy, small-bowel enteroscopy, video capsule endoscopy which could not identify a source of active GI bleed.   Her WBC and platelets have been stable in the low hemoglobin is more likely from an upper GI bleed and less likely from a bone marrow issue at this point of time.     3.  Atrial fibrillation on Coumadin  4.  ESRD on hemodialysis  5.  CAD    Plan:     1.  Would like to get the myeloma and hemolytic labs even though the suspicion is low at this point of time.  Labs ordered for tomorrow morning.   2. We would like to defer bone marrow biopsy for now.  Her anemia is more likely from end-stage renal disease and GI bleed  3.  In view of recurrent GI bleeds on Coumadin, looking at the  benefit versus risk ratio we recommend to hold off on Coumadin, however we will defer that decision to cardiology.  4.  Transfuse to keep hemoglobin over 7    Plan of care discussed with Dr. Gwendolyn Montilla  PGY 4, hematology/oncology

## 2019-02-06 NOTE — PROGRESS NOTES
ADITI Patel NP notified HR 90's to 150's. BP stable. Pt with no c/o at present. EKG ordered. OK per ADITI Patel NP to start blood transfusion.

## 2019-02-06 NOTE — PROGRESS NOTES
[facility]  Heart Transplant  Progress Note    Patient Name: Shivani Sheets  MRN: [id number]  Admission Date: 2/2/2019  Hospital Length of Stay: 4 days  Attending Physician: Gera Jordan Jr.,*  Primary Care Provider: Eula Weiss MD  Principal Problem:Acute blood loss anemia    Subjective:     Interval History: No acute events overnight. Hgb dropped again below 7. Given an additional 1U pRBCs. Tolerated hemodialysis today. Eating and drinking well.  Hem/Onc consulted placed for evaluation of anemia.     Continuous Infusions:  Scheduled Meds:   sodium chloride 0.9%   Intravenous Once    atorvastatin  10 mg Oral Daily    dorzolamide  1 drop Both Eyes BID    epoetin constanza (PROCRIT) injection  8,000 Units Intravenous Every Mon, Wed, Fri    ergocalciferol  50,000 Units Oral Q7 Days    ferric gluconate (FERRLECIT) IVPB  125 mg Intravenous Every Mon, Wed, Fri    latanoprost  1 drop Both Eyes QHS    levothyroxine  100 mcg Oral Before breakfast    pantoprazole  40 mg Oral BID AC    riociguat  2 mg Oral TID    selexipag  1,000 mcg Oral BID    sodium chloride 0.9%  3 mL Intravenous Q8H    timolol maleate 0.5%  1 drop Both Eyes BID     PRN Meds:sodium chloride 0.9%, acetaminophen, dextrose 50%, dextrose 50%, glucagon (human recombinant), glucose, glucose, ondansetron, sodium chloride 0.9%, sodium chloride 0.9%    Review of patient's allergies indicates:   Allergen Reactions    Penicillins Swelling    Iodine      Other reaction(s): Hives    Sulfamethoxazole-trimethoprim      Other reaction(s): Swelling  Other reaction(s): Hives     Objective:     Vital Signs (Most Recent):  Temp: 98.6 °F (37 °C) (02/06/19 1045)  Pulse: 102 (02/06/19 1045)  Resp: 18 (02/06/19 1045)  BP: (!) 164/78 (02/06/19 1045)  SpO2: 96 % (02/06/19 0720) Vital Signs (24h Range):  Temp:  [98.1 °F (36.7 °C)-98.8 °F (37.1 °C)] 98.6 °F (37 °C)  Pulse:  [] 102  Resp:  [18-20] 18  SpO2:  [96 %-100 %] 96 %  BP:  (113-179)/(53-95) 164/78     Patient Vitals for the past 72 hrs (Last 3 readings):   Weight   02/06/19 0500 75.6 kg (166 lb 10.7 oz)   02/05/19 0500 77 kg (169 lb 12.1 oz)     Body mass index is 31.49 kg/m².      Intake/Output Summary (Last 24 hours) at 2/6/2019 1136  Last data filed at 2/6/2019 0900  Gross per 24 hour   Intake 511 ml   Output --   Net 511 ml       Hemodynamic Parameters:       Telemetry: Reviewed    Physical Exam   Constitutional: She is oriented to person, place, and time. She appears well-developed and well-nourished.   HENT:   Head: Normocephalic and atraumatic.   Eyes: EOM are normal. Pupils are equal, round, and reactive to light.   Neck: Normal range of motion. No JVD present.   Cardiovascular: Normal rate, regular rhythm, normal heart sounds and intact distal pulses. Exam reveals no gallop and no friction rub.   No murmur heard.  Pulmonary/Chest: Effort normal and breath sounds normal. No stridor. No respiratory distress. She has no wheezes. She has no rales. She exhibits no tenderness.   Abdominal: Soft. Bowel sounds are normal. She exhibits no distension and no mass. There is no tenderness. There is no guarding.   Musculoskeletal: She exhibits no edema.   Neurological: She is alert and oriented to person, place, and time.   Skin: Skin is warm and dry.   Psychiatric: She has a normal mood and affect.   Vitals reviewed.      Significant Labs:  CBC:  Recent Labs   Lab 02/04/19  1511 02/04/19  1600 02/06/19  0619   WBC 7.89 4.95 5.50   RBC 3.14* 2.67* 2.41*   HGB 9.1* 7.6* 6.7*   HCT 28.0* 23.9* 22.0*   * 136* 148*   MCV 89 90 91   MCH 29.0 28.5 27.8   MCHC 32.5 31.8* 30.5*     BNP:  Recent Labs   Lab 02/03/19  0624   BNP 1,584*     CMP:  Recent Labs   Lab 02/04/19  0852 02/05/19  0401 02/06/19  0202 02/06/19  0619   GLU 79  78 64* 87 84   CALCIUM 6.6*  6.6* 7.8* 7.5* 7.3*   ALBUMIN 2.2*  2.2* 2.1*  --  2.1*   PROT 5.2* 5.2*  --  5.3*     137 138 138 138   K 3.9  3.9 4.2 4.4  4.1   CO2 28  28 27 25 26   CL 98  99 105 104 104   BUN 29*  28* 11 17 17   CREATININE 8.7*  8.5* 4.7* 7.0* 7.3*   ALKPHOS 40* 47*  --  43*   ALT 7* 8*  --  10   AST 11 16  --  15   BILITOT 0.4 0.4  --  0.4      Coagulation:   Recent Labs   Lab 02/02/19  1342 02/03/19  0624 02/04/19  0913 02/06/19  0619   INR 3.2* 3.0* 2.8*  2.8* 1.4*   APTT 35.4*  --   --   --      LDH:  No results for input(s): LDH in the last 72 hours.  Microbiology:  Microbiology Results (last 7 days)     ** No results found for the last 168 hours. **          I have reviewed all pertinent labs within the past 24 hours.    Estimated Creatinine Clearance: 7.1 mL/min (A) (based on SCr of 7.3 mg/dL (H)).    Diagnostic Results:  CT: No results found in the last 24 hours.  CXR: No results found in the last 24 hours.  U/S: No results found in the last 24 hours.  I have reviewed and interpreted all pertinent imaging results/findings within the past 24 hours.    Assessment and Plan:     62 yo F w/ PMHx  pulmonary HTN (on adempas/selexipeg), diastolic dysfunction, pAF(on coumadin), CVA 1987, COPD on 2 litre nocturnal oxygen, central retinal artery occlusion without significant carotid artery stenosis, CAD with remote PCI, ESRD(LUE AVF) secondary to HTN, s/p failed kidney transplant, PUD, STEFFANY on CPAP, hypothyroidism, RA, HLD and obesity who presents with rectal bleeding BRBPR which started 2/1/19 (diarrhea with blood clots - 4-5 episodes ) and anemia with Hb of 4.3 ( 7.8 on 1/18).     She was feeling lightheaded on getting up. Last admission had bill. Had some irregular HR occasionally. One episode of vomiting and some nausea.  She finished course of doxycyline last week for cellulitis of her leg from IO access, now swelling reduced. After discharge on 1/18 she did not have any episodes since yesterday. Had HD on 2/1/2019. Took 5 mg coumadin yesterday. No chest pain, SOB, leg swelling, PND, orthopnea, syncope, F/C, phlegm, abdominal pain, bruising  or bleeding from other sites.    In ED had low blood pressures (98/51 mmHg), but no oxygen requirement and Hgb 4.3. Seen by CCU- no ICU needs at this time. Nephrology saw - will transfuse with HD in am. GI evaluated- Recommended NPO past midnight and CTA if actively bleeds (last BRBPR in the morning).     Last admission : 1/12- 1/18: Melena received 6  Units PBRC and 2 units FFP. Had EGD, SBE: normal stomach, esophagus non bleeding erosive gastropathy. VCE: No obvious source, erosive gastropathy, gastric polyp and SB lymphangiectasia. Discharged with hb of 7.8 and INR 1.7. Discontinued aspirin.   Had recurrent admissions for GI bleed with no obvious source- Admit 9/2018, 6/2018, 2/2018: Colonoscopy : Internal hemorrhoids, 5 mm polyp in sigmoid colon.    11/2018: TTE: LVEF 55%, LA severely enlarged, moderate TR, RV moderately dilated, RVSF mild decreased, PASP -99, severe pulmonary hypertension.   PET stress: No evidence of ischemia at rest and stress          * Anemia of chronic disease    - multiple recurrent GI bleed with no obvious source identified admitted with BRBPR  - Hb of 4.3 from 7.8 on 1/18  - Protonix bid  - ASA on hold from last admission  - 1/14 to 1/15:Had EGD, SBE: normal stomach, esophagus non bleeding erosive gastropathy.   VCE: No obvious source, erosive gastropathy, gastric polyp and SB lymphangiectasia.   Had recurrent admissions for GI bleed with no obvious source- Admit 9/2018, 6/2018, 2/2018: Colonoscopy : Internal hemorrhoids, 5 mm polyp in sigmoid colon.  - Colonoscopy 2/4/18- unremarkable  - Holding coumadin. Will consider resuming as an outpatient.   - Retic count is low and normal colonoscopy. Consult placed to Hem/Onc to evaluate for alternate causes of anemia. Possible need for bone marrow biopsy.  - Started on IV iron and Epo- per Nephrology  - Possible her anemia is related to BM suppression from PH medications     Controlled type 2 diabetes mellitus with both eyes affected by  proliferative retinopathy and macular edema, without long-term current use of insulin    - HbAIC of 5.1     Atrial fibrillation    - Paroxysmal  - Holding coumadin. Will consider resuming as an outpatient.   - Consider EP referral upon discharge for Watchman device     Pulmonary HTN    - Will continue with adempas 2mg TID  - pt brought her selexipeg with her.  - Last TTE 11/2018: LVEF 55%, RVSF mildly decreased, PASP 99, moderate TR     ESRD from HTN started RRT 1999    - Nephrology on board  - Hemodialysis MWF     CAD (coronary artery disease)    - Aspirin on hold  - C/W statin  - PET stress test from 2018 nonischemic         Anatoliy Hartmann MD  Heart Transplant  [facility]

## 2019-02-06 NOTE — PHYSICIAN QUERY
PT Name: Shivani Sheets  MR #: [id number]    Physician Query Form - CardioPulmonary Clarification      CDS/: Rosa Graff RN, CCDS             Contact information: [email or web address]    This form is a permanent document in the medical record.    Query Date: February 6, 2019    By submitting this query, we are merely seeking further clarification of documentation. Please utilize your independent clinical judgment when addressing the question(s) below.    The Medical record contains the following:   Indicators   Supporting Clinical Findings Location in Medical Record   X Pulmonary Hypertension documented pulmonary HTN on adempas/uptravi, 2/2 h/p   X Acute/Chronic Illness  ABLA,GIB, HFpEF, pAF(on coumadin),   ESRD, s/p failed kidney transplant, PUD, STEFFANY on CPAP, hypothyroidism, RA, HLD and obesity    2/2 h/p   X Echo and/or Heart Cath Findings   11/2018: TTE: LVEF 55%, LA severely enlarged, moderate TR, RV moderately dilated, RVSF mild decreased, PASP -99, severe pulmonary hypertension.      2/5 hts note   X BiPAP/Intubation/Supplemental O2 STEFFANY on 2L O2 with sleep,      2/2 cc note    SOB, MORA, Fatigue, Dizziness, LE Edema, Cyanosis, Chest Pain, Respiratory Distress, Hypoxia, etc.      Treatment         Medication      Other     Provider, please specify the type of pulmonary hypertension:  [   ] Group 1:  Pulmonary Arterial Hypertension - includes Primary, Idiopathic, Inheritable, and Secondary (due to drugs, toxins, congenital heart diease, HIV infection, etc.)     [   ] Group 5:  Pulmonary Hypertension due to other, multifactorial, or unclear mechanisms     [  xxx ] Pulmonary Hypertension, unspecified     [   ] Other Cardiopulmonary Condition (please specify):     [  ] Clinically Undetermined         Please document in your progress notes daily for the duration of treatment, until resolved, and include in your discharge summary.

## 2019-02-06 NOTE — PROGRESS NOTES
Arrived to dialysis via stretcher. AAO x 4. NAD noted. Agreed to 3.5 hour Tx. Successful cannulation x 2 needles x 1 attempt to ABEL AVF. Attempting to remove 2L.

## 2019-02-06 NOTE — PLAN OF CARE
Problem: Adult Inpatient Plan of Care  Goal: Plan of Care Review  Outcome: Ongoing (interventions implemented as appropriate)  Pt AAOx4, afebrile, free of falls. Pt ambulates independently. Pt has family member at bedside throughout shift. Pt remained on 2L NC, PRN overnight. Denies pain/distress. 15 beats of v-tach, MD Jennifer notified. Labs drawn and Mg replaced PO. HD today

## 2019-02-06 NOTE — PROGRESS NOTES
[facility] NOTE     Patient currently on hemodialysis for removal of uremic toxins and volume.     Patient seen and evaluated on hemodialysis, tolerating treatment, see HD flowsheet for vitals and assessments.      Ultrafiltration goal is 2L     Labs have been reviewed and the dialysate bath has been adjusted.     Assessment/Plan:  Seen on dialysis this morning, voiced no complaints.  Reported to have episodes of tachyarrythmias on HD, with HR fluctuating between 90's-140's, non-sustained.  EKG performed revealing sinus tach with PVCs.  She reports to me that she has not been receiving her Diltiazem since being admitted. Discussed with HTS about findings.  -1 unit of PRBC on HD today  -TSAT low @ 15.  Will start on Ferrlecit 125 mg IV x 5 doses q MWF.   -will start on SHINE with dialysis today    REYES Hess, AJ-BC  Nephrology  Pager:  [phone]

## 2019-02-06 NOTE — ASSESSMENT & PLAN NOTE
- multiple recurrent GI bleed with no obvious source identified admitted with BRBPR  - Hb of 4.3 from 7.8 on 1/18  - Protonix bid  - ASA on hold from last admission  - 1/14 to 1/15:Had EGD, SBE: normal stomach, esophagus non bleeding erosive gastropathy.   VCE: No obvious source, erosive gastropathy, gastric polyp and SB lymphangiectasia.   Had recurrent admissions for GI bleed with no obvious source- Admit 9/2018, 6/2018, 2/2018: Colonoscopy : Internal hemorrhoids, 5 mm polyp in sigmoid colon.  - Colonoscopy 2/4/18- unremarkable  - Holding coumadin. Will consider resuming as an outpatient.   - Retic count is low and normal colonoscopy. Consult placed to Hem/Onc to evaluate for alternate causes of anemia. Possible need for bone marrow biopsy.  - Started on IV iron and Epo- per Nephrology  - Possible her anemia is related to BM suppression from PH medications

## 2019-02-06 NOTE — SUBJECTIVE & OBJECTIVE
Interval History: No acute events overnight. Hgb dropped again below 7. Given an additional 1U pRBCs. Tolerated hemodialysis today. Eating and drinking well.  Hem/Onc consulted placed for evaluation of anemia.     Continuous Infusions:  Scheduled Meds:   sodium chloride 0.9%   Intravenous Once    atorvastatin  10 mg Oral Daily    dorzolamide  1 drop Both Eyes BID    epoetin constanza (PROCRIT) injection  8,000 Units Intravenous Every Mon, Wed, Fri    ergocalciferol  50,000 Units Oral Q7 Days    ferric gluconate (FERRLECIT) IVPB  125 mg Intravenous Every Mon, Wed, Fri    latanoprost  1 drop Both Eyes QHS    levothyroxine  100 mcg Oral Before breakfast    pantoprazole  40 mg Oral BID AC    riociguat  2 mg Oral TID    selexipag  1,000 mcg Oral BID    sodium chloride 0.9%  3 mL Intravenous Q8H    timolol maleate 0.5%  1 drop Both Eyes BID     PRN Meds:sodium chloride 0.9%, acetaminophen, dextrose 50%, dextrose 50%, glucagon (human recombinant), glucose, glucose, ondansetron, sodium chloride 0.9%, sodium chloride 0.9%    Review of patient's allergies indicates:   Allergen Reactions    Penicillins Swelling    Iodine      Other reaction(s): Hives    Sulfamethoxazole-trimethoprim      Other reaction(s): Swelling  Other reaction(s): Hives     Objective:     Vital Signs (Most Recent):  Temp: 98.6 °F (37 °C) (02/06/19 1045)  Pulse: 102 (02/06/19 1045)  Resp: 18 (02/06/19 1045)  BP: (!) 164/78 (02/06/19 1045)  SpO2: 96 % (02/06/19 0720) Vital Signs (24h Range):  Temp:  [98.1 °F (36.7 °C)-98.8 °F (37.1 °C)] 98.6 °F (37 °C)  Pulse:  [] 102  Resp:  [18-20] 18  SpO2:  [96 %-100 %] 96 %  BP: (113-179)/(53-95) 164/78     Patient Vitals for the past 72 hrs (Last 3 readings):   Weight   02/06/19 0500 75.6 kg (166 lb 10.7 oz)   02/05/19 0500 77 kg (169 lb 12.1 oz)     Body mass index is 31.49 kg/m².      Intake/Output Summary (Last 24 hours) at 2/6/2019 1136  Last data filed at 2/6/2019 0900  Gross per 24 hour   Intake  511 ml   Output --   Net 511 ml       Hemodynamic Parameters:       Telemetry: Reviewed    Physical Exam   Constitutional: She is oriented to person, place, and time. She appears well-developed and well-nourished.   HENT:   Head: Normocephalic and atraumatic.   Eyes: EOM are normal. Pupils are equal, round, and reactive to light.   Neck: Normal range of motion. No JVD present.   Cardiovascular: Normal rate, regular rhythm, normal heart sounds and intact distal pulses. Exam reveals no gallop and no friction rub.   No murmur heard.  Pulmonary/Chest: Effort normal and breath sounds normal. No stridor. No respiratory distress. She has no wheezes. She has no rales. She exhibits no tenderness.   Abdominal: Soft. Bowel sounds are normal. She exhibits no distension and no mass. There is no tenderness. There is no guarding.   Musculoskeletal: She exhibits no edema.   Neurological: She is alert and oriented to person, place, and time.   Skin: Skin is warm and dry.   Psychiatric: She has a normal mood and affect.   Vitals reviewed.      Significant Labs:  CBC:  Recent Labs   Lab 02/04/19  1511 02/04/19  1600 02/06/19  0619   WBC 7.89 4.95 5.50   RBC 3.14* 2.67* 2.41*   HGB 9.1* 7.6* 6.7*   HCT 28.0* 23.9* 22.0*   * 136* 148*   MCV 89 90 91   MCH 29.0 28.5 27.8   MCHC 32.5 31.8* 30.5*     BNP:  Recent Labs   Lab 02/03/19  0624   BNP 1,584*     CMP:  Recent Labs   Lab 02/04/19  0852 02/05/19  0401 02/06/19  0202 02/06/19  0619   GLU 79  78 64* 87 84   CALCIUM 6.6*  6.6* 7.8* 7.5* 7.3*   ALBUMIN 2.2*  2.2* 2.1*  --  2.1*   PROT 5.2* 5.2*  --  5.3*     137 138 138 138   K 3.9  3.9 4.2 4.4 4.1   CO2 28  28 27 25 26   CL 98  99 105 104 104   BUN 29*  28* 11 17 17   CREATININE 8.7*  8.5* 4.7* 7.0* 7.3*   ALKPHOS 40* 47*  --  43*   ALT 7* 8*  --  10   AST 11 16  --  15   BILITOT 0.4 0.4  --  0.4      Coagulation:   Recent Labs   Lab 02/02/19  1342 02/03/19  0624 02/04/19  0913 02/06/19  0619   INR 3.2* 3.0* 2.8*   2.8* 1.4*   APTT 35.4*  --   --   --      LDH:  No results for input(s): LDH in the last 72 hours.  Microbiology:  Microbiology Results (last 7 days)     ** No results found for the last 168 hours. **          I have reviewed all pertinent labs within the past 24 hours.    Estimated Creatinine Clearance: 7.1 mL/min (A) (based on SCr of 7.3 mg/dL (H)).    Diagnostic Results:  CT: No results found in the last 24 hours.  CXR: No results found in the last 24 hours.  U/S: No results found in the last 24 hours.  I have reviewed and interpreted all pertinent imaging results/findings within the past 24 hours.

## 2019-02-06 NOTE — PROGRESS NOTES
Tx complete. NAD noted. AAO x 4. Tolerated. Removed 1.8L.  Removed 2 needles from ABEL AVF. Pressure applied until hemostasis. Gauze applied. 1 unit of  PRBCs given during Tx.

## 2019-02-06 NOTE — TREATMENT PLAN
Treatment Plan  02/05/2019  8:43 PM    No CBC today but no bloody bowel movements.   At this point recommend monitoring her H/H and monitoring for overt signs of bleeding.  If continued bleeding please contact GI team for additional recommendations.  We thank you for this consult at this time we will sign off.    Sergey Hoffman M.D.  Gastroenterology Fellow, PGY-V  Pager: [phone]  [facility]

## 2019-02-07 PROBLEM — Z51.5 PALLIATIVE CARE ENCOUNTER: Status: ACTIVE | Noted: 2019-02-07

## 2019-02-07 LAB
ALBUMIN SERPL BCP-MCNC: 2.3 G/DL
ALP SERPL-CCNC: 45 U/L
ALT SERPL W/O P-5'-P-CCNC: 9 U/L
ANION GAP SERPL CALC-SCNC: 3 MMOL/L
ANION GAP SERPL CALC-SCNC: 4 MMOL/L
ASCENDING AORTA: 2.84 CM
AST SERPL-CCNC: 17 U/L
AV INDEX (PROSTH): 0.62
AV MEAN GRADIENT: 7.43 MMHG
AV PEAK GRADIENT: 14.14 MMHG
AV VALVE AREA: 1.98 CM2
AV VELOCITY RATIO: 0.6
BASOPHILS # BLD AUTO: 0.05 K/UL
BASOPHILS NFR BLD: 0.9 %
BILIRUB SERPL-MCNC: 0.5 MG/DL
BSA FOR ECHO PROCEDURE: 1.86 M2
BUN SERPL-MCNC: 10 MG/DL
BUN SERPL-MCNC: 11 MG/DL
CALCIUM SERPL-MCNC: 7.3 MG/DL
CALCIUM SERPL-MCNC: 7.4 MG/DL
CHLORIDE SERPL-SCNC: 103 MMOL/L
CHLORIDE SERPL-SCNC: 104 MMOL/L
CHOLEST SERPL-MCNC: 114 MG/DL
CHOLEST/HDLC SERPL: 2 {RATIO}
CO2 SERPL-SCNC: 27 MMOL/L
CO2 SERPL-SCNC: 29 MMOL/L
CREAT SERPL-MCNC: 5.1 MG/DL
CREAT SERPL-MCNC: 5.7 MG/DL
CV ECHO LV RWT: 0.47 CM
DIFFERENTIAL METHOD: ABNORMAL
DOP CALC AO PEAK VEL: 1.88 M/S
DOP CALC AO VTI: 33.94 CM
DOP CALC LVOT AREA: 3.2 CM2
DOP CALC LVOT DIAMETER: 2.02 CM
DOP CALC LVOT PEAK VEL: 1.14 M/S
DOP CALC LVOT STROKE VOLUME: 67.07 CM3
DOP CALCLVOT PEAK VEL VTI: 20.94 CM
E WAVE DECELERATION TIME: 248.29 MSEC
E/A RATIO: 1.21
E/E' RATIO: 14.4
ECHO LV POSTERIOR WALL: 1.11 CM (ref 0.6–1.1)
EOSINOPHIL # BLD AUTO: 0.4 K/UL
EOSINOPHIL NFR BLD: 6.8 %
ERYTHROCYTE [DISTWIDTH] IN BLOOD BY AUTOMATED COUNT: 16 %
EST. GFR  (AFRICAN AMERICAN): 8.3 ML/MIN/1.73 M^2
EST. GFR  (AFRICAN AMERICAN): 9.5 ML/MIN/1.73 M^2
EST. GFR  (NON AFRICAN AMERICAN): 7.2 ML/MIN/1.73 M^2
EST. GFR  (NON AFRICAN AMERICAN): 8.3 ML/MIN/1.73 M^2
FRACTIONAL SHORTENING: 17 % (ref 28–44)
GLUCOSE SERPL-MCNC: 78 MG/DL
GLUCOSE SERPL-MCNC: 87 MG/DL
HCT VFR BLD AUTO: 29.5 %
HDLC SERPL-MCNC: 56 MG/DL
HDLC SERPL: 49.1 %
HGB BLD-MCNC: 9 G/DL
IGA SERPL-MCNC: 251 MG/DL
IGG SERPL-MCNC: 1128 MG/DL
IGM SERPL-MCNC: 91 MG/DL
IMM GRANULOCYTES # BLD AUTO: 0.02 K/UL
IMM GRANULOCYTES NFR BLD AUTO: 0.3 %
INR PPP: 1.1
INTERVENTRICULAR SEPTUM: 0.82 CM (ref 0.6–1.1)
LA MAJOR: 6.1 CM
LA MINOR: 5.77 CM
LA WIDTH: 5.16 CM
LDH SERPL L TO P-CCNC: 240 U/L
LDLC SERPL CALC-MCNC: 40.8 MG/DL
LEFT ATRIUM SIZE: 4.45 CM
LEFT ATRIUM VOLUME INDEX: 64.2 ML/M2
LEFT ATRIUM VOLUME: 115.75 CM3
LEFT INTERNAL DIMENSION IN SYSTOLE: 3.95 CM (ref 2.1–4)
LEFT VENTRICLE DIASTOLIC VOLUME INDEX: 58.54 ML/M2
LEFT VENTRICLE DIASTOLIC VOLUME: 105.47 ML
LEFT VENTRICLE MASS INDEX: 88.8 G/M2
LEFT VENTRICLE SYSTOLIC VOLUME INDEX: 37.8 ML/M2
LEFT VENTRICLE SYSTOLIC VOLUME: 68.06 ML
LEFT VENTRICULAR INTERNAL DIMENSION IN DIASTOLE: 4.76 CM (ref 3.5–6)
LEFT VENTRICULAR MASS: 159.98 G
LV LATERAL E/E' RATIO: 10.8
LV SEPTAL E/E' RATIO: 21.6
LYMPHOCYTES # BLD AUTO: 1.1 K/UL
LYMPHOCYTES NFR BLD: 18.5 %
MAGNESIUM SERPL-MCNC: 1.9 MG/DL
MAGNESIUM SERPL-MCNC: 2.2 MG/DL
MCH RBC QN AUTO: 28.7 PG
MCHC RBC AUTO-ENTMCNC: 30.5 G/DL
MCV RBC AUTO: 94 FL
MONOCYTES # BLD AUTO: 0.6 K/UL
MONOCYTES NFR BLD: 9.4 %
MV PEAK A VEL: 0.89 M/S
MV PEAK E VEL: 1.08 M/S
NEUTROPHILS # BLD AUTO: 3.8 K/UL
NEUTROPHILS NFR BLD: 64.1 %
NONHDLC SERPL-MCNC: 58 MG/DL
NRBC BLD-RTO: 0 /100 WBC
PISA TR MAX VEL: 4.46 M/S
PLATELET # BLD AUTO: 154 K/UL
PMV BLD AUTO: 12.4 FL
POTASSIUM SERPL-SCNC: 3.6 MMOL/L
POTASSIUM SERPL-SCNC: 4 MMOL/L
PROT SERPL-MCNC: 5.5 G/DL
PROTHROMBIN TIME: 11.2 SEC
RA MAJOR: 5.76 CM
RA PRESSURE: 8 MMHG
RA WIDTH: 4.27 CM
RBC # BLD AUTO: 3.14 M/UL
RIGHT VENTRICULAR END-DIASTOLIC DIMENSION: 3.08 CM
SINUS: 2.85 CM
SODIUM SERPL-SCNC: 135 MMOL/L
SODIUM SERPL-SCNC: 135 MMOL/L
STJ: 2.75 CM
TDI LATERAL: 0.1
TDI SEPTAL: 0.05
TDI: 0.08
TR MAX PG: 79.57 MMHG
TRICUSPID ANNULAR PLANE SYSTOLIC EXCURSION: 2.24 CM
TRIGL SERPL-MCNC: 86 MG/DL
TV REST PULMONARY ARTERY PRESSURE: 88 MMHG
URATE SERPL-MCNC: 3.3 MG/DL
WBC # BLD AUTO: 5.85 K/UL

## 2019-02-07 PROCEDURE — 25000003 PHARM REV CODE 250

## 2019-02-07 PROCEDURE — 83735 ASSAY OF MAGNESIUM: CPT

## 2019-02-07 PROCEDURE — 86334 PATHOLOGIST INTERPRETATION IFE: ICD-10-PCS | Mod: 26,,, | Performed by: PATHOLOGY

## 2019-02-07 PROCEDURE — 85610 PROTHROMBIN TIME: CPT

## 2019-02-07 PROCEDURE — 20600001 HC STEP DOWN PRIVATE ROOM

## 2019-02-07 PROCEDURE — 99232 SBSQ HOSP IP/OBS MODERATE 35: CPT | Mod: GC,,, | Performed by: INTERNAL MEDICINE

## 2019-02-07 PROCEDURE — 36415 COLL VENOUS BLD VENIPUNCTURE: CPT

## 2019-02-07 PROCEDURE — 99232 SBSQ HOSP IP/OBS MODERATE 35: CPT | Mod: ,,, | Performed by: INTERNAL MEDICINE

## 2019-02-07 PROCEDURE — 99223 1ST HOSP IP/OBS HIGH 75: CPT | Mod: ,,, | Performed by: CLINICAL NURSE SPECIALIST

## 2019-02-07 PROCEDURE — 25000003 PHARM REV CODE 250: Performed by: INTERNAL MEDICINE

## 2019-02-07 PROCEDURE — 83735 ASSAY OF MAGNESIUM: CPT | Mod: 91

## 2019-02-07 PROCEDURE — 84550 ASSAY OF BLOOD/URIC ACID: CPT

## 2019-02-07 PROCEDURE — 84165 PATHOLOGIST INTERPRETATION SPE: ICD-10-PCS | Mod: 26,,, | Performed by: PATHOLOGY

## 2019-02-07 PROCEDURE — 99232 PR SUBSEQUENT HOSPITAL CARE,LEVL II: ICD-10-PCS | Mod: GC,,, | Performed by: INTERNAL MEDICINE

## 2019-02-07 PROCEDURE — 86334 IMMUNOFIX E-PHORESIS SERUM: CPT | Mod: 26,,, | Performed by: PATHOLOGY

## 2019-02-07 PROCEDURE — 80053 COMPREHEN METABOLIC PANEL: CPT

## 2019-02-07 PROCEDURE — 83520 IMMUNOASSAY QUANT NOS NONAB: CPT | Mod: 59

## 2019-02-07 PROCEDURE — 83615 LACTATE (LD) (LDH) ENZYME: CPT

## 2019-02-07 PROCEDURE — 85025 COMPLETE CBC W/AUTO DIFF WBC: CPT

## 2019-02-07 PROCEDURE — A4216 STERILE WATER/SALINE, 10 ML: HCPCS | Performed by: INTERNAL MEDICINE

## 2019-02-07 PROCEDURE — 80048 BASIC METABOLIC PNL TOTAL CA: CPT

## 2019-02-07 PROCEDURE — 82784 ASSAY IGA/IGD/IGG/IGM EACH: CPT | Mod: 59

## 2019-02-07 PROCEDURE — 99232 PR SUBSEQUENT HOSPITAL CARE,LEVL II: ICD-10-PCS | Mod: ,,, | Performed by: INTERNAL MEDICINE

## 2019-02-07 PROCEDURE — 84165 PROTEIN E-PHORESIS SERUM: CPT

## 2019-02-07 PROCEDURE — 84165 PROTEIN E-PHORESIS SERUM: CPT | Mod: 26,,, | Performed by: PATHOLOGY

## 2019-02-07 PROCEDURE — 99223 PR INITIAL HOSPITAL CARE,LEVL III: ICD-10-PCS | Mod: ,,, | Performed by: CLINICAL NURSE SPECIALIST

## 2019-02-07 PROCEDURE — 86334 IMMUNOFIX E-PHORESIS SERUM: CPT

## 2019-02-07 PROCEDURE — 80061 LIPID PANEL: CPT

## 2019-02-07 RX ORDER — POTASSIUM CHLORIDE 20 MEQ/1
20 TABLET, EXTENDED RELEASE ORAL ONCE
Status: COMPLETED | OUTPATIENT
Start: 2019-02-07 | End: 2019-02-07

## 2019-02-07 RX ORDER — ASPIRIN 325 MG
325 TABLET ORAL DAILY
Status: DISCONTINUED | OUTPATIENT
Start: 2019-02-08 | End: 2019-02-08 | Stop reason: HOSPADM

## 2019-02-07 RX ORDER — LANOLIN ALCOHOL/MO/W.PET/CERES
800 CREAM (GRAM) TOPICAL ONCE
Status: COMPLETED | OUTPATIENT
Start: 2019-02-07 | End: 2019-02-07

## 2019-02-07 RX ADMIN — MAGNESIUM OXIDE TAB 400 MG (241.3 MG ELEMENTAL MG) 800 MG: 400 (241.3 MG) TAB at 01:02

## 2019-02-07 RX ADMIN — TIMOLOL MALEATE 1 DROP: 5 SOLUTION OPHTHALMIC at 10:02

## 2019-02-07 RX ADMIN — RIOCIGUAT 2 MG: 2 TABLET, FILM COATED ORAL at 09:02

## 2019-02-07 RX ADMIN — DORZOLAMIDE HYDROCHLORIDE 1 DROP: 20 SOLUTION/ DROPS OPHTHALMIC at 10:02

## 2019-02-07 RX ADMIN — POTASSIUM CHLORIDE 20 MEQ: 1500 TABLET, EXTENDED RELEASE ORAL at 01:02

## 2019-02-07 RX ADMIN — TIMOLOL MALEATE 1 DROP: 5 SOLUTION OPHTHALMIC at 09:02

## 2019-02-07 RX ADMIN — Medication 3 ML: at 06:02

## 2019-02-07 RX ADMIN — ATORVASTATIN CALCIUM 10 MG: 10 TABLET, FILM COATED ORAL at 10:02

## 2019-02-07 RX ADMIN — DORZOLAMIDE HYDROCHLORIDE 1 DROP: 20 SOLUTION/ DROPS OPHTHALMIC at 09:02

## 2019-02-07 RX ADMIN — LATANOPROST 1 DROP: 50 SOLUTION OPHTHALMIC at 09:02

## 2019-02-07 RX ADMIN — LEVOTHYROXINE SODIUM 100 MCG: 100 TABLET ORAL at 06:02

## 2019-02-07 RX ADMIN — RIOCIGUAT 2 MG: 2 TABLET, FILM COATED ORAL at 03:02

## 2019-02-07 RX ADMIN — RIOCIGUAT 2 MG: 2 TABLET, FILM COATED ORAL at 10:02

## 2019-02-07 RX ADMIN — ACETAMINOPHEN 650 MG: 325 TABLET ORAL at 03:02

## 2019-02-07 RX ADMIN — PANTOPRAZOLE SODIUM 40 MG: 40 TABLET, DELAYED RELEASE ORAL at 06:02

## 2019-02-07 RX ADMIN — Medication 3 ML: at 02:02

## 2019-02-07 RX ADMIN — PANTOPRAZOLE SODIUM 40 MG: 40 TABLET, DELAYED RELEASE ORAL at 05:02

## 2019-02-07 RX ADMIN — Medication 3 ML: at 10:02

## 2019-02-07 NOTE — SUBJECTIVE & OBJECTIVE
Interval History:     Past Medical History:   Diagnosis Date    Allergy     Anemia     Anticoagulant long-term use     4 years coumadin, asa    Arthritis     Awaiting organ transplant 2013    Cataract     Central serous chorioretinopathy of eye, right 2018    CHF (congestive heart failure)     Chronic kidney disease     Colon polyps     COPD (chronic obstructive pulmonary disease)     Coronary artery disease     Diabetes mellitus     Diabetic retinopathy     Diverticulosis     Encounter for blood transfusion     ESRD from HTN strtied RRT 1999    Failed  donor kidney transplant      Glaucoma     History of bleeding peptic ulcer      as stated per pt    Hyperlipidemia     Hypertension     Hypothyroidism     Morbid obesity 8/10/2012    Renal hypertension     Stroke            Past Surgical History:   Procedure Laterality Date    CATARACT EXTRACTION W/  INTRAOCULAR LENS IMPLANT Bilateral     COLON SURGERY      COLONOSCOPY      COLONOSCOPY N/A 2019    [facility] (2ND FLR)    COLONOSCOPY N/A 2018    Performed by Jose Falk MD at [facility] (2ND FLR)    EGD (ESOPHAGOGASTRODUODENOSCOPY) N/A 2019    Performed by Miranda Maradiaga MD at [facility] (2ND FLR)    EGD (ESOPHAGOGASTRODUODENOSCOPY) N/A 2018    Performed by Luis Washington MD at [facility] (2ND FLR)    ESOPHAGOGASTRODUODENOSCOPY (EGD) N/A 2018    Performed by Kenji Desir MD at [facility] (2ND FLR)    EYE SURGERY      FRACTURE SURGERY      R arm    HERNIA REPAIR      HYSTERECTOMY      INSERTION-IMPLANT-GLAUCOMA Left 10/12/2017    Performed by Junaid Kern MD at [facility] OR 1ST FLR    KIDNEY TRANSPLANT      NEPHRECTOMY  2008    transplant     PARATHYROID GLAND SURGERY      TONSILLECTOMY      UPPER GASTROINTESTINAL ENDOSCOPY         Review of patient's allergies indicates:   Allergen Reactions    Penicillins Swelling     Iodine      Other reaction(s): Hives    Sulfamethoxazole-trimethoprim      Other reaction(s): Swelling  Other reaction(s): Hives       Medications:  Continuous Infusions:  Scheduled Meds:   atorvastatin  10 mg Oral Daily    dorzolamide  1 drop Both Eyes BID    epoetin constanza (PROCRIT) injection  8,000 Units Intravenous Every Mon, Wed, Fri    ergocalciferol  50,000 Units Oral Q7 Days    ferric gluconate (FERRLECIT) IVPB  125 mg Intravenous Every Mon, Wed, Fri    latanoprost  1 drop Both Eyes QHS    levothyroxine  100 mcg Oral Before breakfast    pantoprazole  40 mg Oral BID AC    riociguat  2 mg Oral TID    selexipag  1,000 mcg Oral BID    sodium chloride 0.9%  3 mL Intravenous Q8H    timolol maleate 0.5%  1 drop Both Eyes BID     PRN Meds:sodium chloride 0.9%, acetaminophen, dextrose 50%, dextrose 50%, glucagon (human recombinant), glucose, glucose, ondansetron, sodium chloride 0.9%, sodium chloride 0.9%    Family History     Problem Relation (Age of Onset)    Asthma Sister    Blindness Father    Breast cancer Maternal Aunt    Depression Sister    Diabetes Maternal Aunt    Heart attack Father, Mother    Heart failure Father, Mother    Hypertension Father, Mother, Sister, Brother    Kidney disease Brother        Tobacco Use    Smoking status: Former Smoker     Types: Cigarettes     Last attempt to quit: 8/10/2000     Years since quittin.5    Smokeless tobacco: Never Used   Substance and Sexual Activity    Alcohol use: No     Comment: hx of etoh     Drug use: No    Sexual activity: No       Review of Systems   Constitutional: Positive for fatigue.   Respiratory: Positive for shortness of breath.    Gastrointestinal: Positive for blood in stool.   Musculoskeletal: Positive for arthralgias.   Allergic/Immunologic: Negative.    Psychiatric/Behavioral: Negative for agitation and confusion.     Objective:     Vital Signs (Most Recent):  Temp: 98.6 °F (37 °C) (19 1211)  Pulse: 90 (19  1456)  Resp: 18 (02/07/19 0800)  BP: (!) 161/78 (02/07/19 1211)  SpO2: 100 % (02/07/19 1211) Vital Signs (24h Range):  Temp:  [98.1 °F (36.7 °C)-99 °F (37.2 °C)] 98.6 °F (37 °C)  Pulse:  [80-97] 90  Resp:  [18] 18  SpO2:  [98 %-100 %] 100 %  BP: (130-161)/(69-91) 161/78     Weight: 78.9 kg (174 lb)  Body mass index is 31.83 kg/m².    Review of Symptoms  Symptom Assessment (ESAS 0-10 scale)   ESAS 0 1 2 3 4 5 6 7 8 9 10   Pain   X           Dyspnea X             Anxiety X             Nausea X             Depression  X             Anorexia X             Fatigue   X           Insomnia X             Restlessness  X             Agitation X             CAM / Delirium __ --  ___+   Constipation     __ --  ___+   Diarrhea           __ --  ___+  Bowel Management Plan (BMP): No    Comments: complains of mild pain reports no shortness of breath     Pain Assessment: complains of mild joint pain she attributes to arthritis, agravated by immobililty, has not requested any pain medications - relieved with acetaminophen     OME in 24 hours: 0    Performance Status: 60    ECOG Performance Status Grade: 1 - Ambulates, capable of light work    Physical Exam   Constitutional: She is oriented to person, place, and time. She appears well-developed and well-nourished. No distress.   Cardiovascular: Normal rate.   Irregular rhythm    Pulmonary/Chest: Effort normal and breath sounds normal.   Abdominal: Soft. Bowel sounds are normal.   Musculoskeletal:   Joint pain    Neurological: She is alert and oriented to person, place, and time.   Skin: Skin is warm and dry.   Psychiatric: She has a normal mood and affect. Her behavior is normal. Judgment and thought content normal.   Nursing note and vitals reviewed.      Significant Labs: All pertinent labs within the past 24 hours have been reviewed.  CBC:   Recent Labs   Lab 02/07/19  1355   WBC 5.85   HGB 9.0*   HCT 29.5*   MCV 94        BMP:  Recent Labs   Lab 02/07/19  0619   GLU 78   NA  "135*   K 4.0      CO2 27   BUN 11   CREATININE 5.7*   CALCIUM 7.3*   MG 2.2     LFT:  Lab Results   Component Value Date    AST 17 2019    ALKPHOS 45 (L) 2019    BILITOT 0.5 2019     Albumin:   Albumin   Date Value Ref Range Status   2019 2.3 (L) 3.5 - 5.2 g/dL Final     Protein:   Total Protein   Date Value Ref Range Status   2019 5.5 (L) 6.0 - 8.4 g/dL Final     Lactic acid:   No results found for: LACTATE    Significant Imaging: I have reviewed all pertinent imaging results/findings within the past 24 hours.    Advance Care Planning   Advanced Directives::  Living Will: No  LaPOST: No  Do Not Resuscitate Status: No  Medical Power of : No    Decision-Making Capacity: Patient answered questions       Living Arrangements: Lives with family    Psychosocial/Cultural: ,  for 28 yrs, two adult sons,  Retired, lives with sons and brother.    Patient's most important priorities:  Really wants to find out why and where she is loosing so much blood    Patient's biggest concerns/fears:  That I have no one to motivate me to keep going.      Previous death/end of life care history:   almost 4 yrs ago.  She feels she is still grieving as he was her " and kept her going".  She prepared him to lose her because she was the sick one and he was the healthy one.       Patient's goals/hopes:  Just met a new gentleman friend that really cares for her and she wants to be able to accept and care for him.    She hopes to continue to live a satisfying life.     Spiritual:     F- Brandy and Belief: [employer]     I - Importance:   .  C - Community:     A - Address in Care: amenable to  visits   "

## 2019-02-07 NOTE — PROGRESS NOTES
Progress Note    SUBJECTIVE:     Patient seen and examined at the bedside  Patient denied of any chest pain, shortness of breath, abdominal pain, fever or chills.  No acute bleeding overnight.  Hemoglobin today at 6.7.  Platelets and WBC are stable    Review of patient's allergies indicates:   Allergen Reactions    Penicillins Swelling    Iodine      Other reaction(s): Hives    Sulfamethoxazole-trimethoprim      Other reaction(s): Swelling  Other reaction(s): Hives     Past Medical History:   Diagnosis Date    Allergy     Anemia     Anticoagulant long-term use     4 years coumadin, asa    Arthritis     Awaiting organ transplant 2013    Cataract     Central serous chorioretinopathy of eye, right 2018    CHF (congestive heart failure)     Chronic kidney disease     Colon polyps     COPD (chronic obstructive pulmonary disease)     Coronary artery disease     Diabetes mellitus     Diabetic retinopathy     Diverticulosis     Encounter for blood transfusion     ESRD from HTN strtied RRT 1999    Failed  donor kidney transplant      Glaucoma     History of bleeding peptic ulcer      as stated per pt    Hyperlipidemia     Hypertension     Hypothyroidism     Morbid obesity 8/10/2012    Renal hypertension     Stroke          Past Surgical History:   Procedure Laterality Date    CATARACT EXTRACTION W/  INTRAOCULAR LENS IMPLANT Bilateral     COLON SURGERY      COLONOSCOPY      COLONOSCOPY N/A 2019    [facility] (2ND FLR)    COLONOSCOPY N/A 2018    Performed by Jose Falk [facility] at [facility] ENDO (2ND FLR)    EGD (ESOPHAGOGASTRODUODENOSCOPY) N/A 2019    Performed by Miranda [facility]aga [facility] at [facility] ENDO (2ND FLR)    EGD (ESOPHAGOGASTRODUODENOSCOPY) N/A 2018    Performed by Luis Washington MD at [facility] ENDO (2ND FLR)    ESOPHAGOGASTRODUODENOSCOPY (EGD) N/A 2018    Performed by [facility]ji Desir [facility] at [facility] END[facility]  (2ND FLR)    EYE SURGERY      FRACTURE SURGERY      R arm    HERNIA REPAIR      HYSTERECTOMY      INSERTION-IMPLANT-GLAUCOMA Left 10/12/2017    [facility]    KIDNEY TRANSPLANT      NEPHRECTOMY  2008    transplant     PARATHYROID GLAND SURGERY      TONSILLECTOMY      UPPER GASTROINTESTINAL ENDOSCOPY       Family History   Problem Relation Age of Onset    Heart attack Father     Heart failure Father     Hypertension Father     Blindness Father     Heart attack Mother     Heart failure Mother     Hypertension Mother     Asthma Sister     Depression Sister     Hypertension Sister     Hypertension Brother     Kidney disease Brother         ESRD    Diabetes Maternal Aunt     Breast cancer Maternal Aunt     Amblyopia Neg Hx     Cataracts Neg Hx     Macular degeneration Neg Hx     Retinal detachment Neg Hx     Strabismus Neg Hx     Colon cancer Neg Hx     Esophageal cancer Neg Hx      Social History     Tobacco Use    Smoking status: Former Smoker     Types: Cigarettes     Last attempt to quit: 8/10/2000     Years since quittin.5    Smokeless tobacco: Never Used   Substance Use Topics    Alcohol use: No     Comment: hx of etoh     Drug use: No     Review of Systems   Constitutional: Negative for chills, fever and malaise/fatigue.   HENT: Negative for nosebleeds.    Respiratory: Negative for cough, sputum production and shortness of breath.    Cardiovascular: Positive for palpitations. Negative for chest pain.   Gastrointestinal: Negative for abdominal pain, heartburn, nausea and vomiting.   Genitourinary: Negative for frequency and urgency.   Musculoskeletal: Negative for back pain.   Neurological: Negative for seizures and headaches.     OBJECTIVE:     Vital Signs:  Temp:  [98.2 °F (36.8 °C)-99.1 °F (37.3 °C)]   Pulse:  [84-96]   Resp:  [18]   BP: (154-161)/(78-88)   SpO2:  [99 %-100 %]     Physical Exam   Constitutional: She is  oriented to person, place, and time. She appears well-developed and well-nourished.   female sitting in chair   HENT:   Head: Normocephalic and atraumatic.   Eyes: EOM are normal. Pupils are equal, round, and reactive to light.   Neck: Normal range of motion. Neck supple.   Cardiovascular:   Murmur heard.  Irregularly irregular   Pulmonary/Chest: Effort normal.   Decreased breath sounds at the bases   Abdominal: Soft. Bowel sounds are normal. There is no tenderness.   Musculoskeletal: Normal range of motion. She exhibits edema. She exhibits no deformity.   Neurological: She is alert and oriented to person, place, and time.   Skin: Skin is warm and dry. No pallor.     Laboratory:  CBC:   Recent Labs   Lab 02/07/19  1355   WBC 5.85   RBC 3.14*   HGB 9.0*   HCT 29.5*      MCV 94   MCH 28.7   MCHC 30.5*     CMP:   Recent Labs   Lab 02/07/19  0619   GLU 78   CALCIUM 7.3*   ALBUMIN 2.3*   PROT 5.5*   *   K 4.0   CO2 27      BUN 11   CREATININE 5.7*   ALKPHOS 45*   ALT 9*   AST 17   BILITOT 0.5     Coagulation:   Recent Labs   Lab 02/02/19  1342  02/07/19  0619   LABPROT 31.6*   < > 11.2   INR 3.2*   < > 1.1   APTT 35.4*  --   --     < > = values in this interval not displayed.     Cardiac markers: No results for input(s): CKMB, CPKMB, TROPONINT, TROPONINI, MYOGLOBIN in the last 168 hours.  Microbiology Results (last 7 days)     ** No results found for the last 168 hours. **          Diagnostic Results:      ASSESSMENT/PLAN:     1.  Anemia of chronic disease.  She iss ESRD patient on hemodialysis.  She has low serum iron, low TIBC and a high ferritin.     2.  Anemia likely secondary to GI bleed. She was admitted with a hemoglobin of 4.3 with a normal MCV an active GI bleed.  Her issue with the GI bleeds have started after she was placed on Coumadin for atrial fibrillation.  She has EGD, colonoscopy, small-bowel enteroscopy, video capsule endoscopy which could not identify a source of active GI bleed.    Her WBC and platelets have been stable in the low hemoglobin is more likely from an upper GI bleed and less likely from a bone marrow issue at this point of time.  Hemolytic labs are normal     3.  Atrial fibrillation on Coumadin  4.  ESRD on hemodialysis  5.  CAD     Plan:      1. Transfuse to keep hemoglobin over 7  2. We would like to defer bone marrow biopsy for now.  Her anemia is more likely from end-stage renal disease and GI bleed related to Coumadin  3.  In view of recurrent GI bleeds on Coumadin, looking at the benefit versus risk ratio we recommend to stop Coumadin, however we will defer that decision to cardiology.    Plan of care discussed with Dr. Gwendolyn Montilla  PGY 4, hematology/oncology

## 2019-02-07 NOTE — HPI
As per chart review:  Ms. Sheets is a 64 yo lady with PMH of pulmonary HTN (on adempas/selexipeg), diastolic dysfunction, pAF(on coumadin), CVA 1987, COPD on 2 litre nocturnal oxygen, central retinal artery occlusion without significant carotid artery stenosis, CAD with remote PCI, ESRD(LUE AVF) secondary to HTN, (HD MWF) s/p failed kidney transplant, PUD, STEFFANY on CPAP, hypothyroidism, RA, HLD and obesity. She presented to [facility] ED with c/o  rectal bleeding BRBPR which started 2/1/19 (diarrhea with blood clots - 4-5 episodes ). Labs indicate anemia  Hb of 4.3 down from 7.8 in January 2019.   Received 2 Units PRBCs and HGB improved to 7.3  Imaging and workup have not indicated source of bleeding.     Palliative care consulted for goals of care and advanced care planning.

## 2019-02-07 NOTE — SUBJECTIVE & OBJECTIVE
"Interval History: No acute events overnight. Hem/Onc evaluated for anemia. Discussed today about consulting palliative care to assist with goals of care. Noted today that she is "tired". She is having frustrations in dealing with her chronic illnesses including PH, ESRD, and having to be frequently admitted for bleeding and blood transfusions.     Continuous Infusions:  Scheduled Meds:   atorvastatin  10 mg Oral Daily    dorzolamide  1 drop Both Eyes BID    epoetin constanza (PROCRIT) injection  8,000 Units Intravenous Every Mon, Wed, Fri    ergocalciferol  50,000 Units Oral Q7 Days    ferric gluconate (FERRLECIT) IVPB  125 mg Intravenous Every Mon, Wed, Fri    latanoprost  1 drop Both Eyes QHS    levothyroxine  100 mcg Oral Before breakfast    pantoprazole  40 mg Oral BID AC    riociguat  2 mg Oral TID    selexipag  1,000 mcg Oral BID    sodium chloride 0.9%  3 mL Intravenous Q8H    timolol maleate 0.5%  1 drop Both Eyes BID     PRN Meds:sodium chloride 0.9%, acetaminophen, dextrose 50%, dextrose 50%, glucagon (human recombinant), glucose, glucose, ondansetron, sodium chloride 0.9%, sodium chloride 0.9%    Review of patient's allergies indicates:   Allergen Reactions    Penicillins Swelling    Iodine      Other reaction(s): Hives    Sulfamethoxazole-trimethoprim      Other reaction(s): Swelling  Other reaction(s): Hives     Objective:     Vital Signs (Most Recent):  Temp: 98.2 °F (36.8 °C) (02/07/19 0800)  Pulse: 95 (02/07/19 0800)  Resp: 18 (02/07/19 0800)  BP: (!) 154/88 (02/07/19 0800)  SpO2: 99 % (02/07/19 0800) Vital Signs (24h Range):  Temp:  [98.1 °F (36.7 °C)-99 °F (37.2 °C)] 98.2 °F (36.8 °C)  Pulse:  [80-97] 95  Resp:  [18] 18  SpO2:  [98 %-100 %] 99 %  BP: (130-154)/(69-91) 154/88     Patient Vitals for the past 72 hrs (Last 3 readings):   Weight   02/07/19 0500 79 kg (174 lb 2.6 oz)   02/06/19 0500 75.6 kg (166 lb 10.7 oz)   02/05/19 0500 77 kg (169 lb 12.1 oz)     Body mass index is 32.91 " kg/m².      Intake/Output Summary (Last 24 hours) at 2/7/2019 1109  Last data filed at 2/6/2019 2359  Gross per 24 hour   Intake 100 ml   Output 0 ml   Net 100 ml       Hemodynamic Parameters:       Telemetry: Reviewed    Physical Exam   Constitutional: She is oriented to person, place, and time. She appears well-developed and well-nourished.   HENT:   Head: Normocephalic and atraumatic.   Eyes: EOM are normal. Pupils are equal, round, and reactive to light.   Neck: Normal range of motion. No JVD present.   Cardiovascular: Normal rate, regular rhythm, normal heart sounds and intact distal pulses. Exam reveals no gallop and no friction rub.   No murmur heard.  Pulmonary/Chest: Effort normal and breath sounds normal. No stridor. No respiratory distress. She has no wheezes. She has no rales. She exhibits no tenderness.   Abdominal: Soft. Bowel sounds are normal. She exhibits no distension and no mass. There is no tenderness. There is no guarding.   Musculoskeletal: She exhibits no edema.   Neurological: She is alert and oriented to person, place, and time.   Skin: Skin is warm and dry.   Psychiatric: She has a normal mood and affect.   Vitals reviewed.      Significant Labs:  CBC:  Recent Labs   Lab 02/04/19  1511 02/04/19  1600 02/06/19  0619   WBC 7.89 4.95 5.50   RBC 3.14* 2.67* 2.41*   HGB 9.1* 7.6* 6.7*   HCT 28.0* 23.9* 22.0*   * 136* 148*   MCV 89 90 91   MCH 29.0 28.5 27.8   MCHC 32.5 31.8* 30.5*     BNP:  Recent Labs   Lab 02/03/19  0624   BNP 1,584*     CMP:  Recent Labs   Lab 02/05/19  0401  02/06/19  0619 02/07/19  0019 02/07/19  0619   GLU 64*   < > 84 87 78   CALCIUM 7.8*   < > 7.3* 7.4* 7.3*   ALBUMIN 2.1*  --  2.1*  --  2.3*   PROT 5.2*  --  5.3*  --  5.5*      < > 138 135* 135*   K 4.2   < > 4.1 3.6 4.0   CO2 27   < > 26 29 27      < > 104 103 104   BUN 11   < > 17 10 11   CREATININE 4.7*   < > 7.3* 5.1* 5.7*   ALKPHOS 47*  --  43*  --  45*   ALT 8*  --  10  --  9*   AST 16  --  15   --  17   BILITOT 0.4  --  0.4  --  0.5    < > = values in this interval not displayed.      Coagulation:   Recent Labs   Lab 02/02/19  1342  02/04/19  0913 02/06/19 0619 02/07/19 0619   INR 3.2*   < > 2.8*  2.8* 1.4* 1.1   APTT 35.4*  --   --   --   --     < > = values in this interval not displayed.     LDH:  Recent Labs   Lab 02/07/19 0619        Microbiology:  Microbiology Results (last 7 days)     ** No results found for the last 168 hours. **          I have reviewed all pertinent labs within the past 24 hours.    Estimated Creatinine Clearance: 9.4 mL/min (A) (based on SCr of 5.7 mg/dL (H)).    Diagnostic Results:  CT: No results found in the last 24 hours.  CXR: No results found in the last 24 hours.  U/S: No results found in the last 24 hours.  I have reviewed and interpreted all pertinent imaging results/findings within the past 24 hours.

## 2019-02-07 NOTE — ASSESSMENT & PLAN NOTE
- multiple recurrent GI bleed with no obvious source identified admitted with BRBPR  - Hb of 4.3 from 7.8 on 1/18  - Protonix bid  - ASA on hold from last admission  - 1/14 to 1/15:Had EGD, SBE: normal stomach, esophagus non bleeding erosive gastropathy.   VCE: No obvious source, erosive gastropathy, gastric polyp and SB lymphangiectasia.   Had recurrent admissions for GI bleed with no obvious source- Admit 9/2018, 6/2018, 2/2018: Colonoscopy : Internal hemorrhoids, 5 mm polyp in sigmoid colon.  - Colonoscopy 2/4/18- unremarkable  - Holding coumadin. Will consider resuming as an outpatient.   - Retic count is low and normal colonoscopy. Consult placed to Hem/Onc to evaluate for alternate causes of anemia.  - Started on IV iron and Epo- per Nephrology  - Possible her anemia is related to BM suppression from PH medications  - Palliative care consulted for goals of care

## 2019-02-07 NOTE — PROGRESS NOTES
"[facility]  Heart Transplant  Progress Note    Patient Name: Shivani Sheets  MRN: [id number]  Admission Date: 2/2/2019  Hospital Length of Stay: 5 days  Attending Physician: Surya Hernandez MD  Primary Care Provider: Eula Weiss MD  Principal Problem:Anemia of chronic disease    Subjective:     Interval History: No acute events overnight. Hem/Onc evaluated for anemia. Discussed today about consulting palliative care to assist with goals of care. Noted today that she is "tired". She is having frustrations in dealing with her chronic illnesses including PH, ESRD, and having to be frequently admitted for bleeding and blood transfusions.     Continuous Infusions:  Scheduled Meds:   atorvastatin  10 mg Oral Daily    dorzolamide  1 drop Both Eyes BID    epoetin constanza (PROCRIT) injection  8,000 Units Intravenous Every Mon, Wed, Fri    ergocalciferol  50,000 Units Oral Q7 Days    ferric gluconate (FERRLECIT) IVPB  125 mg Intravenous Every Mon, Wed, Fri    latanoprost  1 drop Both Eyes QHS    levothyroxine  100 mcg Oral Before breakfast    pantoprazole  40 mg Oral BID AC    riociguat  2 mg Oral TID    selexipag  1,000 mcg Oral BID    sodium chloride 0.9%  3 mL Intravenous Q8H    timolol maleate 0.5%  1 drop Both Eyes BID     PRN Meds:sodium chloride 0.9%, acetaminophen, dextrose 50%, dextrose 50%, glucagon (human recombinant), glucose, glucose, ondansetron, sodium chloride 0.9%, sodium chloride 0.9%    Review of patient's allergies indicates:   Allergen Reactions    Penicillins Swelling    Iodine      Other reaction(s): Hives    Sulfamethoxazole-trimethoprim      Other reaction(s): Swelling  Other reaction(s): Hives     Objective:     Vital Signs (Most Recent):  Temp: 98.2 °F (36.8 °C) (02/07/19 0800)  Pulse: 95 (02/07/19 0800)  Resp: 18 (02/07/19 0800)  BP: (!) 154/88 (02/07/19 0800)  SpO2: 99 % (02/07/19 0800) Vital Signs (24h Range):  Temp:  [98.1 °F (36.7 °C)-99 °F (37.2 °C)] 98.2 " °F (36.8 °C)  Pulse:  [80-97] 95  Resp:  [18] 18  SpO2:  [98 %-100 %] 99 %  BP: (130-154)/(69-91) 154/88     Patient Vitals for the past 72 hrs (Last 3 readings):   Weight   02/07/19 0500 79 kg (174 lb 2.6 oz)   02/06/19 0500 75.6 kg (166 lb 10.7 oz)   02/05/19 0500 77 kg (169 lb 12.1 oz)     Body mass index is 32.91 kg/m².      Intake/Output Summary (Last 24 hours) at 2/7/2019 1109  Last data filed at 2/6/2019 2359  Gross per 24 hour   Intake 100 ml   Output 0 ml   Net 100 ml       Hemodynamic Parameters:       Telemetry: Reviewed    Physical Exam   Constitutional: She is oriented to person, place, and time. She appears well-developed and well-nourished.   HENT:   Head: Normocephalic and atraumatic.   Eyes: EOM are normal. Pupils are equal, round, and reactive to light.   Neck: Normal range of motion. No JVD present.   Cardiovascular: Normal rate, regular rhythm, normal heart sounds and intact distal pulses. Exam reveals no gallop and no friction rub.   No murmur heard.  Pulmonary/Chest: Effort normal and breath sounds normal. No stridor. No respiratory distress. She has no wheezes. She has no rales. She exhibits no tenderness.   Abdominal: Soft. Bowel sounds are normal. She exhibits no distension and no mass. There is no tenderness. There is no guarding.   Musculoskeletal: She exhibits no edema.   Neurological: She is alert and oriented to person, place, and time.   Skin: Skin is warm and dry.   Psychiatric: She has a normal mood and affect.   Vitals reviewed.      Significant Labs:  CBC:  Recent Labs   Lab 02/04/19  1511 02/04/19  1600 02/06/19  0619   WBC 7.89 4.95 5.50   RBC 3.14* 2.67* 2.41*   HGB 9.1* 7.6* 6.7*   HCT 28.0* 23.9* 22.0*   * 136* 148*   MCV 89 90 91   MCH 29.0 28.5 27.8   MCHC 32.5 31.8* 30.5*     BNP:  Recent Labs   Lab 02/03/19 0624   BNP 1,584*     CMP:  Recent Labs   Lab 02/05/19  0401  02/06/19 0619 02/07/19  0019 02/07/19 0619   GLU 64*   < > 84 87 78   CALCIUM 7.8*   < > 7.3*  7.4* 7.3*   ALBUMIN 2.1*  --  2.1*  --  2.3*   PROT 5.2*  --  5.3*  --  5.5*      < > 138 135* 135*   K 4.2   < > 4.1 3.6 4.0   CO2 27   < > 26 29 27      < > 104 103 104   BUN 11   < > 17 10 11   CREATININE 4.7*   < > 7.3* 5.1* 5.7*   ALKPHOS 47*  --  43*  --  45*   ALT 8*  --  10  --  9*   AST 16  --  15  --  17   BILITOT 0.4  --  0.4  --  0.5    < > = values in this interval not displayed.      Coagulation:   Recent Labs   Lab 02/02/19  1342  02/04/19  0913 02/06/19  0619 02/07/19 0619   INR 3.2*   < > 2.8*  2.8* 1.4* 1.1   APTT 35.4*  --   --   --   --     < > = values in this interval not displayed.     LDH:  Recent Labs   Lab 02/07/19 0619        Microbiology:  Microbiology Results (last 7 days)     ** No results found for the last 168 hours. **          I have reviewed all pertinent labs within the past 24 hours.    Estimated Creatinine Clearance: 9.4 mL/min (A) (based on SCr of 5.7 mg/dL (H)).    Diagnostic Results:  CT: No results found in the last 24 hours.  CXR: No results found in the last 24 hours.  U/S: No results found in the last 24 hours.  I have reviewed and interpreted all pertinent imaging results/findings within the past 24 hours.    Assessment and Plan:     62 yo F w/ PMHx  pulmonary HTN (on adempas/selexipeg), diastolic dysfunction, pAF(on coumadin), CVA 1987, COPD on 2 litre nocturnal oxygen, central retinal artery occlusion without significant carotid artery stenosis, CAD with remote PCI, ESRD(LUE AVF) secondary to HTN, s/p failed kidney transplant, PUD, STEFFANY on CPAP, hypothyroidism, RA, HLD and obesity who presents with rectal bleeding BRBPR which started 2/1/19 (diarrhea with blood clots - 4-5 episodes ) and anemia with Hb of 4.3 ( 7.8 on 1/18).     She was feeling lightheaded on getting up. Last admission had bill. Had some irregular HR occasionally. One episode of vomiting and some nausea.  She finished course of doxycyline last week for cellulitis of her leg from  IO access, now swelling reduced. After discharge on 1/18 she did not have any episodes since yesterday. Had HD on 2/1/2019. Took 5 mg coumadin yesterday. No chest pain, SOB, leg swelling, PND, orthopnea, syncope, F/C, phlegm, abdominal pain, bruising or bleeding from other sites.    In ED had low blood pressures (98/51 mmHg), but no oxygen requirement and Hgb 4.3. Seen by CCU- no ICU needs at this time. Nephrology saw - will transfuse with HD in am. GI evaluated- Recommended NPO past midnight and CTA if actively bleeds (last BRBPR in the morning).     Last admission : 1/12- 1/18: Melena received 6  Units PBRC and 2 units FFP. Had EGD, SBE: normal stomach, esophagus non bleeding erosive gastropathy. VCE: No obvious source, erosive gastropathy, gastric polyp and SB lymphangiectasia. Discharged with hb of 7.8 and INR 1.7. Discontinued aspirin.   Had recurrent admissions for GI bleed with no obvious source- Admit 9/2018, 6/2018, 2/2018: Colonoscopy : Internal hemorrhoids, 5 mm polyp in sigmoid colon.    11/2018: TTE: LVEF 55%, LA severely enlarged, moderate TR, RV moderately dilated, RVSF mild decreased, PASP -99, severe pulmonary hypertension.   PET stress: No evidence of ischemia at rest and stress          * Anemia of chronic disease    - multiple recurrent GI bleed with no obvious source identified admitted with BRBPR  - Hb of 4.3 from 7.8 on 1/18  - Protonix bid  - ASA on hold from last admission  - 1/14 to 1/15:Had EGD, SBE: normal stomach, esophagus non bleeding erosive gastropathy.   VCE: No obvious source, erosive gastropathy, gastric polyp and SB lymphangiectasia.   Had recurrent admissions for GI bleed with no obvious source- Admit 9/2018, 6/2018, 2/2018: Colonoscopy : Internal hemorrhoids, 5 mm polyp in sigmoid colon.  - Colonoscopy 2/4/18- unremarkable  - Holding coumadin. Will consider resuming as an outpatient.   - Retic count is low and normal colonoscopy. Consult placed to Hem/Onc to evaluate for  alternate causes of anemia.  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 Will consider resuming as an outpatient.   - Consider EP referral upon discharge for Watchman device     Pulmonary HTN    - Will continue with adempas 2mg TID  - pt brought her selexipeg with her.  - Last TTE 11/2018: LVEF 55%, RVSF mildly decreased, PASP 99, moderate TR     ESRD from HTN started RRT 1999    - Nephrology on board  - Hemodialysis MWF     CAD (coronary artery disease)    - Aspirin on hold  - C/W statin  - PET stress test from 2018 nonischemic         Anatoliy Hartmann MD  Heart Transplant  [facility]

## 2019-02-07 NOTE — ASSESSMENT & PLAN NOTE
Palliative care consult received for goals of care and advanced care planning.  Palliative care APRN met with Mrs. Sheets at bedside.  Palliative care introduced.    Impression:  Mrs. Sheets is a 64 yo lady admitted with anemia of chronic disease transfusion dependent  with PMH of pulmonary htn, ESRD (failed kidney transplant)  - HD three days weekly.  Multiple - 4 hospital admissions in past 6 months.  She is sitting in chair, awake, alert and oriented x4.  No complaints of pain, shortness of breath or other discomfort.      Advanced Care Planning   Advance Care Planning   - No advanced directives have been received.   - Advanced directive education and documents provided.  States she will discuss further with her family   - Able to make own medical decisions.  - Next of kin if unable are sons Ben  [phone] and Frederick  [phone]   - Resuscitations status: full code per primary team.        Goals of Care:   - Brief discussion regarding goals of care.    - At this time her goal remains to continue with HD as she does not feel this is a burdensome treatment. She has been receiving dialysis for 20 plus years  - She has reliable transportation and family support to continue with HD  - Reports she has on occasion missed a session secondary to arthritis pain.    - Also reports she is very adherent with her dietary restrictions and even with missed HD sessions is rarely fluid overloaded.   - Another goal is to understand if there is a source of blood loss.      Palliative care conversation shortened as she was needed for bedside procedure. Mrs. Sheets amenable to continued conversations with palliative care.     Plan/Recommendations  - Palliative care building rapport and will continue to follow.  - Palliative care will continue to assist patient further develop goals of care and advanced care planning.   - emotional support.

## 2019-02-07 NOTE — NURSING
PT AAx4, breathing even and unlabored, call light in reach, bed in lowest position. Pt went to dialysis, pulled 1.8L from pt. Dialysis RN reported pt had bigeminy and med team notified. No significant changes throughout shift. Will continue to monitor.

## 2019-02-07 NOTE — PLAN OF CARE
"Problem: Adult Inpatient Plan of Care  Goal: Plan of Care Review  Outcome: Ongoing (interventions implemented as appropriate)  Pt AAOX4, afebrile, free of falls. Pt restless overnight, unable to get comfortable and rest. Complained of headache, managed with PRN tylenol, relief noted. Multiple runs of v-tach (5-7beats/run). Pt states, "has not been on home cardizem since admit 2/2". MD Jennifer of [facility] notified, holding at this time. Labs recollected, and K and Mg replaced given PO. Echo scheduled for this morning to reassess pt's EF. WCTM.      "

## 2019-02-07 NOTE — PROGRESS NOTES
Admit Note     Met with patient to assess needs. Patient is a 63 y.o.  female, admitted for anemia and possible GI bleed, per medical record.  Pt also has pulmonary HTN.      Patient admitted from emergency room on 2/2/2019.  At this time, patient presents as alert and oriented x 4, calm, cooperative and asking and answering questions appropriately.  At this time, patients caregiver is asleep at bedside.     Household/Family Systems     Patient resides with patient's two adult sons and brother and at    [address] [place] [address]  [place] [address]     Pt cell:  [phone]  Kenji Zaldivar (brother):  [phone]  Ben (son): [phone]  Frederick (son): [phone]    Support system includes adult children, brother, and extended family.  Patient does not have dependents that are need of being cared for.     Patients primary caregiver is self with help from her brother/sons as needed.  During admission, patient's caregiver plans to visit.  Confirmed patient and patients caregivers do have access to reliable transportation.  The pt's brother and adult children drive.     Cognitive Status/Learning     Patient reports reading ability as 12th grade and states patient does not have difficulty with writing, hearing, comprehension and learning. Pt does report difficulty with her eyesight (blurry vision), reading due to blurry vision, and short-term memory.  Patient reports patient learns best by one on one.     Needed: No.   Highest education level: High School (9-12) or GED    Vocation/Disability     Working for Income: No  If no, reason not working: Patient Choice - Retired  Patient reports she retired in 2002 or 2003.  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   The pt's brother is retired and is at home with the pt.   The pt's children are employed.     Adherence     Patient reports a high level of adherence to patients health care regimen.  Patient verbalizes understanding.    Substance  Use    Patient reports the following substance usage.    Tobacco: none.  Pt reports she quit smoking socially in   Alcohol: none recently.  Pt reports she only drank socially.   Illicit Drugs/Non-prescribed Medications: none, patient denies any use.  Patient states clear understanding of the potential impact of substance use.  Substance abstinence/cessation counseling, education and resources provided and reviewed.     Services Utilizing/ADLS    Infusion Service: Prior to admission, patient utilizing? no  Home Health: Prior to admission, patient utilizing? no Pt has used [facility] in the past.  If HH is needed at D/C pt would like to use [facility] again.  DME: Prior to admission, yes, pt has home O2 with portables at 2LPM.  Pt reports her portable O2 is at home and brother or sons will be able to bring it to her at discharge.  Pt unsure of O2 company name.  Pt also reports hainvg a walker, cane, CPAP, scooter, and shower chair. Pt reports she does use her CPAP.   Pulmonary/Cardiac Rehab: Prior to admission, no  Dialysis:  Prior to admission, yes Pt goes to [facility] on MWF from 5:30am until 10:00am.Pt reports she will drive her self to dialysis or her brother will take her.  Transplant Specialty Pharmacy:  Prior to admission, no.    Prior to admission, patient reports patient was mostly independent with ADLS and was driving.  Patient reports patient is able to care for self at this time.  Patient indicates a willingness to care for self once medically cleared to do so.    Insurance/Medications    Insured by   Payor/Plan Subscr  Sex Relation Sub. Ins. ID Effective Group Num   1. [place] - [place]* MIGUEL ANGEL AMADO* 1955 Female  [id number]                                    [address]   2. [place] ANG[place]* 1955 Female  [id number]* [id number]                                    [address] [address]      Primary Insurance (for UNOS reporting): Public Insurance - Medicare FFS (Fee For  Service)  Secondary Insurance (for UNOS reporting): Public Insurance - Medicaid    Patient reports patient is able to obtain and afford medications at this time and at time of discharge.    Living Will/Healthcare Power of     Patient states patient does not have a LW and/or HCPA.   provided education regarding LW and HCPA and the completion of forms.    Coping/Mental Health    Patient is coping adequately with the aid of  family members and mely.  Patient indicates mental health difficulties.  Pt reports depression at times due to her illness and lack of mobility. Pt reports coping adequately at this time.  Pt states she has been frustrated this hospital staying about having to be stuck so many times because she does not have good veins.  Pt reports they were able to draw some labs today while pt was on HD.  SW providing general support to pt today.     Discharge Planning    At time of discharge, patient plans to return to patient's home under the care of self and family.  Patients brother or sons will transport patient.  Per rounds today, expected discharge date has not been medically determined at this time. Patient verbalizes understanding and is involved in treatment planning and discharge process.    Additional Concerns    Patient is being followed for needs, education, resources, information, emotional support, supportive counseling, and for supportive and skilled discharge plan of care.  providing ongoing psychosocial support, education, resources and d/c planning as needed.  SW remains available. Patient denies additional needs and/or concerns at this time. Patient verbalizes understanding and agreement with information reviewed, social work availability, and how to access available resources as needed.

## 2019-02-07 NOTE — CONSULTS
[facility]  Palliative Medicine  Consult Note    Patient Name: Shivani Sheets  MRN: [id number]  Admission Date: 2/2/2019  Hospital Length of Stay: 5 days  Code Status: Full Code   Attending Provider: Surya Hernandez MD  Consulting Provider: PAULETTE Dunne  Primary Care Physician: Eula Weiss MD  Principal Problem:Anemia of chronic disease    Patient information was obtained from patient, past medical records and ER records.      Consults  Assessment/Plan:     Palliative care encounter    Palliative care consult received for goals of care and advanced care planning.  Palliative care APRN met with Mrs. Sheets at bedside.  Palliative care introduced.    Impression:  Mrs. Sheets is a 62 yo lady admitted with anemia of chronic disease transfusion dependent  with PMH of pulmonary htn, ESRD (failed kidney transplant)  - HD three days weekly.  Multiple - 4 hospital admissions in past 6 months.  She is sitting in chair, awake, alert and oriented x4.  No complaints of pain, shortness of breath or other discomfort.      Advanced Care Planning   Advance Care Planning   - No advanced directives have been received.   - Advanced directive education and documents provided.  States she will discuss further with her family   - Able to make own medical decisions.  - Next of kin if unable are sons Ben  [phone] and Frederick  [phone]   - Resuscitations status: full code per primary team.        Goals of Care:   - Brief discussion regarding goals of care.    - At this time her goal remains to continue with HD as she does not feel this is a burdensome treatment. She has been receiving dialysis for 20 plus years  - She has reliable transportation and family support to continue with HD  - Reports she has on occasion missed a session secondary to arthritis pain.    - Also reports she is very adherent with her dietary restrictions and even with missed HD sessions is rarely fluid overloaded.   -  Another goal is to understand if there is a source of blood loss.      Palliative care conversation shortened as she was needed for bedside procedure. Mrs. Sheets amenable to continued conversations with palliative care.     Plan/Recommendations  - Palliative care building rapport and will continue to follow.  - Palliative care will continue to assist patient further develop goals of care and advanced care planning.   - emotional support.          Thank you for your consult. I will follow-up with patient. Please contact us if you have any additional questions.    Subjective:     HPI:   As per chart review:  Ms. Sheets is a 62 yo lady with PMH of pulmonary HTN (on adempas/selexipeg), diastolic dysfunction, pAF(on coumadin), CVA 1987, COPD on 2 litre nocturnal oxygen, central retinal artery occlusion without significant carotid artery stenosis, CAD with remote PCI, ESRD(LUE AVF) secondary to HTN, (HD MWF) s/p failed kidney transplant, PUD, STEFFANY on CPAP, hypothyroidism, RA, HLD and obesity. She presented to [facility] ED with c/o  rectal bleeding BRBPR which started 2/1/19 (diarrhea with blood clots - 4-5 episodes ). Labs indicate anemia  Hb of 4.3 down from 7.8 in January 2019.   Received 2 Units PRBCs and HGB improved to 7.3  Imaging and workup have not indicated source of bleeding.     Palliative care consulted for goals of care and advanced care planning.               Hospital Course:  No notes on file    Interval History:     Past Medical History:   Diagnosis Date    Allergy     Anemia     Anticoagulant long-term use     4 years coumadin, asa    Arthritis     Awaiting organ transplant 4/30/2013    Cataract     Central serous chorioretinopathy of eye, right 8/21/2018    CHF (congestive heart failure)     Chronic kidney disease     Colon polyps     COPD (chronic obstructive pulmonary disease)     Coronary artery disease     Diabetes mellitus     Diabetic retinopathy     Diverticulosis     Encounter for  blood transfusion     ESRD from HTN strtied RRT 1999    Failed  donor kidney transplant      Glaucoma     History of bleeding peptic ulcer      as stated per pt    Hyperlipidemia     Hypertension     Hypothyroidism     Morbid obesity 8/10/2012    Renal hypertension     Stroke     1987       Past Surgical History:   Procedure Laterality Date    CATARACT EXTRACTION W/  INTRAOCULAR LENS IMPLANT Bilateral     COLON SURGERY      COLONOSCOPY      COLONOSCOPY N/A 2019    Performed by Indio Beltran [facility] at [facility] (2ND FLR)    COLONOSCOPY N/A 2018    Performed by Jose Falk [facility] at [facility] END[facility] (2ND FLR)    EGD (ESOPHAGOGASTRODUODENOSCOPY) N/A 2019    Performed by Miranda Maradiaga MD at [facility] END[facility] (2ND FLR)    EGD (ESOPHAGOGASTRODUODENOSCOPY) N/A 2018    Performed by Luis Washington MD at [facility] END[facility] (2ND FLR)    ESOPHAGOGASTRODUODENOSCOPY (EGD) N/A 2018    Performed by Kenji Desir MD at [facility] END[facility] (2ND FLR)    EYE SURGERY      FRACTURE SURGERY      R arm    HERNIA REPAIR      HYSTERECTOMY      INSERTION-IMPLANT-GLAUCOMA Left 10/12/2017    Performed by Junaid Kern MD at [facility] OR 1ST FLR    KIDNEY TRANSPLANT      NEPHRECTOMY  2008    transplant     PARATHYROID GLAND SURGERY      TONSILLECTOMY      UPPER GASTROINTESTINAL ENDOSCOPY         Review of patient's allergies indicates:   Allergen Reactions    Penicillins Swelling    Iodine      Other reaction(s): Hives    Sulfamethoxazole-trimethoprim      Other reaction(s): Swelling  Other reaction(s): Hives       Medications:  Continuous Infusions:  Scheduled Meds:   atorvastatin  10 mg Oral Daily    dorzolamide  1 drop Both Eyes BID    epoetin constanza (PROCRIT) injection  8,000 Units Intravenous Every Mon, Wed, Fri    ergocalciferol  50,000 Units Oral Q7 Days    ferric gluconate (FERRLECIT) IVPB  125 mg Intravenous Every Mon, Wed, Fri    latanoprost  1 drop Both Eyes QHS     levothyroxine  100 mcg Oral Before breakfast    pantoprazole  40 mg Oral BID AC    riociguat  2 mg Oral TID    selexipag  1,000 mcg Oral BID    sodium chloride 0.9%  3 mL Intravenous Q8H    timolol maleate 0.5%  1 drop Both Eyes BID     PRN Meds:sodium chloride 0.9%, acetaminophen, dextrose 50%, dextrose 50%, glucagon (human recombinant), glucose, glucose, ondansetron, sodium chloride 0.9%, sodium chloride 0.9%    Family History     Problem Relation (Age of Onset)    Asthma Sister    Blindness Father    Breast cancer Maternal Aunt    Depression Sister    Diabetes Maternal Aunt    Heart attack Father, Mother    Heart failure Father, Mother    Hypertension Father, Mother, Sister, Brother    Kidney disease Brother        Tobacco Use    Smoking status: Former Smoker     Types: Cigarettes     Last attempt to quit: 8/10/2000     Years since quittin.5    Smokeless tobacco: Never Used   Substance and Sexual Activity    Alcohol use: No     Comment: hx of etoh     Drug use: No    Sexual activity: No       Review of Systems   Constitutional: Positive for fatigue.   Respiratory: Positive for shortness of breath.    Gastrointestinal: Positive for blood in stool.   Musculoskeletal: Positive for arthralgias.   Allergic/Immunologic: Negative.    Psychiatric/Behavioral: Negative for agitation and confusion.     Objective:     Vital Signs (Most Recent):  Temp: 98.6 °F (37 °C) (19 1211)  Pulse: 90 (19 1456)  Resp: 18 (19 0800)  BP: (!) 161/78 (19 1211)  SpO2: 100 % (19 1211) Vital Signs (24h Range):  Temp:  [98.1 °F (36.7 °C)-99 °F (37.2 °C)] 98.6 °F (37 °C)  Pulse:  [80-97] 90  Resp:  [18] 18  SpO2:  [98 %-100 %] 100 %  BP: (130-161)/(69-91) 161/78     Weight: 78.9 kg (174 lb)  Body mass index is 31.83 kg/m².    Review of Symptoms  Symptom Assessment (ESAS 0-10 scale)   ESAS 0 1 2 3 4 5 6 7 8 9 10   Pain   X           Dyspnea X             Anxiety X             Nausea X              Depression  X             Anorexia X             Fatigue   X           Insomnia X             Restlessness  X             Agitation X             CAM / Delirium __ --  ___+   Constipation     __ --  ___+   Diarrhea           __ --  ___+  Bowel Management Plan (BMP): No    Comments: complains of mild pain reports no shortness of breath     Pain Assessment: complains of mild joint pain she attributes to arthritis, agravated by immobililty, has not requested any pain medications - relieved with acetaminophen     OME in 24 hours: 0    Performance Status: 60    ECOG Performance Status Grade: 1 - Ambulates, capable of light work    Physical Exam   Constitutional: She is oriented to person, place, and time. She appears well-developed and well-nourished. No distress.   Cardiovascular: Normal rate.   Irregular rhythm    Pulmonary/Chest: Effort normal and breath sounds normal.   Abdominal: Soft. Bowel sounds are normal.   Musculoskeletal:   Joint pain    Neurological: She is alert and oriented to person, place, and time.   Skin: Skin is warm and dry.   Psychiatric: She has a normal mood and affect. Her behavior is normal. Judgment and thought content normal.   Nursing note and vitals reviewed.      Significant Labs: All pertinent labs within the past 24 hours have been reviewed.  CBC:   Recent Labs   Lab 02/07/19  1355   WBC 5.85   HGB 9.0*   HCT 29.5*   MCV 94        BMP:  Recent Labs   Lab 02/07/19  0619   GLU 78   *   K 4.0      CO2 27   BUN 11   CREATININE 5.7*   CALCIUM 7.3*   MG 2.2     LFT:  Lab Results   Component Value Date    AST 17 02/07/2019    ALKPHOS 45 (L) 02/07/2019    BILITOT 0.5 02/07/2019     Albumin:   Albumin   Date Value Ref Range Status   02/07/2019 2.3 (L) 3.5 - 5.2 g/dL Final     Protein:   Total Protein   Date Value Ref Range Status   02/07/2019 5.5 (L) 6.0 - 8.4 g/dL Final     Lactic acid:   No results found for: LACTATE    Significant Imaging: I have reviewed all pertinent  "imaging results/findings within the past 24 hours.    Advance Care Planning   Advanced Directives::  Living Will: No  LaPOST: No  Do Not Resuscitate Status: No  Medical Power of : No    Decision-Making Capacity: Patient answered questions       Living Arrangements: Lives with family    Psychosocial/Cultural: ,  for 28 yrs, two adult sons,  Retired, lives with sons and brother.    Patient's most important priorities:  Really wants to find out why and where she is loosing so much blood    Patient's biggest concerns/fears:  That I have no one to motivate me to keep going.      Previous death/end of life care history:   almost 4 yrs ago.  She feels she is still grieving as he was her " and kept her going".  She prepared him to lose her because she was the sick one and he was the healthy one.       Patient's goals/hopes:  Just met a new gentleman friend that really cares for her and she wants to be able to accept and care for him.    She hopes to continue to live a satisfying life.     Spiritual:     F- Brandy and Belief: [employer] - [employer]alism     I - Importance:   .  C - Community:     A - Address in Care: amenable to  visits       > 50% of 70  min visit spent in chart review, face to face discussion of goals of care,  symptom assessment, coordination of care and emotional support.    REYES Barreto, ACNS-BC, OCN   Palliative Medicine  [facility]            "

## 2019-02-07 NOTE — CONSULTS
[facility]  Palliative Medicine  Consult Note    Patient Name: Shivani Sheets  MRN: [id number]  Admission Date: 2/2/2019  Hospital Length of Stay: 5 days  Code Status: Full Code   Attending Provider: Surya Hernandez MD  Consulting Provider: PAULETTE Dunne  Primary Care Physician: Eula Weiss MD  Principal Problem:Anemia of chronic disease      Inpatient consult to Palliative Care  Consult performed by: PAULETTE Lauren  Consult ordered by: Anatoliy Hartmann MD  Reason for consult: goals of care and advanced care planning   Assessment/Recommendations: Palliative care consult received.  Chart reviewed and patient discussed with Dr. Hartmann.  Focus of consult is goals of care and advanced care planning   Full consult to follow.   Thank you for consult and opportunity to participate in Ms. Sheets's care.   Corinne Barrios, REYES, ACNS-BC, OCN

## 2019-02-08 ENCOUNTER — ANTI-COAG VISIT (OUTPATIENT)
Dept: CARDIOLOGY | Facility: CLINIC | Age: 64
End: 2019-02-08

## 2019-02-08 VITALS
BODY MASS INDEX: 32.02 KG/M2 | HEART RATE: 103 BPM | SYSTOLIC BLOOD PRESSURE: 169 MMHG | WEIGHT: 174 LBS | HEIGHT: 62 IN | DIASTOLIC BLOOD PRESSURE: 90 MMHG | RESPIRATION RATE: 18 BRPM | OXYGEN SATURATION: 99 % | TEMPERATURE: 98 F

## 2019-02-08 DIAGNOSIS — I48.20 CHRONIC ATRIAL FIBRILLATION: ICD-10-CM

## 2019-02-08 DIAGNOSIS — Z79.01 ANTICOAGULATION MONITORING BY PHARMACIST: ICD-10-CM

## 2019-02-08 PROBLEM — K92.2 GASTROINTESTINAL HEMORRHAGE: Status: RESOLVED | Noted: 2019-01-12 | Resolved: 2019-02-08

## 2019-02-08 PROBLEM — K92.2 GIB (GASTROINTESTINAL BLEEDING): Status: RESOLVED | Noted: 2018-02-27 | Resolved: 2019-02-08

## 2019-02-08 PROBLEM — Z51.5 PALLIATIVE CARE ENCOUNTER: Status: RESOLVED | Noted: 2019-02-07 | Resolved: 2019-02-08

## 2019-02-08 PROBLEM — D64.9 SEVERE ANEMIA: Status: RESOLVED | Noted: 2018-02-27 | Resolved: 2019-02-08

## 2019-02-08 PROBLEM — R57.8 HEMORRHAGIC SHOCK: Status: RESOLVED | Noted: 2019-02-02 | Resolved: 2019-02-08

## 2019-02-08 LAB
ALBUMIN SERPL BCP-MCNC: 2.4 G/DL
ALBUMIN SERPL ELPH-MCNC: 2.4 G/DL
ALP SERPL-CCNC: 46 U/L
ALPHA1 GLOB SERPL ELPH-MCNC: 0.38 G/DL
ALPHA2 GLOB SERPL ELPH-MCNC: 0.64 G/DL
ALT SERPL W/O P-5'-P-CCNC: 11 U/L
ANION GAP SERPL CALC-SCNC: 6 MMOL/L
AST SERPL-CCNC: 16 U/L
B-GLOBULIN SERPL ELPH-MCNC: 0.63 G/DL
BASOPHILS # BLD AUTO: 0.05 K/UL
BASOPHILS NFR BLD: 0.9 %
BILIRUB SERPL-MCNC: 0.3 MG/DL
BUN SERPL-MCNC: 16 MG/DL
CALCIUM SERPL-MCNC: 7 MG/DL
CHLORIDE SERPL-SCNC: 104 MMOL/L
CO2 SERPL-SCNC: 26 MMOL/L
CREAT SERPL-MCNC: 8.2 MG/DL
DIFFERENTIAL METHOD: ABNORMAL
EOSINOPHIL # BLD AUTO: 0.4 K/UL
EOSINOPHIL NFR BLD: 6.7 %
ERYTHROCYTE [DISTWIDTH] IN BLOOD BY AUTOMATED COUNT: 16.1 %
EST. GFR  (AFRICAN AMERICAN): 5.4 ML/MIN/1.73 M^2
EST. GFR  (NON AFRICAN AMERICAN): 4.7 ML/MIN/1.73 M^2
GAMMA GLOB SERPL ELPH-MCNC: 1.14 G/DL
GLUCOSE SERPL-MCNC: 94 MG/DL
HCT VFR BLD AUTO: 27.3 %
HGB BLD-MCNC: 8.4 G/DL
IMM GRANULOCYTES # BLD AUTO: 0.02 K/UL
IMM GRANULOCYTES NFR BLD AUTO: 0.3 %
INR PPP: 1
INTERPRETATION SERPL IFE-IMP: NORMAL
KAPPA LC SER QL IA: 37.05 MG/DL
KAPPA LC/LAMBDA SER IA: 2.4
LAMBDA LC SER QL IA: 15.44 MG/DL
LYMPHOCYTES # BLD AUTO: 1 K/UL
LYMPHOCYTES NFR BLD: 16.7 %
MAGNESIUM SERPL-MCNC: 2.4 MG/DL
MCH RBC QN AUTO: 28.5 PG
MCHC RBC AUTO-ENTMCNC: 30.8 G/DL
MCV RBC AUTO: 93 FL
MONOCYTES # BLD AUTO: 0.5 K/UL
MONOCYTES NFR BLD: 9.1 %
NEUTROPHILS # BLD AUTO: 3.8 K/UL
NEUTROPHILS NFR BLD: 66.3 %
NRBC BLD-RTO: 0 /100 WBC
PATHOLOGIST INTERPRETATION IFE: NORMAL
PATHOLOGIST INTERPRETATION SPE: NORMAL
PLATELET # BLD AUTO: 133 K/UL
PMV BLD AUTO: 12.3 FL
POTASSIUM SERPL-SCNC: 4 MMOL/L
PROT SERPL-MCNC: 5.2 G/DL
PROT SERPL-MCNC: 5.9 G/DL
PROTHROMBIN TIME: 10.2 SEC
RBC # BLD AUTO: 2.95 M/UL
SODIUM SERPL-SCNC: 136 MMOL/L
WBC # BLD AUTO: 5.8 K/UL

## 2019-02-08 PROCEDURE — 96372 THER/PROPH/DIAG INJ SC/IM: CPT

## 2019-02-08 PROCEDURE — 25000003 PHARM REV CODE 250: Performed by: INTERNAL MEDICINE

## 2019-02-08 PROCEDURE — A4216 STERILE WATER/SALINE, 10 ML: HCPCS | Performed by: INTERNAL MEDICINE

## 2019-02-08 PROCEDURE — 90935 HEMODIALYSIS ONE EVALUATION: CPT | Mod: ,,, | Performed by: INTERNAL MEDICINE

## 2019-02-08 PROCEDURE — 63600175 PHARM REV CODE 636 W HCPCS: Mod: JG | Performed by: NURSE PRACTITIONER

## 2019-02-08 PROCEDURE — 90935 HEMODIALYSIS ONE EVALUATION: CPT

## 2019-02-08 PROCEDURE — 90935 PR HEMODIALYSIS, ONE EVALUATION: ICD-10-PCS | Mod: ,,, | Performed by: INTERNAL MEDICINE

## 2019-02-08 PROCEDURE — 83735 ASSAY OF MAGNESIUM: CPT

## 2019-02-08 PROCEDURE — 80053 COMPREHEN METABOLIC PANEL: CPT

## 2019-02-08 PROCEDURE — 85025 COMPLETE CBC W/AUTO DIFF WBC: CPT

## 2019-02-08 PROCEDURE — 99238 HOSP IP/OBS DSCHRG MGMT 30/<: CPT | Mod: ,,, | Performed by: INTERNAL MEDICINE

## 2019-02-08 PROCEDURE — 99238 PR HOSPITAL DISCHARGE DAY,<30 MIN: ICD-10-PCS | Mod: ,,, | Performed by: INTERNAL MEDICINE

## 2019-02-08 PROCEDURE — 25000003 PHARM REV CODE 250: Performed by: NURSE PRACTITIONER

## 2019-02-08 PROCEDURE — 85610 PROTHROMBIN TIME: CPT

## 2019-02-08 RX ORDER — ASPIRIN 325 MG
325 TABLET ORAL DAILY
Refills: 0 | Status: ON HOLD | COMMUNITY
Start: 2019-02-08 | End: 2019-06-06 | Stop reason: HOSPADM

## 2019-02-08 RX ORDER — SODIUM CHLORIDE 9 MG/ML
INJECTION, SOLUTION INTRAVENOUS ONCE
Status: COMPLETED | OUTPATIENT
Start: 2019-02-08 | End: 2019-02-08

## 2019-02-08 RX ADMIN — ERYTHROPOIETIN 8000 UNITS: 4000 INJECTION, SOLUTION INTRAVENOUS; SUBCUTANEOUS at 09:02

## 2019-02-08 RX ADMIN — TIMOLOL MALEATE 1 DROP: 5 SOLUTION OPHTHALMIC at 12:02

## 2019-02-08 RX ADMIN — ATORVASTATIN CALCIUM 10 MG: 10 TABLET, FILM COATED ORAL at 12:02

## 2019-02-08 RX ADMIN — DORZOLAMIDE HYDROCHLORIDE 1 DROP: 20 SOLUTION/ DROPS OPHTHALMIC at 12:02

## 2019-02-08 RX ADMIN — PANTOPRAZOLE SODIUM 40 MG: 40 TABLET, DELAYED RELEASE ORAL at 05:02

## 2019-02-08 RX ADMIN — RIOCIGUAT 2 MG: 2 TABLET, FILM COATED ORAL at 12:02

## 2019-02-08 RX ADMIN — LEVOTHYROXINE SODIUM 100 MCG: 100 TABLET ORAL at 05:02

## 2019-02-08 RX ADMIN — Medication 3 ML: at 06:02

## 2019-02-08 RX ADMIN — SODIUM CHLORIDE: 0.9 INJECTION, SOLUTION INTRAVENOUS at 07:02

## 2019-02-08 RX ADMIN — ASPIRIN 325 MG ORAL TABLET 325 MG: 325 PILL ORAL at 12:02

## 2019-02-08 RX ADMIN — ACETAMINOPHEN 650 MG: 325 TABLET ORAL at 09:02

## 2019-02-08 NOTE — NURSING
"Pt refusing to have labs collected by venipuncture. Midline unable to pull back blood at this time. HTS 2 paged, awaiting call back. Pt states, "I will only let them get my labs in dialysis this morning." TM.    0543: HD nurse confirmed during report she will collect pt's labs from HD access prior to start.   "

## 2019-02-08 NOTE — PLAN OF CARE
Problem: Adult Inpatient Plan of Care  Goal: Plan of Care Review  Outcome: Ongoing (interventions implemented as appropriate)  POC reviewed with the pt. No acute changes throughout the shift. Will continue to monitor.

## 2019-02-08 NOTE — PROGRESS NOTES
Pt was seen during hemodialysis today, dialysis flowsheet, labs, vitals were reviewed, dialysate bath was adjusted per labs and d/w team.

## 2019-02-08 NOTE — NURSING
Pt's IV d/c'd; intact. Discharge papers reviewed with the pt. No new prescriptions. Pt stated she had all her personal belongings with her. Pt was transported out of the hospital via w/c accompanied by family member.

## 2019-02-08 NOTE — DISCHARGE SUMMARY
[facility]  Heart Transplant  Discharge Summary      Patient Name: Shivani Sheets  MRN: [id number]  Admission Date: 2/2/2019  Hospital Length of Stay: 6 days  Discharge Date and Time: 02/08/2019 11:50 AM  Attending Physician: Surya Hernandez MD   Discharging Provider: Anatoliy Hartmann MD  Primary Care Provider: Eula Weiss MD     HPI: 62 yo F w/ PMHx  pulmonary HTN (on adempas/selexipeg), diastolic dysfunction, pAF(on coumadin), CVA 1987, COPD on 2 litre nocturnal oxygen, central retinal artery occlusion without significant carotid artery stenosis, CAD with remote PCI, ESRD(LUE AVF) secondary to HTN, s/p failed kidney transplant, PUD, STEFFANY on CPAP, hypothyroidism, RA, HLD and obesity who presents with rectal bleeding BRBPR which started 2/1/19 (diarrhea with blood clots - 4-5 episodes ) and anemia with Hb of 4.3 ( 7.8 on 1/18).     She was feeling lightheaded on getting up. Last admission had bill. Had some irregular HR occasionally. One episode of vomiting and some nausea.  She finished course of doxycyline last week for cellulitis of her leg from IO access, now swelling reduced. After discharge on 1/18 she did not have any episodes since yesterday. Had HD on 2/1/2019. Took 5 mg coumadin yesterday. No chest pain, SOB, leg swelling, PND, orthopnea, syncope, F/C, phlegm, abdominal pain, bruising or bleeding from other sites.    In ED had low blood pressures (98/51 mmHg), but no oxygen requirement and Hgb 4.3. Seen by CCU- no ICU needs at this time. Nephrology saw - will transfuse with HD in am. GI evaluated- Recommended NPO past midnight and CTA if actively bleeds (last BRBPR in the morning).     Last admission : 1/12- 1/18: Melena received 6  Units PBRC and 2 units FFP. Had EGD, SBE: normal stomach, esophagus non bleeding erosive gastropathy. VCE: No obvious source, erosive gastropathy, gastric polyp and SB lymphangiectasia. Discharged with hb of 7.8 and INR 1.7. Discontinued aspirin.    Had recurrent admissions for GI bleed with no obvious source- Admit 9/2018, 6/2018, 2/2018: Colonoscopy : Internal hemorrhoids, 5 mm polyp in sigmoid colon.    11/2018: TTE: LVEF 55%, LA severely enlarged, moderate TR, RV moderately dilated, RVSF mild decreased, PASP -99, severe pulmonary hypertension.   PET stress: No evidence of ischemia at rest and stress          Procedure(s) (LRB):  COLONOSCOPY (N/A)     Hospital Course: Admitted for anemia, suspected due to GI bleeding. GI was consulted. Colonoscopy was performed 2/4 which was unremarkable. Required multiple pRBCs transfusions during this admission and recurrently over the past few months. Noted to have iron deficiency, started on PO and IV iron. She was given Epo injections per nephrology. Hem/Onc was consulted to evaluate for alternate causes of anemia. Bone marrow biopsy noted indicated at this time. Palliative care was consulted to help with goals of care. Possible her anemia is related to BM suppression from PH medications. Holding coumadin. Will consider resuming as an outpatient.  EP referral upon discharge for Watchman device. Follow up next week with EP and then HTS.     Consults (From admission, onward)        Status Ordering Provider     Inpatient consult to Critical Care Medicine  Once     Provider:  (Not yet assigned)    Completed CR BONDS     Inpatient consult to Gastroenterology  Once     Provider:  (Not yet assigned)    Completed NORA PHILLIP     Inpatient consult to Hematology/Oncology  Once     Provider:  (Not yet assigned)    Completed EZEKIEL LAZO     Inpatient consult to Nephrology  Once     Provider:  (Not yet assigned)    Completed NORA PHILLIP     Inpatient consult to Palliative Care  Once     Provider:  (Not yet assigned)    Completed EZEKIEL LAZO     Inpatient consult to PICC team (\[facility]")  Once     Provider:  (Not yet assigned)    Completed CHUCKIE SHEFFIELD JR  Diagnostic Studies: Labs:   CMP   Recent Labs   Lab 02/07/19  0019 02/07/19  0619 02/08/19  0400   * 135* 136   K 3.6 4.0 4.0    104 104   CO2 29 27 26   GLU 87 78 94   BUN 10 11 16   CREATININE 5.1* 5.7* 8.2*   CALCIUM 7.4* 7.3* 7.0*   PROT  --  5.5* 5.9*   ALBUMIN  --  2.3* 2.4*   BILITOT  --  0.5 0.3   ALKPHOS  --  45* 46*   AST  --  17 16   ALT  --  9* 11   ANIONGAP 3* 4* 6*   ESTGFRAFRICA 9.5* 8.3* 5.4*   EGFRNONAA 8.3* 7.2* 4.7*   , CBC   Recent Labs   Lab 02/07/19  1355 02/08/19  0600   WBC 5.85 5.80   HGB 9.0* 8.4*   HCT 29.5* 27.3*    133*   , INR   Lab Results   Component Value Date    INR 1.0 02/08/2019    INR 1.1 02/07/2019    INR 1.4 (H) 02/06/2019   , Lipid Panel   Lab Results   Component Value Date    CHOL 114 (L) 02/07/2019    HDL 56 02/07/2019    LDLCALC 40.8 (L) 02/07/2019    TRIG 86 02/07/2019    CHOLHDL 49.1 02/07/2019   , Troponin No results for input(s): TROPONINI in the last 168 hours., A1C:   Recent Labs   Lab 01/13/19  0506 02/02/19  1342   HGBA1C 5.2 5.1    and All labs within the past 24 hours have been reviewed  Radiology: X-Ray: CXR: X-Ray Chest 1 View (CXR): No results found for this visit on 02/02/19. and X-Ray Chest PA and Lateral (CXR): No results found for this visit on 02/02/19. and KUB: X-Ray Abdomen AP 1 View (KUB): No results found for this visit on 02/02/19.  CT scan: CT ABDOMEN PELVIS WITH CONTRAST: No results found for this visit on 02/02/19. and CT ABDOMEN PELVIS WITHOUT CONTRAST: No results found for this visit on 02/02/19.  Cardiac Graphics: Echocardiogram:   2D echo with color flow doppler:   Results for orders placed or performed during the hospital encounter of 02/08/18   2D echo with color flow doppler   Result Value Ref Range    QEF 60 55 - 65    Mitral Valve Regurgitation TRIVIAL     Diastolic Dysfunction Yes (A)     Est. PA Systolic Pressure 70.9 (A)     Tricuspid Valve Regurgitation MILD     and Transthoracic echo (TTE) complete (Cupid Only):    Results for orders placed or performed during the hospital encounter of 02/02/19   Transthoracic echo (TTE) complete (Cupid Only)   Result Value Ref Range    Ascending aorta 2.84 cm    STJ 2.75 cm    AV mean gradient 7.43 mmHg    Ao peak drake 1.88 m/s    Ao VTI 33.94 cm    IVS 0.82 0.6 - 1.1 cm    LA size 4.45 cm    Left Atrium Major Axis 6.10 cm    Left Atrium Minor Axis 5.77 cm    LVIDD 4.76 3.5 - 6.0 cm    LVIDS 3.95 2.1 - 4.0 cm    LVOT diameter 2.02 cm    LVOT peak VTI 20.94 cm    PW 1.11 (A) 0.6 - 1.1 cm    MV Peak A Drake 0.89 m/s    E wave decelartion time 248.29 msec    MV Peak E Drake 1.08 m/s    RA Major Axis 5.76 cm    RA Width 4.27 cm    RVDD 3.08 cm    Sinus 2.85 cm    TAPSE 2.24 cm    TR Max Drake 4.46 m/s    TDI LATERAL 0.10     TDI SEPTAL 0.05     LA WIDTH 5.16 cm    LV Diastolic Volume 105.47 mL    LV Systolic Volume 68.06 mL    LVOT peak drake 1.101598332763 m/s    LV LATERAL E/E' RATIO 10.80     LV SEPTAL E/E' RATIO 21.60     FS 17 %    LA volume 115.75 cm3    LV mass 159.98 g    Left Ventricle Relative Wall Thickness 0.47 cm    AV valve area 1.98 cm2    AV Velocity Ratio 0.60     AV index (prosthetic) 0.62     E/A ratio 1.21     Mean e' 0.08     LVOT area 3.20 cm2    LVOT stroke volume 67.07 cm3    AV peak gradient 14.14 mmHg    E/E' ratio 14.40     LV Systolic Volume Index 37.8 mL/m2    LV Diastolic Volume Index 58.54 mL/m2    LA Volume Index 64.2 mL/m2    LV Mass Index 88.8 g/m2    Triscuspid Valve Regurgitation Peak Gradient 79.57 mmHg    BSA 1.86 m2    Right Atrial Pressure (from IVC) 8 mmHg    TV rest pulmonary artery pressure 88 mmHg       Pending Diagnostic Studies:     Procedure Component Value Units Date/Time    Immunofixation electrophoresis [[id number]] Collected:  02/07/19 0619    Order Status:  Sent Lab Status:  In process Updated:  02/07/19 0702    Specimen:  Blood         Final Active Diagnoses:    Diagnosis Date Noted POA    PRINCIPAL PROBLEM:  Anemia of chronic disease [D63.8]  09/14/2018 Yes     Chronic    Acute blood loss anemia [D62]  Yes    End stage renal disease on dialysis [N18.6, Z99.2]  Not Applicable    Controlled type 2 diabetes mellitus with both eyes affected by proliferative retinopathy and macular edema, without long-term current use of insulin [E11.3513] 08/21/2018 Yes    Pulmonary HTN [I27.20] 02/11/2014 Yes    Atrial fibrillation [I48.91] 02/11/2014 Yes    Hypothyroidism [E03.9]  Yes    Chronic diastolic heart failure [I50.32] 08/10/2012 Yes    CAD (coronary artery disease) [I25.10] 08/10/2012 Yes    Obstructive sleep apnea [G47.33] 08/10/2012 Yes    Hyperlipidemia [E78.5] 08/10/2012 Yes    Obesity [E66.9] 08/10/2012 Yes    ESRD from HTN started RRT 1999 [N18.6] 01/01/1999 Yes      Problems Resolved During this Admission:    Diagnosis Date Noted Date Resolved POA    Palliative care encounter [Z51.5] 02/07/2019 02/08/2019 Not Applicable    Goals of care, counseling/discussion [Z71.89]  02/08/2019 Not Applicable    Advanced care planning/counseling discussion [Z71.89]  02/08/2019 Not Applicable    Gastrointestinal hemorrhage [K92.2] 01/12/2019 02/08/2019 Yes    Supratherapeutic INR [R79.1] 09/10/2018 02/05/2019 Yes      Discharged Condition: good    Disposition: Home or Self Care    Follow Up:    Patient Instructions:      Diet Cardiac     Notify your health care provider if you experience any of the following:  increased confusion or weakness     Notify your health care provider if you experience any of the following:  persistent dizziness, light-headedness, or visual disturbances     Notify your health care provider if you experience any of the following:  worsening rash     Notify your health care provider if you experience any of the following:  severe persistent headache     Notify your health care provider if you experience any of the following:  difficulty breathing or increased cough     Notify your health care provider if you experience any of the  following:  redness, tenderness, or signs of infection (pain, swelling, redness, odor or green/yellow discharge around incision site)     Notify your health care provider if you experience any of the following:  severe uncontrolled pain     Notify your health care provider if you experience any of the following:  persistent nausea and vomiting or diarrhea     Notify your health care provider if you experience any of the following:  temperature >100.4     Activity as tolerated     Medications:  Reconciled Home Medications:      Medication List      START taking these medications    aspirin 325 MG tablet  Take 1 tablet (325 mg total) by mouth once daily.        CONTINUE taking these medications    ADEMPAS 2 mg Tab tablet  Generic drug:  riociguat  Take 2 mg by mouth 3 (three) times daily.     ALPHAGAN P 0.1 % Drop  Generic drug:  brimonidine 0.1%  once daily.     diltiaZEM 300 MG 24 hr capsule  Commonly known as:  CARDIZEM CD  Take 1 capsule (300 mg total) by mouth once daily.     dorzolamide-timolol 2-0.5% 22.3-6.8 mg/mL ophthalmic solution  Commonly known as:  COSOPT  INSTILL 1 DROP IN BOTH EYES TWICE DAILY     latanoprost 0.005 % ophthalmic solution  INSTILL 1 DROP IN BOTH EYES EVERY DAY AT BEDTIME     levothyroxine 100 MCG tablet  Commonly known as:  SYNTHROID  Take 100 mcg by mouth. 1 Tablet Oral Every day     LIPITOR 10 MG tablet  Generic drug:  atorvastatin  TAKE 1 BY MOUTH ONCE A DAY     pantoprazole 40 MG tablet  Commonly known as:  PROTONIX  TAKE 1 TABLET BY MOUTH ONCE DAILY     UPTRAVI 1,000 mcg Tab  Generic drug:  selexipag  Take 1,000 mcg by mouth 2 (two) times daily.        STOP taking these medications    HYDROcodone-acetaminophen 7.5-325 mg per tablet  Commonly known as:  NORCO     traMADol 50 mg tablet  Commonly known as:  ULTRAM     warfarin 5 MG tablet  Commonly known as:  COUMADIN            Anatoliy Hartmann MD  Heart Transplant  [facility]

## 2019-02-08 NOTE — PLAN OF CARE
Problem: Adult Inpatient Plan of Care  Goal: Plan of Care Review  Outcome: Ongoing (interventions implemented as appropriate)  Hemodialysis tx complete, 2L removed in 3.5 hour tx, tolerated well. Blood returned via ABEL AVF, 15g needles removed x2, gauze and tape applied, pressure held for 5 minutes to each site, hemostasis achieved

## 2019-02-08 NOTE — PROGRESS NOTES
Maintenance hemodialysis tx started via ABEL AVF, tolerated well flows good. Informed pt of tx duration and net goal removal , voices understanding. Daily labs obtained and sent to lab

## 2019-02-08 NOTE — PROGRESS NOTES
[facility] 2/2-2/8 and warfarin now on hold. See MD notes below    Admitted for anemia, suspected due to GI bleeding. GI was consulted. Colonoscopy was performed 2/4 which was unremarkable. Required multiple pRBCs transfusions during this admission and recurrently over the past few months. Noted to have iron deficiency, started on PO and IV iron. She was given Epo injections per nephrology. Hem/Onc was consulted to evaluate for alternate causes of anemia. Bone marrow biopsy noted indicated at this time. Palliative care was consulted to help with goals of care. Possible her anemia is related to BM suppression from PH medications. Holding coumadin. Will consider resuming as an outpatient.  EP referral upon discharge for Watchman device. Follow up next week with EP and then HTS.

## 2019-02-08 NOTE — PROGRESS NOTES
DISCHARGE    SW to pt's room for discharge this morning; however, pt off floor for dialysis.  SW attempted to see pt again this afternoon for d/c, but pt had already returned from HD and discharged.  During SW admit assessment earlier this week, pt reported no d/c needs and reported coping adequately with support from family.  No d/c needs identified by HTS team.  SW remains available.

## 2019-02-08 NOTE — PLAN OF CARE
Problem: Adult Inpatient Plan of Care  Goal: Plan of Care Review  Outcome: Ongoing (interventions implemented as appropriate)  Pt AAOx4, afebrile, free of falls. Pt up in chair most of shift, resting well compared to previous shift. Denies pain/distress. Scheduled for HD this morning. Palliative care consult was completed on previous shift. Fluid restriction compliance. WCTM.

## 2019-02-11 ENCOUNTER — PATIENT OUTREACH (OUTPATIENT)
Dept: ADMINISTRATIVE | Facility: CLINIC | Age: 64
End: 2019-02-11

## 2019-02-11 ENCOUNTER — TELEPHONE (OUTPATIENT)
Dept: TRANSPLANT | Facility: CLINIC | Age: 64
End: 2019-02-11

## 2019-02-11 NOTE — PROGRESS NOTES
C3 nurse attempted to contact patient. No answer. The following message was left for the patient to return the call:  Good morning, I am a nurse calling on behalf of [facility] from the Care Coordination Center.  This is a Transitional Care Call for Shivani Sheets. When you have a moment please contact us at [phone] or [phone] Monday through Friday, between the hours of 8 am to 4 pm. We look forward to speaking with you. On behalf of [facility] have a nice day.    The patient does not have a scheduled HOSFU appointment within 7 days post hospital discharge date 2\8. Message sent to Physician staff to assist with HOSFU appointment scheduling.

## 2019-02-12 ENCOUNTER — INITIAL CONSULT (OUTPATIENT)
Dept: ELECTROPHYSIOLOGY | Facility: CLINIC | Age: 64
End: 2019-02-12
Payer: MEDICARE

## 2019-02-12 VITALS
HEIGHT: 61 IN | WEIGHT: 158.31 LBS | SYSTOLIC BLOOD PRESSURE: 130 MMHG | DIASTOLIC BLOOD PRESSURE: 70 MMHG | BODY MASS INDEX: 29.89 KG/M2 | HEART RATE: 65 BPM

## 2019-02-12 DIAGNOSIS — D63.8 ANEMIA OF CHRONIC DISEASE: Chronic | ICD-10-CM

## 2019-02-12 DIAGNOSIS — D62 ACUTE BLOOD LOSS ANEMIA: ICD-10-CM

## 2019-02-12 DIAGNOSIS — N18.6 ESRD (END STAGE RENAL DISEASE): ICD-10-CM

## 2019-02-12 DIAGNOSIS — I48.0 PAROXYSMAL ATRIAL FIBRILLATION: Primary | ICD-10-CM

## 2019-02-12 PROCEDURE — 99215 OFFICE O/P EST HI 40 MIN: CPT | Mod: S$PBB,,, | Performed by: INTERNAL MEDICINE

## 2019-02-12 PROCEDURE — 99215 PR OFFICE/OUTPT VISIT, EST, LEVL V, 40-54 MIN: ICD-10-PCS | Mod: S$PBB,,, | Performed by: INTERNAL MEDICINE

## 2019-02-12 PROCEDURE — 99999 PR PBB SHADOW E&M-EST. PATIENT-LVL III: ICD-10-PCS | Mod: PBBFAC,,, | Performed by: INTERNAL MEDICINE

## 2019-02-12 PROCEDURE — 99999 PR PBB SHADOW E&M-EST. PATIENT-LVL III: CPT | Mod: PBBFAC,,, | Performed by: INTERNAL MEDICINE

## 2019-02-12 PROCEDURE — 99213 OFFICE O/P EST LOW 20 MIN: CPT | Mod: PBBFAC | Performed by: INTERNAL MEDICINE

## 2019-02-12 NOTE — PROGRESS NOTES
Subjective:    Patient ID:  Shivani Sheets is a 63 y.o. female who presents for evaluation of Atrial Fibrillation      63 yoF HTN, ESRD on HD, DM, pHTN here for LAAO consideration. She has chronic anemia with some more recent lower GI bleeding. She has been on warfarin for 4 years for CVA prophylaxis due to AF. This was stopped due her recent lower GI bleed . She has been placed back on warfarin. GI work up has demonstrated no obvious source.     Echo 19 (severe anemia):  · Moderately decreased left ventricular systolic function. The estimated ejection fraction is 40%  · Concentric left ventricular remodeling.  · Local segmental wall motion abnormalities.  · Grade II (moderate) left ventricular diastolic dysfunction consistent with pseudonormalization.  · Severe left atrial enlargement.  · Elevated left atrial pressure.  · Normal right ventricular systolic function.  · Moderate right atrial enlargement.  · Mild aortic valve stenosis.  · Aortic valve area is 1.98 cm2; peak velocity is 1.88 m/s; mean gradient is 7.43 mmHg.  · Mild mitral sclerosis.  · Mild to moderate tricuspid regurgitation.  · Pulmonary hypertension present.  · Intermediate central venous pressure (8 mm Hg).  · The estimated PA systolic pressure is 88 mm Hg    Past Medical History:  No date: Allergy  No date: Anemia  No date: Anticoagulant long-term use      Comment:  4 years coumadin, asa  No date: Arthritis  2013: Awaiting organ transplant  No date: Cataract  2018: Central serous chorioretinopathy of eye, right  No date: CHF (congestive heart failure)  No date: Chronic kidney disease  No date: Colon polyps  No date: COPD (chronic obstructive pulmonary disease)  No date: Coronary artery disease  No date: Diabetes mellitus  No date: Diabetic retinopathy  No date: Diverticulosis  No date: Encounter for blood transfusion  1999: ESRD from HTN strtied RRT   No date: Failed  donor kidney transplant   No  date: Glaucoma  No date: History of bleeding peptic ulcer      Comment:  1970's as stated per pt  No date: Hyperlipidemia  No date: Hypertension  No date: Hypothyroidism  8/10/2012: Morbid obesity  No date: Renal hypertension  No date: Stroke      Comment:  1987    Past Surgical History:  No date: CATARACT EXTRACTION W/  INTRAOCULAR LENS IMPLANT; Bilateral  No date: COLON SURGERY  No date: COLONOSCOPY  2/4/2019: COLONOSCOPY; N/A      Comment:  Performed by Indio Beltran MD at [facility] ENDO (2ND FLR)  6/12/2018: COLONOSCOPY; N/A      Comment:  Performed by Jose Falk MD at [facility] ENDO (2ND FLR)  1/14/2019: EGD (ESOPHAGOGASTRODUODENOSCOPY); N/A      Comment:  Performed by Miranda Maradiaga MD at [facility] ENDO (2ND FLR)  9/11/2018: EGD (ESOPHAGOGASTRODUODENOSCOPY); N/A      Comment:  Performed by Luis Washington MD at [facility] ENDO (2ND FLR)  2/27/2018: ESOPHAGOGASTRODUODENOSCOPY (EGD); N/A      Comment:  Performed by Kenji Desir MD at [facility] ENDO (2ND FLR)  No date: EYE SURGERY  No date: FRACTURE SURGERY      Comment:  R arm  No date: HERNIA REPAIR  No date: HYSTERECTOMY  10/12/2017: INSERTION-IMPLANT-GLAUCOMA; Left      Comment:  Performed by Junaid Kren MD at [facility] OR 1ST FLR  No date: KIDNEY TRANSPLANT  11/2008: NEPHRECTOMY      Comment:  transplant   No date: PARATHYROID GLAND SURGERY  No date: TONSILLECTOMY  No date: UPPER GASTROINTESTINAL ENDOSCOPY    Social History    Socioeconomic History      Marital status:       Spouse name: Not on file      Number of children: Not on file      Years of education: Not on file      Highest education level: Not on file    Social Needs      Financial resource strain: Not on file      Food insecurity - worry: Not on file      Food insecurity - inability: Not on file      Transportation needs - medical: Not on file      Transportation needs - non-medical: Not on file    Occupational History      Not on file    Tobacco Use      Smoking status: Former Smoker        Types:  Cigarettes        Quit date: 8/10/2000        Years since quittin.5      Smokeless tobacco: Never Used    Substance and Sexual Activity      Alcohol use: No        Comment: hx of etoh       Drug use: No      Sexual activity: No    Other Topics      Concerns:        Not on file    Social History Narrative      Shivani had three children and 11grandchildren.  She lives alone.  She is on disability.       Review of patient's family history indicates:  Problem: Heart attack      Relation: Father          Age of Onset: (Not Specified)  Problem: Heart failure      Relation: Father          Age of Onset: (Not Specified)  Problem: Hypertension      Relation: Father          Age of Onset: (Not Specified)  Problem: Blindness      Relation: Father          Age of Onset: (Not Specified)  Problem: Heart attack      Relation: Mother          Age of Onset: (Not Specified)  Problem: Heart failure      Relation: Mother          Age of Onset: (Not Specified)  Problem: Hypertension      Relation: Mother          Age of Onset: (Not Specified)  Problem: Asthma      Relation: Sister          Age of Onset: (Not Specified)  Problem: Depression      Relation: Sister          Age of Onset: (Not Specified)  Problem: Hypertension      Relation: Sister          Age of Onset: (Not Specified)  Problem: Hypertension      Relation: Brother          Age of Onset: (Not Specified)  Problem: Kidney disease      Relation: Brother          Age of Onset: (Not Specified)          Comment: ESRD  Problem: Diabetes      Relation: Maternal Aunt          Age of Onset: (Not Specified)  Problem: Breast cancer      Relation: Maternal Aunt          Age of Onset: (Not Specified)  Problem: Amblyopia      Relation: Neg Hx          Age of Onset: (Not Specified)  Problem: Cataracts      Relation: Neg Hx          Age of Onset: (Not Specified)  Problem: Macular degeneration      Relation: Neg Hx          Age of Onset: (Not Specified)  Problem: Retinal detachment       Relation: Neg Hx          Age of Onset: (Not Specified)  Problem: Strabismus      Relation: Neg Hx          Age of Onset: (Not Specified)  Problem: Colon cancer      Relation: Neg Hx          Age of Onset: (Not Specified)  Problem: Esophageal cancer      Relation: Neg Hx          Age of Onset: (Not Specified)          Review of Systems   Constitution: Positive for malaise/fatigue. Negative for decreased appetite, weight gain and weight loss.   HENT: Negative for sore throat.    Eyes: Negative for blurred vision.   Cardiovascular: Positive for dyspnea on exertion. Negative for chest pain, irregular heartbeat, leg swelling, near-syncope, orthopnea, palpitations, paroxysmal nocturnal dyspnea and syncope.   Respiratory: Negative for shortness of breath.    Skin: Negative for rash.   Musculoskeletal: Negative for arthritis.   Gastrointestinal: Negative for abdominal pain.   Neurological: Negative for focal weakness.   Psychiatric/Behavioral: Negative for altered mental status.        Objective:    Physical Exam   Constitutional: She is oriented to person, place, and time. She appears well-developed and well-nourished. No distress.   HENT:   Head: Normocephalic and atraumatic.   Mouth/Throat: Oropharynx is clear and moist.   Eyes: Conjunctivae are normal. Pupils are equal, round, and reactive to light. No scleral icterus.   Neck: Normal range of motion. Neck supple. No JVD present. No thyromegaly present.   Cardiovascular: Normal rate, regular rhythm, normal heart sounds and intact distal pulses.  Occasional extrasystoles are present. Exam reveals no gallop and no friction rub.   No murmur heard.  Pulmonary/Chest: Effort normal and breath sounds normal. No respiratory distress.   Abdominal: Soft. Bowel sounds are normal. She exhibits no distension.   Musculoskeletal: She exhibits no edema.   Neurological: She is alert and oriented to person, place, and time.   Skin: Skin is warm and dry.   Psychiatric: She has a normal  mood and affect. Her behavior is normal.   Vitals reviewed.    ECG: NSR nl AL, QRS, QTc        Assessment:       1. Paroxysmal atrial fibrillation    2. ESRD from HTN started RRT 1999    3. Acute blood loss anemia    4. Anemia of chronic disease         Plan:       63 yoF ESRD on HD, pAF, GI bleeding here for LAAO consideration. She has had a critical bleed on warfarin and is therefore not candidate for long term anticoagulation. It is unclear if the patient can tolerate short term OAC but she is not a candidate for long term OAC due to recurrent bleeding issues. I discussed management options regarding LAAO. I discussed the process of LAAO via Watchman including pre-procedure testing- JACE- as well as the need to take warfarin for 6 weeks post implant. We discussed post procedure JACE studies to assess GODFREY occlusion. Additionally I mentioned the need to take DAPT up to the 6 month point post implant.      Will reassess GI bleeding off warfarin but begin planning for LAAO    Pre-procedure Watchman studies: JACE for GODFREY geometry  Watchman with Anesthesia support

## 2019-02-12 NOTE — LETTER
February 12, 2019      Jenny Hennessy MD  [address]  [facility] [address]           [facility] - Arrhythmia  [address]  [facility] [address]  Phone: [phone]  Fax: [phone]          Patient: Shivani Sheets   MR Number: [id number]   YOB: 1955   Date of Visit: 2/12/2019       Dear Dr. Jenny Hennessy:    Thank you for referring Shivani Sheets to me for evaluation. Attached you will find relevant portions of my assessment and plan of care.    If you have questions, please do not hesitate to call me. I look forward to following Shivani Sheets along with you.    Sincerely,    César Thomas MD    Enclosure  CC:  No Recipients    If you would like to receive this communication electronically, please contact [email or web address] or [phone] to request more information on [employer] access.    For providers and/or their staff who would like to refer a patient to [facility], please contact us through our one-stop-shop provider referral line, [facility], at [phone].    If you feel you have received this communication in error or would no longer like to receive these types of communications, please e-mail [email or web address]

## 2019-02-14 ENCOUNTER — ANTI-COAG VISIT (OUTPATIENT)
Dept: CARDIOLOGY | Facility: CLINIC | Age: 64
End: 2019-02-14

## 2019-02-14 ENCOUNTER — OFFICE VISIT (OUTPATIENT)
Dept: TRANSPLANT | Facility: CLINIC | Age: 64
End: 2019-02-14
Payer: MEDICARE

## 2019-02-14 VITALS
HEIGHT: 61 IN | HEART RATE: 85 BPM | WEIGHT: 158.5 LBS | BODY MASS INDEX: 29.92 KG/M2 | OXYGEN SATURATION: 94 % | SYSTOLIC BLOOD PRESSURE: 109 MMHG | DIASTOLIC BLOOD PRESSURE: 55 MMHG

## 2019-02-14 DIAGNOSIS — I49.3 PVC (PREMATURE VENTRICULAR CONTRACTION): ICD-10-CM

## 2019-02-14 DIAGNOSIS — I27.20 PULMONARY HTN: Primary | ICD-10-CM

## 2019-02-14 DIAGNOSIS — E78.2 MIXED HYPERLIPIDEMIA: ICD-10-CM

## 2019-02-14 DIAGNOSIS — Z98.61 S/P PTCA (PERCUTANEOUS TRANSLUMINAL CORONARY ANGIOPLASTY): ICD-10-CM

## 2019-02-14 DIAGNOSIS — Z99.2 END STAGE RENAL DISEASE ON DIALYSIS: ICD-10-CM

## 2019-02-14 DIAGNOSIS — N18.6 END STAGE RENAL DISEASE ON DIALYSIS: ICD-10-CM

## 2019-02-14 DIAGNOSIS — I48.0 PAROXYSMAL ATRIAL FIBRILLATION: ICD-10-CM

## 2019-02-14 DIAGNOSIS — T86.12 FAILED KIDNEY TRANSPLANT: Chronic | ICD-10-CM

## 2019-02-14 DIAGNOSIS — N18.6 ESRD (END STAGE RENAL DISEASE): ICD-10-CM

## 2019-02-14 DIAGNOSIS — D63.8 ANEMIA OF CHRONIC DISEASE: Chronic | ICD-10-CM

## 2019-02-14 DIAGNOSIS — Z79.01 ANTICOAGULATION MONITORING BY PHARMACIST: ICD-10-CM

## 2019-02-14 PROCEDURE — 99999 PR PBB SHADOW E&M-EST. PATIENT-LVL III: CPT | Mod: PBBFAC,,, | Performed by: INTERNAL MEDICINE

## 2019-02-14 PROCEDURE — 99999 PR PBB SHADOW E&M-EST. PATIENT-LVL III: ICD-10-PCS | Mod: PBBFAC,,, | Performed by: INTERNAL MEDICINE

## 2019-02-14 PROCEDURE — 99215 PR OFFICE/OUTPT VISIT, EST, LEVL V, 40-54 MIN: ICD-10-PCS | Mod: S$PBB,,, | Performed by: INTERNAL MEDICINE

## 2019-02-14 PROCEDURE — 99213 OFFICE O/P EST LOW 20 MIN: CPT | Mod: PBBFAC | Performed by: INTERNAL MEDICINE

## 2019-02-14 PROCEDURE — 99215 OFFICE O/P EST HI 40 MIN: CPT | Mod: S$PBB,,, | Performed by: INTERNAL MEDICINE

## 2019-02-14 NOTE — PROGRESS NOTES
2/14/19 update per Cardiology office visit:    63 yoF ESRD on HD, pAF, GI bleeding here for LAAO consideration. She has had a critical bleed on warfarin and is therefore not candidate for long term anticoagulation. It is unclear if the patient can tolerate short term OAC but she is not a candidate for long term OAC due to recurrent bleeding issues. I discussed management options regarding LAAO. I discussed the process of LAAO via Watchman including pre-procedure testing- JACE- as well as the need to take warfarin for 6 weeks post implant. We discussed post procedure JACE studies to assess GODFREY occlusion. Additionally I mentioned the need to take DAPT up to the 6 month point post implant.      Will reassess GI bleeding off warfarin but begin planning for LAAO     Pre-procedure Watchman studies: JACE for GODFREY geometry  Watchman with Anesthesia support       Message sent to Dr. Thomas informing we will d/c patient from our care (can re-enroll short term if needed for Watchman) unless he would like a different course.

## 2019-02-14 NOTE — LETTER
February 14, 2019        Tye Deleon  [address] [place] [address] [address]  [address]  [place] [address]  Phone: [phone]  Fax: [phone]             [facility]  [address]  [place] [address]  Phone: [phone]   Patient: Shivani Sheets   MR Number: [id number]   YOB: 1955   Date of Visit: 2/14/2019       Dear Dr. Tye Deleon    Thank you for referring Shivani Sheets to me for evaluation. Attached you will find relevant portions of my assessment and plan of care.    If you have questions, please do not hesitate to call me. I look forward to following Shivani Sheets along with you.    Sincerely,    James Horner MD    Enclosure    If you would like to receive this communication electronically, please contact [email or web address] or [phone] to request [employer] access.    [employer] Link is a tool which provides read-only access to select patient information with whom you have a relationship. Its easy to use and provides real time access to review your patients record including encounter summaries, notes, results, and demographic information.    If you feel you have received this communication in error or would no longer like to receive these types of communications, please e-mail [email or web address]

## 2019-02-14 NOTE — PROGRESS NOTES
Subjective:     HPI:  Ms. Sheets is a 63 y.o. year old Black or  female who presents for  hospital follow up. She has history of pulmonary HTN(on adempas/uptravi), diastolic dysfunction, pAF(on coumadin), central retinal artery occlusion without significant carotid artery stenosis, CAD with remote PCI, ESRD(LUE AVF) secondary to HTN, s/p failed kidney transplant, PUD, STEFFANY on CPAP, hypothyroidism, RA, DLP and obesity who presented to the ED for 4 days of melena. Her Hgb in the ED was 3.8.  Pt was admitted to [facility] and was transfused 4 units PRBCs on admit. INR was supratherapeutic, and coumadin was held. GI was consulted and EGD was done on 9/11/18 which showed a few gastic polyps; no signs of bleed. With pt's h/o CAD and A Fib, she was restarted on both ASA 81 and coumadin. She was educated on not taking NSAIDs. Pt was transfused a total of 6 units PRBCs during hospitalization, and H and H is stable on day of d/c.     Here for hospital follow up. Complaints of cough and post nasal drip no further bleeding    Hospital Course: Admitted for anemia, suspected due to GI bleeding. GI was consulted. Colonoscopy was performed 2/4 which was unremarkable. Required multiple pRBCs transfusions during this admission and recurrently over the past few months. Noted to have iron deficiency, started on PO and IV iron. She was given Epo injections per nephrology. Hem/Onc was consulted to evaluate for alternate causes of anemia. Bone marrow biopsy noted indicated at this time. Palliative care was consulted to help with goals of care. Possible her anemia is related to BM suppression from PH medications. Holding coumadin. Will consider resuming as an outpatient.  EP referral upon discharge for Watchman device. Follow up next week with EP and then [facility].         She had RHC before discharge which showed   BP: 133/74  HR: 81  PW: 23/23 (20)  PA: 65/22 (39)  AO_SAT: 90  PA_SAT: 61  RV: 62/-3  RVEDP: 12     RA: 9/10  (9)    CONDITION 1 (2018 11:45:16):  FICKCI: 3.1800  FICKCO: 5.4400  PVR: 368.0000  Feels well today. Went over results of RHC. Has had no more issues with bleeding, has not taken any more NSAIDS  Past Medical History:   Diagnosis Date    Allergy     Anemia     Anticoagulant long-term use     4 years coumadin, asa    Arthritis     Awaiting organ transplant 2013    Cataract     Central serous chorioretinopathy of eye, right 2018    CHF (congestive heart failure)     Chronic kidney disease     Colon polyps     COPD (chronic obstructive pulmonary disease)     Coronary artery disease     Diabetes mellitus     Diabetic retinopathy     Diverticulosis     Encounter for blood transfusion     ESRD from HTN strtied RRT 1999    Failed  donor kidney transplant      Glaucoma     History of bleeding peptic ulcer      as stated per pt    Hyperlipidemia     Hypertension     Hypothyroidism     Morbid obesity 8/10/2012    Renal hypertension     Stroke     1987     Past Surgical History:   Procedure Laterality Date    CATARACT EXTRACTION W/  INTRAOCULAR LENS IMPLANT Bilateral     COLON SURGERY      COLONOSCOPY      COLONOSCOPY N/A 2019    Performed by Indio Beltran MD at [facility] ENDO (2ND FLR)    COLONOSCOPY N/A 2018    Performed by Jose Falk MD at [facility] ENDO (2ND FLR)    EGD (ESOPHAGOGASTRODUODENOSCOPY) N/A 2019    Performed by Miranda Maradiaga MD at [facility] ENDO (2ND FLR)    EGD (ESOPHAGOGASTRODUODENOSCOPY) N/A 2018    Performed by Luis Washington MD at [facility] ENDO (2ND FLR)    ESOPHAGOGASTRODUODENOSCOPY (EGD) N/A 2018    Performed by Kenji Desir MD at [facility] ENDO (2ND FLR)    EYE SURGERY      FRACTURE SURGERY      R arm    HERNIA REPAIR      HYSTERECTOMY      INSERTION-IMPLANT-GLAUCOMA Left 10/12/2017    Performed by Junaid Kern MD at [facility] OR 1ST FLR    KIDNEY TRANSPLANT      NEPHRECTOMY  2008    transplant      "PARATHYROID GLAND SURGERY      TONSILLECTOMY      UPPER GASTROINTESTINAL ENDOSCOPY       OB History      Para Term  AB Living    3 3 3     3    SAB TAB Ectopic Multiple Live Births                     Review of Systems   Constitution: Negative for chills, decreased appetite, diaphoresis, fever, weakness and weight gain.   Eyes: Negative for blurred vision.   Cardiovascular: Negative for chest pain, dyspnea on exertion, paroxysmal nocturnal dyspnea and syncope.   Respiratory: Negative for cough, hemoptysis and shortness of breath.    Musculoskeletal: Negative for arthritis.   Gastrointestinal: Negative for bloating, abdominal pain, constipation, diarrhea, nausea and vomiting.   Genitourinary: Negative for flank pain.       Objective:   Blood pressure (!) 109/55, pulse 85, height 5' 1" (1.549 m), weight 71.9 kg (158 lb 8.2 oz), SpO2 (!) 94 %.body mass index is 29.95 kg/m².  Physical Exam   Constitutional: She appears well-developed and well-nourished. No distress.   HENT:   Head: Normocephalic.   Eyes: Pupils are equal, round, and reactive to light.   Neck: Normal range of motion. No JVD present.   Cardiovascular: Normal rate. Exam reveals no friction rub.   No murmur heard.  Pulmonary/Chest: Effort normal. No respiratory distress.   Abdominal: Soft. She exhibits no distension. There is no tenderness.   Musculoskeletal: Normal range of motion. She exhibits no edema.   Neurological: She is alert. No cranial nerve deficit.   Skin: Skin is warm. She is not diaphoretic. No erythema.   Psychiatric: She has a normal mood and affect.       Labs:    Chemistry        Component Value Date/Time     2019 0400    K 4.0 2019 0400     2019 0400    CO2 26 2019 0400    BUN 16 2019 0400    CREATININE 8.2 (H) 2019 0400    GLU 94 2019 0400        Component Value Date/Time    CALCIUM 7.0 (L) 2019 0400    ALKPHOS 46 (L) 2019 0400    AST 16 2019 0400    " ALT 11 2019 0400    BILITOT 0.3 2019 0400    ESTGFRAFRICA 5.4 (A) 2019 0400    EGFRNONAA 4.7 (A) 2019 0400          Magnesium   Date Value Ref Range Status   2019 2.4 1.6 - 2.6 mg/dL Final     Lab Results   Component Value Date    WBC 5.80 2019    HGB 8.4 (L) 2019    HCT 27.3 (L) 2019     (L) 2019     Lab Results   Component Value Date    INR 1.0 2019    INR 1.1 2019    INR 1.4 (H) 2019     BNP   Date Value Ref Range Status   2019 1,584 (H) 0 - 99 pg/mL Final     Comment:     Values of less than 100 pg/ml are consistent with non-CHF populations.   2018 1,613 (H) 0 - 99 pg/mL Final     Comment:     Values of less than 100 pg/ml are consistent with non-CHF populations.   2018 1,699 (H) 0 - 99 pg/mL Final     Comment:     Values of less than 100 pg/ml are consistent with non-CHF populations.     LD   Date Value Ref Range Status   2019 240 110 - 260 U/L Final     Comment:     Results are increased in hemolyzed samples.     No results found for this or any previous visit.  No results found for this or any previous visit.    Assessment:      1. Pulmonary HTN    2. Paroxysmal atrial fibrillation    3. Mixed hyperlipidemia    4. PVC (premature ventricular contraction)    5. S/P PTCA (percutaneous transluminal coronary angioplasty)    6. End stage renal disease on dialysis    7. ESRD from HTN started RRT     8. Failed  donor kidney transplant -    9. Anticoagulation monitoring by pharmacist        Plan:   Unable to do six minute walk (hip pain)    FLonase for post nasal drip    RTC 6 months  Patient is now NYHA II  Recommend 2 gram sodium restriction and 1500cc fluid restriction.  Encourage physical activity with graded exercise program.  Requested patient to weigh themselves daily, and to notify us if their weight increases by more than 3 lbs in 1 day or 5 lbs in 1 week.

## 2019-02-19 ENCOUNTER — OFFICE VISIT (OUTPATIENT)
Dept: OPHTHALMOLOGY | Facility: CLINIC | Age: 64
End: 2019-02-19
Payer: MEDICARE

## 2019-02-19 VITALS — HEART RATE: 69 BPM | SYSTOLIC BLOOD PRESSURE: 100 MMHG | DIASTOLIC BLOOD PRESSURE: 61 MMHG

## 2019-02-19 DIAGNOSIS — I48.19 PERSISTENT ATRIAL FIBRILLATION: Primary | ICD-10-CM

## 2019-02-19 DIAGNOSIS — E11.3513 CONTROLLED TYPE 2 DIABETES MELLITUS WITH BOTH EYES AFFECTED BY PROLIFERATIVE RETINOPATHY AND MACULAR EDEMA, WITHOUT LONG-TERM CURRENT USE OF INSULIN: Primary | ICD-10-CM

## 2019-02-19 PROCEDURE — 99999 PR PBB SHADOW E&M-EST. PATIENT-LVL III: ICD-10-PCS | Mod: PBBFAC,,, | Performed by: OPHTHALMOLOGY

## 2019-02-19 PROCEDURE — 99499 NO LOS: ICD-10-PCS | Mod: S$PBB,,, | Performed by: OPHTHALMOLOGY

## 2019-02-19 PROCEDURE — 99499 UNLISTED E&M SERVICE: CPT | Mod: S$PBB,,, | Performed by: OPHTHALMOLOGY

## 2019-02-19 PROCEDURE — 67228 TREATMENT X10SV RETINOPATHY: CPT | Mod: PBBFAC,LT | Performed by: OPHTHALMOLOGY

## 2019-02-19 PROCEDURE — 99999 PR PBB SHADOW E&M-EST. PATIENT-LVL III: CPT | Mod: PBBFAC,,, | Performed by: OPHTHALMOLOGY

## 2019-02-19 PROCEDURE — 67228 TREATMENT X10SV RETINOPATHY: CPT | Mod: S$PBB,LT,, | Performed by: OPHTHALMOLOGY

## 2019-02-19 PROCEDURE — 99213 OFFICE O/P EST LOW 20 MIN: CPT | Mod: PBBFAC | Performed by: OPHTHALMOLOGY

## 2019-02-19 PROCEDURE — 67228 PR TREATMENT EXTENSIVE RETINOPATHY, PHOTOCOAGULATION: ICD-10-PCS | Mod: S$PBB,LT,, | Performed by: OPHTHALMOLOGY

## 2019-02-19 NOTE — PROGRESS NOTES
HPI     Eye Problem      Additional comments: laser              Comments     DLS 1/29/19- Patient here for PRP laser OS        Prior OCT  OD - ERM with small subfoveal SRF pocket  OS - no ME    Prior  FA - increased NV OU  Late macular leakage OU      A/P     1. Neovascular Glaucoma OU  PDR/OIS OU - significant vascular disease -?RVO OD  OD - s/p GDD with Morgan 2013  OS  S/p BV with JDN 10/17    10/18 increased ME OS - ?CME from RVO vs DME  12/18 - increased DME OS  S/p resumed Avastin OS x2  S/p PRP fillin OD 1/19      Get approval for Ozurdex OS - now with BV    Plan PRP OS Today    Both eyes  Cosopt BID  Xal q day  Alphagan BID      2. ? OD  Recent PO steroids - pt taking 0.5 on taper  ERM possibly contributing  Recent elevated BP, also predominant pulmonary HTN with recent exacerbation - could contribute to small uveal effusiions - will  Monitor    3. ERM OD  No MMP  Follow    4. HTN Ret OU  Contributing to #2    5. ESRD on HD    6. PCIOL OU        4 weeks OCT    Risks, benefits, and alternatives to treatment discussed in detail with the patient.  The patient voiced understanding and wished to proceed with the procedure    Injection Procedure Note:  Diagnosis: PDR OS    Patient Identified and Time Out complete  Topical Proparacaine and Betadine.  Inject PRP OS at 6:00 @ 3.5-4mm posterior to limbus  Post Operative Dx: Same  Complications: None  Follow up as above.

## 2019-02-19 NOTE — PROGRESS NOTES
TRANSESOPHAGEAL ECHO (JACE) INSTRUCTIONS    You have been scheduled for a procedure in the echo lab on 2/28/2019.      Please report to the Cardiology Waiting Area on the Third floor of the hospital and check in at @ 8 AM.     You will then be taken to the SSCU (Short Stay Cardiac Unit) and prepared for your procedure. Please be aware that this is not the time of your procedure but the time you are to arrive. The procedures are scheduled on an hourly basis; however, emergency cases take precedence over all other cases.      You may not have anything to eat or drink after midnight the night before your test.     Medications:  You may take your regular morning medications with water.     The procedure will take 1-2 hours to perform. After the procedure, you will return to SSCU on the third floor of the hospital. Your family may remain in the room with you during this time.      You will be discharged home that same afternoon. You must have someone to drive you home.  Your doctor will determine, based on your progress, the time of your discharge. The results of your procedure will be discussed with you before you are discharged.      You will be contacted by the echo department prior to your procedure for a  pre-procedure questionnaire.     Any need to reschedule or cancel procedures, or any questions regarding your procedures should be addressed directly with the Arrhythmia Department Nurses at the following phone number: [phone].    Directions to Cardiology Waiting Area:  If you park in the Parking Garage:  Take elevators to the 2nd floor  Walk up ramp and turn right by Gold Elevators  Take elevator to the 3rd floor  Upon exiting the elevator, turn away from the clinic areas  Walk long belle around to front of hospital to area with windows overlooking [facility]  Check in at Reception Desk  OR  If family is dropping you off:  Have them drop you off at the front of the Hospital  (Near the ER, where all the  flags are hung).  Take the E elevators to the 3rd floor.  Check in at the Reception Desk in the waiting room.

## 2019-02-26 ENCOUNTER — INITIAL CONSULT (OUTPATIENT)
Dept: RHEUMATOLOGY | Facility: CLINIC | Age: 64
End: 2019-02-26
Payer: MEDICARE

## 2019-02-26 VITALS
HEART RATE: 77 BPM | HEIGHT: 61 IN | SYSTOLIC BLOOD PRESSURE: 159 MMHG | BODY MASS INDEX: 30.47 KG/M2 | WEIGHT: 161.38 LBS | DIASTOLIC BLOOD PRESSURE: 99 MMHG

## 2019-02-26 DIAGNOSIS — M70.62 TROCHANTERIC BURSITIS OF LEFT HIP: Primary | ICD-10-CM

## 2019-02-26 PROCEDURE — 99201 PR OFFICE/OUTPT VISIT,NEW,LEVL I: ICD-10-PCS | Mod: 25,S$PBB,, | Performed by: INTERNAL MEDICINE

## 2019-02-26 PROCEDURE — 99999 PR PBB SHADOW E&M-EST. PATIENT-LVL III: ICD-10-PCS | Mod: PBBFAC,,, | Performed by: INTERNAL MEDICINE

## 2019-02-26 PROCEDURE — 20610 LARGE JOINT ASPIRATION/INJECTION: L GREATER TROCHANTERIC BURSA: ICD-10-PCS | Mod: S$PBB,LT,, | Performed by: INTERNAL MEDICINE

## 2019-02-26 PROCEDURE — 99213 OFFICE O/P EST LOW 20 MIN: CPT | Mod: PBBFAC | Performed by: INTERNAL MEDICINE

## 2019-02-26 PROCEDURE — 99201 PR OFFICE/OUTPT VISIT,NEW,LEVL I: CPT | Mod: 25,S$PBB,, | Performed by: INTERNAL MEDICINE

## 2019-02-26 PROCEDURE — 20610 DRAIN/INJ JOINT/BURSA W/O US: CPT | Mod: PBBFAC | Performed by: INTERNAL MEDICINE

## 2019-02-26 PROCEDURE — 99999 PR PBB SHADOW E&M-EST. PATIENT-LVL III: CPT | Mod: PBBFAC,,, | Performed by: INTERNAL MEDICINE

## 2019-02-26 RX ORDER — TRIAMCINOLONE ACETONIDE 40 MG/ML
40 INJECTION, SUSPENSION INTRA-ARTICULAR; INTRAMUSCULAR
Status: DISCONTINUED | OUTPATIENT
Start: 2019-02-26 | End: 2019-02-26 | Stop reason: HOSPADM

## 2019-02-26 RX ADMIN — TRIAMCINOLONE ACETONIDE 40 MG: 40 INJECTION, SUSPENSION INTRA-ARTICULAR; INTRAMUSCULAR at 08:02

## 2019-02-26 ASSESSMENT — ROUTINE ASSESSMENT OF PATIENT INDEX DATA (RAPID3)
AM STIFFNESS SCORE: 1, YES
TOTAL RAPID3 SCORE: 3.78
PAIN SCORE: 1
MDHAQ FUNCTION SCORE: 1
PSYCHOLOGICAL DISTRESS SCORE: 3.3
PATIENT GLOBAL ASSESSMENT SCORE: 7
WHEN YOU AWAKENED IN THE MORNING OVER THE LAST WEEK, PLEASE INDICATE THE AMOUNT OF TIME IT TAKES UNTIL YOU ARE AS LIMBER AS YOU WILL BE FOR THE DAY: 30MIN
FATIGUE SCORE: 1

## 2019-02-26 NOTE — PROGRESS NOTES
"Subjective:       Patient ID: Shivani Sheets is a 63 y.o. female.    Chief Complaint: Follow-up    HPI     F/u Seropositive RA, seen by me in 2013 and twice in 2015;   No DMARD's     Now here for L hip pain  7 or 8 months of hip pain; she localizes to lateral hip over trochanter; tender to touch it; hurts to lie on it  Heat and cold little help  Aidan ramirez not helpful  Does not remember this pain but recalls getting a cortisone shot years ago for LLE going out on her    PMH:  ESRD HD M,W,F  HTN, DM, CHF, hypothyroid, a fib  Previously on anti-coagulants ; not now due to bleeding complilcations    Review of Systems      Objective:   BP (!) 159/99   Pulse 77   Ht 5' 1" (1.549 m)   Wt 73.2 kg (161 lb 6 oz)   LMP  (LMP Unknown)   BMI 30.49 kg/m²      Physical Exam     Physical Exam  Lab Results   Component Value Date    SEDRATE 38 (H) 07/05/2011          Assessment:       1. Trochanteric bursitis of left hip            Plan:       Problem List Items Addressed This Visit        Active Problems    Trochanteric bursitis of left hip - Primary     Current L sided hip pain with tenderness at trochanter c/w bursitis though sx very severe  Recent xray showed no bone or joint patholoy; previous abd CT also reviewed    Will inject L trochanteric bursa in an effort to give her some relief  Will also order PT          Relevant Orders    Large Joint Aspiration/Injection: L greater trochanteric bursa    Ambulatory Referral to Physical/Occupational Therapy            "

## 2019-02-26 NOTE — LETTER
February 26, 2019      Eula Weiss MD  [address]  [place] [address]           [facility] - Rheumatology  [address]  [place] [address]  Phone: [phone]  Fax: [phone]          Patient: Shivani Sheets   MR Number: [id number]   YOB: 1955   Date of Visit: 2/26/2019       Dear Dr. Eula Weiss:    Thank you for referring Shivani Sheets to me for evaluation. Attached you will find relevant portions of my assessment and plan of care.    If you have questions, please do not hesitate to call me. I look forward to following Shivani Sheets along with you.    Sincerely,    Rito Morrow MD    Enclosure  CC:  No Recipients    If you would like to receive this communication electronically, please contact [email or web address] or [phone] to request more information on [employer] Link access.    For providers and/or their staff who would like to refer a patient to [facility], please contact us through our one-stop-shop provider referral line, [facility], at [phone].    If you feel you have received this communication in error or would no longer like to receive these types of communications, please e-mail [email or web address]

## 2019-02-27 ENCOUNTER — TELEPHONE (OUTPATIENT)
Dept: ELECTROPHYSIOLOGY | Facility: CLINIC | Age: 64
End: 2019-02-27

## 2019-02-27 NOTE — PROCEDURES
"Shivani Sheets is a 63 y.o. female patient.    Pulse: 77 (02/26/19 1105)  BP: (!) 159/99 (02/26/19 1105)  Weight: 73.2 kg (161 lb 6 oz) (02/26/19 1105)  Height: 5' 1" (154.9 cm) (02/26/19 1105)       Dereck Morrow  2/26/2019  "

## 2019-02-27 NOTE — PROCEDURES
Large Joint Aspiration/Injection: L greater trochanteric bursa  Date/Time: 2/26/2019 8:32 PM  Performed by: Rito Morrow MD  Authorized by: Rito Morrow MD     Consent Done?:  Yes (Verbal)  Indications:  Pain  Timeout: Prior to procedure the correct patient, procedure, and site was verified      Location:  Hip  Site:  L greater trochanteric bursa  Prep: Patient was prepped and draped in usual sterile fashion    Ultrasonic Guidance for needle placement: No  Needle size:  22 G  Approach:  Lateral  Medications:  40 mg triamcinolone acetonide 40 mg/mL  Aspirate amount (ml):  0  Patient tolerance:  Patient tolerated the procedure well with no immediate complications

## 2019-02-27 NOTE — TELEPHONE ENCOUNTER
Spoke to patient    CONFIRMED procedure arrival time of 8am, 3rd floor SSCU for Pre Watchman JACE  Reiterated instructions including:  -Directions to check in desk  -NPO after midnight night prior to procedure  -Will discuss date for Watchman Implant after JACE is reviewed by BSci Rep   Patient verbalizes understanding of above and appreciates call.

## 2019-02-28 ENCOUNTER — OUTPATIENT CASE MANAGEMENT (OUTPATIENT)
Dept: ADMINISTRATIVE | Facility: OTHER | Age: 64
End: 2019-02-28

## 2019-02-28 ENCOUNTER — ANESTHESIA EVENT (OUTPATIENT)
Dept: MEDSURG UNIT | Facility: HOSPITAL | Age: 64
End: 2019-02-28
Payer: MEDICARE

## 2019-02-28 ENCOUNTER — HOSPITAL ENCOUNTER (OUTPATIENT)
Facility: HOSPITAL | Age: 64
Discharge: HOME | End: 2019-02-28
Attending: INTERNAL MEDICINE | Admitting: INTERNAL MEDICINE
Payer: MEDICARE

## 2019-02-28 ENCOUNTER — ANESTHESIA (OUTPATIENT)
Dept: MEDSURG UNIT | Facility: HOSPITAL | Age: 64
End: 2019-02-28
Payer: MEDICARE

## 2019-02-28 ENCOUNTER — TELEPHONE (OUTPATIENT)
Dept: INTERNAL MEDICINE | Facility: CLINIC | Age: 64
End: 2019-02-28

## 2019-02-28 ENCOUNTER — HOSPITAL ENCOUNTER (OUTPATIENT)
Dept: CARDIOLOGY | Facility: CLINIC | Age: 64
Discharge: HOME | End: 2019-02-28
Payer: MEDICARE

## 2019-02-28 VITALS
SYSTOLIC BLOOD PRESSURE: 155 MMHG | HEIGHT: 61 IN | OXYGEN SATURATION: 92 % | TEMPERATURE: 96 F | RESPIRATION RATE: 18 BRPM | DIASTOLIC BLOOD PRESSURE: 77 MMHG | WEIGHT: 156.94 LBS | BODY MASS INDEX: 29.63 KG/M2 | HEART RATE: 81 BPM

## 2019-02-28 DIAGNOSIS — I48.91 AF (ATRIAL FIBRILLATION): ICD-10-CM

## 2019-02-28 DIAGNOSIS — I48.19 PERSISTENT ATRIAL FIBRILLATION: ICD-10-CM

## 2019-02-28 LAB
BSA FOR ECHO PROCEDURE: 1.75 M2
PROX AORTA: 2.8 CM
SINUS: 3 CM
STJ: 2.7 CM

## 2019-02-28 PROCEDURE — 93320 TRANSESOPHAGEAL ECHO (TEE) (CUPID ONLY): ICD-10-PCS | Mod: 26,S$PBB,, | Performed by: INTERNAL MEDICINE

## 2019-02-28 PROCEDURE — D9220A PRA ANESTHESIA: ICD-10-PCS | Mod: ANES,,, | Performed by: ANESTHESIOLOGY

## 2019-02-28 PROCEDURE — 63600175 PHARM REV CODE 636 W HCPCS: Performed by: NURSE ANESTHETIST, CERTIFIED REGISTERED

## 2019-02-28 PROCEDURE — D9220A PRA ANESTHESIA: Mod: CRNA,,, | Performed by: NURSE ANESTHETIST, CERTIFIED REGISTERED

## 2019-02-28 PROCEDURE — D9220A PRA ANESTHESIA: Mod: ANES,,, | Performed by: ANESTHESIOLOGY

## 2019-02-28 PROCEDURE — 93010 ELECTROCARDIOGRAM REPORT: CPT | Mod: ,,, | Performed by: INTERNAL MEDICINE

## 2019-02-28 PROCEDURE — 25000003 PHARM REV CODE 250: Performed by: NURSE ANESTHETIST, CERTIFIED REGISTERED

## 2019-02-28 PROCEDURE — 93325 TRANSESOPHAGEAL ECHO (TEE) (CUPID ONLY): ICD-10-PCS | Mod: 26,S$PBB,, | Performed by: INTERNAL MEDICINE

## 2019-02-28 PROCEDURE — 93312 TRANSESOPHAGEAL ECHO (TEE) (CUPID ONLY): ICD-10-PCS | Mod: 26,S$PBB,, | Performed by: INTERNAL MEDICINE

## 2019-02-28 PROCEDURE — 93325 DOPPLER ECHO COLOR FLOW MAPG: CPT | Mod: PBBFAC | Performed by: INTERNAL MEDICINE

## 2019-02-28 PROCEDURE — D9220A PRA ANESTHESIA: ICD-10-PCS | Mod: CRNA,,, | Performed by: NURSE ANESTHETIST, CERTIFIED REGISTERED

## 2019-02-28 PROCEDURE — 82962 GLUCOSE BLOOD TEST: CPT

## 2019-02-28 PROCEDURE — 93010 EKG 12-LEAD: ICD-10-PCS | Mod: ,,, | Performed by: INTERNAL MEDICINE

## 2019-02-28 PROCEDURE — 93320 DOPPLER ECHO COMPLETE: CPT | Mod: 26,S$PBB,, | Performed by: INTERNAL MEDICINE

## 2019-02-28 PROCEDURE — 93005 ELECTROCARDIOGRAM TRACING: CPT | Mod: 59

## 2019-02-28 PROCEDURE — 93312 ECHO TRANSESOPHAGEAL: CPT | Mod: 26,S$PBB,, | Performed by: INTERNAL MEDICINE

## 2019-02-28 RX ORDER — SODIUM CHLORIDE 9 MG/ML
INJECTION, SOLUTION INTRAVENOUS CONTINUOUS PRN
Status: DISCONTINUED | OUTPATIENT
Start: 2019-02-28 | End: 2019-02-28

## 2019-02-28 RX ORDER — SODIUM CHLORIDE 0.9 % (FLUSH) 0.9 %
5 SYRINGE (ML) INJECTION
Status: ACTIVE | OUTPATIENT
Start: 2019-02-28

## 2019-02-28 RX ORDER — ETOMIDATE 2 MG/ML
INJECTION INTRAVENOUS
Status: DISCONTINUED | OUTPATIENT
Start: 2019-02-28 | End: 2019-02-28

## 2019-02-28 RX ORDER — SODIUM CHLORIDE 0.9 % (FLUSH) 0.9 %
3 SYRINGE (ML) INJECTION
Status: DISCONTINUED | OUTPATIENT
Start: 2019-02-28 | End: 2019-02-28 | Stop reason: HOSPADM

## 2019-02-28 RX ORDER — LIDOCAINE HCL/PF 100 MG/5ML
SYRINGE (ML) INTRAVENOUS
Status: DISCONTINUED | OUTPATIENT
Start: 2019-02-28 | End: 2019-02-28

## 2019-02-28 RX ADMIN — ETOMIDATE 4 MG: 2 INJECTION, SOLUTION INTRAVENOUS at 10:02

## 2019-02-28 RX ADMIN — ETOMIDATE 2 MG: 2 INJECTION, SOLUTION INTRAVENOUS at 10:02

## 2019-02-28 RX ADMIN — LIDOCAINE HYDROCHLORIDE 50 MG: 20 INJECTION, SOLUTION INTRAVENOUS at 10:02

## 2019-02-28 RX ADMIN — SODIUM CHLORIDE: 0.9 INJECTION, SOLUTION INTRAVENOUS at 09:02

## 2019-02-28 RX ADMIN — ETOMIDATE 10 MG: 2 INJECTION, SOLUTION INTRAVENOUS at 10:02

## 2019-02-28 NOTE — PROGRESS NOTES
Pt DC'd per MD order. Discharge instructions given including activity, future appointments, and when to call MD. Medications reviewed including when to take next dose. PIV DC'd, catheter tip intact. Pt left unit via chair with family.

## 2019-02-28 NOTE — SUBJECTIVE & OBJECTIVE
Past Medical History:   Diagnosis Date    Allergy     Anemia     Anticoagulant long-term use     4 years coumadin, asa    Arthritis     Awaiting organ transplant 2013    Cataract     Central serous chorioretinopathy of eye, right 2018    CHF (congestive heart failure)     Chronic kidney disease     Colon polyps     COPD (chronic obstructive pulmonary disease)     Coronary artery disease     Diabetes mellitus     Diabetic retinopathy     Diverticulosis     Encounter for blood transfusion     ESRD from HTN strtied RRT 1999    Failed  donor kidney transplant      Glaucoma     History of bleeding peptic ulcer      as stated per pt    Hyperlipidemia     Hypertension     Hypothyroidism     Morbid obesity 8/10/2012    Renal hypertension     Stroke     1987       Past Surgical History:   Procedure Laterality Date    CATARACT EXTRACTION W/  INTRAOCULAR LENS IMPLANT Bilateral     COLON SURGERY      COLONOSCOPY      COLONOSCOPY N/A 2019    Performed by Indio Beltran MD at [facility] ENDO (2ND FLR)    COLONOSCOPY N/A 2018    Performed by Jose Falk MD at [facility] ENDO (2ND FLR)    EGD (ESOPHAGOGASTRODUODENOSCOPY) N/A 2019    Performed by Miranda Maradiaga MD at [facility] ENDO (2ND FLR)    EGD (ESOPHAGOGASTRODUODENOSCOPY) N/A 2018    Performed by Luis Washington MD at [facility] ENDO (2ND FLR)    ESOPHAGOGASTRODUODENOSCOPY (EGD) N/A 2018    Performed by Kenji Desir MD at [facility] ENDO (2ND FLR)    EYE SURGERY      FRACTURE SURGERY      R arm    HERNIA REPAIR      HYSTERECTOMY      INSERTION-IMPLANT-GLAUCOMA Left 10/12/2017    Performed by Junaid Kern MD at [facility] OR 1ST FLR    KIDNEY TRANSPLANT      NEPHRECTOMY  2008    transplant     PARATHYROID GLAND SURGERY      TONSILLECTOMY      UPPER GASTROINTESTINAL ENDOSCOPY         Review of patient's allergies indicates:   Allergen Reactions    Penicillins Swelling    Iodine      Other  reaction(s): Hives    Sulfamethoxazole-trimethoprim      Other reaction(s): Swelling  Other reaction(s): Hives       No current facility-administered medications on file prior to encounter.      Current Outpatient Medications on File Prior to Encounter   Medication Sig    ALPHAGAN P 0.1 % Drop once daily.     aspirin 325 MG tablet Take 1 tablet (325 mg total) by mouth once daily.    diltiaZEM (CARDIZEM CD) 300 MG 24 hr capsule Take 1 capsule (300 mg total) by mouth once daily.    dorzolamide-timolol 2-0.5% (COSOPT) 22.3-6.8 mg/mL ophthalmic solution INSTILL 1 DROP IN BOTH EYES TWICE DAILY    latanoprost 0.005 % ophthalmic solution INSTILL 1 DROP IN BOTH EYES EVERY DAY AT BEDTIME    levothyroxine (SYNTHROID) 100 MCG tablet Take 100 mcg by mouth. 1 Tablet Oral Every day    LIPITOR 10 mg tablet TAKE 1 BY MOUTH ONCE A DAY (Patient taking differently: TAKE 1 BY MOUTH ONCE nightly)    pantoprazole (PROTONIX) 40 MG tablet TAKE 1 TABLET BY MOUTH ONCE DAILY    riociguat (ADEMPAS) 2 mg Tab tablet Take 2 mg by mouth 3 (three) times daily.     selexipag (UPTRAVI) 1,000 mcg Tab Take 1,000 mcg by mouth 2 (two) times daily.     Family History     Problem Relation (Age of Onset)    Asthma Sister    Blindness Father    Breast cancer Maternal Aunt    Depression Sister    Diabetes Maternal Aunt    Heart attack Father, Mother    Heart failure Father, Mother    Hypertension Father, Mother, Sister, Brother    Kidney disease Brother        Tobacco Use    Smoking status: Former Smoker     Types: Cigarettes     Last attempt to quit: 8/10/2000     Years since quittin.5    Smokeless tobacco: Never Used   Substance and Sexual Activity    Alcohol use: No     Comment: hx of etoh     Drug use: No    Sexual activity: No     Review of Systems   All other systems reviewed and are negative.    Objective:     Vital Signs (Most Recent):  Temp: 98.2 °F (36.8 °C) (19)  Pulse: 87 (19)  Resp: 20 (19  0830)  BP: (!) 146/79 (02/28/19 0830)  SpO2: (!) 92 % (02/28/19 0830) Vital Signs (24h Range):  Temp:  [98.2 °F (36.8 °C)] 98.2 °F (36.8 °C)  Pulse:  [87] 87  Resp:  [20] 20  SpO2:  [92 %] 92 %  BP: (146)/(79) 146/79     Weight: 71.2 kg (156 lb 15.5 oz)  Body mass index is 29.66 kg/m².    SpO2: (!) 92 %  O2 Device (Oxygen Therapy): room air    No intake or output data in the 24 hours ending 02/28/19 0956    Lines/Drains/Airways     Central Venous Catheter Line                 RETIRED! DO NOT USE: Hemodialysis Catheter Arteriovenous fistula Left Forearm -- days          Drain                 Hemodialysis AV Fistula Left upper arm -- days          Peripheral Intravenous Line                 Peripheral IV - Single Lumen 02/28/19 0846 Right Hand less than 1 day                Physical Exam  General: alert, awake and oriented x 3  Eyes:PERRL.   Lungs:  clear to auscultation bilaterally   Cardiovascular: Heart: regular rate and rhythm, S1, S2 normal, no murmur, click, rub or gallop.   Extremities: no cyanosis or edema.   Abdomen/Rectal: Abdomen: soft, non-tender non-distented; bowel sounds normal  Neurologic: Normal strength and tone. No focal numbness or weakness  Significant Labs: BMP: No results for input(s): GLU, NA, K, CL, CO2, BUN, CREATININE, CALCIUM, MG in the last 48 hours., CMP No results for input(s): NA, K, CL, CO2, GLU, BUN, CREATININE, CALCIUM, PROT, ALBUMIN, BILITOT, ALKPHOS, AST, ALT, ANIONGAP, ESTGFRAFRICA, EGFRNONAA in the last 48 hours., CBC No results for input(s): WBC, HGB, HCT, PLT in the last 48 hours. and INR No results for input(s): INR, PROTIME in the last 48 hours.    Significant Imaging: Echocardiogram:   2D echo with color flow doppler:   Results for orders placed or performed during the hospital encounter of 02/08/18   2D echo with color flow doppler   Result Value Ref Range    QEF 60 55 - 65    Mitral Valve Regurgitation TRIVIAL     Diastolic Dysfunction Yes (A)     Est. PA Systolic Pressure  70.9 (A)     Tricuspid Valve Regurgitation MILD     and Transthoracic echo (TTE) complete (Cupid Only):   Results for orders placed or performed during the hospital encounter of 02/02/19   Transthoracic echo (TTE) complete (Cupid Only)   Result Value Ref Range    Ascending aorta 2.84 cm    STJ 2.75 cm    AV mean gradient 7.43 mmHg    Ao peak drake 1.88 m/s    Ao VTI 33.94 cm    IVS 0.82 0.6 - 1.1 cm    LA size 4.45 cm    Left Atrium Major Axis 6.10 cm    Left Atrium Minor Axis 5.77 cm    LVIDD 4.76 3.5 - 6.0 cm    LVIDS 3.95 2.1 - 4.0 cm    LVOT diameter 2.02 cm    LVOT peak VTI 20.94 cm    PW 1.11 (A) 0.6 - 1.1 cm    MV Peak A Drake 0.89 m/s    E wave decelartion time 248.29 msec    MV Peak E Drake 1.08 m/s    RA Major Axis 5.76 cm    RA Width 4.27 cm    RVDD 3.08 cm    Sinus 2.85 cm    TAPSE 2.24 cm    TR Max Drake 4.46 m/s    TDI LATERAL 0.10     TDI SEPTAL 0.05     LA WIDTH 5.16 cm    LV Diastolic Volume 105.47 mL    LV Systolic Volume 68.06 mL    LVOT peak drake [id number] m/s    LV LATERAL E/E' RATIO 10.80     LV SEPTAL E/E' RATIO 21.60     FS 17 %    LA volume 115.75 cm3    LV mass 159.98 g    Left Ventricle Relative Wall Thickness 0.47 cm    AV valve area 1.98 cm2    AV Velocity Ratio 0.60     AV index (prosthetic) 0.62     E/A ratio 1.21     Mean e' 0.08     LVOT area 3.20 cm2    LVOT stroke volume 67.07 cm3    AV peak gradient 14.14 mmHg    E/E' ratio 14.40     LV Systolic Volume Index 37.8 mL/m2    LV Diastolic Volume Index 58.54 mL/m2    LA Volume Index 64.2 mL/m2    LV Mass Index 88.8 g/m2    Triscuspid Valve Regurgitation Peak Gradient 79.57 mmHg    BSA 1.86 m2    Right Atrial Pressure (from IVC) 8 mmHg    TV rest pulmonary artery pressure 88 mmHg

## 2019-02-28 NOTE — ANESTHESIA PREPROCEDURE EVALUATION
2019  Pre-operative evaluation for Procedure(s) (LRB):  Transesophageal echo (JACE) intra-procedure log documentation (N/A)    Shivani Sheets is a 63 y.o. female     RHC:  Summary/Post-Operative Diagnosis      1.   RHC demonstrates increased right and left-sided filling pressures.   2.   Moderate Pulmonary Hypertension on adempas and uptravi.   3.   Normal Cardiac output / index.  BP: 133/74  HR: 81  PW: 23/ (20)  PA: 65/22 (39)  AO_SAT: 90  PA_SAT: 61  RV: 62/-3  RVEDP: 12     RA: 9/10 (9)    CONDITION 1 (2018 11:45:16):  FICKCI: 3.1800  FICKCO: 5.4400  PVR: 368.0000    · Moderately decreased left ventricular systolic function. The estimated ejection fraction is 40%  · Concentric left ventricular remodeling.  · Local segmental wall motion abnormalities.  · Grade II (moderate) left ventricular diastolic dysfunction consistent with pseudonormalization.  · Severe left atrial enlargement.  · Elevated left atrial pressure.  · Normal right ventricular systolic function.  · Moderate right atrial enlargement.  · Mild aortic valve stenosis.  · Aortic valve area is 1.98 cm2; peak velocity is 1.88 m/s; mean gradient is 7.43 mmHg.  · Mild mitral sclerosis.  · Mild to moderate tricuspid regurgitation.  · Pulmonary hypertension present.  · Intermediate central venous pressure (8 mm Hg).  · The estimated PA systolic pressure is 88 mm Hg    Patient Active Problem List   Diagnosis    Chronic diastolic heart failure    CAD (coronary artery disease)    S/P PTCA (percutaneous transluminal coronary angioplasty)    Central retinal artery occlusion    ESRD from HTN started RRT     Obstructive sleep apnea    Hyperlipidemia    Obesity    Rheumatoid arthritis    Anticoagulation monitoring by pharmacist    Failed  donor kidney transplant     Renal hypertension diagnosed age 15     Hypothyroidism    Awaiting organ transplant    Pulmonary HTN    Atrial fibrillation    Low back pain    Other chronic pulmonary heart diseases    Chest wall pain    Neovascular glaucoma, both eyes    Status post cataract extraction and insertion of intraocular lens    Glaucoma filtering bleb of both eyes    Chronic angle-closure glaucoma of both eyes, moderate stage    Glaucoma shunt device of both eyes    Colon polyp    Controlled type 2 diabetes mellitus with both eyes affected by proliferative retinopathy and macular edema, without long-term current use of insulin    Central serous chorioretinopathy of eye, right    Anemia of chronic disease    PVC (premature ventricular contraction)    End stage renal disease on dialysis    Acute blood loss anemia    Trochanteric bursitis of left hip       Review of patient's allergies indicates:   Allergen Reactions    Penicillins Swelling    Iodine      Other reaction(s): Hives    Sulfamethoxazole-trimethoprim      Other reaction(s): Swelling  Other reaction(s): Hives       No current facility-administered medications on file prior to encounter.      Current Outpatient Medications on File Prior to Encounter   Medication Sig Dispense Refill    ALPHAGAN P 0.1 % Drop once daily.       aspirin 325 MG tablet Take 1 tablet (325 mg total) by mouth once daily.  0    diltiaZEM (CARDIZEM CD) 300 MG 24 hr capsule Take 1 capsule (300 mg total) by mouth once daily. 90 capsule 3    dorzolamide-timolol 2-0.5% (COSOPT) 22.3-6.8 mg/mL ophthalmic solution INSTILL 1 DROP IN BOTH EYES TWICE DAILY 10 mL 0    latanoprost 0.005 % ophthalmic solution INSTILL 1 DROP IN BOTH EYES EVERY DAY AT BEDTIME 7.5 mL 0    levothyroxine (SYNTHROID) 100 MCG tablet Take 100 mcg by mouth. 1 Tablet Oral Every day      LIPITOR 10 mg tablet TAKE 1 BY MOUTH ONCE A DAY (Patient taking differently: TAKE 1 BY MOUTH ONCE nightly) 90 tablet 2    pantoprazole (PROTONIX) 40 MG tablet TAKE 1 TABLET  BY MOUTH ONCE DAILY 30 tablet 6    riociguat (ADEMPAS) 2 mg Tab tablet Take 2 mg by mouth 3 (three) times daily.       selexipag (UPTRAVI) 1,000 mcg Tab Take 1,000 mcg by mouth 2 (two) times daily.         Past Surgical History:   Procedure Laterality Date    CATARACT EXTRACTION W/  INTRAOCULAR LENS IMPLANT Bilateral     COLON SURGERY      COLONOSCOPY      COLONOSCOPY N/A 2019    Performed by Indio Beltran MD at [facility] ENDO (2ND FLR)    COLONOSCOPY N/A 2018    Performed by Jose Falk MD at [facility] ENDO (2ND FLR)    EGD (ESOPHAGOGASTRODUODENOSCOPY) N/A 2019    Performed by Miranda Maradiaga MD at [facility] ENDO (2ND FLR)    EGD (ESOPHAGOGASTRODUODENOSCOPY) N/A 2018    Performed by Luis Washington MD at [facility] ENDO (2ND FLR)    ESOPHAGOGASTRODUODENOSCOPY (EGD) N/A 2018    Performed by Kenji Desir MD at [facility] ENDO (2ND FLR)    EYE SURGERY      FRACTURE SURGERY      R arm    HERNIA REPAIR      HYSTERECTOMY      INSERTION-IMPLANT-GLAUCOMA Left 10/12/2017    Performed by Junaid Kern MD at [facility] OR 1ST FLR    KIDNEY TRANSPLANT      NEPHRECTOMY  2008    transplant     PARATHYROID GLAND SURGERY      TONSILLECTOMY      UPPER GASTROINTESTINAL ENDOSCOPY         Social History     Socioeconomic History    Marital status:      Spouse name: Not on file    Number of children: Not on file    Years of education: Not on file    Highest education level: Not on file   Social Needs    Financial resource strain: Not on file    Food insecurity - worry: Not on file    Food insecurity - inability: Not on file    Transportation needs - medical: Not on file    Transportation needs - non-medical: Not on file   Occupational History    Not on file   Tobacco Use    Smoking status: Former Smoker     Types: Cigarettes     Last attempt to quit: 8/10/2000     Years since quittin.5    Smokeless tobacco: Never Used   Substance and Sexual Activity    Alcohol use: No     Comment: hx  of etoh     Drug use: No    Sexual activity: No   Other Topics Concern    Not on file   Social History Narrative    Shivani had three children and 11grandchildren.  She lives alone.  She is on disability.          CBC: No results for input(s): WBC, RBC, HGB, HCT, PLT, MCV, MCH, MCHC in the last 72 hours.    CMP: No results for input(s): NA, K, CL, CO2, BUN, CREATININE, GLU, MG, PHOS, CALCIUM, ALBUMIN, PROT, ALKPHOS, ALT, AST, BILITOT in the last 72 hours.    INR  No results for input(s): PT, INR, PROTIME, APTT in the last 72 hours.        Diagnostic Studies:      EKD Echo:  Results for orders placed or performed during the hospital encounter of 18   2D echo with color flow doppler   Result Value Ref Range    QEF 60 55 - 65    Mitral Valve Regurgitation TRIVIAL     Diastolic Dysfunction Yes (A)     Est. PA Systolic Pressure 70.9 (A)     Tricuspid Valve Regurgitation MILD      1. The EKG portion of this study is negative for ischemia at a peak heart rate of 81 bpm (54% of predicted).   2. Blood pressure remained stable throughout the protocol  (Presenting BP: 136/69 Peak BP: 115/54).   3. The following arrhythmias were present: occasional PACs & PVCs.   4. There were no symptoms of chest discomfort or significant dyspnea throughout the protocol.     Nuclear Quantitative Functional Analysis:   At rest:  LVEF: 61 % (normal is >= 51%)  LVED Volume: 117 ml (normal is <=171)  LVES Volume: 46 ml (normal is <=70)  At stress:  LVEF: 61 % (normal is >= 51%)  LVED Volume: 133 ml (normal is <=171)  LVES Volume: 52 ml (normal is <=70)    CONCLUSIONS: NORMAL MYOCARDIAL PERFUSION PET STRESS TEST  1. The perfusion scan is free of evidence for myocardial ischemia or injury.   2. Resting wall motion is physiologic. Stress wall motion is physiologic.   3. LV function is normal at rest and stress.  (normal is >= 51%)  4. The ventricular volumes are normal at rest and stress.   5. The extracardiac distribution of  radioactivity is normal.   6. There is evidence of right ventricular hypertrophy.   7. There was no previous study available to compare.    Anesthesia Evaluation    I have reviewed the Patient Summary Reports.    I have reviewed the Nursing Notes.   I have reviewed the Medications.     Review of Systems  Anesthesia Hx:  No problems with previous Anesthesia  History of prior surgery of interest to airway management or planning: Denies Family Hx of Anesthesia complications.   Denies Personal Hx of Anesthesia complications.   Hematology/Oncology:         -- Anemia:   Cardiovascular:   Exercise tolerance: poor Hypertension Valvular problems/Murmurs CAD  Dysrhythmias atrial fibrillation CHF ECG has been reviewed. Pulmonary htn on home oxygen and pulmonary vasodilators   Pulmonary:   COPD Denies Asthma. Shortness of breath Sleep Apnea, CPAP    Renal/:   Chronic Renal Disease, Dialysis, ESRD MWF, no issues  L arm fistula   Hepatic/GI:   Denies GERD. Denies Liver Disease.    Neurological:   CVA, no residual symptoms Denies Seizures.    Endocrine:   Diabetes, well controlled        Physical Exam  General:  Well nourished    Airway/Jaw/Neck:  Airway Findings: Mouth Opening: Normal Tongue: Normal  General Airway Assessment: Adult  Mallampati: III  TM Distance: Normal, at least 6 cm  Jaw/Neck Findings:  Neck ROM: Normal ROM  Neck Findings: Normal     Dental:  Dental Findings: In tact    Heart/Vascular:  Heart Findings: Vascular Findings:  Existing Vascular Access  Dialysis Access: AV Fistula LUE        Mental Status:  Mental Status Findings:  Cooperative, Alert and Oriented         Anesthesia Plan  Type of Anesthesia, risks & benefits discussed:  Anesthesia Type:  general, MAC  Patient's Preference:   Intra-op Monitoring Plan: standard ASA monitors  Intra-op Monitoring Plan Comments:   Post Op Pain Control Plan: per primary service following discharge from PACU  Post Op Pain Control Plan Comments:   Induction:   IV  Beta  Blocker:  Patient is not currently on a Beta-Blocker (No further documentation required).       Informed Consent: Patient understands risks and agrees with Anesthesia plan.  Questions answered. Anesthesia consent signed with patient.  ASA Score: 4     Day of Surgery Review of History & Physical:    H&P update referred to the provider.         Ready For Surgery From Anesthesia Perspective.

## 2019-02-28 NOTE — HOSPITAL COURSE
Patient presented for JACE as a part for evaluation before Watchman device placement. Patient tolerated the procedure well and was discharged home in a stable condition. Patient to follow up with Dr Thomas.

## 2019-02-28 NOTE — NURSING TRANSFER
Nursing Transfer Note      2/28/2019     Transfer To: short stay    Transfer via stretcher    Transfer with noa rn    Transported by noa rn    Medicines sent: none    Chart send with patient: Yes    Notified: no complaints no distress noted    Patient reassessed at: see eppic (date, time)    Upon arrival to floor: to room

## 2019-02-28 NOTE — PLAN OF CARE
Problem: Adult Inpatient Plan of Care  Goal: Plan of Care Review  Outcome: Ongoing (interventions implemented as appropriate)  Received report from Brigitte. Patient s/p JACE, AAOx3. VSS, no c/o pain or discomfort at this time, resp even and unlabored. Post procedure protocol reviewed with patient and patient's family. Understanding verbalized. Family members at bedside. Nurse call bell within reach. Will continue to monitor per post procedure protocol.

## 2019-02-28 NOTE — TRANSFER OF CARE
"Anesthesia Transfer of Care Note    Patient: Shivani Sheets    Procedure(s) Performed: Procedure(s) (LRB):  Transesophageal echo (JACE) intra-procedure log documentation (N/A)    Patient location: PACU    Anesthesia Type: general    Transport from OR: Transported from OR on 2-3 L/min O2 by NC with adequate spontaneous ventilation    Post pain: adequate analgesia    Post assessment: tolerated procedure well and no apparent anesthetic complications    Post vital signs: stable    Level of consciousness: awake    Nausea/Vomiting: no vomiting    Complications: none    Transfer of care protocol was followed      Last vitals:   Visit Vitals  BP (!) 146/79 (BP Location: Right arm, Patient Position: Lying)   Pulse 87   Temp 36.8 °C (98.2 °F) (Oral)   Resp 20   Ht 5' 1" (1.549 m)   Wt 71.2 kg (156 lb 15.5 oz)   LMP  (LMP Unknown)   SpO2 (!) 92%   Breastfeeding? No   BMI 29.66 kg/m²     "

## 2019-02-28 NOTE — ASSESSMENT & PLAN NOTE
PLAN:  1. JACE for evaluation of GODFREY for watchman     -The risks, benefits & alternatives of the procedure were explained to the patient.    -The risks of transesophageal echo include but are not limited to:  Dental trauma, esophageal trauma/perforation, bleeding, laryngospasm/brochospasm, aspiration, sore throat/hoarseness, & dislodgement of the endotracheal tube/nasogastric tube (where applicable).    -The risks of moderate sedation include hypotension, respiratory depression, arrhythmias, bronchospasm, & death.    -Informed consent was obtained & the patient is agreeable to proceed with the procedure.    I will discuss with the attending physician. Attending addendum is to follow.     Further recommendations per attending addendum

## 2019-02-28 NOTE — ANESTHESIA POSTPROCEDURE EVALUATION
"Anesthesia Post Evaluation    Patient: Shivani Sheets    Procedure(s) Performed: Procedure(s) (LRB):  Transesophageal echo (JACE) intra-procedure log documentation (N/A)    Final Anesthesia Type: general  Patient location during evaluation: PACU  Patient participation: Yes- Able to Participate  Level of consciousness: awake and alert and oriented  Post-procedure vital signs: reviewed and stable  Pain management: adequate  Airway patency: patent  PONV status at discharge: No PONV  Anesthetic complications: no      Cardiovascular status: blood pressure returned to baseline, hemodynamically stable and stable  Respiratory status: unassisted, room air and spontaneous ventilation  Hydration status: euvolemic  Follow-up not needed.        Visit Vitals  BP (!) 158/88 (BP Location: Right arm, Patient Position: Lying)   Pulse 84   Temp 36.6 °C (97.8 °F) (Temporal)   Resp 17   Ht 5' 1" (1.549 m)   Wt 71.2 kg (156 lb 15.5 oz)   LMP  (LMP Unknown)   SpO2 97%   Breastfeeding? No   BMI 29.66 kg/m²       Pain/Suzie Score: No Data Recorded      "

## 2019-02-28 NOTE — PROGRESS NOTES
Thank you for the referral. The following patient has been assigned to Eve Boyer RN with Outpatient Complex Care Management for high risk screening.    Reason for referral: Persistent atrial fibrillation    Please contact [facility] at [phone] with any questions.    Thank you,    Rebecca Gibbons    Outpatient Case Management

## 2019-02-28 NOTE — H&P
[facility]  Cardiology  History and Physical     Patient Name: Shivani Sheets  MRN: [id number]  Admission Date: 2019  Code Status: Prior   Attending Provider: César Thomas MD   Primary Care Physician: Eula Weiss MD  Principal Problem:<principal problem not specified>    Patient information was obtained from patient and past medical records.     Subjective:     Chief Complaint:  GI bleed     HPI:  63 yoF HTN, ESRD on HD, DM, pHTN here for JACE prior to LAAO consideration. She has chronic anemia with some more recent lower GI bleeding.      Dysphagia or odynophagia:  No  Liver Disease, esophageal disease, or known varices:  No  Upper GI Bleeding: Yes  Snoring:  No  Sleep Apnea:  No  Prior neck surgery or radiation:  No  History of anesthetic difficulties:  No  Family history of anesthetic difficulties:  No  Last oral intake:  12 hours ago  Able to move neck in all directions:  Yes        Past Medical History:   Diagnosis Date    Allergy     Anemia     Anticoagulant long-term use     4 years coumadin, asa    Arthritis     Awaiting organ transplant 2013    Cataract     Central serous chorioretinopathy of eye, right 2018    CHF (congestive heart failure)     Chronic kidney disease     Colon polyps     COPD (chronic obstructive pulmonary disease)     Coronary artery disease     Diabetes mellitus     Diabetic retinopathy     Diverticulosis     Encounter for blood transfusion     ESRD from HTN strtied RRT 1999    Failed  donor kidney transplant -     Glaucoma     History of bleeding peptic ulcer      as stated per pt    Hyperlipidemia     Hypertension     Hypothyroidism     Morbid obesity 8/10/2012    Renal hypertension     Stroke     1987       Past Surgical History:   Procedure Laterality Date    CATARACT EXTRACTION W/  INTRAOCULAR LENS IMPLANT Bilateral     COLON SURGERY      COLONOSCOPY      COLONOSCOPY N/A  2/4/2019    Performed by Indio Beltran MD at [facility] (2ND FLR)    COLONOSCOPY N/A 6/12/2018    Performed by Jose Falk MD at [facility] (2ND FLR)    EGD (ESOPHAGOGASTRODUODENOSCOPY) N/A 1/14/2019    Performed by Miranda Maradiaga MD at [facility] (2ND FLR)    EGD (ESOPHAGOGASTRODUODENOSCOPY) N/A 9/11/2018    Performed by Luis Washington MD at [facility] (2ND FLR)    ESOPHAGOGASTRODUODENOSCOPY (EGD) N/A 2/27/2018    Performed by Kenji Desir MD at [facility] (2ND FLR)    EYE SURGERY      FRACTURE SURGERY      R arm    HERNIA REPAIR      HYSTERECTOMY      INSERTION-IMPLANT-GLAUCOMA Left 10/12/2017    Performed by Junaid Kern MD at [facility] OR 1ST FLR    KIDNEY TRANSPLANT      NEPHRECTOMY  11/2008    transplant     PARATHYROID GLAND SURGERY      TONSILLECTOMY      UPPER GASTROINTESTINAL ENDOSCOPY         Review of patient's allergies indicates:   Allergen Reactions    Penicillins Swelling    Iodine      Other reaction(s): Hives    Sulfamethoxazole-trimethoprim      Other reaction(s): Swelling  Other reaction(s): Hives       No current facility-administered medications on file prior to encounter.      Current Outpatient Medications on File Prior to Encounter   Medication Sig    ALPHAGAN P 0.1 % Drop once daily.     aspirin 325 MG tablet Take 1 tablet (325 mg total) by mouth once daily.    diltiaZEM (CARDIZEM CD) 300 MG 24 hr capsule Take 1 capsule (300 mg total) by mouth once daily.    dorzolamide-timolol 2-0.5% (COSOPT) 22.3-6.8 mg/mL ophthalmic solution INSTILL 1 DROP IN BOTH EYES TWICE DAILY    latanoprost 0.005 % ophthalmic solution INSTILL 1 DROP IN BOTH EYES EVERY DAY AT BEDTIME    levothyroxine (SYNTHROID) 100 MCG tablet Take 100 mcg by mouth. 1 Tablet Oral Every day    LIPITOR 10 mg tablet TAKE 1 BY MOUTH ONCE A DAY (Patient taking differently: TAKE 1 BY MOUTH ONCE nightly)    pantoprazole (PROTONIX) 40 MG tablet TAKE 1 TABLET BY MOUTH ONCE DAILY    riociguat (ADEMPAS) 2 mg Tab  tablet Take 2 mg by mouth 3 (three) times daily.     selexipag (UPTRAVI) 1,000 mcg Tab Take 1,000 mcg by mouth 2 (two) times daily.     Family History     Problem Relation (Age of Onset)    Asthma Sister    Blindness Father    Breast cancer Maternal Aunt    Depression Sister    Diabetes Maternal Aunt    Heart attack Father, Mother    Heart failure Father, Mother    Hypertension Father, Mother, Sister, Brother    Kidney disease Brother        Tobacco Use    Smoking status: Former Smoker     Types: Cigarettes     Last attempt to quit: 8/10/2000     Years since quittin.5    Smokeless tobacco: Never Used   Substance and Sexual Activity    Alcohol use: No     Comment: hx of etoh     Drug use: No    Sexual activity: No     Review of Systems   All other systems reviewed and are negative.    Objective:     Vital Signs (Most Recent):  Temp: 98.2 °F (36.8 °C) (19)  Pulse: 87 (19)  Resp: 20 (19)  BP: (!) 146/79 (19)  SpO2: (!) 92 % (19) Vital Signs (24h Range):  Temp:  [98.2 °F (36.8 °C)] 98.2 °F (36.8 °C)  Pulse:  [87] 87  Resp:  [20] 20  SpO2:  [92 %] 92 %  BP: (146)/(79) 146/79     Weight: 71.2 kg (156 lb 15.5 oz)  Body mass index is 29.66 kg/m².    SpO2: (!) 92 %  O2 Device (Oxygen Therapy): room air    No intake or output data in the 24 hours ending 19 0956    Lines/Drains/Airways     Central Venous Catheter Line                 RETIRED! DO NOT USE: Hemodialysis Catheter Arteriovenous fistula Left Forearm -- days          Drain                 Hemodialysis AV Fistula Left upper arm -- days          Peripheral Intravenous Line                 Peripheral IV - Single Lumen 19 0846 Right Hand less than 1 day                Physical Exam  General: alert, awake and oriented x 3  Eyes:PERRL.   Lungs:  clear to auscultation bilaterally   Cardiovascular: Heart: regular rate and rhythm, S1, S2 normal, no murmur, click, rub or gallop.   Extremities: no  cyanosis or edema.   Abdomen/Rectal: Abdomen: soft, non-tender non-distented; bowel sounds normal  Neurologic: Normal strength and tone. No focal numbness or weakness  Significant Labs: BMP: No results for input(s): GLU, NA, K, CL, CO2, BUN, CREATININE, CALCIUM, MG in the last 48 hours., CMP No results for input(s): NA, K, CL, CO2, GLU, BUN, CREATININE, CALCIUM, PROT, ALBUMIN, BILITOT, ALKPHOS, AST, ALT, ANIONGAP, ESTGFRAFRICA, EGFRNONAA in the last 48 hours., CBC No results for input(s): WBC, HGB, HCT, PLT in the last 48 hours. and INR No results for input(s): INR, PROTIME in the last 48 hours.    Significant Imaging: Echocardiogram:   2D echo with color flow doppler:   Results for orders placed or performed during the hospital encounter of 02/08/18   2D echo with color flow doppler   Result Value Ref Range    QEF 60 55 - 65    Mitral Valve Regurgitation TRIVIAL     Diastolic Dysfunction Yes (A)     Est. PA Systolic Pressure 70.9 (A)     Tricuspid Valve Regurgitation MILD     and Transthoracic echo (TTE) complete (Cupid Only):   Results for orders placed or performed during the hospital encounter of 02/02/19   Transthoracic echo (TTE) complete (Cupid Only)   Result Value Ref Range    Ascending aorta 2.84 cm    STJ 2.75 cm    AV mean gradient 7.43 mmHg    Ao peak drake 1.88 m/s    Ao VTI 33.94 cm    IVS 0.82 0.6 - 1.1 cm    LA size 4.45 cm    Left Atrium Major Axis 6.10 cm    Left Atrium Minor Axis 5.77 cm    LVIDD 4.76 3.5 - 6.0 cm    LVIDS 3.95 2.1 - 4.0 cm    LVOT diameter 2.02 cm    LVOT peak VTI 20.94 cm    PW 1.11 (A) 0.6 - 1.1 cm    MV Peak A Drake 0.89 m/s    E wave decelartion time 248.29 msec    MV Peak E Drake 1.08 m/s    RA Major Axis 5.76 cm    RA Width 4.27 cm    RVDD 3.08 cm    Sinus 2.85 cm    TAPSE 2.24 cm    TR Max Drake 4.46 m/s    TDI LATERAL 0.10     TDI SEPTAL 0.05     LA WIDTH 5.16 cm    LV Diastolic Volume 105.47 mL    LV Systolic Volume 68.06 mL    LVOT peak drake [id number] m/s    LV LATERAL E/E'  RATIO 10.80     LV SEPTAL E/E' RATIO 21.60     FS 17 %    LA volume 115.75 cm3    LV mass 159.98 g    Left Ventricle Relative Wall Thickness 0.47 cm    AV valve area 1.98 cm2    AV Velocity Ratio 0.60     AV index (prosthetic) 0.62     E/A ratio 1.21     Mean e' 0.08     LVOT area 3.20 cm2    LVOT stroke volume 67.07 cm3    AV peak gradient 14.14 mmHg    E/E' ratio 14.40     LV Systolic Volume Index 37.8 mL/m2    LV Diastolic Volume Index 58.54 mL/m2    LA Volume Index 64.2 mL/m2    LV Mass Index 88.8 g/m2    Triscuspid Valve Regurgitation Peak Gradient 79.57 mmHg    BSA 1.86 m2    Right Atrial Pressure (from IVC) 8 mmHg    TV rest pulmonary artery pressure 88 mmHg     Assessment and Plan:     Atrial fibrillation    PLAN:  1. JACE for evaluation of GODFREY for watchman     -The risks, benefits & alternatives of the procedure were explained to the patient.    -The risks of transesophageal echo include but are not limited to:  Dental trauma, esophageal trauma/perforation, bleeding, laryngospasm/brochospasm, aspiration, sore throat/hoarseness, & dislodgement of the endotracheal tube/nasogastric tube (where applicable).    -The risks of moderate sedation include hypotension, respiratory depression, arrhythmias, bronchospasm, & death.    -Informed consent was obtained & the patient is agreeable to proceed with the procedure.    I will discuss with the attending physician. Attending addendum is to follow.     Further recommendations per attending addendum         VTE Risk Mitigation (From admission, onward)    None          Rinku Hutchinson MD  Cardiology   [facility]

## 2019-02-28 NOTE — HPI
63 yoF HTN, ESRD on HD, DM, pHTN here for LAAO consideration. She has chronic anemia with some more recent lower GI bleeding.      Dysphagia or odynophagia:  No  Liver Disease, esophageal disease, or known varices:  No  Upper GI Bleeding: Yes  Snoring:  No  Sleep Apnea:  No  Prior neck surgery or radiation:  No  History of anesthetic difficulties:  No  Family history of anesthetic difficulties:  No  Last oral intake:  12 hours ago  Able to move neck in all directions:  Yes

## 2019-02-28 NOTE — DISCHARGE INSTRUCTIONS
Recovery After Procedural Sedation (Adult)  You have been given medicine by vein to make you sleep during your surgery. This may have included both a pain medicine and sleeping medicine. Most of the effects have worn off. But you may still have some drowsiness for the next 6 to 8 hours.  Home care  Follow these guidelines when you get home:  · For the next 8 hours, you should be watched by a responsible adult. This person should make sure your condition is not getting worse.  · Don't drink any alcohol for the next 24 hours.  · Don't drive, operate dangerous machinery, or make important business or personal decisions during the next 24 hours.  Note: Your healthcare provider may tell you not to take any medicine by mouth for pain or sleep in the next 4 hours. These medicines may react with the medicines you were given in the hospital. This could cause a much stronger response than usual.  Follow-up care  Follow up with your healthcare provider if you are not alert and back to your usual level of activity within 12 hours.  When to seek medical advice  Call your healthcare provider right away if any of these occur:  · Drowsiness gets worse  · Weakness or dizziness gets worse  · Repeated vomiting  · You can't be awakened   Date Last Reviewed: 10/18/2016  © 6877-1639 [employer]. [address] [place] [address], [place] [address]. All rights reserved. This information is not intended as a substitute for professional medical care. Always follow your healthcare professional's instructions.

## 2019-02-28 NOTE — PROGRESS NOTES
Patient admitted to recovery see [facility] for complete assessment pacu bcg's maintained safety measures verified patient instructed on pain scale and patient verbalized understanding. Patient's blood sugar 100. Also called patient's brother and updated on patient location with the permission of patient.

## 2019-02-28 NOTE — DISCHARGE SUMMARY
[facility]  Cardiology  Discharge Summary      Patient Name: Shivani Sheets  MRN: [id number]  Admission Date: 2/28/2019  Hospital Length of Stay: 0 days  Discharge Date and Time:  02/28/2019 1:13 PM  Attending Physician: No att. providers found    Discharging Provider: Rinku Hutchinson MD  Primary Care Physician: Eula Weiss MD    HPI:   63 yoF HTN, ESRD on HD, DM, pHTN here for LAAO consideration. She has had multiple admissions for GI bleed and her coumadin has been held. More recent admission was 2/2019.       Procedure(s) (LRB):  Transesophageal echo (JACE) intra-procedure log documentation (N/A)     Indwelling Lines/Drains at time of discharge:  Lines/Drains/Airways     Central Venous Catheter Line                 RETIRED! DO NOT USE: Hemodialysis Catheter Arteriovenous fistula Left Forearm -- days          Drain                 Hemodialysis AV Fistula Left upper arm -- days                Hospital Course:  Patient presented for JACE as a part for evaluation before Watchman device placement. Patient tolerated the procedure well and was discharged home in a stable condition. Please see JACE procedure note for details.Patient to follow up with Dr Thomas.    Consults: None    Significant Diagnostic Studies: Labs: CMP No results for input(s): NA, K, CL, CO2, GLU, BUN, CREATININE, CALCIUM, PROT, ALBUMIN, BILITOT, ALKPHOS, AST, ALT, ANIONGAP, ESTGFRAFRICA, EGFRNONAA in the last 48 hours. and CBC No results for input(s): WBC, HGB, HCT, PLT in the last 48 hours.    Pending Diagnostic Studies:     None          Final Active Diagnoses:    Diagnosis Date Noted POA    Atrial fibrillation [I48.91] 02/11/2014 Yes      Problems Resolved During this Admission:     No new Assessment & Plan notes have been filed under this hospital service since the last note was generated.  Service: Cardiology      Discharged Condition: good    Disposition: Home or Self Care    Follow Up: with Dr Thomas as scheduled      Patient Instructions:      Ambulatory referral to Outpatient Case Management   Referral Priority: Routine Referral Type: Consultation   Referral Reason: Specialty Services Required   Number of Visits Requested: 1     Medications:  Reconciled Home Medications:      Medication List      CHANGE how you take these medications    LIPITOR 10 MG tablet  Generic drug:  atorvastatin  TAKE 1 BY MOUTH ONCE A DAY  What changed:  See the new instructions.        CONTINUE taking these medications    ADEMPAS 2 mg Tab tablet  Generic drug:  riociguat  Take 2 mg by mouth 3 (three) times daily.     ALPHAGAN P 0.1 % Drop  Generic drug:  brimonidine 0.1%  once daily.     aspirin 325 MG tablet  Take 1 tablet (325 mg total) by mouth once daily.     diltiaZEM 300 MG 24 hr capsule  Commonly known as:  CARDIZEM CD  Take 1 capsule (300 mg total) by mouth once daily.     dorzolamide-timolol 2-0.5% 22.3-6.8 mg/mL ophthalmic solution  Commonly known as:  COSOPT  INSTILL 1 DROP IN BOTH EYES TWICE DAILY     latanoprost 0.005 % ophthalmic solution  INSTILL 1 DROP IN BOTH EYES EVERY DAY AT BEDTIME     levothyroxine 100 MCG tablet  Commonly known as:  SYNTHROID  Take 100 mcg by mouth. 1 Tablet Oral Every day     pantoprazole 40 MG tablet  Commonly known as:  PROTONIX  TAKE 1 TABLET BY MOUTH ONCE DAILY     UPTRAVI 1,000 mcg Tab  Generic drug:  selexipag  Take 1,000 mcg by mouth 2 (two) times daily.            Time spent on the discharge of patient: 35 minutes    Rinku Hutchinson MD  Cardiology  [facility]

## 2019-03-01 LAB — POCT GLUCOSE: 100 MG/DL (ref 70–110)

## 2019-03-06 ENCOUNTER — OUTPATIENT CASE MANAGEMENT (OUTPATIENT)
Dept: ADMINISTRATIVE | Facility: OTHER | Age: 64
End: 2019-03-06

## 2019-03-06 ENCOUNTER — TELEPHONE (OUTPATIENT)
Dept: INTERNAL MEDICINE | Facility: CLINIC | Age: 64
End: 2019-03-06

## 2019-03-06 DIAGNOSIS — N18.6 ESRD (END STAGE RENAL DISEASE): Primary | ICD-10-CM

## 2019-03-06 NOTE — PROGRESS NOTES
Pt stated that she is not on coumadin. She was told to take Aspirin 81mg. She stated that Dr. Hennessy along with other physicians made the decision to take her off. The patients that she has had blood transfusions twice, due to bleeding somewhere in the body. She stated that test were ran to pinpoint the cause of the bleed. But the physicians were unable to find out the cause of the bleed. She was admitted twice with in three weeks and had to have 5 pints of blood given to her.

## 2019-03-06 NOTE — PATIENT INSTRUCTIONS
Fall Due to Dizziness, Weakness, or Loss of Balance  The symptoms that led to your fall have been evaluated. Your doctor feels it is safe for you to return home.  Many things can cause you to become dizzy. Everyone means a little something different by the word dizzy. People may describe their symptoms using these words:  · It doesnt feel right in my head  · It feels like spinning in my head  · It seems like the room is spinning  · My balance feels off  · I feel lightheaded, like I am going to pass out  All these descriptions can have real causes.     Your balance mechanism is in your inner ear. Anything disturbing it can make you feel dizzy, whether it is from a cold, an injury, or many other things. Anything that causes your blood pressure to drop suddenly can make you feel lightheaded, or like you are going to faint. This is because at that moment there might not be enough blood flowing to your brain. Causes include:  · Medicines  · Dehydration  · Standing up or bending over too quickly  · Becoming overheated  · Taking a hot shower or bath  · Straining hard while lifting something or using the toilet  · Strokes, heart attack, heart valve disease, very slow or very fast heart rate  · Low blood sugar  · Ear infection  · Hyperventilation  · Anemia  · Trauma  · Infection  · Panic attack  · Pregnancy  You may be at risk of repeat falls. Take precautions described below to prevent another fall.  Home care  · If you become lightheaded or dizzy, lie down immediately or sit and lean forward with your head down. It is better to do this than fall and seriously hurt or injure yourself.  · Rest today. When changing position, take a moment to be sure any dizziness goes away before standing and walking.  · If you have been prescribed a walker, be sure to use it whenever you walk, even if it is a short distance.  · If you were injured during the fall, follow the advice from your doctor regarding care of your injury.  · Be  sure your doctor knows all the medicines, herbs, and supplements you take. Some medicines can cause dizziness.  Follow-up care  Unless given other advice, call your doctor on the next office day to advise of your fall and to schedule an appointment. You may require further treatment for the underlying condition that caused todays fall.  If X-ray or a CT scan were done, you will be notified of the results, especially if it affects treatment.  Call 911  Call 911 if any of these occur:  · Trouble breathing  · Confused or difficulty arousing  · Fainting or loss of consciousness  · Rapid or very slow heart rate  · Seizure  · Difficulty with speech or vision, weakness of an arm or leg  · Difficulty walking or talking, loss of balance  · Numbness or weakness in one side of your body, facial droop  When to seek medical advice  Call your healthcare provider right away if any of the following occur:  · Another fall  · Continued dizzy spells  · Severe headache  · Blood in vomit or stools (black or red color)  Date Last Reviewed: 11/5/2015  © 1986-1676 [employer]. [address] [place] [address], [place] [address]. All rights reserved. This information is not intended as a substitute for professional medical care. Always follow your healthcare professional's instructions.

## 2019-03-06 NOTE — TELEPHONE ENCOUNTER
----- Message from Fanta Rubin sent at 3/6/2019 12:22 PM CST -----  Contact: patient [phone]  Patient is returning your call from this morning about an appt. She thinks it is about a bookout but would like an earlier time than 4/23/19 which is the next available.

## 2019-03-06 NOTE — TELEPHONE ENCOUNTER
----- Message from Reina Boyer RN sent at 3/6/2019  1:42 PM CST -----  Contact: Eve Boyer RN OPCM  Good Afternoon,  The above pt was enrolled in [facility] this afternoon.  During the enrollment a couple o possible safety concerns arose.  Pt stated that when walking with her walker at times she gets tired and can be in places where there is no where to sit. Pt was wondering if a rolator could be ordered for her.  Additionally with pt's hip soreness, she is having difficulty getting on and off the commode.  Could you possible order a bedside commode to be utilized over her toilet to help her with getting on and off?    Thank you so much for your time and assistance in this matter.    Best Regards,    Eve Blackburn) Rosalind PAYNE BSN RN   Outpatient Care Management  [phone]

## 2019-03-06 NOTE — PROGRESS NOTES
"Summary:  Spoke wit pt regarding enrollment in OPCM - after briefly explaining the program - pt agreed to enroll.  Pt stated that she has been having mobility problem with her hip and recently had it injected last week.  Pt states that she has trouble now getting into bed because of the hip and has to use a stool to climb in bed -reporting that she has a Drake Adrenaline Mobility bed.  Pt reports that she recently was in the ED with atrial fib and also with a rectal bleed which is what pt identifies as her biggest problem- pt reports that his happens to her about every 6 months- pt states that she has had an UGI, coloscopy, camera pill -but they cannot tell her what is wrong.   Pt states she has rectal bleeding -reports loosing alot of blood and ending up in the hosptial because her 'numbers" are low and needing transfusion. Pt stated she had a JACE last week stating it wasa bout putting a "plug" in her heart - pt states she thinks she ws told that the "plug" and the stopping of the blood thinner would possibility help with stopping the rectal bleeding.  Pt reports she goes to dialysis 3x a week - MWF - stated she had been today and she was tired - pt states that she has been on dialysis for over 20 years - reports following a renal and low sodium diet-pt also states that she is on a fluid restriction of 32 oz a day.  Pt did report that due to her pulmonary hypertension her feet and lower legs swell.  Pt reports that she lives with her brother and her son - both help her with getting places, cooking and financially - however they cannot nor does pt want them to assist with on private issues of ADL - dressing, toileting, etc.    Interventions:  Mailed educational info on fall prevention and safety  Messaged PCP regarding safety requests for a rolator and a bedside commode  Referred to [facility] for advanced care planning (pt would like to see if her hand written is similar to what is in the advanced directives packet, also for possible " "ADLS support,  Mailed educational info on atrial fibrillation    Plan:  Follow-up with pt regarding bleed and JACE for "plug" in heart    Todays OPCM Self-Management Care Plan was developed with the patients/caregivers input and was based on identified barriers from todays assessment.  Goals were written today with the patient/caregiver and the patient has agreed to work towards these goals to improve his/her overall well-being. Patient verbalized understanding of the care plan, goals, and all of today's instructions. Encouraged patient/caregiver to communicate with his/her physician and health care team about health conditions and the treatment plan.  Provided my contact information today and encouraged patient/caregiver to call me with any questions as needed.  "

## 2019-03-06 NOTE — TELEPHONE ENCOUNTER
Spoke with pt and informed her that her appointment was rescheduled for 3/26/19 at 10:40 am and letter was mailed out.

## 2019-03-07 ENCOUNTER — ANTI-COAG VISIT (OUTPATIENT)
Dept: CARDIOLOGY | Facility: CLINIC | Age: 64
End: 2019-03-07

## 2019-03-07 DIAGNOSIS — I48.19 PERSISTENT ATRIAL FIBRILLATION: ICD-10-CM

## 2019-03-07 DIAGNOSIS — Z79.01 ANTICOAGULATION MONITORING BY PHARMACIST: ICD-10-CM

## 2019-03-08 ENCOUNTER — PATIENT OUTREACH (OUTPATIENT)
Dept: ADMINISTRATIVE | Facility: HOSPITAL | Age: 64
End: 2019-03-08

## 2019-03-08 NOTE — PROGRESS NOTES
Spoke with patient, made aware that order for her commode was faxed and that the DME company would be contacting her. She verbalized understanding and she was given their contact number.

## 2019-03-14 ENCOUNTER — OUTPATIENT CASE MANAGEMENT (OUTPATIENT)
Dept: ADMINISTRATIVE | Facility: OTHER | Age: 64
End: 2019-03-14

## 2019-03-14 NOTE — PROGRESS NOTES
Summary:  Attempted to do follow-up call with pt - left a message and pt called right back- Pt states that she is doing much better and getting stronger every day. Pt denies any further bleeding from her rectum since being D/[place] from the hospital saying that she has regular B -pt still has a cough but states that her nephrologist told her to just keep taking the Robitussin and that pt is running the humidifierPt reported that she did receive the educational packet but had not finished going thru it yet - also reports that he received the bedside commode and that it has been helpful.  Pt reports that her B/P has been running 120/130's /74 -82.  Pt states she went to dialysis yesterday and is doing well-stating she strictly adheres to her 32 oz per day fluid restriction and a low salt diet pt denies any falls or SOB    Interventions:  Encouraged pt to go thru educational packet and write down questions she has to be discussed on next call  Encouraged pt to keep taking and recording B/P - praised pt for compliance        Plan:  Follow up  On B/P   Follow-up on cough next week - if still has - notify PCP    Todays OPCM Self-Management Care Plan was developed with the patients/caregivers input and was based on identified barriers from todays assessment.  Goals were written today with the patient/caregiver and the patient has agreed to work towards these goals to improve his/her overall well-being. Patient verbalized understanding of the care plan, goals, and all of today's instructions. Encouraged patient/caregiver to communicate with his/her physician and health care team about health conditions and the treatment plan.  Provided my contact information today and encouraged patient/caregiver to call me with any questions as needed.

## 2019-03-15 ENCOUNTER — OUTPATIENT CASE MANAGEMENT (OUTPATIENT)
Dept: ADMINISTRATIVE | Facility: OTHER | Age: 64
End: 2019-03-15

## 2019-03-15 NOTE — PROGRESS NOTES
Summary:  1st Attempt to complete Social Work Assessment for Outpatient Care Management; left message requesting a return call.  LCSW will reattempt at a later date.      Interventions:  None    Plan:  Make 2nd attempt on next week.    Todays OPCM Self-Management Care Plan was developed with the patients/caregivers input and was based on identified barriers from todays assessment.  Goals were written today with the patient/caregiver and the patient has agreed to work towards these goals to improve his/her overall well-being. Patient verbalized understanding of the care plan, goals, and all of today's instructions. Encouraged patient/caregiver to communicate with his/her physician and health care team about health conditions and the treatment plan.  Provided my contact information today and encouraged patient/caregiver to call me with any questions as needed.

## 2019-03-20 ENCOUNTER — PATIENT OUTREACH (OUTPATIENT)
Dept: ADMINISTRATIVE | Facility: HOSPITAL | Age: 64
End: 2019-03-20

## 2019-03-20 ENCOUNTER — OUTPATIENT CASE MANAGEMENT (OUTPATIENT)
Dept: ADMINISTRATIVE | Facility: OTHER | Age: 64
End: 2019-03-20

## 2019-03-20 NOTE — PROGRESS NOTES
Summary:  2nd attempt to complete Social Work Assessment for OPCM; left message requesting a return call. LCSW will mail a letter with contact information requesting a return call.  OPCM RN notified.      Interventions:  Mailed unable to reach letter  Collaborated with OPCM-RN    Plan:  MAKE 3RD ATTEMPT NEXT WEEK.

## 2019-03-20 NOTE — LETTER
March 20, 2019    Shivani Sheets  [address] [place] [address]             [facility] [address] [place] [address] Dear Ms. Sheets:    I am writing from the Outpatient Complex Care Management Department at [facility].  I received a referral from Reina Boyer RN (Ginny) to contact you regarding any needs you may have.  I have been unable to reach you by phone.   Please contact me if you would like to discuss any needs you may have.    I can be reached at [phone]  Monday thru Friday from 8:00am to 4:30pm.  [facility] also has a program with a nurse available 24/7 to answer questions or provide medical advice.  [facility] can be reached at [phone].    Sincerely,       Cheryl Bautista LCSW

## 2019-03-21 ENCOUNTER — CLINICAL SUPPORT (OUTPATIENT)
Dept: REHABILITATION | Facility: HOSPITAL | Age: 64
End: 2019-03-21
Payer: MEDICARE

## 2019-03-21 ENCOUNTER — OUTPATIENT CASE MANAGEMENT (OUTPATIENT)
Dept: ADMINISTRATIVE | Facility: OTHER | Age: 64
End: 2019-03-21

## 2019-03-21 DIAGNOSIS — M25.60 JOINT STIFFNESS: ICD-10-CM

## 2019-03-21 DIAGNOSIS — M70.62 TROCHANTERIC BURSITIS OF LEFT HIP: Primary | ICD-10-CM

## 2019-03-21 DIAGNOSIS — R26.2 DIFFICULTY WALKING: ICD-10-CM

## 2019-03-21 PROCEDURE — 97161 PT EVAL LOW COMPLEX 20 MIN: CPT | Mod: PN

## 2019-03-21 PROCEDURE — 97140 MANUAL THERAPY 1/> REGIONS: CPT | Mod: PN

## 2019-03-21 PROCEDURE — 97110 THERAPEUTIC EXERCISES: CPT | Mod: PN

## 2019-03-21 NOTE — PLAN OF CARE
[facility]  Physical Therapy Initial Evaluation    Name: Shivani Sheets  Clinic Number: [id number]    Therapy Diagnosis:   Encounter Diagnoses   Name Primary?    Trochanteric bursitis of left hip Yes    Joint stiffness     Difficulty walking      Physician: Cullen Sun PA*    Physician Orders: PT Eval and Treat   Medical Diagnosis from Referral: left hip trochanteric bursitis  Evaluation Date: 3/21/2019  Plan of Care Expiration: 6/21/19    Priority Start Date Expiration Date Referral Entered By   Routine 02/26/2019 02/26/2020 Rito Morrow MD   Visits Requested Visits Authorized Visits Completed Visits Scheduled   1 1         Time In: 12:30  Time Out: 1:30    Total Billable Time: 60 minutes    Precautions: severe HTN, kidney dialysis M, W, F,      Subjective   Date of onset: 6 months                                    History of current condition:   Shivani  is a 63 year old female presenting with c/o left hip and low back pain with left LE radiculopathy.  She reports an insidious onset  6 months ago.   She ambulates with a SPC which she started using after a right knee injury s/p fall last year.  She states she was unable to put weight through her right leg for a few months.  She reports an injection in the left hip 6 weeks ago with short term relief.  She is currently undergoing a case management program involving nursing 1-2 x per week due to multiple medical diagnosis including severe pulmonary HTN.  Her goal is to be able to walk as needed without a walker or cane.  She would like to return to cooking with less difficulty and well as clean her house.      Medical History:   Past Medical History:   Diagnosis Date    Allergy     Anemia     Anticoagulant long-term use     4 years coumadin, asa    Arthritis     Awaiting organ transplant 4/30/2013    Cataract     Central serous chorioretinopathy of eye, right 8/21/2018    CHF (congestive heart failure)      Chronic kidney disease     Colon polyps     COPD (chronic obstructive pulmonary disease)     Coronary artery disease     Diabetes mellitus     Diabetic retinopathy     Diverticulosis     Encounter for blood transfusion     ESRD from HTN strtied RRT 1999    Failed  donor kidney transplant      Glaucoma     History of bleeding peptic ulcer      as stated per pt    Hyperlipidemia     Hypertension     Hypothyroidism     Morbid obesity 8/10/2012    Renal hypertension     Stroke     1987       Surgical History:   Shivani Sheets  has a past surgical history that includes Kidney transplant; Hysterectomy; Eye surgery; Nephrectomy (2008); Tonsillectomy; Cataract extraction w/  intraocular lens implant (Bilateral); Upper gastrointestinal endoscopy; Colonoscopy; Colonoscopy (N/A, 2018); Parathyroid gland surgery; Colon surgery; Hernia repair; Fracture surgery; Esophagogastroduodenoscopy (N/A, 2018); Esophagogastroduodenoscopy (N/A, 2019); and Colonoscopy (N/A, 2019).    Medications:   Shivani has a current medication list which includes the following prescription(s): alphagan p, aspirin, diltiazem, dorzolamide-timolol 2-0.5%, latanoprost, levothyroxine, lipitor, pantoprazole, riociguat, selexipag, and walker, and the following Facility-Administered Medications: sodium chloride 0.9%.    Allergies:   Review of patient's allergies indicates:   Allergen Reactions    Penicillins Swelling    Iodine      Other reaction(s): Hives    Sulfamethoxazole-trimethoprim      Other reaction(s): Swelling  Other reaction(s): Hives        Imaging: recent x-rays the pt states reveals minimal DJD.     Prior Therapy: left hip, recent  Social History:  Lives with family  Occupation: none  Prior Level of Function: AD  Current Level of Function: AD    Pain:  Current 7/10, worst 10/10, best 7/10   Location: left hip, lumbar     Aggravating Factors: prolonged standing, walking,  household chores, cooking  Easing Factors: rest, heat, ice    Pt Goal: Her goal is to be able to walk as needed without a walker or cane.  She would like to return to cooking with less difficulty and well as clean her house.     Objective     Observation:  Pt presents ambulating with an antalgic gait, decreased stance on right LE. Stands weightbearing through left LE.   Palpation: mild tenderness on palpation of left GT, and QL.       Range of Motion/Strength:      Lumbar AROM: Degrees   Flexion WFL   Extension Moderate limitation    Right side bending Mild limitation   Left side bending Mild limitation   Lumbar quadrant reveals: increase in discomfort with extension and left lateral flexion    AROM: Bilateral LE: Grossly WFL. Impaired right knee flex/ext.  Bilateral knee flexion to 90 deg.  Mild to moderate limitation left hip flexion  MMT:  Right LE: 4-   Left LE: 3+  Abdominal Strength: 3+    Mobility: L/S p/a grade 1+  Flexibility: hip flexor length:fair      Hamstring Length:fair    Bed Mobility:Independent, modified  Transfers: Independent, modified      CMS Impairment/Limitation/Restriction for FOTO Lumbar Spine Survey  Status Limitation G-Code CMS Severity Modifier  Intake 38% 62% Current Status CL - At least 60 percent but less than 80 percent  Predicted 55% 45% Goal Status+ CK - At least 40 percent but less than 60 percent    TREATMENT     Treatment Time In: 1:00  Treatment Time Out: 1:30    Total Treatment time separate from Evaluation: 30 minutes    Shivani received therapeutic exercises to develop strength, endurance, ROM, flexibility, posture and core stabilization for 10 minutes including:   -hip fall out x 20  -quad set 2 x 10  -piriformis stretch 20 sec x 4  -bal squeeze 2 x 10    Shivani received the following manual therapy techniques:  were applied to the: left IT Band for 10 minutes, including:  STM with roller stick    Shivani received cold pack for 10 minutes to left hip.    Education  provided:   - home exercise compliance    Written Home Exercises Provided: yes.    Exercises were reviewed and Shivani was able to demonstrate them prior to the end of the session.  Shivani demonstrated good  understanding of the education provided.     See EMR under Media for exercises provided 3/21/2019.    Assessment     Pt presents with signs and symptoms consistent with referring diagnosis. Evaluation has determined a decrease in functional status and subjective and objective deficits that can be addressed by physical therapy intervention. Pt demonstrates pain limiting functional activities. Decreased flexibility and strength limiting normal movement patterns. Decreased segmental motion. Decreased postural strength and awareness. Positive special testing. Decreased participation in functional and recreational activities. Subjective and objective measures are addressed by goals in the plan of care.  Patient/family are involved in the development of these goals. Patient/family are educated about current injury and treatment.       Plan of care was dicussed with patient. Pt will benefit from skilled outpatient Physical Therapy to address the deficits stated above and in the chart below, provide pt/family education, and to maximize pt's level of independence. Pt's spiritual, cultural and educational needs considered and patient is agreeable to the plan of care and goals as stated below:     Pt prognosis is Good.  Anticipated Barriers for therapy: none    Medical Necessity is demonstrated by the following  History  Co-morbidities and personal factors that may impact the plan of care Co-morbidities:   Allergy   Anemia   Anticoagulant long-term use   4 years coumadin, asa   Arthritis   Awaiting organ transplant   Cataract   Central serous chorioretinopathy of eye, right   CHF (congestive heart failure)   Chronic kidney disease   Colon polyps   COPD (chronic obstructive pulmonary disease)   Coronary artery disease    Diabetes mellitus   Diabetic retinopathy   Diverticulosis   Encounter for blood transfusion   ESRD from HTN strtied RRT    Failed  donor kidney transplant    Glaucoma   History of bleeding peptic ulcer    as stated per pt   Hyperlipidemia   Hypertension   Hypothyroidism   Morbid obesity   Renal hypertension   Stroke       Personal Factors:   age     low   Examination  Body Structures and Functions, activity limitations and participation restrictions that may impact the plan of care Body Regions:   back  lower extremities    Body Systems:    gross symmetry  ROM  strength  balance  gait  transfers  transitions    Participation Restrictions:   Shopping, cooking, housework    Activity limitations:   Learning and applying knowledge  no deficits    General Tasks and Commands  no deficits    Communication  no deficits    Mobility  lifting and carrying objects  walking    Self care  dressing    Domestic Life  shopping  cooking  doing house work (cleaning house, washing dishes, laundry)    Interactions/Relationships  no deficits    Life Areas  no deficits    Community and Social Life  community life         low   Clinical Presentation stable and uncomplicated low   Decision Making/ Complexity Score: low     Goals:    Short Term Goals (4 Weeks):     1.Pt to increase strength by a 1/2 grade of muscles test to allow for improvement in functional activities such as performing chores.  2.Pt to improve range of motion by 25% to allow for improved functional mobility to allow for improvement in IADLs.   3.Pt to report compliance with HEP and demonstrate proper exercise technique to PT to show competence with self management of condition.  4.Decrease pain by 25% during functional activities.    Long Term Goals (12 Weeks):     1. Increase ROM to allow improved joint biomechanics during functional activities.   2.Increase trunk and lower extremity strength to within normal limits during functional  activities.   3. Independent with home exercise program.   4. Full return to functional activities with manageable complaints.  5. Patient to demonstrate improved posture and body mechanics.  6. Decrease pain by 75% during functional activities.    Plan     Plan of care Certification: 3/21/2019 to 6/21/19.    Recommended Treatment Plan: 2 times per week for 12 weeks with treatments to consist of:  Neuromuscular and postural re-education,  training, therapeutic exercise, therapeutic activities,balance training, gait training, manual therapy, soft tissue mobilization, ROM exercises, Cardiovascular,  Postural stabilization, manual traction, spinal mobilization, moist heat, cryotherapy, electrical stimulation, ultrasound, home exercise education and planning.    Oj Dash, PT

## 2019-03-21 NOTE — PROGRESS NOTES
Summary:  1st missed follow-up attempt- spoke with pt but she requested a call back at another time -stating that she was on her way out of the door to go to PT    Interventions:  Honored pt request    Plan:  2nd follow-up attempt next week 3-26 or 3-27

## 2019-03-26 ENCOUNTER — LAB VISIT (OUTPATIENT)
Dept: LAB | Facility: HOSPITAL | Age: 64
End: 2019-03-26
Attending: INTERNAL MEDICINE
Payer: MEDICARE

## 2019-03-26 ENCOUNTER — PROCEDURE VISIT (OUTPATIENT)
Dept: OPHTHALMOLOGY | Facility: CLINIC | Age: 64
End: 2019-03-26
Payer: MEDICARE

## 2019-03-26 ENCOUNTER — OFFICE VISIT (OUTPATIENT)
Dept: INTERNAL MEDICINE | Facility: CLINIC | Age: 64
End: 2019-03-26
Payer: MEDICARE

## 2019-03-26 VITALS — HEART RATE: 72 BPM | DIASTOLIC BLOOD PRESSURE: 77 MMHG | SYSTOLIC BLOOD PRESSURE: 149 MMHG

## 2019-03-26 DIAGNOSIS — H35.711 CENTRAL SEROUS CHORIORETINOPATHY OF EYE, RIGHT: ICD-10-CM

## 2019-03-26 DIAGNOSIS — E03.9 HYPOTHYROIDISM, UNSPECIFIED TYPE: ICD-10-CM

## 2019-03-26 DIAGNOSIS — H40.53X2 NEOVASCULAR GLAUCOMA OF BOTH EYES, MODERATE STAGE: ICD-10-CM

## 2019-03-26 DIAGNOSIS — E11.3513 CONTROLLED TYPE 2 DIABETES MELLITUS WITH BOTH EYES AFFECTED BY PROLIFERATIVE RETINOPATHY AND MACULAR EDEMA, WITHOUT LONG-TERM CURRENT USE OF INSULIN: Primary | ICD-10-CM

## 2019-03-26 DIAGNOSIS — I10 HYPERTENSION, UNSPECIFIED TYPE: ICD-10-CM

## 2019-03-26 DIAGNOSIS — K92.2 GASTROINTESTINAL HEMORRHAGE, UNSPECIFIED GASTROINTESTINAL HEMORRHAGE TYPE: Primary | ICD-10-CM

## 2019-03-26 DIAGNOSIS — I27.20 PULMONARY HTN: ICD-10-CM

## 2019-03-26 LAB — TSH SERPL DL<=0.005 MIU/L-ACNC: 1.34 UIU/ML (ref 0.4–4)

## 2019-03-26 PROCEDURE — 99214 PR OFFICE/OUTPT VISIT, EST, LEVL IV, 30-39 MIN: ICD-10-PCS | Mod: S$PBB,,, | Performed by: INTERNAL MEDICINE

## 2019-03-26 PROCEDURE — 92134 CPTRZ OPH DX IMG PST SGM RTA: CPT | Mod: PBBFAC | Performed by: OPHTHALMOLOGY

## 2019-03-26 PROCEDURE — 92134 POSTERIOR SEGMENT OCT RETINA (OCULAR COHERENCE TOMOGRAPHY)-BOTH EYES: ICD-10-PCS | Mod: 26,S$PBB,, | Performed by: OPHTHALMOLOGY

## 2019-03-26 PROCEDURE — 92014 PR EYE EXAM, EST PATIENT,COMPREHESV: ICD-10-PCS | Mod: 25,S$PBB,, | Performed by: OPHTHALMOLOGY

## 2019-03-26 PROCEDURE — 84443 ASSAY THYROID STIM HORMONE: CPT

## 2019-03-26 PROCEDURE — 92014 COMPRE OPH EXAM EST PT 1/>: CPT | Mod: 25,S$PBB,, | Performed by: OPHTHALMOLOGY

## 2019-03-26 PROCEDURE — 99214 OFFICE O/P EST MOD 30 MIN: CPT | Mod: S$PBB,,, | Performed by: INTERNAL MEDICINE

## 2019-03-26 PROCEDURE — 36415 COLL VENOUS BLD VENIPUNCTURE: CPT

## 2019-03-26 PROCEDURE — 99999 PR PBB SHADOW E&M-EST. PATIENT-LVL V: ICD-10-PCS | Mod: PBBFAC,,, | Performed by: INTERNAL MEDICINE

## 2019-03-26 PROCEDURE — 67028 INJECTION EYE DRUG: CPT | Mod: S$PBB,LT,, | Performed by: OPHTHALMOLOGY

## 2019-03-26 PROCEDURE — 67028 PR INJECT INTRAVITREAL PHARMCOLOGIC: ICD-10-PCS | Mod: S$PBB,LT,, | Performed by: OPHTHALMOLOGY

## 2019-03-26 PROCEDURE — 67028 INJECTION EYE DRUG: CPT | Mod: PBBFAC,LT | Performed by: OPHTHALMOLOGY

## 2019-03-26 PROCEDURE — 99215 OFFICE O/P EST HI 40 MIN: CPT | Mod: PBBFAC,25 | Performed by: INTERNAL MEDICINE

## 2019-03-26 PROCEDURE — 99999 PR PBB SHADOW E&M-EST. PATIENT-LVL V: CPT | Mod: PBBFAC,,, | Performed by: INTERNAL MEDICINE

## 2019-03-26 NOTE — PROGRESS NOTES
HPI     4 week / OCT/ Ozurdex  DLS-02/19/2019 Dr. Hilario     Pt sts vision fluctuates some days better than others   Occasional pain in OD thinks due to allergies     (-)Flashes (-)Floaters      OCT  OD - ERM with small subfoveal SRF pocket  OS - central ME    Prior  FA - increased NV OU  Late macular leakage OU      A/P     1. Neovascular Glaucoma OU  PDR/OIS OU - significant vascular disease -?RVO OD  OD - s/p GDD with Morgan 2013  OS  S/p BV with JDN 10/17    10/18 increased ME OS - ?CME from RVO vs DME  12/18 - increased DME OS  S/p resumed Avastin OS x2  S/p PRP fillin OD 1/19, OS 2/19    Ozurdex OS      Both eyes  Cosopt BID  Xal q day  Alphagan BID      2. ? OD  Recent PO steroids - pt taking 0.5 on taper  ERM possibly contributing  Recent elevated BP, also predominant pulmonary HTN with recent exacerbation - could contribute to small uveal effusiions - will  Monitor    3. ERM OD  No MMP  Follow    4. HTN Ret OU  Contributing to #2    5. ESRD on HD    6. PCIOL OU        8 weeks OCT    Risks, benefits, and alternatives to treatment discussed in detail with the patient.  The patient voiced understanding and wished to proceed with the procedure    Injection Procedure Note:  Diagnosis: DME OS    Patient Identified and Time Out complete  Topical Proparacaine and Betadine. subconj lido OS  Inject Ozurdex OS at 6:00 @ 3.5-4mm posterior to limbus  Post Operative Dx: Same  Complications: None  Follow up as above.

## 2019-03-28 ENCOUNTER — TELEPHONE (OUTPATIENT)
Dept: INTERNAL MEDICINE | Facility: CLINIC | Age: 64
End: 2019-03-28

## 2019-03-28 ENCOUNTER — TELEPHONE (OUTPATIENT)
Dept: CARDIOLOGY | Facility: CLINIC | Age: 64
End: 2019-03-28

## 2019-03-28 ENCOUNTER — OUTPATIENT CASE MANAGEMENT (OUTPATIENT)
Dept: ADMINISTRATIVE | Facility: OTHER | Age: 64
End: 2019-03-28

## 2019-03-28 NOTE — PROGRESS NOTES
Summary:  Spoke with pt regarding follow-up - pt stated they had a mix-up today with cardiologist- pt reports that she missed dialysis yesterday as she had her eye injected and her eye was still swollen pul her brother who takes her was not feeling well and could not take her.  Pt also stated that her hip was bothering her too both yesterday and today -claiming that the hip injection she had in February only last about a week and a half.Pt stated she regrets was unable to go to her sister-in-laws's husbands  this morning but with her hip she could not get it together.Pt says she wanted to go because she has been thru loosing a  -says she lost her  in   Pt stated she will go to dialysis tomorrow reports that she rarely misses and that she know how to take care of her self because she has been doing this for 20 years.  -pt states she watches what she eats - Pt reports she is going start PT next week on 4-2 for her hip.  Pt denies any falls or SOB    Interventions:  Encourage pt to go to dialysis tomorrow  Listened to pt with frustration regarding being scheduled wrong  Offered empathy as pt talked about her  of 57 years how he passed    Plan:  Follow-up on rolator  Follow-up on previous bleeding  Follow-up on PT and hip    Todays OPCM Self-Management Care Plan was developed with the patients/caregivers input and was based on identified barriers from todays assessment.  Goals were written today with the patient/caregiver and the patient has agreed to work towards these goals to improve his/her overall well-being. Patient verbalized understanding of the care plan, goals, and all of today's instructions. Encouraged patient/caregiver to communicate with his/her physician and health care team about health conditions and the treatment plan.  Provided my contact information today and encouraged patient/caregiver to call me with any questions as needed.

## 2019-03-28 NOTE — TELEPHONE ENCOUNTER
Spoke with Debby and she stated that she talked with Cardio and they will do a follow up noelle, with them and also they are already looking after Ms. Sheets for Pulmonary Hypertension. If you need any clarifications or have any questions please give her a call at [phone].   Here's a copy of the note:      ----- Message from Judi Gonzalez RN sent at 3/28/2019 11:11 AM CDT -----  Hello,  I am a Pulmonary Hypertension Coordinator for Mrs. Sheets. It looks like a referral for PH ended up in the cardiology department. I think this is an error as we (PH under the Advanced Heart Failure and Transpalnt Dept) have been following Mrs. Sheets for years.  If there are any questions or clarifications needed, please let me know.  Thanks,  Debby Gonzalez  [place] [phone]

## 2019-03-28 NOTE — TELEPHONE ENCOUNTER
----- Message from Judi Gonzalez RN sent at 3/28/2019 11:11 AM CDT -----  Hello,  I am a Pulmonary Hypertension Coordinator for Mrs. Sheets. It looks like a referral for PH ended up in the cardiology department. I think this is an error as we (PH under the Advanced Heart Failure and Transpalnt Dept) have been following Mrs. Sheets for years.  If there are any questions or clarifications needed, please let me know.  Thanks,  Debby Gonzalez  [place] [phone]

## 2019-03-29 ENCOUNTER — OUTPATIENT CASE MANAGEMENT (OUTPATIENT)
Dept: ADMINISTRATIVE | Facility: OTHER | Age: 64
End: 2019-03-29

## 2019-03-29 NOTE — PROGRESS NOTES
Summary:  LCSW attempted to return the call the patient as requested. There was no answer; left a voicemail asking for the call to be returned.  Due to 3 missed attempts, LCSW will close out pt's case at this time. OPCM-RN informed.     Interventions:  Collaborated with OPCM-RN on missed attempts  Case closure    Plan:  None

## 2019-03-31 VITALS
WEIGHT: 156.94 LBS | SYSTOLIC BLOOD PRESSURE: 110 MMHG | HEART RATE: 76 BPM | BODY MASS INDEX: 29.63 KG/M2 | DIASTOLIC BLOOD PRESSURE: 70 MMHG | HEIGHT: 61 IN | OXYGEN SATURATION: 95 %

## 2019-04-01 ENCOUNTER — HOSPITAL ENCOUNTER (OUTPATIENT)
Facility: HOSPITAL | Age: 64
Discharge: HOME | End: 2019-04-02
Attending: EMERGENCY MEDICINE | Admitting: INTERNAL MEDICINE
Payer: MEDICARE

## 2019-04-01 DIAGNOSIS — K92.2 GI BLEED: Primary | ICD-10-CM

## 2019-04-01 LAB
ABO + RH BLD: NORMAL
ALBUMIN SERPL BCP-MCNC: 3.4 G/DL (ref 3.5–5.2)
ALP SERPL-CCNC: 47 U/L (ref 55–135)
ALT SERPL W/O P-5'-P-CCNC: 17 U/L (ref 10–44)
ANION GAP SERPL CALC-SCNC: 12 MMOL/L (ref 8–16)
APTT BLDCRRT: 26.3 SEC (ref 21–32)
AST SERPL-CCNC: 23 U/L (ref 10–40)
BASOPHILS # BLD AUTO: 0.05 K/UL (ref 0–0.2)
BASOPHILS NFR BLD: 0.8 % (ref 0–1.9)
BILIRUB SERPL-MCNC: 0.5 MG/DL (ref 0.1–1)
BLD GP AB SCN CELLS X3 SERPL QL: NORMAL
BLD GP AB SCN TITR SERPL: 2048 {TITER}
BLOOD GROUP ANTIBODIES SERPL: NORMAL
BUN SERPL-MCNC: 40 MG/DL (ref 8–23)
CALCIUM SERPL-MCNC: 9.5 MG/DL (ref 8.7–10.5)
CHLORIDE SERPL-SCNC: 99 MMOL/L (ref 95–110)
CO2 SERPL-SCNC: 31 MMOL/L (ref 23–29)
CREAT SERPL-MCNC: 10.5 MG/DL (ref 0.5–1.4)
DIFFERENTIAL METHOD: ABNORMAL
EOSINOPHIL # BLD AUTO: 0.2 K/UL (ref 0–0.5)
EOSINOPHIL NFR BLD: 3.9 % (ref 0–8)
ERYTHROCYTE [DISTWIDTH] IN BLOOD BY AUTOMATED COUNT: 17.6 % (ref 11.5–14.5)
EST. GFR  (AFRICAN AMERICAN): 4 ML/MIN/1.73 M^2
EST. GFR  (NON AFRICAN AMERICAN): 3.5 ML/MIN/1.73 M^2
GLUCOSE SERPL-MCNC: 84 MG/DL (ref 70–110)
HCT VFR BLD AUTO: 34.8 % (ref 37–48.5)
HGB BLD-MCNC: 10.2 G/DL (ref 12–16)
IMM GRANULOCYTES # BLD AUTO: 0.03 K/UL (ref 0–0.04)
IMM GRANULOCYTES NFR BLD AUTO: 0.5 % (ref 0–0.5)
INR PPP: 1 (ref 0.8–1.2)
LYMPHOCYTES # BLD AUTO: 1.1 K/UL (ref 1–4.8)
LYMPHOCYTES NFR BLD: 18.3 % (ref 18–48)
MAGNESIUM SERPL-MCNC: 2.4 MG/DL (ref 1.6–2.6)
MCH RBC QN AUTO: 26.8 PG (ref 27–31)
MCHC RBC AUTO-ENTMCNC: 29.3 G/DL (ref 32–36)
MCV RBC AUTO: 91 FL (ref 82–98)
MONOCYTES # BLD AUTO: 0.5 K/UL (ref 0.3–1)
MONOCYTES NFR BLD: 8.5 % (ref 4–15)
NEUTROPHILS # BLD AUTO: 4 K/UL (ref 1.8–7.7)
NEUTROPHILS NFR BLD: 68 % (ref 38–73)
NRBC BLD-RTO: 0 /100 WBC
PLATELET # BLD AUTO: 136 K/UL (ref 150–350)
PMV BLD AUTO: 12 FL (ref 9.2–12.9)
POTASSIUM SERPL-SCNC: 4.4 MMOL/L (ref 3.5–5.1)
PROT SERPL-MCNC: 7.4 G/DL (ref 6–8.4)
PROTHROMBIN TIME: 10.2 SEC (ref 9–12.5)
RBC # BLD AUTO: 3.81 M/UL (ref 4–5.4)
SODIUM SERPL-SCNC: 142 MMOL/L (ref 136–145)
WBC # BLD AUTO: 5.89 K/UL (ref 3.9–12.7)

## 2019-04-01 PROCEDURE — 93010 EKG 12-LEAD: ICD-10-PCS | Mod: ,,, | Performed by: INTERNAL MEDICINE

## 2019-04-01 PROCEDURE — 85610 PROTHROMBIN TIME: CPT

## 2019-04-01 PROCEDURE — 96374 THER/PROPH/DIAG INJ IV PUSH: CPT

## 2019-04-01 PROCEDURE — 99284 EMERGENCY DEPT VISIT MOD MDM: CPT | Mod: 25

## 2019-04-01 PROCEDURE — 86850 RBC ANTIBODY SCREEN: CPT

## 2019-04-01 PROCEDURE — 63600175 PHARM REV CODE 636 W HCPCS: Performed by: STUDENT IN AN ORGANIZED HEALTH CARE EDUCATION/TRAINING PROGRAM

## 2019-04-01 PROCEDURE — 99285 EMERGENCY DEPT VISIT HI MDM: CPT | Mod: ,,, | Performed by: EMERGENCY MEDICINE

## 2019-04-01 PROCEDURE — 86886 COOMBS TEST INDIRECT TITER: CPT

## 2019-04-01 PROCEDURE — 85730 THROMBOPLASTIN TIME PARTIAL: CPT

## 2019-04-01 PROCEDURE — G0378 HOSPITAL OBSERVATION PER HR: HCPCS

## 2019-04-01 PROCEDURE — 99219 PR INITIAL OBSERVATION CARE,LEVL II: CPT | Mod: ,,, | Performed by: INTERNAL MEDICINE

## 2019-04-01 PROCEDURE — C9113 INJ PANTOPRAZOLE SODIUM, VIA: HCPCS | Performed by: STUDENT IN AN ORGANIZED HEALTH CARE EDUCATION/TRAINING PROGRAM

## 2019-04-01 PROCEDURE — 80053 COMPREHEN METABOLIC PANEL: CPT

## 2019-04-01 PROCEDURE — 83735 ASSAY OF MAGNESIUM: CPT

## 2019-04-01 PROCEDURE — 99285 EMERGENCY DEPT VISIT HI MDM: CPT | Mod: 25

## 2019-04-01 PROCEDURE — 99219 PR INITIAL OBSERVATION CARE,LEVL II: ICD-10-PCS | Mod: ,,, | Performed by: INTERNAL MEDICINE

## 2019-04-01 PROCEDURE — 85025 COMPLETE CBC W/AUTO DIFF WBC: CPT

## 2019-04-01 PROCEDURE — 99285 PR EMERGENCY DEPT VISIT,LEVEL V: ICD-10-PCS | Mod: ,,, | Performed by: EMERGENCY MEDICINE

## 2019-04-01 PROCEDURE — 86870 RBC ANTIBODY IDENTIFICATION: CPT | Mod: 59

## 2019-04-01 PROCEDURE — 93005 ELECTROCARDIOGRAM TRACING: CPT | Performed by: INTERNAL MEDICINE

## 2019-04-01 PROCEDURE — 25000003 PHARM REV CODE 250: Performed by: INTERNAL MEDICINE

## 2019-04-01 PROCEDURE — 93010 ELECTROCARDIOGRAM REPORT: CPT | Mod: ,,, | Performed by: INTERNAL MEDICINE

## 2019-04-01 RX ORDER — AMOXICILLIN 250 MG
1 CAPSULE ORAL DAILY PRN
Status: DISCONTINUED | OUTPATIENT
Start: 2019-04-01 | End: 2019-04-02 | Stop reason: HOSPADM

## 2019-04-01 RX ORDER — ONDANSETRON 8 MG/1
8 TABLET, ORALLY DISINTEGRATING ORAL EVERY 8 HOURS PRN
Status: DISCONTINUED | OUTPATIENT
Start: 2019-04-01 | End: 2019-04-02 | Stop reason: HOSPADM

## 2019-04-01 RX ORDER — PANTOPRAZOLE SODIUM 40 MG/10ML
80 INJECTION, POWDER, LYOPHILIZED, FOR SOLUTION INTRAVENOUS ONCE
Status: DISCONTINUED | OUTPATIENT
Start: 2019-04-01 | End: 2019-04-01

## 2019-04-01 RX ORDER — PANTOPRAZOLE SODIUM 40 MG/10ML
40 INJECTION, POWDER, LYOPHILIZED, FOR SOLUTION INTRAVENOUS ONCE
Status: COMPLETED | OUTPATIENT
Start: 2019-04-01 | End: 2019-04-01

## 2019-04-01 RX ORDER — SODIUM CHLORIDE 0.9 % (FLUSH) 0.9 %
10 SYRINGE (ML) INJECTION
Status: DISCONTINUED | OUTPATIENT
Start: 2019-04-01 | End: 2019-04-02 | Stop reason: HOSPADM

## 2019-04-01 RX ORDER — ACETAMINOPHEN 325 MG/1
650 TABLET ORAL EVERY 4 HOURS PRN
Status: DISCONTINUED | OUTPATIENT
Start: 2019-04-01 | End: 2019-04-02 | Stop reason: HOSPADM

## 2019-04-01 RX ORDER — LEVOTHYROXINE SODIUM 50 UG/1
100 TABLET ORAL
Status: DISCONTINUED | OUTPATIENT
Start: 2019-04-02 | End: 2019-04-02 | Stop reason: HOSPADM

## 2019-04-01 RX ORDER — DILTIAZEM HYDROCHLORIDE 300 MG/1
300 CAPSULE, COATED, EXTENDED RELEASE ORAL DAILY
Status: DISCONTINUED | OUTPATIENT
Start: 2019-04-02 | End: 2019-04-02 | Stop reason: HOSPADM

## 2019-04-01 RX ORDER — ATORVASTATIN CALCIUM 10 MG/1
10 TABLET, FILM COATED ORAL DAILY
Status: DISCONTINUED | OUTPATIENT
Start: 2019-04-02 | End: 2019-04-02 | Stop reason: HOSPADM

## 2019-04-01 RX ORDER — LATANOPROST 50 UG/ML
1 SOLUTION/ DROPS OPHTHALMIC NIGHTLY
Status: DISCONTINUED | OUTPATIENT
Start: 2019-04-01 | End: 2019-04-02 | Stop reason: HOSPADM

## 2019-04-01 RX ORDER — PANTOPRAZOLE SODIUM 40 MG/1
40 TABLET, DELAYED RELEASE ORAL DAILY
Status: DISCONTINUED | OUTPATIENT
Start: 2019-04-02 | End: 2019-04-02 | Stop reason: HOSPADM

## 2019-04-01 RX ADMIN — PANTOPRAZOLE SODIUM 40 MG: 40 INJECTION, POWDER, LYOPHILIZED, FOR SOLUTION INTRAVENOUS at 03:04

## 2019-04-01 RX ADMIN — LATANOPROST 1 DROP: 50 SOLUTION OPHTHALMIC at 10:04

## 2019-04-01 RX ADMIN — RIOCIGUAT 2 MG: 2 TABLET, FILM COATED ORAL at 10:04

## 2019-04-01 NOTE — ASSESSMENT & PLAN NOTE
- Hgb 10 on admission, stable compared to prior  - Coumadin was discontinued on last admission  - Holding aspirin  - Continue to monitor Hgb

## 2019-04-01 NOTE — PROGRESS NOTES
Subjective:       Patient ID: Shivani Sheets is a 63 y.o. female.    Chief Complaint: Hypertension    HPI  She returns for management of hypertension.  She has had hypertension for over a year.  Current treatment has included medications outlined in medication list.  She denies chest pain or shortness of breath.  No palpitations.  Denies left arm or neck pain.    Past medical history: Hypertension, end-stage renal disease on dialysis, status post failed kidney transplant, pulmonary hypertension, atrial fibrillation, coronary artery disease, rheumatoid arthritis, anemia,sleep apnea, hypothyroidism, status post GI bleed, glaucoma, status post hernia repair, colon adenoma.  She had a colonoscopy June 2018     Medications: Xalatan, Synthroid 0.1 mg daily, Lipitor 10 mg daily,selexipag,diltiazem 300 mg daily,adempas     ALLERGIES: Penicillin, iodine, Bactrim       Review of Systems   Constitutional: Negative for chills, fatigue, fever and unexpected weight change.   Respiratory: Negative for chest tightness and shortness of breath.    Cardiovascular: Negative for chest pain and palpitations.   Gastrointestinal: Negative for abdominal pain and blood in stool.   Neurological: Negative for dizziness, syncope, numbness and headaches.       Objective:      Physical Exam   HENT:   Right Ear: External ear normal.   Left Ear: External ear normal.   Nose: Nose normal.   Mouth/Throat: Oropharynx is clear and moist.   Eyes: Pupils are equal, round, and reactive to light.   Neck: Normal range of motion.   Cardiovascular: Normal rate and regular rhythm.   No murmur heard.  Pulmonary/Chest: Breath sounds normal.   Abdominal: She exhibits no distension. There is no hepatosplenomegaly. There is no tenderness.   Lymphadenopathy:     She has no cervical adenopathy.     She has no axillary adenopathy.   Neurological: She has normal strength and normal reflexes. No cranial nerve deficit or sensory deficit.       Assessment/Plan      assessment and plan:  1.  Hypertension:  Controlled  2.  GI bleed:  Follow up with Gastroenterology

## 2019-04-01 NOTE — ED PROVIDER NOTES
Encounter Date: 2019       History     Chief Complaint   Patient presents with    Rectal Bleeding     had blood transfusions in past     HPI     62 yo F with pmhx of pulmonary HTN (on adempas/selexipeg), pAF, CVA (on aspirin), ESRD (LUE AVF, MWF HD), STEFFANY on CPAP presenting with rectal bleeding. Symptoms started today. Pt reports 3 episodes of rectal bleeding BRBPR, filled the toilet bowl, with clots. Roughly 3 episodes since this AM. Associated shortness of breath on exertion. No nausea or vomiting. No hematemesis. No abdominal pain or urinary symptoms. No fever or chills. Pt states she missed dialysis today secondary to the BRBPR.     Pt had recent admission on - for similar symptoms, BRBPR. Received multiple pRBCs during the admission. No obvious source. Unremarkable colonoscopy (19). Had EGD and small bowel evaluation: normal stomach, esophagus non bleeding erosive gastropathy.      Review of patient's allergies indicates:   Allergen Reactions    Penicillins Swelling    Iodine      Other reaction(s): Hives    Sulfamethoxazole-trimethoprim      Other reaction(s): Swelling  Other reaction(s): Hives     Past Medical History:   Diagnosis Date    Allergy     Anemia     Anticoagulant long-term use     4 years coumadin, asa    Arthritis     Awaiting organ transplant 2013    Cataract     Central serous chorioretinopathy of eye, right 2018    CHF (congestive heart failure)     Chronic kidney disease     Colon polyps     COPD (chronic obstructive pulmonary disease)     Coronary artery disease     Diabetes mellitus     Diabetic retinopathy     Diverticulosis     Encounter for blood transfusion     ESRD from HTN strtied RRT 1999    Failed  donor kidney transplant -     Glaucoma     History of bleeding peptic ulcer      as stated per pt    Hyperlipidemia     Hypertension     Hypothyroidism     Morbid obesity 8/10/2012    Renal hypertension      Stroke     1987     Past Surgical History:   Procedure Laterality Date    CATARACT EXTRACTION W/  INTRAOCULAR LENS IMPLANT Bilateral     COLON SURGERY      COLONOSCOPY      COLONOSCOPY N/A 2019    Performed by Indio Beltran MD at [facility] ENDO (2ND FLR)    COLONOSCOPY N/A 2018    Performed by Jose Falk MD at [facility] ENDO (2ND FLR)    EGD (ESOPHAGOGASTRODUODENOSCOPY) N/A 2019    Performed by Miranda Maradiaga MD at [facility] ENDO (2ND FLR)    EGD (ESOPHAGOGASTRODUODENOSCOPY) N/A 2018    Performed by Luis Washington MD at [facility] ENDO (2ND FLR)    ESOPHAGOGASTRODUODENOSCOPY (EGD) N/A 2018    Performed by Kenji Desir MD at [facility] ENDO (2ND FLR)    EYE SURGERY      FRACTURE SURGERY      R arm    HERNIA REPAIR      HYSTERECTOMY      INSERTION-IMPLANT-GLAUCOMA Left 10/12/2017    Performed by Junaid Kern MD at [facility] OR 1ST FLR    KIDNEY TRANSPLANT      NEPHRECTOMY  2008    transplant     PARATHYROID GLAND SURGERY      TONSILLECTOMY      Transesophageal echo (JACE) intra-procedure log documentation N/A 2019    Performed by [facility] Diagnostic Provider at [facility] EP LAB    UPPER GASTROINTESTINAL ENDOSCOPY       Family History   Problem Relation Age of Onset    Heart attack Father     Heart failure Father     Hypertension Father     Blindness Father     Heart attack Mother     Heart failure Mother     Hypertension Mother     Asthma Sister     Depression Sister     Hypertension Sister     Hypertension Brother     Kidney disease Brother         ESRD    Diabetes Maternal Aunt     Breast cancer Maternal Aunt     Amblyopia Neg Hx     Cataracts Neg Hx     Macular degeneration Neg Hx     Retinal detachment Neg Hx     Strabismus Neg Hx     Colon cancer Neg Hx     Esophageal cancer Neg Hx      Social History     Tobacco Use    Smoking status: Former Smoker     Types: Cigarettes     Last attempt to quit: 8/10/2000     Years since quittin.6    Smokeless tobacco:  Never Used   Substance Use Topics    Alcohol use: No     Comment: hx of etoh     Drug use: No     Review of Systems   Constitutional: Negative for chills and fever.   HENT: Negative for congestion and sore throat.    Eyes: Negative for visual disturbance.   Respiratory: Positive for shortness of breath. Negative for cough.    Cardiovascular: Negative for chest pain.   Gastrointestinal: Positive for blood in stool and nausea. Negative for abdominal pain and vomiting.   Genitourinary: Negative for difficulty urinating and dysuria.   Musculoskeletal: Negative for back pain.   Skin: Negative for rash.   Neurological: Negative for weakness and headaches.   Hematological: Does not bruise/bleed easily.   Psychiatric/Behavioral: Negative for agitation and behavioral problems.       Physical Exam     Initial Vitals [04/01/19 1404]   BP Pulse Resp Temp SpO2   (!) 153/80 84 18 98.3 °F (36.8 °C) 96 %      MAP       --         Physical Exam    Nursing note and vitals reviewed.  Constitutional: She appears well-developed and well-nourished.   HENT:   Head: Normocephalic and atraumatic.   Right Ear: External ear normal.   Left Ear: External ear normal.   Eyes: EOM are normal. Pupils are equal, round, and reactive to light.   Neck: Normal range of motion. Neck supple.   Cardiovascular: Normal rate, regular rhythm and normal heart sounds. Exam reveals no gallop and no friction rub.    No murmur heard.  Pulmonary/Chest: Breath sounds normal.   Crackles at bilateral bases   Abdominal: Soft. Bowel sounds are normal. She exhibits no distension. There is no tenderness. There is no rebound.   Musculoskeletal: Normal range of motion.   Neurological: She is alert and oriented to person, place, and time.   Skin: Skin is warm and dry. Capillary refill takes less than 2 seconds.   Psychiatric: She has a normal mood and affect. Thought content normal.         ED Course   Procedures  Labs Reviewed   CBC W/ AUTO DIFFERENTIAL - Abnormal; Notable  for the following components:       Result Value    RBC 3.81 (*)     Hemoglobin 10.2 (*)     Hematocrit 34.8 (*)     MCH 26.8 (*)     MCHC 29.3 (*)     RDW 17.6 (*)     Platelets 136 (*)     All other components within normal limits   COMPREHENSIVE METABOLIC PANEL - Abnormal; Notable for the following components:    CO2 31 (*)     BUN, Bld 40 (*)     Creatinine 10.5 (*)     Albumin 3.4 (*)     Alkaline Phosphatase 47 (*)     eGFR if  4.0 (*)     eGFR if non  3.5 (*)     All other components within normal limits   MAGNESIUM   PROTIME-INR   APTT   TYPE & SCREEN          Imaging Results          X-Ray Chest AP Portable (Final result)  Result time 04/01/19 16:20:20    Final result by Anton Akers III, MD (04/01/19 16:20:20)                 Impression:      See above    Mild-moderate edema/CHF.      Electronically signed by: Anton Akers MD  Date:    04/01/2019  Time:    16:20             Narrative:    EXAMINATION:  XR CHEST AP PORTABLE    CLINICAL HISTORY:  missed dialysis;    FINDINGS:  There is cardiomegaly aortic plaque mild-moderate edema and no change.                                 Medical Decision Making:   Initial Assessment:   62 yo F with pmhx of pulmonary HTN (on adempas/selexipeg), pAF, CVA, ESRD (LUE AVF, MWF HD), STEFFANY on CPAP presenting with BRBPR x 1 day with clots. Hypertensive on arrival to 192/96 but hemodynamically stable. Non-tachycaric. Breathing comfortably, SpO2 96% on RA. Abdomen soft, nontender. Bright red blood on rectal exam.   Differential Diagnosis:   GI bleed. Pt had recent normal colonoscopy making colonic mass or diverticulosis less likely. No hx of cirrhosis or esophageal varices. Differential includes gastropathy vs PUD vs AVM. Ordered Protonix. Will hold off on IVF since ESRD. Pt currently hemodynamically stable. Ordered type&screen, CBC, CMP, EKG, CXR.  ED Management:  Work-up reviewed.   -H/H 10.2/34.8  -INR 1, PTT 26  -BUN 40, K 4.4, CXR with  mild interstitial fullness. Pt breathing comfortably on exam. No indication for emergent dialysis at this time.     Consulted Heart Transplant service for admission. Pt updated on the plan.               Attending Attestation:   Physician Attestation Statement for Resident:  As the supervising MD   Physician Attestation Statement: I have personally seen and examined this patient.   I agree with the above history. -: Patient presents with rectal bleeding.    As the supervising MD I agree with the above PE.   -: On exam, patient's abdomen is soft, non tender, with no rebound or guarding.   As the supervising MD I agree with the above treatment, course, plan, and disposition.   -: Discussed with cardiology. They recommended patient be admitted to [facility]. Will discuss with GI. Will also screen for hyperkalemia since she missed dialysis.                       Clinical Impression:       ICD-10-CM ICD-9-CM   1. GI bleed K92.2 578.9                          Shantelle Roberts MD  Resident  04/01/19 1703       Shantelle Roberts MD  Resident  04/01/19 1705       Alok Melendrez MD  04/03/19 4504

## 2019-04-01 NOTE — ED NOTES
Pt identifiers checked and accurate with Shivani Sheets    Pt reports to ED with complaints of blood in stool beginning this AM. Pt reports 3 watery BM, mild abdominal pain since 4 am. Pt reports being dialysis patient, receiving on MWF, missed dialysis today. Pt denies CP, SOB, fever, chills, N/V, LOC, dizziness, weakness.    LOC: The patient is awake, alert and aware of environment with an appropriate affect, the patient is oriented x 3 and speaking appropriately.  APPEARANCE: Patient resting comfortably and in no acute distress, patient is clean and well groomed  SKIN: The skin is warm and dry, color consistent with ethnicity, patient has normal skin turgor and moist mucus membranes, skin intact.  MUSCULOSKELETAL: Patient moving all extremities well, no obvious swelling or deformities noted.   RESPIRATORY: Airway is open and patent; respirations are spontaneous, patient has increased effort and rate, placed on 2 L NC, pt reports home oxygen 2 L NC ordered.   CARDIAC: Patient has peripheral edema noted to bilateral lower extremities, pt reports lower leg swelling more than normal worsening over past week. No complaints of chest pain at this time.   ABDOMEN: Soft and non tender to palpation, distention noted. Bowel sounds present x 4  NEUROLOGIC: PERRL, 3 mm bilaterally, eyes open spontaneously, behavior appropriate to situation, follows commands, purposeful motor response noted

## 2019-04-01 NOTE — ED NOTES
Patient's O2 saturation 86% on room air. No s/s of evident respiratory distress with even and unlabored respirations. Patient reports being on 2 LPM of O2. Patient placed on 2 LPM, O2 saturation 96% after oxygen therapy.

## 2019-04-01 NOTE — SUBJECTIVE & OBJECTIVE
Past Medical History:   Diagnosis Date    Allergy     Anemia     Anticoagulant long-term use     4 years coumadin, asa    Arthritis     Awaiting organ transplant 2013    Cataract     Central serous chorioretinopathy of eye, right 2018    CHF (congestive heart failure)     Chronic kidney disease     Colon polyps     COPD (chronic obstructive pulmonary disease)     Coronary artery disease     Diabetes mellitus     Diabetic retinopathy     Diverticulosis     Encounter for blood transfusion     ESRD from HTN strtied RRT 1999    Failed  donor kidney transplant -     Glaucoma     History of bleeding peptic ulcer      as stated per pt    Hyperlipidemia     Hypertension     Hypothyroidism     Morbid obesity 8/10/2012    Renal hypertension     Stroke     1987       Past Surgical History:   Procedure Laterality Date    CATARACT EXTRACTION W/  INTRAOCULAR LENS IMPLANT Bilateral     COLON SURGERY      COLONOSCOPY      COLONOSCOPY N/A 2019    Performed by [facility]an [facility] at [facility] END[facility] (2ND FLR)    COLONOSCOPY N/A 2018    Performed by Jose Falk MD at [facility] ENDO (2ND FLR)    EGD (ESOPHAGOGASTRODUODENOSCOPY) N/A 2019    Performed by Miranda Maradiaga MD at [facility] ENDO (2ND FLR)    EGD (ESOPHAGOGASTRODUODENOSCOPY) N/A 2018    Performed by Luis Washington MD at [facility] ENDO (2ND FLR)    ESOPHAGOGASTRODUODENOSCOPY (EGD) N/A 2018    Performed by Kenji Desir MD at [facility] ENDO (2ND FLR)    EYE SURGERY      FRACTURE SURGERY      R arm    HERNIA REPAIR      HYSTERECTOMY      INSERTION-IMPLANT-GLAUCOMA Left 10/12/2017    Performed by Junaid Kern MD at [facility] OR 1ST FLR    KIDNEY TRANSPLANT      NEPHRECTOMY  2008    transplant     PARATHYROID GLAND SURGERY      TONSILLECTOMY      Transesophageal echo (JACE) intra-procedure log documentation N/A 2019    Performed by [facility] Diagnostic Provider at [facility] EP LAB    UPPER  GASTROINTESTINAL ENDOSCOPY         Review of patient's allergies indicates:   Allergen Reactions    Penicillins Swelling    Iodine      Other reaction(s): Hives    Sulfamethoxazole-trimethoprim      Other reaction(s): Swelling  Other reaction(s): Hives       Current Facility-Administered Medications   Medication    acetaminophen tablet 650 mg    [START ON 4/2/2019] atorvastatin tablet 10 mg    dexamethasone (ozurdex) 0.7 mg io implant    [START ON 4/2/2019] diltiaZEM 24 hr capsule 300 mg    latanoprost 0.005 % ophthalmic solution 1 drop    [START ON 4/2/2019] levothyroxine tablet 100 mcg    ondansetron disintegrating tablet 8 mg    [START ON 4/2/2019] pantoprazole EC tablet 40 mg    riociguat (ADEMPAS) tablet 2 mg    selexipag Tab 1,000 mcg    senna-docusate 8.6-50 mg per tablet 1 tablet    sodium chloride 0.9% flush 10 mL     Current Outpatient Medications   Medication Sig    aspirin 325 MG tablet Take 1 tablet (325 mg total) by mouth once daily.    diltiaZEM (CARDIZEM CD) 300 MG 24 hr capsule Take 1 capsule (300 mg total) by mouth once daily.    levothyroxine (SYNTHROID) 100 MCG tablet Take 100 mcg by mouth. 1 Tablet Oral Every day    LIPITOR 10 mg tablet TAKE 1 BY MOUTH ONCE A DAY (Patient taking differently: TAKE 1 BY MOUTH ONCE nightly)    pantoprazole (PROTONIX) 40 MG tablet TAKE 1 TABLET BY MOUTH ONCE DAILY    ALPHAGAN P 0.1 % Drop once daily.     dorzolamide-timolol 2-0.5% (COSOPT) 22.3-6.8 mg/mL ophthalmic solution INSTILL 1 DROP IN BOTH EYES TWICE DAILY    latanoprost 0.005 % ophthalmic solution INSTILL 1 DROP IN BOTH EYES EVERY DAY AT BEDTIME    riociguat (ADEMPAS) 2 mg Tab tablet Take 2 mg by mouth 3 (three) times daily.     selexipag (UPTRAVI) 1,000 mcg Tab Take 1,000 mcg by mouth 2 (two) times daily.    walker (ULTRA-LIGHT ROLLATOR) Misc Use daily     Facility-Administered Medications Ordered in Other Encounters   Medication    sodium chloride 0.9% flush 5 mL     Family  History     Problem Relation (Age of Onset)    Asthma Sister    Blindness Father    Breast cancer Maternal Aunt    Depression Sister    Diabetes Maternal Aunt    Heart attack Father, Mother    Heart failure Father, Mother    Hypertension Father, Mother, Sister, Brother    Kidney disease Brother        Tobacco Use    Smoking status: Former Smoker     Types: Cigarettes     Last attempt to quit: 8/10/2000     Years since quittin.6    Smokeless tobacco: Never Used   Substance and Sexual Activity    Alcohol use: No     Comment: hx of etoh     Drug use: No    Sexual activity: Never     Review of Systems   Constitutional: Positive for fatigue. Negative for chills and fever.   HENT: Negative for sore throat.    Eyes: Negative for visual disturbance.   Respiratory: Positive for shortness of breath. Negative for cough and chest tightness.    Cardiovascular: Negative for chest pain, palpitations and leg swelling.   Gastrointestinal: Positive for blood in stool. Negative for abdominal pain, diarrhea, nausea and vomiting.   Endocrine: Negative for polyuria.   Genitourinary: Negative for dysuria.   Musculoskeletal: Negative for gait problem.   Skin: Negative for rash.   Allergic/Immunologic: Negative for immunocompromised state.   Neurological: Positive for weakness. Negative for light-headedness.   Psychiatric/Behavioral: Negative for confusion.     Objective:     Vital Signs (Most Recent):  Temp: 98.3 °F (36.8 °C) (19 1404)  Pulse: 80 (19 1601)  Resp: (!) 21 (19 1601)  BP: (!) 192/96 (19 1601)  SpO2: 96 % (19 1601) Vital Signs (24h Range):  Temp:  [98.3 °F (36.8 °C)] 98.3 °F (36.8 °C)  Pulse:  [80-85] 80  Resp:  [13-21] 21  SpO2:  [96 %-98 %] 96 %  BP: (152-192)/(80-96) 192/96     Patient Vitals for the past 72 hrs (Last 3 readings):   Weight   19 1404 70.2 kg (154 lb 12.2 oz)     Body mass index is 29.24 kg/m².    No intake or output data in the 24 hours ending 19  1748    Physical Exam   Constitutional: She is oriented to person, place, and time. She appears well-developed and well-nourished.   HENT:   Head: Normocephalic and atraumatic.   Eyes: Pupils are equal, round, and reactive to light. EOM are normal.   Neck: Normal range of motion. No JVD present.   Cardiovascular: Normal rate, regular rhythm, normal heart sounds and intact distal pulses. Exam reveals no gallop and no friction rub.   No murmur heard.  Pulmonary/Chest: Effort normal and breath sounds normal. No stridor. No respiratory distress. She has no wheezes. She has no rales. She exhibits no tenderness.   Abdominal: Soft. Bowel sounds are normal. She exhibits no distension and no mass. There is no tenderness. There is no guarding.   Musculoskeletal: She exhibits no edema.   Neurological: She is alert and oriented to person, place, and time.   Skin: Skin is warm and dry.   Psychiatric: She has a normal mood and affect.   Vitals reviewed.      Significant Labs:  CBC:  Recent Labs   Lab 04/01/19  1456   WBC 5.89   RBC 3.81*   HGB 10.2*   HCT 34.8*   *   MCV 91   MCH 26.8*   MCHC 29.3*     BNP:  No results for input(s): BNP in the last 168 hours.    Invalid input(s): BNPTRIAGELBLO  CMP:  Recent Labs   Lab 04/01/19  1456   GLU 84   CALCIUM 9.5   ALBUMIN 3.4*   PROT 7.4      K 4.4   CO2 31*   CL 99   BUN 40*   CREATININE 10.5*   ALKPHOS 47*   ALT 17   AST 23   BILITOT 0.5      Coagulation:   Recent Labs   Lab 04/01/19  1456   INR 1.0   APTT 26.3     LDH:  No results for input(s): LDH in the last 72 hours.  Microbiology:  Microbiology Results (last 7 days)     ** No results found for the last 168 hours. **          I have reviewed all pertinent labs within the past 24 hours.    Diagnostic Results:  CT: No results found in the last 24 hours.  CXR: X-ray Chest Ap Portable    Result Date: 4/1/2019  See above Mild-moderate edema/CHF. Electronically signed by: Anton Akers MD Date:    04/01/2019  Time:    16:20  U/S: No results found in the last 24 hours.  I have reviewed and interpreted all pertinent imaging results/findings within the past 24 hours.

## 2019-04-01 NOTE — ED NOTES
Pt sitting up in bed, meal tray provided, call bell within reach. Will continue to monitor and update pt on plan of care.

## 2019-04-01 NOTE — HPI
Mrs. Sheets is a 62 yo F w/ PMHx  pulmonary HTN (on adempas/selexipeg), diastolic dysfunction, pAF, CVA 1987, COPD on 2 L nocturnal oxygen, central retinal artery occlusion without significant carotid artery stenosis, CAD with remote PCI, ESRD(LUE AVF) secondary to HTN, s/p failed kidney transplant, PUD, STEFFANY on CPAP, hypothyroidism, RA, HLD. She has had multiple recurrent admissions for ABLA. Recently referred to EP for consideration of Watchman procedure. Now off coumadin, was continued on aspirin for CAD.     Presents to ED today for BRBPR that started this morning. Patient reports 3 watery BM, mild abdominal pain since 4 am. She misseddialysis today. Associated shortness of breath on exertion. No nausea or vomiting. No hematemesis. No abdominal pain or urinary symptoms. No fever or chills. Pt had recent admission on 2/2-2/8 for similar symptoms, BRBPR. Received multiple pRBCs during the admission. No obvious source. Unremarkable colonoscopy (2/4/19). Had EGD and small bowel evaluation: normal stomach, esophagus non bleeding erosive gastropathy.    Hgb 10 on admission which is stable compared to prior.

## 2019-04-02 VITALS
BODY MASS INDEX: 29.22 KG/M2 | RESPIRATION RATE: 20 BRPM | TEMPERATURE: 98 F | HEART RATE: 82 BPM | WEIGHT: 154.75 LBS | HEIGHT: 61 IN | OXYGEN SATURATION: 95 % | SYSTOLIC BLOOD PRESSURE: 183 MMHG | DIASTOLIC BLOOD PRESSURE: 83 MMHG

## 2019-04-02 LAB
ALBUMIN SERPL BCP-MCNC: 3.1 G/DL (ref 3.5–5.2)
ALP SERPL-CCNC: 42 U/L (ref 55–135)
ALT SERPL W/O P-5'-P-CCNC: 15 U/L (ref 10–44)
ANION GAP SERPL CALC-SCNC: 13 MMOL/L (ref 8–16)
AST SERPL-CCNC: 31 U/L (ref 10–40)
BASOPHILS # BLD AUTO: 0.04 K/UL (ref 0–0.2)
BASOPHILS NFR BLD: 0.7 % (ref 0–1.9)
BILIRUB SERPL-MCNC: 0.5 MG/DL (ref 0.1–1)
BUN SERPL-MCNC: 46 MG/DL (ref 8–23)
CALCIUM SERPL-MCNC: 8.7 MG/DL (ref 8.7–10.5)
CHLORIDE SERPL-SCNC: 100 MMOL/L (ref 95–110)
CO2 SERPL-SCNC: 27 MMOL/L (ref 23–29)
CREAT SERPL-MCNC: 11 MG/DL (ref 0.5–1.4)
DIFFERENTIAL METHOD: ABNORMAL
EOSINOPHIL # BLD AUTO: 0.3 K/UL (ref 0–0.5)
EOSINOPHIL NFR BLD: 4.6 % (ref 0–8)
ERYTHROCYTE [DISTWIDTH] IN BLOOD BY AUTOMATED COUNT: 17.3 % (ref 11.5–14.5)
EST. GFR  (AFRICAN AMERICAN): 3.8 ML/MIN/1.73 M^2
EST. GFR  (NON AFRICAN AMERICAN): 3.3 ML/MIN/1.73 M^2
GLUCOSE SERPL-MCNC: 87 MG/DL (ref 70–110)
HCT VFR BLD AUTO: 31.3 % (ref 37–48.5)
HGB BLD-MCNC: 9.4 G/DL (ref 12–16)
IMM GRANULOCYTES # BLD AUTO: 0.02 K/UL (ref 0–0.04)
IMM GRANULOCYTES NFR BLD AUTO: 0.4 % (ref 0–0.5)
LYMPHOCYTES # BLD AUTO: 1.1 K/UL (ref 1–4.8)
LYMPHOCYTES NFR BLD: 19.6 % (ref 18–48)
MAGNESIUM SERPL-MCNC: 2.7 MG/DL (ref 1.6–2.6)
MCH RBC QN AUTO: 26.6 PG (ref 27–31)
MCHC RBC AUTO-ENTMCNC: 30 G/DL (ref 32–36)
MCV RBC AUTO: 88 FL (ref 82–98)
MONOCYTES # BLD AUTO: 0.5 K/UL (ref 0.3–1)
MONOCYTES NFR BLD: 9.4 % (ref 4–15)
NEUTROPHILS # BLD AUTO: 3.5 K/UL (ref 1.8–7.7)
NEUTROPHILS NFR BLD: 65.3 % (ref 38–73)
NRBC BLD-RTO: 0 /100 WBC
PHOSPHATE SERPL-MCNC: 3.1 MG/DL (ref 2.7–4.5)
PLATELET # BLD AUTO: 128 K/UL (ref 150–350)
PMV BLD AUTO: 12 FL (ref 9.2–12.9)
POTASSIUM SERPL-SCNC: 4.7 MMOL/L (ref 3.5–5.1)
PROT SERPL-MCNC: 7.1 G/DL (ref 6–8.4)
RBC # BLD AUTO: 3.54 M/UL (ref 4–5.4)
SODIUM SERPL-SCNC: 140 MMOL/L (ref 136–145)
WBC # BLD AUTO: 5.42 K/UL (ref 3.9–12.7)

## 2019-04-02 PROCEDURE — 80053 COMPREHEN METABOLIC PANEL: CPT

## 2019-04-02 PROCEDURE — 83735 ASSAY OF MAGNESIUM: CPT

## 2019-04-02 PROCEDURE — 84100 ASSAY OF PHOSPHORUS: CPT

## 2019-04-02 PROCEDURE — 25000003 PHARM REV CODE 250: Performed by: INTERNAL MEDICINE

## 2019-04-02 PROCEDURE — G0378 HOSPITAL OBSERVATION PER HR: HCPCS

## 2019-04-02 PROCEDURE — 85025 COMPLETE CBC W/AUTO DIFF WBC: CPT

## 2019-04-02 RX ADMIN — ACETAMINOPHEN 650 MG: 325 TABLET ORAL at 03:04

## 2019-04-02 RX ADMIN — LEVOTHYROXINE SODIUM 100 MCG: 50 TABLET ORAL at 06:04

## 2019-04-02 NOTE — ED NOTES
Report received from CHIDI Guzman. Pt is AAOx4. REU. Pt wearing oxygen and on cardiac monitor. Denies pain. Side rails up x2, call bell in place. Will continue to monitor.

## 2019-04-02 NOTE — ED NOTES
Pt ambulated to restroom for Bowel Movement. Per MD, pt will be discharged. Awaiting discharge orders at this time.

## 2019-04-02 NOTE — CONSULTS
ESRD patient on MWF dialysis who presented to hospital with chief complaint of BRBPR.  Reported to me that patient would be discharged.  Electrolytes are stable, oxygenating well on NC per her OP prescription.  Her last hemodialysis was Friday and she is due for dialysis tomorrow morning as an OP.  Instructed patient that if she felt worsening SOB, to notify her OP dialysis unit and see if she can run today, if not, she should report back to the ED for further management.  If oxygenating well w/o SOB, can resume her OP dialysis tomorrow.    REYES Hess, [facility]  Nephrology  Pager:  [phone]

## 2019-04-02 NOTE — ED NOTES
"Stool is formed and reddish brown in color. Pt states "it's not loose and watery with clots like it was."   "

## 2019-04-02 NOTE — DISCHARGE SUMMARY
[facility]  Heart Transplant  Discharge Summary      Patient Name: Shivani Sheets  MRN: [id number]  Admission Date: 4/1/2019  Hospital Length of Stay: 0 days  Discharge Date and Time: 04/02/2019 12:47 PM  Attending Physician: No att. providers found   Discharging Provider: Anatoliy Hartmann MD  Primary Care Provider: Eula Weiss MD     HPI: Mrs. Sheets is a 64 yo F w/ PMHx  pulmonary HTN (on adempas/selexipeg), diastolic dysfunction, pAF, CVA 1987, COPD on 2 L nocturnal oxygen, central retinal artery occlusion without significant carotid artery stenosis, CAD with remote PCI, ESRD(LUE AVF) secondary to HTN, s/p failed kidney transplant, PUD, STEFFANY on CPAP, hypothyroidism, RA, HLD. She has had multiple recurrent admissions for ABLA. Recently referred to EP for consideration of Watchman procedure. Now off coumadin, was continued on aspirin for CAD.     Presents to ED today for BRBPR that started this morning. Patient reports 3 watery BM, mild abdominal pain since 4 am. She misseddialysis today. Associated shortness of breath on exertion. No nausea or vomiting. No hematemesis. No abdominal pain or urinary symptoms. No fever or chills. Pt had recent admission on 2/2-2/8 for similar symptoms, BRBPR. Received multiple pRBCs during the admission. No obvious source. Unremarkable colonoscopy (2/4/19). Had EGD and small bowel evaluation: normal stomach, esophagus non bleeding erosive gastropathy.    Hgb 10 on admission which is stable compared to prior.         * No surgery found *     Hospital Course: Presented to ED for GI bleeding. Boarded in ED overnight. No further bowel movements. Hgb stable at around baseline of 9. VSS. Ambulating without difficulties. Discussed need for follow up for Watchman device. Discharged on aspirin but not coumadin. Risks and benefits of anticoagulation and atrial fibrillation discussed with the patient. Decision to not anticoagulate at this time. She has a dialysis  appointment for tomorrow. Discharged home from ED in stable condition.     Consults (From admission, onward)        Status Ordering Provider     Inpatient consult to Nephrology  Once     Provider:  (Not yet assigned)    Completed EZEKIEL LAZO          Significant Diagnostic Studies: Labs:   CMP   Recent Labs   Lab 04/01/19  1456 04/02/19  0426    140   K 4.4 4.7   CL 99 100   CO2 31* 27   GLU 84 87   BUN 40* 46*   CREATININE 10.5* 11.0*   CALCIUM 9.5 8.7   PROT 7.4 7.1   ALBUMIN 3.4* 3.1*   BILITOT 0.5 0.5   ALKPHOS 47* 42*   AST 23 31   ALT 17 15   ANIONGAP 12 13   ESTGFRAFRICA 4.0* 3.8*   EGFRNONAA 3.5* 3.3*   , CBC   Recent Labs   Lab 04/01/19  1456 04/02/19  0426   WBC 5.89 5.42   HGB 10.2* 9.4*   HCT 34.8* 31.3*   * 128*   , INR   Lab Results   Component Value Date    INR 1.0 04/01/2019    INR 1.0 02/14/2019    INR 1.0 02/14/2019   , Lipid Panel   Lab Results   Component Value Date    CHOL 114 (L) 02/07/2019    HDL 56 02/07/2019    LDLCALC 40.8 (L) 02/07/2019    TRIG 86 02/07/2019    CHOLHDL 49.1 02/07/2019   , Troponin No results for input(s): TROPONINI in the last 168 hours., A1C:   Recent Labs   Lab 01/13/19  0506 02/02/19  1342   HGBA1C 5.2 5.1    and All labs within the past 24 hours have been reviewed  Radiology: X-Ray: CXR: X-Ray Chest 1 View (CXR): No results found for this visit on 04/01/19. and X-Ray Chest PA and Lateral (CXR): No results found for this visit on 04/01/19. and KUB: X-Ray Abdomen AP 1 View (KUB): No results found for this visit on 04/01/19.  CT scan: CT ABDOMEN PELVIS WITH CONTRAST: No results found for this visit on 04/01/19. and CT ABDOMEN PELVIS WITHOUT CONTRAST: No results found for this visit on 04/01/19.    Pending Diagnostic Studies:     None        Final Active Diagnoses:    Diagnosis Date Noted POA    PRINCIPAL PROBLEM:  Acute blood loss anemia [D62]  Yes    GI bleed [K92.2] 04/01/2019 Yes    End stage renal disease on dialysis [N18.6, Z99.2]  Not Applicable     Anemia of chronic disease [D63.8] 09/14/2018 Yes     Chronic    Controlled type 2 diabetes mellitus with both eyes affected by proliferative retinopathy and macular edema, without long-term current use of insulin [E11.3513] 08/21/2018 Yes    Pulmonary HTN [I27.20] 02/11/2014 Yes    Atrial fibrillation [I48.91] 02/11/2014 Yes    Hypothyroidism [E03.9]  Yes    CAD (coronary artery disease) [I25.10] 08/10/2012 Yes    Obstructive sleep apnea [G47.33] 08/10/2012 Yes    Hyperlipidemia [E78.5] 08/10/2012 Yes    Obesity [E66.9] 08/10/2012 Yes    Chronic diastolic heart failure [I50.32] 08/10/2012 Yes    ESRD from HTN started RRT 1999 [N18.6] 01/01/1999 Yes      Problems Resolved During this Admission:      Discharged Condition: good    Disposition: Home or Self Care    Follow Up:    Patient Instructions:   No discharge procedures on file.  Medications:  Reconciled Home Medications:      Medication List      ASK your doctor about these medications    ADEMPAS 2 mg Tab tablet  Generic drug:  riociguat  Take 2 mg by mouth 3 (three) times daily.     ALPHAGAN P 0.1 % Drop  Generic drug:  brimonidine 0.1%  once daily.     aspirin 325 MG tablet  Take 1 tablet (325 mg total) by mouth once daily.     diltiaZEM 300 MG 24 hr capsule  Commonly known as:  CARDIZEM CD  Take 1 capsule (300 mg total) by mouth once daily.     dorzolamide-timolol 2-0.5% 22.3-6.8 mg/mL ophthalmic solution  Commonly known as:  COSOPT  INSTILL 1 DROP IN BOTH EYES TWICE DAILY     latanoprost 0.005 % ophthalmic solution  INSTILL 1 DROP IN BOTH EYES EVERY DAY AT BEDTIME     levothyroxine 100 MCG tablet  Commonly known as:  SYNTHROID  Take 100 mcg by mouth. 1 Tablet Oral Every day     LIPITOR 10 MG tablet  Generic drug:  atorvastatin  TAKE 1 BY MOUTH ONCE A DAY     pantoprazole 40 MG tablet  Commonly known as:  PROTONIX  TAKE 1 TABLET BY MOUTH ONCE DAILY     UPTRAVI 1,000 mcg Tab  Generic drug:  selexipag  Take 1,000 mcg by mouth 2 (two) times daily.      walker Misc  Commonly known as:  ULTRA-LIGHT ROLLATOR  Use daily            Anatoliy Hartmann MD  Heart Transplant  [facility]

## 2019-04-02 NOTE — H&P
[facility]  Heart Transplant  H&P    Patient Name: Shivani Sheets  MRN: [id number]  Admission Date: 4/1/2019  Attending Physician: Alok Melendrez MD  Primary Care Provider: Eula Weiss MD  Principal Problem:Acute blood loss anemia    Subjective:     History of Present Illness:  Mrs. Sheets is a 64 yo F w/ PMHx  pulmonary HTN (on adempas/selexipeg), diastolic dysfunction, pAF, CVA 1987, COPD on 2 L nocturnal oxygen, central retinal artery occlusion without significant carotid artery stenosis, CAD with remote PCI, ESRD(LUE AVF) secondary to HTN, s/p failed kidney transplant, PUD, STEFFANY on CPAP, hypothyroidism, RA, HLD. She has had multiple recurrent admissions for ABLA. Recently referred to EP for consideration of Watchman procedure. Now off coumadin, was continued on aspirin for CAD.     Presents to ED today for BRBPR that started this morning. Patient reports 3 watery BM, mild abdominal pain since 4 am. She misseddialysis today. Associated shortness of breath on exertion. No nausea or vomiting. No hematemesis. No abdominal pain or urinary symptoms. No fever or chills. Pt had recent admission on 2/2-2/8 for similar symptoms, BRBPR. Received multiple pRBCs during the admission. No obvious source. Unremarkable colonoscopy (2/4/19). Had EGD and small bowel evaluation: normal stomach, esophagus non bleeding erosive gastropathy.    Hgb 10 on admission which is stable compared to prior.         Past Medical History:   Diagnosis Date    Allergy     Anemia     Anticoagulant long-term use     4 years coumadin, asa    Arthritis     Awaiting organ transplant 4/30/2013    Cataract     Central serous chorioretinopathy of eye, right 8/21/2018    CHF (congestive heart failure)     Chronic kidney disease     Colon polyps     COPD (chronic obstructive pulmonary disease)     Coronary artery disease     Diabetes mellitus     Diabetic retinopathy     Diverticulosis     Encounter for blood  transfusion     ESRD from HTN strtied RRT 1999    Failed  donor kidney transplant      Glaucoma     History of bleeding peptic ulcer      as stated per pt    Hyperlipidemia     Hypertension     Hypothyroidism     Morbid obesity 8/10/2012    Renal hypertension     Stroke            Past Surgical History:   Procedure Laterality Date    CATARACT EXTRACTION W/  INTRAOCULAR LENS IMPLANT Bilateral     COLON SURGERY      COLONOSCOPY      COLONOSCOPY N/A 2019    Performed by Indio Beltran MD at [facility] ENDO (2ND FLR)    COLONOSCOPY N/A 2018    Performed by Jose Falk MD at [facility] ENDO (2ND FLR)    EGD (ESOPHAGOGASTRODUODENOSCOPY) N/A 2019    Performed by Miranda Maradiaga MD at [facility] ENDO (2ND FLR)    EGD (ESOPHAGOGASTRODUODENOSCOPY) N/A 2018    Performed by Luis Washington MD at [facility] ENDO (2ND FLR)    ESOPHAGOGASTRODUODENOSCOPY (EGD) N/A 2018    Performed by Kenji Desir MD at [facility] ENDO (2ND FLR)    EYE SURGERY      FRACTURE SURGERY      R arm    HERNIA REPAIR      HYSTERECTOMY      INSERTION-IMPLANT-GLAUCOMA Left 10/12/2017    Performed by Junaid Kern MD at [facility] OR 1ST FLR    KIDNEY TRANSPLANT      NEPHRECTOMY  2008    transplant     PARATHYROID GLAND SURGERY      TONSILLECTOMY      Transesophageal echo (JACE) intra-procedure log documentation N/A 2019    Performed by [facility] Diagnostic Provider at [facility] EP LAB    UPPER GASTROINTESTINAL ENDOSCOPY         Review of patient's allergies indicates:   Allergen Reactions    Penicillins Swelling    Iodine      Other reaction(s): Hives    Sulfamethoxazole-trimethoprim      Other reaction(s): Swelling  Other reaction(s): Hives       Current Facility-Administered Medications   Medication    acetaminophen tablet 650 mg    [START ON 2019] atorvastatin tablet 10 mg    dexamethasone (ozurdex) 0.7 mg io implant    [START ON 2019] diltiaZEM 24 hr capsule 300 mg     latanoprost 0.005 % ophthalmic solution 1 drop    [START ON 2019] levothyroxine tablet 100 mcg    ondansetron disintegrating tablet 8 mg    [START ON 2019] pantoprazole EC tablet 40 mg    riociguat (ADEMPAS) tablet 2 mg    selexipag Tab 1,000 mcg    senna-docusate 8.6-50 mg per tablet 1 tablet    sodium chloride 0.9% flush 10 mL     Current Outpatient Medications   Medication Sig    aspirin 325 MG tablet Take 1 tablet (325 mg total) by mouth once daily.    diltiaZEM (CARDIZEM CD) 300 MG 24 hr capsule Take 1 capsule (300 mg total) by mouth once daily.    levothyroxine (SYNTHROID) 100 MCG tablet Take 100 mcg by mouth. 1 Tablet Oral Every day    LIPITOR 10 mg tablet TAKE 1 BY MOUTH ONCE A DAY (Patient taking differently: TAKE 1 BY MOUTH ONCE nightly)    pantoprazole (PROTONIX) 40 MG tablet TAKE 1 TABLET BY MOUTH ONCE DAILY    ALPHAGAN P 0.1 % Drop once daily.     dorzolamide-timolol 2-0.5% (COSOPT) 22.3-6.8 mg/mL ophthalmic solution INSTILL 1 DROP IN BOTH EYES TWICE DAILY    latanoprost 0.005 % ophthalmic solution INSTILL 1 DROP IN BOTH EYES EVERY DAY AT BEDTIME    riociguat (ADEMPAS) 2 mg Tab tablet Take 2 mg by mouth 3 (three) times daily.     selexipag (UPTRAVI) 1,000 mcg Tab Take 1,000 mcg by mouth 2 (two) times daily.    walker (ULTRA-LIGHT ROLLATOR) Misc Use daily     Facility-Administered Medications Ordered in Other Encounters   Medication    sodium chloride 0.9% flush 5 mL     Family History     Problem Relation (Age of Onset)    Asthma Sister    Blindness Father    Breast cancer Maternal Aunt    Depression Sister    Diabetes Maternal Aunt    Heart attack Father, Mother    Heart failure Father, Mother    Hypertension Father, Mother, Sister, Brother    Kidney disease Brother        Tobacco Use    Smoking status: Former Smoker     Types: Cigarettes     Last attempt to quit: 8/10/2000     Years since quittin.6    Smokeless tobacco: Never Used   Substance and Sexual Activity     Alcohol use: No     Comment: hx of etoh     Drug use: No    Sexual activity: Never     Review of Systems   Constitutional: Positive for fatigue. Negative for chills and fever.   HENT: Negative for sore throat.    Eyes: Negative for visual disturbance.   Respiratory: Positive for shortness of breath. Negative for cough and chest tightness.    Cardiovascular: Negative for chest pain, palpitations and leg swelling.   Gastrointestinal: Positive for blood in stool. Negative for abdominal pain, diarrhea, nausea and vomiting.   Endocrine: Negative for polyuria.   Genitourinary: Negative for dysuria.   Musculoskeletal: Negative for gait problem.   Skin: Negative for rash.   Allergic/Immunologic: Negative for immunocompromised state.   Neurological: Positive for weakness. Negative for light-headedness.   Psychiatric/Behavioral: Negative for confusion.     Objective:     Vital Signs (Most Recent):  Temp: 98.3 °F (36.8 °C) (04/01/19 1404)  Pulse: 80 (04/01/19 1601)  Resp: (!) 21 (04/01/19 1601)  BP: (!) 192/96 (04/01/19 1601)  SpO2: 96 % (04/01/19 1601) Vital Signs (24h Range):  Temp:  [98.3 °F (36.8 °C)] 98.3 °F (36.8 °C)  Pulse:  [80-85] 80  Resp:  [13-21] 21  SpO2:  [96 %-98 %] 96 %  BP: (152-192)/(80-96) 192/96     Patient Vitals for the past 72 hrs (Last 3 readings):   Weight   04/01/19 1404 70.2 kg (154 lb 12.2 oz)     Body mass index is 29.24 kg/m².    No intake or output data in the 24 hours ending 04/01/19 1748    Physical Exam   Constitutional: She is oriented to person, place, and time. She appears well-developed and well-nourished.   HENT:   Head: Normocephalic and atraumatic.   Eyes: Pupils are equal, round, and reactive to light. EOM are normal.   Neck: Normal range of motion. No JVD present.   Cardiovascular: Normal rate, regular rhythm, normal heart sounds and intact distal pulses. Exam reveals no gallop and no friction rub.   No murmur heard.  Pulmonary/Chest: Effort normal and breath sounds normal. No  stridor. No respiratory distress. She has no wheezes. She has no rales. She exhibits no tenderness.   Abdominal: Soft. Bowel sounds are normal. She exhibits no distension and no mass. There is no tenderness. There is no guarding.   Musculoskeletal: She exhibits no edema.   Neurological: She is alert and oriented to person, place, and time.   Skin: Skin is warm and dry.   Psychiatric: She has a normal mood and affect.   Vitals reviewed.      Significant Labs:  CBC:  Recent Labs   Lab 04/01/19  1456   WBC 5.89   RBC 3.81*   HGB 10.2*   HCT 34.8*   *   MCV 91   MCH 26.8*   MCHC 29.3*     BNP:  No results for input(s): BNP in the last 168 hours.    Invalid input(s): BNPTRIAGELBLO  CMP:  Recent Labs   Lab 04/01/19  1456   GLU 84   CALCIUM 9.5   ALBUMIN 3.4*   PROT 7.4      K 4.4   CO2 31*   CL 99   BUN 40*   CREATININE 10.5*   ALKPHOS 47*   ALT 17   AST 23   BILITOT 0.5      Coagulation:   Recent Labs   Lab 04/01/19  1456   INR 1.0   APTT 26.3     LDH:  No results for input(s): LDH in the last 72 hours.  Microbiology:  Microbiology Results (last 7 days)     ** No results found for the last 168 hours. **          I have reviewed all pertinent labs within the past 24 hours.    Diagnostic Results:  CT: No results found in the last 24 hours.  CXR: X-ray Chest Ap Portable    Result Date: 4/1/2019  See above Mild-moderate edema/CHF. Electronically signed by: Anton Akers MD Date:    04/01/2019 Time:    16:20  U/S: No results found in the last 24 hours.  I have reviewed and interpreted all pertinent imaging results/findings within the past 24 hours.    Assessment/Plan:     * Acute blood loss anemia  - Hgb 10 on admission, stable compared to prior  - Coumadin was discontinued on last admission  - Holding aspirin  - Continue to monitor Hgb    Atrial fibrillation  - Paroxysmal  - holding coumadin in setting of ABLA  - Being evaluated by EP for Watchman device    Pulmonary HTN  Will continue with adempas 2mg TID  -  pt brought her selexipeg with her.    Hypothyroidism  - continue synthroid    Hyperlipidemia  - continue statin    ESRD from HTN started RRT 1999  - Nephrology on board  - Hemodialysis MWF    CAD (coronary artery disease)  - Aspirin on hold  - C/W statin  - PET stress test from 2018 nonischemic        Anatoliy Hartmann MD  Heart Transplant  [facility]

## 2019-04-04 ENCOUNTER — OUTPATIENT CASE MANAGEMENT (OUTPATIENT)
Dept: ADMINISTRATIVE | Facility: OTHER | Age: 64
End: 2019-04-04

## 2019-04-04 ENCOUNTER — DOCUMENTATION ONLY (OUTPATIENT)
Dept: REHABILITATION | Facility: HOSPITAL | Age: 64
End: 2019-04-04

## 2019-04-04 NOTE — PROGRESS NOTES
Physical Therapy: No show/Cancellation of Visit  Date: 04/04/2019      Patient was a no show to today's PT appointment. Patient's next scheduled appointment is 4/9/19.       Cancel: 0  No show: 1    Therapist: Yolanda Zapata PTA

## 2019-04-04 NOTE — PROGRESS NOTES
Summary:  Spoke with pt regarding follow up - pt stated that she was feeling better and reports that she was taken off the Coumadin -Pt did get her dialysis yessterday pt stayes she is feeling very wiped out and tired - pt states that her blood count was good when  She left the hospital - Pt also reports that she fell on Sunday -states her slips caught on the ledge/upsetp and she feel into the den-  Pt states she is still sore today- states she is soaking in epsom salt and rubbing with green alcohol- and she thinks that her fall may have had something to do with the bleeding.  Pt states she was supposed to or she thought she was to go to PT and had to cancel -stated she was told that she had to get doctor to re-order- Dr. Morrow ordered it.  Pt  Reports she is back on her regular dialysis     Interventions:  Encouraged pt to get different slippers - pt states she threw slippers away after fall.  Checked chart - informed pt that referral from Dr. Morrow was done yesterday and wait for PT to call or if call again -let them know    Plan:  Follow up on PT referral  Follow-up on fall and new slippers      Todays OPCM Self-Management Care Plan was developed with the patients/caregivers input and was based on identified barriers from todays assessment.  Goals were written today with the patient/caregiver and the patient has agreed to work towards these goals to improve his/her overall well-being. Patient verbalized understanding of the care plan, goals, and all of today's instructions. Encouraged patient/caregiver to communicate with his/her physician and health care team about health conditions and the treatment plan.  Provided my contact information today and encouraged patient/caregiver to call me with any questions as needed.

## 2019-04-07 DIAGNOSIS — I48.19 PERSISTENT ATRIAL FIBRILLATION: Primary | ICD-10-CM

## 2019-04-08 NOTE — PROGRESS NOTES
LEFT ATRIAL APPENDAGE CLOSURE INSTRUCTIONS    4/22/2019 - 9am  PRE-PROCEDURE LABS HAVE BEEN SCHEDULED FOR YOU @ [facility]  YOU DO NOT HAVE TO FAST FOR THIS LAB WORK!      4/26/2019 - 5:30  PLEASE REPORT TO THE CARDIOLOGY WAITING AREA ON THE 3RD FLOOR OF THE HOSPITAL FOR WATCHMAN IMPLANT    WASH WITH HIBICLENS OR AN ANTIBACTERIAL SOAP (SUCH AS DIAL) ON THE NIGHT BEFORE AND THE MORNING OF YOUR PROCEDURE  PLEASE DO NOT WEAR MAKEUP (ESPECIALLY MASCARA) WHEN ARRIVING FOR YOUR PROCEDURE    DO NOT EAT OR DRINK ANYTHING AFTER: 12 MN ON THE NIGHT BEFORE YOUR PROCEDURE    SOMEONE FROM THE ECHO DEPT WILL BE CALLING YOU ABOUT YOUR TRANSESOPHAGEAL ECHO (THIS WILL BE DONE DURING YOUR PROCEDURE)    MEDICATIONS:  YOU MAY TAKE YOUR USUAL MORNING MEDICATIONS WITH A SIP OF WATER    DIRECTIONS TO CARDIOLOGY WAITING AREA  If you park in the Parking Garage:  Take elevators to the 2nd floor  Walk up ramp and turn right by Gold Elevators  Take elevator to the 3rd floor  Upon exiting the elevator, turn away from the clinic areas  Walk long belle around to front of hospital to area with windows overlooking [facility]  Check in at Reception Desk  OR  If family is dropping you off:  Have them drop you off at the front of the Hospital  (Near the ER, where all the flags are hung).  Take the E elevators to the 3rd floor.  Check in at the Reception Desk in the waiting room.    YOU WILL BE SPENDING AT LEAST ONE NIGHT AFTER YOUR PROCEDURE  YOU WILL NEED SOMEONE TO DRIVE YOU HOME AFTER DISCHARGE    YOUR PAIN DURING YOUR PROCEDURE WILL BE ADDRESSED BY THE ANESTHESIA STAFF    Any need to reschedule or cancel procedures, or any questions regarding your procedures should be addressed directly with the Arrhythmia Department Nurses at the following phone number: [phone]

## 2019-04-09 ENCOUNTER — CLINICAL SUPPORT (OUTPATIENT)
Dept: REHABILITATION | Facility: HOSPITAL | Age: 64
End: 2019-04-09
Payer: MEDICARE

## 2019-04-09 ENCOUNTER — HOSPITAL ENCOUNTER (EMERGENCY)
Facility: HOSPITAL | Age: 64
Discharge: HOME | End: 2019-04-09
Attending: EMERGENCY MEDICINE
Payer: MEDICARE

## 2019-04-09 ENCOUNTER — OFFICE VISIT (OUTPATIENT)
Dept: GASTROENTEROLOGY | Facility: CLINIC | Age: 64
End: 2019-04-09
Payer: MEDICARE

## 2019-04-09 VITALS
SYSTOLIC BLOOD PRESSURE: 121 MMHG | WEIGHT: 151.88 LBS | HEART RATE: 73 BPM | DIASTOLIC BLOOD PRESSURE: 76 MMHG | BODY MASS INDEX: 28.67 KG/M2 | HEIGHT: 61 IN

## 2019-04-09 VITALS
RESPIRATION RATE: 17 BRPM | DIASTOLIC BLOOD PRESSURE: 78 MMHG | WEIGHT: 151.44 LBS | SYSTOLIC BLOOD PRESSURE: 153 MMHG | HEIGHT: 61 IN | OXYGEN SATURATION: 97 % | HEART RATE: 70 BPM | TEMPERATURE: 99 F | BODY MASS INDEX: 28.59 KG/M2

## 2019-04-09 DIAGNOSIS — R19.7 DIARRHEA, UNSPECIFIED TYPE: Primary | ICD-10-CM

## 2019-04-09 DIAGNOSIS — R15.9 FULL INCONTINENCE OF FECES: ICD-10-CM

## 2019-04-09 DIAGNOSIS — R26.2 DIFFICULTY WALKING: ICD-10-CM
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
## 2019-04-09 DIAGNOSIS — M25.562 ACUTE PAIN OF LEFT KNEE: Primary | ICD-10-CM

## 2019-04-09 DIAGNOSIS — M25.60 JOINT STIFFNESS: ICD-10-CM

## 2019-04-09 PROCEDURE — 99999 PR PBB SHADOW E&M-EST. PATIENT-LVL IV: CPT | Mod: PBBFAC,,, | Performed by: PHYSICIAN ASSISTANT

## 2019-04-09 PROCEDURE — 99999 PR PBB SHADOW E&M-EST. PATIENT-LVL IV: ICD-10-PCS | Mod: PBBFAC,,, | Performed by: PHYSICIAN ASSISTANT

## 2019-04-09 PROCEDURE — 99282 EMERGENCY DEPT VISIT SF MDM: CPT | Mod: ,,, | Performed by: PHYSICIAN ASSISTANT

## 2019-04-09 PROCEDURE — 99213 OFFICE O/P EST LOW 20 MIN: CPT | Mod: S$PBB,,, | Performed by: PHYSICIAN ASSISTANT

## 2019-04-09 PROCEDURE — 99285 EMERGENCY DEPT VISIT HI MDM: CPT | Mod: 27,25

## 2019-04-09 PROCEDURE — 99214 OFFICE O/P EST MOD 30 MIN: CPT | Mod: PBBFAC,25 | Performed by: PHYSICIAN ASSISTANT

## 2019-04-09 PROCEDURE — 25000003 PHARM REV CODE 250: Performed by: PHYSICIAN ASSISTANT

## 2019-04-09 PROCEDURE — 97110 THERAPEUTIC EXERCISES: CPT | Mod: PN

## 2019-04-09 PROCEDURE — 99213 PR OFFICE/OUTPT VISIT, EST, LEVL III, 20-29 MIN: ICD-10-PCS | Mod: S$PBB,,, | Performed by: PHYSICIAN ASSISTANT

## 2019-04-09 PROCEDURE — 99282 PR EMERGENCY DEPT VISIT,LEVEL II: ICD-10-PCS | Mod: ,,, | Performed by: PHYSICIAN ASSISTANT

## 2019-04-09 RX ORDER — ACETAMINOPHEN 500 MG
1000 TABLET ORAL
Status: COMPLETED | OUTPATIENT
Start: 2019-04-09 | End: 2019-04-09

## 2019-04-09 RX ADMIN — ACETAMINOPHEN 1000 MG: 500 TABLET ORAL at 08:04

## 2019-04-09 NOTE — LETTER
April 10, 2019      Eula Weiss MD  [address]  [place] [address]           [facility] G[facility]enter[facility]  [address]  [place] [address]  Phone: [phone]  Fax: [phone]          Patient: Shivani Sheets   MR Number: [id number]   YOB: 1955   Date of Visit: 4/9/2019       Dear Dr. Eula Weiss:    Thank you for referring Shivani Sheets to me for evaluation. Attached you will find relevant portions of my assessment and plan of care.    If you have questions, please do not hesitate to call me. I look forward to following Shivani Sheets along with you.    Sincerely,    Regina Rodriguez PA-C    Enclosure  CC:  No Recipients    If you would like to receive this communication electronically, please contact [email or web address] or [phone] to request more information on [facility] [employer] access.    For providers and/or their staff who would like to refer a patient to [facility], please contact us through our one-stop-shop provider referral line, [facility], at [phone].    If you feel you have received this communication in error or would no longer like to receive these types of communications, please e-mail [email or web address]

## 2019-04-09 NOTE — PATIENT INSTRUCTIONS
Start a fiber supplement, start once daily and ok to increase on a weekly basis    aquaphor baby ointment

## 2019-04-09 NOTE — PROGRESS NOTES
Physical Therapy Daily Treatment Note     Name: Shivani PLAZA Harrah  Clinic Number: [id number]    Therapy Diagnosis:   Encounter Diagnoses   Name Primary?    Joint stiffness     Difficulty walking      Physician: Cullen Sun PA*    Visit Date: 4/9/2019    Physician Orders: PT Eval and Treat   Medical Diagnosis from Referral: left hip trochanteric bursitis  Evaluation Date: 3/21/2019  Plan of Care Expiration: 6/21/19  Authorization Period Expiration: 12/31/2019  Visit #/Visits authorized: 1/ 20 + eval      Time In: 1300  Time Out: 1330  Total Billable Time: 15 minutes    Precautions: severe HTN, kidney dialysis M, W, F , Falls       Subjective     Pt reports: that she is not having a good day today.  Pt states that she is having increased pain in both hips today.  Pt states that she fell about 2 weeks ago.  Pt states that she fell on the concrete slab in her garage.   She was semi compliant with home exercise program.  Response to previous treatment: ok   Functional change: none to report     Pain: 9/10  Location: bilateral hips     Objective     Shivani received therapeutic exercises to develop strength, endurance, ROM, flexibility, posture and core stabilization for 20 minutes including:    Nustep x 6'   hip fall out x 20  quad set 2 x 10  piriformis stretch 20 sec x 4  bal squeeze 2' x 3''   HL hip abduction Yellow TB 2'       Shivani received the following manual therapy techniques:  were applied to the: left IT Band for 5 minutes, including:  STM with roller stick      Shivani received cold pack for 10 minutes to left hip.      Home Exercises Provided and Patient Education Provided     Education provided:   - increasing movement around the house.  -compliance with HEP    Written Home Exercises Provided: Patient instructed to cont prior HEP.  Exercises were reviewed and Shivani was able to demonstrate them prior to the end of the session.  Shivani demonstrated good  understanding of the education  provided.     See EMR under Patient Instructions for exercises provided prior visit.    Assessment     Shivani tolerated treatment session poorly today.  Patient with decreased tolerance to exercises with exacerbation of symptoms with most exercises.  Patient unable to perform standing exercises due to pain.   Shivani is progressing well towards her goals.   Pt prognosis is Good.     Pt will continue to benefit from skilled outpatient physical therapy to address the deficits listed in the problem list box on initial evaluation, provide pt/family education and to maximize pt's level of independence in the home and community environment.     Pt's spiritual, cultural and educational needs considered and pt agreeable to plan of care and goals.     Anticipated barriers to physical therapy: none    Short Term Goals (4 Weeks):      1.Pt to increase strength by a 1/2 grade of muscles test to allow for improvement in functional activities such as performing chores.  2.Pt to improve range of motion by 25% to allow for improved functional mobility to allow for improvement in IADLs.   3.Pt to report compliance with HEP and demonstrate proper exercise technique to PT to show competence with self management of condition.  4.Decrease pain by 25% during functional activities.     Long Term Goals (12 Weeks):      1. Increase ROM to allow improved joint biomechanics during functional activities.   2.Increase trunk and lower extremity strength to within normal limits during functional activities.   3. Independent with home exercise program.   4. Full return to functional activities with manageable complaints.  5. Patient to demonstrate improved posture and body mechanics.  6. Decrease pain by 75% during functional activities.      Plan     Continue with current POC.     Yolanda Zapata, PTA

## 2019-04-09 NOTE — PROGRESS NOTES
[facility] Consultation Note    Reason for Consult:  The primary encounter diagnosis was Diarrhea, unspecified type. A diagnosis of Full incontinence of feces was also pertinent to this visit.    PCP:   Eula Weiss   [address] / [place] [address]    Referring MD:  Eula Weiss Md  [address] [place] [address]    HPI:  This is a 63 y.o. female here for a hospital follow.    Hospitalized 1/2018 for  01/14/2019 small-bowel enteroscopy - Normal esophagus.                        - Z-line regular, 41 cm from the incisors.                        - 2 cm hiatal hernia.                        - Non-bleeding erosive gastropathy.                        - Normal stomach.                        - Normal examined duodenum.    01/15/2019 video capsule endoscopy- Complete study (capsule entered cecum prior to                         completion)                        - No cause for patient's melena ilicited during                         this study.                        - Erosive gastropathy.                        - Gastric polyp. Pyloric inflammatory polyp versus                         pancreatic rest.                        - Small bowel lymphangiectasia.  - Monitor for re-bleeding, if re-bleeds will                         consider colonoscopy versus repeat VCE.    02/04/2019 colonoscopy normal  Hospitalized 10/2018 for melena and anemia.     Seen in the ED 4/1/ for rectal bleeding - reported 3 episodes of small amont of BRBPR    Interval history  seeing clear mucus in the stool since her colonoscopy  Frequent loose stool with urgency since taking Adnempas and uptravi  Post-prandial diarrhea, fecal incontinence episodes  She has to wear depends and is experiencing perianal irritation  Stopped the coumadin, planning on IVC filter for a fib at the end of this month    2018 GI consult  Patient presented with 4 days of melena in the setting of high INR and recent NSAID  "use. Presented with Hb of 3.8 from a baseline of ~11. Responded well to transfusion. She is hemodynamically stable.     EGD done 9/11/18  Impression:           - Normal esophagus.                        - A few gastric polyps.                        - Normal examined duodenum.                        - No specimens collected.  Recommendation:         Continue to monitor clinically and trend H/H. If continues to show signs of active GI bleeding will consider inpatient VCE.     6/2018 colonoscopy for GI bleeding    - Hemorrhoids found on perianal exam.                        - Two 5 mm polyps in the sigmoid colon and in the                         ascending colon, removed with a cold biopsy                         forceps. Resected and retrieved.                        - Non-bleeding internal hemorrhoids.    11/08/2018 visit notes  Today she is doing well.  Denies melena or rectal bleeding since being in the hospital  Did see mucus in the stool 2 weeks ago on 2 occasions  9/20/18 Hgb 10.6    She reports hx of intestinal perforation requiring surgery, which was a complication during a peritneal dailysis / hernia repair surgery    5/2018 office visit notes   for a hospital f/u to f/u on her melena, CARMEN, and heme positive stool. PMH Pum HTN with PA pressures of 71, afib on coumadin, remote PUD, and ESRD 2/2 HTN (LUE AVF) s/p failed transplant (re-listed).    She saw anticoagulation clinic 2/22 and her INR was noted to be elevated with patient having melena. She was encouraged to go to the ER and adviced to hold warfarin. In triage, Hgb was noted to 4.7 (down from 7.8 one week prior with baseline around 9-10), the patient was admitted to the ICU, and "given blood transfusion and FFP". Per notes, at least 2U FFP and 3U PRBCs were given.     11/2008 colonoscopy - internal hermorhoid and diverticulosis  2/2018 EGD revealed gastric polyps, and a small hiatal hernia    Plan was to do a colonoscopy as an outpatient    Today she " dnies melena  Some intermittent abdominal cramping  Bowels alternate between bristol type 1, normal stool, and diarrhea with fecal incontinence  + poor appetite and early satiety  + abdominal bloating    Has fluid restriction  Getting iron in dialysis    ROS:  Constitutional: No fevers, chills, No weight loss  ENT: No allergies  CV: No chest pain  Pulm: No cough, No shortness of breath  Ophtho: No vision changes  GI: see HPI  Derm: No rash  Heme: No lymphadenopathy, No bruising  MSK: No arthritis  : No dysuria, No hematuria  Endo: No hot or cold intolerance  Neuro: No syncope, No seizure  Psych: No anxiety, No depression    Medical History:  has a past medical history of Allergy, Anemia, Anticoagulant long-term use, Arthritis, Awaiting organ transplant (2013), Cataract, Central serous chorioretinopathy of eye, right (2018), CHF (congestive heart failure), Chronic diarrhea, Chronic kidney disease, Colon polyps, COPD (chronic obstructive pulmonary disease), Coronary artery disease, Diabetes mellitus, Diabetic retinopathy, Diverticulosis, Encounter for blood transfusion, ESRD from HTN strtied RRT  (1999), Failed  donor kidney transplant -, Glaucoma, History of bleeding peptic ulcer, Hyperlipidemia, Hypertension, Hypothyroidism, Morbid obesity (8/10/2012), Renal hypertension, and Stroke.    Surgical History:  has a past surgical history that includes Kidney transplant; Hysterectomy; Eye surgery; Nephrectomy (2008); Tonsillectomy; Cataract extraction w/  intraocular lens implant (Bilateral); Upper gastrointestinal endoscopy; Colonoscopy; Colonoscopy (N/A, 2018); Parathyroid gland surgery; Colon surgery; Hernia repair; Fracture surgery; Esophagogastroduodenoscopy (N/A, 2018); Esophagogastroduodenoscopy (N/A, 2019); and Colonoscopy (N/A, 2019).    Family History: family history includes Asthma in her sister; Blindness in her father; Breast cancer in her maternal  aunt; Depression in her sister; Diabetes in her maternal aunt; Heart attack in her father and mother; Heart failure in her father and mother; Hypertension in her brother, father, mother, and sister; Kidney disease in her brother..     Social History:  reports that she quit smoking about 18 years ago. Her smoking use included cigarettes. She has never used smokeless tobacco. She reports that she does not drink alcohol or use drugs.    Review of patient's allergies indicates:   Allergen Reactions    Penicillins Swelling    Iodine      Other reaction(s): Hives    Sulfamethoxazole-trimethoprim      Other reaction(s): Swelling  Other reaction(s): Hives       Current Outpatient Medications on File Prior to Visit   Medication Sig Dispense Refill    ALPHAGAN P 0.1 % Drop once daily.       aspirin 325 MG tablet Take 1 tablet (325 mg total) by mouth once daily.  0    diltiaZEM (CARDIZEM CD) 300 MG 24 hr capsule Take 1 capsule (300 mg total) by mouth once daily. 90 capsule 3    dorzolamide-timolol 2-0.5% (COSOPT) 22.3-6.8 mg/mL ophthalmic solution INSTILL 1 DROP IN BOTH EYES TWICE DAILY 10 mL 0    latanoprost 0.005 % ophthalmic solution INSTILL 1 DROP IN BOTH EYES EVERY DAY AT BEDTIME 7.5 mL 0    levothyroxine (SYNTHROID) 100 MCG tablet Take 100 mcg by mouth. 1 Tablet Oral Every day      LIPITOR 10 mg tablet TAKE 1 BY MOUTH ONCE A DAY (Patient taking differently: TAKE 1 BY MOUTH ONCE nightly) 90 tablet 2    pantoprazole (PROTONIX) 40 MG tablet TAKE 1 TABLET BY MOUTH ONCE DAILY 30 tablet 6    riociguat (ADEMPAS) 2 mg Tab tablet Take 2 mg by mouth 3 (three) times daily.       selexipag (UPTRAVI) 1,000 mcg Tab Take 1,000 mcg by mouth 2 (two) times daily.      walker (ULTRA-LIGHT ROLLATOR) Misc Use daily 1 each 0     Current Facility-Administered Medications on File Prior to Visit   Medication Dose Route Frequency Provider Last Rate Last Dose    dexamethasone (ozurdex) 0.7 mg io implant  0.7 mg Intraocular 1 time in  "Clinic/HOD ERIKA Hilario MD        sodium chloride 0.9% flush 5 mL  5 mL Intravenous PRN Bethany Nielsen NP             Objective Findings:    Vital Signs:  /76 Comment: pt took bp medication today  Pulse 73   Ht 5' 1" (1.549 m)   Wt 68.9 kg (151 lb 14.4 oz)   LMP  (LMP Unknown)   BMI 28.70 kg/m²   Body mass index is 28.7 kg/m².    Physical Exam:  General Appearance: Well appearing in no acute distress  Head:   Normocephalic, without obvious abnormality  Eyes:    No scleral icterus  ENT: Neck supple, Lips, mucosa, and tongue normal  Lungs: CTA bilaterally in anterior and posterior fields, no wheezes, no crackles.  Heart:  IRIR, S1, S2 normal, no murmurs heard  Abdomen: Soft, non tender, non distended with positive bowel sounds in all four quadrants.   Extremities: no edema  Skin: No rash  Neurologic: AAO x 3      Labs:  Lab Results   Component Value Date    WBC 5.42 04/02/2019    HGB 9.4 (L) 04/02/2019    HCT 31.3 (L) 04/02/2019     (L) 04/02/2019    CHOL 114 (L) 02/07/2019    TRIG 86 02/07/2019    HDL 56 02/07/2019    ALT 15 04/02/2019    AST 31 04/02/2019     04/02/2019    K 4.7 04/02/2019     04/02/2019    CREATININE 11.0 (H) 04/02/2019    BUN 46 (H) 04/02/2019    CO2 27 04/02/2019    TSH 1.337 03/26/2019    INR 1.0 04/01/2019    GLUF 126 (H) 03/16/2010    HGBA1C 5.1 02/02/2019         Assessment:  1. Diarrhea, unspecified type    2. Full incontinence of feces       63yo F with PMH Pum HTN with PA pressures of 71, afib on coumadin, remote PUD, and ESRD 2/2 HTN (LUE AVF) s/p failed transplant (re-listed).  Hospitalize 3 months ago for melena.  Colonoscopy was normal.  Previous EGD colon in video capsule workups for melena were also unrevealing.  Recently had episode of bright red blood in the stool that lasted a few hours.    Her main complaint is postprandial diarrhea with incontinence since starting pulmonary hypertension medicine  Recommendations:  Stool studies to " rule out infection.  If negative diarrhea is likely a adverse drug reaction of the medicine.    Start fiber supplement to hopefully firm up the stool  Will not prescribe anticholinergics  due to her history of glaucoma    She would benefit from a biopsy of the colon in the future to rule out microscopic colitis if another colonoscopy is performed to rule out GI bleeding source is  Follow up if symptoms worsen or fail to improve.      Order summary:  Orders Placed This Encounter    Clostridium difficile EIA    Stool culture    Stool Exam-Ova,Cysts,Parasites    Stool Exam-Ova,Cysts,Parasites    Giardia / Cryptosporidum, EIA    Giardia / Cryptosporidum, EIA    Giardia / Cryptosporidum, EIA    Stool Exam-Ova,Cysts,Parasites         Thank you so much for allowing me to participate in the care of Shivani Rodriguez PA-C

## 2019-04-10 NOTE — ED PROVIDER NOTES
Encounter Date: 2019       History     Chief Complaint   Patient presents with    Fall     tripped and fell on 5 th floor, no loc pain to L knee     63-year-old female with multiple medical comorbidities significant for pulmonary HTN, diastolic dysfunction, pAF no longer on chronic anticoagulation except aspirin, CVA, COPD home oxygen at night, CAD, ESRD secondary to HTN, s/p failed kidney transplant, PUD, STEFFANY on CPAP, hypothyroidism, RA, HLD presents to the ED for evaluation of a fall in the parking garage at this location.  Patient states that she tripped on a ledge or uneven pavement causing her to fall onto her left knee.  Patient states that she fell to the L after landing on her knee and may have hit her head on a nearby concrete wall. Pt is unsure of LOC.  She denies HA, nausea, vomiting, vision changes, dizziness.  Pt complains of L knee pain and swelling as well as pain to the L side of the neck and shoulder.  Denies weakness, numbness/tingling. Pt was taken off of coumadin recently. Currently only on ASA for anticoagulation.          Review of patient's allergies indicates:   Allergen Reactions    Penicillins Swelling    Iodine      Other reaction(s): Hives    Sulfamethoxazole-trimethoprim      Other reaction(s): Swelling  Other reaction(s): Hives     Past Medical History:   Diagnosis Date    Allergy     Anemia     Anticoagulant long-term use     4 years coumadin, asa    Arthritis     Awaiting organ transplant 2013    Cataract     Central serous chorioretinopathy of eye, right 2018    CHF (congestive heart failure)     Chronic kidney disease     Colon polyps     COPD (chronic obstructive pulmonary disease)     Coronary artery disease     Diabetes mellitus     Diabetic retinopathy     Diverticulosis     Encounter for blood transfusion     ESRD from HTN strtied RRT 1999    Failed  donor kidney transplant      Glaucoma     History of bleeding  peptic ulcer     1970's as stated per pt    Hyperlipidemia     Hypertension     Hypothyroidism     Morbid obesity 8/10/2012    Renal hypertension     Stroke     1987     Past Surgical History:   Procedure Laterality Date    CATARACT EXTRACTION W/  INTRAOCULAR LENS IMPLANT Bilateral     COLON SURGERY      COLONOSCOPY      COLONOSCOPY N/A 2/4/2019    Performed by Indio Beltran MD at [facility] (2ND FLR)    COLONOSCOPY N/A 6/12/2018    Performed by Jose Falk MD at [facility] (2ND FLR)    EGD (ESOPHAGOGASTRODUODENOSCOPY) N/A 1/14/2019    Performed by Miranda Maradiaga MD at [facility] (2ND FLR)    EGD (ESOPHAGOGASTRODUODENOSCOPY) N/A 9/11/2018    Performed by Luis Washington MD at [facility] (2ND FLR)    ESOPHAGOGASTRODUODENOSCOPY (EGD) N/A 2/27/2018    Performed by Kenji Desir MD at [facility] (2ND FLR)    EYE SURGERY      FRACTURE SURGERY      R arm    HERNIA REPAIR      HYSTERECTOMY      INSERTION-IMPLANT-GLAUCOMA Left 10/12/2017    Performed by Junaid Kern MD at [facility] OR 1ST FLR    KIDNEY TRANSPLANT      NEPHRECTOMY  11/2008    transplant     PARATHYROID GLAND SURGERY      TONSILLECTOMY      Transesophageal echo (JACE) intra-procedure log documentation N/A 2/28/2019    Performed by [facility] at [facility]    UPPER GASTROINTESTINAL ENDOSCOPY       Family History   Problem Relation Age of Onset    Heart attack Father     Heart failure Father     Hypertension Father     Blindness Father     Heart attack Mother     Heart failure Mother     Hypertension Mother     Asthma Sister     Depression Sister     Hypertension Sister     Hypertension Brother     Kidney disease Brother         ESRD    Diabetes Maternal Aunt     Breast cancer Maternal Aunt     Amblyopia Neg Hx     Cataracts Neg Hx     Macular degeneration Neg Hx     Retinal detachment Neg Hx     Strabismus Neg Hx     Colon cancer Neg Hx     Esophageal cancer Neg Hx      Social History     Tobacco  Use    Smoking status: Former Smoker     Types: Cigarettes     Last attempt to quit: 8/10/2000     Years since quittin.6    Smokeless tobacco: Never Used   Substance Use Topics    Alcohol use: No     Comment: hx of etoh     Drug use: No     Review of Systems   Constitutional: Negative for fever.   HENT: Negative for sore throat.    Eyes: Negative for visual disturbance.   Respiratory: Negative for shortness of breath.    Cardiovascular: Negative for chest pain.   Gastrointestinal: Negative for nausea and vomiting.   Genitourinary: Negative for dysuria.   Musculoskeletal: Positive for neck pain (Left-sided, left trapezius). Negative for back pain.        Left knee pain, left lower leg pain   Skin: Negative for rash.   Neurological: Negative for dizziness, weakness and numbness.   Hematological: Does not bruise/bleed easily.       Physical Exam     Initial Vitals [19 1727]   BP Pulse Resp Temp SpO2   132/61 66 18 98.3 °F (36.8 °C) 95 %      MAP       --         Physical Exam    Nursing note and vitals reviewed.  Constitutional: She appears well-developed and well-nourished. She is not diaphoretic.  Non-toxic appearance. She does not appear ill. No distress.   HENT:   Head: Normocephalic and atraumatic.   Eyes: EOM are normal. Right pupil is not round.   Patient has an abnormal pupil on the right at baseline   Neck: Neck supple.   Cardiovascular: Normal rate and regular rhythm. Exam reveals no gallop and no friction rub.    No murmur heard.  Pulses:       Radial pulses are 2+ on the right side.   Doppler signals present in bilateral DP and PT pulses.  2+ pitting edema to B/L lower legs and ankles.  No palpable radial pulse on L. Pt has large AV fistula to the L arm with palpable thrill.    Pulmonary/Chest: Effort normal and breath sounds normal. No accessory muscle usage. No tachypnea. No respiratory distress. She has no decreased breath sounds. She has no wheezes. She has no rhonchi. She has no rales.    Abdominal: She exhibits no distension.   Musculoskeletal:   Left paracervical and left trapezius muscle tenderness and stiffness. Some tenderness to the L lateral shoulder. No significant midline C, T, L tenderness. There is tenderness in a large contusion to the anterior left knee.  She has full range of motion of the left knee with some pain. Patient is able to bear weight without significant pain.    Neurological: She is alert.   No facial droop.  Normal strength to the upper and lower extremities.    Skin: No rash noted.   Psychiatric: She has a normal mood and affect. Her behavior is normal.         ED Course   Procedures  Labs Reviewed - No data to display       Imaging Results          X-Ray Knee 3 View Left (Final result)  Result time 04/09/19 21:07:22    Final result by Asif Sanabria MD (04/09/19 21:07:22)                 Impression:      Left lower leg and ankle diffuse nonspecific soft tissue swelling from edema or cellulitis, without acute displaced fracture-dislocation identified.      Electronically signed by: Asif Sanabria MD  Date:    04/09/2019  Time:    21:07             Narrative:    EXAMINATION:  XR KNEE 3 VIEW LEFT; XR TIBIA FIBULA 2 VIEW LEFT    CLINICAL HISTORY:  Unspecified fall, initial encounter    TECHNIQUE:  AP, lateral, and Merchant views of the left knee; AP and lateral views left tibia series.    COMPARISON:  Left ankle series 03/22/2013    FINDINGS:  Partially imaged vascular stent at the distal left thigh.  Scattered atherosclerotic vascular calcifications noted.    Overall alignment is within normal limits.  No displaced fracture, dislocation or destructive osseous process.  Mild degenerative change noted about the knee and ankle.  Nonspecific soft tissue swelling with subcutaneous stranding about the left lower leg and ankle.  No subcutaneous emphysema.  No large suprapatellar joint effusion.                               X-Ray Tibia Fibula 2 View Left (Final result)  Result  time 04/09/19 21:07:22    Final result by Asif Sanabria MD (04/09/19 21:07:22)                 Impression:      Left lower leg and ankle diffuse nonspecific soft tissue swelling from edema or cellulitis, without acute displaced fracture-dislocation identified.      Electronically signed by: Asif Sanabria MD  Date:    04/09/2019  Time:    21:07             Narrative:    EXAMINATION:  XR KNEE 3 VIEW LEFT; XR TIBIA FIBULA 2 VIEW LEFT    CLINICAL HISTORY:  Unspecified fall, initial encounter    TECHNIQUE:  AP, lateral, and Merchant views of the left knee; AP and lateral views left tibia series.    COMPARISON:  Left ankle series 03/22/2013    FINDINGS:  Partially imaged vascular stent at the distal left thigh.  Scattered atherosclerotic vascular calcifications noted.    Overall alignment is within normal limits.  No displaced fracture, dislocation or destructive osseous process.  Mild degenerative change noted about the knee and ankle.  Nonspecific soft tissue swelling with subcutaneous stranding about the left lower leg and ankle.  No subcutaneous emphysema.  No large suprapatellar joint effusion.                               X-Ray Hip 2 View Left (Final result)  Result time 04/09/19 21:27:07    Final result by Asif Sanabria MD (04/09/19 21:27:07)                 Impression:      No acute displaced fracture-dislocation identified.      Electronically signed by: Asif Sanabria MD  Date:    04/09/2019  Time:    21:27             Narrative:    EXAMINATION:  XR HIP 2 VIEW LEFT    CLINICAL HISTORY:  Unspecified fall, initial encounter    TECHNIQUE:  AP view of the pelvis and frog leg lateral view of the left hip were performed.    COMPARISON:  Left hip series 08/09/2018    FINDINGS:  Overall alignment is within normal limits.  No displaced fracture, dislocation or destructive osseous process.  Age-related mild degenerative change of the included lower lumbosacral spine and bilateral sacroiliac and hip joints.  Surgical  clips project over the right lower quadrant and pelvis.  Scattered advanced atherosclerotic vascular calcifications and pelvic phleboliths noted.  No subcutaneous emphysema.                               X-Ray Shoulder Trauma Left (Final result)  Result time 04/09/19 21:28:13    Final result by Asif Sanabria MD (04/09/19 21:28:13)                 Impression:      No acute displaced fracture-dislocation identified.    Grossly stable additional findings as above.      Electronically signed by: Asif Sanabria MD  Date:    04/09/2019  Time:    21:28             Narrative:    EXAMINATION:  XR SHOULDER TRAUMA 3 VIEW LEFT    CLINICAL HISTORY:  Unspecified fall, initial encounter    TECHNIQUE:  Three views of the left shoulder were performed.    COMPARISON  Chest radiograph 04/01/2019    FINDINGS:  Vascular stent again projects over the left subclavicular region.  Surgical clips project over the lower left cervical region similar to prior.    Overall alignment is within normal limits.  Generalized osteopenia.  No displaced fracture, dislocation or destructive osseous process.  Minimal degenerative change about the shoulder.  Bilateral fuse nonspecific interstitial coarsening of the imaged mid to upper lung zones similar to prior.  No apical pneumothorax.  Cardiac silhouette remains enlarged.                               CT Head Without Contrast (Final result)  Result time 04/09/19 20:54:47    Final result by David Escobedo MD (04/09/19 20:54:47)                 Impression:      1. No evidence of acute intracranial pathology.  2. Senescent changes.  3. Paranasal sinus disease.    Electronically signed by resident: Yossi Lugo  Date:    04/09/2019  Time:    20:45    Electronically signed by: David Escobedo MD  Date:    04/09/2019  Time:    20:54             Narrative:    EXAMINATION:  CT HEAD WITHOUT CONTRAST    CLINICAL HISTORY:  head injury, fall no LOC    TECHNIQUE:  Low dose axial CT images obtained throughout the  head without intravenous contrast. Sagittal and coronal reconstructions were performed.    COMPARISON:  None.    FINDINGS:  Intracranial compartment:    Age-appropriate generalized cerebral volume loss.  Sulci and ventricles are normal in size for age.  No extra-axial blood or fluid collections.    Patchy hypoattenuation of the supratentorial white matter consistent with chronic microvascular ischemic change.  No parenchymal mass, hemorrhage, edema or major vascular distribution infarct.  No mass effect or midline shift.    Skull/extracranial contents (limited evaluation): No displaced calvarial fracture.  Moderate mucosal thickening of the right maxillary sinus.  Mild partial opacification of the right ethmoid air cells and right frontal sinus.  Mastoid air cells are clear.  Postoperative changes of the bilateral globes.                                 Medical Decision Making:   History:   Old Medical Records: I decided to obtain old medical records.  Differential Diagnosis:   My differential diagnosis includes but is not limited to:  Contusion, fracture, dislocation, concussion, SDH, muscle strain  Clinical Tests:   Radiological Study: Ordered and Reviewed       APC / Resident Notes:   64 yo F presents for evaluation after a mechanical fall on site with unclear head trauma or LOC.  Exam as noted above.     CT of the head reveals no acute intracranial injury or skull fracture.  Xrays of the LUE, LLE are pending a this time. Pt signed out to fellow PA pending results and final disposition.  If xrays of the knee are negative for acute fracture, I have low suspicion for tibial plateau fracture as patient was able to bear weight on the extremity without significant pain.      I have reviewed the patient's records and discussed this case with my supervising physician.           Attending Attestation:     Physician Attestation Statement for NP/PA:   I discussed this assessment and plan of this patient with the NP/PA, but  I did not personally examine the patient. The face to face encounter was performed by the NP/PA.                     Clinical Impression:       ICD-10-CM ICD-9-CM   1. Acute pain of left knee M25.562 719.46   2. Fall W19.XXXA E888.9                                ANA LILIA Ponce-C  04/10/19 4733       Sarita Stanley MD  04/10/19 0277

## 2019-04-10 NOTE — ED TRIAGE NOTES
Shivani Sheets, a 63 y.o. female presents to the ED w/ complaint of a fall that occurred. Pt states she tripped and landed on her left knee. Small hematoma noted to the left knee. Pt unsure if LOC or hitting head. Pt also complains of left ankle pain. Pt has limited ROM in LLE due to pain.      Triage note:  Chief Complaint   Patient presents with    Fall     tripped and fell on 5 th floor, no loc pain to L knee     Review of patient's allergies indicates:   Allergen Reactions    Penicillins Swelling    Iodine      Other reaction(s): Hives    Sulfamethoxazole-trimethoprim      Other reaction(s): Swelling  Other reaction(s): Hives     Past Medical History:   Diagnosis Date    Allergy     Anemia     Anticoagulant long-term use     4 years coumadin, asa    Arthritis     Awaiting organ transplant 2013    Cataract     Central serous chorioretinopathy of eye, right 2018    CHF (congestive heart failure)     Chronic kidney disease     Colon polyps     COPD (chronic obstructive pulmonary disease)     Coronary artery disease     Diabetes mellitus     Diabetic retinopathy     Diverticulosis     Encounter for blood transfusion     ESRD from HTN strtied RRT 1999    Failed  donor kidney transplant -     Glaucoma     History of bleeding peptic ulcer     's as stated per pt    Hyperlipidemia     Hypertension     Hypothyroidism     Morbid obesity 8/10/2012    Renal hypertension     Stroke     1987

## 2019-04-10 NOTE — DISCHARGE INSTRUCTIONS
Your x-rays were negative for acute fracture or dislocation.  Your CT scan of your head was normal.  You may use over-the-counter Tylenol for pain relief.  Applied ice compresses to the knee for 15 min every 2-4 hours for the 1st 24 hr since fall.  Elevate the leg above heart level when possible.  Use Ace wrap to relieve swelling.  Follow-up with the PCP.  Return to the ED for any concerning symptoms.    Our goal in the emergency department is to always give you outstanding care and exceptional service. You may receive a survey by mail or e-mail in the next week regarding your experience in our ED. We would greatly appreciate your completing and returning the survey. Your feedback provides us with a way to recognize our staff who give very good care and it helps us learn how to improve when your experience was below our aspiration of excellence.

## 2019-04-10 NOTE — PROVIDER PROGRESS NOTES - EMERGENCY DEPT.
ED Physician Hand-off Note:    ED Course: I assumed care of patient from off-going ED physician team. Briefly, Patient is a 63-year-old  female with multiple medical comorbidities significant for pulmonary HTN, diastolic dysfunction, pAF no longer on chronic AC except aspirin, CVA, COPD home oxygen at night, CAD, ESRD secondary to HTN, s/p failed kidney transplant, PUD, STEFFANY on CPAP, hypothyroidism, RA, HLD presents to the ED for evaluation of a fall in the parking garage at this location.  She reports pain and swelling of the left knee.  She is unsure of head trauma/LOC.    At the time of signout plan was pending CT head scan and x-ray imaging of the left shoulder, left hip, left tib/fib, and left knee.    Medications given in the ED:    Medications   acetaminophen tablet 1,000 mg (1,000 mg Oral Given 4/9/19 2008)       CT head without evidence of acute intracranial pathology.  X-ray imaging significant only for left lower leg and ankle diffuse soft tissue swelling.    Ace wrap applied to left knee.  Upon reassessment, patient reports mild symptomatic relief with ED management.  She uses a cane for assistance and was able to ambulate prior to discharge.    I feel patient is stable for discharge home with PCP follow-up.  Instructed on RICE therapy.  Advised to take over-the-counter Tylenol for pain relief and apply ice compresses frequently. I have discussed patient case with my supervisory physician, who is in agreement with my assessment and plan.    Disposition:  Discharged    Impression:  Fall, acute pain of left knee    I have reviewed and concur with the PA's history, physical, assessment, and plan.  I have not personally interviewed and examined the patient at bedside.

## 2019-04-11 ENCOUNTER — OUTPATIENT CASE MANAGEMENT (OUTPATIENT)
Dept: ADMINISTRATIVE | Facility: OTHER | Age: 64
End: 2019-04-11

## 2019-04-11 NOTE — PROGRESS NOTES
"Summary:  Spoke with pt regarding follow-up - pt states she is beginning to fall and stumble too much - pt wants to make appt with PCP about her equilibrium- ppt rather t stats she has old walker that she has to push and it doesn't help rather it is a hindrance- pt states her knee is still sore and her body is sore -stated she cancelled her PT appt - reports that the swelling has gone down a lot- pt states her BS has been between the 80's-90's.  Pt reports that she fell in house 2 weeks ago and reports sheas been soaking in epsom salts and green alcohol, plus taking Tyleno - but bones and muscles are still sore.  Pt states she feels "wobbly" Pt states that she is upset and scared -she doesn't want her brother to help her in the shower and this stumbling is causing problems.      Interventions:  Messaged PCP about ordering a rolator for pt for safety issues and recently falls  Encouraged pt to use old walker for balance  Requested contact with pt for appt. From PCP  Encouraged pt to list to her body and rest as needed    Plan:  Follow-up with pt regarding PCP appt and walker  Todays [facility] Self-Management Care Plan was developed with the patients/caregivers input and was based on identified barriers from todays assessment.  Goals were written today with the patient/caregiver and the patient has agreed to work towards these goals to improve his/her overall well-being. Patient verbalized understanding of the care plan, goals, and all of today's instructions. Encouraged patient/caregiver to communicate with his/her physician and health care team about health conditions and the treatment plan.  Provided my contact information today and encouraged patient/caregiver to call me with any questions as needed.  "

## 2019-04-15 ENCOUNTER — TELEPHONE (OUTPATIENT)
Dept: INTERNAL MEDICINE | Facility: CLINIC | Age: 64
End: 2019-04-15

## 2019-04-16 ENCOUNTER — CLINICAL SUPPORT (OUTPATIENT)
Dept: REHABILITATION | Facility: HOSPITAL | Age: 64
End: 2019-04-16
Payer: MEDICARE

## 2019-04-16 ENCOUNTER — TELEPHONE (OUTPATIENT)
Dept: INTERNAL MEDICINE | Facility: CLINIC | Age: 64
End: 2019-04-16

## 2019-04-16 DIAGNOSIS — M25.60 JOINT STIFFNESS: ICD-10-CM

## 2019-04-16 DIAGNOSIS — R26.2 DIFFICULTY WALKING: ICD-10-CM

## 2019-04-16 PROCEDURE — 97110 THERAPEUTIC EXERCISES: CPT | Mod: PN

## 2019-04-16 NOTE — TELEPHONE ENCOUNTER
----- Message from Yomi Mares sent at 4/16/2019 10:37 AM CDT -----  Contact: Patient [phone]  Patient is returning a phone call.  Who left a message for the patient: Dr chaney   Does patient know what this is regarding:  Scheduling an appt.  Comments:     Please call an advise  Thank you

## 2019-04-16 NOTE — TELEPHONE ENCOUNTER
Spoke with pt and she can do Tuesday and Thursday before 1 pm because she has therapy.    Please advise!

## 2019-04-16 NOTE — PROGRESS NOTES
Physical Therapy Daily Treatment Note     Name: Shivani Sheets  Clinic Number: [id number]    Therapy Diagnosis:   Encounter Diagnoses   Name Primary?    Joint stiffness     Difficulty walking      Physician: Rito Morrow, *    Visit Date: 4/16/2019    Physician Orders: PT Eval and Treat   Medical Diagnosis from Referral: left hip trochanteric bursitis  Evaluation Date: 3/21/2019  Plan of Care Expiration: 6/21/19  Authorization Period Expiration: 12/31/2019  Visit #/Visits authorized: 2/ 20 + eval      Time In: 1300  Time Out: 1355  Total Billable Time: 30 minutes    Precautions: severe HTN, kidney dialysis M, W, F , Falls       Subjective     Pt reports:   She was semi compliant with home exercise program.  Response to previous treatment: ok   Functional change: none to report     Pain: 9/10  Location: bilateral hips     Objective       ZAVALA Assessment    1. Sitting to Standing    3 - able to stand independely using hands  2. Standing Unsupported   2 - able to stand 30 seconds unsupported  3. Sitting Unsupported   4 - able to sit safely and securely 2 minutes  4. Standing to Sitting   3 - controls descent by using hands  5. Pivot Transfer   3 - able to transfer safely with definite use of hands  6. Standing with Eyes Closed   3 - able to stand 10 seconds with supervision  7. Standing with Feet Together   3 - able to place feet together independently and stand for 1 minute with supervision  8. Reaching Forward with Outstretched Arm   3 - can reach forward 12 cm/5 inches safely  9. Retrieving Object from Floor   0 - loses balance while trying, requires external support  10. Turning to Look Behind   1 - needs supervision when turning  11. Turning 360 Degrees   2 - able to turn 360 safely but slowly  12. Placing Alternate Foot on Step   2 - able to complete 4 steps without aid with supervision  13. Standing with One Foot in Front   1 - need help to step but can hold 15 seconds  14. Standing on One  Foot   3- able to lift leg independently and hold 5-10 seconds    Total 33/ 56   Medium fall risk     Shivani received therapeutic exercises to develop strength, endurance, ROM, flexibility, posture and core stabilization for 45 minutes including:    Nustep x 8'   + Standing heel raises x 20   + Fwd step over small purple foam roll x 10 ea LE     hip fall out x 20  quad set 2 x 10  piriformis stretch 20 sec x 4  bal squeeze 2' x 3''   HL hip abduction Yellow TB 2'       Shivani received the following manual therapy techniques:  were applied to the: left IT Band for 0 minutes, including:  STM with roller stick      Shivani received cold pack for 10 minutes to left hip.      Home Exercises Provided and Patient Education Provided     Education provided:   - increasing movement around the house.  -compliance with HEP    Written Home Exercises Provided: Patient instructed to cont prior HEP.  Exercises were reviewed and Shivani was able to demonstrate them prior to the end of the session.  Shivani demonstrated good  understanding of the education provided.     See EMR under Patient Instructions for exercises provided prior visit.    Assessment     Shivani with increased tolerance to PT this session.  Patient displayed a medium fall risk with the performance of ZAVALA balance test.  Patient will also benefit from dynamic gait training as patient has fallen 2x in the last 2 weeks.  Pt with decreased step length and height with gait.  Pt responded fairly well to cues to correct deficits.     Shivani is progressing well towards her goals.     Pt prognosis is Good.     Pt will continue to benefit from skilled outpatient physical therapy to address the deficits listed in the problem list box on initial evaluation, provide pt/family education and to maximize pt's level of independence in the home and community environment.     Pt's spiritual, cultural and educational needs considered and pt agreeable to plan of care and  goals.     Anticipated barriers to physical therapy: none    Short Term Goals (4 Weeks):      1.Pt to increase strength by a 1/2 grade of muscles test to allow for improvement in functional activities such as performing chores.  2.Pt to improve range of motion by 25% to allow for improved functional mobility to allow for improvement in IADLs.   3.Pt to report compliance with HEP and demonstrate proper exercise technique to PT to show competence with self management of condition.  4.Decrease pain by 25% during functional activities.     Long Term Goals (12 Weeks):      1. Increase ROM to allow improved joint biomechanics during functional activities.   2.Increase trunk and lower extremity strength to within normal limits during functional activities.   3. Independent with home exercise program.   4. Full return to functional activities with manageable complaints.  5. Patient to demonstrate improved posture and body mechanics.  6. Decrease pain by 75% during functional activities.      Plan     Plan to progress with dynamic balance and gait activities.     Yolanda Zapata, PTA

## 2019-04-16 NOTE — TELEPHONE ENCOUNTER
----- Message from Juana Martinez sent at 4/16/2019  9:19 AM CDT -----  Contact: [phone]  Patient is returning a phone call.  Who left a message for the patient: Ester   Does patient know what this is regarding:    Comments: please advise, thanks

## 2019-04-18 ENCOUNTER — OUTPATIENT CASE MANAGEMENT (OUTPATIENT)
Dept: ADMINISTRATIVE | Facility: OTHER | Age: 64
End: 2019-04-18

## 2019-04-18 DIAGNOSIS — Z79.01 ANTICOAGULATION MONITORING BY PHARMACIST: ICD-10-CM

## 2019-04-18 RX ORDER — WARFARIN SODIUM 5 MG/1
TABLET ORAL
Qty: 120 TABLET | Refills: 0 | Status: ON HOLD | OUTPATIENT
Start: 2019-04-18 | End: 2019-06-06 | Stop reason: HOSPADM

## 2019-04-23 ENCOUNTER — CLINICAL SUPPORT (OUTPATIENT)
Dept: REHABILITATION | Facility: HOSPITAL | Age: 64
End: 2019-04-23
Payer: MEDICARE

## 2019-04-23 DIAGNOSIS — M25.60 JOINT STIFFNESS: ICD-10-CM

## 2019-04-23 DIAGNOSIS — R26.2 DIFFICULTY WALKING: ICD-10-CM

## 2019-04-23 PROCEDURE — 97110 THERAPEUTIC EXERCISES: CPT | Mod: PN

## 2019-04-23 NOTE — PROGRESS NOTES
Summary:  Pt called back returning OPCM RN's call-Pt stated that she was in the bathroom taking a shower when OPCM called.  Pt stated that everything is ok - her bleeding has stopped and that she has not had any more falls - pt stated though she has a follow-up appt post her fall in the parking garage on 4-25 with her PCP.  Pt stated that she is feeling good - call was shortened as pt was planning to go out.    Interventions:  Encouraged pt to use her walker at all times  Reviewed upcoming appts with pt    Plan:  Follow-up next week after appt with PCP  Assess for further educational opportunities  Todays OPCM Self-Management Care Plan was developed with the patients/caregivers input and was based on identified barriers from todays assessment.  Goals were written today with the patient/caregiver and the patient has agreed to work towards these goals to improve his/her overall well-being. Patient verbalized understanding of the care plan, goals, and all of today's instructions. Encouraged patient/caregiver to communicate with his/her physician and health care team about health conditions and the treatment plan.  Provided my contact information today and encouraged patient/caregiver to call me with any questions as needed.

## 2019-04-23 NOTE — PROGRESS NOTES
Physical Therapy Daily Treatment Note     Name: Shivani Guillermoman  Clinic Number: [id number]    Therapy Diagnosis:   Encounter Diagnoses   Name Primary?    Joint stiffness     Difficulty walking      Physician: Rito Morrow, *    Visit Date: 4/23/2019    Physician Orders: PT Eval and Treat   Medical Diagnosis from Referral: left hip trochanteric bursitis  Evaluation Date: 3/21/2019  Plan of Care Expiration: 6/21/19  Authorization Period Expiration: 12/31/2019  Visit #/Visits authorized: 2/ 20 + eval      Time In: 1320  ( 20' late to appt)   Time Out: 1405  Total Billable Time: 30 minutes    Precautions: severe HTN, kidney dialysis M, W, F , Falls       Subjective     Pt reports: that she is fair today.  Pt states that she is still having a lot of pain in her back and both hips.  Pt states that she is having a heart procedure on Friday and she wants to wait to schedule more appts after she sees how it goes.   She was semi compliant with home exercise program.  Response to previous treatment: ok   Functional change: none to report     Pain: 8/10  Location: bilateral hips     Objective       Shivani received therapeutic exercises to develop strength, endurance, ROM, flexibility, posture and core stabilization for 45 minutes including:    Nustep x 8'   Standing heel raises x 20   + Standing hip abduction x 10 ea LE   + mini squats x 10   Fwd step over small purple foam roll x 15 ea LE   + LAQ x 10 ea LE      hip fall out x 20  quad set 2 x 10  piriformis stretch 20 sec x 4  ball squeeze 2' x 3''   HL hip abduction Yellow TB 2'       Shivani received the following manual therapy techniques:  were applied to the: left IT Band for 0 minutes, including:  STM with roller stick      Shivani received moist hot pack for 10 minutes to left hip.      Home Exercises Provided and Patient Education Provided     Education provided:   - increasing movement around the house.  -compliance with HEP    Written Home  Exercises Provided: Patient instructed to cont prior HEP.  Exercises were reviewed and Shivani was able to demonstrate them prior to the end of the session.  Shivani demonstrated good  understanding of the education provided.     See EMR under Patient Instructions for exercises provided prior visit.    Assessment     Shivani tolerated treatment session fairly today.  Patient ambulated into clinic with slow and guarded motions.  Despite complaints of pain patient was able to progress with standing exercises with appropriate training effect achieved.  Patient is very slowly progressing towards discharge goals.    Pt prognosis is guarded.     Pt may continue to benefit from skilled outpatient physical therapy to address the deficits listed in the problem list box on initial evaluation, provide pt/family education and to maximize pt's level of independence in the home and community environment.     Pt's spiritual, cultural and educational needs considered and pt agreeable to plan of care and goals.     Anticipated barriers to physical therapy: none    Short Term Goals (4 Weeks):      1.Pt to increase strength by a 1/2 grade of muscles test to allow for improvement in functional activities such as performing chores.  2.Pt to improve range of motion by 25% to allow for improved functional mobility to allow for improvement in IADLs.   3.Pt to report compliance with HEP and demonstrate proper exercise technique to PT to show competence with self management of condition.  4.Decrease pain by 25% during functional activities.     Long Term Goals (12 Weeks):      1. Increase ROM to allow improved joint biomechanics during functional activities.   2.Increase trunk and lower extremity strength to within normal limits during functional activities.   3. Independent with home exercise program.   4. Full return to functional activities with manageable complaints.  5. Patient to demonstrate improved posture and body mechanics.  6.  Decrease pain by 75% during functional activities.      Plan     Plan to progress with dynamic balance and gait activities.     Yolanda Zapata, PTA

## 2019-04-24 ENCOUNTER — TELEPHONE (OUTPATIENT)
Dept: ELECTROPHYSIOLOGY | Facility: CLINIC | Age: 64
End: 2019-04-24

## 2019-04-24 NOTE — TELEPHONE ENCOUNTER
Left message for patient to return call to review pre-op instructions for Watchman Implant on 4/26/19. Need to find out if she is back on coumadin. Also need to find out why she didn't do her pre-op labs on 4/22/19. Call back # left.

## 2019-04-25 ENCOUNTER — ANESTHESIA EVENT (OUTPATIENT)
Dept: MEDSURG UNIT | Facility: HOSPITAL | Age: 64
DRG: 273 | End: 2019-04-25
Payer: MEDICARE

## 2019-04-25 ENCOUNTER — OUTPATIENT CASE MANAGEMENT (OUTPATIENT)
Dept: ADMINISTRATIVE | Facility: OTHER | Age: 64
End: 2019-04-25

## 2019-04-25 ENCOUNTER — TELEPHONE (OUTPATIENT)
Dept: ELECTROPHYSIOLOGY | Facility: CLINIC | Age: 64
End: 2019-04-25

## 2019-04-25 ENCOUNTER — OFFICE VISIT (OUTPATIENT)
Dept: INTERNAL MEDICINE | Facility: CLINIC | Age: 64
DRG: 273 | End: 2019-04-25
Payer: MEDICARE

## 2019-04-25 DIAGNOSIS — I10 HYPERTENSION, UNSPECIFIED TYPE: ICD-10-CM

## 2019-04-25 DIAGNOSIS — R26.89 BALANCE DISORDER: Primary | ICD-10-CM

## 2019-04-25 PROCEDURE — 99214 PR OFFICE/OUTPT VISIT, EST, LEVL IV, 30-39 MIN: ICD-10-PCS | Mod: S$PBB,,, | Performed by: INTERNAL MEDICINE

## 2019-04-25 PROCEDURE — 99215 OFFICE O/P EST HI 40 MIN: CPT | Mod: PBBFAC | Performed by: INTERNAL MEDICINE

## 2019-04-25 PROCEDURE — 99999 PR PBB SHADOW E&M-EST. PATIENT-LVL V: CPT | Mod: PBBFAC,,, | Performed by: INTERNAL MEDICINE

## 2019-04-25 PROCEDURE — 99999 PR PBB SHADOW E&M-EST. PATIENT-LVL V: ICD-10-PCS | Mod: PBBFAC,,, | Performed by: INTERNAL MEDICINE

## 2019-04-25 PROCEDURE — 99214 OFFICE O/P EST MOD 30 MIN: CPT | Mod: S$PBB,,, | Performed by: INTERNAL MEDICINE

## 2019-04-25 NOTE — TELEPHONE ENCOUNTER
Spoke to patient.    CONFIRMED procedure arrival time of 5:30am, 3rd floor SSCU for Watchman Implant  Reiterated instructions including:  -Directions to check in desk  -Contrast Prep as follows  · Prednisone 50mg  · Cimetidine 300mg  · Benadryl 50mg  · Take one of each at 6pm and 11pm this evening and one of each at 5am tomorrow morning  · She verbalizes understanding of instructions  · Rx called in to [phone]  -NPO after midnight night prior to procedure  -Pre-procedure LABS done today, results pending  -Do not wear mascara day of procedure  -Bathe night prior and morning prior to procedure with Hibiclens solution or an antibacterial soap  -Patient is NOT on Coumadin, she will be started for 6 weeks of coumadin after the Watchman  -She is aware that she will be dialyzed after the procedure     Patient verbalizes understanding of above and appreciates call.

## 2019-04-26 ENCOUNTER — TELEPHONE (OUTPATIENT)
Dept: INTERNAL MEDICINE | Facility: CLINIC | Age: 64
End: 2019-04-26

## 2019-04-26 ENCOUNTER — HOSPITAL ENCOUNTER (INPATIENT)
Facility: HOSPITAL | Age: 64
LOS: 1 days | Discharge: HOME | DRG: 273 | End: 2019-04-27
Attending: INTERNAL MEDICINE | Admitting: INTERNAL MEDICINE
Payer: MEDICARE

## 2019-04-26 ENCOUNTER — ANTI-COAG VISIT (OUTPATIENT)
Dept: CARDIOLOGY | Facility: CLINIC | Age: 64
End: 2019-04-26

## 2019-04-26 ENCOUNTER — ANESTHESIA (OUTPATIENT)
Dept: MEDSURG UNIT | Facility: HOSPITAL | Age: 64
DRG: 273 | End: 2019-04-26
Payer: MEDICARE

## 2019-04-26 DIAGNOSIS — I49.9 CARDIAC ARRHYTHMIA: ICD-10-CM

## 2019-04-26 DIAGNOSIS — N18.6 END STAGE RENAL DISEASE ON DIALYSIS: ICD-10-CM

## 2019-04-26 DIAGNOSIS — Z99.2 END STAGE RENAL DISEASE ON DIALYSIS: ICD-10-CM

## 2019-04-26 DIAGNOSIS — I48.19 PERSISTENT ATRIAL FIBRILLATION: ICD-10-CM

## 2019-04-26 DIAGNOSIS — Z79.01 LONG TERM (CURRENT) USE OF ANTICOAGULANTS: Primary | ICD-10-CM

## 2019-04-26 DIAGNOSIS — I48.91 ATRIAL FIBRILLATION: Primary | ICD-10-CM

## 2019-04-26 LAB
ABO + RH BLD: NORMAL
ALBUMIN SERPL BCP-MCNC: 3 G/DL (ref 3.5–5.2)
ANION GAP SERPL CALC-SCNC: 11 MMOL/L (ref 8–16)
ANISOCYTOSIS BLD QL SMEAR: SLIGHT
BASOPHILS # BLD AUTO: 0 K/UL (ref 0–0.2)
BASOPHILS NFR BLD: 0 % (ref 0–1.9)
BLD GP AB SCN CELLS X3 SERPL QL: NORMAL
BLOOD GROUP ANTIBODIES SERPL: NORMAL
BSA FOR ECHO PROCEDURE: 1.73 M2
BUN SERPL-MCNC: 21 MG/DL (ref 8–23)
CALCIUM SERPL-MCNC: 7.7 MG/DL (ref 8.7–10.5)
CHLORIDE SERPL-SCNC: 101 MMOL/L (ref 95–110)
CO2 SERPL-SCNC: 27 MMOL/L (ref 23–29)
CREAT SERPL-MCNC: 5.4 MG/DL (ref 0.5–1.4)
DIFFERENTIAL METHOD: ABNORMAL
EOSINOPHIL # BLD AUTO: 0 K/UL (ref 0–0.5)
EOSINOPHIL NFR BLD: 0 % (ref 0–8)
ERYTHROCYTE [DISTWIDTH] IN BLOOD BY AUTOMATED COUNT: 18.2 % (ref 11.5–14.5)
EST. GFR  (AFRICAN AMERICAN): 8.9 ML/MIN/1.73 M^2
EST. GFR  (NON AFRICAN AMERICAN): 7.7 ML/MIN/1.73 M^2
GLUCOSE SERPL-MCNC: 163 MG/DL (ref 70–110)
HCT VFR BLD AUTO: 30.9 % (ref 37–48.5)
HGB BLD-MCNC: 9.3 G/DL (ref 12–16)
HYPOCHROMIA BLD QL SMEAR: ABNORMAL
IMM GRANULOCYTES # BLD AUTO: 0.03 K/UL (ref 0–0.04)
IMM GRANULOCYTES NFR BLD AUTO: 0.7 % (ref 0–0.5)
LYMPHOCYTES # BLD AUTO: 0.5 K/UL (ref 1–4.8)
LYMPHOCYTES NFR BLD: 11.9 % (ref 18–48)
MCH RBC QN AUTO: 26.3 PG (ref 27–31)
MCHC RBC AUTO-ENTMCNC: 30.1 G/DL (ref 32–36)
MCV RBC AUTO: 87 FL (ref 82–98)
MONOCYTES # BLD AUTO: 0.1 K/UL (ref 0.3–1)
MONOCYTES NFR BLD: 2.7 % (ref 4–15)
NEUTROPHILS # BLD AUTO: 3.5 K/UL (ref 1.8–7.7)
NEUTROPHILS NFR BLD: 84.7 % (ref 38–73)
NRBC BLD-RTO: 0 /100 WBC
PHOSPHATE SERPL-MCNC: 2.3 MG/DL (ref 2.7–4.5)
PLATELET # BLD AUTO: 165 K/UL (ref 150–350)
PLATELET BLD QL SMEAR: ABNORMAL
PMV BLD AUTO: 12.2 FL (ref 9.2–12.9)
POCT GLUCOSE: 123 MG/DL (ref 70–110)
POCT GLUCOSE: 172 MG/DL (ref 70–110)
POLYCHROMASIA BLD QL SMEAR: ABNORMAL
POTASSIUM SERPL-SCNC: 3.5 MMOL/L (ref 3.5–5.1)
RBC # BLD AUTO: 3.54 M/UL (ref 4–5.4)
SODIUM SERPL-SCNC: 139 MMOL/L (ref 136–145)
WBC # BLD AUTO: 4.12 K/UL (ref 3.9–12.7)

## 2019-04-26 PROCEDURE — 33340 PR TRANSCATH CLOSURE, PERC, LEFT ATRIAL APPENDAGE, W/IMPLANT: ICD-10-PCS | Mod: Q0,,, | Performed by: INTERNAL MEDICINE

## 2019-04-26 PROCEDURE — 25000003 PHARM REV CODE 250: Performed by: INTERNAL MEDICINE

## 2019-04-26 PROCEDURE — D9220A PRA ANESTHESIA: Mod: CRNA,,, | Performed by: NURSE ANESTHETIST, CERTIFIED REGISTERED

## 2019-04-26 PROCEDURE — 25500020 PHARM REV CODE 255: Performed by: INTERNAL MEDICINE

## 2019-04-26 PROCEDURE — 63600175 PHARM REV CODE 636 W HCPCS: Performed by: NURSE PRACTITIONER

## 2019-04-26 PROCEDURE — 93005 ELECTROCARDIOGRAM TRACING: CPT

## 2019-04-26 PROCEDURE — 80100016 HC MAINTENANCE HEMODIALYSIS

## 2019-04-26 PROCEDURE — C1769 GUIDE WIRE: HCPCS | Performed by: INTERNAL MEDICINE

## 2019-04-26 PROCEDURE — 27800903 OPTIME MED/SURG SUP & DEVICES OTHER IMPLANTS: Performed by: INTERNAL MEDICINE

## 2019-04-26 PROCEDURE — D9220A PRA ANESTHESIA: ICD-10-PCS | Mod: ANES,,, | Performed by: ANESTHESIOLOGY

## 2019-04-26 PROCEDURE — 36620 INSERTION CATHETER ARTERY: CPT | Mod: 59,,, | Performed by: ANESTHESIOLOGY

## 2019-04-26 PROCEDURE — 99223 1ST HOSP IP/OBS HIGH 75: CPT | Mod: 25,,, | Performed by: NURSE PRACTITIONER

## 2019-04-26 PROCEDURE — 93010 ELECTROCARDIOGRAM REPORT: CPT | Mod: ,,, | Performed by: INTERNAL MEDICINE

## 2019-04-26 PROCEDURE — D9220A PRA ANESTHESIA: ICD-10-PCS | Mod: CRNA,,, | Performed by: NURSE ANESTHETIST, CERTIFIED REGISTERED

## 2019-04-26 PROCEDURE — C1894 INTRO/SHEATH, NON-LASER: HCPCS | Performed by: INTERNAL MEDICINE

## 2019-04-26 PROCEDURE — 85025 COMPLETE CBC W/AUTO DIFF WBC: CPT

## 2019-04-26 PROCEDURE — 27201037 HC PRESSURE MONITORING SET UP

## 2019-04-26 PROCEDURE — 63600175 PHARM REV CODE 636 W HCPCS: Performed by: INTERNAL MEDICINE

## 2019-04-26 PROCEDURE — 33340 PERQ CLSR TCAT L ATR APNDGE: CPT | Performed by: INTERNAL MEDICINE

## 2019-04-26 PROCEDURE — 80069 RENAL FUNCTION PANEL: CPT

## 2019-04-26 PROCEDURE — 27201423 OPTIME MED/SURG SUP & DEVICES STERILE SUPPLY: Performed by: INTERNAL MEDICINE

## 2019-04-26 PROCEDURE — 63600175 PHARM REV CODE 636 W HCPCS: Performed by: NURSE ANESTHETIST, CERTIFIED REGISTERED

## 2019-04-26 PROCEDURE — 86901 BLOOD TYPING SEROLOGIC RH(D): CPT

## 2019-04-26 PROCEDURE — 90935 HEMODIALYSIS ONE EVALUATION: CPT | Mod: ,,, | Performed by: NURSE PRACTITIONER

## 2019-04-26 PROCEDURE — 33340 PERQ CLSR TCAT L ATR APNDGE: CPT | Mod: Q0,,, | Performed by: INTERNAL MEDICINE

## 2019-04-26 PROCEDURE — 25000003 PHARM REV CODE 250: Performed by: NURSE PRACTITIONER

## 2019-04-26 PROCEDURE — 11000001 HC ACUTE MED/SURG PRIVATE ROOM

## 2019-04-26 PROCEDURE — 36620 ARTERIAL: ICD-10-PCS | Mod: 59,,, | Performed by: ANESTHESIOLOGY

## 2019-04-26 PROCEDURE — 90935 PR HEMODIALYSIS, ONE EVALUATION: ICD-10-PCS | Mod: ,,, | Performed by: NURSE PRACTITIONER

## 2019-04-26 PROCEDURE — 25000003 PHARM REV CODE 250: Performed by: NURSE ANESTHETIST, CERTIFIED REGISTERED

## 2019-04-26 PROCEDURE — 93010 ELECTROCARDIOGRAM REPORT: CPT | Mod: 76,,, | Performed by: INTERNAL MEDICINE

## 2019-04-26 PROCEDURE — 27800505 HC CATH, RADIAL ARTERY KIT: Performed by: NURSE ANESTHETIST, CERTIFIED REGISTERED

## 2019-04-26 PROCEDURE — 37000009 HC ANESTHESIA EA ADD 15 MINS: Performed by: INTERNAL MEDICINE

## 2019-04-26 PROCEDURE — 93010 EKG 12-LEAD: ICD-10-PCS | Mod: ,,, | Performed by: INTERNAL MEDICINE

## 2019-04-26 PROCEDURE — 86870 RBC ANTIBODY IDENTIFICATION: CPT

## 2019-04-26 PROCEDURE — 37000008 HC ANESTHESIA 1ST 15 MINUTES: Performed by: INTERNAL MEDICINE

## 2019-04-26 PROCEDURE — 99223 PR INITIAL HOSPITAL CARE,LEVL III: ICD-10-PCS | Mod: 25,,, | Performed by: NURSE PRACTITIONER

## 2019-04-26 PROCEDURE — D9220A PRA ANESTHESIA: Mod: ANES,,, | Performed by: ANESTHESIOLOGY

## 2019-04-26 PROCEDURE — 82962 GLUCOSE BLOOD TEST: CPT | Performed by: INTERNAL MEDICINE

## 2019-04-26 DEVICE — LEFT ATRIAL APPENDAGE CLOSURE DEVICE WITH DELIVERY SYSTEM
Type: IMPLANTABLE DEVICE | Site: HEART | Status: FUNCTIONAL
Brand: WATCHMAN®

## 2019-04-26 RX ORDER — FENTANYL CITRATE 50 UG/ML
INJECTION, SOLUTION INTRAMUSCULAR; INTRAVENOUS
Status: DISCONTINUED | OUTPATIENT
Start: 2019-04-26 | End: 2019-04-26

## 2019-04-26 RX ORDER — LIDOCAINE HCL/PF 100 MG/5ML
SYRINGE (ML) INTRAVENOUS
Status: DISCONTINUED | OUTPATIENT
Start: 2019-04-26 | End: 2019-04-26

## 2019-04-26 RX ORDER — VANCOMYCIN HCL IN 5 % DEXTROSE 1G/250ML
1000 PLASTIC BAG, INJECTION (ML) INTRAVENOUS
Status: DISCONTINUED | OUTPATIENT
Start: 2019-04-26 | End: 2019-04-26

## 2019-04-26 RX ORDER — HEPARIN SODIUM 10000 [USP'U]/100ML
INJECTION, SOLUTION INTRAVENOUS
Status: DISCONTINUED | OUTPATIENT
Start: 2019-04-26 | End: 2019-04-26

## 2019-04-26 RX ORDER — METOPROLOL TARTRATE 25 MG/1
25 TABLET, FILM COATED ORAL ONCE
Status: COMPLETED | OUTPATIENT
Start: 2019-04-26 | End: 2019-04-26

## 2019-04-26 RX ORDER — ONDANSETRON 2 MG/ML
4 INJECTION INTRAMUSCULAR; INTRAVENOUS DAILY PRN
Status: CANCELLED | OUTPATIENT
Start: 2019-04-26

## 2019-04-26 RX ORDER — SODIUM CHLORIDE 0.9 % (FLUSH) 0.9 %
5 SYRINGE (ML) INJECTION
Status: DISCONTINUED | OUTPATIENT
Start: 2019-04-26 | End: 2019-04-26

## 2019-04-26 RX ORDER — LEVOTHYROXINE SODIUM 100 UG/1
100 TABLET ORAL
Status: DISCONTINUED | OUTPATIENT
Start: 2019-04-27 | End: 2019-04-27 | Stop reason: HOSPADM

## 2019-04-26 RX ORDER — GLYCOPYRROLATE 0.2 MG/ML
INJECTION INTRAMUSCULAR; INTRAVENOUS
Status: DISCONTINUED | OUTPATIENT
Start: 2019-04-26 | End: 2019-04-26

## 2019-04-26 RX ORDER — ROCURONIUM BROMIDE 10 MG/ML
INJECTION, SOLUTION INTRAVENOUS
Status: DISCONTINUED | OUTPATIENT
Start: 2019-04-26 | End: 2019-04-26

## 2019-04-26 RX ORDER — WARFARIN SODIUM 5 MG/1
5 TABLET ORAL DAILY
Status: DISCONTINUED | OUTPATIENT
Start: 2019-04-26 | End: 2019-04-27 | Stop reason: HOSPADM

## 2019-04-26 RX ORDER — MIDAZOLAM HYDROCHLORIDE 1 MG/ML
INJECTION, SOLUTION INTRAMUSCULAR; INTRAVENOUS
Status: DISCONTINUED | OUTPATIENT
Start: 2019-04-26 | End: 2019-04-26

## 2019-04-26 RX ORDER — ESMOLOL HYDROCHLORIDE 10 MG/ML
INJECTION INTRAVENOUS
Status: DISCONTINUED | OUTPATIENT
Start: 2019-04-26 | End: 2019-04-26

## 2019-04-26 RX ORDER — ONDANSETRON 2 MG/ML
INJECTION INTRAMUSCULAR; INTRAVENOUS
Status: DISCONTINUED | OUTPATIENT
Start: 2019-04-26 | End: 2019-04-26

## 2019-04-26 RX ORDER — SODIUM CHLORIDE 9 MG/ML
INJECTION, SOLUTION INTRAVENOUS ONCE
Status: COMPLETED | OUTPATIENT
Start: 2019-04-26 | End: 2019-04-26

## 2019-04-26 RX ORDER — ASPIRIN 81 MG/1
81 TABLET ORAL DAILY
Status: DISCONTINUED | OUTPATIENT
Start: 2019-04-27 | End: 2019-04-27 | Stop reason: HOSPADM

## 2019-04-26 RX ORDER — FENTANYL CITRATE 50 UG/ML
25 INJECTION, SOLUTION INTRAMUSCULAR; INTRAVENOUS EVERY 5 MIN PRN
Status: CANCELLED | OUTPATIENT
Start: 2019-04-26

## 2019-04-26 RX ORDER — SODIUM CHLORIDE 0.9 % (FLUSH) 0.9 %
10 SYRINGE (ML) INJECTION
Status: CANCELLED | OUTPATIENT
Start: 2019-04-26

## 2019-04-26 RX ORDER — SODIUM CHLORIDE 9 MG/ML
INJECTION, SOLUTION INTRAVENOUS CONTINUOUS
Status: DISCONTINUED | OUTPATIENT
Start: 2019-04-26 | End: 2019-04-26

## 2019-04-26 RX ORDER — PROPOFOL 10 MG/ML
VIAL (ML) INTRAVENOUS
Status: DISCONTINUED | OUTPATIENT
Start: 2019-04-26 | End: 2019-04-26

## 2019-04-26 RX ORDER — IODIXANOL 320 MG/ML
INJECTION, SOLUTION INTRAVASCULAR
Status: DISCONTINUED | OUTPATIENT
Start: 2019-04-26 | End: 2019-04-26

## 2019-04-26 RX ORDER — ATORVASTATIN CALCIUM 10 MG/1
10 TABLET, FILM COATED ORAL NIGHTLY
Status: DISCONTINUED | OUTPATIENT
Start: 2019-04-26 | End: 2019-04-27 | Stop reason: HOSPADM

## 2019-04-26 RX ORDER — PROTAMINE SULFATE 10 MG/ML
INJECTION, SOLUTION INTRAVENOUS
Status: DISCONTINUED | OUTPATIENT
Start: 2019-04-26 | End: 2019-04-26

## 2019-04-26 RX ORDER — METOPROLOL TARTRATE 25 MG/1
25 TABLET, FILM COATED ORAL 2 TIMES DAILY
Status: DISCONTINUED | OUTPATIENT
Start: 2019-04-26 | End: 2019-04-27 | Stop reason: HOSPADM

## 2019-04-26 RX ORDER — LIDOCAINE HYDROCHLORIDE 20 MG/ML
INJECTION, SOLUTION EPIDURAL; INFILTRATION; INTRACAUDAL; PERINEURAL
Status: DISCONTINUED | OUTPATIENT
Start: 2019-04-26 | End: 2019-04-26

## 2019-04-26 RX ORDER — PANTOPRAZOLE SODIUM 40 MG/1
40 TABLET, DELAYED RELEASE ORAL DAILY
Status: DISCONTINUED | OUTPATIENT
Start: 2019-04-27 | End: 2019-04-27 | Stop reason: HOSPADM

## 2019-04-26 RX ADMIN — SODIUM CHLORIDE: 0.9 INJECTION, SOLUTION INTRAVENOUS at 07:04

## 2019-04-26 RX ADMIN — FENTANYL CITRATE 100 MCG: 50 INJECTION, SOLUTION INTRAMUSCULAR; INTRAVENOUS at 07:04

## 2019-04-26 RX ADMIN — VASOPRESSIN 1 UNITS: 20 INJECTION INTRAVENOUS at 08:04

## 2019-04-26 RX ADMIN — MIDAZOLAM 1 MG: 1 INJECTION INTRAMUSCULAR; INTRAVENOUS at 07:04

## 2019-04-26 RX ADMIN — ROCURONIUM BROMIDE 10 MG: 10 INJECTION, SOLUTION INTRAVENOUS at 09:04

## 2019-04-26 RX ADMIN — ESMOLOL HYDROCHLORIDE 30 MG: 10 INJECTION INTRAVENOUS at 09:04

## 2019-04-26 RX ADMIN — SODIUM CHLORIDE: 0.9 INJECTION, SOLUTION INTRAVENOUS at 05:04

## 2019-04-26 RX ADMIN — PROPOFOL 50 MG: 10 INJECTION, EMULSION INTRAVENOUS at 07:04

## 2019-04-26 RX ADMIN — Medication 1000 MG: at 08:04

## 2019-04-26 RX ADMIN — ONDANSETRON 4 MG: 2 INJECTION INTRAMUSCULAR; INTRAVENOUS at 09:04

## 2019-04-26 RX ADMIN — LIDOCAINE HYDROCHLORIDE 50 MG: 20 INJECTION, SOLUTION INTRAVENOUS at 07:04

## 2019-04-26 RX ADMIN — WARFARIN SODIUM 5 MG: 5 TABLET ORAL at 07:04

## 2019-04-26 RX ADMIN — GLYCOPYRROLATE 0.2 MG: 0.2 INJECTION, SOLUTION INTRAMUSCULAR; INTRAVENOUS at 08:04

## 2019-04-26 RX ADMIN — RIOCIGUAT 2.5 MG: 2.5 TABLET, FILM COATED ORAL at 08:04

## 2019-04-26 RX ADMIN — SUGAMMADEX 200 MG: 100 INJECTION, SOLUTION INTRAVENOUS at 09:04

## 2019-04-26 RX ADMIN — ATORVASTATIN CALCIUM 10 MG: 10 TABLET, FILM COATED ORAL at 08:04

## 2019-04-26 RX ADMIN — VASOPRESSIN 2 UNITS: 20 INJECTION INTRAVENOUS at 09:04

## 2019-04-26 RX ADMIN — METOPROLOL TARTRATE 25 MG: 25 TABLET ORAL at 01:04

## 2019-04-26 RX ADMIN — PROTAMINE SULFATE 50 MG: 10 INJECTION, SOLUTION INTRAVENOUS at 09:04

## 2019-04-26 RX ADMIN — AMIODARONE HYDROCHLORIDE 150 MG: 1.5 INJECTION, SOLUTION INTRAVENOUS at 12:04

## 2019-04-26 RX ADMIN — ROCURONIUM BROMIDE 30 MG: 10 INJECTION, SOLUTION INTRAVENOUS at 07:04

## 2019-04-26 RX ADMIN — HEPARIN SODIUM AND DEXTROSE 10000 UNITS: 10000; 5 INJECTION INTRAVENOUS at 08:04

## 2019-04-26 RX ADMIN — METOPROLOL TARTRATE 25 MG: 25 TABLET ORAL at 08:04

## 2019-04-26 RX ADMIN — ROCURONIUM BROMIDE 10 MG: 10 INJECTION, SOLUTION INTRAVENOUS at 08:04

## 2019-04-26 NOTE — PROGRESS NOTES
Called dr. reynolds  And let him know paitent hr and status he spoke with dr. Thomas and give lopressor 25mg po and ok for patient to go to her room 314.

## 2019-04-26 NOTE — HPI
63 yoF HTN, ESRD on HD, DM, pHTN here for LAAO consideration. She has chronic anemia with some more recent lower GI bleeding. She has been on warfarin for 4 years for CVA prophylaxis due to AF. This was stopped due her recent lower GI bleed 2/19. She has been placed back on warfarin. GI work up has demonstrated no obvious source. She presents today for Watchman device placement.    Currently just on ASA.Took two of the three doses of contrast prep.

## 2019-04-26 NOTE — HPI
Patient is a 62 yo female with a past medical hx of ESRD on HD, DM type 2 and pHTN here for LAAO implantation (Watchman) with Dr. Thomas. She has a hx of chronic anemia and hx of GI bleed. Denies any currently GI bleed now or hematemesis. Was previously on Warfarin and is now only on Aspirin 81 mg daily. Previous JACE was done on 02/28/2019 which showed GODFREY dimensions of 2.0, 1.7, 1.6, 1.8, 1.6 cm at 0, 30, 45, 90, 135 degrees respectively. GODFREY length ranged from 2.9 to 3.7. No prior CT done to compare to. Patient reports that she underwent HD on Wednesday and tolerated a full session. Patient stated that she tolerated the last JACE without any issues.     Dysphagia or odynophagia:  No  Liver Disease, esophageal disease, or known varices:  No  Upper GI Bleeding: No  Snoring:  No  Sleep Apnea:  Yes  Prior neck surgery or radiation:  Yes parathyroid surgery remotely   History of anesthetic difficulties:  No  Family history of anesthetic difficulties:  No  Last oral intake:  12 hours ago  Able to move neck in all directions:  Yes    Mallampati: 4  ASA: 3

## 2019-04-26 NOTE — PLAN OF CARE
Problem: Adult Inpatient Plan of Care  Goal: Plan of Care Review  Outcome: Ongoing (interventions implemented as appropriate)  Patient arrived to room. PIV placed, labs sent. Admit assessment completed. Patient has HD MWF schedule. Last HD was on Wed. VSS, patient uses O2 at home prn, O2 currently 91% on RA, oxygen tubing placed at bedside. Iodine allergy noted, Dye prep of benadryl and prednisone ordered, patient took doses at 1845 last night and 0430 this morning. Plan of care discussed with patient. Will monitor

## 2019-04-26 NOTE — ANESTHESIA PROCEDURE NOTES
Arterial    Diagnosis: afib    Patient location during procedure: done in OR  Procedure start time: 4/26/2019 7:31 AM  Timeout: 4/26/2019 7:30 AM  Procedure end time: 4/26/2019 7:34 AM  Staffing  Resident/CRNA: Laverne Clemente CRNA  Performed: resident/CRNA   Anesthesiologist was present at the time of the procedure.  Preanesthetic Checklist  Completed: patient identified, site marked, surgical consent, pre-op evaluation, timeout performed, IV checked, risks and benefits discussed, monitors and equipment checked and anesthesia consent givenArterial  Skin Prep: chlorhexidine gluconate  Local Infiltration: lidocaine  Orientation: right  Location: radial  Catheter Size: 20 G  Catheter placement by Anatomical landmarks. Heme positive aspiration all ports.Insertion Attempts: 1  Assessment  Dressing: secured with tape and tegaderm  Patient: Tolerated well

## 2019-04-26 NOTE — PROGRESS NOTES
Patient admitted to recovery see [facility] for complete assessment pacu bcg's maintained safety measures verified patient instructed on pain scale and patient verbalized understanding. No complaints no distress noted. Also patient's blood sugar 172. Renal function panel drawn and sent down to lab also called patient's brother and updated on patient location with the permission of patient. Also 1011 patient hr 110's afib noted on monitor dr. Thomas at bedside aware new order for ekg ekg done and placed in patient's chart no complaints no distress noted.

## 2019-04-26 NOTE — OP NOTE
EP Post Procedure Note    Successful 27mm Watchman device placed without any immediate complications    Plan  - Restart home medications, start Coumadin today, goal INR 2-3, enroll in Coumadin clinic  - Bed rest x 3 hours, Q30 minute groin checks  - HD today  - Advance diet as tolerated    Keegan Miller MD, MPH  PGY-VI  Cardiovascular Disease Fellow

## 2019-04-26 NOTE — TRANSFER OF CARE
"Anesthesia Transfer of Care Note    Patient: Shivani Sheets    Procedure(s) Performed: Procedure(s) (LRB):  Left atrial appendage closure device (N/A)  Transesophageal echo (JACE) intra-procedure log documentation (N/A)    Patient location: PACU    Anesthesia Type: general    Transport from OR: Transported from OR on 6-10 L/min O2 by face mask with adequate spontaneous ventilation    Post pain: adequate analgesia    Post assessment: no apparent anesthetic complications and tolerated procedure well    Post vital signs: stable    Level of consciousness: awake and alert    Nausea/Vomiting: no nausea/vomiting    Complications: none    Transfer of care protocol was followed      Last vitals:   Visit Vitals  BP (!) 143/84 (BP Location: Left arm, Patient Position: Lying)   Pulse 91   Temp 36.4 °C (97.5 °F) (Oral)   Resp 20   Ht 5' 1" (1.549 m)   Wt 69.9 kg (154 lb 1.6 oz)   LMP  (LMP Unknown)   SpO2 (!) 91%   Breastfeeding? No   BMI 29.12 kg/m²     "

## 2019-04-26 NOTE — PROGRESS NOTES
3 hr dialysis trx completed. 2.5 L fluid removed. Needles pulled from L UA AVF. Pressure held. [place] drsg applied. R Groin drsg intact, no bleeding. Report called to Adeline. Transport per stretcher to [facility] requested.

## 2019-04-26 NOTE — ASSESSMENT & PLAN NOTE
I discussed the nature of Watchman device placement, including possible transseptal puncture. We discused risks and benefits at length. Our discussion included, but was not limited to the risk of death, infection, bleeding, stroke, MI, cardiac perforation, embolism, cardiac tamponade, and other organic injury    Proceed with Watchman device placement  JACE, anesthesia

## 2019-04-26 NOTE — PROGRESS NOTES
Patient transported to dialysis via stretcher with patient escort. Cardiac monitoring and 2L O2 via nasal cannula in place. Chart sent with patient.

## 2019-04-26 NOTE — PLAN OF CARE
Problem: Adult Inpatient Plan of Care  Goal: Plan of Care Review  Outcome: Ongoing (interventions implemented as appropriate)  Received report from Brigitte. Patient s/p Watchman, AAOx3. VSS, no c/o pain or discomfort at this time, resp even and unlabored. Gauze/tegaderm dressing to R groin is CDI. No active bleeding. No hematoma noted. Post procedure protocol reviewed with patient. Understanding verbalized. Nurse call bell within reach. Will continue to monitor per post procedure protocol. Report given to Christian REYES RN. Patient in for transport to dialysis.

## 2019-04-26 NOTE — CONSULTS
[facility]  Nephrology  Consult Note    Patient Name: Shivani Sheets  MRN: [id number]  Admission Date: 4/26/2019  Hospital Length of Stay: 0 days  Attending Provider: César Thomas MD   Primary Care Physician: Eula Weiss MD  Principal Problem:<principal problem not specified>    Inpatient consult to Nephrology  Consult performed by: Leta Soni NP  Consult ordered by: Keegan Miller MD  Reason for consult: ESRD Management        Subjective:     HPI: The patient is a 64 yo AA female with a past medical history of ESRD (on HD), T2DM, Afib, and pHTN who presents today for LAAO consideration. She is not s/p a LAAO implantation (Watchman Device) with Dr. Thomas. She has a history of chronic anemia with recurrent GIB, most recent being in February. GI work up has demonstrated no obvious source of bleeding. The patient was previously on Coumadin but was recently switch to Aspirin for her Afib. Patient reports that she underwent HD on Wednesday and tolerated a full session. She normally dialyzes on MWF at [facility] under the direction of Dr. Deleon for 3.5 hrs via a LUE AVF. The patient is anuric and has a dry weight of 68 kg. She states that normally attends all of her treatments but have missed 3 treatments over the last month due to issues with her arthritis. Nephrology consulted for management of ESRD and HD treatment.    Past Medical History:   Diagnosis Date    Allergy     Anemia     Anticoagulant long-term use     4 years coumadin, asa    Arthritis     Awaiting organ transplant 4/30/2013    Cataract     Central serous chorioretinopathy of eye, right 8/21/2018    CHF (congestive heart failure)     Chronic diarrhea     Chronic kidney disease     Colon polyps     COPD (chronic obstructive pulmonary disease)     Coronary artery disease     Diabetes mellitus     Diabetic retinopathy     Diverticulosis     Encounter for blood transfusion     ESRD from  HTN strtied RRT 1999    Failed  donor kidney transplant      Glaucoma     History of bleeding peptic ulcer      as stated per pt    Hyperlipidemia     Hypertension     Hypothyroidism     Morbid obesity 8/10/2012    Renal hypertension     Stroke     1987       Past Surgical History:   Procedure Laterality Date    CATARACT EXTRACTION W/  INTRAOCULAR LENS IMPLANT Bilateral     COLON SURGERY      COLONOSCOPY      COLONOSCOPY N/A 2019    Performed by Indio Beltran MD at [facility] ENDO (2ND FLR)    COLONOSCOPY N/A 2018    Performed by Jose Falk MD at [facility] ENDO (2ND FLR)    EGD (ESOPHAGOGASTRODUODENOSCOPY) N/A 2019    Performed by Miranda Maradiaga MD at [facility] ENDO (2ND FLR)    EGD (ESOPHAGOGASTRODUODENOSCOPY) N/A 2018    Performed by Luis Washington MD at [facility] ENDO (2ND FLR)    ESOPHAGOGASTRODUODENOSCOPY (EGD) N/A 2018    Performed by Kenji Desir MD at [facility] ENDO (2ND FLR)    EYE SURGERY      FRACTURE SURGERY      R arm    HERNIA REPAIR      HYSTERECTOMY      INSERTION-IMPLANT-GLAUCOMA Left 10/12/2017    Performed by Junaid Kern MD at [facility] OR 1ST FLR    KIDNEY TRANSPLANT      NEPHRECTOMY  2008    transplant     PARATHYROID GLAND SURGERY      TONSILLECTOMY      Transesophageal echo (JACE) intra-procedure log documentation N/A 2019    Performed by [facility] Provider at [facility] EP LAB    UPPER GASTROINTESTINAL ENDOSCOPY         Review of patient's allergies indicates:   Allergen Reactions    Penicillins Swelling    Iodine      Other reaction(s): Hives    Sulfamethoxazole-trimethoprim      Other reaction(s): Swelling  Other reaction(s): Hives     Current Facility-Administered Medications   Medication Frequency    [START ON 2019] aspirin EC tablet 81 mg Daily    atorvastatin tablet 10 mg QHS    [START ON 2019] levothyroxine tablet 100 mcg Before breakfast    [START ON 2019] pantoprazole EC tablet 40 mg  Daily    riociguat (ADEMPAS) tablet 2.5 mg TID    selexipag Tab 1,000 mcg BID    warfarin (COUMADIN) tablet 5 mg Daily     Facility-Administered Medications Ordered in Other Encounters   Medication Frequency    sodium chloride 0.9% flush 5 mL PRN     Family History     Problem Relation (Age of Onset)    Asthma Sister    Blindness Father    Breast cancer Maternal Aunt    Depression Sister    Diabetes Maternal Aunt    Heart attack Father, Mother    Heart failure Father, Mother    Hypertension Father, Mother, Sister, Brother    Kidney disease Brother        Tobacco Use    Smoking status: Former Smoker     Types: Cigarettes     Last attempt to quit: 8/10/2000     Years since quittin.7    Smokeless tobacco: Never Used   Substance and Sexual Activity    Alcohol use: No     Comment: hx of etoh     Drug use: No    Sexual activity: Never     Review of Systems   Constitutional: Negative for activity change, appetite change and fatigue.   HENT: Negative for hearing loss.    Eyes: Negative for visual disturbance.   Respiratory: Negative for cough and shortness of breath.    Cardiovascular: Negative for chest pain, palpitations and leg swelling.   Gastrointestinal: Negative for abdominal distention, abdominal pain, diarrhea, nausea and vomiting.   Genitourinary: Positive for decreased urine volume.   Musculoskeletal: Positive for arthralgias. Negative for gait problem.   Neurological: Negative for numbness and headaches.     Objective:     Vital Signs (Most Recent):  Temp: 97.9 °F (36.6 °C) (19 1100)  Pulse: 108 (19 1200)  Resp: 18 (19 1200)  BP: (!) 159/92 (19 1200)  SpO2: 95 % (19 1200)  O2 Device (Oxygen Therapy): nasal cannula (19 1200) Vital Signs (24h Range):  Temp:  [97.5 °F (36.4 °C)-98.1 °F (36.7 °C)] 97.9 °F (36.6 °C)  Pulse:  [] 108  Resp:  [16-22] 18  SpO2:  [91 %-100 %] 95 %  BP: (109-159)/(79-93) 159/92  Arterial Line BP: ()/(78-88) 134/78     Weight:  69.9 kg (154 lb 1.6 oz) (04/26/19 0615)  Body mass index is 29.12 kg/m².  Body surface area is 1.73 meters squared.    No intake/output data recorded.    Physical Exam   Constitutional: She is oriented to person, place, and time. She appears well-developed and well-nourished. No distress.   HENT:   Head: Normocephalic and atraumatic.   Right Ear: External ear normal.   Left Ear: External ear normal.   Neck: Normal range of motion. No tracheal deviation present.   Cardiovascular: An irregular rhythm present. Tachycardia present.   Pulmonary/Chest: Effort normal. No respiratory distress. She has no wheezes. She has no rales.   Abdominal: Soft. Bowel sounds are normal. There is no tenderness.   Musculoskeletal: She exhibits edema. She exhibits no tenderness or deformity.   Neurological: She is alert and oriented to person, place, and time.   Skin: No rash noted. She is not diaphoretic. No erythema. No pallor.   Psychiatric: She has a normal mood and affect. Her behavior is normal.     Significant Labs:  CBC:   Recent Labs   Lab 04/25/19  0925   WBC 5.20   RBC 3.80*   HGB 10.1*   HCT 34.5*      MCV 91   MCH 26.6*   MCHC 29.3*     CMP:   Recent Labs   Lab 04/25/19  0925   GLU 94   CALCIUM 8.7      K 3.7   CO2 33*   CL 99   BUN 19   CREATININE 6.2*           Assessment/Plan:     End stage renal disease on dialysis  The patient is 63 year-old AA female here today for a watchman device placement. Last dialysis on Wednesday, 4/24.    Plan:   - Will plan for dialysis today for volume management and metabolic clearance3.   - Target UF 1-2 L as tolerated, keep MAP >65  - Recommend renal diet with 1 L fluid restriction   - Renal function panel pending   - Continue to monitor intake and output, daily weights   - Avoid nephrotoxic medication and renal dose medications to GFR  - Daily renal panel so that phos and albumin is monitored daily    Atrial fibrillation  - Being follow by admitting service    - s/p watchman  device placement 4/26        Thank you for your consult. I will follow-up with patient. Please contact us if you have any additional questions.    Leta Soni NP  Nephrology  [facility]

## 2019-04-26 NOTE — Clinical Note
18 ml injected throughout the case. 182 mL total wasted during the case. 200 mL total used in the case.

## 2019-04-26 NOTE — HPI
The patient is a 62 yo AA female with a past medical history of ESRD (on HD), T2DM, Afib, and pHTN who presents today for LAAO consideration. She is not s/p a LAAO implantation (Watchman Device) with Dr. Thomas. She has a history of chronic anemia with recurrent GIB, most recent being in February. GI work up has demonstrated no obvious source of bleeding. Patient reports that she underwent HD on Wednesday and tolerated a full session. She normally dialyzes on MWF at [facility] under the direction of Dr. Deleon for 3.5 hrs via a LUE AVF. The patient is anuric and has a dry weight of 68 kg. She states that normally attends all of her treatments but have missed 3 treatments over the last month due to issues with her arthritis. Nephrology consulted for management of ESRD and HD treatment.

## 2019-04-26 NOTE — SUBJECTIVE & OBJECTIVE
Past Medical History:   Diagnosis Date    Allergy     Anemia     Anticoagulant long-term use     4 years coumadin, asa    Arthritis     Awaiting organ transplant 2013    Cataract     Central serous chorioretinopathy of eye, right 2018    CHF (congestive heart failure)     Chronic diarrhea     Chronic kidney disease     Colon polyps     COPD (chronic obstructive pulmonary disease)     Coronary artery disease     Diabetes mellitus     Diabetic retinopathy     Diverticulosis     Encounter for blood transfusion     ESRD from HTN strtied RRT 1999    Failed  donor kidney transplant -     Glaucoma     History of bleeding peptic ulcer      as stated per pt    Hyperlipidemia     Hypertension     Hypothyroidism     Morbid obesity 8/10/2012    Renal hypertension     Stroke            Past Surgical History:   Procedure Laterality Date    CATARACT EXTRACTION W/  INTRAOCULAR LENS IMPLANT Bilateral     COLON SURGERY      COLONOSCOPY      COLONOSCOPY N/A 2019    Performed by Ind[facility]tran [facility] at [facility] ENDO (2ND FLR)    COLONOSCOPY N/A 2018    Performed by Jose Falk MD at [facility] ENDO (2ND FLR)    EGD (ESOPHAGOGASTRODUODENOSCOPY) N/A 2019    Performed by Miranda Maradiaga MD at [facility] ENDO (2ND FLR)    EGD (ESOPHAGOGASTRODUODENOSCOPY) N/A 2018    Performed by Luis Washington MD at [facility] ENDO (2ND FLR)    ESOPHAGOGASTRODUODENOSCOPY (EGD) N/A 2018    Performed by Kenji Desir MD at [facility] ENDO (2ND FLR)    EYE SURGERY      FRACTURE SURGERY      R arm    HERNIA REPAIR      HYSTERECTOMY      INSERTION-IMPLANT-GLAUCOMA Left 10/12/2017    Performed by Junaid Kern MD at [facility] OR 1ST FLR    KIDNEY TRANSPLANT      NEPHRECTOMY  2008    transplant     PARATHYROID GLAND SURGERY      TONSILLECTOMY      Transesophageal echo (JACE) intra-procedure log documentation N/A 2019    Performed by [facility] Diagnostic Provider at [facility] EP  LAB    UPPER GASTROINTESTINAL ENDOSCOPY         Review of patient's allergies indicates:   Allergen Reactions    Penicillins Swelling    Iodine      Other reaction(s): Hives    Sulfamethoxazole-trimethoprim      Other reaction(s): Swelling  Other reaction(s): Hives       Current Facility-Administered Medications on File Prior to Encounter   Medication    sodium chloride 0.9% flush 5 mL     Current Outpatient Medications on File Prior to Encounter   Medication Sig    aspirin 325 MG tablet Take 1 tablet (325 mg total) by mouth once daily.    diltiaZEM (CARDIZEM CD) 300 MG 24 hr capsule Take 1 capsule (300 mg total) by mouth once daily.    dorzolamide-timolol 2-0.5% (COSOPT) 22.3-6.8 mg/mL ophthalmic solution INSTILL 1 DROP IN BOTH EYES TWICE DAILY    pantoprazole (PROTONIX) 40 MG tablet TAKE 1 TABLET BY MOUTH ONCE DAILY    riociguat (ADEMPAS) 2 mg Tab tablet Take 2 mg by mouth 3 (three) times daily.     selexipag (UPTRAVI) 1,000 mcg Tab Take 1,000 mcg by mouth 2 (two) times daily.    ALPHAGAN P 0.1 % Drop once daily.     latanoprost 0.005 % ophthalmic solution INSTILL 1 DROP IN BOTH EYES EVERY DAY AT BEDTIME    levothyroxine (SYNTHROID) 100 MCG tablet Take 100 mcg by mouth. 1 Tablet Oral Every day    LIPITOR 10 mg tablet TAKE 1 BY MOUTH ONCE A DAY (Patient taking differently: TAKE 1 BY MOUTH ONCE nightly)     Family History     Problem Relation (Age of Onset)    Asthma Sister    Blindness Father    Breast cancer Maternal Aunt    Depression Sister    Diabetes Maternal Aunt    Heart attack Father, Mother    Heart failure Father, Mother    Hypertension Father, Mother, Sister, Brother    Kidney disease Brother        Tobacco Use    Smoking status: Former Smoker     Types: Cigarettes     Last attempt to quit: 8/10/2000     Years since quittin.7    Smokeless tobacco: Never Used   Substance and Sexual Activity    Alcohol use: No     Comment: hx of etoh     Drug use: No    Sexual activity: Never      Review of Systems   Constitution: Negative for chills and fever.   HENT: Negative for hoarse voice and sore throat.    Cardiovascular: Negative for chest pain, claudication, cyanosis, dyspnea on exertion, irregular heartbeat, leg swelling, near-syncope, orthopnea, palpitations, paroxysmal nocturnal dyspnea and syncope.   Respiratory: Negative for cough, hemoptysis and shortness of breath.    Musculoskeletal: Negative for back pain, joint pain and joint swelling.   Gastrointestinal: Negative for abdominal pain, constipation, diarrhea, hematochezia, melena, nausea and vomiting.   Genitourinary: Negative for dysuria, hematuria and incomplete emptying.   Neurological: Negative for dizziness, headaches and light-headedness.   Psychiatric/Behavioral: Negative for altered mental status, depression and suicidal ideas. The patient is not nervous/anxious.      Objective:     Vital Signs (Most Recent):  Temp: 97.5 °F (36.4 °C) (04/26/19 0615)  Pulse: 91 (04/26/19 0615)  Resp: 20 (04/26/19 0615)  BP: (!) 143/84 (04/26/19 0615)  SpO2: (!) 91 % (04/26/19 0615) Vital Signs (24h Range):  Temp:  [97.5 °F (36.4 °C)] 97.5 °F (36.4 °C)  Pulse:  [81-91] 91  Resp:  [20] 20  SpO2:  [91 %-95 %] 91 %  BP: (122-143)/(78-84) 143/84       Weight: 69.9 kg (154 lb 1.6 oz)  Body mass index is 29.12 kg/m².    SpO2: (!) 91 %  O2 Device (Oxygen Therapy): room air    Physical Exam   Constitutional: She is oriented to person, place, and time. She appears well-developed and well-nourished. No distress.   HENT:   Head: Normocephalic and atraumatic.   Right Ear: External ear normal.   Left Ear: External ear normal.   Nose: Nose normal.   Mouth/Throat: Oropharynx is clear and moist. No oropharyngeal exudate.   Eyes: Pupils are equal, round, and reactive to light. Conjunctivae and EOM are normal. Right eye exhibits no discharge. Left eye exhibits no discharge. No scleral icterus.   Neck: Normal range of motion. Neck supple. No JVD present. No tracheal  deviation present. No thyromegaly present.   Cardiovascular: Normal rate, regular rhythm and intact distal pulses. Exam reveals no gallop and no friction rub.   Murmur (Systolic murmur) heard.  Pulmonary/Chest: Effort normal and breath sounds normal. No stridor. No respiratory distress. She has no wheezes. She has no rales. She exhibits no tenderness.   Abdominal: Soft. Bowel sounds are normal. She exhibits no distension and no mass. There is no tenderness. There is no rebound and no guarding.   Musculoskeletal: Normal range of motion. She exhibits no edema, tenderness or deformity.   Lymphadenopathy:     She has no cervical adenopathy.   Neurological: She is alert and oriented to person, place, and time. No cranial nerve deficit.   Skin: Skin is warm and dry. No rash noted. She is not diaphoretic. No erythema. No pallor.   LUE fistula +thrill     Psychiatric: She has a normal mood and affect. Her behavior is normal. Thought content normal.   Nursing note and vitals reviewed.

## 2019-04-26 NOTE — SUBJECTIVE & OBJECTIVE
Past Medical History:   Diagnosis Date    Allergy     Anemia     Anticoagulant long-term use     4 years coumadin, asa    Arthritis     Awaiting organ transplant 2013    Cataract     Central serous chorioretinopathy of eye, right 2018    CHF (congestive heart failure)     Chronic diarrhea     Chronic kidney disease     Colon polyps     COPD (chronic obstructive pulmonary disease)     Coronary artery disease     Diabetes mellitus     Diabetic retinopathy     Diverticulosis     Encounter for blood transfusion     ESRD from HTN strtied RRT 1999    Failed  donor kidney transplant -     Glaucoma     History of bleeding peptic ulcer      as stated per pt    Hyperlipidemia     Hypertension     Hypothyroidism     Morbid obesity 8/10/2012    Renal hypertension     Stroke            Past Surgical History:   Procedure Laterality Date    CATARACT EXTRACTION W/  INTRAOCULAR LENS IMPLANT Bilateral     COLON SURGERY      COLONOSCOPY      COLONOSCOPY N/A 2019    Performed by Indio Beltran MD at [facility] ENDO (2ND FLR)    COLONOSCOPY N/A 2018    Performed by Jose Falk MD at [facility] ENDO (2ND FLR)    EGD (ESOPHAGOGASTRODUODENOSCOPY) N/A 2019    Performed by Miranda Maradiaga MD at [facility] ENDO (2ND FLR)    EGD (ESOPHAGOGASTRODUODENOSCOPY) N/A 2018    Performed by Luis Washington MD at [facility] ENDO (2ND FLR)    ESOPHAGOGASTRODUODENOSCOPY (EGD) N/A 2018    Performed by Kenji Desir MD at [facility] ENDO (2ND FLR)    EYE SURGERY      FRACTURE SURGERY      R arm    HERNIA REPAIR      HYSTERECTOMY      INSERTION-IMPLANT-GLAUCOMA Left 10/12/2017    Performed by Junaid Kern MD at [facility] OR 1ST FLR    KIDNEY TRANSPLANT      NEPHRECTOMY  2008    transplant     PARATHYROID GLAND SURGERY      TONSILLECTOMY      Transesophageal echo (JACE) intra-procedure log documentation N/A 2019    Performed by [facility] Diagnostic Provider at [facility] EP  LAB    UPPER GASTROINTESTINAL ENDOSCOPY         Review of patient's allergies indicates:   Allergen Reactions    Penicillins Swelling    Iodine      Other reaction(s): Hives    Sulfamethoxazole-trimethoprim      Other reaction(s): Swelling  Other reaction(s): Hives       Current Facility-Administered Medications on File Prior to Encounter   Medication    sodium chloride 0.9% flush 5 mL     Current Outpatient Medications on File Prior to Encounter   Medication Sig    aspirin 325 MG tablet Take 1 tablet (325 mg total) by mouth once daily.    diltiaZEM (CARDIZEM CD) 300 MG 24 hr capsule Take 1 capsule (300 mg total) by mouth once daily.    dorzolamide-timolol 2-0.5% (COSOPT) 22.3-6.8 mg/mL ophthalmic solution INSTILL 1 DROP IN BOTH EYES TWICE DAILY    pantoprazole (PROTONIX) 40 MG tablet TAKE 1 TABLET BY MOUTH ONCE DAILY    riociguat (ADEMPAS) 2 mg Tab tablet Take 2 mg by mouth 3 (three) times daily.     selexipag (UPTRAVI) 1,000 mcg Tab Take 1,000 mcg by mouth 2 (two) times daily.    ALPHAGAN P 0.1 % Drop once daily.     latanoprost 0.005 % ophthalmic solution INSTILL 1 DROP IN BOTH EYES EVERY DAY AT BEDTIME    levothyroxine (SYNTHROID) 100 MCG tablet Take 100 mcg by mouth. 1 Tablet Oral Every day    LIPITOR 10 mg tablet TAKE 1 BY MOUTH ONCE A DAY (Patient taking differently: TAKE 1 BY MOUTH ONCE nightly)     Family History     Problem Relation (Age of Onset)    Asthma Sister    Blindness Father    Breast cancer Maternal Aunt    Depression Sister    Diabetes Maternal Aunt    Heart attack Father, Mother    Heart failure Father, Mother    Hypertension Father, Mother, Sister, Brother    Kidney disease Brother        Tobacco Use    Smoking status: Former Smoker     Types: Cigarettes     Last attempt to quit: 8/10/2000     Years since quittin.7    Smokeless tobacco: Never Used   Substance and Sexual Activity    Alcohol use: No     Comment: hx of etoh     Drug use: No    Sexual activity: Never      Review of Systems   All other systems reviewed and are negative.    Objective:     Vital Signs (Most Recent):  Temp: 97.5 °F (36.4 °C) (04/26/19 0615)  Pulse: 91 (04/26/19 0615)  Resp: 20 (04/26/19 0615)  BP: (!) 143/84 (04/26/19 0615)  SpO2: (!) 91 % (04/26/19 0615) Vital Signs (24h Range):  Temp:  [97.5 °F (36.4 °C)] 97.5 °F (36.4 °C)  Pulse:  [81-91] 91  Resp:  [20] 20  SpO2:  [91 %-95 %] 91 %  BP: (122-143)/(78-84) 143/84     Weight: 69.9 kg (154 lb 1.6 oz)  Body mass index is 29.12 kg/m².    SpO2: (!) 91 %  O2 Device (Oxygen Therapy): room air    No intake or output data in the 24 hours ending 04/26/19 0716    Lines/Drains/Airways     Central Venous Catheter Line                 RETIRED! DO NOT USE: Hemodialysis Catheter Arteriovenous fistula Left Forearm -- days          Drain                 Hemodialysis AV Fistula Left upper arm -- days          Peripheral Intravenous Line                 Peripheral IV - Single Lumen 04/26/19 0650 20 G Right Hand less than 1 day         Peripheral IV - Single Lumen 04/26/19 0651 20 G Right Wrist less than 1 day                Physical Exam   Constitutional: She is oriented to person, place, and time. She appears well-developed and well-nourished.   HENT:   Head: Normocephalic and atraumatic.   Eyes: Pupils are equal, round, and reactive to light. EOM are normal.   Neck: Normal range of motion. Neck supple. No JVD present.   Cardiovascular: Normal rate and regular rhythm.   In sinus here   Pulmonary/Chest: Effort normal and breath sounds normal.   Abdominal: Soft. Bowel sounds are normal.   Musculoskeletal: Normal range of motion.   Neurological: She is alert and oriented to person, place, and time.       Significant Labs:   CMP   Recent Labs   Lab 04/25/19  0925      K 3.7   CL 99   CO2 33*   GLU 94   BUN 19   CREATININE 6.2*   CALCIUM 8.7   ANIONGAP 11   ESTGFRAFRICA 8*   EGFRNONAA 7*   , CBC   Recent Labs   Lab 04/25/19  0925   WBC 5.20   HGB 10.1*   HCT 34.5*        and All pertinent lab results from the last 24 hours have been reviewed.    Significant Imaging: Echocardiogram:   Transthoracic echo (TTE) complete (Cupid Only):   Results for orders placed or performed during the hospital encounter of 02/02/19   Transthoracic echo (TTE) complete (Cupid Only)   Result Value Ref Range    Ascending aorta 2.84 cm    STJ 2.75 cm    AV mean gradient 7.43 mmHg    Ao peak drake 1.88 m/s    Ao VTI 33.94 cm    IVS 0.82 0.6 - 1.1 cm    LA size 4.45 cm    Left Atrium Major Axis 6.10 cm    Left Atrium Minor Axis 5.77 cm    LVIDD 4.76 3.5 - 6.0 cm    LVIDS 3.95 2.1 - 4.0 cm    LVOT diameter 2.02 cm    LVOT peak VTI 20.94 cm    PW 1.11 (A) 0.6 - 1.1 cm    MV Peak A Drake 0.89 m/s    E wave decelartion time 248.29 msec    MV Peak E Drake 1.08 m/s    RA Major Axis 5.76 cm    RA Width 4.27 cm    RVDD 3.08 cm    Sinus 2.85 cm    TAPSE 2.24 cm    TR Max Drake 4.46 m/s    TDI LATERAL 0.10     TDI SEPTAL 0.05     LA WIDTH 5.16 cm    LV Diastolic Volume 105.47 mL    LV Systolic Volume 68.06 mL    LVOT peak drake [id number] m/s    LV LATERAL E/E' RATIO 10.80     LV SEPTAL E/E' RATIO 21.60     FS 17 %    LA volume 115.75 cm3    LV mass 159.98 g    Left Ventricle Relative Wall Thickness 0.47 cm    AV valve area 1.98 cm2    AV Velocity Ratio 0.60     AV index (prosthetic) 0.62     E/A ratio 1.21     Mean e' 0.08     LVOT area 3.20 cm2    LVOT stroke volume 67.07 cm3    AV peak gradient 14.14 mmHg    E/E' ratio 14.40     LV Systolic Volume Index 37.8 mL/m2    LV Diastolic Volume Index 58.54 mL/m2    LA Volume Index 64.2 mL/m2    LV Mass Index 88.8 g/m2    Triscuspid Valve Regurgitation Peak Gradient 79.57 mmHg    BSA 1.86 m2    Right Atrial Pressure (from IVC) 8 mmHg    TV rest pulmonary artery pressure 88 mmHg    and X-Ray: CXR: X-Ray Chest 1 View (CXR): No results found for this visit on 04/26/19.

## 2019-04-26 NOTE — ASSESSMENT & PLAN NOTE
Assessment & Plan:     PLAN:  1. JACE for evaluation of GODFREY and assistance during nova-procedural implantation of LAAO device.    -The risks, benefits & alternatives of the procedure were explained to the patient.    -The risks of transesophageal echo include but are not limited to:  Dental trauma, esophageal trauma/perforation, bleeding, laryngospasm/brochospasm, aspiration, sore throat/hoarseness, & dislodgement of the endotracheal tube/nasogastric tube (where applicable).    -The risks of moderate sedation include hypotension, respiratory depression, arrhythmias, bronchospasm, & death.    -Informed consent was obtained & the patient is agreeable to proceed with the procedure.    I will discuss with the attending physician. Attending addendum is to follow.     Further recommendations per attending addendum

## 2019-04-26 NOTE — SUBJECTIVE & OBJECTIVE
Past Medical History:   Diagnosis Date    Allergy     Anemia     Anticoagulant long-term use     4 years coumadin, asa    Arthritis     Awaiting organ transplant 2013    Cataract     Central serous chorioretinopathy of eye, right 2018    CHF (congestive heart failure)     Chronic diarrhea     Chronic kidney disease     Colon polyps     COPD (chronic obstructive pulmonary disease)     Coronary artery disease     Diabetes mellitus     Diabetic retinopathy     Diverticulosis     Encounter for blood transfusion     ESRD from HTN strtied RRT 1999    Failed  donor kidney transplant -     Glaucoma     History of bleeding peptic ulcer      as stated per pt    Hyperlipidemia     Hypertension     Hypothyroidism     Morbid obesity 8/10/2012    Renal hypertension     Stroke            Past Surgical History:   Procedure Laterality Date    CATARACT EXTRACTION W/  INTRAOCULAR LENS IMPLANT Bilateral     COLON SURGERY      COLONOSCOPY      COLONOSCOPY N/A 2019    Performed by Indio Beltran MD at [facility] ENDO (2ND FLR)    COLONOSCOPY N/A 2018    Performed by Jose Falk MD at [facility] ENDO (2ND FLR)    EGD (ESOPHAGOGASTRODUODENOSCOPY) N/A 2019    Performed by Miranda Maradiaga MD at [facility] ENDO (2ND FLR)    EGD (ESOPHAGOGASTRODUODENOSCOPY) N/A 2018    Performed by Luis Washington MD at [facility] ENDO (2ND FLR)    ESOPHAGOGASTRODUODENOSCOPY (EGD) N/A 2018    Performed by Kenji Desir MD at [facility] ENDO (2ND FLR)    EYE SURGERY      FRACTURE SURGERY      R arm    HERNIA REPAIR      HYSTERECTOMY      INSERTION-IMPLANT-GLAUCOMA Left 10/12/2017    Performed by Junaid Kern MD at [facility] OR 1ST FLR    KIDNEY TRANSPLANT      NEPHRECTOMY  2008    transplant     PARATHYROID GLAND SURGERY      TONSILLECTOMY      Transesophageal echo (JACE) intra-procedure log documentation N/A 2019    Performed by [facility] Provider at [facility] EP  LAB    UPPER GASTROINTESTINAL ENDOSCOPY         Review of patient's allergies indicates:   Allergen Reactions    Penicillins Swelling    Iodine      Other reaction(s): Hives    Sulfamethoxazole-trimethoprim      Other reaction(s): Swelling  Other reaction(s): Hives     Current Facility-Administered Medications   Medication Frequency    [START ON 2019] aspirin EC tablet 81 mg Daily    atorvastatin tablet 10 mg QHS    [START ON 2019] levothyroxine tablet 100 mcg Before breakfast    [START ON 2019] pantoprazole EC tablet 40 mg Daily    riociguat (ADEMPAS) tablet 2.5 mg TID    selexipag Tab 1,000 mcg BID    warfarin (COUMADIN) tablet 5 mg Daily     Facility-Administered Medications Ordered in Other Encounters   Medication Frequency    sodium chloride 0.9% flush 5 mL PRN     Family History     Problem Relation (Age of Onset)    Asthma Sister    Blindness Father    Breast cancer Maternal Aunt    Depression Sister    Diabetes Maternal Aunt    Heart attack Father, Mother    Heart failure Father, Mother    Hypertension Father, Mother, Sister, Brother    Kidney disease Brother        Tobacco Use    Smoking status: Former Smoker     Types: Cigarettes     Last attempt to quit: 8/10/2000     Years since quittin.7    Smokeless tobacco: Never Used   Substance and Sexual Activity    Alcohol use: No     Comment: hx of etoh     Drug use: No    Sexual activity: Never     Review of Systems   Constitutional: Negative for activity change, appetite change and fatigue.   HENT: Negative for hearing loss.    Eyes: Negative for visual disturbance.   Respiratory: Negative for cough and shortness of breath.    Cardiovascular: Negative for chest pain, palpitations and leg swelling.   Gastrointestinal: Negative for abdominal distention, abdominal pain, diarrhea, nausea and vomiting.   Genitourinary: Positive for decreased urine volume.   Musculoskeletal: Positive for arthralgias. Negative for gait problem.    Neurological: Negative for numbness and headaches.     Objective:     Vital Signs (Most Recent):  Temp: 97.9 °F (36.6 °C) (04/26/19 1100)  Pulse: 108 (04/26/19 1200)  Resp: 18 (04/26/19 1200)  BP: (!) 159/92 (04/26/19 1200)  SpO2: 95 % (04/26/19 1200)  O2 Device (Oxygen Therapy): nasal cannula (04/26/19 1200) Vital Signs (24h Range):  Temp:  [97.5 °F (36.4 °C)-98.1 °F (36.7 °C)] 97.9 °F (36.6 °C)  Pulse:  [] 108  Resp:  [16-22] 18  SpO2:  [91 %-100 %] 95 %  BP: (109-159)/(79-93) 159/92  Arterial Line BP: ()/(78-88) 134/78     Weight: 69.9 kg (154 lb 1.6 oz) (04/26/19 0615)  Body mass index is 29.12 kg/m².  Body surface area is 1.73 meters squared.    No intake/output data recorded.    Physical Exam   Constitutional: She is oriented to person, place, and time. She appears well-developed and well-nourished. No distress.   HENT:   Head: Normocephalic and atraumatic.   Right Ear: External ear normal.   Left Ear: External ear normal.   Neck: Normal range of motion. No tracheal deviation present.   Cardiovascular: An irregular rhythm present. Tachycardia present.   Pulmonary/Chest: Effort normal. No respiratory distress. She has no wheezes. She has no rales.   Abdominal: Soft. Bowel sounds are normal. There is no tenderness.   Musculoskeletal: She exhibits edema. She exhibits no tenderness or deformity.   Neurological: She is alert and oriented to person, place, and time.   Skin: No rash noted. She is not diaphoretic. No erythema. No pallor.   Psychiatric: She has a normal mood and affect. Her behavior is normal.     Significant Labs:  CBC:   Recent Labs   Lab 04/25/19  0925   WBC 5.20   RBC 3.80*   HGB 10.1*   HCT 34.5*      MCV 91   MCH 26.6*   MCHC 29.3*     CMP:   Recent Labs   Lab 04/25/19  0925   GLU 94   CALCIUM 8.7      K 3.7   CO2 33*   CL 99   BUN 19   CREATININE 6.2*

## 2019-04-26 NOTE — PROGRESS NOTES
62 y/o female re-enrolling with us after Watchman GODFREY closure device placement on 4/26/19. She her warfarin therapy for atrial fibrillation was recently d/c after multiple GI bleeding episodes. Her other PMH is significant for pulmonary HTN, diastolic dysfunction, CVA, COPD home oxygen at night, CAD, ESRD secondary to HTN, s/p failed kidney transplant, PUD, STEFFANY on CPAP, hypothyroidism, RA, HLD.    Warfarin Rx ordered and 5mg daily seems an appropriate starting dose based on history. Will contact patient to get her scheduled with INR early next week.

## 2019-04-26 NOTE — ASSESSMENT & PLAN NOTE
The patient is 63 year-old AA female here today for a watchman device placement. Last dialysis on Wednesday, 4/24.    Plan:   - Will plan for dialysis today for volume management and metabolic clearance3.   - Target UF 1-2 L as tolerated, keep MAP >65  - Recommend renal diet with 1 L fluid restriction   - Renal function panel pending   - Continue to monitor intake and output, daily weights   - Avoid nephrotoxic medication and renal dose medications to GFR  - Daily renal panel so that phos and albumin is monitored daily.

## 2019-04-26 NOTE — TELEPHONE ENCOUNTER
----- Message from Moraima Agarwal sent at 4/26/2019  8:45 AM CDT -----  Contact: Beni/Petra/[phone] ext [phone]/fax  Beni called in regards needing last visit clinical notes. Please call and fax it to [phone] ext [phone]  Fax# [phone].  Thank you

## 2019-04-26 NOTE — H&P
[facility]  Cardiac Electrophysiology  History and Physical     Admission Date: 2019  Code Status: Prior   Attending Provider: César Thomas MD   Principal Problem:<principal problem not specified>    Subjective:     Chief Complaint:  AF     HPI:   63 yoF HTN, ESRD on HD, DM, pHTN here for LAAO consideration. She has chronic anemia with some more recent lower GI bleeding. She has been on warfarin for 4 years for CVA prophylaxis due to AF. This was stopped due her recent lower GI bleed . She has been placed back on warfarin. GI work up has demonstrated no obvious source. She presents today for Watchman device placement.    Currently just on ASA.Took two of the three doses of contrast prep.    Past Medical History:   Diagnosis Date    Allergy     Anemia     Anticoagulant long-term use     4 years coumadin, asa    Arthritis     Awaiting organ transplant 2013    Cataract     Central serous chorioretinopathy of eye, right 2018    CHF (congestive heart failure)     Chronic diarrhea     Chronic kidney disease     Colon polyps     COPD (chronic obstructive pulmonary disease)     Coronary artery disease     Diabetes mellitus     Diabetic retinopathy     Diverticulosis     Encounter for blood transfusion     ESRD from HTN strtied RRT 1999    Failed  donor kidney transplant -     Glaucoma     History of bleeding peptic ulcer      as stated per pt    Hyperlipidemia     Hypertension     Hypothyroidism     Morbid obesity 8/10/2012    Renal hypertension     Stroke            Past Surgical History:   Procedure Laterality Date    CATARACT EXTRACTION W/  INTRAOCULAR LENS IMPLANT Bilateral     COLON SURGERY      COLONOSCOPY      COLONOSCOPY N/A 2019    [facility]formed by [facility] at [facility] ENDO (2ND FLR)    COLONOSCOPY N/A 2018    Performed by Jose Falk MD at [facility] ENDO (2ND FLR)    EGD  (ESOPHAGOGASTRODUODENOSCOPY) N/A 1/14/2019    Performed by Miranda [facility]aga [facility] at [facility] ENDO (2ND FLR)    EGD (ESOPHAGOGASTRODUODENOSCOPY) N/A 9/11/2018    Performed by Luis Washington MD at [facility] ENDO (2ND FLR)    ESOPHAGOGASTRODUODENOSCOPY (EGD) N/A 2/27/2018    Performed by Kenji Desir MD at [facility] ENDO (2ND FLR)    EYE SURGERY      FRACTURE SURGERY      R arm    HERNIA REPAIR      HYSTERECTOMY      INSERTION-IMPLANT-GLAUCOMA Left 10/12/2017    Performed by Junaid Kern MD at [facility] OR 1ST FLR    KIDNEY TRANSPLANT      NEPHRECTOMY  11/2008    transplant     PARATHYROID GLAND SURGERY      TONSILLECTOMY      Transesophageal echo (JACE) intra-procedure log documentation N/A 2/28/2019    Performed by [facility] Diagnostic Provider at [facility] EP LAB    UPPER GASTROINTESTINAL ENDOSCOPY         Review of patient's allergies indicates:   Allergen Reactions    Penicillins Swelling    Iodine      Other reaction(s): Hives    Sulfamethoxazole-trimethoprim      Other reaction(s): Swelling  Other reaction(s): Hives       Current Facility-Administered Medications on File Prior to Encounter   Medication    sodium chloride 0.9% flush 5 mL     Current Outpatient Medications on File Prior to Encounter   Medication Sig    aspirin 325 MG tablet Take 1 tablet (325 mg total) by mouth once daily.    diltiaZEM (CARDIZEM CD) 300 MG 24 hr capsule Take 1 capsule (300 mg total) by mouth once daily.    dorzolamide-timolol 2-0.5% (COSOPT) 22.3-6.8 mg/mL ophthalmic solution INSTILL 1 DROP IN BOTH EYES TWICE DAILY    pantoprazole (PROTONIX) 40 MG tablet TAKE 1 TABLET BY MOUTH ONCE DAILY    riociguat (ADEMPAS) 2 mg Tab tablet Take 2 mg by mouth 3 (three) times daily.     selexipag (UPTRAVI) 1,000 mcg Tab Take 1,000 mcg by mouth 2 (two) times daily.    ALPHAGAN P 0.1 % Drop once daily.     latanoprost 0.005 % ophthalmic solution INSTILL 1 DROP IN BOTH EYES EVERY DAY AT BEDTIME    levothyroxine (SYNTHROID) 100 MCG tablet Take 100  mcg by mouth. 1 Tablet Oral Every day    LIPITOR 10 mg tablet TAKE 1 BY MOUTH ONCE A DAY (Patient taking differently: TAKE 1 BY MOUTH ONCE nightly)     Family History     Problem Relation (Age of Onset)    Asthma Sister    Blindness Father    Breast cancer Maternal Aunt    Depression Sister    Diabetes Maternal Aunt    Heart attack Father, Mother    Heart failure Father, Mother    Hypertension Father, Mother, Sister, Brother    Kidney disease Brother        Tobacco Use    Smoking status: Former Smoker     Types: Cigarettes     Last attempt to quit: 8/10/2000     Years since quittin.7    Smokeless tobacco: Never Used   Substance and Sexual Activity    Alcohol use: No     Comment: hx of etoh     Drug use: No    Sexual activity: Never     Review of Systems   Constitution: Negative for chills and fever.   HENT: Negative for hoarse voice and sore throat.    Cardiovascular: Negative for chest pain, claudication, cyanosis, dyspnea on exertion, irregular heartbeat, leg swelling, near-syncope, orthopnea, palpitations, paroxysmal nocturnal dyspnea and syncope.   Respiratory: Negative for cough, hemoptysis and shortness of breath.    Musculoskeletal: Negative for back pain, joint pain and joint swelling.   Gastrointestinal: Negative for abdominal pain, constipation, diarrhea, hematochezia, melena, nausea and vomiting.   Genitourinary: Negative for dysuria, hematuria and incomplete emptying.   Neurological: Negative for dizziness, headaches and light-headedness.   Psychiatric/Behavioral: Negative for altered mental status, depression and suicidal ideas. The patient is not nervous/anxious.      Objective:     Vital Signs (Most Recent):  Temp: 97.5 °F (36.4 °C) (19)  Pulse: 91 (19)  Resp: 20 (19)  BP: (!) 143/84 (19)  SpO2: (!) 91 % (19) Vital Signs (24h Range):  Temp:  [97.5 °F (36.4 °C)] 97.5 °F (36.4 °C)  Pulse:  [81-91] 91  Resp:  [20] 20  SpO2:  [91 %-95 %] 91  %  BP: (122-143)/(78-84) 143/84       Weight: 69.9 kg (154 lb 1.6 oz)  Body mass index is 29.12 kg/m².    SpO2: (!) 91 %  O2 Device (Oxygen Therapy): room air    Physical Exam   Constitutional: She is oriented to person, place, and time. She appears well-developed and well-nourished. No distress.   HENT:   Head: Normocephalic and atraumatic.   Right Ear: External ear normal.   Left Ear: External ear normal.   Nose: Nose normal.   Mouth/Throat: Oropharynx is clear and moist. No oropharyngeal exudate.   Eyes: Pupils are equal, round, and reactive to light. Conjunctivae and EOM are normal. Right eye exhibits no discharge. Left eye exhibits no discharge. No scleral icterus.   Neck: Normal range of motion. Neck supple. No JVD present. No tracheal deviation present. No thyromegaly present.   Cardiovascular: Normal rate, regular rhythm and intact distal pulses. Exam reveals no gallop and no friction rub.   Murmur (Systolic murmur) heard.  Pulmonary/Chest: Effort normal and breath sounds normal. No stridor. No respiratory distress. She has no wheezes. She has no rales. She exhibits no tenderness.   Abdominal: Soft. Bowel sounds are normal. She exhibits no distension and no mass. There is no tenderness. There is no rebound and no guarding.   Musculoskeletal: Normal range of motion. She exhibits no edema, tenderness or deformity.   Lymphadenopathy:     She has no cervical adenopathy.   Neurological: She is alert and oriented to person, place, and time. No cranial nerve deficit.   Skin: Skin is warm and dry. No rash noted. She is not diaphoretic. No erythema. No pallor.   LUE fistula +thrill     Psychiatric: She has a normal mood and affect. Her behavior is normal. Thought content normal.   Nursing note and vitals reviewed.      Assessment and Plan:     Atrial fibrillation  I discussed the nature of Watchman device placement, including possible transseptal puncture. We discused risks and benefits at length. Our discussion  included, but was not limited to the risk of death, infection, bleeding, stroke, MI, cardiac perforation, embolism, cardiac tamponade, and other organic injury    Proceed with Watchman device placement  JACE, anesthesia  Will need HD following Watchman placement          Keegan Miller MD  Cardiac Electrophysiology  [facility]

## 2019-04-26 NOTE — CONSULTS
[facility]  Cardiology  Consult Note    Patient Name: Shivani Sheets  MRN: [id number]  Admission Date: 4/26/2019  Hospital Length of Stay: 0 days  Code Status: Prior   Attending Provider: César Thomas MD   Consulting Provider: Yaneli Alonso MD  Primary Care Physician: Eula Weiss MD  Principal Problem:<principal problem not specified>    Patient information was obtained from patient and past medical records.       Subjective:     Chief Complaint:  LAAO device implantation     HPI:   Patient is a 62 yo female with a past medical hx of ESRD on HD, DM type 2 and pHTN here for LAAO implantation (Watchman) with Dr. Thomas. She has a hx of chronic anemia and hx of GI bleed. Denies any currently GI bleed now or hematemesis. Was previously on Warfarin and is now only on Aspirin 81 mg daily. Previous JACE was done on 02/28/2019 which showed GODFREY dimensions of 2.0, 1.7, 1.6, 1.8, 1.6 cm at 0, 30, 45, 90, 135 degrees respectively. GODFREY length ranged from 2.9 to 3.7. No prior CT done to compare to. Patient reports that she underwent HD on Wednesday and tolerated a full session. Patient stated that she tolerated the last JACE without any issues.     Dysphagia or odynophagia:  No  Liver Disease, esophageal disease, or known varices:  No  Upper GI Bleeding: No  Snoring:  No  Sleep Apnea:  Yes  Prior neck surgery or radiation:  Yes parathyroid surgery remotely   History of anesthetic difficulties:  No  Family history of anesthetic difficulties:  No  Last oral intake:  12 hours ago  Able to move neck in all directions:  Yes    Mallampati: 4  ASA: 3          Past Medical History:   Diagnosis Date    Allergy     Anemia     Anticoagulant long-term use     4 years coumadin, asa    Arthritis     Awaiting organ transplant 4/30/2013    Cataract     Central serous chorioretinopathy of eye, right 8/21/2018    CHF (congestive heart failure)     Chronic diarrhea     Chronic kidney disease     Colon  polyps     COPD (chronic obstructive pulmonary disease)     Coronary artery disease     Diabetes mellitus     Diabetic retinopathy     Diverticulosis     Encounter for blood transfusion     ESRD from HTN strtied RRT 1999    Failed  donor kidney transplant      Glaucoma     History of bleeding peptic ulcer      as stated per pt    Hyperlipidemia     Hypertension     Hypothyroidism     Morbid obesity 8/10/2012    Renal hypertension     Stroke     1987       Past Surgical History:   Procedure Laterality Date    CATARACT EXTRACTION W/  INTRAOCULAR LENS IMPLANT Bilateral     COLON SURGERY      COLONOSCOPY      COLONOSCOPY N/A 2019    Performed by Indio [facility]tran MD at [facility] ENDO (2ND FLR)    COLONOSCOPY N/A 2018    Performed by Jose Falk MD at [facility] ENDO (2ND FLR)    EGD (ESOPHAGOGASTRODUODENOSCOPY) N/A 2019    Performed by Miranda Maradiaga MD at [facility] ENDO (2ND FLR)    EGD (ESOPHAGOGASTRODUODENOSCOPY) N/A 2018    Performed by Luis Washington MD at [facility] ENDO (2ND FLR)    ESOPHAGOGASTRODUODENOSCOPY (EGD) N/A 2018    Performed by Kenji Desir MD at [facility] ENDO (2ND FLR)    EYE SURGERY      FRACTURE SURGERY      R arm    HERNIA REPAIR      HYSTERECTOMY      INSERTION-IMPLANT-GLAUCOMA Left 10/12/2017    Performed by Junaid Kern MD at [facility] OR 1ST FLR    KIDNEY TRANSPLANT      NEPHRECTOMY  2008    transplant     PARATHYROID GLAND SURGERY      TONSILLECTOMY      Transesophageal echo (JACE) intra-procedure log documentation N/A 2019    Performed by [facility] Diagnostic Provider at [facility] EP LAB    UPPER GASTROINTESTINAL ENDOSCOPY         Review of patient's allergies indicates:   Allergen Reactions    Penicillins Swelling    Iodine      Other reaction(s): Hives    Sulfamethoxazole-trimethoprim      Other reaction(s): Swelling  Other reaction(s): Hives       Current Facility-Administered Medications on File Prior to Encounter    Medication    sodium chloride 0.9% flush 5 mL     Current Outpatient Medications on File Prior to Encounter   Medication Sig    aspirin 325 MG tablet Take 1 tablet (325 mg total) by mouth once daily.    diltiaZEM (CARDIZEM CD) 300 MG 24 hr capsule Take 1 capsule (300 mg total) by mouth once daily.    dorzolamide-timolol 2-0.5% (COSOPT) 22.3-6.8 mg/mL ophthalmic solution INSTILL 1 DROP IN BOTH EYES TWICE DAILY    pantoprazole (PROTONIX) 40 MG tablet TAKE 1 TABLET BY MOUTH ONCE DAILY    riociguat (ADEMPAS) 2 mg Tab tablet Take 2 mg by mouth 3 (three) times daily.     selexipag (UPTRAVI) 1,000 mcg Tab Take 1,000 mcg by mouth 2 (two) times daily.    ALPHAGAN P 0.1 % Drop once daily.     latanoprost 0.005 % ophthalmic solution INSTILL 1 DROP IN BOTH EYES EVERY DAY AT BEDTIME    levothyroxine (SYNTHROID) 100 MCG tablet Take 100 mcg by mouth. 1 Tablet Oral Every day    LIPITOR 10 mg tablet TAKE 1 BY MOUTH ONCE A DAY (Patient taking differently: TAKE 1 BY MOUTH ONCE nightly)     Family History     Problem Relation (Age of Onset)    Asthma Sister    Blindness Father    Breast cancer Maternal Aunt    Depression Sister    Diabetes Maternal Aunt    Heart attack Father, Mother    Heart failure Father, Mother    Hypertension Father, Mother, Sister, Brother    Kidney disease Brother        Tobacco Use    Smoking status: Former Smoker     Types: Cigarettes     Last attempt to quit: 8/10/2000     Years since quittin.7    Smokeless tobacco: Never Used   Substance and Sexual Activity    Alcohol use: No     Comment: hx of etoh     Drug use: No    Sexual activity: Never     Review of Systems   All other systems reviewed and are negative.    Objective:     Vital Signs (Most Recent):  Temp: 97.5 °F (36.4 °C) (19)  Pulse: 91 (19)  Resp: 20 (19)  BP: (!) 143/84 (19)  SpO2: (!) 91 % (19) Vital Signs (24h Range):  Temp:  [97.5 °F (36.4 °C)] 97.5 °F (36.4 °C)  Pulse:   [81-91] 91  Resp:  [20] 20  SpO2:  [91 %-95 %] 91 %  BP: (122-143)/(78-84) 143/84     Weight: 69.9 kg (154 lb 1.6 oz)  Body mass index is 29.12 kg/m².    SpO2: (!) 91 %  O2 Device (Oxygen Therapy): room air    No intake or output data in the 24 hours ending 04/26/19 0716    Lines/Drains/Airways     Central Venous Catheter Line                 RETIRED! DO NOT USE: Hemodialysis Catheter Arteriovenous fistula Left Forearm -- days          Drain                 Hemodialysis AV Fistula Left upper arm -- days          Peripheral Intravenous Line                 Peripheral IV - Single Lumen 04/26/19 0650 20 G Right Hand less than 1 day         Peripheral IV - Single Lumen 04/26/19 0651 20 G Right Wrist less than 1 day                Physical Exam   Constitutional: She is oriented to person, place, and time. She appears well-developed and well-nourished.   HENT:   Head: Normocephalic and atraumatic.   Eyes: Pupils are equal, round, and reactive to light. EOM are normal.   Neck: Normal range of motion. Neck supple. No JVD present.   Cardiovascular: Normal rate and regular rhythm.   In sinus here   Pulmonary/Chest: Effort normal and breath sounds normal.   Abdominal: Soft. Bowel sounds are normal.   Musculoskeletal: Normal range of motion.   Neurological: She is alert and oriented to person, place, and time.       Significant Labs:   CMP   Recent Labs   Lab 04/25/19  0925      K 3.7   CL 99   CO2 33*   GLU 94   BUN 19   CREATININE 6.2*   CALCIUM 8.7   ANIONGAP 11   ESTGFRAFRICA 8*   EGFRNONAA 7*   , CBC   Recent Labs   Lab 04/25/19  0925   WBC 5.20   HGB 10.1*   HCT 34.5*       and All pertinent lab results from the last 24 hours have been reviewed.    Significant Imaging: Echocardiogram:   Transthoracic echo (TTE) complete (Cupid Only):   Results for orders placed or performed during the hospital encounter of 02/02/19   Transthoracic echo (TTE) complete (Cupid Only)   Result Value Ref Range    Ascending aorta  2.84 cm    STJ 2.75 cm    AV mean gradient 7.43 mmHg    Ao peak drake 1.88 m/s    Ao VTI 33.94 cm    IVS 0.82 0.6 - 1.1 cm    LA size 4.45 cm    Left Atrium Major Axis 6.10 cm    Left Atrium Minor Axis 5.77 cm    LVIDD 4.76 3.5 - 6.0 cm    LVIDS 3.95 2.1 - 4.0 cm    LVOT diameter 2.02 cm    LVOT peak VTI 20.94 cm    PW 1.11 (A) 0.6 - 1.1 cm    MV Peak A Drake 0.89 m/s    E wave decelartion time 248.29 msec    MV Peak E Drake 1.08 m/s    RA Major Axis 5.76 cm    RA Width 4.27 cm    RVDD 3.08 cm    Sinus 2.85 cm    TAPSE 2.24 cm    TR Max Drake 4.46 m/s    TDI LATERAL 0.10     TDI SEPTAL 0.05     LA WIDTH 5.16 cm    LV Diastolic Volume 105.47 mL    LV Systolic Volume 68.06 mL    LVOT peak drake [id number] m/s    LV LATERAL E/E' RATIO 10.80     LV SEPTAL E/E' RATIO 21.60     FS 17 %    LA volume 115.75 cm3    LV mass 159.98 g    Left Ventricle Relative Wall Thickness 0.47 cm    AV valve area 1.98 cm2    AV Velocity Ratio 0.60     AV index (prosthetic) 0.62     E/A ratio 1.21     Mean e' 0.08     LVOT area 3.20 cm2    LVOT stroke volume 67.07 cm3    AV peak gradient 14.14 mmHg    E/E' ratio 14.40     LV Systolic Volume Index 37.8 mL/m2    LV Diastolic Volume Index 58.54 mL/m2    LA Volume Index 64.2 mL/m2    LV Mass Index 88.8 g/m2    Triscuspid Valve Regurgitation Peak Gradient 79.57 mmHg    BSA 1.86 m2    Right Atrial Pressure (from IVC) 8 mmHg    TV rest pulmonary artery pressure 88 mmHg    and X-Ray: CXR: X-Ray Chest 1 View (CXR): No results found for this visit on 04/26/19.    Assessment and Plan:     Atrial fibrillation    Assessment & Plan:     PLAN:  1. JACE for evaluation of GODFREY and assistance during nova-procedural implantation of LAAO device.    -The risks, benefits & alternatives of the procedure were explained to the patient.    -The risks of transesophageal echo include but are not limited to:  Dental trauma, esophageal trauma/perforation, bleeding, laryngospasm/brochospasm, aspiration, sore throat/hoarseness, &  dislodgement of the endotracheal tube/nasogastric tube (where applicable).    -The risks of moderate sedation include hypotension, respiratory depression, arrhythmias, bronchospasm, & death.    -Informed consent was obtained & the patient is agreeable to proceed with the procedure.    I will discuss with the attending physician. Attending addendum is to follow.     Further recommendations per attending addendum          VTE Risk Mitigation (From admission, onward)    None          Yaneli Alonso MD  Cardiology   [facility]

## 2019-04-26 NOTE — PROGRESS NOTES
[facility] NOTE     Patient currently on hemodialysis for removal of uremic toxins .     Patient seen and evaluated on hemodialysis, tolerating treatment, see HD flowsheet for vitals and assessments.      No Hypotension, chest pain, shortness of breath, cramping, nausea or vomiting.      ERIKA Soni DNP, APRN, FNP-C  Department of Nephrology  [facility]  Pager: [phone]

## 2019-04-26 NOTE — PROGRESS NOTES
Dr. reynolds here aware of HR and rhythm new order noted will follow order and continue to monitor. Also called pharmacy for medication.

## 2019-04-26 NOTE — NURSING TRANSFER
Nursing Transfer Note      4/26/2019     Transfer To: [id number]    Transfer via stretcher    Transfer with cardiac monitoring    Transported by yue ziegler    Medicines sent: none    Chart send with patient: Yes    Notified: reported to jaime ziegler    Patient reassessed at: see epic (date, time)    Upon arrival to floor: to room no complaints no distress noted.

## 2019-04-26 NOTE — Clinical Note
Catheter is inserted into the left atrium. Angiography performed of the GODFREY. Angiography performed via hand injection with 10 mL of contrast. Left atrial pressure 27

## 2019-04-26 NOTE — ANESTHESIA PREPROCEDURE EVALUATION
2019  Shivani Sheets is a 63 y.o., female presenting for GODFREY closure device.  Hx of chronic recurrent GIB and severe pHTN.    Past Medical History:   Diagnosis Date    Allergy     Anemia     Anticoagulant long-term use     4 years coumadin, asa    Arthritis     Awaiting organ transplant 2013    Cataract     Central serous chorioretinopathy of eye, right 2018    CHF (congestive heart failure)     Chronic diarrhea     Chronic kidney disease     Colon polyps     COPD (chronic obstructive pulmonary disease)     Coronary artery disease     Diabetes mellitus     Diabetic retinopathy     Diverticulosis     Encounter for blood transfusion     ESRD from HTN strtied RRT 1999    Failed  donor kidney transplant -     Glaucoma     History of bleeding peptic ulcer      as stated per pt    Hyperlipidemia     Hypertension     Hypothyroidism     Morbid obesity 8/10/2012    Renal hypertension     Stroke          Past Surgical History:   Procedure Laterality Date    CATARACT EXTRACTION W/  INTRAOCULAR LENS IMPLANT Bilateral     COLON SURGERY      COLONOSCOPY      COLONOSCOPY N/A 2019    Performed by Inido [facility]tran MD at [facility] ENDO (2ND FLR)    COLONOSCOPY N/A 2018    Performed by Jose Falk MD at [facility] ENDO (2ND FLR)    EGD (ESOPHAGOGASTRODUODENOSCOPY) N/A 2019    Performed by Miranda Maradiaga MD at [facility] ENDO (2ND FLR)    EGD (ESOPHAGOGASTRODUODENOSCOPY) N/A 2018    Performed by Luis Washington MD at [facility] ENDO (2ND FLR)    ESOPHAGOGASTRODUODENOSCOPY (EGD) N/A 2018    Performed by Kenji Desir MD at [facility] ENDO (2ND FLR)    EYE SURGERY      FRACTURE SURGERY      R arm    HERNIA REPAIR      HYSTERECTOMY      INSERTION-IMPLANT-GLAUCOMA Left 10/12/2017    Performed by Junaid Kern MD at [facility] OR 1ST FLR     KIDNEY TRANSPLANT      NEPHRECTOMY  11/2008    transplant     PARATHYROID GLAND SURGERY      TONSILLECTOMY      Transesophageal echo (JACE) intra-procedure log documentation N/A 2/28/2019    Performed by [facility] Provider at [facility] EP LAB    UPPER GASTROINTESTINAL ENDOSCOPY       Review of patient's allergies indicates:   Allergen Reactions    Penicillins Swelling    Iodine      Other reaction(s): Hives    Sulfamethoxazole-trimethoprim      Other reaction(s): Swelling  Other reaction(s): Hives     Current Facility-Administered Medications on File Prior to Encounter   Medication Dose Route Frequency Provider Last Rate Last Dose    sodium chloride 0.9% flush 5 mL  5 mL Intravenous PRN Bethany Nielsen NP         Current Outpatient Medications on File Prior to Encounter   Medication Sig Dispense Refill    aspirin 325 MG tablet Take 1 tablet (325 mg total) by mouth once daily.  0    diltiaZEM (CARDIZEM CD) 300 MG 24 hr capsule Take 1 capsule (300 mg total) by mouth once daily. 90 capsule 3    dorzolamide-timolol 2-0.5% (COSOPT) 22.3-6.8 mg/mL ophthalmic solution INSTILL 1 DROP IN BOTH EYES TWICE DAILY 10 mL 0    pantoprazole (PROTONIX) 40 MG tablet TAKE 1 TABLET BY MOUTH ONCE DAILY 30 tablet 6    riociguat (ADEMPAS) 2 mg Tab tablet Take 2 mg by mouth 3 (three) times daily.       selexipag (UPTRAVI) 1,000 mcg Tab Take 1,000 mcg by mouth 2 (two) times daily.      ALPHAGAN P 0.1 % Drop once daily.       latanoprost 0.005 % ophthalmic solution INSTILL 1 DROP IN BOTH EYES EVERY DAY AT BEDTIME 7.5 mL 0    levothyroxine (SYNTHROID) 100 MCG tablet Take 100 mcg by mouth. 1 Tablet Oral Every day      LIPITOR 10 mg tablet TAKE 1 BY MOUTH ONCE A DAY (Patient taking differently: TAKE 1 BY MOUTH ONCE nightly) 90 tablet 2     Lab Results   Component Value Date    WBC 5.20 04/25/2019    HGB 10.1 (L) 04/25/2019    HCT 34.5 (L) 04/25/2019    MCV 91 04/25/2019     04/25/2019     BMP  Lab Results    Component Value Date     04/25/2019    K 3.7 04/25/2019    CL 99 04/25/2019    CO2 33 (H) 04/25/2019    BUN 19 04/25/2019    CREATININE 6.2 (H) 04/25/2019    CALCIUM 8.7 04/25/2019    ANIONGAP 11 04/25/2019    ESTGFRAFRICA 8 (A) 04/25/2019    EGFRNONAA 7 (A) 04/25/2019         Anesthesia Evaluation    I have reviewed the Patient Summary Reports.     I have reviewed the Medications.     Review of Systems  Anesthesia Hx:  No problems with previous Anesthesia   Denies Personal Hx of Anesthesia complications.   Cardiovascular:   Exercise tolerance: poor Hypertension CAD   CHF    Pulmonary:   COPD Sleep Apnea    Renal/:   Chronic Renal Disease, Dialysis    Hepatic/GI:  Hepatic/GI Normal    Neurological:   CVA, no residual symptoms Denies Seizures.    Endocrine:   Diabetes        Physical Exam  General:  Well nourished    Airway/Jaw/Neck:  Airway Findings: Mouth Opening: Normal Tongue: Normal  General Airway Assessment: Adult  Mallampati: III  Improves to II with phonation.  TM Distance: Normal, at least 6 cm  Jaw/Neck Findings:  Neck ROM: Normal ROM      Dental:  Dental Findings: In tact, Periodontal disease, Mild        Mental Status:  Mental Status Findings:  Cooperative, Alert and Oriented         Anesthesia Plan  Type of Anesthesia, risks & benefits discussed:  Anesthesia Type:  general  Patient's Preference:   Intra-op Monitoring Plan: standard ASA monitors and arterial line  Intra-op Monitoring Plan Comments:   Post Op Pain Control Plan: per primary service following discharge from PACU and IV/PO Opioids PRN  Post Op Pain Control Plan Comments:   Induction:   IV  Beta Blocker:  Patient is not currently on a Beta-Blocker (No further documentation required).       Informed Consent: Patient understands risks and agrees with Anesthesia plan.  Questions answered. Anesthesia consent signed with patient.  ASA Score: 4     Day of Surgery Review of History & Physical:    H&P update referred to the surgeon.      Anesthesia Plan Notes: Awake a line, 2 PIVs.        Ready For Surgery From Anesthesia Perspective.

## 2019-04-27 VITALS
OXYGEN SATURATION: 100 % | HEART RATE: 93 BPM | WEIGHT: 154.13 LBS | HEIGHT: 61 IN | SYSTOLIC BLOOD PRESSURE: 116 MMHG | BODY MASS INDEX: 29.1 KG/M2 | DIASTOLIC BLOOD PRESSURE: 75 MMHG | TEMPERATURE: 98 F | RESPIRATION RATE: 20 BRPM

## 2019-04-27 PROCEDURE — 25000003 PHARM REV CODE 250: Performed by: INTERNAL MEDICINE

## 2019-04-27 RX ORDER — METOPROLOL TARTRATE 25 MG/1
25 TABLET, FILM COATED ORAL 2 TIMES DAILY
Qty: 60 TABLET | Refills: 11 | Status: ON HOLD | OUTPATIENT
Start: 2019-04-27 | End: 2019-06-06 | Stop reason: HOSPADM

## 2019-04-27 RX ADMIN — ASPIRIN 81 MG: 81 TABLET, COATED ORAL at 08:04

## 2019-04-27 RX ADMIN — RIOCIGUAT 2.5 MG: 2.5 TABLET, FILM COATED ORAL at 08:04

## 2019-04-27 RX ADMIN — LEVOTHYROXINE SODIUM 100 MCG: 100 TABLET ORAL at 05:04

## 2019-04-27 RX ADMIN — METOPROLOL TARTRATE 25 MG: 25 TABLET ORAL at 08:04

## 2019-04-27 RX ADMIN — PANTOPRAZOLE SODIUM 40 MG: 40 TABLET, DELAYED RELEASE ORAL at 08:04

## 2019-04-27 NOTE — HOSPITAL COURSE
Shivani Sheets is a 64 yo female with PMHx signficant for ESRD on HD, PAH, DM2, and hx chronic anemia with prior GI bleed who presented on 4/26 for LAAO implantation (Watchman) with Dr. Thomas. Patient underwent successful deployment of Watchman device; she was monitored overnight in short stay unit. No post procedural complications observed and groin site was c/d/i with no palpable hematoma. Patient was ultimately discharged home on 4/27 in stable condition on warfarin and new prescription for metoprolol for rate control of her AF (diltiazem was discontinued due to moderately reduced LVEF). Patient will need follow up JACE in 6 weeks.

## 2019-04-27 NOTE — DISCHARGE SUMMARY
[facility]  Cardiac Electrophysiology  Discharge Summary      Patient Name: Shivani Sheets  MRN: [id number]  Admission Date: 4/26/2019  Hospital Length of Stay: 1 days  Discharge Date and Time:  04/27/2019 12:21 PM  Attending Physician: No att. providers found    Discharging Provider: Georgina Kelsey MD  Primary Care Physician: Eula Weiss MD    HPI:    63 yoF HTN, ESRD on HD, DM, pHTN here for LAAO consideration. She has chronic anemia with some more recent lower GI bleeding. She has been on warfarin for 4 years for CVA prophylaxis due to AF. This was stopped due her recent lower GI bleed 2/19. She has been placed back on warfarin. GI work up has demonstrated no obvious source. She presents today for Watchman device placement.    Currently just on ASA.Took two of the three doses of contrast prep.    Procedure(s) (LRB):  Left atrial appendage closure device (N/A)  Transesophageal echo (JACE) intra-procedure log documentation (N/A)     Indwelling Lines/Drains at time of discharge:  Lines/Drains/Airways     Drain                 Hemodialysis AV Fistula Left upper arm -- days                Hospital Course:  Shivani Sheets is a 62 yo female with PMHx signficant for ESRD on HD, PAH, DM2, and hx chronic anemia with prior GI bleed who presented on 4/26 for LAAO implantation (Watchman) with Dr. Thomas. Patient underwent successful deployment of Watchman device; she was monitored overnight in short stay unit. No post procedural complications observed and groin site was c/d/i with no palpable hematoma. Patient was ultimately discharged home on 4/27 in stable condition on warfarin and new prescription for metoprolol for rate control of her AF (diltiazem was discontinued due to moderately reduced LVEF). Patient will need follow up JACE in 6 weeks.       Consults:   Consults (From admission, onward)        Status Ordering Provider     Inpatient consult to Nephrology  Once     Provider:  (Not yet  assigned)    Completed NITESH MIMS          Significant Diagnostic Studies: Labs:   CMP   Recent Labs   Lab 04/26/19  1515      K 3.5      CO2 27   *   BUN 21   CREATININE 5.4*   CALCIUM 7.7*   ALBUMIN 3.0*   ANIONGAP 11   ESTGFRAFRICA 8.9*   EGFRNONAA 7.7*   , CBC   Recent Labs   Lab 04/26/19  1336   WBC 4.12   HGB 9.3*   HCT 30.9*       and INR   Lab Results   Component Value Date    INR 0.9 04/25/2019    INR 1.0 04/01/2019    INR 1.0 02/14/2019    INR 1.0 02/14/2019       Pending Diagnostic Studies:     None          Final Active Diagnoses:    Diagnosis Date Noted POA    End stage renal disease on dialysis [N18.6, Z99.2]  Not Applicable    Atrial fibrillation [I48.91] 02/11/2014 Yes      Problems Resolved During this Admission:         Discharged Condition: good    Disposition: Home or Self Care    Follow Up:    Patient Instructions:      Ambulatory consult to Anticoagulation Monitoring   Referral Priority: Routine Referral Type: Consultation   Referral Reason: Specialty Services Required   Requested Specialty: Cardiology   Number of Visits Requested: 1     Medications:  Reconciled Home Medications:      Medication List      START taking these medications    metoprolol tartrate 25 MG tablet  Commonly known as:  LOPRESSOR  Take 1 tablet (25 mg total) by mouth 2 (two) times daily.        CHANGE how you take these medications    LIPITOR 10 MG tablet  Generic drug:  atorvastatin  TAKE 1 BY MOUTH ONCE A DAY  What changed:  See the new instructions.        CONTINUE taking these medications    ADEMPAS 2.5 mg tablet  Generic drug:  riociguat  Take 2.5 mg by mouth 3 (three) times daily.     ALPHAGAN P 0.1 % Drop  Generic drug:  brimonidine 0.1%  once daily.     aspirin 325 MG tablet  Take 1 tablet (325 mg total) by mouth once daily.     dorzolamide-timolol 2-0.5% 22.3-6.8 mg/mL ophthalmic solution  Commonly known as:  COSOPT  INSTILL 1 DROP IN BOTH EYES TWICE DAILY     latanoprost 0.005 %  ophthalmic solution  INSTILL 1 DROP IN BOTH EYES EVERY DAY AT BEDTIME     levothyroxine 100 MCG tablet  Commonly known as:  SYNTHROID  Take 100 mcg by mouth. 1 Tablet Oral Every day     pantoprazole 40 MG tablet  Commonly known as:  PROTONIX  TAKE 1 TABLET BY MOUTH ONCE DAILY     UPTRAVI 1,000 mcg Tab  Generic drug:  selexipag  Take 1,000 mcg by mouth 2 (two) times daily.     walker Misc  Commonly known as:  ULTRA-LIGHT ROLLATOR  Use daily     warfarin 5 MG tablet  Commonly known as:  COUMADIN  TAKE 1 1/2 TABLETS BY MOUTH DAILY ON TUESDAY, THURSDAY AND SATURDAY, THEN TAKE 1 TABLET DAILY ON MONDAY, WEDNESDAY, FRIDAY AND SUNDAY        STOP taking these medications    diltiaZEM 300 MG 24 hr capsule  Commonly known as:  CARDIZEM CD            Time spent on the discharge of patient: 35 minutes    Georgina Kelsey MD  Cardiac Electrophysiology  [facility]

## 2019-04-27 NOTE — DISCHARGE INSTRUCTIONS
Instructions     1. Do not strain or lift anything greater than 5 lb for 1 week.   2. Do not drive or operate any dangerous machinery for 24 hours.   3. Keep the dressing on, clean, and dry for 24 hours.   4. After 24 hours, the dressing may be removed and a shower is allowed.   5. Clean the area with mild soap and water and then cover it with a bandage.   6. Once the skin has healed, bathing in a tub or swimming is allowed.   7. Inspect the groin site daily and report to the physician any swelling at the site that   cannot be controlled with manual pressure for 10 minutes, unusual pain at the   access site or affected extremity, unusual swelling at the access site, or signs or   symptoms of infection such as redness, pain, or fever.   Call 911 if you have:   Bleeding from the puncture site that you cannot stop by doing the following:   Relax and lie down right away. Keep your leg flat and apply firm pressure to the site using your fingers and a gauze pad. Keep the pressure on for 20 minutes. Continue this until the bleeding stops. This may take awhile. When bleeding stops, cover the site with a sterile bandage and keep your leg still as much as possible.

## 2019-04-27 NOTE — PROGRESS NOTES
Pt returned to [facility]. Bedrest protocol complete at 1530. Pt ambulated with RN and PCT. Right groin dressing is clean, dry, and intact with no evidence of bleeding or hematoma. No complaints at this time. No acute distress noted.

## 2019-04-27 NOTE — NURSING
Discharge instructions and medlist given.  Patient and spouse verbalized understanding.  Denies need for escort off unit.  Off unit via wheelchair with spouse pushing and will drive her home.

## 2019-04-27 NOTE — PLAN OF CARE
Problem: Adult Inpatient Plan of Care  Goal: Patient-Specific Goal (Individualization)  Outcome: Ongoing (interventions implemented as appropriate)  Pt AAO x3, NAD, s/p watchman on 4/26/19 per Dr Thomas, Rt groin site dsg CDI, no active bleeding and no hematoma noted. Pt had hemodialysis via Lt upper arm fistula on  4/26/19 with 2.5L removed. Call light within pt reach, pt instructed to call staff for any needed assistance, safety maintained. Pt denies needs/concerns at this time, will continue to monitor.

## 2019-04-28 NOTE — ANESTHESIA POSTPROCEDURE EVALUATION
Anesthesia Post Evaluation    Patient: Shivani Sheets    Procedure(s) Performed: Procedure(s) (LRB):  Left atrial appendage closure device (N/A)  Transesophageal echo (JACE) intra-procedure log documentation (N/A)    Final Anesthesia Type: general  Patient location during evaluation: PACU  Patient participation: Yes- Able to Participate  Level of consciousness: awake and alert  Post-procedure vital signs: reviewed and stable  Pain management: adequate  Airway patency: patent  PONV status at discharge: No PONV  Anesthetic complications: no      Cardiovascular status: hemodynamically stable  Respiratory status: unassisted  Hydration status: euvolemic  Follow-up not needed.          Vitals Value Taken Time   /75 4/27/2019  8:04 AM   Temp 36.6 °C (97.9 °F) 4/27/2019  8:04 AM   Pulse 93 4/27/2019  8:04 AM   Resp 20 4/27/2019  8:04 AM   SpO2 100 % 4/27/2019  8:04 AM         Event Time     Out of Recovery 14:10:59          Pain/Suzie Score: Suzie Score: 9 (4/26/2019  2:20 PM)

## 2019-04-29 NOTE — PROGRESS NOTES
Patient verified she takes Coumadin 5 mg strength tab.  I instructed her to take 1 tab daily after 5 pm, she will have lab INR 4/30/19, and clinic education appointment on 5/9/19.

## 2019-04-30 VITALS
HEART RATE: 81 BPM | SYSTOLIC BLOOD PRESSURE: 122 MMHG | WEIGHT: 154.13 LBS | BODY MASS INDEX: 29.1 KG/M2 | OXYGEN SATURATION: 95 % | HEIGHT: 61 IN | TEMPERATURE: 99 F | DIASTOLIC BLOOD PRESSURE: 78 MMHG

## 2019-05-01 ENCOUNTER — LAB VISIT (OUTPATIENT)
Dept: LAB | Facility: HOSPITAL | Age: 64
End: 2019-05-01
Attending: INTERNAL MEDICINE
Payer: MEDICARE

## 2019-05-01 ENCOUNTER — ANTI-COAG VISIT (OUTPATIENT)
Dept: CARDIOLOGY | Facility: CLINIC | Age: 64
End: 2019-05-01
Payer: MEDICARE

## 2019-05-01 DIAGNOSIS — Z79.01 LONG TERM (CURRENT) USE OF ANTICOAGULANTS: ICD-10-CM

## 2019-05-01 LAB
INR PPP: 1.2 (ref 0.8–1.2)
PROTHROMBIN TIME: 11.8 SEC (ref 9–12.5)

## 2019-05-01 PROCEDURE — 85610 PROTHROMBIN TIME: CPT

## 2019-05-01 PROCEDURE — 93793 ANTICOAG MGMT PT WARFARIN: CPT | Mod: ,,,

## 2019-05-01 PROCEDURE — 36415 COLL VENOUS BLD VENIPUNCTURE: CPT | Mod: PO

## 2019-05-01 PROCEDURE — 93793 PR ANTICOAGULANT MGMT FOR PT TAKING WARFARIN: ICD-10-PCS | Mod: ,,,

## 2019-05-01 NOTE — PROGRESS NOTES
INR not at goal. Medications, chart, and patient findings reviewed. See calendar for adjustments to dose and follow up plan.  Pt INR=1.2 today.  Will give 7.5mg 5/1 & 5/3.  Plan to re-assess 5/6.

## 2019-05-01 NOTE — PROGRESS NOTES
Subjective:       Patient ID: Shivani Sheets is a 63 y.o. female.    Chief Complaint: Hypertension    HPI  She returns for management of hypertension.  She has had hypertension for over a year.  Current treatment has included medications outlined in medication list.  She denies chest pain or shortness of breath.  No palpitations.  Denies left arm or neck pain. She fell April 9th.  Please see emergency department record.  She complains of feeling off balance frequently    Past medical history: Hypertension, end-stage renal disease on dialysis, status post failed kidney transplant, pulmonary hypertension, atrial fibrillation, coronary artery disease, rheumatoid arthritis, anemia,sleep apnea, hypothyroidism, status post GI bleed, glaucoma, status post hernia repair, colon adenoma.  She had a colonoscopy June 2018     Medications: Xalatan, Synthroid 0.1 mg daily, Lipitor 10 mg daily,selexipag,diltiazem 300 mg daily,adempas     ALLERGIES: Penicillin, iodine, Bactrim       Review of Systems   Constitutional: Negative for chills, fatigue, fever and unexpected weight change.   Respiratory: Negative for chest tightness and shortness of breath.    Cardiovascular: Negative for chest pain and palpitations.   Gastrointestinal: Negative for abdominal pain and blood in stool.   Neurological: Negative for dizziness, syncope, numbness and headaches.       Objective:      Physical Exam   HENT:   Right Ear: External ear normal.   Left Ear: External ear normal.   Nose: Nose normal.   Mouth/Throat: Oropharynx is clear and moist.   Eyes: Pupils are equal, round, and reactive to light.   Neck: Normal range of motion.   Cardiovascular: Normal rate and regular rhythm.   No murmur heard.  Pulmonary/Chest: Breath sounds normal.   Abdominal: She exhibits no distension. There is no hepatosplenomegaly. There is no tenderness.   Lymphadenopathy:     She has no cervical adenopathy.     She has no axillary adenopathy.   Neurological: She has  normal strength and normal reflexes. No cranial nerve deficit or sensory deficit.       Assessment/Plan       Assessment and plan:  1.  Hypertension:  Controlled  2.  Balance disorder:  Prescription for Rollator walker given.  Scheduled neurology and physical medicine appointments

## 2019-05-02 NOTE — PROGRESS NOTES
Patient advised to take (WArfarin 5mg daily, except 7.5mg Friday). Patient verbalized understanding.

## 2019-05-02 NOTE — PROGRESS NOTES
Summary:  Spoke with pt regarding follow-up - pt states she is doing good - states she is weak from the procedure - the watchman- pt states she started back on her Coumadin on Saturday - pt stated she got a call yesterday to increase coumadin this week every other day till Sunday - pt denies any bleeding or SOB- pt states that she feels bloated today -pt states she was not feeling well at all when she came out of the hospital- pt says that the doctor told her on Monday she was depressed and pt stated she did not want to argue with him but today in repeating story pt started laughing - pt stated she is not taking any Ambien - states it spaces her out- pt states she respects him as a doctor her nephrologists but he does not know what is going on with her - pt states that she is feeling better today - she thinks that it was the NPO on Thursday that may her feel weak- pt stated the nephrologist wanted her to have an extra treatment this week stating he was concerned about fluid in her lungs- pt states the doctor wants her to be at 68.5 kilos' - pt stated she has been 90 mainly over the past 2-3 years and has gradually continued to loose per pt - pt stated she doesn't know how much more she can loose - pt states she continues to watch her salt intake and her renal diet as she has been doing this for years- pt states she does know what to do and not to do.  Pt states she will decide onSaturday if she needs an extra dialysis treament  Pt states that prior to the procedure her hips and her feet were so swollen- she stated her son told her her legs looked like they were going to pop - pt states however when she cam home Saturday - the pain I the pain and legs are no longer swollen- pt states she can walk now and it feels good and she has not awaken in pain since the procedure    Interventions:  Allowed pt to vent and express her feelings, frustrationas well as positive outcomes with watchman procedure.  Praised pt for her  compliance in diet and other areas  Encouraged pt to follow recommendation of dialysis and clinic tomorrow if they feel she needs more fluid off or there is fluid in the lungs-     Plan:  Follow-up next week on Tuesday to see how pt is feeling post procedure or if pt decided to do extra dialysis  Assess for further educational opportunitesi  Todays OPCM Self-Management Care Plan was developed with the patients/caregivers input and was based on identified barriers from todays assessment.  Goals were written today with the patient/caregiver and the patient has agreed to work towards these goals to improve his/her overall well-being. Patient verbalized understanding of the care plan, goals, and all of today's instructions. Encouraged patient/caregiver to communicate with his/her physician and health care team about health conditions and the treatment plan.  Provided my contact information today and encouraged patient/caregiver to call me with any questions as needed.

## 2019-05-06 ENCOUNTER — ANTI-COAG VISIT (OUTPATIENT)
Dept: CARDIOLOGY | Facility: CLINIC | Age: 64
End: 2019-05-06
Payer: MEDICARE

## 2019-05-06 ENCOUNTER — LAB VISIT (OUTPATIENT)
Dept: LAB | Facility: HOSPITAL | Age: 64
End: 2019-05-06
Payer: MEDICARE

## 2019-05-06 DIAGNOSIS — Z79.01 LONG TERM (CURRENT) USE OF ANTICOAGULANTS: ICD-10-CM

## 2019-05-06 LAB
INR PPP: 1.9 (ref 0.8–1.2)
PROTHROMBIN TIME: 18.4 SEC (ref 9–12.5)

## 2019-05-06 PROCEDURE — 85610 PROTHROMBIN TIME: CPT

## 2019-05-06 PROCEDURE — 93793 ANTICOAG MGMT PT WARFARIN: CPT | Mod: ,,, | Performed by: INTERNAL MEDICINE

## 2019-05-06 PROCEDURE — 36415 COLL VENOUS BLD VENIPUNCTURE: CPT | Mod: PO

## 2019-05-06 PROCEDURE — 93793 PR ANTICOAGULANT MGMT FOR PT TAKING WARFARIN: ICD-10-PCS | Mod: ,,, | Performed by: INTERNAL MEDICINE

## 2019-05-06 NOTE — PROGRESS NOTES
INR not at goal. Medications, chart, and patient findings reviewed. See calendar for adjustments to dose and follow up plan. However, continue current regimen.  Pt scheduled for follow-up 5/9. Expected maintenance regimen ~40mg/wk.

## 2019-05-07 ENCOUNTER — TELEPHONE (OUTPATIENT)
Dept: INTERNAL MEDICINE | Facility: CLINIC | Age: 64
End: 2019-05-07

## 2019-05-07 RX ORDER — LEVOTHYROXINE SODIUM 100 UG/1
100 TABLET ORAL
Qty: 30 TABLET | Refills: 3 | Status: SHIPPED | OUTPATIENT
Start: 2019-05-07

## 2019-05-07 NOTE — TELEPHONE ENCOUNTER
----- Message from Lani Hutchinson MD sent at 5/6/2019 10:07 AM CDT -----  Hello,  I am taking care of Ms. Sheets for her ESRD.  I refilled her Lipitor today.  She states she needs refills on her Synthroid.   If you are her still her PCP, can you please address?    Thanks    SG

## 2019-05-09 ENCOUNTER — OUTPATIENT CASE MANAGEMENT (OUTPATIENT)
Dept: ADMINISTRATIVE | Facility: OTHER | Age: 64
End: 2019-05-09

## 2019-05-09 ENCOUNTER — ANTI-COAG VISIT (OUTPATIENT)
Dept: CARDIOLOGY | Facility: CLINIC | Age: 64
End: 2019-05-09
Payer: MEDICARE

## 2019-05-09 DIAGNOSIS — Z79.01 LONG TERM (CURRENT) USE OF ANTICOAGULANTS: ICD-10-CM

## 2019-05-09 DIAGNOSIS — I48.19 PERSISTENT ATRIAL FIBRILLATION: Primary | ICD-10-CM

## 2019-05-09 LAB — INR PPP: 2.2 (ref 2–3)

## 2019-05-09 PROCEDURE — 93793 PR ANTICOAGULANT MGMT FOR PT TAKING WARFARIN: ICD-10-PCS | Mod: ,,,

## 2019-05-09 PROCEDURE — 93793 ANTICOAG MGMT PT WARFARIN: CPT | Mod: ,,,

## 2019-05-09 PROCEDURE — 85610 PROTHROMBIN TIME: CPT | Mod: PBBFAC,PO

## 2019-05-09 NOTE — PROGRESS NOTES
INR good. Patient reports taking 7.5mg Mon/Wed. She reports not eating well since surgery. No other changes. We discussed GI bleeding history and signs to watch for. Educated patient on coumadin diet restrictions, bleeding risks, potential for medication interactions, and when to call the coumadin clinic. Handouts given for reference. She will be avoiding high vit K foods. Dose adjusted based on weekly trend and historical dosing. Recheck INR in 1 week

## 2019-05-09 NOTE — PROGRESS NOTES
Summary:  Spoke with pt regarding follow-up - pt stated that her A/C went out last night and the repairman won't get there until after 3 pm - other than that -pt states she is feeling better and that she went to Coumadin class this morning and stated they are happy with her Coumadin levels and  Pt states the watchman seems to be helping - pt states she had had no episodes of bleeding - pt states she needs for her appetite to come back - pt reports that she has never been a big eater - pt states that she has to cook for herself- pt states that her son will fix certain things for pt when she asks  - pt states that other than being hot she is really doing much better -pt denies any falls or SOB-pt states she is being complaint so she can get back to going out and doing things with her friends. Pt states that her family is planning to take her out for mothers day    Interventions:  Praised pt for compliance with Coumadin and going to the clinic  Encouraged pt when she eats to continue to eat healthy as she works on her appetite    Plan:  Dicussed possible D/C in 2 weeks if pt continues to do well  Todays OPCM Self-Management Care Plan was developed with the patients/caregivers input and was based on identified barriers from todays assessment.  Goals were written today with the patient/caregiver and the patient has agreed to work towards these goals to improve his/her overall well-being. Patient verbalized understanding of the care plan, goals, and all of today's instructions. Encouraged patient/caregiver to communicate with his/her physician and health care team about health conditions and the treatment plan.  Provided my contact information today and encouraged patient/caregiver to call me with any questions as needed.

## 2019-05-10 ENCOUNTER — TELEPHONE (OUTPATIENT)
Dept: INTERNAL MEDICINE | Facility: CLINIC | Age: 64
End: 2019-05-10

## 2019-05-10 NOTE — TELEPHONE ENCOUNTER
----- Message from Jerry Hoffman sent at 5/10/2019  1:08 PM CDT -----  Contact: Judith  [phone]  Twin is calling to received patient clinic notes , please call and advise, Thanks

## 2019-05-13 ENCOUNTER — TELEPHONE (OUTPATIENT)
Dept: INTERNAL MEDICINE | Facility: CLINIC | Age: 64
End: 2019-05-13

## 2019-05-13 NOTE — TELEPHONE ENCOUNTER
----- Message from Jerry Hoffman sent at 5/13/2019 12:29 PM CDT -----  Contact: [phone]  Patient  requesting a call from the office in regards to getting order for walker (ULTRA-LIGHT ROLLATOR) Misc . Please call and advise. Thanks

## 2019-05-14 LAB
POC ACTIVATED CLOTTING TIME K: 268 SEC (ref 74–137)
POC ACTIVATED CLOTTING TIME K: 290 SEC (ref 74–137)
SAMPLE: ABNORMAL
SAMPLE: ABNORMAL

## 2019-05-16 ENCOUNTER — ANTI-COAG VISIT (OUTPATIENT)
Dept: CARDIOLOGY | Facility: CLINIC | Age: 64
End: 2019-05-16
Payer: MEDICARE

## 2019-05-16 DIAGNOSIS — Z79.01 LONG TERM (CURRENT) USE OF ANTICOAGULANTS: ICD-10-CM

## 2019-05-16 LAB — INR PPP: 4 (ref 2–3)

## 2019-05-16 PROCEDURE — 93793 ANTICOAG MGMT PT WARFARIN: CPT | Mod: ,,,

## 2019-05-16 PROCEDURE — 85610 PROTHROMBIN TIME: CPT | Mod: PBBFAC,PO

## 2019-05-16 PROCEDURE — 93793 PR ANTICOAGULANT MGMT FOR PT TAKING WARFARIN: ICD-10-PCS | Mod: ,,,

## 2019-05-16 NOTE — PROGRESS NOTES
INR low. Patient confirmed dose and compliance. She is nauseated today and has not been eating well this week. No signs or symptoms of bleeding. No other changes. We will hold coumadin dose today and decrease for now. I suspect acute health change causing INR to be high. Follow closely

## 2019-05-17 ENCOUNTER — OUTPATIENT CASE MANAGEMENT (OUTPATIENT)
Dept: ADMINISTRATIVE | Facility: OTHER | Age: 64
End: 2019-05-17

## 2019-05-20 ENCOUNTER — TELEPHONE (OUTPATIENT)
Dept: ELECTROPHYSIOLOGY | Facility: CLINIC | Age: 64
End: 2019-05-20

## 2019-05-20 NOTE — TELEPHONE ENCOUNTER
Left message for patient to return call. Call back number left. Post Watchman JACE is scheduled for 6/13/19 with 9am arrival time.

## 2019-05-21 ENCOUNTER — ANTI-COAG VISIT (OUTPATIENT)
Dept: CARDIOLOGY | Facility: CLINIC | Age: 64
End: 2019-05-21
Payer: MEDICARE

## 2019-05-21 ENCOUNTER — PROCEDURE VISIT (OUTPATIENT)
Dept: OPHTHALMOLOGY | Facility: CLINIC | Age: 64
End: 2019-05-21
Payer: MEDICARE

## 2019-05-21 VITALS — DIASTOLIC BLOOD PRESSURE: 70 MMHG | SYSTOLIC BLOOD PRESSURE: 101 MMHG | HEART RATE: 68 BPM

## 2019-05-21 DIAGNOSIS — H40.53X2 NEOVASCULAR GLAUCOMA OF BOTH EYES, MODERATE STAGE: ICD-10-CM

## 2019-05-21 DIAGNOSIS — Z79.01 LONG TERM (CURRENT) USE OF ANTICOAGULANTS: Primary | ICD-10-CM

## 2019-05-21 DIAGNOSIS — E11.3513 CONTROLLED TYPE 2 DIABETES MELLITUS WITH BOTH EYES AFFECTED BY PROLIFERATIVE RETINOPATHY AND MACULAR EDEMA, WITHOUT LONG-TERM CURRENT USE OF INSULIN: Primary | ICD-10-CM

## 2019-05-21 DIAGNOSIS — H35.711 CENTRAL SEROUS CHORIORETINOPATHY OF EYE, RIGHT: ICD-10-CM

## 2019-05-21 LAB — INR PPP: 2.9 (ref 2–3)

## 2019-05-21 PROCEDURE — 93793 ANTICOAG MGMT PT WARFARIN: CPT | Mod: ,,, | Performed by: PHARMACIST

## 2019-05-21 PROCEDURE — 85610 PROTHROMBIN TIME: CPT | Mod: PBBFAC

## 2019-05-21 PROCEDURE — 93793 PR ANTICOAGULANT MGMT FOR PT TAKING WARFARIN: ICD-10-PCS | Mod: ,,, | Performed by: PHARMACIST

## 2019-05-21 PROCEDURE — 92014 COMPRE OPH EXAM EST PT 1/>: CPT | Mod: S$PBB,,, | Performed by: OPHTHALMOLOGY

## 2019-05-21 PROCEDURE — 92014 PR EYE EXAM, EST PATIENT,COMPREHESV: ICD-10-PCS | Mod: S$PBB,,, | Performed by: OPHTHALMOLOGY

## 2019-05-21 PROCEDURE — 92226 PR SPECIAL EYE EXAM, SUBSEQUENT: ICD-10-PCS | Mod: S$PBB,50,, | Performed by: OPHTHALMOLOGY

## 2019-05-21 PROCEDURE — 92226 PR SPECIAL EYE EXAM, SUBSEQUENT: CPT | Mod: S$PBB,50,, | Performed by: OPHTHALMOLOGY

## 2019-05-21 PROCEDURE — 92226 PR SPECIAL EYE EXAM, SUBSEQUENT: CPT | Mod: PBBFAC | Performed by: OPHTHALMOLOGY

## 2019-05-21 NOTE — PROGRESS NOTES
HPI     8 week / OCT/ Ozurdex  DLS-03/26/2019 Dr. Hilario     Pt sts Od vision more blurry.     (-)Flashes (-)Floaters    EYE MEDS:   Cosopt BID OD  Xal q day OD  Alphagan  BID OD       PF 1% BID OS ran out and needs refill     HPI     4 week / OCT/ Ozurdex  DLS-02/19/2019 Dr. Hilario     Pt sts vision fluctuates some days better than others   Occasional pain in OD thinks due to allergies     (-)Flashes (-)Floaters      OCT  OD - ERM with small subfoveal SRF pocket  OS - central ME    Prior  FA - increased NV OU  Late macular leakage OU      A/P     1. Neovascular Glaucoma OU  PDR/OIS OU - significant vascular disease -?RVO OD  OD - s/p GDD with Morgan 2013  OS  S/p BV with JDN 10/17    10/18 increased ME OS - ?CME from RVO vs DME  12/18 - increased DME OS  S/p resumed Avastin OS x2, Ozurdex OS x 1 3/19  S/p PRP fillin OD 1/19, OS 2/19    DME much improved after Ozurdex OS, observe today      Both eyes  Cosopt BID  Xal q day  Alphagan BID      2. ? OD  Recent PO steroids - pt taking 0.5 on taper  ERM possibly contributing  Recent elevated BP, also predominant pulmonary HTN with recent exacerbation - could contribute to small uveal effusiions - will  Monitor    3. ERM OD  No MMP  Follow    4. HTN Ret OU  Contributing to #2    5. ESRD on HD    6. PCIOL OU        8 weeks OCT   gradual onset

## 2019-05-21 NOTE — PROGRESS NOTES
INR at goal. Medications and chart reviewed. No changes noted to necessitate adjustment of warfarin or follow-up plan. See calendar.  Patient denies any changes in diet, medications, or health that would effect warfarin therapy.  Patient was re-educated on situations that would require placing a call to the coumadin clinic, including bleeding or unusual bruising issues, changes in health, diet or medications, upcoming procedures that require warfarin interruption, and missed coumadin dose(s). Patient expressed understanding that avoidance of consistency with these parameters could cause fluctuations in INR, leading to more frequent visits and increase risk of adverse events.

## 2019-05-22 DIAGNOSIS — H40.9 GLAUCOMA FOLLOWING SURGERY: Primary | ICD-10-CM

## 2019-05-22 RX ORDER — PREDNISOLONE ACETATE 10 MG/ML
1 SUSPENSION/ DROPS OPHTHALMIC EVERY 4 HOURS
Qty: 5 ML | Refills: 6 | Status: SHIPPED | OUTPATIENT
Start: 2019-05-22 | End: 2020-05-21

## 2019-05-28 ENCOUNTER — TELEPHONE (OUTPATIENT)
Dept: PHYSICAL MEDICINE AND REHAB | Facility: CLINIC | Age: 64
End: 2019-05-28

## 2019-05-28 NOTE — TELEPHONE ENCOUNTER
Called and left messages on the home number and the family member's number asking for a callback, that Dr Hawkins is not in the office today and that we would have to reschedule for this Thursday.  Attempted the main number again, Ms Parrish answered, she rescheduled for 05/30/2019 at 1:00pm.  Informed her of where the location is and how to check in.

## 2019-05-30 ENCOUNTER — INITIAL CONSULT (OUTPATIENT)
Dept: PHYSICAL MEDICINE AND REHAB | Facility: CLINIC | Age: 64
End: 2019-05-30
Payer: MEDICARE

## 2019-05-30 VITALS
DIASTOLIC BLOOD PRESSURE: 67 MMHG | HEART RATE: 60 BPM | WEIGHT: 155 LBS | BODY MASS INDEX: 29.27 KG/M2 | HEIGHT: 61 IN | SYSTOLIC BLOOD PRESSURE: 98 MMHG

## 2019-05-30 DIAGNOSIS — R29.898 LEG WEAKNESS, BILATERAL: Primary | ICD-10-CM

## 2019-05-30 DIAGNOSIS — R26.9 GAIT ABNORMALITY: ICD-10-CM

## 2019-05-30 PROCEDURE — 99999 PR PBB SHADOW E&M-EST. PATIENT-LVL III: CPT | Mod: PBBFAC,,, | Performed by: PHYSICAL MEDICINE & REHABILITATION

## 2019-05-30 PROCEDURE — 99204 OFFICE O/P NEW MOD 45 MIN: CPT | Mod: S$PBB,,, | Performed by: PHYSICAL MEDICINE & REHABILITATION

## 2019-05-30 PROCEDURE — 99999 PR PBB SHADOW E&M-EST. PATIENT-LVL III: ICD-10-PCS | Mod: PBBFAC,,, | Performed by: PHYSICAL MEDICINE & REHABILITATION

## 2019-05-30 PROCEDURE — 99213 OFFICE O/P EST LOW 20 MIN: CPT | Mod: PBBFAC,PO | Performed by: PHYSICAL MEDICINE & REHABILITATION

## 2019-05-30 PROCEDURE — 99204 PR OFFICE/OUTPT VISIT, NEW, LEVL IV, 45-59 MIN: ICD-10-PCS | Mod: S$PBB,,, | Performed by: PHYSICAL MEDICINE & REHABILITATION

## 2019-05-30 NOTE — LETTER
May 30, 2019      Eula Weiss MD  [address]  [facility] [address]           [facility]  [address]  [facility] [address]  Phone: [phone]          Patient: Shivani Sheets   MR Number: [id number]   YOB: 1955   Date of Visit: 5/30/2019       Dear Dr. Eula Weiss:    Thank you for referring Shivani Sheets to me for evaluation. Attached you will find relevant portions of my assessment and plan of care.    If you have questions, please do not hesitate to call me. I look forward to following Shivani Sheets along with you.    Sincerely,    Junaid Hawkins, DO    Enclosure  CC:  No Recipients    If you would like to receive this communication electronically, please contact [email or web address] or [phone] to request more information on [employer] Link access.    For providers and/or their staff who would like to refer a patient to [facility], please contact us through our one-stop-shop provider referral line, [facility], at [phone].    If you feel you have received this communication in error or would no longer like to receive these types of communications, please e-mail [email or web address]

## 2019-05-30 NOTE — PROGRESS NOTES
Summary:  Spoke with pt regarding follow-up - pt states she went to the MD today about her balance - pt stated that she was so concerned with her balance she says she forgot her Coumadin clinic appt.  Pt reports that her B/P has been really low  And that the doctor thinks that that may be the cause of pt feeling wobbly - pt says at dialysis they have to turn the machine off because her B/P goes below 100 - pt states that she is taking the metoprolol 2x a day but she had backed recently backed off the medication herself beause it was making her feel back and it was turning her machine off at the dialysis clinic - pt says she started taking 1 pill a day about a week ago - reports that the doctor took many notes and will be contacting her PCP about changing her medication on her B/P    Pt states she is going to get on PT- pt states she is not walking with her walker or her cane- says the doctor wants to work on her her hips and muscles and core strengthening rather than given medication  Pt says doctor is going to write the orders for PT - Pt says her appetite has not come back and she has lost weight 175 lbs down to 152 lbs  over the last 6 months- pt states she has been taking protein bars etc.  Pt is seeing a nutritionist at the dialysis clinic and is encouraging her to try to eat 3 meals a day    Interventions:  Allowed pt to vent her frustrations with her balance, B/P and all of her fluid not being able to be pulled off with dialysis because her pressure drops so low   Encouraged pt to go to PT when the orders go thru  Encouraged pt to continue to try to eat and follow the nutritionist advice  Encouraged pt to continue with assistive walking devices    Plan:  Follow-up next week on medication and metoprolol dosage  Begin to discuss possible D/C  Ensure pt follows-up with PT and medications    Todays OPCM Self-Management Care Plan was developed with the patients/caregivers input and was based on identified  barriers from todays assessment.  Goals were written today with the patient/caregiver and the patient has agreed to work towards these goals to improve his/her overall well-being. Patient verbalized understanding of the care plan, goals, and all of today's instructions. Encouraged patient/caregiver to communicate with his/her physician and health care team about health conditions and the treatment plan.  Provided my contact information today and encouraged patient/caregiver to call me with any questions as needed.

## 2019-05-30 NOTE — PROGRESS NOTES
"[facility] Initial Visit Note    Chief Complaint: Gait/balance abnormality    HPI: Shivani Sheets is a 63 y.o. female who presents for evaluation of gait abnormality. She has been having balance issues for the past 2 months or so.  She has fallen twice during that time.  She feels "as if she is drunk" with a "lightheadedness".  She states that her BP has been low since her medication was changed at her  hoispitalization.  She has been having low BP issues in HD.     No spinning sensation at all.  She takes her time moving to standing position and that helps.  She does have chronic knee (R>L), bilateral hip, low back pain.  The left hip has tenderness over the greater trochanter.  She has had a few cortisone injections in the past for this (one that didn't work, one that lasted 2 months, and one that lasted 10 years), but prefers a more conservative approach at this time.        She uses a scooter in the community, and a single point cane or rollator at the home       (+) numbness/tingling in legs when dizziness comes, not otherwise.  (+) weakness in legs diffusely when she gets lightheaded.  She also has chronic weakness of her legs, aggravated by these lightheaded episodes  (--) recent fevers        PMH:   Past Medical History:   Diagnosis Date    Allergy     Anemia     Anticoagulant long-term use     4 years coumadin, asa    Arthritis     Awaiting organ transplant 2013    Cataract     Central serous chorioretinopathy of eye, right 2018    CHF (congestive heart failure)     Chronic diarrhea     Chronic kidney disease     Colon polyps     COPD (chronic obstructive pulmonary disease)     Coronary artery disease     Diabetes mellitus     Diabetic retinopathy     Diverticulosis     Encounter for blood transfusion     ESRD from HTN strtied RRT 1999    Failed  donor kidney transplant      Glaucoma     History of bleeding peptic ulcer     's as stated " per pt    Hyperlipidemia     Hypertension     Hypothyroidism     Morbid obesity 8/10/2012    Renal hypertension     Stroke     1987                 Past Surgical History:   Procedure Laterality Date    CATARACT EXTRACTION W/  INTRAOCULAR LENS IMPLANT Bilateral     COLON SURGERY      COLONOSCOPY      COLONOSCOPY N/A 2/4/2019    Performed by Indio Beltran [facility] at [facility] (2ND FLR)    COLONOSCOPY N/A 6/12/2018    Performed by Jose Falk [facility] at [facility] (2ND FLR)    EGD (ESOPHAGOGASTRODUODENOSCOPY) N/A 1/14/2019    Performed by Miranda Maradiaga MD at [facility] (2ND FLR)    EGD (ESOPHAGOGASTRODUODENOSCOPY) N/A 9/11/2018    Performed by Luis Washington MD at [facility] (2ND FLR)    ESOPHAGOGASTRODUODENOSCOPY (EGD) N/A 2/27/2018    Performed by Kenji Desir MD at [facility] (2ND FLR)    EYE SURGERY      FRACTURE SURGERY      R arm    HERNIA REPAIR      HYSTERECTOMY      INSERTION-IMPLANT-GLAUCOMA Left 10/12/2017    Performed by Junaid Kern MD at [facility] OR 1ST FLR    KIDNEY TRANSPLANT      Left atrial appendage closure device N/A 4/26/2019    Performed by César Thomas MD at [facility] EP LAB    NEPHRECTOMY  11/2008    transplant     PARATHYROID GLAND SURGERY      TONSILLECTOMY      Transesophageal echo (JACE) intra-procedure log documentation N/A 4/26/2019    Performed by Dos Diagnostic Provider at [facility]    Transesophageal echo (JACE) intra-procedure log documentation N/A 2/28/2019    Performed by [facility] Provider at [facility]    UPPER GASTROINTESTINAL ENDOSCOPY         Family Hx:   Family History   Problem Relation Age of Onset    Heart attack Father     Heart failure Father     Hypertension Father     Blindness Father     Heart attack Mother     Heart failure Mother     Hypertension Mother     Asthma Sister     Depression Sister     Hypertension Sister     Hypertension Brother     Kidney disease Brother         ESRD    Diabetes Maternal Aunt     Breast  cancer Maternal Aunt     Amblyopia Neg Hx     Cataracts Neg Hx     Macular degeneration Neg Hx     Retinal detachment Neg Hx     Strabismus Neg Hx     Colon cancer Neg Hx     Esophageal cancer Neg Hx         Social History:   Social History     Socioeconomic History    Marital status:      Spouse name: Not on file    Number of children: Not on file    Years of education: Not on file    Highest education level: Not on file   Occupational History    Not on file   Social Needs    Financial resource strain: Not on file    Food insecurity:     Worry: Not on file     Inability: Not on file    Transportation needs:     Medical: Not on file     Non-medical: Not on file   Tobacco Use    Smoking status: Former Smoker     Types: Cigarettes     Last attempt to quit: 8/10/2000     Years since quittin.8    Smokeless tobacco: Never Used   Substance and Sexual Activity    Alcohol use: No     Comment: hx of etoh     Drug use: No    Sexual activity: Never   Lifestyle    Physical activity:     Days per week: Not on file     Minutes per session: Not on file    Stress: Not on file   Relationships    Social connections:     Talks on phone: Not on file     Gets together: Not on file     Attends Spiritism service: Not on file     Active member of club or organization: Not on file     Attends meetings of clubs or organizations: Not on file     Relationship status: Not on file   Other Topics Concern    Not on file   Social History Narrative    Shivani had three children and 11grandchildren.  She lives alone.  She is on disability.        Allergies:   Review of patient's allergies indicates:   Allergen Reactions    Penicillins Swelling    Iodine      Other reaction(s): Hives    Sulfamethoxazole-trimethoprim      Other reaction(s): Swelling  Other reaction(s): Hives       Current Meds:   Current Outpatient Medications   Medication Sig Dispense Refill    ALPHAGAN P 0.1 % Drop once daily.       aspirin  325 MG tablet Take 1 tablet (325 mg total) by mouth once daily.  0    atorvastatin (LIPITOR) 10 MG tablet TAKE 1 BY MOUTH ONCE nightly 90 tablet 2    dorzolamide-timolol 2-0.5% (COSOPT) 22.3-6.8 mg/mL ophthalmic solution INSTILL 1 DROP IN BOTH EYES TWICE DAILY 10 mL 0    latanoprost 0.005 % ophthalmic solution INSTILL 1 DROP IN BOTH EYES EVERY DAY AT BEDTIME 7.5 mL 0    levothyroxine (SYNTHROID) 100 MCG tablet Take 1 tablet (100 mcg total) by mouth before breakfast. 30 tablet 3    metoprolol tartrate (LOPRESSOR) 25 MG tablet Take 1 tablet (25 mg total) by mouth 2 (two) times daily. 60 tablet 11    pantoprazole (PROTONIX) 40 MG tablet TAKE 1 TABLET BY MOUTH ONCE DAILY 30 tablet 6    prednisoLONE acetate (PRED FORTE) 1 % DrpS Place 1 drop into the left eye every 4 (four) hours. 5 mL 6    riociguat (ADEMPAS) 2.5 mg tablet Take 2.5 mg by mouth 3 (three) times daily.       selexipag (UPTRAVI) 1,000 mcg Tab Take 1,000 mcg by mouth 2 (two) times daily.      walker (ULTRA-LIGHT ROLLATOR) Misc Use daily 1 each 0    warfarin (COUMADIN) 5 MG tablet TAKE 1 1/2 TABLETS BY MOUTH DAILY ON TUESDAY, THURSDAY AND SATURDAY, THEN TAKE 1 TABLET DAILY ON MONDAY, WEDNESDAY, FRIDAY AND SUNDAY 120 tablet 0     Current Facility-Administered Medications   Medication Dose Route Frequency Provider Last Rate Last Dose    dexamethasone (ozurdex) 0.7 mg io implant  0.7 mg Intraocular 1 time in Clinic/HOD ERIKA Hilario MD         Facility-Administered Medications Ordered in Other Visits   Medication Dose Route Frequency Provider Last Rate Last Dose    sodium chloride 0.9% flush 5 mL  5 mL Intravenous PRN Bethany Nielsen NP           Review of Systems:  11 Point ROS (constitutional,HEENT,Resp,CV,GI,,Skin,Endo/Heme/Allergy,Psych,Neuro,MSK) negative aside from what is mentioned in HPI above.      Physical Exam:  General/Constitutional: Awake, in no acute distress  Psych: Normal affect  CV: Warm, well perfused  extremities  Resp: Normal rate, unlabored breathing  Skin: No erythema or rash on exposed skin  Lymph: No lympedema  Bilateral Hip Exam:  Inspection: No gross abnormalities on exam.  No joint warmth or erythema.    Palpation: There was tenderness to palpation over the left GTB  ROM: ROM was Moderately decreased.  There was pain with ROM.  Provocative tests:   (+) FABERs - pain in the right groin and left SIJ/lower lumbar facets  Neuro exam:  DTRs: absent patellar, normal and symmetric otherwise  Motor:  Motor exam showed weakness of Bilateral hip flexors (3/5), knee extensors (4+/5 Rei), ADF (4+/5 Rei).  Otherwise, strength was full in the bilateral lower extremities.    Sensation: Sensation to light touch is abnormal to the bilateral feet diffusely (sparing medial ankles).  Otherwise, sensation to light touch is normal in all dermatomes and peripheral nerve distributions to the bilateral lower extremities  Pandya: (--)Bilaterally  Gait: slow, narrow base of support, feet shuffle  Coordination: intact with LEs        Imaging: I personally reviewed imaging today  XR Left hip (4/2019): Age-related mild degenerative change of the included lower lumbosacral spine and bilateral sacroiliac and hip joints  XR L spine (8/2018): Adequate alignment of the lumbar spine without significant vertebral body height loss or disc space height loss.  There is lower lumbar facet arthropathy.  The facet joints are aligned    Impression: Shivani Sheets is a 63 y.o. female who presents with:   1.  Gait abnormality/balance issue:  I think this is multifactorial (OH, leg weakness, osteoarthritis of the hips/SIJ/lumbar facets, peripheral polyneuropathy), but primarily related to orthostatic hypotension (she is following with multiple specialists regarding this including Nephrology and Cardiology).  From a musculoskeletal standpoint, she does have bilateral hip flexor weakness and diffuse leg weakness as well.  2.  Hip flexor  weakness>distal leg weakness:  Secondary to hip osteoarthritis, deconditioning, and poor biomechanics.    3.  Left greater trochanteric bursitis/tendinitis  4.  Lumbar spondylosis:  Lower lumbar facet arthropathy and sacroiliac joint dysfunction  5.  Peripheral polyneuropathy- likely due to DM, possibly ESRD       Plan:   - Physical Therapy to work on hip strengthening and ROM, knee and ankle strengthening, and core stabilization.  Also to work on improving gait and gait safety.  - Continue using assistive devices (cane, rollator, scooter)  - Avoiding NSAIDs given multiple contraindications (ESRD/HD, anticoagulation for A fibb, Hx of GI bleed).  May consider a topical NSAID for the GT bursitis  - Orthostatic Hypotension to be addressed by PCP, Cardiology, Nephrology.  As above, I believe this to be the principal reason for her balance issues  - May consider GTB injection in future if INR stable  - Follow up with me in 3 months to follow progress in PT, and address GTB/Hip osteoarthritis.         Junaid Hawkins, DO  Physical Medicine and Rehabilitation

## 2019-06-03 ENCOUNTER — HOSPITAL ENCOUNTER (INPATIENT)
Facility: HOSPITAL | Age: 64
LOS: 5 days | Discharge: HOSPICE | DRG: 391 | End: 2019-06-08
Attending: EMERGENCY MEDICINE | Admitting: INTERNAL MEDICINE
Payer: MEDICARE

## 2019-06-03 DIAGNOSIS — R10.13 EPIGASTRIC PAIN: Primary | ICD-10-CM

## 2019-06-03 DIAGNOSIS — Z51.5 PALLIATIVE CARE ENCOUNTER: ICD-10-CM

## 2019-06-03 DIAGNOSIS — N18.6 END STAGE RENAL DISEASE ON DIALYSIS: ICD-10-CM

## 2019-06-03 DIAGNOSIS — R79.1 SUPRATHERAPEUTIC INR: ICD-10-CM

## 2019-06-03 DIAGNOSIS — Z99.2 END STAGE RENAL DISEASE ON DIALYSIS: ICD-10-CM

## 2019-06-03 DIAGNOSIS — E87.20 LACTIC ACIDOSIS: ICD-10-CM

## 2019-06-03 DIAGNOSIS — Z99.2 HEMODIALYSIS PATIENT: ICD-10-CM

## 2019-06-03 DIAGNOSIS — R53.83 FATIGUE, UNSPECIFIED TYPE: ICD-10-CM

## 2019-06-03 DIAGNOSIS — H40.9 GLAUCOMA FOLLOWING SURGERY: ICD-10-CM

## 2019-06-03 DIAGNOSIS — Z71.89 ADVANCE DIRECTIVE DISCUSSED WITH PATIENT: ICD-10-CM

## 2019-06-03 DIAGNOSIS — R19.7 DIARRHEA: ICD-10-CM

## 2019-06-03 DIAGNOSIS — R53.81 DEBILITY: ICD-10-CM

## 2019-06-03 DIAGNOSIS — I27.20 PULMONARY HYPERTENSION: ICD-10-CM

## 2019-06-03 LAB
ALBUMIN SERPL BCP-MCNC: 3.1 G/DL (ref 3.5–5.2)
ALP SERPL-CCNC: 168 U/L (ref 55–135)
ALT SERPL W/O P-5'-P-CCNC: 35 U/L (ref 10–44)
ANION GAP SERPL CALC-SCNC: 16 MMOL/L (ref 8–16)
ANISOCYTOSIS BLD QL SMEAR: SLIGHT
AST SERPL-CCNC: 39 U/L (ref 10–40)
BASO STIPL BLD QL SMEAR: ABNORMAL
BASOPHILS # BLD AUTO: 0.07 K/UL (ref 0–0.2)
BASOPHILS NFR BLD: 1.3 % (ref 0–1.9)
BILIRUB SERPL-MCNC: 0.7 MG/DL (ref 0.1–1)
BUN SERPL-MCNC: 44 MG/DL (ref 8–23)
CALCIUM SERPL-MCNC: 8.5 MG/DL (ref 8.7–10.5)
CHLORIDE SERPL-SCNC: 101 MMOL/L (ref 95–110)
CO2 SERPL-SCNC: 21 MMOL/L (ref 23–29)
CREAT SERPL-MCNC: 8.3 MG/DL (ref 0.5–1.4)
DIFFERENTIAL METHOD: ABNORMAL
DOHLE BOD BLD QL SMEAR: PRESENT
EOSINOPHIL # BLD AUTO: 0.1 K/UL (ref 0–0.5)
EOSINOPHIL NFR BLD: 1.3 % (ref 0–8)
ERYTHROCYTE [DISTWIDTH] IN BLOOD BY AUTOMATED COUNT: 20.3 % (ref 11.5–14.5)
EST. GFR  (AFRICAN AMERICAN): 5.3 ML/MIN/1.73 M^2
EST. GFR  (NON AFRICAN AMERICAN): 4.6 ML/MIN/1.73 M^2
GLUCOSE SERPL-MCNC: 96 MG/DL (ref 70–110)
HCT VFR BLD AUTO: 44.1 % (ref 37–48.5)
HGB BLD-MCNC: 12.8 G/DL (ref 12–16)
HYPOCHROMIA BLD QL SMEAR: ABNORMAL
IMM GRANULOCYTES # BLD AUTO: 0.01 K/UL (ref 0–0.04)
IMM GRANULOCYTES NFR BLD AUTO: 0.2 % (ref 0–0.5)
INR PPP: 6 (ref 0.8–1.2)
LACTATE SERPL-SCNC: 2.6 MMOL/L (ref 0.5–2.2)
LIPASE SERPL-CCNC: 14 U/L (ref 4–60)
LYMPHOCYTES # BLD AUTO: 1.8 K/UL (ref 1–4.8)
LYMPHOCYTES NFR BLD: 32.5 % (ref 18–48)
MAGNESIUM SERPL-MCNC: 2.5 MG/DL (ref 1.6–2.6)
MCH RBC QN AUTO: 26.8 PG (ref 27–31)
MCHC RBC AUTO-ENTMCNC: 29 G/DL (ref 32–36)
MCV RBC AUTO: 92 FL (ref 82–98)
MONOCYTES # BLD AUTO: 0.9 K/UL (ref 0.3–1)
MONOCYTES NFR BLD: 15.8 % (ref 4–15)
NEUTROPHILS # BLD AUTO: 2.6 K/UL (ref 1.8–7.7)
NEUTROPHILS NFR BLD: 48.9 % (ref 38–73)
NRBC BLD-RTO: 0 /100 WBC
OVALOCYTES BLD QL SMEAR: ABNORMAL
PHOSPHATE SERPL-MCNC: 3.2 MG/DL (ref 2.7–4.5)
PLATELET # BLD AUTO: 106 K/UL (ref 150–350)
PLATELET BLD QL SMEAR: ABNORMAL
PMV BLD AUTO: ABNORMAL FL (ref 9.2–12.9)
POCT GLUCOSE: 132 MG/DL (ref 70–110)
POCT GLUCOSE: 44 MG/DL (ref 70–110)
POCT GLUCOSE: 97 MG/DL (ref 70–110)
POIKILOCYTOSIS BLD QL SMEAR: SLIGHT
POLYCHROMASIA BLD QL SMEAR: ABNORMAL
POTASSIUM SERPL-SCNC: 4.3 MMOL/L (ref 3.5–5.1)
PROT SERPL-MCNC: 6.9 G/DL (ref 6–8.4)
PROTHROMBIN TIME: 59.8 SEC (ref 9–12.5)
RBC # BLD AUTO: 4.78 M/UL (ref 4–5.4)
SODIUM SERPL-SCNC: 138 MMOL/L (ref 136–145)
TARGETS BLD QL SMEAR: ABNORMAL
TOXIC GRANULES BLD QL SMEAR: PRESENT
TROPONIN I SERPL DL<=0.01 NG/ML-MCNC: 0.07 NG/ML (ref 0–0.03)
WBC # BLD AUTO: 5.39 K/UL (ref 3.9–12.7)

## 2019-06-03 PROCEDURE — 99285 EMERGENCY DEPT VISIT HI MDM: CPT | Mod: 25

## 2019-06-03 PROCEDURE — 20600001 HC STEP DOWN PRIVATE ROOM

## 2019-06-03 PROCEDURE — 83605 ASSAY OF LACTIC ACID: CPT

## 2019-06-03 PROCEDURE — 93010 ELECTROCARDIOGRAM REPORT: CPT | Mod: ,,, | Performed by: INTERNAL MEDICINE

## 2019-06-03 PROCEDURE — 82962 GLUCOSE BLOOD TEST: CPT

## 2019-06-03 PROCEDURE — 87040 BLOOD CULTURE FOR BACTERIA: CPT

## 2019-06-03 PROCEDURE — 83690 ASSAY OF LIPASE: CPT

## 2019-06-03 PROCEDURE — 99285 EMERGENCY DEPT VISIT HI MDM: CPT | Mod: ,,, | Performed by: EMERGENCY MEDICINE

## 2019-06-03 PROCEDURE — 93005 ELECTROCARDIOGRAM TRACING: CPT

## 2019-06-03 PROCEDURE — 25000003 PHARM REV CODE 250: Performed by: PHYSICIAN ASSISTANT

## 2019-06-03 PROCEDURE — 25000003 PHARM REV CODE 250: Performed by: STUDENT IN AN ORGANIZED HEALTH CARE EDUCATION/TRAINING PROGRAM

## 2019-06-03 PROCEDURE — 96374 THER/PROPH/DIAG INJ IV PUSH: CPT

## 2019-06-03 PROCEDURE — 84100 ASSAY OF PHOSPHORUS: CPT

## 2019-06-03 PROCEDURE — 83735 ASSAY OF MAGNESIUM: CPT

## 2019-06-03 PROCEDURE — 93010 EKG 12-LEAD: ICD-10-PCS | Mod: ,,, | Performed by: INTERNAL MEDICINE

## 2019-06-03 PROCEDURE — 84484 ASSAY OF TROPONIN QUANT: CPT

## 2019-06-03 PROCEDURE — 85025 COMPLETE CBC W/AUTO DIFF WBC: CPT

## 2019-06-03 PROCEDURE — 99285 PR EMERGENCY DEPT VISIT,LEVEL V: ICD-10-PCS | Mod: ,,, | Performed by: EMERGENCY MEDICINE

## 2019-06-03 PROCEDURE — 80053 COMPREHEN METABOLIC PANEL: CPT

## 2019-06-03 PROCEDURE — 96361 HYDRATE IV INFUSION ADD-ON: CPT

## 2019-06-03 PROCEDURE — 85610 PROTHROMBIN TIME: CPT

## 2019-06-03 RX ORDER — LEVOTHYROXINE SODIUM 100 UG/1
100 TABLET ORAL
Status: DISCONTINUED | OUTPATIENT
Start: 2019-06-04 | End: 2019-06-08 | Stop reason: HOSPADM

## 2019-06-03 RX ORDER — OXYCODONE AND ACETAMINOPHEN 5; 325 MG/1; MG/1
1 TABLET ORAL
Status: COMPLETED | OUTPATIENT
Start: 2019-06-03 | End: 2019-06-03

## 2019-06-03 RX ORDER — METOPROLOL TARTRATE 25 MG/1
25 TABLET, FILM COATED ORAL 2 TIMES DAILY
Status: DISCONTINUED | OUTPATIENT
Start: 2019-06-03 | End: 2019-06-06

## 2019-06-03 RX ORDER — ASPIRIN 325 MG
325 TABLET ORAL DAILY
Status: DISCONTINUED | OUTPATIENT
Start: 2019-06-04 | End: 2019-06-04

## 2019-06-03 RX ORDER — METOPROLOL TARTRATE 25 MG/1
25 TABLET, FILM COATED ORAL
Status: COMPLETED | OUTPATIENT
Start: 2019-06-03 | End: 2019-06-03

## 2019-06-03 RX ORDER — SODIUM CHLORIDE 0.9 % (FLUSH) 0.9 %
10 SYRINGE (ML) INJECTION
Status: DISCONTINUED | OUTPATIENT
Start: 2019-06-03 | End: 2019-06-08 | Stop reason: HOSPADM

## 2019-06-03 RX ORDER — ATORVASTATIN CALCIUM 10 MG/1
10 TABLET, FILM COATED ORAL DAILY
Status: DISCONTINUED | OUTPATIENT
Start: 2019-06-04 | End: 2019-06-06

## 2019-06-03 RX ORDER — ONDANSETRON 8 MG/1
8 TABLET, ORALLY DISINTEGRATING ORAL
Status: COMPLETED | OUTPATIENT
Start: 2019-06-03 | End: 2019-06-03

## 2019-06-03 RX ORDER — PANTOPRAZOLE SODIUM 40 MG/1
40 TABLET, DELAYED RELEASE ORAL DAILY
Status: DISCONTINUED | OUTPATIENT
Start: 2019-06-04 | End: 2019-06-08 | Stop reason: HOSPADM

## 2019-06-03 RX ORDER — ONDANSETRON 2 MG/ML
4 INJECTION INTRAMUSCULAR; INTRAVENOUS
Status: DISCONTINUED | OUTPATIENT
Start: 2019-06-03 | End: 2019-06-03

## 2019-06-03 RX ADMIN — METOPROLOL TARTRATE 25 MG: 25 TABLET ORAL at 08:06

## 2019-06-03 RX ADMIN — DEXTROSE MONOHYDRATE 25 G: 500 INJECTION PARENTERAL at 08:06

## 2019-06-03 RX ADMIN — SODIUM CHLORIDE 500 ML: 0.9 INJECTION, SOLUTION INTRAVENOUS at 03:06

## 2019-06-03 RX ADMIN — OXYCODONE HYDROCHLORIDE AND ACETAMINOPHEN 1 TABLET: 5; 325 TABLET ORAL at 08:06

## 2019-06-03 RX ADMIN — OXYCODONE HYDROCHLORIDE AND ACETAMINOPHEN 1 TABLET: 5; 325 TABLET ORAL at 01:06

## 2019-06-03 RX ADMIN — ONDANSETRON 8 MG: 8 TABLET, ORALLY DISINTEGRATING ORAL at 01:06

## 2019-06-03 RX ADMIN — METOPROLOL TARTRATE 25 MG: 25 TABLET ORAL at 04:06

## 2019-06-03 NOTE — ED NOTES
Pt back from ultrasound. Place on cardiac and o2 monitor in ED room. No distress noted . Vital signs updated

## 2019-06-03 NOTE — ED PROVIDER NOTES
Encounter Date: 6/3/2019       History     Chief Complaint   Patient presents with    Abdominal Pain     abd pain, vomiting, diarrhea since last night     63-year-old female with multiple comorbidities including ESRD on HD MWF, pulmonary hypertension, rheumatoid arthritis, persistent AFib on Coumadin, COPD presents abdominal pain since yesterday evening.  She reports the pain was gradual in onset, she describes the pain as feeling like she needs to have a bowel movement.  She reports that her bowel movements are always abnormal, however she has had 1 episode of diarrhea this morning.  Endorses some relief of abdominal pain after having a bowel movement.  She reports associated nausea with multiple episodes of emesis, subjective fever and lightheadedness.  Denies chest pain, shortness of breath, bloody or dark stool or back pain. She does not make urine.  Last dialyzed on Friday.  Previous abdominal surgeries include partial hysterectomy and nephrectomy as well as failed kidney transplant.  She is unsure if she has her gallbladder or appendix.  Denies new or strange foods, sick contacts or international travel.        Review of patient's allergies indicates:   Allergen Reactions    Penicillins Swelling    Iodine      Other reaction(s): Hives    Sulfamethoxazole-trimethoprim      Other reaction(s): Swelling  Other reaction(s): Hives     Past Medical History:   Diagnosis Date    Allergy     Anemia     Anticoagulant long-term use     4 years coumadin, asa    Arthritis     Awaiting organ transplant 4/30/2013    Cataract     Central serous chorioretinopathy of eye, right 8/21/2018    CHF (congestive heart failure)     Chronic diarrhea     Chronic kidney disease     Colon polyps     COPD (chronic obstructive pulmonary disease)     Coronary artery disease     Diabetes mellitus     Diabetic retinopathy     Diverticulosis     Encounter for blood transfusion     ESRD from HTN strtieze RRT 1999 1/1/1999     Failed  donor kidney transplant      Glaucoma     History of bleeding peptic ulcer      as stated per pt    Hyperlipidemia     Hypertension     Hypothyroidism     Morbid obesity 8/10/2012    Renal hypertension     Stroke          Past Surgical History:   Procedure Laterality Date    CATARACT EXTRACTION W/  INTRAOCULAR LENS IMPLANT Bilateral     COLON SURGERY      COLONOSCOPY      COLONOSCOPY N/A 2019    Performed by Indio Beltran MD at [facility] ENDO (2ND FLR)    COLONOSCOPY N/A 2018    Performed by Jose Falk MD at [facility] ENDO (2ND FLR)    EGD (ESOPHAGOGASTRODUODENOSCOPY) N/A 2019    Performed by Miranda Maradiaga MD at [facility] ENDO (2ND FLR)    EGD (ESOPHAGOGASTRODUODENOSCOPY) N/A 2018    Performed by Luis Washington MD at [facility] ENDO (2ND FLR)    ESOPHAGOGASTRODUODENOSCOPY (EGD) N/A 2018    Performed by Kenji Desir MD at [facility] ENDO (2ND FLR)    EYE SURGERY      FRACTURE SURGERY      R arm    HERNIA REPAIR      HYSTERECTOMY      INSERTION-IMPLANT-GLAUCOMA Left 10/12/2017    Performed by Junaid Kern MD at [facility] OR 1ST FLR    KIDNEY TRANSPLANT      Left atrial appendage closure device N/A 2019    Performed by César Thomas MD at [facility] EP LAB    NEPHRECTOMY  2008    transplant     PARATHYROID GLAND SURGERY      TONSILLECTOMY      Transesophageal echo (JACE) intra-procedure log documentation N/A 2019    Performed by [facility] Provider at [facility] EP LAB    Transesophageal echo (JACE) intra-procedure log documentation N/A 2019    Performed by [facility] Diagnostic Provider at [facility] EP LAB    UPPER GASTROINTESTINAL ENDOSCOPY       Family History   Problem Relation Age of Onset    Heart attack Father     Heart failure Father     Hypertension Father     Blindness Father     Heart attack Mother     Heart failure Mother     Hypertension Mother     Asthma Sister     Depression Sister     Hypertension Sister      Hypertension Brother     Kidney disease Brother         ESRD    Diabetes Maternal Aunt     Breast cancer Maternal Aunt     Amblyopia Neg Hx     Cataracts Neg Hx     Macular degeneration Neg Hx     Retinal detachment Neg Hx     Strabismus Neg Hx     Colon cancer Neg Hx     Esophageal cancer Neg Hx      Social History     Tobacco Use    Smoking status: Former Smoker     Types: Cigarettes     Last attempt to quit: 8/10/2000     Years since quittin.8    Smokeless tobacco: Never Used   Substance Use Topics    Alcohol use: No     Comment: hx of etoh     Drug use: No     Review of Systems   Constitutional: Positive for fatigue and fever. Negative for chills and diaphoresis.   Respiratory: Negative for cough and shortness of breath.    Cardiovascular: Negative for chest pain and palpitations.   Gastrointestinal: Positive for abdominal pain, diarrhea, nausea and vomiting. Negative for abdominal distention, anal bleeding, blood in stool, constipation and rectal pain.   Genitourinary: Negative for flank pain and pelvic pain.   Musculoskeletal: Negative for back pain and myalgias.   Skin: Negative for color change and pallor.   Allergic/Immunologic: Negative for food allergies and immunocompromised state.   Neurological: Positive for light-headedness and headaches.   Hematological: Negative for adenopathy. Does not bruise/bleed easily.       Physical Exam     Initial Vitals [19 1215]   BP Pulse Resp Temp SpO2   103/81 96 20 98.2 °F (36.8 °C) 96 %      MAP       --         Physical Exam    Vitals reviewed.  Constitutional: She is not diaphoretic. No distress.   Chronically ill-appearing.   HENT:   Head: Normocephalic and atraumatic.   Eyes: EOM are normal. Pupils are equal, round, and reactive to light.   Neck: Normal range of motion. Neck supple.   Cardiovascular: Normal rate, regular rhythm, normal heart sounds and intact distal pulses. Exam reveals no gallop and no friction rub.    No murmur  heard.  Dialysis access left upper arm   Pulmonary/Chest: Breath sounds normal. No respiratory distress. She has no wheezes. She has no rhonchi. She has no rales. She exhibits no tenderness.   Abdominal: Soft. Bowel sounds are normal. She exhibits no distension and no mass. There is hepatomegaly. There is tenderness in the right lower quadrant. There is no rigidity, no rebound, no guarding, no CVA tenderness, no tenderness at McBurney's point and negative Argueta's sign.   Liver is enlarged and firm   Musculoskeletal: Normal range of motion.   Neurological: She is alert and oriented to person, place, and time.   Skin: Skin is warm and dry.   Psychiatric: She has a normal mood and affect.         ED Course   Procedures  Labs Reviewed   CBC W/ AUTO DIFFERENTIAL - Abnormal; Notable for the following components:       Result Value    Mean Corpuscular Hemoglobin 26.8 (*)     Mean Corpuscular Hemoglobin Conc 29.0 (*)     RDW 20.3 (*)     Platelets 106 (*)     Mono% 15.8 (*)     Platelet Estimate Decreased (*)     All other components within normal limits   COMPREHENSIVE METABOLIC PANEL - Abnormal; Notable for the following components:    CO2 21 (*)     BUN, Bld 44 (*)     Creatinine 8.3 (*)     Calcium 8.5 (*)     Albumin 3.1 (*)     Alkaline Phosphatase 168 (*)     eGFR if  5.3 (*)     eGFR if non  4.6 (*)     All other components within normal limits   LACTIC ACID, PLASMA - Abnormal; Notable for the following components:    Lactate (Lactic Acid) 2.6 (*)     All other components within normal limits   TROPONIN I - Abnormal; Notable for the following components:    Troponin I 0.068 (*)     All other components within normal limits   PROTIME-INR - Abnormal; Notable for the following components:    Prothrombin Time 59.8 (*)     INR 6.0 (*)     All other components within normal limits    Narrative:     pt inr  critical result(s) called and verbal readback obtained from   andi beatty rn,  06/03/2019 14:36   CULTURE, BLOOD   CULTURE, BLOOD   LIPASE     EKG Readings: (Independently Interpreted)   Initial Reading: No STEMI. Previous EKG: Compared with most recent EKG Previous EKG Date: 4/26/2019. Rhythm: Atrial Fibrillation. Heart Rate: 122. Ectopy: No Ectopy. Conduction: Normal. Clinical Impression: Atrial Fibrillation with RVR       Imaging Results           US Abdomen Limited (Gallbladder) (Final result)  Result time 06/03/19 18:28:30    Final result by Dory Victor MD (06/03/19 18:28:30)                 Impression:      1.  Cholelithiasis with gallbladder wall thickening and pericholecystic fluid. These gallbladder findings may be seen in the setting of hepatic dysfunction, but acute cholecystitis cannot be completely excluded, though there is no gallbladder wall hyperemia or sonographic Argueta's sign.  If clinically indicated, HIDA scan could be performed.    2.  Small volume of complex ascites.    This report was flagged in Epic as abnormal.    Electronically signed by resident: Michelle Wu  Date:    06/03/2019  Time:    17:56    Electronically signed by: Dory Victor MD  Date:    06/03/2019  Time:    18:28             Narrative:    EXAMINATION:  US ABDOMEN LIMITED    CLINICAL HISTORY:  Epigastric pain    TECHNIQUE:  Limited ultrasound of the right upper quadrant of the abdomen (including pancreas, gallbladder, common bile duct, and right kidney) was performed.    COMPARISON:  CT abdomen pelvis 06/03/2019    FINDINGS:  The common bile duct is not dilated, measuring 3 mm.    The gallbladder contains 2 subcentimeter stones and demonstrates wall thickening up to 0.8 cm with pericholecystic fluid.  No gallbladder wall hyperemia.  Sonographic Argueta's sign was negative.    The visualized portions of the pancreas are unremarkable.    Right kidney is atrophic and diffusely echogenic, consistent with history of ESRD.    Small volume of complex ascites overlying the liver.                                CT Abdomen Pelvis  Without Contrast (Final result)  Result time 06/03/19 16:07:30    Final result by Kiet Jacobo MD (06/03/19 16:07:30)                 Impression:      1. Interval development of diffuse body wall anasarca, cardiomegaly, pericardial effusion, ascites, and pleural effusions, concerning for volume overload, correlation advised.  2. Findings suggesting end-stage renal disease noting remnant of prior right transplant.  3. Indistinctness about the gallbladder, is likely related to motion artifact however there is cholelithiasis.  If there is associated right upper quadrant pain, ultrasound is recommended for further evaluation.  4. Several additional findings above.      Electronically signed by: Kiet Jacobo MD  Date:    06/03/2019  Time:    16:07             Narrative:    EXAMINATION:  CT ABDOMEN PELVIS WITHOUT CONTRAST    CLINICAL HISTORY:  RLQ pain, appendicitis suspected;    TECHNIQUE:  Low dose axial images, sagittal and coronal reformations were obtained from the lung bases to the pubic symphysis.  Oral contrast was not administered.    COMPARISON:  02/22/2018    FINDINGS:  Images of the lower thorax are remarkable for bilateral dependent atelectasis.  There is a small the right pleural effusion and trace left pleural effusion.  The heart is enlarged.  There is a small pericardial effusion.    The liver, spleen, pancreas and adrenal glands have a grossly unremarkable noncontrast appearance, allowing for motion artifact.  There is cholelithiasis.  There is indistinctness about the gallbladder, likely related to motion although correlation with any focal right upper quadrant pain recommended, ultrasound as warranted.  No significant abdominal lymphadenopathy.    The kidneys are atrophic bilaterally.  Multiple subcentimeter low attenuating lesions arise from the kidneys, too small for characterization.  There is a 1.4 cm low attenuating lesion arising from the interpolar  region of the right kidney, attenuation of which is slightly higher than would be expected for simple cyst.  The bilateral ureters are unable to be followed in their entirety to the urinary bladder, no definite calculi seen although correlation with urinalysis is recommended.  The urinary bladder is decompressed.  The uterus is absent the adnexa is unremarkable.    The large bowel is decompressed noting several scattered colonic diverticula.  No definite surrounding inflammation.  Scattered regions of high attenuating material in noted within the bowel.  The terminal ileum and appendix are grossly unremarkable.  There is a stable soft tissue/cystic focus within the right lower quadrant, presumably residual from prior renal transplant.  There is calcification in the region.  There is strand-like fluid within the abdomen and pelvis, noting somewhat more loculated components of fluid anterior to the stomach and in a perihepatic distribution.  Scattered nonenlarged mesenteric lymph nodes are noted.  There is presacral fluid.    No focal osseous destructive process.  There is atherosclerotic calcification of the aorta and its branches.  There are prominent bilateral inguinal lymph nodes.  There is diffuse body wall anasarca.  There is a small anterior abdominal wall incisional hernia, containing a loop of nonobstructed large bowel.                                 Medical Decision Making:   History:   Old Medical Records: I decided to obtain old medical records.  Clinical Tests:   Lab Tests: Ordered and Reviewed  Radiological Study: Ordered and Reviewed  Medical Tests: Ordered and Reviewed       APC / Resident Notes:   63-year-old female presenting for abdominal pain with nausea, vomiting and 1 episode of diarrhea.  Differential diagnosis includes appendicitis, cholecystitis, gastroenteritis, electrolyte derangement.  Will check labs, give Zofran, Percocet for pain, do CT abdomen pelvis and reassess.    Labs notable for  elevated lactic acid 2.6.  Will give 500 cc fluid bolus.  INR supratherapeutic at 6.0.  No leukocytosis.  Chemistries notable for elevated alk-phos at 168.  CT shows diffuse body wall anasarca, cardiomegaly pleural effusion ascites and pleural effusions suggestive of fluid overload.  This is likely given the patient did not have dialysis today.  Gallbladder is noted to be indistinct with cholelithiasis.  Will do ultrasound further evaluate.  Patient in AFib with RVR.  She did not take any of her home meds, will give home Lopressor.    Ultrasound showed cholelithiasis with gallbladder wall thickening and pericholecystic fluid which may be secondary to hepatic congestion.  Patient may need a HIDA scan to further evaluate.  She will be admitted to HD S service for further management.  Patient comfortable with admission.  I discussed this patient with my supervising physician.    Kathleen Riley PA-C           Attending Attestation:     Physician Attestation Statement for NP/PA:   I have conducted a face to face encounter with this patient in addition to the NP/PA, due to Medical Complexity    Other NP/PA Attestation Additions:    History of Present Illness: 63 female history of ESRD on HD, pulmonary hypertension, rheumatoid arthritis, persistent AFib on Coumadin here today with abdominal pain, nausea, vomiting, diarrhea since yesterday.   Physical Exam: General:  Chronically ill-appearing, no acute distress  Lungs:  Clear to auscultation bilaterally, 2 L nasal cannula at baseline  Cardiac:  Irregularly irregular  Abdomen:  Soft, midline incision consistent with previous surgery, right lower quadrant tenderness with no rebound or guarding, mild hepatomegaly   Medical Decision Making: Labs reviewed with no leukocytosis.  H&H stable. ESRD at baseline.  Lactate is mildly elevated at 2.6.  Patient reports continued pain in the right lower quadrant, CT abdomen pelvis ordered.    CT with anasarca, cholelithiasis.  Right  upper quadrant ultrasound ordered and shows pericholecystic fluid and mild wall thickening without hyperemia or positive Argueta's.    Case discussed with Cardiology fellow, will admit for further treatment and evaluation.  Recommend HIDA scan and possible general surgery consult.                      Clinical Impression:       ICD-10-CM ICD-9-CM   1. Epigastric pain R10.13 789.06   2. Hemodialysis patient Z99.2 V45.11   3. Diarrhea R19.7 787.91   4. Pulmonary hypertension I27.20 416.8   5. Supratherapeutic INR R79.1 790.92         Disposition:   Disposition: Admitted  Condition: Trinidad Riley PA-C  06/03/19 2031       Jackie Tavares MD  06/05/19 [id number]

## 2019-06-04 PROBLEM — R10.13 EPIGASTRIC PAIN: Status: ACTIVE | Noted: 2019-06-04

## 2019-06-04 LAB
ABO + RH BLD: NORMAL
ABO + RH BLD: NORMAL
ALBUMIN SERPL BCP-MCNC: 3 G/DL (ref 3.5–5.2)
ALBUMIN SERPL BCP-MCNC: 3.1 G/DL (ref 3.5–5.2)
ALBUMIN SERPL BCP-MCNC: 3.1 G/DL (ref 3.5–5.2)
ALBUMIN SERPL BCP-MCNC: 3.2 G/DL (ref 3.5–5.2)
ALLENS TEST: ABNORMAL
ALP SERPL-CCNC: 147 U/L (ref 55–135)
ALP SERPL-CCNC: 165 U/L (ref 55–135)
ALP SERPL-CCNC: 165 U/L (ref 55–135)
ALT SERPL W/O P-5'-P-CCNC: 39 U/L (ref 10–44)
ALT SERPL W/O P-5'-P-CCNC: 52 U/L (ref 10–44)
ALT SERPL W/O P-5'-P-CCNC: 53 U/L (ref 10–44)
ANION GAP SERPL CALC-SCNC: 21 MMOL/L (ref 8–16)
ANION GAP SERPL CALC-SCNC: 21 MMOL/L (ref 8–16)
ANION GAP SERPL CALC-SCNC: 24 MMOL/L (ref 8–16)
ANION GAP SERPL CALC-SCNC: 25 MMOL/L (ref 8–16)
APTT BLDCRRT: 35.4 SEC (ref 21–32)
ASCENDING AORTA: 3.46 CM
AST SERPL-CCNC: 58 U/L (ref 10–40)
AST SERPL-CCNC: 86 U/L (ref 10–40)
AST SERPL-CCNC: 88 U/L (ref 10–40)
AV INDEX (PROSTH): 0.52
AV MEAN GRADIENT: 1.88 MMHG
AV PEAK GRADIENT: 3.17 MMHG
AV VALVE AREA: 1.55 CM2
AV VELOCITY RATIO: 0.65
BASOPHILS # BLD AUTO: 0.02 K/UL (ref 0–0.2)
BASOPHILS # BLD AUTO: 0.02 K/UL (ref 0–0.2)
BASOPHILS # BLD AUTO: 0.07 K/UL (ref 0–0.2)
BASOPHILS NFR BLD: 0.2 % (ref 0–1.9)
BASOPHILS NFR BLD: 0.2 % (ref 0–1.9)
BASOPHILS NFR BLD: 0.7 % (ref 0–1.9)
BILIRUB DIRECT SERPL-MCNC: 1 MG/DL (ref 0.1–0.3)
BILIRUB SERPL-MCNC: 1.2 MG/DL (ref 0.1–1)
BLD GP AB SCN CELLS X3 SERPL QL: NORMAL
BLD GP AB SCN CELLS X3 SERPL QL: NORMAL
BLD PROD TYP BPU: NORMAL
BLD PROD TYP BPU: NORMAL
BLOOD GROUP ANTIBODIES SERPL: NORMAL
BLOOD UNIT EXPIRATION DATE: NORMAL
BLOOD UNIT EXPIRATION DATE: NORMAL
BLOOD UNIT TYPE CODE: 6200
BLOOD UNIT TYPE CODE: 6200
BLOOD UNIT TYPE: NORMAL
BLOOD UNIT TYPE: NORMAL
BSA FOR ECHO PROCEDURE: 1.66 M2
BUN SERPL-MCNC: 48 MG/DL (ref 8–23)
BUN SERPL-MCNC: 48 MG/DL (ref 8–23)
BUN SERPL-MCNC: 54 MG/DL (ref 8–23)
BUN SERPL-MCNC: 57 MG/DL (ref 8–23)
CALCIUM SERPL-MCNC: 8.1 MG/DL (ref 8.7–10.5)
CALCIUM SERPL-MCNC: 8.2 MG/DL (ref 8.7–10.5)
CALCIUM SERPL-MCNC: 8.2 MG/DL (ref 8.7–10.5)
CALCIUM SERPL-MCNC: 8.5 MG/DL (ref 8.7–10.5)
CHLORIDE SERPL-SCNC: 100 MMOL/L (ref 95–110)
CHLORIDE SERPL-SCNC: 100 MMOL/L (ref 95–110)
CHLORIDE SERPL-SCNC: 99 MMOL/L (ref 95–110)
CHLORIDE SERPL-SCNC: 99 MMOL/L (ref 95–110)
CO2 SERPL-SCNC: 17 MMOL/L (ref 23–29)
CO2 SERPL-SCNC: 18 MMOL/L (ref 23–29)
CODING SYSTEM: NORMAL
CODING SYSTEM: NORMAL
CREAT SERPL-MCNC: 8.6 MG/DL (ref 0.5–1.4)
CREAT SERPL-MCNC: 8.6 MG/DL (ref 0.5–1.4)
CREAT SERPL-MCNC: 9 MG/DL (ref 0.5–1.4)
CREAT SERPL-MCNC: 9.2 MG/DL (ref 0.5–1.4)
CRP SERPL-MCNC: 25.39 MG/L (ref 0–3.19)
CV ECHO LV RWT: 0.51 CM
DELSYS: ABNORMAL
DELSYS: ABNORMAL
DIFFERENTIAL METHOD: ABNORMAL
DISPENSE STATUS: NORMAL
DISPENSE STATUS: NORMAL
DOP CALC AO PEAK VEL: 0.89 M/S
DOP CALC AO VTI: 15.35 CM
DOP CALC LVOT AREA: 2.95 CM2
DOP CALC LVOT DIAMETER: 1.94 CM
DOP CALC LVOT PEAK VEL: 0.58 M/S
DOP CALC LVOT STROKE VOLUME: 23.75 CM3
DOP CALCLVOT PEAK VEL VTI: 8.04 CM
E/E' RATIO: 14.83
ECHO LV POSTERIOR WALL: 0.94 CM (ref 0.6–1.1)
EOSINOPHIL # BLD AUTO: 0 K/UL (ref 0–0.5)
EOSINOPHIL NFR BLD: 0 % (ref 0–8)
EOSINOPHIL NFR BLD: 0 % (ref 0–8)
EOSINOPHIL NFR BLD: 0.1 % (ref 0–8)
ERYTHROCYTE [DISTWIDTH] IN BLOOD BY AUTOMATED COUNT: 20.2 % (ref 11.5–14.5)
ERYTHROCYTE [DISTWIDTH] IN BLOOD BY AUTOMATED COUNT: 20.7 % (ref 11.5–14.5)
ERYTHROCYTE [DISTWIDTH] IN BLOOD BY AUTOMATED COUNT: 21.1 % (ref 11.5–14.5)
ERYTHROCYTE [SEDIMENTATION RATE] IN BLOOD BY WESTERGREN METHOD: 18 MM/HR (ref 0–36)
EST. GFR  (AFRICAN AMERICAN): 4.7 ML/MIN/1.73 M^2
EST. GFR  (AFRICAN AMERICAN): 4.8 ML/MIN/1.73 M^2
EST. GFR  (AFRICAN AMERICAN): 5.1 ML/MIN/1.73 M^2
EST. GFR  (AFRICAN AMERICAN): 5.1 ML/MIN/1.73 M^2
EST. GFR  (NON AFRICAN AMERICAN): 4.1 ML/MIN/1.73 M^2
EST. GFR  (NON AFRICAN AMERICAN): 4.2 ML/MIN/1.73 M^2
EST. GFR  (NON AFRICAN AMERICAN): 4.4 ML/MIN/1.73 M^2
EST. GFR  (NON AFRICAN AMERICAN): 4.4 ML/MIN/1.73 M^2
FLOW: 2
FLOW: 4
FRACTIONAL SHORTENING: 19 % (ref 28–44)
GLUCOSE SERPL-MCNC: 164 MG/DL (ref 70–110)
GLUCOSE SERPL-MCNC: 164 MG/DL (ref 70–110)
GLUCOSE SERPL-MCNC: 86 MG/DL (ref 70–110)
GLUCOSE SERPL-MCNC: 92 MG/DL (ref 70–110)
HCO3 UR-SCNC: 15.9 MMOL/L (ref 24–28)
HCO3 UR-SCNC: 18 MMOL/L (ref 24–28)
HCO3 UR-SCNC: 24 MMOL/L (ref 24–28)
HCT VFR BLD AUTO: 38.3 % (ref 37–48.5)
HCT VFR BLD AUTO: 44.8 % (ref 37–48.5)
HCT VFR BLD AUTO: 47.9 % (ref 37–48.5)
HGB BLD-MCNC: 11.6 G/DL (ref 12–16)
HGB BLD-MCNC: 13.4 G/DL (ref 12–16)
HGB BLD-MCNC: 14 G/DL (ref 12–16)
IMM GRANULOCYTES # BLD AUTO: 0.04 K/UL (ref 0–0.04)
IMM GRANULOCYTES # BLD AUTO: 0.05 K/UL (ref 0–0.04)
IMM GRANULOCYTES # BLD AUTO: 0.18 K/UL (ref 0–0.04)
IMM GRANULOCYTES NFR BLD AUTO: 0.5 % (ref 0–0.5)
IMM GRANULOCYTES NFR BLD AUTO: 0.5 % (ref 0–0.5)
IMM GRANULOCYTES NFR BLD AUTO: 1.7 % (ref 0–0.5)
INR PPP: 3.6 (ref 0.8–1.2)
INR PPP: 8.7 (ref 0.8–1.2)
INR PPP: 9 (ref 0.8–1.2)
INR PPP: 9.5 (ref 0.8–1.2)
INTERVENTRICULAR SEPTUM: 0.87 CM (ref 0.6–1.1)
IVRT: 0.05 MSEC
LA MAJOR: 5.66 CM
LA MINOR: 5.66 CM
LA WIDTH: 3.76 CM
LACTATE SERPL-SCNC: 3 MMOL/L (ref 0.5–2.2)
LACTATE SERPL-SCNC: 7.4 MMOL/L (ref 0.5–2.2)
LACTATE SERPL-SCNC: 7.9 MMOL/L (ref 0.5–2.2)
LACTATE SERPL-SCNC: 8.5 MMOL/L (ref 0.5–2.2)
LEFT ATRIUM SIZE: 3.99 CM
LEFT ATRIUM VOLUME INDEX: 44 ML/M2
LEFT ATRIUM VOLUME: 72.18 CM3
LEFT INTERNAL DIMENSION IN SYSTOLE: 2.98 CM (ref 2.1–4)
LEFT VENTRICLE DIASTOLIC VOLUME INDEX: 34.49 ML/M2
LEFT VENTRICLE DIASTOLIC VOLUME: 56.53 ML
LEFT VENTRICLE MASS INDEX: 58.6 G/M2
LEFT VENTRICLE SYSTOLIC VOLUME INDEX: 20.9 ML/M2
LEFT VENTRICLE SYSTOLIC VOLUME: 34.31 ML
LEFT VENTRICULAR INTERNAL DIMENSION IN DIASTOLE: 3.66 CM (ref 3.5–6)
LEFT VENTRICULAR MASS: 95.98 G
LV LATERAL E/E' RATIO: 12.71
LV SEPTAL E/E' RATIO: 17.8
LYMPHOCYTES # BLD AUTO: 1 K/UL (ref 1–4.8)
LYMPHOCYTES # BLD AUTO: 1.1 K/UL (ref 1–4.8)
LYMPHOCYTES # BLD AUTO: 1.2 K/UL (ref 1–4.8)
LYMPHOCYTES NFR BLD: 11.1 % (ref 18–48)
LYMPHOCYTES NFR BLD: 11.4 % (ref 18–48)
LYMPHOCYTES NFR BLD: 11.5 % (ref 18–48)
MAGNESIUM SERPL-MCNC: 2.4 MG/DL (ref 1.6–2.6)
MCH RBC QN AUTO: 26.6 PG (ref 27–31)
MCH RBC QN AUTO: 26.7 PG (ref 27–31)
MCH RBC QN AUTO: 26.9 PG (ref 27–31)
MCHC RBC AUTO-ENTMCNC: 29.2 G/DL (ref 32–36)
MCHC RBC AUTO-ENTMCNC: 29.9 G/DL (ref 32–36)
MCHC RBC AUTO-ENTMCNC: 30.3 G/DL (ref 32–36)
MCV RBC AUTO: 88 FL (ref 82–98)
MCV RBC AUTO: 89 FL (ref 82–98)
MCV RBC AUTO: 92 FL (ref 82–98)
METHEMOGLOBIN: 0.1 % (ref 0–3)
MODE: ABNORMAL
MODE: ABNORMAL
MONOCYTES # BLD AUTO: 0.6 K/UL (ref 0.3–1)
MONOCYTES # BLD AUTO: 0.6 K/UL (ref 0.3–1)
MONOCYTES # BLD AUTO: 0.9 K/UL (ref 0.3–1)
MONOCYTES NFR BLD: 6.3 % (ref 4–15)
MONOCYTES NFR BLD: 7.4 % (ref 4–15)
MONOCYTES NFR BLD: 8.1 % (ref 4–15)
MV PEAK E VEL: 0.89 M/S
NEUTROPHILS # BLD AUTO: 6.9 K/UL (ref 1.8–7.7)
NEUTROPHILS # BLD AUTO: 7.7 K/UL (ref 1.8–7.7)
NEUTROPHILS # BLD AUTO: 8.4 K/UL (ref 1.8–7.7)
NEUTROPHILS NFR BLD: 78 % (ref 38–73)
NEUTROPHILS NFR BLD: 80.8 % (ref 38–73)
NEUTROPHILS NFR BLD: 81.5 % (ref 38–73)
NRBC BLD-RTO: 0 /100 WBC
NRBC BLD-RTO: 0 /100 WBC
NRBC BLD-RTO: 1 /100 WBC
NUM UNITS TRANS FFP: NORMAL
NUM UNITS TRANS FFP: NORMAL
PCO2 BLDA: 33 MMHG (ref 35–45)
PCO2 BLDA: 43.4 MMHG (ref 35–45)
PCO2 BLDA: 53.6 MMHG (ref 35–45)
PH SMN: 7.22 [PH] (ref 7.35–7.45)
PH SMN: 7.26 [PH] (ref 7.35–7.45)
PH SMN: 7.29 [PH] (ref 7.35–7.45)
PHOSPHATE SERPL-MCNC: 6.2 MG/DL (ref 2.7–4.5)
PHOSPHATE SERPL-MCNC: 6.6 MG/DL (ref 2.7–4.5)
PHOSPHATE SERPL-MCNC: 6.6 MG/DL (ref 2.7–4.5)
PISA TR MAX VEL: 3.93 M/S
PLATELET # BLD AUTO: 104 K/UL (ref 150–350)
PLATELET # BLD AUTO: 106 K/UL (ref 150–350)
PLATELET # BLD AUTO: 112 K/UL (ref 150–350)
PMV BLD AUTO: ABNORMAL FL (ref 9.2–12.9)
PO2 BLDA: 28 MMHG (ref 40–60)
PO2 BLDA: 45 MMHG (ref 40–60)
PO2 BLDA: 91 MMHG (ref 80–100)
POC BE: -10 MMOL/L
POC BE: -11 MMOL/L
POC BE: -3 MMOL/L
POC SATURATED O2: 44 % (ref 95–100)
POC SATURATED O2: 72 % (ref 95–100)
POC SATURATED O2: 96 % (ref 95–100)
POC TCO2: 17 MMOL/L (ref 23–27)
POC TCO2: 19 MMOL/L (ref 24–29)
POC TCO2: 26 MMOL/L (ref 24–29)
POCT GLUCOSE: 104 MG/DL (ref 70–110)
POCT GLUCOSE: 106 MG/DL (ref 70–110)
POCT GLUCOSE: 143 MG/DL (ref 70–110)
POCT GLUCOSE: 146 MG/DL (ref 70–110)
POCT GLUCOSE: 34 MG/DL (ref 70–110)
POCT GLUCOSE: 60 MG/DL (ref 70–110)
POCT GLUCOSE: 91 MG/DL (ref 70–110)
POCT GLUCOSE: 97 MG/DL (ref 70–110)
POTASSIUM SERPL-SCNC: 4.4 MMOL/L (ref 3.5–5.1)
POTASSIUM SERPL-SCNC: 4.5 MMOL/L (ref 3.5–5.1)
PROCALCITONIN SERPL IA-MCNC: 1.69 NG/ML
PROT SERPL-MCNC: 6.4 G/DL (ref 6–8.4)
PROT SERPL-MCNC: 6.7 G/DL (ref 6–8.4)
PROT SERPL-MCNC: 6.9 G/DL (ref 6–8.4)
PROTHROMBIN TIME: 35 SEC (ref 9–12.5)
PROTHROMBIN TIME: 90.1 SEC (ref 9–12.5)
PROTHROMBIN TIME: 93.4 SEC (ref 9–12.5)
PROTHROMBIN TIME: 98.8 SEC (ref 9–12.5)
PULM VEIN S/D RATIO: 1.48
PV PEAK D VEL: 0.25 M/S
PV PEAK S VEL: 0.37 M/S
RA MAJOR: 5.94 CM
RA PRESSURE: 15 MMHG
RA WIDTH: 5.57 CM
RBC # BLD AUTO: 4.34 M/UL (ref 4–5.4)
RBC # BLD AUTO: 5.03 M/UL (ref 4–5.4)
RBC # BLD AUTO: 5.21 M/UL (ref 4–5.4)
RIGHT VENTRICULAR END-DIASTOLIC DIMENSION: 5.17 CM
RV TISSUE DOPPLER FREE WALL SYSTOLIC VELOCITY 1 (APICAL 4 CHAMBER VIEW): 7.35 M/S
SAMPLE: ABNORMAL
SINUS: 2.52 CM
SITE: ABNORMAL
SODIUM SERPL-SCNC: 138 MMOL/L (ref 136–145)
SODIUM SERPL-SCNC: 138 MMOL/L (ref 136–145)
SODIUM SERPL-SCNC: 141 MMOL/L (ref 136–145)
SODIUM SERPL-SCNC: 141 MMOL/L (ref 136–145)
SP02: 97
STJ: 2.98 CM
TDI LATERAL: 0.07
TDI SEPTAL: 0.05
TDI: 0.06
TR MAX PG: 61.78 MMHG
TRICUSPID ANNULAR PLANE SYSTOLIC EXCURSION: 1.55 CM
TROPONIN I SERPL DL<=0.01 NG/ML-MCNC: 0.29 NG/ML (ref 0–0.03)
TV REST PULMONARY ARTERY PRESSURE: 77 MMHG
WBC # BLD AUTO: 10.76 K/UL (ref 3.9–12.7)
WBC # BLD AUTO: 8.55 K/UL (ref 3.9–12.7)
WBC # BLD AUTO: 9.42 K/UL (ref 3.9–12.7)

## 2019-06-04 PROCEDURE — 80053 COMPREHEN METABOLIC PANEL: CPT | Mod: 91

## 2019-06-04 PROCEDURE — 25000003 PHARM REV CODE 250: Performed by: INTERNAL MEDICINE

## 2019-06-04 PROCEDURE — 63600175 PHARM REV CODE 636 W HCPCS: Performed by: INTERNAL MEDICINE

## 2019-06-04 PROCEDURE — 86141 C-REACTIVE PROTEIN HS: CPT

## 2019-06-04 PROCEDURE — 25000003 PHARM REV CODE 250: Performed by: STUDENT IN AN ORGANIZED HEALTH CARE EDUCATION/TRAINING PROGRAM

## 2019-06-04 PROCEDURE — 25000003 PHARM REV CODE 250

## 2019-06-04 PROCEDURE — 99223 1ST HOSP IP/OBS HIGH 75: CPT | Mod: ,,, | Performed by: NURSE PRACTITIONER

## 2019-06-04 PROCEDURE — 99900035 HC TECH TIME PER 15 MIN (STAT)

## 2019-06-04 PROCEDURE — 83735 ASSAY OF MAGNESIUM: CPT

## 2019-06-04 PROCEDURE — 82803 BLOOD GASES ANY COMBINATION: CPT

## 2019-06-04 PROCEDURE — S0030 INJECTION, METRONIDAZOLE: HCPCS

## 2019-06-04 PROCEDURE — 36415 COLL VENOUS BLD VENIPUNCTURE: CPT

## 2019-06-04 PROCEDURE — 27000221 HC OXYGEN, UP TO 24 HOURS

## 2019-06-04 PROCEDURE — 36600 WITHDRAWAL OF ARTERIAL BLOOD: CPT

## 2019-06-04 PROCEDURE — 76937 US GUIDE VASCULAR ACCESS: CPT

## 2019-06-04 PROCEDURE — 99291 CRITICAL CARE FIRST HOUR: CPT | Mod: GC,,, | Performed by: INTERNAL MEDICINE

## 2019-06-04 PROCEDURE — 84100 ASSAY OF PHOSPHORUS: CPT

## 2019-06-04 PROCEDURE — 80069 RENAL FUNCTION PANEL: CPT

## 2019-06-04 PROCEDURE — 85730 THROMBOPLASTIN TIME PARTIAL: CPT

## 2019-06-04 PROCEDURE — 86901 BLOOD TYPING SEROLOGIC RH(D): CPT

## 2019-06-04 PROCEDURE — 87040 BLOOD CULTURE FOR BACTERIA: CPT

## 2019-06-04 PROCEDURE — 84145 PROCALCITONIN (PCT): CPT

## 2019-06-04 PROCEDURE — 63600175 PHARM REV CODE 636 W HCPCS

## 2019-06-04 PROCEDURE — 85610 PROTHROMBIN TIME: CPT | Mod: 91

## 2019-06-04 PROCEDURE — 85610 PROTHROMBIN TIME: CPT

## 2019-06-04 PROCEDURE — 63600367 HC NITRIC OXIDE PER HOUR

## 2019-06-04 PROCEDURE — 87040 BLOOD CULTURE FOR BACTERIA: CPT | Mod: 59

## 2019-06-04 PROCEDURE — 99223 PR INITIAL HOSPITAL CARE,LEVL III: ICD-10-PCS | Mod: ,,, | Performed by: NURSE PRACTITIONER

## 2019-06-04 PROCEDURE — 20000000 HC ICU ROOM

## 2019-06-04 PROCEDURE — C1751 CATH, INF, PER/CENT/MIDLINE: HCPCS

## 2019-06-04 PROCEDURE — 36430 TRANSFUSION BLD/BLD COMPNT: CPT

## 2019-06-04 PROCEDURE — P9017 PLASMA 1 DONOR FRZ W/IN 8 HR: HCPCS

## 2019-06-04 PROCEDURE — 85652 RBC SED RATE AUTOMATED: CPT

## 2019-06-04 PROCEDURE — 36410 VNPNXR 3YR/> PHY/QHP DX/THER: CPT

## 2019-06-04 PROCEDURE — 80053 COMPREHEN METABOLIC PANEL: CPT

## 2019-06-04 PROCEDURE — 84484 ASSAY OF TROPONIN QUANT: CPT

## 2019-06-04 PROCEDURE — 94761 N-INVAS EAR/PLS OXIMETRY MLT: CPT

## 2019-06-04 PROCEDURE — 83605 ASSAY OF LACTIC ACID: CPT | Mod: 91

## 2019-06-04 PROCEDURE — 83050 HGB METHEMOGLOBIN QUAN: CPT

## 2019-06-04 PROCEDURE — 99291 PR CRITICAL CARE, E/M 30-74 MINUTES: ICD-10-PCS | Mod: GC,,, | Performed by: INTERNAL MEDICINE

## 2019-06-04 PROCEDURE — 84075 ASSAY ALKALINE PHOSPHATASE: CPT

## 2019-06-04 PROCEDURE — 86870 RBC ANTIBODY IDENTIFICATION: CPT

## 2019-06-04 PROCEDURE — 85025 COMPLETE CBC W/AUTO DIFF WBC: CPT | Mod: 91

## 2019-06-04 RX ORDER — SODIUM CHLORIDE 9 MG/ML
INJECTION, SOLUTION INTRAVENOUS ONCE
Status: DISCONTINUED | OUTPATIENT
Start: 2019-06-04 | End: 2019-06-08 | Stop reason: HOSPADM

## 2019-06-04 RX ORDER — CIPROFLOXACIN 2 MG/ML
400 INJECTION, SOLUTION INTRAVENOUS
Status: DISCONTINUED | OUTPATIENT
Start: 2019-06-04 | End: 2019-06-06

## 2019-06-04 RX ORDER — VANCOMYCIN HCL IN 5 % DEXTROSE 1.25 G/25
1250 PLASTIC BAG, INJECTION (ML) INTRAVENOUS ONCE
Status: COMPLETED | OUTPATIENT
Start: 2019-06-04 | End: 2019-06-04

## 2019-06-04 RX ORDER — HYDROCODONE BITARTRATE AND ACETAMINOPHEN 500; 5 MG/1; MG/1
TABLET ORAL
Status: DISCONTINUED | OUTPATIENT
Start: 2019-06-04 | End: 2019-06-08 | Stop reason: HOSPADM

## 2019-06-04 RX ORDER — METRONIDAZOLE 500 MG/100ML
500 INJECTION, SOLUTION INTRAVENOUS
Status: DISCONTINUED | OUTPATIENT
Start: 2019-06-04 | End: 2019-06-06

## 2019-06-04 RX ORDER — ASPIRIN 81 MG/1
81 TABLET ORAL DAILY
Status: DISCONTINUED | OUTPATIENT
Start: 2019-06-05 | End: 2019-06-06

## 2019-06-04 RX ADMIN — Medication 1250 MG: at 08:06

## 2019-06-04 RX ADMIN — CIPROFLOXACIN 400 MG: 2 INJECTION, SOLUTION INTRAVENOUS at 06:06

## 2019-06-04 RX ADMIN — METOPROLOL TARTRATE 25 MG: 25 TABLET ORAL at 08:06

## 2019-06-04 RX ADMIN — METRONIDAZOLE 500 MG: 500 SOLUTION INTRAVENOUS at 04:06

## 2019-06-04 RX ADMIN — RIOCIGUAT 2.5 MG: 2.5 TABLET, FILM COATED ORAL at 06:06

## 2019-06-04 RX ADMIN — METRONIDAZOLE 500 MG: 500 SOLUTION INTRAVENOUS at 11:06

## 2019-06-04 RX ADMIN — PANTOPRAZOLE SODIUM 40 MG: 40 TABLET, DELAYED RELEASE ORAL at 08:06

## 2019-06-04 RX ADMIN — DEXTROSE MONOHYDRATE 25 G: 25 INJECTION, SOLUTION INTRAVENOUS at 03:06

## 2019-06-04 RX ADMIN — ASPIRIN 325 MG ORAL TABLET 325 MG: 325 PILL ORAL at 08:06

## 2019-06-04 RX ADMIN — LEVOTHYROXINE SODIUM 100 MCG: 100 TABLET ORAL at 06:06

## 2019-06-04 RX ADMIN — RIOCIGUAT 2.5 MG: 2.5 TABLET, FILM COATED ORAL at 08:06

## 2019-06-04 RX ADMIN — SODIUM CHLORIDE: 0.9 INJECTION, SOLUTION INTRAVENOUS at 02:06

## 2019-06-04 RX ADMIN — PHYTONADIONE 5 MG: 10 INJECTION, EMULSION INTRAMUSCULAR; INTRAVENOUS; SUBCUTANEOUS at 08:06

## 2019-06-04 RX ADMIN — RIOCIGUAT 2.5 MG: 2.5 TABLET, FILM COATED ORAL at 11:06

## 2019-06-04 RX ADMIN — ATORVASTATIN CALCIUM 10 MG: 10 TABLET, FILM COATED ORAL at 08:06

## 2019-06-04 NOTE — PROGRESS NOTES
Pharmacokinetic Initial Assessment: IV Vancomycin    Assessment/Plan:     · Initiate intravenous vancomycin with loading dose of 1250 mg once   · Desired empiric serum trough concentration is 10 to 20 mcg/mL.  · Draw a random at 0600 on 6/5/19    Pharmacy will continue to follow and monitor vancomycin.      Please contact pharmacy at extension [phone] with any questions regarding this assessment.     Thank you for the consult,   Roopa Cruz      Patient brief summary:  Shivani Sheets is a 63 y.o. female initiated on antimicrobial therapy with IV Vancomycin for treatment of suspected bacteremia    Drug Allergies:   Review of patient's allergies indicates:   Allergen Reactions    Penicillins Swelling    Iodine      Other reaction(s): Hives    Sulfamethoxazole-trimethoprim      Other reaction(s): Swelling  Other reaction(s): Hives       Actual Body Weight:   65 kg    Renal Function:   Estimated Creatinine Clearance: 5.4 mL/min (A) (based on SCr of 9 mg/dL (H)).,     Dialysis Method (if applicable):  intermittent HD    CBC (last 72 hours):  Recent Labs   Lab Result Units 06/03/19  1347 06/04/19  0426 06/04/19  1117 06/04/19  1604   WBC K/uL 5.39 10.76 9.42 8.55   Hemoglobin g/dL 12.8 14.0 13.4 11.6*   Hematocrit % 44.1 47.9 44.8 38.3   Platelets K/uL 106* 106* 112* 104*   Gran% % 48.9 78.0* 81.5* 80.8*   Lymph% % 32.5 11.4* 11.5* 11.1*   Mono% % 15.8* 8.1 6.3 7.4   Eosinophil% % 1.3 0.1 0.0 0.0   Basophil% % 1.3 0.7 0.2 0.2   Differential Method  Automated Automated Automated Automated       Metabolic Panel (last 72 hours):  Recent Labs   Lab Result Units 06/03/19  1347 06/04/19  0426 06/04/19  1117 06/04/19  1604   Sodium mmol/L 138 138  138 141 141   Potassium mmol/L 4.3 4.5  4.5 4.5 4.4   Chloride mmol/L 101 100  100 99 99   CO2 mmol/L 21* 17*  17* 17* 18*   Glucose mg/dL 96 164*  164* 86 92   BUN, Bld mg/dL 44* 48*  48* 54* 57*   Creatinine mg/dL 8.3* 8.6*  8.6* 9.2* 9.0*   Albumin g/dL 3.1* 3.0* 3.2* 3.1*   3.1*   Total Bilirubin mg/dL 0.7 1.2* 1.2* 1.2*   Alkaline Phosphatase U/L 168* 165* 165* 147*   AST U/L 39 58* 88* 86*   ALT U/L 35 39 53* 52*   Magnesium mg/dL 2.5  --  2.4  --    Phosphorus mg/dL 3.2  --  6.2* 6.6*  6.6*       Drug levels (last 3 results):  No results for input(s): VANCOMYCINRA, VANCOMYCINPE, VANCOMYCINTR in the last 72 hours.    Microbiologic Results:  Microbiology Results (last 7 days)     Procedure Component Value Units Date/Time    Blood culture [[id number]]     Order Status:  No result Specimen:  Blood     Blood culture #1 **CANNOT BE ORDERED STAT** [[id number]] Collected:  06/03/19 1457    Order Status:  Completed Specimen:  Blood from Peripheral, Hand, Right Updated:  06/04/19 1653     Blood Culture, Routine Gram stain aer bottle: Gram positive cocci in clusters resembling Staph      Blood Culture, Routine Results called to and read back by:Mena Cleary RN 06/04/2019  16:53    Blood culture #2 **CANNOT BE ORDERED STAT** [[id number]] Collected:  06/03/19 1512    Order Status:  Completed Specimen:  Blood from Peripheral, Hand, Right Updated:  06/04/19 1622     Blood Culture, Routine No Growth to date     Blood Culture, Routine No Growth to date    Blood culture [[id number]] Collected:  06/04/19 1118    Order Status:  Sent Specimen:  Blood Updated:  06/04/19 1137

## 2019-06-04 NOTE — SUBJECTIVE & OBJECTIVE
Past Medical History:   Diagnosis Date    Allergy     Anemia     Anticoagulant long-term use     4 years coumadin, asa    Arthritis     Awaiting organ transplant 2013    Cataract     Central serous chorioretinopathy of eye, right 2018    CHF (congestive heart failure)     Chronic diarrhea     Chronic kidney disease     Colon polyps     COPD (chronic obstructive pulmonary disease)     Coronary artery disease     Diabetes mellitus     Diabetic retinopathy     Diverticulosis     Encounter for blood transfusion     ESRD from HTN strtied RRT 1999    Failed  donor kidney transplant      Glaucoma     History of bleeding peptic ulcer      as stated per pt    Hyperlipidemia     Hypertension     Hypothyroidism     Morbid obesity 8/10/2012    Renal hypertension     Stroke            Past Surgical History:   Procedure Laterality Date    CATARACT EXTRACTION W/  INTRAOCULAR LENS IMPLANT Bilateral     COLON SURGERY      COLONOSCOPY      COLONOSCOPY N/A 2019    Performed by Ind[facility]tran MD at [facility] ENDO (2ND FLR)    COLONOSCOPY N/A 2018    Performed by Jose Falk MD at [facility] ENDO (2ND FLR)    EGD (ESOPHAGOGASTRODUODENOSCOPY) N/A 2019    Performed by Miranda Maradiaga MD at [facility] ENDO (2ND FLR)    EGD (ESOPHAGOGASTRODUODENOSCOPY) N/A 2018    Performed by Luis Washington MD at [facility] ENDO (2ND FLR)    ESOPHAGOGASTRODUODENOSCOPY (EGD) N/A 2018    Performed by Kenji Desir MD at [facility] ENDO (2ND FLR)    EYE SURGERY      FRACTURE SURGERY      R arm    HERNIA REPAIR      HYSTERECTOMY      INSERTION-IMPLANT-GLAUCOMA Left 10/12/2017    Performed by Junaid Kern MD at [facility] OR 1ST FLR    KIDNEY TRANSPLANT      Left atrial appendage closure device N/A 2019    Performed by César Thomas MD at [facility] EP LAB    NEPHRECTOMY  2008    transplant     PARATHYROID GLAND SURGERY      TONSILLECTOMY      Transesophageal  echo (JACE) intra-procedure log documentation N/A 2019    Performed by [facility] EP LAB    Transesophageal echo (JACE) intra-procedure log documentation N/A 2019    Performed by [facility] EP LAB    UPPER GASTROINTESTINAL ENDOSCOPY         Review of patient's allergies indicates:   Allergen Reactions    Penicillins Swelling    Iodine      Other reaction(s): Hives    Sulfamethoxazole-trimethoprim      Other reaction(s): Swelling  Other reaction(s): Hives       Current Facility-Administered Medications   Medication    aspirin tablet 325 mg    atorvastatin tablet 10 mg    levothyroxine tablet 100 mcg    metoprolol tartrate (LOPRESSOR) tablet 25 mg    pantoprazole EC tablet 40 mg    riociguat (ADEMPAS) tablet 2.5 mg    selexipag Tab 1,000 mcg    sodium chloride 0.9% flush 10 mL     Facility-Administered Medications Ordered in Other Encounters   Medication    sodium chloride 0.9% flush 5 mL     Family History     Problem Relation (Age of Onset)    Asthma Sister    Blindness Father    Breast cancer Maternal Aunt    Depression Sister    Diabetes Maternal Aunt    Heart attack Father, Mother    Heart failure Father, Mother    Hypertension Father, Mother, Sister, Brother    Kidney disease Brother        Tobacco Use    Smoking status: Former Smoker     Types: Cigarettes     Last attempt to quit: 8/10/2000     Years since quittin.8    Smokeless tobacco: Never Used   Substance and Sexual Activity    Alcohol use: No     Comment: hx of etoh     Drug use: No    Sexual activity: Never     Review of Systems   All other systems reviewed and are negative.    Objective:     Vital Signs (Most Recent):  Temp: 96.6 °F (35.9 °C) (19)  Pulse: 96 (19)  Resp: 16 (19)  BP: 123/80 (19)  SpO2: 95 % (19) Vital Signs (24h Range):  Temp:  [96.4 °F (35.8 °C)-98.2 °F (36.8 °C)] 96.6 °F (35.9 °C)  Pulse:  []  96  Resp:  [16-20] 16  SpO2:  [93 %-96 %] 95 %  BP: ()/(57-93) 123/80     Patient Vitals for the past 72 hrs (Last 3 readings):   Weight   06/04/19 0600 65 kg (143 lb 3.2 oz)   06/03/19 2248 63.7 kg (140 lb 6.4 oz)   06/03/19 1215 70.3 kg (155 lb)     Body mass index is 27.06 kg/m².      Intake/Output Summary (Last 24 hours) at 6/4/2019 0622  Last data filed at 6/4/2019 0344  Gross per 24 hour   Intake 620 ml   Output 0 ml   Net 620 ml       Physical Exam  GEN: Alert and oriented in NAD  NECK: + JVD appreciated to ear  CVS: RRR, s1/s2, no MRG  PULM: CTAB no rales  ABD: NT/ND BS +  Extremities: warm and dry, palpable pulses, mild edema  NEURO: Alert and oriented x 3  PSYCH: appropriate affect.   Significant Labs:  CBC:  Recent Labs   Lab 06/03/19  1347 06/04/19  0426   WBC 5.39 10.76   RBC 4.78 5.21   HGB 12.8 14.0   HCT 44.1 47.9   * 106*   MCV 92 92   MCH 26.8* 26.9*   MCHC 29.0* 29.2*     BNP:  No results for input(s): BNP in the last 168 hours.    Invalid input(s): BNPTRIAGELBLO  CMP:  Recent Labs   Lab 06/03/19  1347 06/04/19  0426   GLU 96 164*  164*   CALCIUM 8.5* 8.2*  8.2*   ALBUMIN 3.1* 3.0*   PROT 6.9 6.7    138  138   K 4.3 4.5  4.5   CO2 21* 17*  17*    100  100   BUN 44* 48*  48*   CREATININE 8.3* 8.6*  8.6*   ALKPHOS 168* 165*   ALT 35 39   AST 39 58*   BILITOT 0.7 1.2*      Coagulation:   Recent Labs   Lab 06/03/19  1404 06/04/19  0426   INR 6.0* 9.5*     LDH:  No results for input(s): LDH in the last 72 hours.  Microbiology:  Microbiology Results (last 7 days)     Procedure Component Value Units Date/Time    Blood culture #1 **CANNOT BE ORDERED STAT** [[id number]] Collected:  06/03/19 1457    Order Status:  Completed Specimen:  Blood from Peripheral, Hand, Right Updated:  06/04/19 0115     Blood Culture, Routine No Growth to date    Blood culture #2 **CANNOT BE ORDERED STAT** [[id number]] Collected:  06/03/19 1512    Order Status:  Completed Specimen:  Blood from  Peripheral, Hand, Right Updated:  06/04/19 0115     Blood Culture, Routine No Growth to date          I have reviewed all pertinent labs within the past 24 hours.    Diagnostic Results:  I have reviewed and interpreted all pertinent imaging results/findings within the past 24 hours.

## 2019-06-04 NOTE — ASSESSMENT & PLAN NOTE
Acute diarrhea which started last evening. No fever/chills, no WBC, or recurrent GI bleeding. Seems likely related to viral gastroenteritis.      Plan  Conservative management  Advance diet as tolerated  Hold off on fluids for now appears needs more fluid taken off with dialysis.

## 2019-06-04 NOTE — CONSULTS
NIAS consulted for PICC placement     Per policy, nephrology called for clearance: GFR<45 (4.7)     Nephrology denied PICC placement     Nurse notified

## 2019-06-04 NOTE — H&P
[facility]  Heart Transplant  H&P    Patient Name: Shivani Sheets  MRN: [id number]  Admission Date: 6/3/2019  Attending Physician: Jenny Hennessy MD  Primary Care Provider: Eula Weiss MD  Principal Problem:Diarrhea    Subjective:     History of Present Illness:  Pt is a 63 year old lady who we are consulted for diarrhea     She has a hx of PH on (adempas/selexipeg), diastolic dysfunction, pAF, CVA , COPD on 2 L nocturnal oxygen, central retinal artery occlusion without significant carotid artery stenosis, CAD with remote PCI, ESRD(LUE AVF) secondary to HTN, s/p failed kidney transplant, PUD, STEFFANY on CPAP, hypothyroidism, RA, HLD. Had recurrent GI bleeding and is now s/p watchman.      Presents to ED today for diarrhea that started last night. Patient reports 4 watery BM, mild abdominal pain since 4 am. She missed dialysis today because they told her to come to the ER. Does complain of associated nausea and vomiting. No hematemesis. Had CT scan with gall stones and anasarca. U/S abdomen reveals gallstones and pericholecystic fluid.      Past Medical History:   Diagnosis Date    Allergy     Anemia     Anticoagulant long-term use     4 years coumadin, asa    Arthritis     Awaiting organ transplant 2013    Cataract     Central serous chorioretinopathy of eye, right 2018    CHF (congestive heart failure)     Chronic diarrhea     Chronic kidney disease     Colon polyps     COPD (chronic obstructive pulmonary disease)     Coronary artery disease     Diabetes mellitus     Diabetic retinopathy     Diverticulosis     Encounter for blood transfusion     ESRD from HTN strtied RRT 1999    Failed  donor kidney transplant      Glaucoma     History of bleeding peptic ulcer      as stated per pt    Hyperlipidemia     Hypertension     Hypothyroidism     Morbid obesity 8/10/2012    Renal hypertension     Stroke     1987       Past  Surgical History:   Procedure Laterality Date    CATARACT EXTRACTION W/  INTRAOCULAR LENS IMPLANT Bilateral     COLON SURGERY      COLONOSCOPY      COLONOSCOPY N/A 2/4/2019    Performed by Indio Beltran MD at [facility] ENDO (2ND FLR)    COLONOSCOPY N/A 6/12/2018    Performed by Jose Falk MD at [facility] ENDO (2ND FLR)    EGD (ESOPHAGOGASTRODUODENOSCOPY) N/A 1/14/2019    Performed by Miranda Maradiaga MD at [facility] ENDO (2ND FLR)    EGD (ESOPHAGOGASTRODUODENOSCOPY) N/A 9/11/2018    Performed by Luis Washington MD at [facility] ENDO (2ND FLR)    ESOPHAGOGASTRODUODENOSCOPY (EGD) N/A 2/27/2018    Performed by Kenji Desir MD at [facility] ENDO (2ND FLR)    EYE SURGERY      FRACTURE SURGERY      R arm    HERNIA REPAIR      HYSTERECTOMY      INSERTION-IMPLANT-GLAUCOMA Left 10/12/2017    Performed by Junaid Kern MD at [facility] OR 1ST FLR    KIDNEY TRANSPLANT      Left atrial appendage closure device N/A 4/26/2019    Performed by César Thomas MD at [facility] EP LAB    NEPHRECTOMY  11/2008    transplant     PARATHYROID GLAND SURGERY      TONSILLECTOMY      Transesophageal echo (JACE) intra-procedure log documentation N/A 4/26/2019    Performed by [facility] at [facility] EP LAB    Transesophageal echo (JACE) intra-procedure log documentation N/A 2/28/2019    Performed by [facility] at [facility] EP LAB    UPPER GASTROINTESTINAL ENDOSCOPY         Review of patient's allergies indicates:   Allergen Reactions    Penicillins Swelling    Iodine      Other reaction(s): Hives    Sulfamethoxazole-trimethoprim      Other reaction(s): Swelling  Other reaction(s): Hives       Current Facility-Administered Medications   Medication    aspirin tablet 325 mg    atorvastatin tablet 10 mg    levothyroxine tablet 100 mcg    metoprolol tartrate (LOPRESSOR) tablet 25 mg    pantoprazole EC tablet 40 mg    riociguat (ADEMPAS) tablet 2.5 mg    selexipag Tab 1,000 mcg    sodium chloride 0.9% flush 10 mL      Facility-Administered Medications Ordered in Other Encounters   Medication    sodium chloride 0.9% flush 5 mL     Family History     Problem Relation (Age of Onset)    Asthma Sister    Blindness Father    Breast cancer Maternal Aunt    Depression Sister    Diabetes Maternal Aunt    Heart attack Father, Mother    Heart failure Father, Mother    Hypertension Father, Mother, Sister, Brother    Kidney disease Brother        Tobacco Use    Smoking status: Former Smoker     Types: Cigarettes     Last attempt to quit: 8/10/2000     Years since quittin.8    Smokeless tobacco: Never Used   Substance and Sexual Activity    Alcohol use: No     Comment: hx of etoh     Drug use: No    Sexual activity: Never     Review of Systems   All other systems reviewed and are negative.    Objective:     Vital Signs (Most Recent):  Temp: 96.6 °F (35.9 °C) (19)  Pulse: 96 (19)  Resp: 16 (19)  BP: 123/80 (19)  SpO2: 95 % (19) Vital Signs (24h Range):  Temp:  [96.4 °F (35.8 °C)-98.2 °F (36.8 °C)] 96.6 °F (35.9 °C)  Pulse:  [] 96  Resp:  [16-20] 16  SpO2:  [93 %-96 %] 95 %  BP: ()/(57-93) 123/80     Patient Vitals for the past 72 hrs (Last 3 readings):   Weight   19 0600 65 kg (143 lb 3.2 oz)   19 2248 63.7 kg (140 lb 6.4 oz)   19 1215 70.3 kg (155 lb)     Body mass index is 27.06 kg/m².      Intake/Output Summary (Last 24 hours) at 2019 06  Last data filed at 2019 0344  Gross per 24 hour   Intake 620 ml   Output 0 ml   Net 620 ml       Physical Exam  GEN: Alert and oriented in NAD  NECK: + JVD appreciated to ear  CVS: RRR, s1/s2, no MRG  PULM: CTAB no rales  ABD: NT/ND BS +  Extremities: warm and dry, palpable pulses, mild edema  NEURO: Alert and oriented x 3  PSYCH: appropriate affect.   Significant Labs:  CBC:  Recent Labs   Lab 19  1347 19  0426   WBC 5.39 10.76   RBC 4.78 5.21   HGB 12.8 14.0   HCT 44.1 47.9   *  106*   MCV 92 92   MCH 26.8* 26.9*   MCHC 29.0* 29.2*     BNP:  No results for input(s): BNP in the last 168 hours.    Invalid input(s): BNPTRIAGELBLO  CMP:  Recent Labs   Lab 06/03/19  1347 06/04/19  0426   GLU 96 164*  164*   CALCIUM 8.5* 8.2*  8.2*   ALBUMIN 3.1* 3.0*   PROT 6.9 6.7    138  138   K 4.3 4.5  4.5   CO2 21* 17*  17*    100  100   BUN 44* 48*  48*   CREATININE 8.3* 8.6*  8.6*   ALKPHOS 168* 165*   ALT 35 39   AST 39 58*   BILITOT 0.7 1.2*      Coagulation:   Recent Labs   Lab 06/03/19  1404 06/04/19  0426   INR 6.0* 9.5*     LDH:  No results for input(s): LDH in the last 72 hours.  Microbiology:  Microbiology Results (last 7 days)     Procedure Component Value Units Date/Time    Blood culture #1 **CANNOT BE ORDERED STAT** [[id number]] Collected:  06/03/19 1457    Order Status:  Completed Specimen:  Blood from Peripheral, Hand, Right Updated:  06/04/19 0115     Blood Culture, Routine No Growth to date    Blood culture #2 **CANNOT BE ORDERED STAT** [[id number]] Collected:  06/03/19 1512    Order Status:  Completed Specimen:  Blood from Peripheral, Hand, Right Updated:  06/04/19 0115     Blood Culture, Routine No Growth to date          I have reviewed all pertinent labs within the past 24 hours.    Diagnostic Results:  I have reviewed and interpreted all pertinent imaging results/findings within the past 24 hours.    Assessment/Plan:     * Diarrhea  Acute diarrhea which started last evening. No fever/chills, no WBC, or recurrent GI bleeding. Seems likely related to viral gastroenteritis.      Plan  Conservative management  Advance diet as tolerated  Hold off on fluids for now appears needs more fluid taken off with dialysis.       End stage renal disease on dialysis  Consult nephrology for HD    Atrial fibrillation  S/p watchman placement 4/27  INR supratherapeutic-continue to monitor      Pulmonary HTN  Continue adempas and james Fontaine MD  Heart  Transplant  [facility]

## 2019-06-04 NOTE — PROGRESS NOTES
Pt was only able to drink 1 juice and eat 1 forrest cracker with peanut butter. Recheck on blood sugar was 34, administered full amp of D50 per orders, blood sugar 91 on recheck.

## 2019-06-04 NOTE — CONSULTS
Placed 18g, 10 cm Midline in right brachial vein using u/s guidance.  Max dwell date 7/3/2019.  Lot # [id number].

## 2019-06-04 NOTE — CARE UPDATE
"RAPID RESPONSE NURSE PROACTIVE ROUNDING NOTE     Time of Visit: 715    Admit Date: 6/3/2019  LOS: 1  Code Status: Full Code   Date of Visit: 2019  : 1955  Age: 63 y.o.  Sex: female  Race: Black or   Bed: 348/348 A:   MRN: [id number]  Was the patient discharged from an ICU this admission? no   Was the patient discharged from a PACU within last 24 hours?  no  Did the patient receive conscious sedation/general anesthesia in last 24 hours?  no  Was the patient in the ED within the past 24 hours?  yes  Was the patient started on NIPPV within the past 24 hours?  no  Attending Physician: Jenny Hennessy MD  Primary Service: Networked reference to record PCT     ASSESSMENT     Diagnosis: Diarrhea    Abnormal Vital Signs: /80 (BP Location: Right arm, Patient Position: Lying)   Pulse 102   Temp 96.6 °F (35.9 °C) (Axillary)   Resp 16   Ht 5' 1" (1.549 m)   Wt 65 kg (143 lb 3.2 oz)   LMP  (LMP Unknown)   SpO2 95%   Breastfeeding? No   BMI 27.06 kg/m²      Clinical Issues: Circulatory    Patient  has a past medical history of Allergy, Anemia, Anticoagulant long-term use, Arthritis, Awaiting organ transplant, Cataract, Central serous chorioretinopathy of eye, right, CHF (congestive heart failure), Chronic diarrhea, Chronic kidney disease, Colon polyps, COPD (chronic obstructive pulmonary disease), Coronary artery disease, Diabetes mellitus, Diabetic retinopathy, Diverticulosis, Encounter for blood transfusion, ESRD from HTN strtied RRT , Failed  donor kidney transplant -, Glaucoma, History of bleeding peptic ulcer, Hyperlipidemia, Hypertension, Hypothyroidism, Morbid obesity, Renal hypertension, and Stroke.    Pt laying in bed AAO. Pt states that she is feeling slightly better.   Pt waiting for dialysis, INR 9.5, Vitamin K to be administered, labs to be redrawn.      INTERVENTIONS/ RECOMMENDATIONS     Plan of care to be followed set by primary team. Recheck labs, " administer vitamin K, evaluate dialysis time.     Discussed plan of care with primary RN, .    PHYSICIAN ESCALATION     Yes/No  no      Disposition: Remain in room 348A.    FOLLOW-UP     Call back the Rapid Response Nurse, Aaron Gonzalez RN at [phone] for additional questions or concerns.

## 2019-06-04 NOTE — EICU
"eLERT for " time out" for cordis and tlc placement.  "Time out" performed.  Line attempted x 2, unsuccessful.  Consult for PICC line ordered.  "

## 2019-06-04 NOTE — SUBJECTIVE & OBJECTIVE
Past Medical History:   Diagnosis Date    Allergy     Anemia     Anticoagulant long-term use     4 years coumadin, asa    Arthritis     Awaiting organ transplant 2013    Cataract     Central serous chorioretinopathy of eye, right 2018    CHF (congestive heart failure)     Chronic diarrhea     Chronic kidney disease     Colon polyps     COPD (chronic obstructive pulmonary disease)     Coronary artery disease     Diabetes mellitus     Diabetic retinopathy     Diverticulosis     Encounter for blood transfusion     ESRD from HTN strtied RRT 1999    Failed  donor kidney transplant      Glaucoma     History of bleeding peptic ulcer      as stated per pt    Hyperlipidemia     Hypertension     Hypothyroidism     Morbid obesity 8/10/2012    Renal hypertension     Stroke            Past Surgical History:   Procedure Laterality Date    CATARACT EXTRACTION W/  INTRAOCULAR LENS IMPLANT Bilateral     COLON SURGERY      COLONOSCOPY      COLONOSCOPY N/A 2019    Performed by Indio [facility]tran MD at [facility] ENDO (2ND FLR)    COLONOSCOPY N/A 2018    Performed by Jose Falk MD at [facility] ENDO (2ND FLR)    EGD (ESOPHAGOGASTRODUODENOSCOPY) N/A 2019    Performed by Miranda Maradiaga MD at [facility] ENDO (2ND FLR)    EGD (ESOPHAGOGASTRODUODENOSCOPY) N/A 2018    Performed by Luis Washington MD at [facility] ENDO (2ND FLR)    ESOPHAGOGASTRODUODENOSCOPY (EGD) N/A 2018    Performed by Kenji Desir MD at [facility] ENDO (2ND FLR)    EYE SURGERY      FRACTURE SURGERY      R arm    HERNIA REPAIR      HYSTERECTOMY      INSERTION-IMPLANT-GLAUCOMA Left 10/12/2017    Performed by Junaid Kern MD at [facility] OR 1ST FLR    KIDNEY TRANSPLANT      Left atrial appendage closure device N/A 2019    Performed by César Thomas MD at [facility] EP LAB    NEPHRECTOMY  2008    transplant     PARATHYROID GLAND SURGERY      TONSILLECTOMY      Transesophageal  echo (JACE) intra-procedure log documentation N/A 4/26/2019    Performed by [facility] at [facility] EP LAB    Transesophageal echo (JACE) intra-procedure log documentation N/A 2/28/2019    Performed by [facility] Provider at [facility] EP LAB    UPPER GASTROINTESTINAL ENDOSCOPY         Review of patient's allergies indicates:   Allergen Reactions    Penicillins Swelling    Iodine      Other reaction(s): Hives    Sulfamethoxazole-trimethoprim      Other reaction(s): Swelling  Other reaction(s): Hives       Current Facility-Administered Medications on File Prior to Encounter   Medication    sodium chloride 0.9% flush 5 mL     Current Outpatient Medications on File Prior to Encounter   Medication Sig    ALPHAGAN P 0.1 % Drop once daily.     aspirin 325 MG tablet Take 1 tablet (325 mg total) by mouth once daily.    atorvastatin (LIPITOR) 10 MG tablet TAKE 1 BY MOUTH ONCE nightly    dorzolamide-timolol 2-0.5% (COSOPT) 22.3-6.8 mg/mL ophthalmic solution INSTILL 1 DROP IN BOTH EYES TWICE DAILY    latanoprost 0.005 % ophthalmic solution INSTILL 1 DROP IN BOTH EYES EVERY DAY AT BEDTIME    levothyroxine (SYNTHROID) 100 MCG tablet Take 1 tablet (100 mcg total) by mouth before breakfast.    metoprolol tartrate (LOPRESSOR) 25 MG tablet Take 1 tablet (25 mg total) by mouth 2 (two) times daily.    pantoprazole (PROTONIX) 40 MG tablet TAKE 1 TABLET BY MOUTH ONCE DAILY    prednisoLONE acetate (PRED FORTE) 1 % DrpS Place 1 drop into the left eye every 4 (four) hours.    riociguat (ADEMPAS) 2.5 mg tablet Take 2.5 mg by mouth 3 (three) times daily.     selexipag (UPTRAVI) 1,000 mcg Tab Take 1,000 mcg by mouth 2 (two) times daily.    walker (ULTRA-LIGHT ROLLATOR) Misc Use daily    warfarin (COUMADIN) 5 MG tablet TAKE 1 1/2 TABLETS BY MOUTH DAILY ON TUESDAY, THURSDAY AND SATURDAY, THEN TAKE 1 TABLET DAILY ON MONDAY, WEDNESDAY, FRIDAY AND SUNDAY     Family History     Problem Relation (Age of Onset)    Asthma  Sister    Blindness Father    Breast cancer Maternal Aunt    Depression Sister    Diabetes Maternal Aunt    Heart attack Father, Mother    Heart failure Father, Mother    Hypertension Father, Mother, Sister, Brother    Kidney disease Brother        Tobacco Use    Smoking status: Former Smoker     Types: Cigarettes     Last attempt to quit: 8/10/2000     Years since quittin.8    Smokeless tobacco: Never Used   Substance and Sexual Activity    Alcohol use: No     Comment: hx of etoh     Drug use: No    Sexual activity: Never     Review of Systems   All other systems reviewed and are negative.    Objective:     Vital Signs (Most Recent):  Temp: 96.9 °F (36.1 °C) (19)  Pulse: 108 (19)  Resp: 18 (19 161)  BP: (!) 176/88 (19)  SpO2: (!) 93 % (19) Vital Signs (24h Range):  Temp:  [96.9 °F (36.1 °C)-98.2 °F (36.8 °C)] 96.9 °F (36.1 °C)  Pulse:  [] 108  Resp:  [18-20] 18  SpO2:  [93 %-96 %] 93 %  BP: ()/(57-93) 176/88     Weight: 70.3 kg (155 lb)  Body mass index is 25.02 kg/m².    SpO2: (!) 93 %  O2 Device (Oxygen Therapy): room air      Intake/Output Summary (Last 24 hours) at 6/3/2019 2307  Last data filed at 6/3/2019 1658  Gross per 24 hour   Intake 500 ml   Output --   Net 500 ml       Lines/Drains/Airways     Drain                 Hemodialysis AV Fistula Left upper arm -- days          Peripheral Intravenous Line                 Peripheral IV - Single Lumen 19 1424 22 G Right Hand less than 1 day                Physical Exam  GEN: Alert and oriented in NAD  NECK: + JVD appreciated to ear  CVS: RRR, s1/s2, no MRG  PULM: CTAB no rales  ABD: NT/ND BS +  Extremities: warm and dry, palpable pulses, mild edema  NEURO: Alert and oriented x 3  PSYCH: appropriate affect.         Significant Labs:   Recent Lab Results       19  2108   19  2032   19  1404   19  1347        Albumin       3.1     Alkaline Phosphatase       168      ALT       35     Anion Gap       16     Aniso       Slight     AST       39  Comment:  *Result may be interfered by visible hemolysis     Baso #       0.07     Basophilic Stippling       Occasional     Basophil%       1.3     BILIRUBIN TOTAL       0.7  Comment:  For infants and newborns, interpretation of results should be based  on gestational age, weight and in agreement with clinical  observations.  Premature Infant recommended reference ranges:  Up to 24 hours.............<8.0 mg/dL  Up to 48 hours............<12.0 mg/dL  3-5 days..................<15.0 mg/dL  6-29 days.................<15.0 mg/dL       BUN, Bld       44     Calcium       8.5     Chloride       101     CO2       21     Creatinine       8.3     Differential Method       Automated     Dohle Bodies       Present     eGFR if        5.3     eGFR if non        4.6  Comment:  Calculation used to obtain the estimated glomerular filtration  rate (eGFR) is the CKD-EPI equation.        Eos #       0.1     Eosinophil%       1.3     Glucose       96     Gran # (ANC)       2.6     Gran%       48.9     Hematocrit       44.1     Hemoglobin       12.8     Hypo       Occasional     Immature Grans (Abs)       0.01  Comment:  Mild elevation in immature granulocytes is non specific and   can be seen in a variety of conditions including stress response,   acute inflammation, trauma and pregnancy. Correlation with other   laboratory and clinical findings is essential.       Immature Granulocytes       0.2     Coumadin Monitoring INR     6.0  Comment:  Coumadin Therapy:  2.0 - 3.0 for INR for all indicators except mechanical heart valves  and antiphospholipid syndromes which should use 2.5 - 3.5.         Lactate, Jimmie     2.6  Comment:  Falsely low lactic acid results can be found in samples   containing >=13.0 mg/dL total bilirubin and/or >=3.5 mg/dL   direct bilirubin.         Lipase       14     Lymph #       1.8     Lymph%       32.5      Magnesium       2.5     MCH       26.8     MCHC       29.0     MCV       92     Mono #       0.9     Mono%       15.8     MPV       SEE COMMENT  Comment:  Result not available.     nRBC       0     Ovalocytes       Occasional     Phosphorus       3.2     Platelet Estimate       Decreased     Platelets       106     POCT Glucose 132 44         Poik       Slight     Poly       Occasional     Potassium       4.3     PROTEIN TOTAL       6.9     Protime     59.8       RBC       4.78     RDW       20.3     Sodium       138     Target Cells       Occasional     Toxic Granulation       Present     Troponin I       0.068  Comment:  The reference interval for Troponin I represents the 99th percentile   cutoff   for our facility and is consistent with 3rd generation assay   performance.       WBC       5.39           Significant Imaging: reviewed

## 2019-06-04 NOTE — CONSULTS
[facility]  Nephrology  Consult Note    Patient Name: Shivani Sheets  MRN: [id number]  Admission Date: 6/3/2019  Hospital Length of Stay: 1 days  Attending Provider: Jenny Hennessy MD   Primary Care Physician: Eula Weiss MD  Principal Problem:Diarrhea    Inpatient consult to Nephrology  Consult performed by: Kiet Patel NP  Consult ordered by: Shiv Fontaine MD  Reason for consult: ESRD        Subjective:     HPI: Ms. Shivani Sheets is a 62 yo AAF with HTN, pHTN, HFpEF (50%), and ESRD on iHD MWF who presented to the hospital on 06/03 for evaluation of nausea/vomiting/diarrhea x 1 day.  She denies any changes in her diet.  No abdominal pain, fever, or chills.  She missed her hemodialysis treatment yesterday because she felt bad, and Nephrology has been consulted for ESRD management while in-patient.  She underwent CT Abdomen and it was read as increased abdominal wall edema, cardiomegaly with small pericardial effusion, and ascites.  She reports recently having her EDW decreased a few weeks ago by her OP Nephrologist, and she has been tolerating her hemodialysis treatments well without problems.  On exam today, she denies any complaints, no abdominal pain, nausea/vomiting/diarrhea have resolved.  She denies SOB, on O2 @ 2L NC, which she normally uses @ home.      She dialyzes at [facility] under the care of Dr. Deleon on a MWF schedule.  She reports a recent decrease in her EDW to 68 kg and she no longer makes urine.  She has a ABEL AVF with a very large anuerysm, followed by surgeons at the [facility].  He rnormal treatment time is 3.5 hours, last treatment being on Friday of last week.    Past Medical History:   Diagnosis Date    Allergy     Anemia     Anticoagulant long-term use     4 years coumadin, asa    Arthritis     Awaiting organ transplant 4/30/2013    Cataract     Central serous chorioretinopathy of eye, right 8/21/2018    CHF  (congestive heart failure)     Chronic diarrhea     Chronic kidney disease     Colon polyps     COPD (chronic obstructive pulmonary disease)     Coronary artery disease     Diabetes mellitus     Diabetic retinopathy     Diverticulosis     Encounter for blood transfusion     ESRD from HTN strtied RRT 1999    Failed  donor kidney transplant -     Glaucoma     History of bleeding peptic ulcer      as stated per pt    Hyperlipidemia     Hypertension     Hypothyroidism     Morbid obesity 8/10/2012    Renal hypertension     Stroke            Past Surgical History:   Procedure Laterality Date    CATARACT EXTRACTION W/  INTRAOCULAR LENS IMPLANT Bilateral     COLON SURGERY      COLONOSCOPY      COLONOSCOPY N/A 2019    Performed by Indio Beltran MD at [facility] ENDO (2ND FLR)    COLONOSCOPY N/A 2018    Performed by Jose Falk MD at [facility] ENDO (2ND FLR)    EGD (ESOPHAGOGASTRODUODENOSCOPY) N/A 2019    Performed by Miranda Maradiaga MD at [facility] ENDO (2ND FLR)    EGD (ESOPHAGOGASTRODUODENOSCOPY) N/A 2018    Performed by Luis Washington MD at [facility] ENDO (2ND FLR)    ESOPHAGOGASTRODUODENOSCOPY (EGD) N/A 2018    Performed by Kenji Desir MD at [facility] ENDO (2ND FLR)    EYE SURGERY      FRACTURE SURGERY      R arm    HERNIA REPAIR      HYSTERECTOMY      INSERTION-IMPLANT-GLAUCOMA Left 10/12/2017    Performed by Junaid Kern MD at [facility] OR 1ST FLR    KIDNEY TRANSPLANT      Left atrial appendage closure device N/A 2019    Performed by César Thomas MD at [facility] EP LAB    NEPHRECTOMY  2008    transplant     PARATHYROID GLAND SURGERY      TONSILLECTOMY      Transesophageal echo (JACE) intra-procedure log documentation N/A 2019    Performed by [facility] at [facility] EP LAB    Transesophageal echo (JACE) intra-procedure log documentation N/A 2019    Performed by [employer] Diagnostic Provider at [facility] EP LAB    UPPER  GASTROINTESTINAL ENDOSCOPY         Review of patient's allergies indicates:   Allergen Reactions    Penicillins Swelling    Iodine      Other reaction(s): Hives    Sulfamethoxazole-trimethoprim      Other reaction(s): Swelling  Other reaction(s): Hives     Current Facility-Administered Medications   Medication Frequency    aspirin tablet 325 mg Daily    atorvastatin tablet 10 mg Daily    levothyroxine tablet 100 mcg Before breakfast    metoprolol tartrate (LOPRESSOR) tablet 25 mg BID    pantoprazole EC tablet 40 mg Daily    riociguat (ADEMPAS) tablet 2.5 mg TID    selexipag Tab 1,000 mcg BID    sodium chloride 0.9% flush 10 mL PRN     Facility-Administered Medications Ordered in Other Encounters   Medication Frequency    sodium chloride 0.9% flush 5 mL PRN     Family History     Problem Relation (Age of Onset)    Asthma Sister    Blindness Father    Breast cancer Maternal Aunt    Depression Sister    Diabetes Maternal Aunt    Heart attack Father, Mother    Heart failure Father, Mother    Hypertension Father, Mother, Sister, Brother    Kidney disease Brother        Tobacco Use    Smoking status: Former Smoker     Types: Cigarettes     Last attempt to quit: 8/10/2000     Years since quittin.8    Smokeless tobacco: Never Used   Substance and Sexual Activity    Alcohol use: No     Comment: hx of etoh     Drug use: No    Sexual activity: Never     Review of Systems   Constitutional: Positive for appetite change. Negative for activity change, fatigue, fever and unexpected weight change.   HENT: Negative for congestion, facial swelling, postnasal drip, rhinorrhea, sinus pressure and sinus pain.    Respiratory: Negative for cough, chest tightness, shortness of breath and wheezing.    Cardiovascular: Negative for chest pain, palpitations and leg swelling.   Gastrointestinal: Positive for diarrhea, nausea and vomiting. Negative for abdominal distention, abdominal pain and constipation.   Musculoskeletal:  Negative for arthralgias, gait problem and myalgias.   Skin: Negative for color change, pallor and rash.   Neurological: Negative for dizziness, light-headedness and headaches.   Psychiatric/Behavioral: Negative for agitation, behavioral problems and confusion.     Objective:     Vital Signs (Most Recent):  Temp: 97.8 °F (36.6 °C) (06/04/19 0827)  Pulse: (!) 111 (06/04/19 0834)  Resp: 18 (06/04/19 0827)  BP: (!) 163/125 (06/04/19 0834)  SpO2: (!) 94 % (06/04/19 0827)  O2 Device (Oxygen Therapy): nasal cannula (06/04/19 0342) Vital Signs (24h Range):  Temp:  [96.4 °F (35.8 °C)-98.2 °F (36.8 °C)] 97.8 °F (36.6 °C)  Pulse:  [] 111  Resp:  [16-20] 18  SpO2:  [93 %-96 %] 94 %  BP: ()/() 163/125     Weight: 65 kg (143 lb 3.2 oz) (06/04/19 0600)  Body mass index is 27.06 kg/m².  Body surface area is 1.67 meters squared.    I/O last 3 completed shifts:  In: 620 [P.O.:120; IV Piggyback:500]  Out: 0     Physical Exam   Constitutional: She is oriented to person, place, and time. She appears well-developed. No distress.   HENT:   Head: Normocephalic and atraumatic.   Right Ear: External ear normal.   Left Ear: External ear normal.   Eyes: Conjunctivae and EOM are normal. Right eye exhibits no discharge. Left eye exhibits no discharge.   Periorbital edema   Neck: Normal range of motion. Neck supple. JVD present.   Cardiovascular: An irregularly irregular rhythm present. Exam reveals no gallop and no friction rub.   Murmur heard.  Pulmonary/Chest: Effort normal. No respiratory distress. She has no wheezes. She has no rales.   Abdominal: Soft. Bowel sounds are normal. She exhibits no distension. There is no tenderness. There is no guarding.   Musculoskeletal: She exhibits no edema or deformity.   Neurological: She is alert and oriented to person, place, and time.   Skin: Skin is warm and dry. She is not diaphoretic.   Psychiatric: She has a normal mood and affect. Her behavior is normal.       Significant  Labs:  CBC:   Recent Labs   Lab 06/04/19  0426   WBC 10.76   RBC 5.21   HGB 14.0   HCT 47.9   *   MCV 92   MCH 26.9*   MCHC 29.2*     CMP:   Recent Labs   Lab 06/04/19  0426   *  164*   CALCIUM 8.2*  8.2*   ALBUMIN 3.0*   PROT 6.7     138   K 4.5  4.5   CO2 17*  17*     100   BUN 48*  48*   CREATININE 8.6*  8.6*   ALKPHOS 165*   ALT 39   AST 58*   BILITOT 1.2*         Assessment/Plan:     End stage renal disease on dialysis  ESRD on iHD MWF  [place]  3.5 hours  Dr. eDleon  No residual renal function  ABEL AVF  Reports an EDW of 68 kg that was recently decreased a few weeks ago.    Plan/Recommendations:  -HD this afternoon for metabolic clearance/volume management  -will ultrafiltrate to her EDW.  CBC looks to be hemoconcentrated and may just need clearance for today.  -will obtain her OP records  -she needs to be followed up closely with her vascular surgeons regarding her anuerysm on her AVF, I have discussed this with her and she will need to make an appointment upon discharge.  -Phos levels        Kiet Landaverde, NEETU  Nephrology  [facility]

## 2019-06-04 NOTE — ED NOTES
Pt being transported by pt transportation services. On tele monitor from CSU. No distress noted at this time

## 2019-06-04 NOTE — PROGRESS NOTES
Rechecked pt's FSBG=60. MD on call notified, orders to try and get pt to eat, administer D50 if pt unable to eat. Will recheck after treatment.

## 2019-06-04 NOTE — HPI
Pt is a 63 year old lady who we are consulted for diarrhea     She has a hx of PH on (adempas/selexipeg), diastolic dysfunction, pAF, CVA 1987, COPD on 2 L nocturnal oxygen, central retinal artery occlusion without significant carotid artery stenosis, CAD with remote PCI, ESRD(LUE AVF) secondary to HTN, s/p failed kidney transplant, PUD, STEFFANY on CPAP, hypothyroidism, RA, HLD. Had recurrent GI bleeding and is now s/p watchman.      Presents to ED today for diarrhea that started last night. Patient reports 4 watery BM, mild abdominal pain since 4 am. She missed dialysis today because they told her to come to the ER. Does complain of associated nausea and vomiting. No hematemesis. Had CT scan with gall stones and anasarca. U/S abdomen reveals gallstones and pericholecystic fluid.

## 2019-06-04 NOTE — NURSING
Pt's brother, Nahum, brought pt's home med bottle, Selexiphag - sent to pharmacy for ID and barcode.  TERRI Bai in pharmacy and infomred it was being brought  down

## 2019-06-04 NOTE — ED NOTES
poct glucose read less than 44mg/dl . Physician notified . Plan to give dextrose injection and recheck pt blood sugar

## 2019-06-04 NOTE — PROGRESS NOTES
Called regarding patient being drowsy and unable to register temperature. On exam patient is AAO x3, feeling much better. Afebrile and not hypothermic. POC glucose WNL now but were low earlier. Will check glucose in 1-2 hrs. Will continue to monitor.

## 2019-06-04 NOTE — PLAN OF CARE
"06/04/2019  4:16 PM    Subjective:  Patient had recurrence of abdominal pain, BP decreased from . Lactate trended up    Objective:  BP (!) 97/59   Pulse 99   Temp 98 °F (36.7 °C) (Oral)   Resp 13   Ht 5' 1" (1.549 m)   Wt 65 kg (143 lb 3.2 oz)   LMP  (LMP Unknown)   SpO2 97%   Breastfeeding? No   BMI 27.06 kg/m²   Constitutional: No distress, obese, conversant  Neck: ++ JVD, no masses, good movement  CV: RRR, S1 and S2 normal.  Pulm: Clear to auscultation bilaterally with symmetrical expansion.  GI: Abdomen soft, mild intermittent RLQ-tendermess, good bowel sounds. Argueta's negative  Extremities: Both extremities intact and grossly normal, skin is cool, no edema noted  Neuro: CNII-XII intact, no focal deficits    Lab Results   Component Value Date     06/04/2019    K 4.5 06/04/2019    CL 99 06/04/2019    CO2 17 (L) 06/04/2019    BUN 54 (H) 06/04/2019    CREATININE 9.2 (H) 06/04/2019    ANIONGAP 25 (H) 06/04/2019     Lab Results   Component Value Date    HGBA1C 5.1 02/02/2019     Lab Results   Component Value Date    BNP 2,593 (H) 02/14/2019    BNP 1,584 (H) 02/03/2019    BNP 1,613 (H) 06/19/2018       Lab Results   Component Value Date    WBC 9.42 06/04/2019    HGB 13.4 06/04/2019    HCT 44.8 06/04/2019     (L) 06/04/2019    GRAN 7.7 06/04/2019    GRAN 81.5 (H) 06/04/2019     Lab Results   Component Value Date    CHOL 114 (L) 02/07/2019    HDL 56 02/07/2019    LDLCALC 40.8 (L) 02/07/2019    TRIG 86 02/07/2019       ECHO:  · Moderately decreased left ventricular systolic function. The estimated ejection fraction is 43%  · Concentric left ventricular remodeling.  · Local segmental wall motion abnormalities.  · Septal wall has abnormal motion.  · Left ventricular diastolic dysfunction.  · Moderate left atrial enlargement.  · Moderate right ventricular enlargement.  · Mildly to moderately reduced right ventricular systolic function.  · Moderate right atrial enlargement.  · Mild aortic valve " stenosis.  · Aortic valve area is 1.55 cm2; peak velocity is 0.89 m/s; mean gradient is 1.88 mmHg.  · Moderate mitral sclerosis.  · Mild mitral regurgitation.  · Severe tricuspid regurgitation.  · Mild pulmonic regurgitation.  · Elevated central venous pressure (15 mm Hg).  · The estimated PA systolic pressure is 77 mm Hg  · Pulmonary hypertension present.  · Trivial Pericardial effusion.    US liver:  The liver parenchyma is unremarkable.  No focal hepatic parenchymal abnormality. No intra or extrahepatic bile duct dilatation.    The middle, right, and left hepatic veins are patent and unremarkable. The main, right, and left branches of the portal vein are patent and demonstrate bi-directional flow.    The hepatic arterial system is unremarkable.    The IVC is unremarkable.    ASSESSMENT:  1. Lactic acidosis of unclear etiology given adequate blood pressures, good ABG. Possibly the result of right heart failure ddx infection.    VBG pulled from REJ with SVO2 72    On attempted placement of RIJ cordis, stenosis in the proximal jugular vein was encountered and guidewire was unable to be passed on multiple attempts.    PLAN:  Inhaled nitric oxide @ 10ppm  Close monitoring in ICU  Volume off-loading with dialysis  Trend lactate, ABG, CMP q6h  INR daily  CRP/procalcitonin/ESR    ADDENDUM:  Blood cultures resulted GPC, will start vancomycin    Zacarias Rodriguez

## 2019-06-04 NOTE — CONSULTS
[facility]  Cardiology  Consult Note    Patient Name: Shivani Sheets  MRN: [id number]  Admission Date: 6/3/2019  Hospital Length of Stay: 0 days  Code Status: Full Code   Attending Provider: Jenny Hennessy MD   Consulting Provider: Shiv Fontaine MD  Primary Care Physician: Eula Weiss MD  Principal Problem:<principal problem not specified>    Patient information was obtained from patient and ER records.     Consults  Subjective:     Chief Complaint:  diarrhea     HPI:   Pt is a 63 year old lady who we are consulted for diarrhea    She has a hx of PH on (adempas/selexipeg), diastolic dysfunction, pAF, CVA , COPD on 2 L nocturnal oxygen, central retinal artery occlusion without significant carotid artery stenosis, CAD with remote PCI, ESRD(LUE AVF) secondary to HTN, s/p failed kidney transplant, PUD, STEFFANY on CPAP, hypothyroidism, RA, HLD. Had recurrent GI bleeding and is now s/p watchman.      Presents to ED today for diarrhea that started last night. Patient reports 4 watery BM, mild abdominal pain since 4 am. She missed dialysis today because they told her to come to the ER. Does complain of associated nausea and vomiting. No hematemesis. Had CT scan with gall stones and anasarca. U/S abdomen reveals gallstones and pericholecystic fluid.       Past Medical History:   Diagnosis Date    Allergy     Anemia     Anticoagulant long-term use     4 years coumadin, asa    Arthritis     Awaiting organ transplant 2013    Cataract     Central serous chorioretinopathy of eye, right 2018    CHF (congestive heart failure)     Chronic diarrhea     Chronic kidney disease     Colon polyps     COPD (chronic obstructive pulmonary disease)     Coronary artery disease     Diabetes mellitus     Diabetic retinopathy     Diverticulosis     Encounter for blood transfusion     ESRD from HTN strtied RRT 1999    Failed  donor kidney transplant      Glaucoma      History of bleeding peptic ulcer     1970's as stated per pt    Hyperlipidemia     Hypertension     Hypothyroidism     Morbid obesity 8/10/2012    Renal hypertension     Stroke     1987       Past Surgical History:   Procedure Laterality Date    CATARACT EXTRACTION W/  INTRAOCULAR LENS IMPLANT Bilateral     COLON SURGERY      COLONOSCOPY      COLONOSCOPY N/A 2/4/2019    Performed by Indio Beltran MD at [facility] ENDO (2ND FLR)    COLONOSCOPY N/A 6/12/2018    Performed by Joes Falk MD at [facility] ENDO (2ND FLR)    EGD (ESOPHAGOGASTRODUODENOSCOPY) N/A 1/14/2019    Performed by Miranda Maradiaga MD at [facility] ENDO (2ND FLR)    EGD (ESOPHAGOGASTRODUODENOSCOPY) N/A 9/11/2018    Performed by Luis Washington MD at [facility] ENDO (2ND FLR)    ESOPHAGOGASTRODUODENOSCOPY (EGD) N/A 2/27/2018    Performed by Kenji Desir MD at [facility] ENDO (2ND FLR)    EYE SURGERY      FRACTURE SURGERY      R arm    HERNIA REPAIR      HYSTERECTOMY      INSERTION-IMPLANT-GLAUCOMA Left 10/12/2017    Performed by Junaid Kern MD at [facility] OR 1ST FLR    KIDNEY TRANSPLANT      Left atrial appendage closure device N/A 4/26/2019    Performed by César Thomas MD at [facility] EP LAB    NEPHRECTOMY  11/2008    transplant     PARATHYROID GLAND SURGERY      TONSILLECTOMY      Transesophageal echo (JACE) intra-procedure log documentation N/A 4/26/2019    Performed by Dos Diagnostic Provider at [facility] EP LAB    Transesophageal echo (JACE) intra-procedure log documentation N/A 2/28/2019    Performed by [facility] Provider at [facility] EP LAB    UPPER GASTROINTESTINAL ENDOSCOPY         Review of patient's allergies indicates:   Allergen Reactions    Penicillins Swelling    Iodine      Other reaction(s): Hives    Sulfamethoxazole-trimethoprim      Other reaction(s): Swelling  Other reaction(s): Hives       Current Facility-Administered Medications on File Prior to Encounter   Medication    sodium chloride 0.9% flush 5 mL     Current  Outpatient Medications on File Prior to Encounter   Medication Sig    ALPHAGAN P 0.1 % Drop once daily.     aspirin 325 MG tablet Take 1 tablet (325 mg total) by mouth once daily.    atorvastatin (LIPITOR) 10 MG tablet TAKE 1 BY MOUTH ONCE nightly    dorzolamide-timolol 2-0.5% (COSOPT) 22.3-6.8 mg/mL ophthalmic solution INSTILL 1 DROP IN BOTH EYES TWICE DAILY    latanoprost 0.005 % ophthalmic solution INSTILL 1 DROP IN BOTH EYES EVERY DAY AT BEDTIME    levothyroxine (SYNTHROID) 100 MCG tablet Take 1 tablet (100 mcg total) by mouth before breakfast.    metoprolol tartrate (LOPRESSOR) 25 MG tablet Take 1 tablet (25 mg total) by mouth 2 (two) times daily.    pantoprazole (PROTONIX) 40 MG tablet TAKE 1 TABLET BY MOUTH ONCE DAILY    prednisoLONE acetate (PRED FORTE) 1 % DrpS Place 1 drop into the left eye every 4 (four) hours.    riociguat (ADEMPAS) 2.5 mg tablet Take 2.5 mg by mouth 3 (three) times daily.     selexipag (UPTRAVI) 1,000 mcg Tab Take 1,000 mcg by mouth 2 (two) times daily.    walker (ULTRA-LIGHT ROLLATOR) Misc Use daily    warfarin (COUMADIN) 5 MG tablet TAKE 1 1/2 TABLETS BY MOUTH DAILY ON TUESDAY, THURSDAY AND SATURDAY, THEN TAKE 1 TABLET DAILY ON MONDAY, WEDNESDAY, FRIDAY AND      Family History     Problem Relation (Age of Onset)    Asthma Sister    Blindness Father    Breast cancer Maternal Aunt    Depression Sister    Diabetes Maternal Aunt    Heart attack Father, Mother    Heart failure Father, Mother    Hypertension Father, Mother, Sister, Brother    Kidney disease Brother        Tobacco Use    Smoking status: Former Smoker     Types: Cigarettes     Last attempt to quit: 8/10/2000     Years since quittin.8    Smokeless tobacco: Never Used   Substance and Sexual Activity    Alcohol use: No     Comment: hx of etoh     Drug use: No    Sexual activity: Never     Review of Systems   All other systems reviewed and are negative.    Objective:     Vital Signs (Most  Recent):  Temp: 96.9 °F (36.1 °C) (06/03/19 2007)  Pulse: 108 (06/03/19 2040)  Resp: 18 (06/03/19 1615)  BP: (!) 176/88 (06/03/19 2040)  SpO2: (!) 93 % (06/03/19 2040) Vital Signs (24h Range):  Temp:  [96.9 °F (36.1 °C)-98.2 °F (36.8 °C)] 96.9 °F (36.1 °C)  Pulse:  [] 108  Resp:  [18-20] 18  SpO2:  [93 %-96 %] 93 %  BP: ()/(57-93) 176/88     Weight: 70.3 kg (155 lb)  Body mass index is 25.02 kg/m².    SpO2: (!) 93 %  O2 Device (Oxygen Therapy): room air      Intake/Output Summary (Last 24 hours) at 6/3/2019 2307  Last data filed at 6/3/2019 1658  Gross per 24 hour   Intake 500 ml   Output --   Net 500 ml       Lines/Drains/Airways     Drain                 Hemodialysis AV Fistula Left upper arm -- days          Peripheral Intravenous Line                 Peripheral IV - Single Lumen 06/03/19 1424 22 G Right Hand less than 1 day                Physical Exam  GEN: Alert and oriented in NAD  NECK: + JVD appreciated to ear  CVS: RRR, s1/s2, no MRG  PULM: CTAB no rales  ABD: NT/ND BS +  Extremities: warm and dry, palpable pulses, mild edema  NEURO: Alert and oriented x 3  PSYCH: appropriate affect.         Significant Labs:   Recent Lab Results       06/03/19  2108   06/03/19  2032   06/03/19  1404   06/03/19  1347        Albumin       3.1     Alkaline Phosphatase       168     ALT       35     Anion Gap       16     Aniso       Slight     AST       39  Comment:  *Result may be interfered by visible hemolysis     Baso #       0.07     Basophilic Stippling       Occasional     Basophil%       1.3     BILIRUBIN TOTAL       0.7  Comment:  For infants and newborns, interpretation of results should be based  on gestational age, weight and in agreement with clinical  observations.  Premature Infant recommended reference ranges:  Up to 24 hours.............<8.0 mg/dL  Up to 48 hours............<12.0 mg/dL  3-5 days..................<15.0 mg/dL  6-29 days.................<15.0 mg/dL       BUN, Bld       44      Calcium       8.5     Chloride       101     CO2       21     Creatinine       8.3     Differential Method       Automated     Dohle Bodies       Present     eGFR if        5.3     eGFR if non        4.6  Comment:  Calculation used to obtain the estimated glomerular filtration  rate (eGFR) is the CKD-EPI equation.        Eos #       0.1     Eosinophil%       1.3     Glucose       96     Gran # (ANC)       2.6     Gran%       48.9     Hematocrit       44.1     Hemoglobin       12.8     Hypo       Occasional     Immature Grans (Abs)       0.01  Comment:  Mild elevation in immature granulocytes is non specific and   can be seen in a variety of conditions including stress response,   acute inflammation, trauma and pregnancy. Correlation with other   laboratory and clinical findings is essential.       Immature Granulocytes       0.2     Coumadin Monitoring INR     6.0  Comment:  Coumadin Therapy:  2.0 - 3.0 for INR for all indicators except mechanical heart valves  and antiphospholipid syndromes which should use 2.5 - 3.5.         Lactate, Jimmie     2.6  Comment:  Falsely low lactic acid results can be found in samples   containing >=13.0 mg/dL total bilirubin and/or >=3.5 mg/dL   direct bilirubin.         Lipase       14     Lymph #       1.8     Lymph%       32.5     Magnesium       2.5     MCH       26.8     MCHC       29.0     MCV       92     Mono #       0.9     Mono%       15.8     MPV       SEE COMMENT  Comment:  Result not available.     nRBC       0     Ovalocytes       Occasional     Phosphorus       3.2     Platelet Estimate       Decreased     Platelets       106     POCT Glucose 132 44         Poik       Slight     Poly       Occasional     Potassium       4.3     PROTEIN TOTAL       6.9     Protime     59.8       RBC       4.78     RDW       20.3     Sodium       138     Target Cells       Occasional     Toxic Granulation       Present     Troponin I        0.068  Comment:  The reference interval for Troponin I represents the 99th percentile   cutoff   for our facility and is consistent with 3rd generation assay   performance.       WBC       5.39           Significant Imaging: reviewed    Assessment and Plan:     Diarrhea  Acute diarrhea which started last evening. No fever/chills, no WBC, or recurrent GI bleeding. Seems likely related to viral gastroenteritis.     Plan  Conservative management  Advance diet as tolerated  Hold off on fluids for now appears needs more fluid taken off with dialysis.     End stage renal disease on dialysis  Consulted Nephrology.     Pulmonary HTN  Continue home medications  Diuresis.         VTE Risk Mitigation (From admission, onward)        Ordered     IP VTE HIGH RISK PATIENT  Once      06/03/19 1958          Thank you for your consult.    Shiv Fontaine MD  Cardiology   [facility]

## 2019-06-04 NOTE — NURSING TRANSFER
Nursing Transfer Note      6/4/2019     Transfer TRANSFER TO [id number]    Transfer via TRANSFER VIA:Bed    Transfer with :Telemetry     Transported by staff    Medicines sent: n/a    Chart send with patient: YES     Notified: brother  Patient reassessed at:11:45am  Upon arrival to floor: brought to room and introduced to nursing staff

## 2019-06-04 NOTE — PLAN OF CARE
POC reviewed with pt. Pt states that her family will bring her pHTN meds this morning. Blood sugar checked multiple times through the night, D50 given, pt without any appetite. Axillary temp obtained x2. Pt able to adjust positions independently. LUE fistula. Pt normally M,W,F HD but missed Monday. Bed low and locked, bed alarm on, call light within reach. Will continue to monitor. Jessica Rollins RN

## 2019-06-04 NOTE — HPI
Ms. Shivani Sheets is a 64 yo AAF with HTN, pHTN, HFpEF (50%), and ESRD on iHD MWF who presented to the hospital on 06/03 for evaluation of nausea/vomiting/diarrhea x 1 day.  She denies any changes in her diet.  No abdominal pain, fever, or chills.  She missed her hemodialysis treatment yesterday because she felt bad, and Nephrology has been consulted for ESRD management while in-patient.  She underwent CT Abdomen and it was read as increased abdominal wall edema, cardiomegaly with small pericardial effusion, and ascites.  She reports recently having her EDW decreased a few weeks ago by her OP Nephrologist, and she has been tolerating her hemodialysis treatments well without problems.  On exam today, she denies any complaints, no abdominal pain, nausea/vomiting/diarrhea have resolved.  She denies SOB, on O2 @ 2L NC, which she normally uses @ home.      She dialyzes at [facility] under the care of Dr. Deleon on a MWF schedule.  She reports a recent decrease in her EDW to 68 kg and she no longer makes urine.  She has a ABEL AVF with a very large anuerysm, followed by surgeons at the [facility] surgery center in [facility].  He rnormal treatment time is 3.5 hours, last treatment being on Friday of last week.

## 2019-06-04 NOTE — CONSULTS
[facility]  Attending: Lilliana  Resident: Shen   CC: Abdominal pain/ diarrhea    HPI: Shivani Sheets is a 63 y.o. woman with pulmonary HTN, diastolic dysfunction, pAF, CVA 1987m COPD on 2L nocturnal oxygen, ESRD, on whom we are consulted to rule out bowel ischemia/ cholecystitis.    The patient has had diarrhea and right lower quadrant pain since yesterday evening.  She has no emesis, but does have a little nausea.    The pain felt better after she had a bowel movement, but has returned.  She had a little apple juice this morning as well, which also helped the pain.      WBC is 9.4.  She does have a rising lactate to 8.5, with a CO2 17.      Past Medical History:   Diagnosis Date    Allergy     Anemia     Anticoagulant long-term use     4 years coumadin, asa    Arthritis     Awaiting organ transplant 2013    Cataract     Central serous chorioretinopathy of eye, right 2018    CHF (congestive heart failure)     Chronic diarrhea     Chronic kidney disease     Colon polyps     COPD (chronic obstructive pulmonary disease)     Coronary artery disease     Diabetes mellitus     Diabetic retinopathy     Diverticulosis     Encounter for blood transfusion     ESRD from HTN strtied RRT 1999    Failed  donor kidney transplant -     Glaucoma     History of bleeding peptic ulcer      as stated per pt    Hyperlipidemia     Hypertension     Hypothyroidism     Morbid obesity 8/10/2012    Renal hypertension     Stroke            Past Surgical History:   Procedure Laterality Date    CATARACT EXTRACTION W/  INTRAOCULAR LENS IMPLANT Bilateral     COLON SURGERY      COLONOSCOPY      COLONOSCOPY N/A 2019    Performed by Indio [facility]tran MD at [facility] ENDO (2ND FLR)    COLONOSCOPY N/A 2018    Performed by Jose Falk MD at [facility] ENDO (2ND FLR)    EGD (ESOPHAGOGASTRODUODENOSCOPY) N/A 2019    Performed by Miranda Maradiaga MD at [facility] ENDO (2ND  FLR)    EGD (ESOPHAGOGASTRODUODENOSCOPY) N/A 9/11/2018    Performed by Luis Washington MD at [facility] ENDO (2ND FLR)    ESOPHAGOGASTRODUODENOSCOPY (EGD) N/A 2/27/2018    Performed by Kenji Desir MD at [facility] ENDO (2ND FLR)    EYE SURGERY      FRACTURE SURGERY      R arm    HERNIA REPAIR      HYSTERECTOMY      INSERTION-IMPLANT-GLAUCOMA Left 10/12/2017    Performed by Junaid Kern MD at [facility] OR 1ST FLR    KIDNEY TRANSPLANT      Left atrial appendage closure device N/A 4/26/2019    Performed by César Thomas MD at [facility] EP LAB    NEPHRECTOMY  11/2008    transplant     PARATHYROID GLAND SURGERY      TONSILLECTOMY      Transesophageal echo (JACE) intra-procedure log documentation N/A 4/26/2019    Performed by [facility] Diagnostic Provider at [facility] EP LAB    Transesophageal echo (JACE) intra-procedure log documentation N/A 2/28/2019    Performed by [facility] Diagnostic Provider at [facility] EP LAB    UPPER GASTROINTESTINAL ENDOSCOPY         Family History   Problem Relation Age of Onset    Heart attack Father     Heart failure Father     Hypertension Father     Blindness Father     Heart attack Mother     Heart failure Mother     Hypertension Mother     Asthma Sister     Depression Sister     Hypertension Sister     Hypertension Brother     Kidney disease Brother         ESRD    Diabetes Maternal Aunt     Breast cancer Maternal Aunt     Amblyopia Neg Hx     Cataracts Neg Hx     Macular degeneration Neg Hx     Retinal detachment Neg Hx     Strabismus Neg Hx     Colon cancer Neg Hx     Esophageal cancer Neg Hx        Social History     Socioeconomic History    Marital status:      Spouse name: Not on file    Number of children: Not on file    Years of education: Not on file    Highest education level: Not on file   Occupational History    Not on file   Social Needs    Financial resource strain: Not on file    Food insecurity:     Worry: Not on file     Inability: Not on file     Transportation needs:     Medical: Not on file     Non-medical: Not on file   Tobacco Use    Smoking status: Former Smoker     Types: Cigarettes     Last attempt to quit: 8/10/2000     Years since quittin.8    Smokeless tobacco: Never Used   Substance and Sexual Activity    Alcohol use: No     Comment: hx of etoh     Drug use: No    Sexual activity: Never   Lifestyle    Physical activity:     Days per week: Not on file     Minutes per session: Not on file    Stress: Not on file   Relationships    Social connections:     Talks on phone: Not on file     Gets together: Not on file     Attends [employer] service: Not on file     Active member of club or organization: Not on file     Attends meetings of clubs or organizations: Not on file     Relationship status: Not on file   Other Topics Concern    Not on file   Social History Narrative    Shivani had three children and 11grandchildren.  She lives alone.  She is on disability.        Current Facility-Administered Medications on File Prior to Encounter   Medication Dose Route Frequency Provider Last Rate Last Dose    sodium chloride 0.9% flush 5 mL  5 mL Intravenous PRN Bethany Nielsen NP         Current Outpatient Medications on File Prior to Encounter   Medication Sig Dispense Refill    ALPHAGAN P 0.1 % Drop once daily.       aspirin 325 MG tablet Take 1 tablet (325 mg total) by mouth once daily.  0    atorvastatin (LIPITOR) 10 MG tablet TAKE 1 BY MOUTH ONCE nightly 90 tablet 2    dorzolamide-timolol 2-0.5% (COSOPT) 22.3-6.8 mg/mL ophthalmic solution INSTILL 1 DROP IN BOTH EYES TWICE DAILY 10 mL 0    latanoprost 0.005 % ophthalmic solution INSTILL 1 DROP IN BOTH EYES EVERY DAY AT BEDTIME 7.5 mL 0    levothyroxine (SYNTHROID) 100 MCG tablet Take 1 tablet (100 mcg total) by mouth before breakfast. 30 tablet 3    metoprolol tartrate (LOPRESSOR) 25 MG tablet Take 1 tablet (25 mg total) by mouth 2 (two) times daily. 60 tablet 11    pantoprazole  (PROTONIX) 40 MG tablet TAKE 1 TABLET BY MOUTH ONCE DAILY 30 tablet 6    prednisoLONE acetate (PRED FORTE) 1 % DrpS Place 1 drop into the left eye every 4 (four) hours. 5 mL 6    riociguat (ADEMPAS) 2.5 mg tablet Take 2.5 mg by mouth 3 (three) times daily.       selexipag (UPTRAVI) 1,000 mcg Tab Take 1,000 mcg by mouth 2 (two) times daily.      walker (ULTRA-LIGHT ROLLATOR) Misc Use daily 1 each 0    warfarin (COUMADIN) 5 MG tablet TAKE 1 1/2 TABLETS BY MOUTH DAILY ON TUESDAY, THURSDAY AND SATURDAY, THEN TAKE 1 TABLET DAILY ON MONDAY, WEDNESDAY, FRIDAY AND SUNDAY 120 tablet 0       Review of patient's allergies indicates:   Allergen Reactions    Penicillins Swelling    Iodine      Other reaction(s): Hives    Sulfamethoxazole-trimethoprim      Other reaction(s): Swelling  Other reaction(s): Hives       ROS:   Constitutional: no fever or chills, pain controlled   Eyes: No eye pain or diplopia   Ears: No change in hearing or tinnitus  Nose: No epistaxis or frequent colds  Respiratory: No cough or dyspnea  Cardiovascular: no chest pain or palpitations   Gastrointestinal: +Abdominal pain.  +Diarrhea  Genitourinary: no hematuria or dysuria   Hematologic/Lymphatic: no easy bruising or lymphadenopathy   Musculoskeletal: no arthralgias or myalgias   Neurological: no seizures or tremors   Skin: No rashes or itching    Phys:  Vitals:    06/04/19 1145 06/04/19 1200 06/04/19 1215 06/04/19 1230   BP: 117/85 99/68 111/61 111/61   BP Location: Right leg Right leg     Patient Position: Lying Lying     Pulse: 104 100 99 104   Resp: (!) 21 20 18 (!) 24   Temp: 97.9 °F (36.6 °C)      TempSrc: Oral      SpO2: (!) 94% 97% (!) 94% (!) 92%   Weight:       Height:         Phys:   Gen: NAD   HEENT: NCAT, trachea midline  CV: RRR, no m/r/g   Pulm: Unlabored  Abd: Soft, mild ttp RLQ > LLQ = RUQ. No rebound, guarding.   Extremities: no cyanosis or edema, or clubbing  Skin: Skin color, texture, turgor normal. No rashes or lesions     US:  "Gallbladder wall thickening and pericholecystic fluid.  No sonographic Argueta's.  2 gallstones.    CT: No signs of bowel ischemia, although no IV contrast given.  Bowel is nondistended.  No free air.  +Ascites, which was not present on CT scan earlier this year.  "Indistinctness around gallbladder"    A/P Abdominal pain and lactic acidosis    Would be consistent with gastroenteritis, but cannot explain the lactic acidosis  CT scan is reassuring, and there is no peritonitis on exam.    Consider HIDA scan to rule out cholecystitis  Will follow for serial abdominal exams      Subhash Gotti MD  General Surgery, PGY-5  [phone]     "

## 2019-06-04 NOTE — PROGRESS NOTES
"Pt arrived from ED, drowsy but will wake up easily to answer questions but will fall asleep while talking. Pt states last BM was last night and that it was not loose, but "creamy". Pt states abd pain has gotten better. Unable to obtain BP in RUE, only in RLE. Unable to obtain oral or axillary temp. Pt is cold to the touch. MD on call notified, will obtain rectal temp and place bear hugger.  Pt states she did not bring her medications with her but will have someone bring them in AM. FSBG was 97.   "

## 2019-06-04 NOTE — ASSESSMENT & PLAN NOTE
ESRD on iHD MWF  [place]  3.5 hours  Dr. Deleon  No residual renal function  ABEL AVF  Reports an EDW of 68 kg that was recently decreased a few weeks ago.    Plan/Recommendations:  -HD this afternoon for metabolic clearance/volume management  -will ultrafiltrate to her EDW.  CBC looks to be hemoconcentrated and may just need clearance for today.  -will obtain her OP records  -she needs to be followed up closely with her vascular surgeons regarding her anuerysm on her AVF, I have discussed this with her and she will need to make an appointment upon discharge.  -Phos levels

## 2019-06-05 LAB
ALBUMIN SERPL BCP-MCNC: 2.6 G/DL (ref 3.5–5.2)
ALP SERPL-CCNC: 119 U/L (ref 55–135)
ALT SERPL W/O P-5'-P-CCNC: 49 U/L (ref 10–44)
ANION GAP SERPL CALC-SCNC: 15 MMOL/L (ref 8–16)
AST SERPL-CCNC: 75 U/L (ref 10–40)
BASOPHILS # BLD AUTO: 0.02 K/UL (ref 0–0.2)
BASOPHILS NFR BLD: 0.3 % (ref 0–1.9)
BILIRUB SERPL-MCNC: 0.7 MG/DL (ref 0.1–1)
BUN SERPL-MCNC: 63 MG/DL (ref 8–23)
CALCIUM SERPL-MCNC: 7.4 MG/DL (ref 8.7–10.5)
CHLORIDE SERPL-SCNC: 100 MMOL/L (ref 95–110)
CO2 SERPL-SCNC: 23 MMOL/L (ref 23–29)
CREAT SERPL-MCNC: 9.5 MG/DL (ref 0.5–1.4)
DIFFERENTIAL METHOD: ABNORMAL
EOSINOPHIL # BLD AUTO: 0 K/UL (ref 0–0.5)
EOSINOPHIL NFR BLD: 0.4 % (ref 0–8)
ERYTHROCYTE [DISTWIDTH] IN BLOOD BY AUTOMATED COUNT: 19.7 % (ref 11.5–14.5)
EST. GFR  (AFRICAN AMERICAN): 4.5 ML/MIN/1.73 M^2
EST. GFR  (NON AFRICAN AMERICAN): 3.9 ML/MIN/1.73 M^2
GLUCOSE SERPL-MCNC: 89 MG/DL (ref 70–110)
HCT VFR BLD AUTO: 35.2 % (ref 37–48.5)
HGB BLD-MCNC: 10.8 G/DL (ref 12–16)
IMM GRANULOCYTES # BLD AUTO: 0.05 K/UL (ref 0–0.04)
IMM GRANULOCYTES NFR BLD AUTO: 0.6 % (ref 0–0.5)
INR PPP: 4.5 (ref 0.8–1.2)
LACTATE SERPL-SCNC: 1 MMOL/L (ref 0.5–2.2)
LACTATE SERPL-SCNC: 1.4 MMOL/L (ref 0.5–2.2)
LYMPHOCYTES # BLD AUTO: 1 K/UL (ref 1–4.8)
LYMPHOCYTES NFR BLD: 12.4 % (ref 18–48)
MCH RBC QN AUTO: 26.9 PG (ref 27–31)
MCHC RBC AUTO-ENTMCNC: 30.7 G/DL (ref 32–36)
MCV RBC AUTO: 88 FL (ref 82–98)
METHEMOGLOBIN: 0.3 % (ref 0–3)
METHEMOGLOBIN: 0.4 % (ref 0–3)
MONOCYTES # BLD AUTO: 0.9 K/UL (ref 0.3–1)
MONOCYTES NFR BLD: 11.4 % (ref 4–15)
NEUTROPHILS # BLD AUTO: 5.8 K/UL (ref 1.8–7.7)
NEUTROPHILS NFR BLD: 74.9 % (ref 38–73)
NRBC BLD-RTO: 0 /100 WBC
PLATELET # BLD AUTO: 93 K/UL (ref 150–350)
PMV BLD AUTO: ABNORMAL FL (ref 9.2–12.9)
POTASSIUM SERPL-SCNC: 4.3 MMOL/L (ref 3.5–5.1)
PROT SERPL-MCNC: 5.4 G/DL (ref 6–8.4)
PROTHROMBIN TIME: 44.7 SEC (ref 9–12.5)
RBC # BLD AUTO: 4.01 M/UL (ref 4–5.4)
SODIUM SERPL-SCNC: 138 MMOL/L (ref 136–145)
VANCOMYCIN SERPL-MCNC: 15.6 UG/ML
WBC # BLD AUTO: 7.8 K/UL (ref 3.9–12.7)

## 2019-06-05 PROCEDURE — 25000003 PHARM REV CODE 250

## 2019-06-05 PROCEDURE — 27000221 HC OXYGEN, UP TO 24 HOURS

## 2019-06-05 PROCEDURE — 83050 HGB METHEMOGLOBIN QUAN: CPT

## 2019-06-05 PROCEDURE — 25000003 PHARM REV CODE 250: Performed by: INTERNAL MEDICINE

## 2019-06-05 PROCEDURE — 27000190 HC CPAP FULL FACE MASK W/VALVE

## 2019-06-05 PROCEDURE — 83605 ASSAY OF LACTIC ACID: CPT | Mod: 91

## 2019-06-05 PROCEDURE — 99233 PR SUBSEQUENT HOSPITAL CARE,LEVL III: ICD-10-PCS | Mod: ,,, | Performed by: INTERNAL MEDICINE

## 2019-06-05 PROCEDURE — 94761 N-INVAS EAR/PLS OXIMETRY MLT: CPT

## 2019-06-05 PROCEDURE — 99233 SBSQ HOSP IP/OBS HIGH 50: CPT | Mod: ,,, | Performed by: INTERNAL MEDICINE

## 2019-06-05 PROCEDURE — 80053 COMPREHEN METABOLIC PANEL: CPT

## 2019-06-05 PROCEDURE — 25000003 PHARM REV CODE 250: Performed by: NURSE PRACTITIONER

## 2019-06-05 PROCEDURE — 80202 ASSAY OF VANCOMYCIN: CPT

## 2019-06-05 PROCEDURE — 63600367 HC NITRIC OXIDE PER HOUR

## 2019-06-05 PROCEDURE — 83605 ASSAY OF LACTIC ACID: CPT

## 2019-06-05 PROCEDURE — 85610 PROTHROMBIN TIME: CPT

## 2019-06-05 PROCEDURE — 99900035 HC TECH TIME PER 15 MIN (STAT)

## 2019-06-05 PROCEDURE — 99291 PR CRITICAL CARE, E/M 30-74 MINUTES: ICD-10-PCS | Mod: GC,,, | Performed by: INTERNAL MEDICINE

## 2019-06-05 PROCEDURE — 99291 CRITICAL CARE FIRST HOUR: CPT | Mod: GC,,, | Performed by: INTERNAL MEDICINE

## 2019-06-05 PROCEDURE — 80100014 HC HEMODIALYSIS 1:1

## 2019-06-05 PROCEDURE — 25000003 PHARM REV CODE 250: Performed by: STUDENT IN AN ORGANIZED HEALTH CARE EDUCATION/TRAINING PROGRAM

## 2019-06-05 PROCEDURE — S0030 INJECTION, METRONIDAZOLE: HCPCS

## 2019-06-05 PROCEDURE — 20000000 HC ICU ROOM

## 2019-06-05 PROCEDURE — 85025 COMPLETE CBC W/AUTO DIFF WBC: CPT

## 2019-06-05 PROCEDURE — 94660 CPAP INITIATION&MGMT: CPT

## 2019-06-05 RX ORDER — MIDODRINE HYDROCHLORIDE 5 MG/1
10 TABLET ORAL 3 TIMES DAILY
Status: DISCONTINUED | OUTPATIENT
Start: 2019-06-05 | End: 2019-06-08 | Stop reason: HOSPADM

## 2019-06-05 RX ORDER — CALCIUM ACETATE 667 MG/1
1334 CAPSULE ORAL
Status: DISCONTINUED | OUTPATIENT
Start: 2019-06-05 | End: 2019-06-08 | Stop reason: HOSPADM

## 2019-06-05 RX ADMIN — LEVOTHYROXINE SODIUM 100 MCG: 100 TABLET ORAL at 06:06

## 2019-06-05 RX ADMIN — ASPIRIN 81 MG: 81 TABLET, COATED ORAL at 08:06

## 2019-06-05 RX ADMIN — RIOCIGUAT 2.5 MG: 2.5 TABLET, FILM COATED ORAL at 11:06

## 2019-06-05 RX ADMIN — METRONIDAZOLE 500 MG: 500 SOLUTION INTRAVENOUS at 06:06

## 2019-06-05 RX ADMIN — ATORVASTATIN CALCIUM 10 MG: 10 TABLET, FILM COATED ORAL at 08:06

## 2019-06-05 RX ADMIN — METRONIDAZOLE 500 MG: 500 SOLUTION INTRAVENOUS at 01:06

## 2019-06-05 RX ADMIN — MIDODRINE HYDROCHLORIDE 10 MG: 5 TABLET ORAL at 03:06

## 2019-06-05 RX ADMIN — METRONIDAZOLE 500 MG: 500 SOLUTION INTRAVENOUS at 09:06

## 2019-06-05 RX ADMIN — MIDODRINE HYDROCHLORIDE 10 MG: 5 TABLET ORAL at 08:06

## 2019-06-05 RX ADMIN — CALCIUM ACETATE 1334 MG: 667 CAPSULE ORAL at 09:06

## 2019-06-05 RX ADMIN — RIOCIGUAT 2.5 MG: 2.5 TABLET, FILM COATED ORAL at 07:06

## 2019-06-05 RX ADMIN — PANTOPRAZOLE SODIUM 40 MG: 40 TABLET, DELAYED RELEASE ORAL at 08:06

## 2019-06-05 RX ADMIN — MIDODRINE HYDROCHLORIDE 10 MG: 5 TABLET ORAL at 01:06

## 2019-06-05 NOTE — ASSESSMENT & PLAN NOTE
ESRD on iHD MWF  [place]  3.5 hours  Dr. Deleon  No residual renal function  ABEL AVF  Reports an EDW of 68 kg that was recently decreased a few weeks ago.    Plan/Recommendations:  -UF completed at bedside, UF 1.5 L, however, patient hypotensive during treatment (SBP 70s) despite Midodrine. Will not plan for any further treatment at this time, if the patient remains hypotensive a line will need to be placed for CRRT for metabolic clearance and volume management.  -Pending US of anuerysm to her L AVF  -Phos 7.4, restarted PhosLo   -Recommend serial renal function panels for phos and albumin levels   -Strict I/Os and daily weights   -Hgb 10.8, within normal range

## 2019-06-05 NOTE — NURSING
Pt. BP currently too low (80's/50's) to tolerate HD; Dr. Hutchinson notified and came by to assess pt. After speaking w/ Dr. Hennessy he ordered 10 mg midodrine to see if the pt would be able to tolerate HD vs CRRT. WCTM.

## 2019-06-05 NOTE — PROGRESS NOTES
[facility]  Heart Transplant  Progress Note    Patient Name: Shivani Sheets  MRN: [id number]  Admission Date: 6/3/2019  Hospital Length of Stay: 2 days  Attending Physician: Jenny Hennessy MD  Primary Care Provider: Eula Weiss MD  Principal Problem:Diarrhea    Subjective:     Interval History: Was transiently hypotensive overnight, self-corrected. Had a brief hypotensive episode during dialysis this morning. States she would like a family meeting    Continuous Infusions:   nitric oxide gas       Scheduled Meds:   sodium chloride 0.9%   Intravenous Once    aspirin  81 mg Oral Daily    atorvastatin  10 mg Oral Daily    calcium acetate  1,334 mg Oral TID WM    ciprofloxacin  400 mg Intravenous Every Tues, Thurs, Sat    levothyroxine  100 mcg Oral Before breakfast    metoprolol tartrate  25 mg Oral BID    metronidazole  500 mg Intravenous Q8H    midodrine  10 mg Oral TID    pantoprazole  40 mg Oral Daily    riociguat  2.5 mg Oral TID    selexipag  1,000 mcg Oral BID     PRN Meds:sodium chloride, sodium chloride 0.9%    Review of patient's allergies indicates:   Allergen Reactions    Penicillins Swelling    Iodine      Other reaction(s): Hives    Sulfamethoxazole-trimethoprim      Other reaction(s): Swelling  Other reaction(s): Hives     Objective:     Vital Signs (Most Recent):  Temp: 98 °F (36.7 °C) (06/05/19 0701)  Pulse: (!) 112 (06/05/19 1200)  Resp: 18 (06/05/19 1200)  BP: (!) 77/56 (06/05/19 1200)  SpO2: 95 % (06/05/19 1200) Vital Signs (24h Range):  Temp:  [97.8 °F (36.6 °C)-98.2 °F (36.8 °C)] 98 °F (36.7 °C)  Pulse:  [] 112  Resp:  [6-31] 18  SpO2:  [56 %-100 %] 95 %  BP: ()/(41-81) 77/56     Patient Vitals for the past 72 hrs (Last 3 readings):   Weight   06/04/19 2020 65 kg (143 lb 4.8 oz)   06/04/19 0600 65 kg (143 lb 3.2 oz)   06/03/19 2248 63.7 kg (140 lb 6.4 oz)     Body mass index is 27.08 kg/m².      Intake/Output Summary (Last 24 hours) at  6/5/2019 1450  Last data filed at 6/5/2019 1200  Gross per 24 hour   Intake 1859 ml   Output 1800 ml   Net 59 ml       Hemodynamic Parameters:       Physical Exam   Constitutional: She is oriented to person, place, and time. She appears well-developed and well-nourished. No distress.   Eyes: Pupils are equal, round, and reactive to light. Conjunctivae and EOM are normal. No scleral icterus.   Neck: Normal range of motion. Neck supple. JVD present.   Cardiovascular: Normal rate, regular rhythm and normal heart sounds.   No murmur heard.  Pulmonary/Chest: Effort normal and breath sounds normal.   Abdominal: Soft. Bowel sounds are normal. She exhibits distension. There is no tenderness.   Musculoskeletal: Normal range of motion. She exhibits edema.   Neurological: She is alert and oriented to person, place, and time. No cranial nerve deficit or sensory deficit.   Skin: Skin is warm and dry. Capillary refill takes 2 to 3 seconds. No rash noted.   Psychiatric: She has a normal mood and affect. Thought content normal.       Significant Labs:  CBC:  Recent Labs   Lab 06/04/19  1117 06/04/19  1604 06/05/19 0223   WBC 9.42 8.55 7.80   RBC 5.03 4.34 4.01   HGB 13.4 11.6* 10.8*   HCT 44.8 38.3 35.2*   * 104* 93*   MCV 89 88 88   MCH 26.6* 26.7* 26.9*   MCHC 29.9* 30.3* 30.7*     BNP:  No results for input(s): BNP in the last 168 hours.    Invalid input(s): BNPTRIAGELBLO  CMP:  Recent Labs   Lab 06/04/19  1117 06/04/19  1604 06/05/19  0223   GLU 86 92 89   CALCIUM 8.5* 8.1* 7.4*   ALBUMIN 3.2* 3.1*  3.1* 2.6*   PROT 6.9 6.4 5.4*    141 138   K 4.5 4.4 4.3   CO2 17* 18* 23   CL 99 99 100   BUN 54* 57* 63*   CREATININE 9.2* 9.0* 9.5*   ALKPHOS 165* 147* 119   ALT 53* 52* 49*   AST 88* 86* 75*   BILITOT 1.2* 1.2* 0.7      Coagulation:   Recent Labs   Lab 06/04/19  1117 06/04/19  1604 06/05/19  0249   INR 9.0* 3.6* 4.5*   APTT  --  35.4*  --      LDH:  No results for input(s): LDH in the last 72  hours.  Microbiology:  Microbiology Results (last 7 days)     Procedure Component Value Units Date/Time    Blood culture [[id number]] Collected:  06/04/19 1118    Order Status:  Completed Specimen:  Blood Updated:  06/05/19 1222     Blood Culture, Routine No Growth to date     Blood Culture, Routine No Growth to date    Blood culture #1 **CANNOT BE ORDERED STAT** [[id number]] Collected:  06/03/19 1457    Order Status:  Completed Specimen:  Blood from Peripheral, Hand, Right Updated:  06/05/19 0916     Blood Culture, Routine Gram stain aer bottle: Gram positive cocci in clusters resembling Staph      Blood Culture, Routine Results called to and read back by:Mena Cleary RN 06/04/2019  16:53     Blood Culture, Routine --     COAGULASE-NEGATIVE STAPHYLOCOCCUS SPECIES  Organism is a probable contaminant      Blood culture [[id number]] Collected:  06/04/19 1754    Order Status:  Completed Specimen:  Blood from Peripheral, Hand, Right Updated:  06/05/19 0115     Blood Culture, Routine No Growth to date    Blood culture #2 **CANNOT BE ORDERED STAT** [[id number]] Collected:  06/03/19 1512    Order Status:  Completed Specimen:  Blood from Peripheral, Hand, Right Updated:  06/04/19 1622     Blood Culture, Routine No Growth to date     Blood Culture, Routine No Growth to date          I have reviewed all pertinent labs within the past 24 hours.    Estimated Creatinine Clearance: 5.1 mL/min (A) (based on SCr of 9.5 mg/dL (H)).      Assessment and Plan:     Pt is a 63 year old lady who we are consulted for diarrhea     She has a hx of PH on (adempas/selexipeg), diastolic dysfunction, pAF, CVA 1987, COPD on 2 L nocturnal oxygen, central retinal artery occlusion without significant carotid artery stenosis, CAD with remote PCI, ESRD(LUE AVF) secondary to HTN, s/p failed kidney transplant, PUD, STEFFANY on CPAP, hypothyroidism, RA, HLD. Had recurrent GI bleeding and is now s/p watchman.      Presents to ED today for diarrhea that started last  night. Patient reports 4 watery BM, mild abdominal pain since 4 am. She missed dialysis today because they told her to come to the ER. Does complain of associated nausea and vomiting. No hematemesis. Had CT scan with gall stones and anasarca. U/S abdomen reveals gallstones and pericholecystic fluid.      * Diarrhea  Acute diarrhea which started last evening. No fever/chills, no WBC, or recurrent GI bleeding, however procalcitonin elevated and seems to have responded to antibiotics.    - cipro/flagyl for 7 days (to end 07/11/19)  - Advance diet as tolerated    End stage renal disease on dialysis  Consulted nephrology for HD. Tolerated HD today with transient hypotensive episode. Will continue to off-load    Pulmonary HTN  Unclear if her hypotension and lactate were from worsening PH/RV failure or related to infection.    - off-load RV with dialysis today  - wean Lisa after dialysis  - trend lactate this evening and tomorrow morning  - Continue adempas and selexipeg  - had a discussion with patient on her worsening health and possibility for additional family support/palliative approach. Plan on family meeting tomorrow at 10:30-11am    Atrial fibrillation  S/p watchman placement 4/27. INR supratherapeutic, she is now >30 days out from procedure.    No need to continue coumadin from this point on, especially given elevated bleeding risk        Zacarias Rodriguez MD  Heart Transplant  [facility]

## 2019-06-05 NOTE — NURSING
CAlled and SW Dr Holland re: MAP being >65mmHG for the most part and plan to decrease nitric oxide as per discussion with Dr Huff this afternoon.  Order rec'd to decreased nitric oxide to 5ppm x1 hour, if MAP stays >/= 60mmHG, then turn nitric off. If able to be turned off, draw lactic acid lab 2 hours after it is turned off.  RT, Doron notified.

## 2019-06-05 NOTE — PROGRESS NOTES
Admit Note     Met with patient and brother Kenji in order to assess needs. Patient is a 63 y.o.  female, admitted for GI issues, elevated lactate and fluid overload per medical record.  Pt has a diagnosis of PH.      Patient admitted from emergency room on 6/3/2019.  At this time, patient presents as alert and oriented x 4, calm and sleepy. Pt getting HD at time of SW visit.  At this time, patients caregiver is alert and oriented x4, pleasant, and communicative.    Household/Family Systems     Patient resides with patient's brother and one son at    [address] [place], [place] [id number]     Pt cell:  [phone]  Kenji Zaldivar (brother):  [phone]  Ben (son): [phone]  Frederick (son): [phone]    Support system includes adult children, brother, and extended family.  Patient does not have dependents that are need of being cared for.     Patients primary caregiver is self with help from her brother/sons as needed.  During admission, patient's caregiver plans to visit.  Confirmed patient and patients caregivers do have access to reliable transportation.  The pt's brother and adult children drive.     Cognitive Status/Learning     Patient reports reading ability as 12th grade and states patient does not have difficulty with writing, hearing, comprehension and learning. Pt does report difficulty with her eyesight (blurry vision), reading due to blurry vision, and short-term memory.  Patient reports patient learns best by one on one.     Needed: No.   Highest education level: High School (9-12) or GED    Vocation/Disability     Working for Income: No  If no, reason not working: Patient Choice - Retired  Patient reports she retired in 2002 or 2003.  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   The pt's brother is retired and is at home with the pt.   The pt's children are employed.     Adherence     Patient reports a high level of adherence to patients health care regimen.   Patient verbalizes understanding.    Substance Use    Patient reports the following substance usage.    Tobacco: none.  Pt reports she quit smoking socially in   Alcohol: none recently.  Pt reports she only drank socially.   Illicit Drugs/Non-prescribed Medications: none, patient denies any use.  Patient states clear understanding of the potential impact of substance use.  Substance abstinence/cessation counseling, education and resources provided and reviewed.     Services Utilizing/ADLS    Infusion Service: Prior to admission, patient utilizing? no  Home Health: Prior to admission, patient utilizing? no Pt has used [facility] in the past.  Pt is interested in restarting HH at discharge.  DME: Prior to admission, yes, pt has home O2 with portables at 2LPM through [facility]. Pt has a home-fill unit and two cylinders that can be used as portables.  Pt reports her portable O2 is at home and brother or sons will be able to bring it to her at discharge. Pt also reports having a walker, cane, CPAP, scooter, and shower chair. Pt reports she does use her CPAP.   Pulmonary/Cardiac Rehab: Prior to admission, no  Dialysis:  Prior to admission, yes Pt goes to [facility] on MWF from 5:30am until 10:00am.Pt reports that her brother drives her to dialysis.  Transplant Specialty Pharmacy:  Prior to admission, no.    Prior to admission, patient reports patient was mostly independent with ADLS and was not driving.  Patient reports patient is not able to care for self at this time due to compromised medical condition (as documented in medical record) and physical weakness.  Patient indicates a willingness to care for self once medically cleared to do so.    Insurance/Medications    Insured by   Payor/Plan Subscr  Sex Relation Sub. Ins. ID Effective Group Num   1. [place] - [place]* MIGUEL ANGEL AMADO* 1955 Female  [id number]                                    [address]   2. [place]  MIGUEL ANGEL AMADO* 1955 Female  [id number]* 9/1/17                                    [address] [address]      Primary Insurance (for UNOS reporting): Public Insurance - Medicare FFS (Fee For Service)  Secondary Insurance (for UNOS reporting): Public Insurance - Medicaid    Patient reports patient is able to obtain and afford medications at this time and at time of discharge.    Living Will/Healthcare Power of     Patient states patient does not have a LW and/or HCPA.  Pt thought she had a POA on file at [facility], but there is nothing in the chart. SW to provide documents to pt when she is less sleepy. Pt states that she has a friend who would be her POA.     Coping/Mental Health    Patient is coping adequately with the aid of  family members and mely.  Patient indicates mental health difficulties.  Pt reports depression at times due to her illness and lack of mobility. Pt states that her nephrologist added an antidepressant 2-3 weeks ago but pt could not recall the med. Pt reports that this medication has made a difference. SW to discuss with pt if she is interested in counseling, once she is more alert.     Discharge Planning    At time of discharge, patient plans to return to patient's home under the care of self and family.  Patients brother or sons will transport patient.  Per rounds today, expected discharge date has not been medically determined at this time. Patient verbalizes understanding and is involved in treatment planning and discharge process.    Additional Concerns    Patient is being followed for needs, education, resources, information, emotional support, supportive counseling, and for supportive and skilled discharge plan of care.  providing ongoing psychosocial support, education, resources and d/c planning as needed.  SW remains available. Patient denies additional needs and/or concerns at this time. Patient verbalizes understanding and agreement with information reviewed,  social work availability, and how to access available resources as needed.

## 2019-06-05 NOTE — SUBJECTIVE & OBJECTIVE
Interval History: Was transiently hypotensive overnight, self-corrected. Had a brief hypotensive episode during dialysis this morning. States she would like a family meeting    Continuous Infusions:   nitric oxide gas       Scheduled Meds:   sodium chloride 0.9%   Intravenous Once    aspirin  81 mg Oral Daily    atorvastatin  10 mg Oral Daily    calcium acetate  1,334 mg Oral TID WM    ciprofloxacin  400 mg Intravenous Every Tues, Thurs, Sat    levothyroxine  100 mcg Oral Before breakfast    metoprolol tartrate  25 mg Oral BID    metronidazole  500 mg Intravenous Q8H    midodrine  10 mg Oral TID    pantoprazole  40 mg Oral Daily    riociguat  2.5 mg Oral TID    selexipag  1,000 mcg Oral BID     PRN Meds:sodium chloride, sodium chloride 0.9%    Review of patient's allergies indicates:   Allergen Reactions    Penicillins Swelling    Iodine      Other reaction(s): Hives    Sulfamethoxazole-trimethoprim      Other reaction(s): Swelling  Other reaction(s): Hives     Objective:     Vital Signs (Most Recent):  Temp: 98 °F (36.7 °C) (06/05/19 0701)  Pulse: (!) 112 (06/05/19 1200)  Resp: 18 (06/05/19 1200)  BP: (!) 77/56 (06/05/19 1200)  SpO2: 95 % (06/05/19 1200) Vital Signs (24h Range):  Temp:  [97.8 °F (36.6 °C)-98.2 °F (36.8 °C)] 98 °F (36.7 °C)  Pulse:  [] 112  Resp:  [6-31] 18  SpO2:  [56 %-100 %] 95 %  BP: ()/(41-81) 77/56     Patient Vitals for the past 72 hrs (Last 3 readings):   Weight   06/04/19 2020 65 kg (143 lb 4.8 oz)   06/04/19 0600 65 kg (143 lb 3.2 oz)   06/03/19 2248 63.7 kg (140 lb 6.4 oz)     Body mass index is 27.08 kg/m².      Intake/Output Summary (Last 24 hours) at 6/5/2019 1450  Last data filed at 6/5/2019 1200  Gross per 24 hour   Intake 1859 ml   Output 1800 ml   Net 59 ml       Hemodynamic Parameters:       Physical Exam   Constitutional: She is oriented to person, place, and time. She appears well-developed and well-nourished. No distress.   Eyes: Pupils are equal,  round, and reactive to light. Conjunctivae and EOM are normal. No scleral icterus.   Neck: Normal range of motion. Neck supple. JVD present.   Cardiovascular: Normal rate, regular rhythm and normal heart sounds.   No murmur heard.  Pulmonary/Chest: Effort normal and breath sounds normal.   Abdominal: Soft. Bowel sounds are normal. She exhibits distension. There is no tenderness.   Musculoskeletal: Normal range of motion. She exhibits edema.   Neurological: She is alert and oriented to person, place, and time. No cranial nerve deficit or sensory deficit.   Skin: Skin is warm and dry. Capillary refill takes 2 to 3 seconds. No rash noted.   Psychiatric: She has a normal mood and affect. Thought content normal.       Significant Labs:  CBC:  Recent Labs   Lab 06/04/19 1117 06/04/19  1604 06/05/19 0223   WBC 9.42 8.55 7.80   RBC 5.03 4.34 4.01   HGB 13.4 11.6* 10.8*   HCT 44.8 38.3 35.2*   * 104* 93*   MCV 89 88 88   MCH 26.6* 26.7* 26.9*   MCHC 29.9* 30.3* 30.7*     BNP:  No results for input(s): BNP in the last 168 hours.    Invalid input(s): BNPTRIAGELBLO  CMP:  Recent Labs   Lab 06/04/19 1117 06/04/19  1604 06/05/19 0223   GLU 86 92 89   CALCIUM 8.5* 8.1* 7.4*   ALBUMIN 3.2* 3.1*  3.1* 2.6*   PROT 6.9 6.4 5.4*    141 138   K 4.5 4.4 4.3   CO2 17* 18* 23   CL 99 99 100   BUN 54* 57* 63*   CREATININE 9.2* 9.0* 9.5*   ALKPHOS 165* 147* 119   ALT 53* 52* 49*   AST 88* 86* 75*   BILITOT 1.2* 1.2* 0.7      Coagulation:   Recent Labs   Lab 06/04/19 1117 06/04/19 1604 06/05/19  0249   INR 9.0* 3.6* 4.5*   APTT  --  35.4*  --      LDH:  No results for input(s): LDH in the last 72 hours.  Microbiology:  Microbiology Results (last 7 days)     Procedure Component Value Units Date/Time    Blood culture [[id number]] Collected:  06/04/19 1118    Order Status:  Completed Specimen:  Blood Updated:  06/05/19 1222     Blood Culture, Routine No Growth to date     Blood Culture, Routine No Growth to date    Blood  culture #1 **CANNOT BE ORDERED STAT** [[id number]] Collected:  06/03/19 1457    Order Status:  Completed Specimen:  Blood from Peripheral, Hand, Right Updated:  06/05/19 0916     Blood Culture, Routine Gram stain aer bottle: Gram positive cocci in clusters resembling Staph      Blood Culture, Routine Results called to and read back by:Mena Cleary RN 06/04/2019  16:53     Blood Culture, Routine --     COAGULASE-NEGATIVE STAPHYLOCOCCUS SPECIES  Organism is a probable contaminant      Blood culture [[id number]] Collected:  06/04/19 1754    Order Status:  Completed Specimen:  Blood from Peripheral, Hand, Right Updated:  06/05/19 0115     Blood Culture, Routine No Growth to date    Blood culture #2 **CANNOT BE ORDERED STAT** [[id number]] Collected:  06/03/19 1512    Order Status:  Completed Specimen:  Blood from Peripheral, Hand, Right Updated:  06/04/19 1622     Blood Culture, Routine No Growth to date     Blood Culture, Routine No Growth to date          I have reviewed all pertinent labs within the past 24 hours.    Estimated Creatinine Clearance: 5.1 mL/min (A) (based on SCr of 9.5 mg/dL (H)).

## 2019-06-05 NOTE — ASSESSMENT & PLAN NOTE
Acute diarrhea which started last evening. No fever/chills, no WBC, or recurrent GI bleeding, however procalcitonin elevated and seems to have responded to antibiotics.    - cipro/flagyl for 7 days (to end 07/11/19)  - Advance diet as tolerated

## 2019-06-05 NOTE — NURSING
S/W dialysis RN Jonathan, stated that HD cannot be performed tonight d/t staffing and pt. Priority. Will notify HTS

## 2019-06-05 NOTE — NURSING
Dr. Hutchinson @ bedside assessing; pt. O2 requirements increasing from 4L @ start of shift to 10L high flow. Dr. Hutchinson suggested use of simple mask and instructed RN to call if O2 requirements increase. He also spoke w/ dialysis who said they should be able to come dialyze pt. Within next 2-3 hrs. VSS. TUNDE.

## 2019-06-05 NOTE — PROGRESS NOTES
3.5hr HD treatment completed 1.5l of fluid removed low bp throughout. Both needles of a ABEL fistula removed and pressure held until hemostasis achieved dressing applied. Report given to CHIDI San at bedside.

## 2019-06-05 NOTE — PROGRESS NOTES
Patient seen and examined.     No complaints of abdominal pain this AM; no further episodes of diarrhea.     Lactate has normalized.    Abdomen remains soft, non-tender, non-distended, and otherwise benign.     A/P - No evidence of ongoing intra-abdominal process. Will sign off at this time. Please contact Gen Surg with any significant clinical changes or further questions/concerns.     Rg Delgado MD  General Surgery PGY-3  Pager: [phone]

## 2019-06-05 NOTE — PROGRESS NOTES
Bedside HD treatment initiated via ABEL fistula with 15G needles without difficulty. BFR@400. Primary nurse at bedside midodrine given as ordered.

## 2019-06-05 NOTE — ASSESSMENT & PLAN NOTE
Unclear if her hypotension and lactate were from worsening PH/RV failure or related to infection.    - off-load RV with dialysis today  - wean Lisa after dialysis  - trend lactate this evening and tomorrow morning  - Continue adempas and selexipeg  - had a discussion with patient on her worsening health and possibility for additional family support/palliative approach. Plan on family meeting tomorrow at 10:30-11am

## 2019-06-05 NOTE — PLAN OF CARE
Problem: Adult Inpatient Plan of Care  Goal: Plan of Care Review  Outcome: Ongoing (interventions implemented as appropriate)    No acute events throughout day. See vital signs and assessments in flowsheets. See below for updates on today's progress.     Pulmonary: Device ranging from NC to simple facemask 4L-10L seeming to be dependent on whether or not pt asleep. Pt normally wears home CPAP QHS but is unable to here d/t Lisa; no apparent resp distress, but there were several instances where she dropped and maintained spO2 < 90 for extended period.    Cardiovascular: AF rate controlled (90s-110s); see note on hypotension episode; afebrile; pulses all palpable     Neurological: AAO x4    Gastrointestinal: no BM this shift; cards diet     Genitourinary: HD to be performed this AM d/t alterations in clinical course; anuric     Endocrine: WNL    Skin/Bath: aneurysm of CHIKIS fistula noted; otherwise intact   Date of last CHG bath given: 6/4    Infusions: KVO    Patient progressing towards goals as tolerated, plan of care communicated and reviewed with Shivani Sheets and family. All concerns addressed. Will continue to monitor.

## 2019-06-05 NOTE — SUBJECTIVE & OBJECTIVE
Interval History: Patient moved to ICU yesterday due to elevated lactate, which has since improved to 1.0. The patient is alert and oriented w/o distress. HD in progress this morning during assessment for volume removal. Patient hypotensive overnight unsure of the accuracy due to BP being taken in the leg. Patient states that her SBP is normally in the 90s.    Review of patient's allergies indicates:   Allergen Reactions    Penicillins Swelling    Iodine      Other reaction(s): Hives    Sulfamethoxazole-trimethoprim      Other reaction(s): Swelling  Other reaction(s): Hives     Current Facility-Administered Medications   Medication Frequency    0.9%  NaCl infusion (for blood administration) Q24H PRN    0.9%  NaCl infusion Once    aspirin EC tablet 81 mg Daily    atorvastatin tablet 10 mg Daily    calcium acetate capsule 1,334 mg TID WM    ciprofloxacin (CIPRO)400mg/200ml D5W IVPB 400 mg Every Tues, Thurs, Sat    levothyroxine tablet 100 mcg Before breakfast    metoprolol tartrate (LOPRESSOR) tablet 25 mg BID    metronidazole IVPB 500 mg Q8H    midodrine tablet 10 mg TID    nitric oxide gas Gas 10 ppm Continuous    pantoprazole EC tablet 40 mg Daily    riociguat (ADEMPAS) tablet 2.5 mg TID    selexipag Tab 1,000 mcg BID    sodium chloride 0.9% flush 10 mL PRN     Facility-Administered Medications Ordered in Other Encounters   Medication Frequency    sodium chloride 0.9% flush 5 mL PRN       Objective:     Vital Signs (Most Recent):  Temp: 98 °F (36.7 °C) (06/05/19 0701)  Pulse: 108 (06/05/19 1144)  Resp: 15 (06/05/19 1144)  BP: (!) 101/54 (06/05/19 1130)  SpO2: 96 % (06/05/19 1144)  O2 Device (Oxygen Therapy): nasal cannula w/ humidification (06/05/19 1144) Vital Signs (24h Range):  Temp:  [97.8 °F (36.6 °C)-98.2 °F (36.8 °C)] 98 °F (36.7 °C)  Pulse:  [] 108  Resp:  [6-31] 15  SpO2:  [56 %-100 %] 96 %  BP: ()/(41-81) 101/54     Weight: 65 kg (143 lb 4.8 oz) (06/04/19 2020)  Body mass  index is 27.08 kg/m².  Body surface area is 1.67 meters squared.    I/O last 3 completed shifts:  In: 1635.3 [P.O.:380; I.V.:23.3; Blood:482; IV Piggyback:750]  Out: 0     Physical Exam   Constitutional: She is oriented to person, place, and time. She appears well-developed. No distress.   HENT:   Head: Normocephalic and atraumatic.   Right Ear: External ear normal.   Left Ear: External ear normal.   Eyes: Conjunctivae and EOM are normal. Right eye exhibits no discharge. Left eye exhibits no discharge.   Periorbital edema   Neck: Normal range of motion. Neck supple. JVD present.   Cardiovascular: An irregularly irregular rhythm present. Exam reveals no gallop and no friction rub.   Murmur heard.  Pulmonary/Chest: Effort normal. No respiratory distress. She has no wheezes. She has no rales.   Abdominal: Soft. Bowel sounds are normal. She exhibits no distension. There is no tenderness. There is no guarding.   Musculoskeletal: She exhibits no edema or deformity.   Neurological: She is alert and oriented to person, place, and time.   Skin: Skin is warm and dry. She is not diaphoretic.   Psychiatric: She has a normal mood and affect. Her behavior is normal.       Significant Labs:  CBC:   Recent Labs   Lab 06/05/19 0223   WBC 7.80   RBC 4.01   HGB 10.8*   HCT 35.2*   PLT 93*   MCV 88   MCH 26.9*   MCHC 30.7*     CMP:   Recent Labs   Lab 06/05/19 0223   GLU 89   CALCIUM 7.4*   ALBUMIN 2.6*   PROT 5.4*      K 4.3   CO2 23      BUN 63*   CREATININE 9.5*   ALKPHOS 119   ALT 49*   AST 75*   BILITOT 0.7

## 2019-06-05 NOTE — ASSESSMENT & PLAN NOTE
S/p watchman placement 4/27. INR supratherapeutic, she is now >30 days out from procedure.    No need to continue coumadin from this point on, especially given elevated bleeding risk

## 2019-06-05 NOTE — ASSESSMENT & PLAN NOTE
Consulted nephrology for HD. Tolerated HD today with transient hypotensive episode. Will continue to off-load

## 2019-06-05 NOTE — NURSING
Called and reported to Dr Rodriguez that pt's SBP per Doppler is 78.  Cuff is reading lower with maps in 50's.  However, pt is mentating and speaking clearly.  Moving  Per her baseline with this RN.  Pt's BP is only able to be taken on leg or on R fa r/t fistula.  OK to given 1500 dose of Midodrine 10mg now.  Call MD if pt is symptomatic with lower BP readings (slower speech, trouble putting words together in a sentence).

## 2019-06-06 ENCOUNTER — OUTPATIENT CASE MANAGEMENT (OUTPATIENT)
Dept: ADMINISTRATIVE | Facility: OTHER | Age: 64
End: 2019-06-06

## 2019-06-06 PROBLEM — R19.7 DIARRHEA: Status: RESOLVED | Noted: 2019-06-03 | Resolved: 2019-06-06

## 2019-06-06 PROBLEM — Z71.89 ADVANCE DIRECTIVE DISCUSSED WITH PATIENT: Status: ACTIVE | Noted: 2019-06-06

## 2019-06-06 PROBLEM — Z51.5 PALLIATIVE CARE ENCOUNTER: Status: ACTIVE | Noted: 2019-06-06

## 2019-06-06 PROBLEM — R53.83 FATIGUE: Status: ACTIVE | Noted: 2019-06-06

## 2019-06-06 PROBLEM — R53.81 DEBILITY: Status: ACTIVE | Noted: 2019-06-06

## 2019-06-06 LAB
ALBUMIN SERPL BCP-MCNC: 2.4 G/DL (ref 3.5–5.2)
ALP SERPL-CCNC: 119 U/L (ref 55–135)
ALT SERPL W/O P-5'-P-CCNC: 45 U/L (ref 10–44)
ANION GAP SERPL CALC-SCNC: 9 MMOL/L (ref 8–16)
AST SERPL-CCNC: 39 U/L (ref 10–40)
BACTERIA BLD CULT: NORMAL
BASOPHILS # BLD AUTO: 0.02 K/UL (ref 0–0.2)
BASOPHILS NFR BLD: 0.3 % (ref 0–1.9)
BILIRUB SERPL-MCNC: 0.6 MG/DL (ref 0.1–1)
BUN SERPL-MCNC: 33 MG/DL (ref 8–23)
CALCIUM SERPL-MCNC: 7.9 MG/DL (ref 8.7–10.5)
CHLORIDE SERPL-SCNC: 106 MMOL/L (ref 95–110)
CO2 SERPL-SCNC: 23 MMOL/L (ref 23–29)
CREAT SERPL-MCNC: 6.5 MG/DL (ref 0.5–1.4)
DIFFERENTIAL METHOD: ABNORMAL
EOSINOPHIL # BLD AUTO: 0.1 K/UL (ref 0–0.5)
EOSINOPHIL NFR BLD: 1.6 % (ref 0–8)
ERYTHROCYTE [DISTWIDTH] IN BLOOD BY AUTOMATED COUNT: 20.9 % (ref 11.5–14.5)
EST. GFR  (AFRICAN AMERICAN): 7.1 ML/MIN/1.73 M^2
EST. GFR  (NON AFRICAN AMERICAN): 6.2 ML/MIN/1.73 M^2
GLUCOSE SERPL-MCNC: 123 MG/DL (ref 70–110)
HCT VFR BLD AUTO: 37.3 % (ref 37–48.5)
HGB BLD-MCNC: 11.3 G/DL (ref 12–16)
IMM GRANULOCYTES # BLD AUTO: 0.03 K/UL (ref 0–0.04)
IMM GRANULOCYTES NFR BLD AUTO: 0.5 % (ref 0–0.5)
INR PPP: 2.1 (ref 0.8–1.2)
LACTATE SERPL-SCNC: 0.7 MMOL/L (ref 0.5–2.2)
LYMPHOCYTES # BLD AUTO: 0.8 K/UL (ref 1–4.8)
LYMPHOCYTES NFR BLD: 12.4 % (ref 18–48)
MCH RBC QN AUTO: 26.7 PG (ref 27–31)
MCHC RBC AUTO-ENTMCNC: 30.3 G/DL (ref 32–36)
MCV RBC AUTO: 88 FL (ref 82–98)
MONOCYTES # BLD AUTO: 0.7 K/UL (ref 0.3–1)
MONOCYTES NFR BLD: 11.4 % (ref 4–15)
NEUTROPHILS # BLD AUTO: 4.6 K/UL (ref 1.8–7.7)
NEUTROPHILS NFR BLD: 73.8 % (ref 38–73)
NRBC BLD-RTO: 0 /100 WBC
PLATELET # BLD AUTO: 98 K/UL (ref 150–350)
PMV BLD AUTO: ABNORMAL FL (ref 9.2–12.9)
POCT GLUCOSE: 127 MG/DL (ref 70–110)
POTASSIUM SERPL-SCNC: 4.5 MMOL/L (ref 3.5–5.1)
PROT SERPL-MCNC: 5.5 G/DL (ref 6–8.4)
PROTHROMBIN TIME: 20.6 SEC (ref 9–12.5)
RBC # BLD AUTO: 4.24 M/UL (ref 4–5.4)
SODIUM SERPL-SCNC: 138 MMOL/L (ref 136–145)
WBC # BLD AUTO: 6.21 K/UL (ref 3.9–12.7)

## 2019-06-06 PROCEDURE — 94761 N-INVAS EAR/PLS OXIMETRY MLT: CPT

## 2019-06-06 PROCEDURE — 99291 CRITICAL CARE FIRST HOUR: CPT | Mod: GC,,, | Performed by: INTERNAL MEDICINE

## 2019-06-06 PROCEDURE — 85610 PROTHROMBIN TIME: CPT

## 2019-06-06 PROCEDURE — 27000221 HC OXYGEN, UP TO 24 HOURS

## 2019-06-06 PROCEDURE — 99232 PR SUBSEQUENT HOSPITAL CARE,LEVL II: ICD-10-PCS | Mod: ,,, | Performed by: INTERNAL MEDICINE

## 2019-06-06 PROCEDURE — 99291 PR CRITICAL CARE, E/M 30-74 MINUTES: ICD-10-PCS | Mod: GC,,, | Performed by: INTERNAL MEDICINE

## 2019-06-06 PROCEDURE — 25000003 PHARM REV CODE 250: Performed by: INTERNAL MEDICINE

## 2019-06-06 PROCEDURE — 25000003 PHARM REV CODE 250: Performed by: NURSE PRACTITIONER

## 2019-06-06 PROCEDURE — 25000003 PHARM REV CODE 250

## 2019-06-06 PROCEDURE — 94660 CPAP INITIATION&MGMT: CPT

## 2019-06-06 PROCEDURE — 20000000 HC ICU ROOM

## 2019-06-06 PROCEDURE — 80053 COMPREHEN METABOLIC PANEL: CPT

## 2019-06-06 PROCEDURE — 25000003 PHARM REV CODE 250: Performed by: STUDENT IN AN ORGANIZED HEALTH CARE EDUCATION/TRAINING PROGRAM

## 2019-06-06 PROCEDURE — 99223 1ST HOSP IP/OBS HIGH 75: CPT | Mod: ,,, | Performed by: EMERGENCY MEDICINE

## 2019-06-06 PROCEDURE — 99232 SBSQ HOSP IP/OBS MODERATE 35: CPT | Mod: ,,, | Performed by: INTERNAL MEDICINE

## 2019-06-06 PROCEDURE — 85025 COMPLETE CBC W/AUTO DIFF WBC: CPT

## 2019-06-06 PROCEDURE — 83605 ASSAY OF LACTIC ACID: CPT

## 2019-06-06 PROCEDURE — 99900035 HC TECH TIME PER 15 MIN (STAT)

## 2019-06-06 PROCEDURE — S0030 INJECTION, METRONIDAZOLE: HCPCS

## 2019-06-06 PROCEDURE — 99223 PR INITIAL HOSPITAL CARE,LEVL III: ICD-10-PCS | Mod: ,,, | Performed by: EMERGENCY MEDICINE

## 2019-06-06 RX ORDER — METRONIDAZOLE 500 MG/1
500 TABLET ORAL EVERY 8 HOURS
Status: DISCONTINUED | OUTPATIENT
Start: 2019-06-06 | End: 2019-06-07

## 2019-06-06 RX ORDER — METRONIDAZOLE 500 MG/1
500 TABLET ORAL EVERY 8 HOURS
Qty: 12 TABLET | Refills: 0 | Status: SHIPPED | OUTPATIENT
Start: 2019-06-06

## 2019-06-06 RX ORDER — CIPROFLOXACIN 500 MG/1
500 TABLET ORAL NIGHTLY
Qty: 8 TABLET | Refills: 0 | Status: SHIPPED | OUTPATIENT
Start: 2019-06-06

## 2019-06-06 RX ORDER — MIDODRINE HYDROCHLORIDE 10 MG/1
10 TABLET ORAL 3 TIMES DAILY PRN
Qty: 90 TABLET | Refills: 11 | Status: SHIPPED | OUTPATIENT
Start: 2019-06-06 | End: 2019-06-08

## 2019-06-06 RX ORDER — CIPROFLOXACIN 500 MG/1
500 TABLET ORAL NIGHTLY
Status: DISCONTINUED | OUTPATIENT
Start: 2019-06-06 | End: 2019-06-08 | Stop reason: HOSPADM

## 2019-06-06 RX ADMIN — MIDODRINE HYDROCHLORIDE 10 MG: 5 TABLET ORAL at 08:06

## 2019-06-06 RX ADMIN — RIOCIGUAT 2.5 MG: 2.5 TABLET, FILM COATED ORAL at 03:06

## 2019-06-06 RX ADMIN — ATORVASTATIN CALCIUM 10 MG: 10 TABLET, FILM COATED ORAL at 09:06

## 2019-06-06 RX ADMIN — METRONIDAZOLE 500 MG: 500 TABLET ORAL at 09:06

## 2019-06-06 RX ADMIN — MIDODRINE HYDROCHLORIDE 10 MG: 5 TABLET ORAL at 09:06

## 2019-06-06 RX ADMIN — LEVOTHYROXINE SODIUM 100 MCG: 100 TABLET ORAL at 06:06

## 2019-06-06 RX ADMIN — METRONIDAZOLE 500 MG: 500 SOLUTION INTRAVENOUS at 06:06

## 2019-06-06 RX ADMIN — METRONIDAZOLE 500 MG: 500 SOLUTION INTRAVENOUS at 03:06

## 2019-06-06 RX ADMIN — ASPIRIN 81 MG: 81 TABLET, COATED ORAL at 09:06

## 2019-06-06 RX ADMIN — RIOCIGUAT 2.5 MG: 2.5 TABLET, FILM COATED ORAL at 09:06

## 2019-06-06 RX ADMIN — RIOCIGUAT 2.5 MG: 2.5 TABLET, FILM COATED ORAL at 08:06

## 2019-06-06 RX ADMIN — MIDODRINE HYDROCHLORIDE 10 MG: 5 TABLET ORAL at 03:06

## 2019-06-06 RX ADMIN — CIPROFLOXACIN HYDROCHLORIDE 500 MG: 500 TABLET, FILM COATED ORAL at 08:06

## 2019-06-06 RX ADMIN — PANTOPRAZOLE SODIUM 40 MG: 40 TABLET, DELAYED RELEASE ORAL at 09:06

## 2019-06-06 NOTE — ASSESSMENT & PLAN NOTE
ESRD on iHD MWF  [facility]  3.5 hours  Dr. Deleon  No residual renal function  ABEL AVF  Reports an EDW of 68 kg that was recently decreased a few weeks ago.    Plan/Recommendations:  -Patient remains hypotensive today, SBP 80/90s, on Midodrine. No urgent need for HD again today, patient had 1.5 L remove with good clearance on yesterday.   -Will likely plan for RRT tomorrow iHD/CRRT depending on hemodynamics   -Pending US of anuerysm to her L AVF  -Continue phos binders   -Continue serial renal function panels for phos and albumin levels   -Strict I/Os and daily weights   -Hgb 11.3, within normal range

## 2019-06-06 NOTE — PLAN OF CARE
Problem: Adult Inpatient Plan of Care  Goal: Plan of Care Review  Outcome: Ongoing (interventions implemented as appropriate)    No acute events throughout day. See vital signs and assessments in flowsheets. See below for updates on today's progress.     Pulmonary: Lung sound diminished, SATs  on 5 L nasal cannula. No shortness of breath. CPAP at night as needed.     Cardiovascular: A-fib, rate between 90-110s today. Infrequent PVCs. Blood pressures between 80-90s systolic and MAPs >60 which is goal for patient.     Neurological: Awake, alert and oriented x4. Very drowsy this AM. Patient was more awake this afternoon and evening. Afebrile. Follows commands. No focal weakness.     Gastrointestinal: Abdomen soft, non-tender. No bowel movement today. Patient did not eat today, stating she was not hungry at all.     Genitourinary: Anuric on dialysis.     Endocrine: NA.     Skin/Bath: Night bath, patient repositions self in bed. Family at bedside today very helpful with patient's care.     Infusions: NA.     Palliative care had a very nice with the patient and her family today about goals of care. Patient wished to change code status from full to DNR.     Patient progressing towards goals as tolerated, plan of care communicated and reviewed with Shivani Sheets and family. All concerns addressed. Will continue to monitor.

## 2019-06-06 NOTE — CONSULTS
[facility]  Palliative Medicine  Consult Note    Patient Name: Shivani Sheets  MRN: [id number]  Admission Date: 6/3/2019  Hospital Length of Stay: 3 days  Code Status: DNR   Attending Provider: Jenny Hennessy MD  Consulting Provider: Frederick Nam MD  Primary Care Physician: Eula Weiss MD  Principal Problem:Diarrhea    Patient information was obtained from patient, relative(s) and past medical records.      Consults  Assessment/Plan:     Palliative care encounter  62 yo woman with ESRD on HD for 20 years, failed kidney transplant, HF, and pulmonary hypertension admitted with FTT, volume overload, and diarrhea.  Palliative care consulted for discussions re: GOC and aiding in medical decision making.     1) Symptoms: Mainly extreme fatigue and debility from chronic disease  2) Code status: as per todays discussion now DNR/DNI; added to chart  3) Medicolegal: son Ben is surrogate DM and the pt voiced her desire for him to be MPOA  4) Psychosocial:     5) Spiritual: strong in her mely; feels prepared to meet God  6) GOC/Discussions:  Met with the pt with her son, nieces, and son in law. The pt demonstrated an excellent understanding of her condition and life expectancy.  She was able to clearly articulate her desire to return home and with guidance, accept the option to return home with hospice.  She desires to stop HD as she feels she is no longer achieving what she once had from it.  Her family was very supportive throughout the conversation and are committed to supporting her at home.    7) ACP discussion   .Advance Care Planning During this visit, I engaged the patient in the advance care planning process.  The patient and I reviewed the role for advance directives and their purpose in directing future healthcare if the patients unable to speak for him/herself.  At this point in time, the patient does have full decision-making capacity.  We discussed different extreme health  states that she could experience, and reviewed what kind of medical care she would want in those situations.  The patient communicated that if she were comatose and had little chance of a meaningful recovery, she would not want machines/life-sustaining treatments used.  In addition to the above preference, other important end-of-life issues for the patient include returning home with hospice.  The patient has not completed a living will to reflect these preferences.  The patient  has already designated a healthcare power of  to make decisions on her behalf.   I spent a total of 32 minutes engaging the patient in this advance care planning discussion.     8) Dispo: home with hospice    Plan:   -hospice consult for home hospice  -d/c meds and therapies not contributing to comfort  -changed code to DNR and paperwork on the chart  -ongoing family support    Discussed with ICU team and HTS.     Frederick Nam MD  Palliative Medicine  [phone]        Thank you for your consult. I will follow-up with patient. Please contact us if you have any additional questions.    Subjective:     HPI:   She has a hx of PH on (adempas/selexipeg), diastolic dysfunction, pAF, CVA 1987, COPD on 2 L nocturnal oxygen, central retinal artery occlusion without significant carotid artery stenosis, CAD with remote PCI, ESRD(LUE AVF) secondary to HTN, s/p failed kidney transplant, PUD, STEFFANY on CPAP, hypothyroidism, RA, HLD. Had recurrent GI bleeding and is now s/p watchman.      She presented to the ED with volume overload from missed HD, lethargy, diarrhea, and failure to thrive.  She has had 5 hospitalizations in the past 6 months and per family an indolent marked decline over the past 3 years.  Per the pt and son, she has more often than not felt like going to HD.     In this setting, palliative medicine was consulted to help with medical decision making and aid in the formation of goals of care.       Hospital Course:  No notes on  "file    Interval History: Pt remains very fatigued, otherwise no complaints and feels "at peace"    Past Medical History:   Diagnosis Date    Allergy     Anemia     Anticoagulant long-term use     4 years coumadin, asa    Arthritis     Awaiting organ transplant 2013    Cataract     Central serous chorioretinopathy of eye, right 2018    CHF (congestive heart failure)     Chronic diarrhea     Chronic kidney disease     Colon polyps     COPD (chronic obstructive pulmonary disease)     Coronary artery disease     Diabetes mellitus     Diabetic retinopathy     Diverticulosis     Encounter for blood transfusion     ESRD from HTN strtied RRT 1999    Failed  donor kidney transplant -     Glaucoma     History of bleeding peptic ulcer      as stated per pt    Hyperlipidemia     Hypertension     Hypothyroidism     Morbid obesity 8/10/2012    Renal hypertension     Stroke            Past Surgical History:   Procedure Laterality Date    CATARACT EXTRACTION W/  INTRAOCULAR LENS IMPLANT Bilateral     COLON SURGERY      COLONOSCOPY      COLONOSCOPY N/A 2019    Performed by Indio Beltran MD at [facility] ENDO (2ND FLR)    COLONOSCOPY N/A 2018    Performed by Jose Falk MD at [facility] ENDO (2ND FLR)    EGD (ESOPHAGOGASTRODUODENOSCOPY) N/A 2019    Performed by Miranda Maradiaga MD at [facility] ENDO (2ND FLR)    EGD (ESOPHAGOGASTRODUODENOSCOPY) N/A 2018    Performed by Luis Washington MD at [facility] ENDO (2ND FLR)    ESOPHAGOGASTRODUODENOSCOPY (EGD) N/A 2018    Performed by Kenji Desir MD at [facility] ENDO (2ND FLR)    EYE SURGERY      FRACTURE SURGERY      R arm    HERNIA REPAIR      HYSTERECTOMY      INSERTION-IMPLANT-GLAUCOMA Left 10/12/2017    Performed by Junaid Kern MD at [facility] OR 1ST FLR    KIDNEY TRANSPLANT      Left atrial appendage closure device N/A 2019    Performed by César Thomas MD at [facility] EP LAB    " NEPHRECTOMY  2008    transplant     PARATHYROID GLAND SURGERY      TONSILLECTOMY      Transesophageal echo (JACE) intra-procedure log documentation N/A 2019    Performed by [facility]    Transesophageal echo (JACE) intra-procedure log documentation N/A 2019    Performed by [facility] EP LAB    UPPER GASTROINTESTINAL ENDOSCOPY         Review of patient's allergies indicates:   Allergen Reactions    Penicillins Swelling    Iodine      Other reaction(s): Hives    Sulfamethoxazole-trimethoprim      Other reaction(s): Swelling  Other reaction(s): Hives       Medications:  Continuous Infusions:  Scheduled Meds:   sodium chloride 0.9%   Intravenous Once    aspirin  81 mg Oral Daily    atorvastatin  10 mg Oral Daily    calcium acetate  1,334 mg Oral TID WM    ciprofloxacin  400 mg Intravenous Every Tues, Thurs, Sat    levothyroxine  100 mcg Oral Before breakfast    metronidazole  500 mg Intravenous Q8H    midodrine  10 mg Oral TID    pantoprazole  40 mg Oral Daily    riociguat  2.5 mg Oral TID    selexipag  1,000 mcg Oral BID     PRN Meds:sodium chloride, sodium chloride 0.9%    Family History     Problem Relation (Age of Onset)    Asthma Sister    Blindness Father    Breast cancer Maternal Aunt    Depression Sister    Diabetes Maternal Aunt    Heart attack Father, Mother    Heart failure Father, Mother    Hypertension Father, Mother, Sister, Brother    Kidney disease Brother        Tobacco Use    Smoking status: Former Smoker     Types: Cigarettes     Last attempt to quit: 8/10/2000     Years since quittin.8    Smokeless tobacco: Never Used   Substance and Sexual Activity    Alcohol use: No     Comment: hx of etoh     Drug use: No    Sexual activity: Never       Review of Systems   Constitutional: Positive for activity change, fatigue and unexpected weight change. Negative for diaphoresis.   HENT: Negative.    Eyes: Negative.     Respiratory: Positive for shortness of breath.    Cardiovascular: Negative.    Gastrointestinal: Negative.    Endocrine: Negative.    Genitourinary: Negative.    Musculoskeletal: Negative.    Allergic/Immunologic: Negative.    Neurological: Positive for weakness.   Hematological: Negative.    Psychiatric/Behavioral: Negative.      Objective:     Vital Signs (Most Recent):  Temp: 96.8 °F (36 °C) (06/06/19 1100)  Pulse: (!) 112 (06/06/19 1400)  Resp: 13 (06/06/19 1400)  BP: (!) 80/58 (06/06/19 1400)  SpO2: (!) 93 % (06/06/19 1400) Vital Signs (24h Range):  Temp:  [96.4 °F (35.8 °C)-97.9 °F (36.6 °C)] 96.8 °F (36 °C)  Pulse:  [] 112  Resp:  [2-26] 13  SpO2:  [89 %-99 %] 93 %  BP: ()/(45-68) 80/58     Weight: 65 kg (143 lb 4.8 oz)  Body mass index is 27.08 kg/m².    Review of Symptoms  Symptom Assessment (ESAS 0-10 scale)   ESAS 0 1 2 3 4 5 6 7 8 9 10   Pain x             Dyspnea x             Anxiety xx             Nausea x             Depression  x             Anorexia x             Fatigue      x        Insomnia x             Restlessness  x             Agitation x             CAM / Delirium _x_ --  ___+   Constipation     x_ --  ___+   Diarrhea           _x_ --  ___+  Bowel Management Plan (BMP): No    Comments: diarrhea    Pain Assessment: none    OME in 24 hours: o  Performance Status: 50    ECOG Performance Status Grade: 3 - Confined to bed or chair 50% of waking hours    Physical Exam  Constitutional: She is oriented to person, place, and time. She appears well-developed and well-nourished. No distress.   Eyes: Pupils are equal, round, and reactive to light. Conjunctivae and EOM are normal. No scleral icterus.   Neck: Normal range of motion. Neck supple. JVD present.   Cardiovascular: Normal rate, regular rhythm and normal heart sounds.   No murmur heard.  Pulmonary/Chest: Effort normal and breath sounds normal.   Abdominal: Soft. Bowel sounds are normal. She exhibits distension. There is no  "tenderness.   Musculoskeletal: Normal range of motion. She exhibits edema.   Neurological: She is alert and oriented to person, place, and time. No cranial nerve deficit or sensory deficit.   Skin: Skin is warm and dry. Capillary refill takes 2 to 3 seconds. No rash noted.   Psychiatric: She has a normal mood and affect. Thought content normal.      Significant Labs: Prealbumin: No results for input(s): PREALBUMIN in the last 48 hours.  CBC:   Recent Labs   Lab 06/06/19  0355   WBC 6.21   HGB 11.3*   HCT 37.3   MCV 88   PLT 98*     BMP:  Recent Labs   Lab 06/06/19  0355   *      K 4.5      CO2 23   BUN 33*   CREATININE 6.5*   CALCIUM 7.9*     LFT:  Lab Results   Component Value Date    AST 39 06/06/2019    ALKPHOS 119 06/06/2019    BILITOT 0.6 06/06/2019     Albumin:   Albumin   Date Value Ref Range Status   06/06/2019 2.4 (L) 3.5 - 5.2 g/dL Final     Protein:   Total Protein   Date Value Ref Range Status   06/06/2019 5.5 (L) 6.0 - 8.4 g/dL Final     Lactic acid:   Lab Results   Component Value Date    LACTATE 0.7 06/06/2019    LACTATE 1.4 06/05/2019       Significant Imaging: I have reviewed all pertinent imaging results/findings within the past 24 hours.    Advance Care Planning   Advanced Directives::  Living Will: No  LaPOST: No  Do Not Resuscitate Status: No  Medical Power of : No; Pt's brother, Kenji Zaldivar ("Nahum") -[phone]  [place] [phone]    Decision-Making Capacity: Patient answered questions, Family answered questions       Living Arrangements:   Patient resides with patient's brother and one son (Ben)     [address] [facility] [address] [facility] [address]      Pt cell:  [phone]  Kenji Zaldivar (brother):  [phone]  Ben (son): [phone]  Frederick (son): [phone]     Support system includes adult children, brother, and extended family.  Patient does not have dependents that are need of being cared for.     Patients primary caregiver is self with help from her " brother/sons as needed.  During admission, patient's caregiver plans to visit.  Confirmed patient and patients caregivers do have access to reliable transportation.  The pt's brother and adult children drive.     Psychosocial/Cultural:  Patient's most important priorities:  Being with family and in home setting    Patient's biggest concerns/fears:  Disappointing family    Previous death/end of life care history:    in the home setting    Patient's goals/hopes:  Be at home with family on hospice    Spiritual:     F- Brandy and Belief: Holiness    I - Importance: at the center of family life  .  C - Community: family     A - Address in Care: involved      > 50% of 75 min visit spent in chart review, face to face discussion of goals of care,  symptom assessment, coordination of care and emotional support.    Frederick Nam MD  Palliative Medicine  [facility]

## 2019-06-06 NOTE — PLAN OF CARE
Problem: End-of-Life Care  Goal: Comfort, Peace and Preserved Dignity    Intervention: Promote Peace and Maintain Dignity  Will arrange for pt and her family to met with [facility] [facility] and allow pt to speak freely re: her wishes for when she is discharged.

## 2019-06-06 NOTE — PROGRESS NOTES
[facility]  Heart Transplant  Progress Note    Patient Name: Shivani Sheets  MRN: [id number]  Admission Date: 6/3/2019  Hospital Length of Stay: 3 days  Attending Physician: Jenny Hennessy MD  Primary Care Provider: Eula Weiss MD  Principal Problem:Diarrhea    Subjective:     Interval History: No events overnight, somnolent this morning. But woke up in the afternoon and on multi-disciplinary discussion requested comfort-oriented care and to die at home with hospice    Continuous Infusions:  Scheduled Meds:   sodium chloride 0.9%   Intravenous Once    calcium acetate  1,334 mg Oral TID WM    ciprofloxacin HCl  500 mg Oral QHS    levothyroxine  100 mcg Oral Before breakfast    metroNIDAZOLE  500 mg Oral Q8H    midodrine  10 mg Oral TID    pantoprazole  40 mg Oral Daily    riociguat  2.5 mg Oral TID    selexipag  1,000 mcg Oral BID     PRN Meds:sodium chloride, sodium chloride 0.9%    Review of patient's allergies indicates:   Allergen Reactions    Penicillins Swelling    Iodine      Other reaction(s): Hives    Sulfamethoxazole-trimethoprim      Other reaction(s): Swelling  Other reaction(s): Hives     Objective:     Vital Signs (Most Recent):  Temp: 96.8 °F (36 °C) (06/06/19 1100)  Pulse: (!) 119 (06/06/19 1533)  Resp: 16 (06/06/19 1533)  BP: 91/61 (06/06/19 1500)  SpO2: 98 % (06/06/19 1533) Vital Signs (24h Range):  Temp:  [96.4 °F (35.8 °C)-97.6 °F (36.4 °C)] 96.8 °F (36 °C)  Pulse:  [] 119  Resp:  [2-26] 16  SpO2:  [89 %-99 %] 98 %  BP: ()/(45-68) 91/61     Patient Vitals for the past 72 hrs (Last 3 readings):   Weight   06/04/19 2020 65 kg (143 lb 4.8 oz)   06/04/19 0600 65 kg (143 lb 3.2 oz)   06/03/19 2248 63.7 kg (140 lb 6.4 oz)     Body mass index is 27.08 kg/m².      Intake/Output Summary (Last 24 hours) at 6/6/2019 1638  Last data filed at 6/6/2019 0606  Gross per 24 hour   Intake 210 ml   Output --   Net 210 ml       Hemodynamic Parameters:        Telemetry: Afib with HR     Physical Exam   Constitutional: She is oriented to person, place, and time. She appears well-developed and well-nourished. No distress.   HENT:   On NC   Eyes: Pupils are equal, round, and reactive to light. Conjunctivae and EOM are normal. No scleral icterus.   Neck: Normal range of motion. Neck supple. JVD present.   Cardiovascular: Normal rate, regular rhythm and normal heart sounds.   No murmur heard.  Pulmonary/Chest: Effort normal and breath sounds normal.   Abdominal: Soft. Bowel sounds are normal. She exhibits distension. There is no tenderness.   Musculoskeletal: Normal range of motion. She exhibits edema.   Neurological: She is alert and oriented to person, place, and time. No cranial nerve deficit or sensory deficit.   Skin: Skin is warm and dry. Capillary refill takes 2 to 3 seconds. No rash noted.   Psychiatric: She has a normal mood and affect. Thought content normal.       Significant Labs:  CBC:  Recent Labs   Lab 06/04/19  1604 06/05/19 0223 06/06/19  0355   WBC 8.55 7.80 6.21   RBC 4.34 4.01 4.24   HGB 11.6* 10.8* 11.3*   HCT 38.3 35.2* 37.3   * 93* 98*   MCV 88 88 88   MCH 26.7* 26.9* 26.7*   MCHC 30.3* 30.7* 30.3*     BNP:  No results for input(s): BNP in the last 168 hours.    Invalid input(s): BNPTRIAGELBLO  CMP:  Recent Labs   Lab 06/04/19  1604 06/05/19 0223 06/06/19  0355   GLU 92 89 123*   CALCIUM 8.1* 7.4* 7.9*   ALBUMIN 3.1*  3.1* 2.6* 2.4*   PROT 6.4 5.4* 5.5*    138 138   K 4.4 4.3 4.5   CO2 18* 23 23   CL 99 100 106   BUN 57* 63* 33*   CREATININE 9.0* 9.5* 6.5*   ALKPHOS 147* 119 119   ALT 52* 49* 45*   AST 86* 75* 39   BILITOT 1.2* 0.7 0.6      Coagulation:   Recent Labs   Lab 06/04/19  1604 06/05/19  0249 06/06/19  0844   INR 3.6* 4.5* 2.1*   APTT 35.4*  --   --      LDH:  No results for input(s): LDH in the last 72 hours.  Microbiology:  Microbiology Results (last 7 days)     Procedure Component Value Units Date/Time    Blood  culture #2 **CANNOT BE ORDERED STAT** [[id number]] Collected:  06/03/19 1512    Order Status:  Completed Specimen:  Blood from Peripheral, Hand, Right Updated:  06/06/19 1622     Blood Culture, Routine No Growth to date     Blood Culture, Routine No Growth to date     Blood Culture, Routine No Growth to date     Blood Culture, Routine No Growth to date    Blood culture [[id number]] Collected:  06/04/19 1118    Order Status:  Completed Specimen:  Blood Updated:  06/06/19 1222     Blood Culture, Routine No Growth to date     Blood Culture, Routine No Growth to date     Blood Culture, Routine No Growth to date    Blood culture #1 **CANNOT BE ORDERED STAT** [[id number]] Collected:  06/03/19 1457    Order Status:  Completed Specimen:  Blood from Peripheral, Hand, Right Updated:  06/06/19 1008     Blood Culture, Routine Gram stain aer bottle: Gram positive cocci in clusters resembling Staph      Blood Culture, Routine Results called to and read back by:Mena Cleary RN 06/04/2019  16:53     Blood Culture, Routine --     COAGULASE-NEGATIVE STAPHYLOCOCCUS SPECIES  Organism is a probable contaminant      Blood culture [[id number]] Collected:  06/04/19 1754    Order Status:  Completed Specimen:  Blood from Peripheral, Hand, Right Updated:  06/05/19 2012     Blood Culture, Routine No Growth to date     Blood Culture, Routine No Growth to date          Lactate: 0.7    I have reviewed all pertinent labs within the past 24 hours.    Estimated Creatinine Clearance: 7.5 mL/min (A) (based on SCr of 6.5 mg/dL (H)).    Assessment and Plan:     Pt is a 63 year old lady who we are consulted for diarrhea     She has a hx of PH on (adempas/selexipeg), diastolic dysfunction, pAF, CVA 1987, COPD on 2 L nocturnal oxygen, central retinal artery occlusion without significant carotid artery stenosis, CAD with remote PCI, ESRD(LUE AVF) secondary to HTN, s/p failed kidney transplant, PUD, STEFFANY on CPAP, hypothyroidism, RA, HLD. Had recurrent GI bleeding  and is now s/p watchman.      Presents to ED today for diarrhea that started last night. Patient reports 4 watery BM, mild abdominal pain since 4 am. She missed dialysis today because they told her to come to the ER. Does complain of associated nausea and vomiting. No hematemesis. Had CT scan with gall stones and anasarca. U/S abdomen reveals gallstones and pericholecystic fluid.      * Diarrhea  Acute diarrhea which started last evening. No fever/chills, no WBC, or recurrent GI bleeding, however procalcitonin elevated and seems to have responded to antibiotics.    - cipro/flagyl for 7 days (to end 07/11/19)  - Advance diet as tolerated    End stage renal disease on dialysis  Consulted nephrology for HD. Tolerated HD today with transient hypotensive episode. Will continue to off-load if she would like further dialysis.    Pulmonary HTN  Unclear if her hypotension and lactate were from worsening PH/RV failure or related to infection.    - Continue adempas and selexipeg  - had a discussion with patient on her worsening health and possibility for additional family support/palliative approach. Plan on discharge home with hospice tomorrow after she meets with her best friend and brother at 1pm    Advance directive discussed with patient  Discussed at length with patient and multiple family members:    Code status: DNR/DNI  Discharge plan: home with hospice    Atrial fibrillation  S/p watchman placement 4/27. INR supratherapeutic, she is now >30 days out from procedure.    No need to continue coumadin from this point on, especially given elevated bleeding risk        Zacarias Rodriguez MD  Heart Transplant  [facility]

## 2019-06-06 NOTE — PLAN OF CARE
Problem: Adult Inpatient Plan of Care  Goal: Plan of Care Review  Outcome: Ongoing (interventions implemented as appropriate)    No acute events throughout day. See vital signs and assessments in flowsheets. See below for updates on today's progress.     Pulmonary: sats WNL on CPAP    Cardiovascular: AF rate controlled (90s-110s); see note on hypotension episode; afebrile; pulses all palpable     Neurological: AAO x4    Gastrointestinal: no BM this shift; cards diet     Genitourinary: anuric     Endocrine: WNL    Skin/Bath: aneurysm of CHIKIS fistula noted; otherwise intact       Date of last CHG bath given: 6/6    Infusions: KVO    Patient progressing towards goals as tolerated, plan of care communicated and reviewed with Shivani Sheets and family. All concerns addressed. Will continue to monitor.

## 2019-06-06 NOTE — ASSESSMENT & PLAN NOTE
Unclear if her hypotension and lactate were from worsening PH/RV failure or related to infection.    - Continue adempas and seltammig  - had a discussion with patient on her worsening health and possibility for additional family support/palliative approach. Plan on discharge home with hospice tomorrow after she meets with her best friend and brother at 1pm

## 2019-06-06 NOTE — NURSING
"During rounds this am, pt discussed with Dr Hennessy and team with this RN present that she is tired and has been battling her with her health for years.  Miss Amin explained that she has always done as the MD's recommended and she is thankful for their care but she is tired and feel the meds are "breaking her down".  Dr Hennessy suggested that once pt is stepped down to a regular room that a family conference is held and with the MD present, facilitate a discussion with pt's family so they are aware of how she feels and her wishes.  Also, family can let pt know how much they ar willing and able to help her.  "

## 2019-06-06 NOTE — SUBJECTIVE & OBJECTIVE
Interval History: No events overnight, somnolent this morning. But woke up in the afternoon and on multi-disciplinary discussion requested comfort-oriented care and to die at home with hospice    Continuous Infusions:  Scheduled Meds:   sodium chloride 0.9%   Intravenous Once    calcium acetate  1,334 mg Oral TID WM    ciprofloxacin HCl  500 mg Oral QHS    levothyroxine  100 mcg Oral Before breakfast    metroNIDAZOLE  500 mg Oral Q8H    midodrine  10 mg Oral TID    pantoprazole  40 mg Oral Daily    riociguat  2.5 mg Oral TID    selexipag  1,000 mcg Oral BID     PRN Meds:sodium chloride, sodium chloride 0.9%    Review of patient's allergies indicates:   Allergen Reactions    Penicillins Swelling    Iodine      Other reaction(s): Hives    Sulfamethoxazole-trimethoprim      Other reaction(s): Swelling  Other reaction(s): Hives     Objective:     Vital Signs (Most Recent):  Temp: 96.8 °F (36 °C) (06/06/19 1100)  Pulse: (!) 119 (06/06/19 1533)  Resp: 16 (06/06/19 1533)  BP: 91/61 (06/06/19 1500)  SpO2: 98 % (06/06/19 1533) Vital Signs (24h Range):  Temp:  [96.4 °F (35.8 °C)-97.6 °F (36.4 °C)] 96.8 °F (36 °C)  Pulse:  [] 119  Resp:  [2-26] 16  SpO2:  [89 %-99 %] 98 %  BP: ()/(45-68) 91/61     Patient Vitals for the past 72 hrs (Last 3 readings):   Weight   06/04/19 2020 65 kg (143 lb 4.8 oz)   06/04/19 0600 65 kg (143 lb 3.2 oz)   06/03/19 2248 63.7 kg (140 lb 6.4 oz)     Body mass index is 27.08 kg/m².      Intake/Output Summary (Last 24 hours) at 6/6/2019 1638  Last data filed at 6/6/2019 0606  Gross per 24 hour   Intake 210 ml   Output --   Net 210 ml       Hemodynamic Parameters:       Telemetry: Afib with HR     Physical Exam   Constitutional: She is oriented to person, place, and time. She appears well-developed and well-nourished. No distress.   HENT:   On NC   Eyes: Pupils are equal, round, and reactive to light. Conjunctivae and EOM are normal. No scleral icterus.   Neck: Normal  range of motion. Neck supple. JVD present.   Cardiovascular: Normal rate, regular rhythm and normal heart sounds.   No murmur heard.  Pulmonary/Chest: Effort normal and breath sounds normal.   Abdominal: Soft. Bowel sounds are normal. She exhibits distension. There is no tenderness.   Musculoskeletal: Normal range of motion. She exhibits edema.   Neurological: She is alert and oriented to person, place, and time. No cranial nerve deficit or sensory deficit.   Skin: Skin is warm and dry. Capillary refill takes 2 to 3 seconds. No rash noted.   Psychiatric: She has a normal mood and affect. Thought content normal.       Significant Labs:  CBC:  Recent Labs   Lab 06/04/19  1604 06/05/19 0223 06/06/19  0355   WBC 8.55 7.80 6.21   RBC 4.34 4.01 4.24   HGB 11.6* 10.8* 11.3*   HCT 38.3 35.2* 37.3   * 93* 98*   MCV 88 88 88   MCH 26.7* 26.9* 26.7*   MCHC 30.3* 30.7* 30.3*     BNP:  No results for input(s): BNP in the last 168 hours.    Invalid input(s): BNPTRIAGELBLO  CMP:  Recent Labs   Lab 06/04/19  1604 06/05/19 0223 06/06/19  0355   GLU 92 89 123*   CALCIUM 8.1* 7.4* 7.9*   ALBUMIN 3.1*  3.1* 2.6* 2.4*   PROT 6.4 5.4* 5.5*    138 138   K 4.4 4.3 4.5   CO2 18* 23 23   CL 99 100 106   BUN 57* 63* 33*   CREATININE 9.0* 9.5* 6.5*   ALKPHOS 147* 119 119   ALT 52* 49* 45*   AST 86* 75* 39   BILITOT 1.2* 0.7 0.6      Coagulation:   Recent Labs   Lab 06/04/19  1604 06/05/19  0249 06/06/19  0844   INR 3.6* 4.5* 2.1*   APTT 35.4*  --   --      LDH:  No results for input(s): LDH in the last 72 hours.  Microbiology:  Microbiology Results (last 7 days)     Procedure Component Value Units Date/Time    Blood culture #2 **CANNOT BE ORDERED STAT** [[id number]] Collected:  06/03/19 1512    Order Status:  Completed Specimen:  Blood from Peripheral, Hand, Right Updated:  06/06/19 1622     Blood Culture, Routine No Growth to date     Blood Culture, Routine No Growth to date     Blood Culture, Routine No Growth to date      Blood Culture, Routine No Growth to date    Blood culture [[id number]] Collected:  06/04/19 1118    Order Status:  Completed Specimen:  Blood Updated:  06/06/19 1222     Blood Culture, Routine No Growth to date     Blood Culture, Routine No Growth to date     Blood Culture, Routine No Growth to date    Blood culture #1 **CANNOT BE ORDERED STAT** [[id number]] Collected:  06/03/19 1457    Order Status:  Completed Specimen:  Blood from Peripheral, Hand, Right Updated:  06/06/19 1008     Blood Culture, Routine Gram stain aer bottle: Gram positive cocci in clusters resembling Staph      Blood Culture, Routine Results called to and read back by:Mena Cleary RN 06/04/2019  16:53     Blood Culture, Routine --     COAGULASE-NEGATIVE STAPHYLOCOCCUS SPECIES  Organism is a probable contaminant      Blood culture [[id number]] Collected:  06/04/19 1754    Order Status:  Completed Specimen:  Blood from Peripheral, Hand, Right Updated:  06/05/19 2012     Blood Culture, Routine No Growth to date     Blood Culture, Routine No Growth to date          Lactate: 0.7    I have reviewed all pertinent labs within the past 24 hours.    Estimated Creatinine Clearance: 7.5 mL/min (A) (based on SCr of 6.5 mg/dL (H)).

## 2019-06-06 NOTE — HOSPITAL COURSE
Patient admitted with GI symptoms, on day 2 these resolved, however she had worsening lactic acidosis, high procalcitonin, and decreasing blood pressure. Started on cipro/flagyl and inhaled nitric oxide which led to clinical improvement and correction of lactic acidosis. Lisa was gradually weaned, antibiotics were recommended for 7-day course.    Despite some recovery, patient felt debilitated and had low blood pressures, she requested a family meeting and on multi-disciplinary discussion made it evident that she was tired and nearing the end of her life, wanting to die at home with family. Given a lack of additional options for her worsening PH and poor life expectancy (<6 months), this was a reasonable decision. Hospice was consulted and to arrange discharge home with hospice. She was discharged on 06/07 with hospice and plans for outpatient dialysis as tolerated by patient.

## 2019-06-06 NOTE — SUBJECTIVE & OBJECTIVE
"Interval History: Pt remains very fatigued, otherwise no complaints and feels "at peace"    Past Medical History:   Diagnosis Date    Allergy     Anemia     Anticoagulant long-term use     4 years coumadin, asa    Arthritis     Awaiting organ transplant 2013    Cataract     Central serous chorioretinopathy of eye, right 2018    CHF (congestive heart failure)     Chronic diarrhea     Chronic kidney disease     Colon polyps     COPD (chronic obstructive pulmonary disease)     Coronary artery disease     Diabetes mellitus     Diabetic retinopathy     Diverticulosis     Encounter for blood transfusion     ESRD from HTN strtied RRT 1999    Failed  donor kidney transplant -     Glaucoma     History of bleeding peptic ulcer      as stated per pt    Hyperlipidemia     Hypertension     Hypothyroidism     Morbid obesity 8/10/2012    Renal hypertension     Stroke            Past Surgical History:   Procedure Laterality Date    CATARACT EXTRACTION W/  INTRAOCULAR LENS IMPLANT Bilateral     COLON SURGERY      COLONOSCOPY      COLONOSCOPY N/A 2019    Performed by Indio Beltran MD at [facility] ENDO (2ND FLR)    COLONOSCOPY N/A 2018    Performed by Jose Falk MD at [facility] ENDO (2ND FLR)    EGD (ESOPHAGOGASTRODUODENOSCOPY) N/A 2019    Performed by Miranda Maradiaga MD at [facility] ENDO (2ND FLR)    EGD (ESOPHAGOGASTRODUODENOSCOPY) N/A 2018    Performed by Luis Washington MD at [facility] ENDO (2ND FLR)    ESOPHAGOGASTRODUODENOSCOPY (EGD) N/A 2018    Performed by Kenji Desir MD at [facility] ENDO (2ND FLR)    EYE SURGERY      FRACTURE SURGERY      R arm    HERNIA REPAIR      HYSTERECTOMY      INSERTION-IMPLANT-GLAUCOMA Left 10/12/2017    Performed by Junaid Kern MD at [facility] OR 1ST FLR    KIDNEY TRANSPLANT      Left atrial appendage closure device N/A 2019    Performed by César Thomas MD at [facility] EP LAB    NEPHRECTOMY  " 2008    transplant     PARATHYROID GLAND SURGERY      TONSILLECTOMY      Transesophageal echo (JACE) intra-procedure log documentation N/A 2019    Performed by [facility] EP [facility]    Transesophageal echo (JACE) intra-procedure log documentation N/A 2019    Performed by [facility] EP LAB    UPPER GASTROINTESTINAL ENDOSCOPY         Review of patient's allergies indicates:   Allergen Reactions    Penicillins Swelling    Iodine      Other reaction(s): Hives    Sulfamethoxazole-trimethoprim      Other reaction(s): Swelling  Other reaction(s): Hives       Medications:  Continuous Infusions:  Scheduled Meds:   sodium chloride 0.9%   Intravenous Once    aspirin  81 mg Oral Daily    atorvastatin  10 mg Oral Daily    calcium acetate  1,334 mg Oral TID WM    ciprofloxacin  400 mg Intravenous Every Tues, Thurs, Sat    levothyroxine  100 mcg Oral Before breakfast    metronidazole  500 mg Intravenous Q8H    midodrine  10 mg Oral TID    pantoprazole  40 mg Oral Daily    riociguat  2.5 mg Oral TID    selexipag  1,000 mcg Oral BID     PRN Meds:sodium chloride, sodium chloride 0.9%    Family History     Problem Relation (Age of Onset)    Asthma Sister    Blindness Father    Breast cancer Maternal Aunt    Depression Sister    Diabetes Maternal Aunt    Heart attack Father, Mother    Heart failure Father, Mother    Hypertension Father, Mother, Sister, Brother    Kidney disease Brother        Tobacco Use    Smoking status: Former Smoker     Types: Cigarettes     Last attempt to quit: 8/10/2000     Years since quittin.8    Smokeless tobacco: Never Used   Substance and Sexual Activity    Alcohol use: No     Comment: hx of etoh     Drug use: No    Sexual activity: Never       Review of Systems   Constitutional: Positive for activity change, fatigue and unexpected weight change. Negative for diaphoresis.   HENT: Negative.    Eyes: Negative.    Respiratory:  Positive for shortness of breath.    Cardiovascular: Negative.    Gastrointestinal: Negative.    Endocrine: Negative.    Genitourinary: Negative.    Musculoskeletal: Negative.    Allergic/Immunologic: Negative.    Neurological: Positive for weakness.   Hematological: Negative.    Psychiatric/Behavioral: Negative.      Objective:     Vital Signs (Most Recent):  Temp: 96.8 °F (36 °C) (06/06/19 1100)  Pulse: (!) 112 (06/06/19 1400)  Resp: 13 (06/06/19 1400)  BP: (!) 80/58 (06/06/19 1400)  SpO2: (!) 93 % (06/06/19 1400) Vital Signs (24h Range):  Temp:  [96.4 °F (35.8 °C)-97.9 °F (36.6 °C)] 96.8 °F (36 °C)  Pulse:  [] 112  Resp:  [2-26] 13  SpO2:  [89 %-99 %] 93 %  BP: ()/(45-68) 80/58     Weight: 65 kg (143 lb 4.8 oz)  Body mass index is 27.08 kg/m².    Review of Symptoms  Symptom Assessment (ESAS 0-10 scale)   ESAS 0 1 2 3 4 5 6 7 8 9 10   Pain x             Dyspnea x             Anxiety xx             Nausea x             Depression  x             Anorexia x             Fatigue      x        Insomnia x             Restlessness  x             Agitation x             CAM / Delirium _x_ --  ___+   Constipation     x_ --  ___+   Diarrhea           _x_ --  ___+  Bowel Management Plan (BMP): No    Comments: diarrhea    Pain Assessment: none    OME in 24 hours: o  Performance Status: 50    ECOG Performance Status Grade: 3 - Confined to bed or chair 50% of waking hours    Physical Exam  Constitutional: She is oriented to person, place, and time. She appears well-developed and well-nourished. No distress.   Eyes: Pupils are equal, round, and reactive to light. Conjunctivae and EOM are normal. No scleral icterus.   Neck: Normal range of motion. Neck supple. JVD present.   Cardiovascular: Normal rate, regular rhythm and normal heart sounds.   No murmur heard.  Pulmonary/Chest: Effort normal and breath sounds normal.   Abdominal: Soft. Bowel sounds are normal. She exhibits distension. There is no tenderness.  "  Musculoskeletal: Normal range of motion. She exhibits edema.   Neurological: She is alert and oriented to person, place, and time. No cranial nerve deficit or sensory deficit.   Skin: Skin is warm and dry. Capillary refill takes 2 to 3 seconds. No rash noted.   Psychiatric: She has a normal mood and affect. Thought content normal.      Significant Labs: Prealbumin: No results for input(s): PREALBUMIN in the last 48 hours.  CBC:   Recent Labs   Lab 06/06/19  0355   WBC 6.21   HGB 11.3*   HCT 37.3   MCV 88   PLT 98*     BMP:  Recent Labs   Lab 06/06/19  0355   *      K 4.5      CO2 23   BUN 33*   CREATININE 6.5*   CALCIUM 7.9*     LFT:  Lab Results   Component Value Date    AST 39 06/06/2019    ALKPHOS 119 06/06/2019    BILITOT 0.6 06/06/2019     Albumin:   Albumin   Date Value Ref Range Status   06/06/2019 2.4 (L) 3.5 - 5.2 g/dL Final     Protein:   Total Protein   Date Value Ref Range Status   06/06/2019 5.5 (L) 6.0 - 8.4 g/dL Final     Lactic acid:   Lab Results   Component Value Date    LACTATE 0.7 06/06/2019    LACTATE 1.4 06/05/2019       Significant Imaging: I have reviewed all pertinent imaging results/findings within the past 24 hours.    Advance Care Planning   Advanced Directives::  Living Will: No  LaPOST: No  Do Not Resuscitate Status: No  Medical Power of : No; Pt's brother, Kenji Zaldivar ("Nahum") -[phone]  Ben [place] [phone]    Decision-Making Capacity: Patient answered questions, Family answered questions       Living Arrangements:   Patient resides with patient's brother and one son (Ben)     [address] [facility] [address]      Pt cell:  [phone]  Kenji Zaldivar (brother):  [phone]  Ben (son): [phone]  Frederick (son): [phone]     Support system includes adult children, brother, and extended family.  Patient does not have dependents that are need of being cared for.     Patients primary caregiver is self with help from her " brother/sons as needed.  During admission, patient's caregiver plans to visit.  Confirmed patient and patients caregivers do have access to reliable transportation.  The pt's brother and adult children drive.     Psychosocial/Cultural:  Patient's most important priorities:  Being with family and in home setting    Patient's biggest concerns/fears:  Disappointing family    Previous death/end of life care history:    in the home setting    Patient's goals/hopes:  Be at home with family on hospice    Spiritual:     F- Brandy and Belief: Latter day    I - Importance: at the center of family life  .  C - Community: family     A - Address in Care: involved

## 2019-06-06 NOTE — SUBJECTIVE & OBJECTIVE
Interval History: Patient appears lethargic on assessment this AM but arouses to verbal stimulation. HD provided on yesterday w/ adequate clearance. Patient now off of nitric oxide. Remains on 4 L via NC, oxygen saturation ~ 96%.      Review of patient's allergies indicates:   Allergen Reactions    Penicillins Swelling    Iodine      Other reaction(s): Hives    Sulfamethoxazole-trimethoprim      Other reaction(s): Swelling  Other reaction(s): Hives     Current Facility-Administered Medications   Medication Frequency    0.9%  NaCl infusion (for blood administration) Q24H PRN    0.9%  NaCl infusion Once    aspirin EC tablet 81 mg Daily    atorvastatin tablet 10 mg Daily    calcium acetate capsule 1,334 mg TID WM    ciprofloxacin (CIPRO)400mg/200ml D5W IVPB 400 mg Every Tues, Thurs, Sat    levothyroxine tablet 100 mcg Before breakfast    metronidazole IVPB 500 mg Q8H    midodrine tablet 10 mg TID    pantoprazole EC tablet 40 mg Daily    riociguat (ADEMPAS) tablet 2.5 mg TID    selexipag Tab 1,000 mcg BID    sodium chloride 0.9% flush 10 mL PRN     Facility-Administered Medications Ordered in Other Encounters   Medication Frequency    sodium chloride 0.9% flush 5 mL PRN       Objective:     Vital Signs (Most Recent):  Temp: 96.8 °F (36 °C) (06/06/19 1100)  Pulse: 104 (06/06/19 1125)  Resp: 12 (06/06/19 1125)  BP: 97/68 (06/06/19 1100)  SpO2: 96 % (06/06/19 1125)  O2 Device (Oxygen Therapy): nasal cannula w/ humidification (06/06/19 1125) Vital Signs (24h Range):  Temp:  [96.4 °F (35.8 °C)-97.9 °F (36.6 °C)] 96.8 °F (36 °C)  Pulse:  [] 104  Resp:  [2-26] 12  SpO2:  [89 %-99 %] 96 %  BP: ()/(0-68) 97/68     Weight: 65 kg (143 lb 4.8 oz) (06/04/19 2020)  Body mass index is 27.08 kg/m².  Body surface area is 1.67 meters squared.    I/O last 3 completed shifts:  In: 1690 [P.O.:580; I.V.:60; Other:300; IV Piggyback:750]  Out: 1800 [Other:1800]    Physical Exam   Constitutional: She is oriented to  person, place, and time. She appears well-developed. No distress.   HENT:   Head: Normocephalic and atraumatic.   Right Ear: External ear normal.   Left Ear: External ear normal.   Eyes: Conjunctivae and EOM are normal. Right eye exhibits no discharge. Left eye exhibits no discharge.   Periorbital edema   Neck: Normal range of motion. Neck supple. JVD present.   Cardiovascular: An irregularly irregular rhythm present. Exam reveals no gallop and no friction rub.   Murmur heard.  Pulmonary/Chest: Effort normal. No respiratory distress. She has no wheezes. She has no rales.   Abdominal: Soft. Bowel sounds are normal. She exhibits no distension. There is no tenderness. There is no guarding.   Musculoskeletal: She exhibits no edema or deformity.   Neurological: She is alert and oriented to person, place, and time.   Skin: Skin is warm and dry. She is not diaphoretic.   Psychiatric: She has a normal mood and affect. Her behavior is normal.       Significant Labs:  CBC:   Recent Labs   Lab 06/06/19  0355   WBC 6.21   RBC 4.24   HGB 11.3*   HCT 37.3   PLT 98*   MCV 88   MCH 26.7*   MCHC 30.3*     CMP:   Recent Labs   Lab 06/06/19  0355   *   CALCIUM 7.9*   ALBUMIN 2.4*   PROT 5.5*      K 4.5   CO2 23      BUN 33*   CREATININE 6.5*   ALKPHOS 119   ALT 45*   AST 39   BILITOT 0.6

## 2019-06-06 NOTE — HPI
She has a hx of PH on (adempas/selexipeg), diastolic dysfunction, pAF, CVA 1987, COPD on 2 L nocturnal oxygen, central retinal artery occlusion without significant carotid artery stenosis, CAD with remote PCI, ESRD(LUE AVF) secondary to HTN, s/p failed kidney transplant, PUD, STEFFANY on CPAP, hypothyroidism, RA, HLD. Had recurrent GI bleeding and is now s/p watchman.      She presented to the ED with volume overload from missed HD, lethargy, diarrhea, and failure to thrive.  She has had 5 hospitalizations in the past 6 months and per family an indolent marked decline over the past 3 years.  Per the pt and son, she has more often than not felt like going to HD.     In this setting, palliative medicine was consulted to help with medical decision making and aid in the formation of goals of care.

## 2019-06-06 NOTE — PLAN OF CARE
Problem: Adult Inpatient Plan of Care  Goal: Plan of Care Review  Pt was able to be weaned from nitric oxide by end of shift and even tolerated run of HD earlier today.  Pt as more sleepy after dialysis but awoke easily and said she is usually like this after HD.  Pt interacted with a friend who visited this evening and was in good spirits but just sleepy.  Pt did request  friend to be told by this RN of her feelings and friend was relieved because the friend stated that the pt's brother called her saying the pt was going for emergency surgery.  The friend (of 14 years) said she is retired and can help pt with her shower and cook for her.  Pt was still tired at change of shift but did speak during bedside report.  Pt had eaten a late lunch and did not want her dinner that was delivered.  Report given to PM RN and made aware of changes in times of PHTN meds and binder with meal not given for dinner since pt had not yet eaten. MAP remained >60mmHG.

## 2019-06-06 NOTE — NURSING
Reviewed schedule for PHTN meds.  Dennis delayed -med not on unit and is being processed.  Awaiting delivery as response to my pharmacy request indicated.

## 2019-06-06 NOTE — ASSESSMENT & PLAN NOTE
62 yo woman with ESRD on HD for 20 years, failed kidney transplant, HF, and pulmonary hypertension admitted with FTT, volume overload, and diarrhea.  Palliative care consulted for discussions re: GOC and aiding in medical decision making.     1) Symptoms: Mainly extreme fatigue and debility from chronic disease  2) Code status: as per todays discussion now DNR/DNI; added to chart  3) Medicolegal: son Ben is surrogate DM and the pt voiced her desire for him to be MPOA  4) Psychosocial:     5) Spiritual: strong in her mely; feels prepared to meet God  6) GOC/Discussions:  Met with the pt with her son, nieces, and son in law. The pt demonstrated an excellent understanding of her condition and life expectancy.  She was able to clearly articulate her desire to return home and with guidance, accept the option to return home with hospice.  She desires to stop HD as she feels she is no longer achieving what she once had from it.  Her family was very supportive throughout the conversation and are committed to supporting her at home.    7) ACP discussion   .Advance Care Planning During this visit, I engaged the patient in the advance care planning process.  The patient and I reviewed the role for advance directives and their purpose in directing future healthcare if the patients unable to speak for him/herself.  At this point in time, the patient does have full decision-making capacity.  We discussed different extreme health states that she could experience, and reviewed what kind of medical care she would want in those situations.  The patient communicated that if she were comatose and had little chance of a meaningful recovery, she would not want machines/life-sustaining treatments used.  In addition to the above preference, other important end-of-life issues for the patient include returning home with hospice.  The patient has not completed a living will to reflect these preferences.  The patient  has already  designated a healthcare power of  to make decisions on her behalf.   I spent a total of 32 minutes engaging the patient in this advance care planning discussion.     8) Dispo: home with hospice    Plan:   -hospice consult for home hospice  -d/c meds and therapies not contributing to comfort  -changed code to DNR and paperwork on the chart  -ongoing family support    Discussed with ICU team and HTS.     Frederick Nam MD  Palliative Medicine  [phone]

## 2019-06-06 NOTE — PHYSICIAN QUERY
PT Name: Shivani Sheets  MR #: [id number]    Physician Query Form - CardioPulmonary Clarification      CDS/: Madai Olea               Contact information: [email or web address]      This form is a permanent document in the medical record.    Query Date: June 6, 2019    By submitting this query, we are merely seeking further clarification of documentation. Please utilize your independent clinical judgment when addressing the question(s) below.    The Medical record contains the following:   Indicators   Supporting Clinical Findings Location in Medical Record   x Pulmonary Hypertension documented Pulmonary HTN  Unclear if her hypotension and lactate were from worsening PH/RV failure or related to infection.     - off-load RV with dialysis today  - wean Lisa after dialysis  - trend lactate this evening and tomorrow morning  - Continue adempas and selexipeg  - had a discussion with patient on her worsening health and possibility for additional family support/palliative approach. Plan on family meeting tomorrow at 10:30-11am HTS note 6/5    x Acute/Chronic Illness  COPD on 2 L nocturnal oxygen  diastolic dysfunction, H&P   x Echo and/or Heart Cath Findings · Moderately decreased left ventricular systolic function. The estimated ejection fraction is 43%  · Concentric left ventricular remodeling.  · Local segmental wall motion abnormalities.  · Septal wall has abnormal motion.  · Left ventricular diastolic dysfunction.  · Moderate left atrial enlargement.  · Moderate right ventricular enlargement.  · Mildly to moderately reduced right ventricular systolic function.  · Moderate right atrial enlargement.  · Mild aortic valve stenosis.  · Aortic valve area is 1.55 cm2; peak velocity is 0.89 m/s; mean gradient is 1.88 mmHg.  · Moderate mitral sclerosis.  · Mild mitral regurgitation.  · Severe tricuspid regurgitation.  · Mild pulmonic regurgitation.  · Elevated central venous pressure (15 mm Hg).  · The estimated PA  systolic pressure is 77 mm Hg  · Pulmonary hypertension present.  · Trivial Pericardial effusion. TTE 6/4     BiPAP/Intubation/Supplemental O2      SOB, MORA, Fatigue, Dizziness, LE Edema, Cyanosis, Chest Pain, Respiratory Distress, Hypoxia, etc.     x Treatment         Medication riociguat (ADEMPAS) tablet 2.5 mg   Dose: 2.5 mg  Freq: 3 times daily Route: Oral  Start: 06/04/19 0900    selexipag Tab 1,000 mcg   Dose: 1,000 mcg  Freq: 2 times daily Route: Oral  Start: 06/04/19 1330 MAR          MAR     Other     Provider, please specify the type of pulmonary hypertension:  [  x ] Group 1:  Pulmonary Arterial Hypertension - includes Primary, Idiopathic, Inheritable, and Secondary (due to drugs, toxins, congenital heart diease, HIV infection, etc.)     [   ] Group 2:  Pulmonary Hypertension due to Left Heart Disease, including left heart failure and/or left heart valve disease     [   ] Group 3:  Pulmonary Hypertension due to Lung Disease     [   ] Group 5:  Pulmonary Hypertension due to other, multifactorial, or unclear mechanisms     [   ] Pulmonary Hypertension, unspecified     [   ] Other Cardiopulmonary Condition (please specify):     [  ] Clinically Undetermined         Please document in your progress notes daily for the duration of treatment, until resolved, and include in your discharge summary.

## 2019-06-06 NOTE — PROGRESS NOTES
[facility]  Nephrology  Progress Note    Patient Name: Shivani Sheets  MRN: [id number]  Admission Date: 6/3/2019  Hospital Length of Stay: 3 days  Attending Provider: Jenny Hennessy MD   Primary Care Physician: Eula Weiss MD  Principal Problem:Diarrhea    Subjective:     HPI: Ms. Shivani Sheets is a 64 yo AAF with HTN, pHTN, HFpEF (50%), and ESRD on iHD MWF who presented to the hospital on 06/03 for evaluation of nausea/vomiting/diarrhea x 1 day.  She denies any changes in her diet.  No abdominal pain, fever, or chills.  She missed her hemodialysis treatment yesterday because she felt bad, and Nephrology has been consulted for ESRD management while in-patient.  She underwent CT Abdomen and it was read as increased abdominal wall edema, cardiomegaly with small pericardial effusion, and ascites.  She reports recently having her EDW decreased a few weeks ago by her OP Nephrologist, and she has been tolerating her hemodialysis treatments well without problems.  On exam today, she denies any complaints, no abdominal pain, nausea/vomiting/diarrhea have resolved.  She denies SOB, on O2 @ 2L NC, which she normally uses @ home.      She dialyzes at [facility] under the care of Dr. Deleon on a MWF schedule.  She reports a recent decrease in her EDW to 68 kg and she no longer makes urine.  She has a ABEL AVF with a very large anuerysm, followed by surgeons at the [facility] surgery center in [facility].  He rnormal treatment time is 3.5 hours, last treatment being on Friday of last week.    Interval History: Patient appears lethargic on assessment this AM but arouses to verbal stimulation. HD provided on yesterday w/ adequate clearance. Patient now off of nitric oxide. Remains on 4 L via NC, oxygen saturation ~ 96%.      Review of patient's allergies indicates:   Allergen Reactions    Penicillins Swelling    Iodine      Other reaction(s): Hives    Sulfamethoxazole-trimethoprim      Other  reaction(s): Swelling  Other reaction(s): Hives     Current Facility-Administered Medications   Medication Frequency    0.9%  NaCl infusion (for blood administration) Q24H PRN    0.9%  NaCl infusion Once    aspirin EC tablet 81 mg Daily    atorvastatin tablet 10 mg Daily    calcium acetate capsule 1,334 mg TID WM    ciprofloxacin (CIPRO)400mg/200ml D5W IVPB 400 mg Every Tues, Thurs, Sat    levothyroxine tablet 100 mcg Before breakfast    metronidazole IVPB 500 mg Q8H    midodrine tablet 10 mg TID    pantoprazole EC tablet 40 mg Daily    riociguat (ADEMPAS) tablet 2.5 mg TID    selexipag Tab 1,000 mcg BID    sodium chloride 0.9% flush 10 mL PRN     Facility-Administered Medications Ordered in Other Encounters   Medication Frequency    sodium chloride 0.9% flush 5 mL PRN       Objective:     Vital Signs (Most Recent):  Temp: 96.8 °F (36 °C) (06/06/19 1100)  Pulse: 104 (06/06/19 1125)  Resp: 12 (06/06/19 1125)  BP: 97/68 (06/06/19 1100)  SpO2: 96 % (06/06/19 1125)  O2 Device (Oxygen Therapy): nasal cannula w/ humidification (06/06/19 1125) Vital Signs (24h Range):  Temp:  [96.4 °F (35.8 °C)-97.9 °F (36.6 °C)] 96.8 °F (36 °C)  Pulse:  [] 104  Resp:  [2-26] 12  SpO2:  [89 %-99 %] 96 %  BP: ()/(0-68) 97/68     Weight: 65 kg (143 lb 4.8 oz) (06/04/19 2020)  Body mass index is 27.08 kg/m².  Body surface area is 1.67 meters squared.    I/O last 3 completed shifts:  In: 1690 [P.O.:580; I.V.:60; Other:300; IV Piggyback:750]  Out: 1800 [Other:1800]    Physical Exam   Constitutional: She is oriented to person, place, and time. She appears well-developed. No distress.   HENT:   Head: Normocephalic and atraumatic.   Right Ear: External ear normal.   Left Ear: External ear normal.   Eyes: Conjunctivae and EOM are normal. Right eye exhibits no discharge. Left eye exhibits no discharge.   Periorbital edema   Neck: Normal range of motion. Neck supple. JVD present.   Cardiovascular: An irregularly irregular  rhythm present. Exam reveals no gallop and no friction rub.   Murmur heard.  Pulmonary/Chest: Effort normal. No respiratory distress. She has no wheezes. She has no rales.   Abdominal: Soft. Bowel sounds are normal. She exhibits no distension. There is no tenderness. There is no guarding.   Musculoskeletal: She exhibits no edema or deformity.   Neurological: She is alert and oriented to person, place, and time.   Skin: Skin is warm and dry. She is not diaphoretic.   Psychiatric: She has a normal mood and affect. Her behavior is normal.       Significant Labs:  CBC:   Recent Labs   Lab 06/06/19  0355   WBC 6.21   RBC 4.24   HGB 11.3*   HCT 37.3   PLT 98*   MCV 88   MCH 26.7*   MCHC 30.3*     CMP:   Recent Labs   Lab 06/06/19  0355   *   CALCIUM 7.9*   ALBUMIN 2.4*   PROT 5.5*      K 4.5   CO2 23      BUN 33*   CREATININE 6.5*   ALKPHOS 119   ALT 45*   AST 39   BILITOT 0.6          Assessment/Plan:     * Diarrhea  - Being treated by primary service     End stage renal disease on dialysis  ESRD on iHD MWF  [facility]  3.5 hours  Dr. Deleon  No residual renal function  ABEL AVF  Reports an EDW of 68 kg that was recently decreased a few weeks ago.    Plan/Recommendations:  -Patient remains hypotensive today, SBP 80/90s, on Midodrine. No urgent need for HD again today, patient had 1.5 L remove with good clearance on yesterday.   -Will likely plan for RRT tomorrow iHD/CRRT depending on hemodynamics   -Pending US of anuerysm to her L AVF  -Continue phos binders   -Continue serial renal function panels for phos and albumin levels   -Strict I/Os and daily weights   -Hgb 11.3, within normal range           Thank you for your consult. I will follow-up with patient. Please contact us if you have any additional questions.    Leta Soni NP  Nephrology  [facility]

## 2019-06-06 NOTE — PROGRESS NOTES
TERRI notified by Dr. Rodriguez that pt and family are interested in pt returning home with hospice tomorrow. SW met with pt in pt's room to discuss hospice preference and to provide support. Pt was sleepy but oriented. Pt stating she does not have an agency in mind and would like SW to choose. SW inquired if pt was open to using [facility] as they have nurses who can meet with pt while she is still in the hospital and provide information prior to setting anything up. Pt agreeable. Pt reports that she would like her friend Milla Mcclellan to be POA and to be present during the meeting with the hospice nurse. SW stated that she would contact Ms. Mcclellan. Pt also amenable to SW contacting her brother Kenji Zaldivar. SW stated that this would be no problem. Pt denying any further needs at this time. SW remains available for continued psychosocial support, education, resources, and additional d/c planning as needed.    SW contacted pt's brother Kenji Zaldivar in order to discuss hospice meeting tomorrow. Kenji reports that he can be available for meeting. TERRI notified Kenji that pt would like her friend [place] involved. Kenji was able to contact Milla and have her contact SW. TERRI inquired with Milla ([phone]) as to when she would be available to meet with hospice. It was agreed that a meeting would take place tomorrow with the hospice nurse for 1pm with a plan of pt going home with hospice if patient approved of agency. Milla reports she will be there. SW remains available.     TERRI contacted Alber from [facility] [phone] and notified her of pt's interest in services. Alber reports that a hospice nurse can be in pt's room to meet with pt and family for 1pm. SW expressed understanding and notified Dr. Rodriguez. SW remains available.

## 2019-06-06 NOTE — ASSESSMENT & PLAN NOTE
Consulted nephrology for HD. Tolerated HD today with transient hypotensive episode. Will continue to off-load if she would like further dialysis.

## 2019-06-07 LAB
POCT GLUCOSE: 91 MG/DL (ref 70–110)
POCT GLUCOSE: 98 MG/DL (ref 70–110)

## 2019-06-07 PROCEDURE — 99233 PR SUBSEQUENT HOSPITAL CARE,LEVL III: ICD-10-PCS | Mod: ,,, | Performed by: INTERNAL MEDICINE

## 2019-06-07 PROCEDURE — 25000003 PHARM REV CODE 250: Performed by: NURSE PRACTITIONER

## 2019-06-07 PROCEDURE — 99239 HOSP IP/OBS DSCHRG MGMT >30: CPT | Mod: GC,,, | Performed by: INTERNAL MEDICINE

## 2019-06-07 PROCEDURE — 94660 CPAP INITIATION&MGMT: CPT

## 2019-06-07 PROCEDURE — 25000003 PHARM REV CODE 250: Performed by: STUDENT IN AN ORGANIZED HEALTH CARE EDUCATION/TRAINING PROGRAM

## 2019-06-07 PROCEDURE — 99233 PR SUBSEQUENT HOSPITAL CARE,LEVL III: ICD-10-PCS | Mod: ,,, | Performed by: EMERGENCY MEDICINE

## 2019-06-07 PROCEDURE — 80100014 HC HEMODIALYSIS 1:1

## 2019-06-07 PROCEDURE — 25000003 PHARM REV CODE 250: Performed by: INTERNAL MEDICINE

## 2019-06-07 PROCEDURE — 20000000 HC ICU ROOM

## 2019-06-07 PROCEDURE — 99239 PR HOSPITAL DISCHARGE DAY,>30 MIN: ICD-10-PCS | Mod: GC,,, | Performed by: INTERNAL MEDICINE

## 2019-06-07 PROCEDURE — 90945 DIALYSIS ONE EVALUATION: CPT

## 2019-06-07 PROCEDURE — 99233 SBSQ HOSP IP/OBS HIGH 50: CPT | Mod: ,,, | Performed by: EMERGENCY MEDICINE

## 2019-06-07 PROCEDURE — 99233 SBSQ HOSP IP/OBS HIGH 50: CPT | Mod: ,,, | Performed by: INTERNAL MEDICINE

## 2019-06-07 PROCEDURE — 99900035 HC TECH TIME PER 15 MIN (STAT)

## 2019-06-07 PROCEDURE — S0030 INJECTION, METRONIDAZOLE: HCPCS | Performed by: INTERNAL MEDICINE

## 2019-06-07 RX ORDER — MIDODRINE HYDROCHLORIDE 5 MG/1
5 TABLET ORAL
Status: DISCONTINUED | OUTPATIENT
Start: 2019-06-07 | End: 2019-06-08 | Stop reason: HOSPADM

## 2019-06-07 RX ORDER — SODIUM CHLORIDE 9 MG/ML
INJECTION, SOLUTION INTRAVENOUS ONCE
Status: COMPLETED | OUTPATIENT
Start: 2019-06-07 | End: 2019-06-07

## 2019-06-07 RX ORDER — METRONIDAZOLE 500 MG/100ML
500 INJECTION, SOLUTION INTRAVENOUS ONCE
Status: COMPLETED | OUTPATIENT
Start: 2019-06-07 | End: 2019-06-07

## 2019-06-07 RX ORDER — MIDODRINE HYDROCHLORIDE 5 MG/1
5 TABLET ORAL DAILY PRN
Status: DISCONTINUED | OUTPATIENT
Start: 2019-06-07 | End: 2019-06-08 | Stop reason: HOSPADM

## 2019-06-07 RX ORDER — METRONIDAZOLE 500 MG/1
500 TABLET ORAL EVERY 8 HOURS
Status: DISCONTINUED | OUTPATIENT
Start: 2019-06-08 | End: 2019-06-08 | Stop reason: HOSPADM

## 2019-06-07 RX ADMIN — LEVOTHYROXINE SODIUM 100 MCG: 100 TABLET ORAL at 09:06

## 2019-06-07 RX ADMIN — METRONIDAZOLE 500 MG: 500 TABLET ORAL at 02:06

## 2019-06-07 RX ADMIN — RIOCIGUAT 2.5 MG: 2.5 TABLET, FILM COATED ORAL at 09:06

## 2019-06-07 RX ADMIN — RIOCIGUAT 2.5 MG: 2.5 TABLET, FILM COATED ORAL at 02:06

## 2019-06-07 RX ADMIN — MIDODRINE HYDROCHLORIDE 10 MG: 5 TABLET ORAL at 02:06

## 2019-06-07 RX ADMIN — MIDODRINE HYDROCHLORIDE 10 MG: 5 TABLET ORAL at 09:06

## 2019-06-07 RX ADMIN — METRONIDAZOLE 500 MG: 500 SOLUTION INTRAVENOUS at 06:06

## 2019-06-07 RX ADMIN — PANTOPRAZOLE SODIUM 40 MG: 40 TABLET, DELAYED RELEASE ORAL at 09:06

## 2019-06-07 RX ADMIN — SODIUM CHLORIDE: 0.9 INJECTION, SOLUTION INTRAVENOUS at 02:06

## 2019-06-07 RX ADMIN — MIDODRINE HYDROCHLORIDE 10 MG: 5 TABLET ORAL at 07:06

## 2019-06-07 RX ADMIN — CALCIUM ACETATE 1334 MG: 667 CAPSULE ORAL at 09:06

## 2019-06-07 RX ADMIN — CIPROFLOXACIN HYDROCHLORIDE 500 MG: 500 TABLET, FILM COATED ORAL at 09:06

## 2019-06-07 NOTE — ASSESSMENT & PLAN NOTE
ESRD on iHD MWF  [facility]  3.5 hours  Dr. Deleon  No residual renal function  ABEL AVF  Reports an EDW of 68 kg that was recently decreased a few weeks ago.    Plan/Recommendations:  -will provide iHD for metabolic clearance and volume management prior to discharging home with hospice, will dialyze with decreased blood floods  -PRN midodrine provided   -Continue phos binders   -Continue serial renal function panels for phos and albumin levels   -Strict I/Os and daily weights

## 2019-06-07 NOTE — PROGRESS NOTES
[facility]  Heart Transplant  Progress Note    Patient Name: Shivani Sheets  MRN: [id number]  Admission Date: 6/3/2019  Hospital Length of Stay: 4 days  Attending Physician: Jenny Hennessy MD  Primary Care Provider: Eula Weiss MD  Principal Problem:Diarrhea    Subjective:     Interval History: Was somnolent last night, did not want to eat. Better this morning, still wants a palliative/hospice approach but would like to continue dialysis until she can no longer tolerated it.    Continuous Infusions:  Scheduled Meds:   sodium chloride 0.9%   Intravenous Once    sodium chloride 0.9%   Intravenous Once    calcium acetate  1,334 mg Oral TID WM    ciprofloxacin HCl  500 mg Oral QHS    levothyroxine  100 mcg Oral Before breakfast    metroNIDAZOLE  500 mg Oral Q8H    midodrine  10 mg Oral TID    pantoprazole  40 mg Oral Daily    riociguat  2.5 mg Oral TID    selexipag  1,000 mcg Oral BID     PRN Meds:sodium chloride, midodrine, midodrine, sodium chloride 0.9%    Review of patient's allergies indicates:   Allergen Reactions    Penicillins Swelling    Iodine      Other reaction(s): Hives    Sulfamethoxazole-trimethoprim      Other reaction(s): Swelling  Other reaction(s): Hives     Objective:     Vital Signs (Most Recent):  Temp: 97.8 °F (36.6 °C) (06/07/19 1100)  Pulse: 97 (06/07/19 1100)  Resp: 16 (06/07/19 1100)  BP: (!) 78/54 (06/07/19 1100)  SpO2: (!) 94 % (06/07/19 1100) Vital Signs (24h Range):  Temp:  [96.8 °F (36 °C)-98.2 °F (36.8 °C)] 97.8 °F (36.6 °C)  Pulse:  [] 97  Resp:  [13-27] 16  SpO2:  [85 %-100 %] 94 %  BP: ()/(45-68) 78/54     Patient Vitals for the past 72 hrs (Last 3 readings):   Weight   06/04/19 2020 65 kg (143 lb 4.8 oz)     Body mass index is 27.08 kg/m².      Intake/Output Summary (Last 24 hours) at 6/7/2019 1350  Last data filed at 6/7/2019 1000  Gross per 24 hour   Intake 200 ml   Output --   Net 200 ml       Hemodynamic Parameters:        Physical Exam   Constitutional: She is oriented to person, place, and time. She appears well-developed and well-nourished. No distress.   HENT:   On NC   Eyes: Pupils are equal, round, and reactive to light. Conjunctivae and EOM are normal. No scleral icterus.   Neck: Normal range of motion. Neck supple. No JVD present.   Cardiovascular: Normal rate, regular rhythm and normal heart sounds.   No murmur heard.  Pulmonary/Chest: Effort normal and breath sounds normal.   Abdominal: Soft. Bowel sounds are normal. She exhibits distension. There is no tenderness.   Musculoskeletal: Normal range of motion. She exhibits edema.   Neurological: She is alert and oriented to person, place, and time. No cranial nerve deficit or sensory deficit.   Skin: Skin is warm and dry. Capillary refill takes 2 to 3 seconds. No rash noted.   Psychiatric: She has a normal mood and affect. Thought content normal.       Significant Labs:  CBC:  Recent Labs   Lab 06/04/19  1604 06/05/19 0223 06/06/19  0355   WBC 8.55 7.80 6.21   RBC 4.34 4.01 4.24   HGB 11.6* 10.8* 11.3*   HCT 38.3 35.2* 37.3   * 93* 98*   MCV 88 88 88   MCH 26.7* 26.9* 26.7*   MCHC 30.3* 30.7* 30.3*     BNP:  No results for input(s): BNP in the last 168 hours.    Invalid input(s): BNPTRIAGELBLO  CMP:  Recent Labs   Lab 06/04/19  1604 06/05/19 0223 06/06/19  0355   GLU 92 89 123*   CALCIUM 8.1* 7.4* 7.9*   ALBUMIN 3.1*  3.1* 2.6* 2.4*   PROT 6.4 5.4* 5.5*    138 138   K 4.4 4.3 4.5   CO2 18* 23 23   CL 99 100 106   BUN 57* 63* 33*   CREATININE 9.0* 9.5* 6.5*   ALKPHOS 147* 119 119   ALT 52* 49* 45*   AST 86* 75* 39   BILITOT 1.2* 0.7 0.6      Coagulation:   Recent Labs   Lab 06/04/19  1604 06/05/19  0249 06/06/19  0844   INR 3.6* 4.5* 2.1*   APTT 35.4*  --   --      LDH:  No results for input(s): LDH in the last 72 hours.  Microbiology:  Microbiology Results (last 7 days)     Procedure Component Value Units Date/Time    Blood culture [[id number]] Collected:   06/04/19 1118    Order Status:  Completed Specimen:  Blood Updated:  06/07/19 1222     Blood Culture, Routine No Growth to date     Blood Culture, Routine No Growth to date     Blood Culture, Routine No Growth to date     Blood Culture, Routine No Growth to date    Blood culture [[id number]] Collected:  06/04/19 1754    Order Status:  Completed Specimen:  Blood from Peripheral, Hand, Right Updated:  06/06/19 2012     Blood Culture, Routine No Growth to date     Blood Culture, Routine No Growth to date     Blood Culture, Routine No Growth to date    Blood culture #2 **CANNOT BE ORDERED STAT** [[id number]] Collected:  06/03/19 1512    Order Status:  Completed Specimen:  Blood from Peripheral, Hand, Right Updated:  06/06/19 1622     Blood Culture, Routine No Growth to date     Blood Culture, Routine No Growth to date     Blood Culture, Routine No Growth to date     Blood Culture, Routine No Growth to date    Blood culture #1 **CANNOT BE ORDERED STAT** [[id number]] Collected:  06/03/19 1457    Order Status:  Completed Specimen:  Blood from Peripheral, Hand, Right Updated:  06/06/19 1008     Blood Culture, Routine Gram stain aer bottle: Gram positive cocci in clusters resembling Staph      Blood Culture, Routine Results called to and read back by:Mena Cleary RN 06/04/2019  16:53     Blood Culture, Routine --     COAGULASE-NEGATIVE STAPHYLOCOCCUS SPECIES  Organism is a probable contaminant              Assessment and Plan:     Pt is a 63 year old lady who we are consulted for diarrhea     She has a hx of PH on (adempas/selexipeg), diastolic dysfunction, pAF, CVA 1987, COPD on 2 L nocturnal oxygen, central retinal artery occlusion without significant carotid artery stenosis, CAD with remote PCI, ESRD(LUE AVF) secondary to HTN, s/p failed kidney transplant, PUD, STEFFANY on CPAP, hypothyroidism, RA, HLD. Had recurrent GI bleeding and is now s/p watchman.      Presents to ED today for diarrhea that started last night. Patient reports  4 watery BM, mild abdominal pain since 4 am. She missed dialysis today because they told her to come to the ER. Does complain of associated nausea and vomiting. No hematemesis. Had CT scan with gall stones and anasarca. U/S abdomen reveals gallstones and pericholecystic fluid.      End stage renal disease on dialysis  Consulted nephrology for HD. Discussing with social work/hospice/nephrology team on the needed documentation for discharge planning.    Pulmonary HTN  Unclear if her hypotension and lactate were from worsening PH/RV failure or related to infection.    - Continue adempas and selexipeg  - had a discussion with patient on her worsening health and possibility for additional family support/palliative approach. Plan on discharge home with hospice/palliative care depending on aaivlable options for continued dialysis    Advance directive discussed with patient  Discussed at length with patient and multiple family members:    Code status: DNR/DNI  Discharge plan: home with hospice    Atrial fibrillation  S/p watchman placement 4/27, she is now >30 days out from procedure.    No need to continue coumadin from this point on, especially given elevated bleeding risk        Zacarias Rodriguez MD  Heart Transplant  [facility]

## 2019-06-07 NOTE — ASSESSMENT & PLAN NOTE
Unclear if her hypotension and lactate were from worsening PH/RV failure or related to infection.    - Continue adempas and selexipeg  - had a discussion with patient on her worsening health and possibility for additional family support/palliative approach. Plan on discharge home with hospice/palliative care depending on aaivlable options for continued dialysis

## 2019-06-07 NOTE — ASSESSMENT & PLAN NOTE
S/p watchman placement 4/27, she is now >30 days out from procedure.    No need to continue coumadin from this point on, especially given elevated bleeding risk

## 2019-06-07 NOTE — NURSING
Pt. Increasingly somnolent and Only received about half dose of her evening meds d/t impaired swallowing ability. Meds crushed in applesauce and pt. Still had a hard time swallowing them. Dr. Hutchinson notified. CBG 98. VSS. WCTM.

## 2019-06-07 NOTE — PROGRESS NOTES
[facility]  Nephrology  Progress Note    Patient Name: Shivani Sheets  MRN: [id number]  Admission Date: 6/3/2019  Hospital Length of Stay: 4 days  Attending Provider: Jenny Hennessy MD   Primary Care Physician: Eula Weiss MD  Principal Problem:Diarrhea    Subjective:     HPI: Ms. Shivani Sheets is a 64 yo AAF with HTN, pHTN, HFpEF (50%), and ESRD on iHD MWF who presented to the hospital on 06/03 for evaluation of nausea/vomiting/diarrhea x 1 day.  She denies any changes in her diet.  No abdominal pain, fever, or chills.  She missed her hemodialysis treatment yesterday because she felt bad, and Nephrology has been consulted for ESRD management while in-patient.  She underwent CT Abdomen and it was read as increased abdominal wall edema, cardiomegaly with small pericardial effusion, and ascites.  She reports recently having her EDW decreased a few weeks ago by her OP Nephrologist, and she has been tolerating her hemodialysis treatments well without problems.  On exam today, she denies any complaints, no abdominal pain, nausea/vomiting/diarrhea have resolved.  She denies SOB, on O2 @ 2L NC, which she normally uses @ home.      She dialyzes at [facility] under the care of Dr. Deleon on a MWF schedule.  She reports a recent decrease in her EDW to 68 kg and she no longer makes urine.  She has a ABEL AVF with a very large anuerysm, followed by surgeons at the [facility] surgery center in [facility].  He rnormal treatment time is 3.5 hours, last treatment being on Friday of last week.    Interval History: Patient more awake on assessment today, verbalizing desire to stop aggressive medical management at this time. Plans for discharge home with hospice. Patient verbalized desire to continue HD treatments.     Review of patient's allergies indicates:   Allergen Reactions    Penicillins Swelling    Iodine      Other reaction(s): Hives    Sulfamethoxazole-trimethoprim      Other reaction(s):  Swelling  Other reaction(s): Hives     Current Facility-Administered Medications   Medication Frequency    0.9%  NaCl infusion (for blood administration) Q24H PRN    0.9%  NaCl infusion Once    0.9%  NaCl infusion Once    calcium acetate capsule 1,334 mg TID WM    ciprofloxacin HCl tablet 500 mg QHS    levothyroxine tablet 100 mcg Before breakfast    metroNIDAZOLE tablet 500 mg Q8H    midodrine tablet 10 mg TID    midodrine tablet 5 mg Daily PRN    midodrine tablet 5 mg PRN    pantoprazole EC tablet 40 mg Daily    riociguat (ADEMPAS) tablet 2.5 mg TID    selexipag Tab 1,000 mcg BID    sodium chloride 0.9% flush 10 mL PRN     Facility-Administered Medications Ordered in Other Encounters   Medication Frequency    sodium chloride 0.9% flush 5 mL PRN       Objective:     Vital Signs (Most Recent):  Temp: 97.8 °F (36.6 °C) (06/07/19 1100)  Pulse: 97 (06/07/19 1100)  Resp: 16 (06/07/19 1100)  BP: (!) 78/54 (06/07/19 1100)  SpO2: (!) 94 % (06/07/19 1100)  O2 Device (Oxygen Therapy): nasal cannula (06/07/19 1100) Vital Signs (24h Range):  Temp:  [97.6 °F (36.4 °C)-98.2 °F (36.8 °C)] 97.8 °F (36.6 °C)  Pulse:  [] 97  Resp:  [13-27] 16  SpO2:  [85 %-100 %] 94 %  BP: ()/(45-68) 78/54     Weight: 65 kg (143 lb 4.8 oz) (06/04/19 2020)  Body mass index is 27.08 kg/m².  Body surface area is 1.67 meters squared.    I/O last 3 completed shifts:  In: 400 [P.O.:100; IV Piggyback:300]  Out: -     Physical Exam   Constitutional: She is oriented to person, place, and time. She appears well-developed. No distress.   HENT:   Head: Normocephalic and atraumatic.   Right Ear: External ear normal.   Left Ear: External ear normal.   Eyes: Conjunctivae and EOM are normal. Right eye exhibits no discharge. Left eye exhibits no discharge.   Periorbital edema   Neck: Normal range of motion. Neck supple. JVD present.   Cardiovascular: An irregularly irregular rhythm present. Exam reveals no gallop and no friction rub.    Murmur heard.  Pulmonary/Chest: Effort normal. No respiratory distress. She has no wheezes. She has no rales.   Abdominal: Soft. Bowel sounds are normal. She exhibits no distension. There is no tenderness. There is no guarding.   Musculoskeletal: She exhibits no edema or deformity.   Neurological: She is alert and oriented to person, place, and time.   Skin: Skin is warm and dry. She is not diaphoretic.   Psychiatric: She has a normal mood and affect. Her behavior is normal.       Significant Labs:  CBC:   Recent Labs   Lab 06/06/19  0355   WBC 6.21   RBC 4.24   HGB 11.3*   HCT 37.3   PLT 98*   MCV 88   MCH 26.7*   MCHC 30.3*     CMP:   Recent Labs   Lab 06/06/19  0355   *   CALCIUM 7.9*   ALBUMIN 2.4*   PROT 5.5*      K 4.5   CO2 23      BUN 33*   CREATININE 6.5*   ALKPHOS 119   ALT 45*   AST 39   BILITOT 0.6            Assessment/Plan:     End stage renal disease on dialysis  ESRD on iHD MWF  [facility]  3.5 hours  Dr. Deleon  No residual renal function  [facility]  Reports an EDW of 68 kg that was recently decreased a few weeks ago.    Plan/Recommendations:  -will provide iHD for metabolic clearance and volume management prior to discharging home with hospice, will dialyze with decreased blood floods  -PRN midodrine provided   -Continue phos binders   -Continue serial renal function panels for phos and albumin levels   -Strict I/Os and daily weights           Case discussed with staff further recs with attestation.     I will follow-up with patient. Please contact us if you have any additional questions.    ERIKA Soni DNP, APRN, FNP-C  Department of Nephrology  [facility]  Pager: [phone]

## 2019-06-07 NOTE — SUBJECTIVE & OBJECTIVE
Interval History: Patient more awake on assessment today, verbalizing desire to stop aggressive medical management at this time. Plans for discharge home with hospice. Patient verbalized desire to continue HD treatments.     Review of patient's allergies indicates:   Allergen Reactions    Penicillins Swelling    Iodine      Other reaction(s): Hives    Sulfamethoxazole-trimethoprim      Other reaction(s): Swelling  Other reaction(s): Hives     Current Facility-Administered Medications   Medication Frequency    0.9%  NaCl infusion (for blood administration) Q24H PRN    0.9%  NaCl infusion Once    0.9%  NaCl infusion Once    calcium acetate capsule 1,334 mg TID WM    ciprofloxacin HCl tablet 500 mg QHS    levothyroxine tablet 100 mcg Before breakfast    metroNIDAZOLE tablet 500 mg Q8H    midodrine tablet 10 mg TID    midodrine tablet 5 mg Daily PRN    midodrine tablet 5 mg PRN    pantoprazole EC tablet 40 mg Daily    riociguat (ADEMPAS) tablet 2.5 mg TID    selexipag Tab 1,000 mcg BID    sodium chloride 0.9% flush 10 mL PRN     Facility-Administered Medications Ordered in Other Encounters   Medication Frequency    sodium chloride 0.9% flush 5 mL PRN       Objective:     Vital Signs (Most Recent):  Temp: 97.8 °F (36.6 °C) (06/07/19 1100)  Pulse: 97 (06/07/19 1100)  Resp: 16 (06/07/19 1100)  BP: (!) 78/54 (06/07/19 1100)  SpO2: (!) 94 % (06/07/19 1100)  O2 Device (Oxygen Therapy): nasal cannula (06/07/19 1100) Vital Signs (24h Range):  Temp:  [97.6 °F (36.4 °C)-98.2 °F (36.8 °C)] 97.8 °F (36.6 °C)  Pulse:  [] 97  Resp:  [13-27] 16  SpO2:  [85 %-100 %] 94 %  BP: ()/(45-68) 78/54     Weight: 65 kg (143 lb 4.8 oz) (06/04/19 2020)  Body mass index is 27.08 kg/m².  Body surface area is 1.67 meters squared.    I/O last 3 completed shifts:  In: 400 [P.O.:100; IV Piggyback:300]  Out: -     Physical Exam   Constitutional: She is oriented to person, place, and time. She appears well-developed. No  distress.   HENT:   Head: Normocephalic and atraumatic.   Right Ear: External ear normal.   Left Ear: External ear normal.   Eyes: Conjunctivae and EOM are normal. Right eye exhibits no discharge. Left eye exhibits no discharge.   Periorbital edema   Neck: Normal range of motion. Neck supple. JVD present.   Cardiovascular: An irregularly irregular rhythm present. Exam reveals no gallop and no friction rub.   Murmur heard.  Pulmonary/Chest: Effort normal. No respiratory distress. She has no wheezes. She has no rales.   Abdominal: Soft. Bowel sounds are normal. She exhibits no distension. There is no tenderness. There is no guarding.   Musculoskeletal: She exhibits no edema or deformity.   Neurological: She is alert and oriented to person, place, and time.   Skin: Skin is warm and dry. She is not diaphoretic.   Psychiatric: She has a normal mood and affect. Her behavior is normal.       Significant Labs:  CBC:   Recent Labs   Lab 06/06/19  0355   WBC 6.21   RBC 4.24   HGB 11.3*   HCT 37.3   PLT 98*   MCV 88   MCH 26.7*   MCHC 30.3*     CMP:   Recent Labs   Lab 06/06/19  0355   *   CALCIUM 7.9*   ALBUMIN 2.4*   PROT 5.5*      K 4.5   CO2 23      BUN 33*   CREATININE 6.5*   ALKPHOS 119   ALT 45*   AST 39   BILITOT 0.6

## 2019-06-07 NOTE — ASSESSMENT & PLAN NOTE
Discussed at length with patient and multiple family members:    Code status: DNR/DNI  Discharge plan: home with hospice

## 2019-06-07 NOTE — ASSESSMENT & PLAN NOTE
62 yo woman with ESRD on HD for 20 years, failed kidney transplant, HF, and pulmonary hypertension admitted with FTT, volume overload, and diarrhea.  Palliative care consulted for discussions re: GOC and aiding in medical decision making.     1) Symptoms: Mainly extreme fatigue and debility from chronic disease  2) Code status: as per todays discussion now DNR/DNI; added to chart  3) Medicolegal: son Ben is surrogate DM and the pt voiced her desire for him to be MPOA  4) Psychosocial:     5) Spiritual: strong in her mely; feels prepared to meet God  6) GOC/Discussions:   6/7 Pt and brother engaged in discussions. Pt still strong in her desire to have care provided at home with hospice.  With more consideration however, she does want to continue dialysis for the time being while in the hospital.  She understands that her choice will be respected by her family and clinical team alike when the time is right to discontinue HD.      No new complaints and family is well supported.    6/6 Met with the pt with her son, nieces, and son in law. The pt demonstrated an excellent understanding of her condition and life expectancy.  She was able to clearly articulate her desire to return home and with guidance, accept the option to return home with hospice.  She desires to stop HD as she feels she is no longer achieving what she once had from it.  Her family was very supportive throughout the conversation and are committed to supporting her at home.    7) ACP discussion   .Advance Care Planning During this visit, I engaged the patient in the advance care planning process.  The patient and I reviewed the role for advance directives and their purpose in directing future healthcare if the patients unable to speak for him/herself.  At this point in time, the patient does have full decision-making capacity.  We discussed different extreme health states that she could experience, and reviewed what kind of medical care she would  want in those situations.  The patient communicated that if she were comatose and had little chance of a meaningful recovery, she would not want machines/life-sustaining treatments used.  In addition to the above preference, other important end-of-life issues for the patient include returning home with hospice.  The patient has not completed a living will to reflect these preferences.  The patient  has already designated a healthcare power of  to make decisions on her behalf.   I spent a total of 32 minutes engaging the patient in this advance care planning discussion.     8) Dispo: home with hospice    Plan:   -hospice consult for home hospice; HD does pre-date her pulmonary HTN that can be her primary hospice diagnosis  -if hospice doesn't accept due to HD can consider home based palliative caer to continue conversations and facilitate transition to hospice  -d/c meds and therapies not contributing to comfort  -changed code to DNR and paperwork on the chart  -ongoing family support    Discussed with ICU team and HTS.     Will sign off for now as goals of care are clear.     Please call if we can be of additional support.    Frederick Nam MD  Palliative Medicine  [phone]

## 2019-06-07 NOTE — DISCHARGE SUMMARY
[facility]  Heart Transplant  Discharge Summary      Patient Name: Shivani Sheets  MRN: [id number]  Admission Date: 6/3/2019  Hospital Length of Stay: 4 days  Discharge Date and Time: 06/07/2019 3:22 PM  Attending Physician: Jenny Hennessy MD   Discharging Provider: Zacarias Rodriguez MD  Primary Care Provider: Eula Weiss MD     HPI: Pt is a 63 year old lady who we are consulted for diarrhea     She has a hx of PH on (adempas/selexipeg), diastolic dysfunction, pAF, CVA 1987, COPD on 2 L nocturnal oxygen, central retinal artery occlusion without significant carotid artery stenosis, CAD with remote PCI, ESRD(LUE AVF) secondary to HTN, s/p failed kidney transplant, PUD, STEFFANY on CPAP, hypothyroidism, RA, HLD. Had recurrent GI bleeding and is now s/p watchman.      Presents to ED today for diarrhea that started last night. Patient reports 4 watery BM, mild abdominal pain since 4 am. She missed dialysis today because they told her to come to the ER. Does complain of associated nausea and vomiting. No hematemesis. Had CT scan with gall stones and anasarca. U/S abdomen reveals gallstones and pericholecystic fluid.      * No surgery found *     Hospital Course: Patient admitted with GI symptoms, on day 2 these resolved, however she had worsening lactic acidosis, high procalcitonin, and decreasing blood pressure. Started on cipro/flagyl and inhaled nitric oxide which led to clinical improvement and correction of lactic acidosis. Lisa was gradually weaned, antibiotics were recommended for 7-day course.    Despite some recovery, patient felt debilitated and had low blood pressures, she requested a family meeting and on multi-disciplinary discussion made it evident that she was tired and nearing the end of her life, wanting to die at home with family. Given a lack of additional options for her worsening PH and poor life expectancy (<6 months), this was a reasonable decision. Hospice was consulted and to  arrange discharge home with hospice. She was discharged on 06/07 with hospice and plans for outpatient dialysis as tolerated by patient.    Consults (From admission, onward)        Status Ordering Provider     Inpatient consult to General Surgery  Once     Provider:  (Not yet assigned)    Completed TENA LAND     Inpatient consult to Hospice  Once     Provider:  (Not yet assigned)    Acknowledged TENA LAND     Inpatient consult to Nephrology  Once     Provider:  (Not yet assigned)    Completed GABY MELISSA     Inpatient consult to Palliative Care  Once     Provider:  (Not yet assigned)    Completed TENA LAND     Inpatient consult to PICC team (\[facility]\"")  Once     Provider:  (Not yet assigned)    Completed TENA LAND     Inpatient consult to Social Work  Once     Provider:  (Not yet assigned)    Acknowledged TENA LAND          Significant Diagnostic Studies: Labs:   BMP:   Recent Labs   Lab 06/06/19  0355   *      K 4.5      CO2 23   BUN 33*   CREATININE 6.5*   CALCIUM 7.9*   , CBC   Recent Labs   Lab 06/06/19  0355   WBC 6.21   HGB 11.3*   HCT 37.3   PLT 98*    and INR   Lab Results   Component Value Date    INR 2.1 (H) 06/06/2019    INR 4.5 (H) 06/05/2019    INR 3.6 (H) 06/04/2019       Pending Diagnostic Studies:     Procedure Component Value Units Date/Time    VAS US Hemodialysis Access [[id number]]     Order Status:  Sent Lab Status:  No result         Final Active Diagnoses:    Diagnosis Date Noted POA    End stage renal disease on dialysis [N18.6, Z99.2]  Not Applicable    Pulmonary HTN [I27.20] 02/11/2014 Yes    Palliative care encounter [Z51.5] 06/06/2019 Not Applicable    Debility [R53.81] 06/06/2019 Yes    Fatigue [R53.83] 06/06/2019 Yes    Advance directive discussed with patient [Z71.89] 06/06/2019 Not Applicable    Epigastric pain [R10.13] 06/04/2019 Yes    Atrial fibrillation [I48.91] 02/11/2014 Yes       Problems Resolved During this Admission:    Diagnosis Date Noted Date Resolved POA    PRINCIPAL PROBLEM:  Diarrhea [R19.7] 06/03/2019 06/06/2019 Yes      Discharged Condition: poor    Disposition: Hospice/Home    Follow Up:    Patient Instructions:   No discharge procedures on file.  Medications:  Reconciled Home Medications:      Medication List      START taking these medications    ciprofloxacin HCl 500 MG tablet  Commonly known as:  CIPRO  Take 1 tablet (500 mg total) by mouth every evening.     metroNIDAZOLE 500 MG tablet  Commonly known as:  FLAGYL  Take 1 tablet (500 mg total) by mouth every 8 (eight) hours.     midodrine 10 MG tablet  Commonly known as:  PROAMATINE  Take 1 tablet (10 mg total) by mouth 3 (three) times daily as needed (For symptoms of lightheadedness fatigue related to low blood pressure).        CONTINUE taking these medications    ADEMPAS 2.5 mg tablet  Generic drug:  riociguat  Take 2.5 mg by mouth 3 (three) times daily.     ALPHAGAN P 0.1 % Drop  Generic drug:  brimonidine 0.1%  once daily.     dorzolamide-timolol 2-0.5% 22.3-6.8 mg/mL ophthalmic solution  Commonly known as:  COSOPT  INSTILL 1 DROP IN BOTH EYES TWICE DAILY     latanoprost 0.005 % ophthalmic solution  INSTILL 1 DROP IN BOTH EYES EVERY DAY AT BEDTIME     levothyroxine 100 MCG tablet  Commonly known as:  SYNTHROID  Take 1 tablet (100 mcg total) by mouth before breakfast.     pantoprazole 40 MG tablet  Commonly known as:  PROTONIX  TAKE 1 TABLET BY MOUTH ONCE DAILY     prednisoLONE acetate 1 % Drps  Commonly known as:  PRED FORTE  Place 1 drop into the left eye every 4 (four) hours.     UPTRAVI 1,000 mcg Tab  Generic drug:  selexipag  Take 1,000 mcg by mouth 2 (two) times daily.     walker Misc  Commonly known as:  ULTRA-LIGHT ROLLATOR  Use daily        STOP taking these medications    aspirin 325 MG tablet     atorvastatin 10 MG tablet  Commonly known as:  LIPITOR     metoprolol tartrate 25 MG tablet  Commonly known as:   LOPRESSOR     warfarin 5 MG tablet  Commonly known as:  COUMADIN            Zacarias Rodriguez MD  Heart Transplant  [facility]

## 2019-06-07 NOTE — PROGRESS NOTES
Discharge Note:  TERRI met with pt, pt's brother Kenji, close friend who is pt's POJULIETH Mcclellan and two nieces in order to review discharge plan. Pt was AAOx4, pleasant and her friend was assisting in feeding the pt dinner. Pt reports that she is still on board with discharging home today after inpatient HD with home hospice through [facility]. Pt, her friend and family were also agreeable. Pt and family met with hospice RN Lorraine from [facility] at 1pm this afternoon and did sign all paperwork. Pt is planning on returning to outpatient HD at [facility] in [place] (MWF). Pt states that she has been at this unit for 10 years and on HD for 20. SW to f/u with the HD unit to confirm pt's status as hospice. Pt and family denied any further needs at this time. SW remains available for continued psychosocial support, education, resources, and additional d/c planning as needed.    TERRI coordinated home hospice care with Lorraine from [facility] ([phone]). Lorraine confirmed that pt would be able to return home with hospice and still do outpatient HD at [facility]. Lorraine met with family and had all documents signed. TERRI provided pt's bedside RN with Lorraine's contact so that she can be in contact with Lorraine closer to when pt being discharged. Per Lorraine, pt's brother will be back at the pt's home when DME arrives. SW remains available.     TERRI attempted to reach pt's HD clinic [facility] ([phone]) but was unable to speak to anyone or leave a message. TERRI notified Hospice RN Lorraine who states that they will follow up with the unit on Monday but that there shouldn't be any issue as hospice will be paying for pt's HD at this point in time. TERRI remains available for follow up as needed.

## 2019-06-07 NOTE — PLAN OF CARE
Problem: Fall Injury Risk  Goal: Absence of Fall and Fall-Related Injury    Intervention: Promote Injury-Free Environment  Pt oriented to room and surroundings. Bed rails up x3. Bed locked and in lowest position. Call light and personal belongings w/in reach.

## 2019-06-07 NOTE — SUBJECTIVE & OBJECTIVE
Interval History: Was somnolent last night, did not want to eat. Better this morning, still wants a palliative/hospice approach but would like to continue dialysis until she can no longer tolerated it.    Continuous Infusions:  Scheduled Meds:   sodium chloride 0.9%   Intravenous Once    sodium chloride 0.9%   Intravenous Once    calcium acetate  1,334 mg Oral TID WM    ciprofloxacin HCl  500 mg Oral QHS    levothyroxine  100 mcg Oral Before breakfast    metroNIDAZOLE  500 mg Oral Q8H    midodrine  10 mg Oral TID    pantoprazole  40 mg Oral Daily    riociguat  2.5 mg Oral TID    selexipag  1,000 mcg Oral BID     PRN Meds:sodium chloride, midodrine, midodrine, sodium chloride 0.9%    Review of patient's allergies indicates:   Allergen Reactions    Penicillins Swelling    Iodine      Other reaction(s): Hives    Sulfamethoxazole-trimethoprim      Other reaction(s): Swelling  Other reaction(s): Hives     Objective:     Vital Signs (Most Recent):  Temp: 97.8 °F (36.6 °C) (06/07/19 1100)  Pulse: 97 (06/07/19 1100)  Resp: 16 (06/07/19 1100)  BP: (!) 78/54 (06/07/19 1100)  SpO2: (!) 94 % (06/07/19 1100) Vital Signs (24h Range):  Temp:  [96.8 °F (36 °C)-98.2 °F (36.8 °C)] 97.8 °F (36.6 °C)  Pulse:  [] 97  Resp:  [13-27] 16  SpO2:  [85 %-100 %] 94 %  BP: ()/(45-68) 78/54     Patient Vitals for the past 72 hrs (Last 3 readings):   Weight   06/04/19 2020 65 kg (143 lb 4.8 oz)     Body mass index is 27.08 kg/m².      Intake/Output Summary (Last 24 hours) at 6/7/2019 1350  Last data filed at 6/7/2019 1000  Gross per 24 hour   Intake 200 ml   Output --   Net 200 ml       Hemodynamic Parameters:       Physical Exam   Constitutional: She is oriented to person, place, and time. She appears well-developed and well-nourished. No distress.   HENT:   On NC   Eyes: Pupils are equal, round, and reactive to light. Conjunctivae and EOM are normal. No scleral icterus.   Neck: Normal range of motion. Neck supple. No  JVD present.   Cardiovascular: Normal rate, regular rhythm and normal heart sounds.   No murmur heard.  Pulmonary/Chest: Effort normal and breath sounds normal.   Abdominal: Soft. Bowel sounds are normal. She exhibits distension. There is no tenderness.   Musculoskeletal: Normal range of motion. She exhibits edema.   Neurological: She is alert and oriented to person, place, and time. No cranial nerve deficit or sensory deficit.   Skin: Skin is warm and dry. Capillary refill takes 2 to 3 seconds. No rash noted.   Psychiatric: She has a normal mood and affect. Thought content normal.       Significant Labs:  CBC:  Recent Labs   Lab 06/04/19  1604 06/05/19 0223 06/06/19  0355   WBC 8.55 7.80 6.21   RBC 4.34 4.01 4.24   HGB 11.6* 10.8* 11.3*   HCT 38.3 35.2* 37.3   * 93* 98*   MCV 88 88 88   MCH 26.7* 26.9* 26.7*   MCHC 30.3* 30.7* 30.3*     BNP:  No results for input(s): BNP in the last 168 hours.    Invalid input(s): BNPTRIAGELBLO  CMP:  Recent Labs   Lab 06/04/19  1604 06/05/19 0223 06/06/19  0355   GLU 92 89 123*   CALCIUM 8.1* 7.4* 7.9*   ALBUMIN 3.1*  3.1* 2.6* 2.4*   PROT 6.4 5.4* 5.5*    138 138   K 4.4 4.3 4.5   CO2 18* 23 23   CL 99 100 106   BUN 57* 63* 33*   CREATININE 9.0* 9.5* 6.5*   ALKPHOS 147* 119 119   ALT 52* 49* 45*   AST 86* 75* 39   BILITOT 1.2* 0.7 0.6      Coagulation:   Recent Labs   Lab 06/04/19  1604 06/05/19  0249 06/06/19  0844   INR 3.6* 4.5* 2.1*   APTT 35.4*  --   --      LDH:  No results for input(s): LDH in the last 72 hours.  Microbiology:  Microbiology Results (last 7 days)     Procedure Component Value Units Date/Time    Blood culture [[id number]] Collected:  06/04/19 1118    Order Status:  Completed Specimen:  Blood Updated:  06/07/19 1222     Blood Culture, Routine No Growth to date     Blood Culture, Routine No Growth to date     Blood Culture, Routine No Growth to date     Blood Culture, Routine No Growth to date    Blood culture [[id number]] Collected:  06/04/19  1754    Order Status:  Completed Specimen:  Blood from Peripheral, Hand, Right Updated:  06/06/19 2012     Blood Culture, Routine No Growth to date     Blood Culture, Routine No Growth to date     Blood Culture, Routine No Growth to date    Blood culture #2 **CANNOT BE ORDERED STAT** [[id number]] Collected:  06/03/19 1512    Order Status:  Completed Specimen:  Blood from Peripheral, Hand, Right Updated:  06/06/19 1622     Blood Culture, Routine No Growth to date     Blood Culture, Routine No Growth to date     Blood Culture, Routine No Growth to date     Blood Culture, Routine No Growth to date    Blood culture #1 **CANNOT BE ORDERED STAT** [[id number]] Collected:  06/03/19 1457    Order Status:  Completed Specimen:  Blood from Peripheral, Hand, Right Updated:  06/06/19 1008     Blood Culture, Routine Gram stain aer bottle: Gram positive cocci in clusters resembling Staph      Blood Culture, Routine Results called to and read back by:Mena Cleary RN 06/04/2019  16:53     Blood Culture, Routine --     COAGULASE-NEGATIVE STAPHYLOCOCCUS SPECIES  Organism is a probable contaminant

## 2019-06-07 NOTE — SUBJECTIVE & OBJECTIVE
Interval History: More alert, awake and interactive today.  Brother at the bedside.  Seen with Dr Hennessy and [facility] team.  No complaints today but wanting to make it home    Past Medical History:   Diagnosis Date    Allergy     Anemia     Anticoagulant long-term use     4 years coumadin, asa    Arthritis     Awaiting organ transplant 2013    Cataract     Central serous chorioretinopathy of eye, right 2018    CHF (congestive heart failure)     Chronic diarrhea     Chronic kidney disease     Colon polyps     COPD (chronic obstructive pulmonary disease)     Coronary artery disease     Diabetes mellitus     Diabetic retinopathy     Diverticulosis     Encounter for blood transfusion     ESRD from HTN strtied RRT 1999    Failed  donor kidney transplant -     Glaucoma     History of bleeding peptic ulcer      as stated per pt    Hyperlipidemia     Hypertension     Hypothyroidism     Morbid obesity 8/10/2012    Renal hypertension     Stroke            Past Surgical History:   Procedure Laterality Date    CATARACT EXTRACTION W/  INTRAOCULAR LENS IMPLANT Bilateral     COLON SURGERY      COLONOSCOPY      COLONOSCOPY N/A 2019    Performed by Ind[facility]tran MD at [facility] ENDO (2ND FLR)    COLONOSCOPY N/A 2018    Performed by Jose Falk MD at [facility] ENDO (2ND FLR)    EGD (ESOPHAGOGASTRODUODENOSCOPY) N/A 2019    Performed by Miranda Maradiaga MD at [facility] ENDO (2ND FLR)    EGD (ESOPHAGOGASTRODUODENOSCOPY) N/A 2018    Performed by Luis Washington MD at [facility] ENDO (2ND FLR)    ESOPHAGOGASTRODUODENOSCOPY (EGD) N/A 2018    Performed by Kenji Desir MD at [facility] ENDO (2ND FLR)    EYE SURGERY      FRACTURE SURGERY      R arm    HERNIA REPAIR      HYSTERECTOMY      INSERTION-IMPLANT-GLAUCOMA Left 10/12/2017    Performed by Junaid Kern MD at [facility] OR 1ST FLR    KIDNEY TRANSPLANT      Left atrial appendage closure device N/A  2019    Performed by [facility] at [facility] EP LAB    NEPHRECTOMY  2008    transplant     PARATHYROID GLAND SURGERY      TONSILLECTOMY      Transesophageal echo (JACE) intra-procedure log documentation N/A 2019    Performed by [facility] at [facility] EP LAB    Transesophageal echo (JACE) intra-procedure log documentation N/A 2019    Performed by [facility] Provider at [facility] EP LAB    UPPER GASTROINTESTINAL ENDOSCOPY         Review of patient's allergies indicates:   Allergen Reactions    Penicillins Swelling    Iodine      Other reaction(s): Hives    Sulfamethoxazole-trimethoprim      Other reaction(s): Swelling  Other reaction(s): Hives       Medications:  Continuous Infusions:  Scheduled Meds:   sodium chloride 0.9%   Intravenous Once    sodium chloride 0.9%   Intravenous Once    calcium acetate  1,334 mg Oral TID WM    ciprofloxacin HCl  500 mg Oral QHS    levothyroxine  100 mcg Oral Before breakfast    metroNIDAZOLE  500 mg Oral Q8H    midodrine  10 mg Oral TID    pantoprazole  40 mg Oral Daily    riociguat  2.5 mg Oral TID    selexipag  1,000 mcg Oral BID     PRN Meds:sodium chloride, midodrine, midodrine, sodium chloride 0.9%    Family History     Problem Relation (Age of Onset)    Asthma Sister    Blindness Father    Breast cancer Maternal Aunt    Depression Sister    Diabetes Maternal Aunt    Heart attack Father, Mother    Heart failure Father, Mother    Hypertension Father, Mother, Sister, Brother    Kidney disease Brother        Tobacco Use    Smoking status: Former Smoker     Types: Cigarettes     Last attempt to quit: 8/10/2000     Years since quittin.8    Smokeless tobacco: Never Used   Substance and Sexual Activity    Alcohol use: No     Comment: hx of etoh     Drug use: No    Sexual activity: Never       Review of Systems   Constitutional: Positive for activity change, fatigue and unexpected weight change. Negative for diaphoresis.    HENT: Negative.    Eyes: Negative.    Respiratory: Positive for shortness of breath.    Cardiovascular: Negative.    Gastrointestinal: Negative.    Endocrine: Negative.    Genitourinary: Negative.    Musculoskeletal: Negative.    Allergic/Immunologic: Negative.    Neurological: Positive for weakness.   Hematological: Negative.    Psychiatric/Behavioral: Negative.      Objective:     Vital Signs (Most Recent):  Temp: 97.8 °F (36.6 °C) (06/07/19 1100)  Pulse: 97 (06/07/19 1100)  Resp: 16 (06/07/19 1100)  BP: (!) 78/54 (06/07/19 1100)  SpO2: (!) 94 % (06/07/19 1100) Vital Signs (24h Range):  Temp:  [96.8 °F (36 °C)-98.2 °F (36.8 °C)] 97.8 °F (36.6 °C)  Pulse:  [] 97  Resp:  [13-27] 16  SpO2:  [85 %-100 %] 94 %  BP: ()/(45-68) 78/54     Weight: 65 kg (143 lb 4.8 oz)  Body mass index is 27.08 kg/m².    Review of Symptoms  Symptom Assessment (ESAS 0-10 scale)   ESAS 0 1 2 3 4 5 6 7 8 9 10   Pain x             Dyspnea x             Anxiety xx             Nausea x             Depression  x             Anorexia x             Fatigue      x        Insomnia x             Restlessness  x             Agitation x             CAM / Delirium _x_ --  ___+   Constipation     x_ --  ___+   Diarrhea           _x_ --  ___+  Bowel Management Plan (BMP): No    Comments: diarrhea    Pain Assessment: none    OME in 24 hours: o  Performance Status: 50    ECOG Performance Status Grade: 3 - Confined to bed or chair 50% of waking hours    Physical Exam  Constitutional: She is oriented to person, place, and time. She appears well-developed and well-nourished. No distress.   Eyes: Pupils are equal, round, and reactive to light. Conjunctivae and EOM are normal. No scleral icterus.   Neck: Normal range of motion. Neck supple. JVD present.   Cardiovascular: Normal rate, regular rhythm and normal heart sounds.   No murmur heard.  Pulmonary/Chest: Effort normal and breath sounds normal.   Abdominal: Soft. Bowel sounds are normal. She  "exhibits distension. There is no tenderness.   Musculoskeletal: Normal range of motion. She exhibits edema.   Neurological: She is alert and oriented to person, place, and time. No cranial nerve deficit or sensory deficit.   Skin: Skin is warm and dry. Capillary refill takes 2 to 3 seconds. No rash noted.   Psychiatric: She has a normal mood and affect. Thought content normal.      Significant Labs: Prealbumin: No results for input(s): PREALBUMIN in the last 48 hours.  CBC:   Recent Labs   Lab 06/06/19  0355   WBC 6.21   HGB 11.3*   HCT 37.3   MCV 88   PLT 98*     BMP:  No results for input(s): GLU, NA, K, CL, CO2, BUN, CREATININE, CALCIUM, MG in the last 24 hours.  LFT:  Lab Results   Component Value Date    AST 39 06/06/2019    ALKPHOS 119 06/06/2019    BILITOT 0.6 06/06/2019     Albumin:   Albumin   Date Value Ref Range Status   06/06/2019 2.4 (L) 3.5 - 5.2 g/dL Final     Protein:   Total Protein   Date Value Ref Range Status   06/06/2019 5.5 (L) 6.0 - 8.4 g/dL Final     Lactic acid:   Lab Results   Component Value Date    LACTATE 0.7 06/06/2019    LACTATE 1.4 06/05/2019       Significant Imaging: I have reviewed all pertinent imaging results/findings within the past 24 hours.    Advance Care Planning   Advanced Directives::  Living Will: No  LaPOST: No  Do Not Resuscitate Status: No  Medical Power of : No; Pt's brother, Kenji Zaldivar ("Nahum") -[phone]  [place] [phone]    Decision-Making Capacity: Patient answered questions, Family answered questions       Living Arrangements:   Patient resides with patient's brother and one son (Ben)     [address] [facility] [address]      Pt cell:  [phone]  Kenji Zaldivar (brother):  [phone]  Ben (son): [phone]  Frederick (son): [phone]     Support system includes adult children, brother, and extended family.  Patient does not have dependents that are need of being cared for.     Patients primary caregiver is self with help " from her brother/sons as needed.  During admission, patient's caregiver plans to visit.  Confirmed patient and patients caregivers do have access to reliable transportation.  The pt's brother and adult children drive.     Psychosocial/Cultural:  Patient's most important priorities:  Being with family and in home setting    Patient's biggest concerns/fears:  Disappointing family    Previous death/end of life care history:    in the home setting    Patient's goals/hopes:  Be at home with family on hospice    Spiritual:     F- Brandy and Belief: Yazdanism    I - Importance: at the center of family life  .  C - Community: family     A - Address in Care: involved

## 2019-06-07 NOTE — ASSESSMENT & PLAN NOTE
Consulted nephrology for HD. Discussing with social work/hospice/nephrology team on the needed documentation for discharge planning.

## 2019-06-07 NOTE — PROGRESS NOTES
[facility]  Palliative Medicine  Progress Note    Patient Name: Shivani Sheets  MRN: [id number]  Admission Date: 6/3/2019  Hospital Length of Stay: 4 days  Code Status: DNR   Attending Provider: Jenny Hennessy MD  Consulting Provider: Frederick Nam MD  Primary Care Physician: Eula Weiss MD  Principal Problem:Diarrhea    Patient information was obtained from patient and past medical records.      Assessment/Plan:     Palliative care encounter  62 yo woman with ESRD on HD for 20 years, failed kidney transplant, HF, and pulmonary hypertension admitted with FTT, volume overload, and diarrhea.  Palliative care consulted for discussions re: GOC and aiding in medical decision making.     1) Symptoms: Mainly extreme fatigue and debility from chronic disease  2) Code status: as per todays discussion now DNR/DNI; added to chart  3) Medicolegal: son Ben is surrogate DM and the pt voiced her desire for him to be MPOA  4) Psychosocial:     5) Spiritual: strong in her mely; feels prepared to meet God  6) GOC/Discussions:   6/7 Pt and brother engaged in discussions. Pt still strong in her desire to have care provided at home with hospice.  With more consideration however, she does want to continue dialysis for the time being while in the hospital.  She understands that her choice will be respected by her family and clinical team alike when the time is right to discontinue HD.      No new complaints and family is well supported.    6/6 Met with the pt with her son, nieces, and son in law. The pt demonstrated an excellent understanding of her condition and life expectancy.  She was able to clearly articulate her desire to return home and with guidance, accept the option to return home with hospice.  She desires to stop HD as she feels she is no longer achieving what she once had from it.  Her family was very supportive throughout the conversation and are committed to supporting her at home.    7)  ACP discussion   .Advance Care Planning During this visit, I engaged the patient in the advance care planning process.  The patient and I reviewed the role for advance directives and their purpose in directing future healthcare if the patients unable to speak for him/herself.  At this point in time, the patient does have full decision-making capacity.  We discussed different extreme health states that she could experience, and reviewed what kind of medical care she would want in those situations.  The patient communicated that if she were comatose and had little chance of a meaningful recovery, she would not want machines/life-sustaining treatments used.  In addition to the above preference, other important end-of-life issues for the patient include returning home with hospice.  The patient has not completed a living will to reflect these preferences.  The patient  has already designated a healthcare power of  to make decisions on her behalf.   I spent a total of 32 minutes engaging the patient in this advance care planning discussion.     8) Dispo: home with hospice    Plan:   -hospice consult for home hospice; HD does pre-date her pulmonary HTN that can be her primary hospice diagnosis  -if hospice doesn't accept due to HD can consider home based palliative caer to continue conversations and facilitate transition to hospice  -d/c meds and therapies not contributing to comfort  -changed code to DNR and paperwork on the chart  -ongoing family support    Discussed with ICU team and HTS.     Will sign off for now as goals of care are clear.     Please call if we can be of additional support.    Frederick Nam MD  Palliative Medicine  [phone]        I will sign off. Please contact us if you have any additional questions.    Subjective:     Chief Complaint:   Chief Complaint   Patient presents with    Abdominal Pain     abd pain, vomiting, diarrhea since last night       HPI:   She has a hx of PH on  (adempas/selexipeg), diastolic dysfunction, pAF, CVA , COPD on 2 L nocturnal oxygen, central retinal artery occlusion without significant carotid artery stenosis, CAD with remote PCI, ESRD(LUE AVF) secondary to HTN, s/p failed kidney transplant, PUD, STEFFANY on CPAP, hypothyroidism, RA, HLD. Had recurrent GI bleeding and is now s/p watchman.      She presented to the ED with volume overload from missed HD, lethargy, diarrhea, and failure to thrive.  She has had 5 hospitalizations in the past 6 months and per family an indolent marked decline over the past 3 years.  Per the pt and son, she has more often than not felt like going to HD.     In this setting, palliative medicine was consulted to help with medical decision making and aid in the formation of goals of care.       Hospital Course:  No notes on file    Interval History: More alert, awake and interactive today.  Brother at the bedside.  Seen with Dr Hennessy and HTS team.  No complaints today but wanting to make it home    Past Medical History:   Diagnosis Date    Allergy     Anemia     Anticoagulant long-term use     4 years coumadin, asa    Arthritis     Awaiting organ transplant 2013    Cataract     Central serous chorioretinopathy of eye, right 2018    CHF (congestive heart failure)     Chronic diarrhea     Chronic kidney disease     Colon polyps     COPD (chronic obstructive pulmonary disease)     Coronary artery disease     Diabetes mellitus     Diabetic retinopathy     Diverticulosis     Encounter for blood transfusion     ESRD from HTN strtied RRT 1999    Failed  donor kidney transplant -     Glaucoma     History of bleeding peptic ulcer     's as stated per pt    Hyperlipidemia     Hypertension     Hypothyroidism     Morbid obesity 8/10/2012    Renal hypertension     Stroke            Past Surgical History:   Procedure Laterality Date    CATARACT EXTRACTION W/  INTRAOCULAR LENS  IMPLANT Bilateral     COLON SURGERY      COLONOSCOPY      COLONOSCOPY N/A 2/4/2019    Performed by Indio Beltran MD at [facility] ENDO (2ND FLR)    COLONOSCOPY N/A 6/12/2018    Performed by Jose Falk MD at [facility] ENDO (2ND FLR)    EGD (ESOPHAGOGASTRODUODENOSCOPY) N/A 1/14/2019    Performed by Miranda Maradiaga MD at [facility] ENDO (2ND FLR)    EGD (ESOPHAGOGASTRODUODENOSCOPY) N/A 9/11/2018    Performed by Luis Washington MD at [facility] ENDO (2ND FLR)    ESOPHAGOGASTRODUODENOSCOPY (EGD) N/A 2/27/2018    Performed by Kenji Desir MD at [facility] ENDO (2ND FLR)    EYE SURGERY      FRACTURE SURGERY      R arm    HERNIA REPAIR      HYSTERECTOMY      INSERTION-IMPLANT-GLAUCOMA Left 10/12/2017    Performed by Junaid Kern MD at [facility] OR 1ST FLR    KIDNEY TRANSPLANT      Left atrial appendage closure device N/A 4/26/2019    Performed by César Thomas MD at [facility] EP LAB    NEPHRECTOMY  11/2008    transplant     PARATHYROID GLAND SURGERY      TONSILLECTOMY      Transesophageal echo (JACE) intra-procedure log documentation N/A 4/26/2019    Performed by [facility] at [facility] EP LAB    Transesophageal echo (JACE) intra-procedure log documentation N/A 2/28/2019    Performed by [facility] Diagnostic Provider at [facility] EP LAB    UPPER GASTROINTESTINAL ENDOSCOPY         Review of patient's allergies indicates:   Allergen Reactions    Penicillins Swelling    Iodine      Other reaction(s): Hives    Sulfamethoxazole-trimethoprim      Other reaction(s): Swelling  Other reaction(s): Hives       Medications:  Continuous Infusions:  Scheduled Meds:   sodium chloride 0.9%   Intravenous Once    sodium chloride 0.9%   Intravenous Once    calcium acetate  1,334 mg Oral TID WM    ciprofloxacin HCl  500 mg Oral QHS    levothyroxine  100 mcg Oral Before breakfast    metroNIDAZOLE  500 mg Oral Q8H    midodrine  10 mg Oral TID    pantoprazole  40 mg Oral Daily    riociguat  2.5 mg Oral TID    selexipag  1,000 mcg Oral  BID     PRN Meds:sodium chloride, midodrine, midodrine, sodium chloride 0.9%    Family History     Problem Relation (Age of Onset)    Asthma Sister    Blindness Father    Breast cancer Maternal Aunt    Depression Sister    Diabetes Maternal Aunt    Heart attack Father, Mother    Heart failure Father, Mother    Hypertension Father, Mother, Sister, Brother    Kidney disease Brother        Tobacco Use    Smoking status: Former Smoker     Types: Cigarettes     Last attempt to quit: 8/10/2000     Years since quittin.8    Smokeless tobacco: Never Used   Substance and Sexual Activity    Alcohol use: No     Comment: hx of etoh     Drug use: No    Sexual activity: Never       Review of Systems   Constitutional: Positive for activity change, fatigue and unexpected weight change. Negative for diaphoresis.   HENT: Negative.    Eyes: Negative.    Respiratory: Positive for shortness of breath.    Cardiovascular: Negative.    Gastrointestinal: Negative.    Endocrine: Negative.    Genitourinary: Negative.    Musculoskeletal: Negative.    Allergic/Immunologic: Negative.    Neurological: Positive for weakness.   Hematological: Negative.    Psychiatric/Behavioral: Negative.      Objective:     Vital Signs (Most Recent):  Temp: 97.8 °F (36.6 °C) (19 1100)  Pulse: 97 (19 1100)  Resp: 16 (19 1100)  BP: (!) 78/54 (19 1100)  SpO2: (!) 94 % (19) Vital Signs (24h Range):  Temp:  [96.8 °F (36 °C)-98.2 °F (36.8 °C)] 97.8 °F (36.6 °C)  Pulse:  [] 97  Resp:  [13-27] 16  SpO2:  [85 %-100 %] 94 %  BP: ()/(45-68) 78/54     Weight: 65 kg (143 lb 4.8 oz)  Body mass index is 27.08 kg/m².    Review of Symptoms  Symptom Assessment (ESAS 0-10 scale)   ESAS 0 1 2 3 4 5 6 7 8 9 10   Pain x             Dyspnea x             Anxiety xx             Nausea x             Depression  x             Anorexia x             Fatigue      x        Insomnia x             Restlessness  x             Agitation x              CAM / Delirium _x_ --  ___+   Constipation     x_ --  ___+   Diarrhea           _x_ --  ___+  Bowel Management Plan (BMP): No    Comments: diarrhea    Pain Assessment: none    OME in 24 hours: o  Performance Status: 50    ECOG Performance Status Grade: 3 - Confined to bed or chair 50% of waking hours    Physical Exam  Constitutional: She is oriented to person, place, and time. She appears well-developed and well-nourished. No distress.   Eyes: Pupils are equal, round, and reactive to light. Conjunctivae and EOM are normal. No scleral icterus.   Neck: Normal range of motion. Neck supple. JVD present.   Cardiovascular: Normal rate, regular rhythm and normal heart sounds.   No murmur heard.  Pulmonary/Chest: Effort normal and breath sounds normal.   Abdominal: Soft. Bowel sounds are normal. She exhibits distension. There is no tenderness.   Musculoskeletal: Normal range of motion. She exhibits edema.   Neurological: She is alert and oriented to person, place, and time. No cranial nerve deficit or sensory deficit.   Skin: Skin is warm and dry. Capillary refill takes 2 to 3 seconds. No rash noted.   Psychiatric: She has a normal mood and affect. Thought content normal.      Significant Labs: Prealbumin: No results for input(s): PREALBUMIN in the last 48 hours.  CBC:   Recent Labs   Lab 06/06/19  0355   WBC 6.21   HGB 11.3*   HCT 37.3   MCV 88   PLT 98*     BMP:  No results for input(s): GLU, NA, K, CL, CO2, BUN, CREATININE, CALCIUM, MG in the last 24 hours.  LFT:  Lab Results   Component Value Date    AST 39 06/06/2019    ALKPHOS 119 06/06/2019    BILITOT 0.6 06/06/2019     Albumin:   Albumin   Date Value Ref Range Status   06/06/2019 2.4 (L) 3.5 - 5.2 g/dL Final     Protein:   Total Protein   Date Value Ref Range Status   06/06/2019 5.5 (L) 6.0 - 8.4 g/dL Final     Lactic acid:   Lab Results   Component Value Date    LACTATE 0.7 06/06/2019    LACTATE 1.4 06/05/2019       Significant Imaging: I have reviewed all  "pertinent imaging results/findings within the past 24 hours.    Advance Care Planning   Advanced Directives::  Living Will: No  LaPOST: No  Do Not Resuscitate Status: No  Medical Power of : No; Pt's brother, Kenji Zaldivar ("Nahum") -[phone]  Ben Sheets [phone]    Decision-Making Capacity: Patient answered questions, Family answered questions       Living Arrangements:   Patient resides with patient's brother and one son (Ben)     [address] [facility] [address]      Pt cell:  [phone]  Kenji Zaldivar (brother):  [phone]  Ben (son): [phone]  Frederick (son): [phone]     Support system includes adult children, brother, and extended family.  Patient does not have dependents that are need of being cared for.     Patients primary caregiver is self with help from her brother/sons as needed.  During admission, patient's caregiver plans to visit.  Confirmed patient and patients caregivers do have access to reliable transportation.  The pt's brother and adult children drive.     Psychosocial/Cultural:  Patient's most important priorities:  Being with family and in home setting    Patient's biggest concerns/fears:  Disappointing family    Previous death/end of life care history:    in the home setting    Patient's goals/hopes:  Be at home with family on hospice    Spiritual:     F- Brandy and Belief: Evangelical    I - Importance: at the center of family life  .  C - Community: family     A - Address in Care: involved      > 50% of 40 min visit spent in chart review, face to face discussion of goals of care,  symptom assessment, coordination of care and emotional support.    Frederick Nam MD  Palliative Medicine  [facility]            "

## 2019-06-07 NOTE — SUBJECTIVE & OBJECTIVE
Interval History: ***    Continuous Infusions:  Scheduled Meds:   sodium chloride 0.9%   Intravenous Once    sodium chloride 0.9%   Intravenous Once    calcium acetate  1,334 mg Oral TID WM    ciprofloxacin HCl  500 mg Oral QHS    levothyroxine  100 mcg Oral Before breakfast    metroNIDAZOLE  500 mg Oral Q8H    midodrine  10 mg Oral TID    pantoprazole  40 mg Oral Daily    riociguat  2.5 mg Oral TID    selexipag  1,000 mcg Oral BID     PRN Meds:sodium chloride, midodrine, midodrine, sodium chloride 0.9%    Review of patient's allergies indicates:   Allergen Reactions    Penicillins Swelling    Iodine      Other reaction(s): Hives    Sulfamethoxazole-trimethoprim      Other reaction(s): Swelling  Other reaction(s): Hives     Objective:     Vital Signs (Most Recent):  Temp: 97.8 °F (36.6 °C) (06/07/19 1100)  Pulse: 97 (06/07/19 1100)  Resp: 16 (06/07/19 1100)  BP: (!) 78/54 (06/07/19 1100)  SpO2: (!) 94 % (06/07/19 1100) Vital Signs (24h Range):  Temp:  [96.8 °F (36 °C)-98.2 °F (36.8 °C)] 97.8 °F (36.6 °C)  Pulse:  [] 97  Resp:  [13-27] 16  SpO2:  [85 %-100 %] 94 %  BP: ()/(45-68) 78/54     Patient Vitals for the past 72 hrs (Last 3 readings):   Weight   06/04/19 2020 65 kg (143 lb 4.8 oz)     Body mass index is 27.08 kg/m².      Intake/Output Summary (Last 24 hours) at 6/7/2019 1452  Last data filed at 6/7/2019 1000  Gross per 24 hour   Intake 200 ml   Output --   Net 200 ml       Hemodynamic Parameters:       Telemetry: ***    Physical Exam    Significant Labs:  CBC:  Recent Labs   Lab 06/04/19  1604 06/05/19  0223 06/06/19  0355   WBC 8.55 7.80 6.21   RBC 4.34 4.01 4.24   HGB 11.6* 10.8* 11.3*   HCT 38.3 35.2* 37.3   * 93* 98*   MCV 88 88 88   MCH 26.7* 26.9* 26.7*   MCHC 30.3* 30.7* 30.3*     BNP:  No results for input(s): BNP in the last 168 hours.    Invalid input(s): BNPTRIAGELBLO  CMP:  Recent Labs   Lab 06/04/19  1604 06/05/19  0223 06/06/19  0355   GLU 92 89 123*   CALCIUM  8.1* 7.4* 7.9*   ALBUMIN 3.1*  3.1* 2.6* 2.4*   PROT 6.4 5.4* 5.5*    138 138   K 4.4 4.3 4.5   CO2 18* 23 23   CL 99 100 106   BUN 57* 63* 33*   CREATININE 9.0* 9.5* 6.5*   ALKPHOS 147* 119 119   ALT 52* 49* 45*   AST 86* 75* 39   BILITOT 1.2* 0.7 0.6      Coagulation:   Recent Labs   Lab 06/04/19  1604 06/05/19  0249 06/06/19  0844   INR 3.6* 4.5* 2.1*   APTT 35.4*  --   --      LDH:  No results for input(s): LDH in the last 72 hours.  Microbiology:  Microbiology Results (last 7 days)     Procedure Component Value Units Date/Time    Blood culture [[id number]] Collected:  06/04/19 1118    Order Status:  Completed Specimen:  Blood Updated:  06/07/19 1222     Blood Culture, Routine No Growth to date     Blood Culture, Routine No Growth to date     Blood Culture, Routine No Growth to date     Blood Culture, Routine No Growth to date    Blood culture [[id number]] Collected:  06/04/19 1754    Order Status:  Completed Specimen:  Blood from Peripheral, Hand, Right Updated:  06/06/19 2012     Blood Culture, Routine No Growth to date     Blood Culture, Routine No Growth to date     Blood Culture, Routine No Growth to date    Blood culture #2 **CANNOT BE ORDERED STAT** [[id number]] Collected:  06/03/19 1512    Order Status:  Completed Specimen:  Blood from Peripheral, Hand, Right Updated:  06/06/19 1622     Blood Culture, Routine No Growth to date     Blood Culture, Routine No Growth to date     Blood Culture, Routine No Growth to date     Blood Culture, Routine No Growth to date    Blood culture #1 **CANNOT BE ORDERED STAT** [[id number]] Collected:  06/03/19 1457    Order Status:  Completed Specimen:  Blood from Peripheral, Hand, Right Updated:  06/06/19 1008     Blood Culture, Routine Gram stain aer bottle: Gram positive cocci in clusters resembling Staph      Blood Culture, Routine Results called to and read back by:Mena Cleary RN 06/04/2019  16:53     Blood Culture, Routine --     COAGULASE-NEGATIVE STAPHYLOCOCCUS  "SPECIES  Organism is a probable contaminant            {Select [facility]:06245::"I have reviewed all pertinent labs within the past 24 hours."}    Estimated Creatinine Clearance: 7.5 mL/min (A) (based on SCr of 6.5 mg/dL (H)).    Diagnostic Results:  {Imaging Review:06704757}  "

## 2019-06-08 ENCOUNTER — TELEPHONE (OUTPATIENT)
Dept: TRANSPLANT | Facility: HOSPITAL | Age: 64
End: 2019-06-08

## 2019-06-08 VITALS
HEART RATE: 124 BPM | TEMPERATURE: 98 F | BODY MASS INDEX: 26.51 KG/M2 | DIASTOLIC BLOOD PRESSURE: 69 MMHG | RESPIRATION RATE: 20 BRPM | OXYGEN SATURATION: 98 % | WEIGHT: 140.44 LBS | SYSTOLIC BLOOD PRESSURE: 102 MMHG | HEIGHT: 61 IN

## 2019-06-08 LAB — BACTERIA BLD CULT: NORMAL

## 2019-06-08 PROCEDURE — 25000003 PHARM REV CODE 250: Performed by: INTERNAL MEDICINE

## 2019-06-08 PROCEDURE — 25000003 PHARM REV CODE 250: Performed by: NURSE PRACTITIONER

## 2019-06-08 PROCEDURE — 94761 N-INVAS EAR/PLS OXIMETRY MLT: CPT

## 2019-06-08 PROCEDURE — 25000003 PHARM REV CODE 250: Performed by: STUDENT IN AN ORGANIZED HEALTH CARE EDUCATION/TRAINING PROGRAM

## 2019-06-08 PROCEDURE — 99900035 HC TECH TIME PER 15 MIN (STAT)

## 2019-06-08 PROCEDURE — 94660 CPAP INITIATION&MGMT: CPT

## 2019-06-08 RX ORDER — MIDODRINE HYDROCHLORIDE 10 MG/1
10 TABLET ORAL 3 TIMES DAILY PRN
Qty: 90 TABLET | Refills: 11 | Status: SHIPPED | OUTPATIENT
Start: 2019-06-08 | End: 2020-06-07

## 2019-06-08 RX ADMIN — PANTOPRAZOLE SODIUM 40 MG: 40 TABLET, DELAYED RELEASE ORAL at 08:06

## 2019-06-08 RX ADMIN — RIOCIGUAT 2.5 MG: 2.5 TABLET, FILM COATED ORAL at 08:06

## 2019-06-08 RX ADMIN — METRONIDAZOLE 500 MG: 500 TABLET ORAL at 08:06

## 2019-06-08 RX ADMIN — LEVOTHYROXINE SODIUM 100 MCG: 100 TABLET ORAL at 08:06

## 2019-06-08 RX ADMIN — METRONIDAZOLE 500 MG: 500 TABLET ORAL at 01:06

## 2019-06-08 RX ADMIN — MIDODRINE HYDROCHLORIDE 10 MG: 5 TABLET ORAL at 08:06

## 2019-06-08 RX ADMIN — CALCIUM ACETATE 1334 MG: 667 CAPSULE ORAL at 08:06

## 2019-06-08 NOTE — PROGRESS NOTES
[facility]  Nephrology  Progress Note    Patient Name: Shivani Sheets  MRN: [id number]  Admission Date: 6/3/2019  Hospital Length of Stay: 5 days  Attending Provider: Jenny Hennessy MD   Primary Care Physician: Eula Weiss MD  Principal Problem:Diarrhea    Subjective:     HPI: Ms. Shivani Sheets is a 64 yo AAF with HTN, pHTN, HFpEF (50%), and ESRD on iHD MWF who presented to the hospital on 06/03 for evaluation of nausea/vomiting/diarrhea x 1 day.  She denies any changes in her diet.  No abdominal pain, fever, or chills.  She missed her hemodialysis treatment yesterday because she felt bad, and Nephrology has been consulted for ESRD management while in-patient.  She underwent CT Abdomen and it was read as increased abdominal wall edema, cardiomegaly with small pericardial effusion, and ascites.  She reports recently having her EDW decreased a few weeks ago by her OP Nephrologist, and she has been tolerating her hemodialysis treatments well without problems.  On exam today, she denies any complaints, no abdominal pain, nausea/vomiting/diarrhea have resolved.  She denies SOB, on O2 @ 2L NC, which she normally uses @ home.      She dialyzes at [facility] under the care of Dr. Deleon on a MWF schedule.  She reports a recent decrease in her EDW to 68 kg and she no longer makes urine.  She has a ABEL AVF with a very large anuerysm, followed by surgeons at the [facility] surgery center in [facility].  He rnormal treatment time is 3.5 hours, last treatment being on Friday of last week.    Interval History:   Patient evaluated at bedside, had dialysis finished this morning in which ran for 3 hrs and UF 1 L. NET positive 278 cc.     Review of patient's allergies indicates:   Allergen Reactions    Penicillins Swelling    Iodine      Other reaction(s): Hives    Sulfamethoxazole-trimethoprim      Other reaction(s): Swelling  Other reaction(s): Hives     Current Facility-Administered Medications    Medication Frequency    0.9%  NaCl infusion (for blood administration) Q24H PRN    0.9%  NaCl infusion Once    calcium acetate capsule 1,334 mg TID WM    ciprofloxacin HCl tablet 500 mg QHS    levothyroxine tablet 100 mcg Before breakfast    metroNIDAZOLE tablet 500 mg Q8H    midodrine tablet 10 mg TID    midodrine tablet 5 mg Daily PRN    midodrine tablet 5 mg PRN    pantoprazole EC tablet 40 mg Daily    riociguat (ADEMPAS) tablet 2.5 mg TID    selexipag Tab 1,000 mcg BID    sodium chloride 0.9% flush 10 mL PRN     Facility-Administered Medications Ordered in Other Encounters   Medication Frequency    sodium chloride 0.9% flush 5 mL PRN       Objective:     Vital Signs (Most Recent):  Temp: 97.6 °F (36.4 °C) (06/08/19 0300)  Pulse: 87 (06/08/19 0722)  Resp: 18 (06/08/19 0722)  BP: 106/70 (06/08/19 0600)  SpO2: 99 % (06/08/19 0722)  O2 Device (Oxygen Therapy): CPAP (06/08/19 0722) Vital Signs (24h Range):  Temp:  [97.6 °F (36.4 °C)-97.8 °F (36.6 °C)] 97.6 °F (36.4 °C)  Pulse:  [] 87  Resp:  [13-40] 18  SpO2:  [86 %-100 %] 99 %  BP: ()/(48-75) 106/70     Weight: 63.7 kg (140 lb 6.9 oz) (06/08/19 0400)  Body mass index is 26.53 kg/m².  Body surface area is 1.66 meters squared.    I/O last 3 completed shifts:  In: 2278.3 [P.O.:1150; I.V.:628.3; Other:500]  Out: 2000 [Urine:500; Other:1500]    Physical Exam   Constitutional: She is oriented to person, place, and time. She appears well-developed. No distress. Nasal cannula in place.   HENT:   Head: Normocephalic and atraumatic.   Right Ear: External ear normal.   Left Ear: External ear normal.   Eyes: Conjunctivae and EOM are normal. Right eye exhibits no discharge. Left eye exhibits no discharge.   Neck: Normal range of motion. Neck supple. JVD present.   Cardiovascular: An irregularly irregular rhythm present. Exam reveals no gallop and no friction rub.   Murmur heard.  Pulmonary/Chest: Effort normal. No respiratory distress. She has no  wheezes. She has no rales.   Abdominal: Soft. Bowel sounds are normal. She exhibits no distension. There is no tenderness. There is no guarding.   Musculoskeletal: She exhibits no edema or deformity.   Neurological: She is alert and oriented to person, place, and time.   Skin: Skin is warm and dry. She is not diaphoretic.   Psychiatric: She has a normal mood and affect. Her behavior is normal.       Significant Labs:  ABGs:   Recent Labs   Lab 06/04/19 2008   PH 7.259*   PCO2 53.6*   HCO3 24.0   POCSATURATED 44*   BE -3     BMP:   Recent Labs   Lab 06/04/19  1117  06/06/19  0355   GLU 86   < > 123*   CL 99   < > 106   CO2 17*   < > 23   BUN 54*   < > 33*   CREATININE 9.2*   < > 6.5*   CALCIUM 8.5*   < > 7.9*   MG 2.4  --   --     < > = values in this interval not displayed.     CBC:   Recent Labs   Lab 06/06/19  0355   WBC 6.21   RBC 4.24   HGB 11.3*   HCT 37.3   PLT 98*   MCV 88   MCH 26.7*   MCHC 30.3*     CMP:   Recent Labs   Lab 06/06/19  0355   *   CALCIUM 7.9*   ALBUMIN 2.4*   PROT 5.5*      K 4.5   CO2 23      BUN 33*   CREATININE 6.5*   ALKPHOS 119   ALT 45*   AST 39   BILITOT 0.6     All labs within the past 24 hours have been reviewed.       Assessment/Plan:     End stage renal disease on dialysis  ESRD on iHD MWF  Olamide  3.5 hours  Dr. Deleon  No residual renal function  [facility] AV  Reports an EDW of 68 kg that was recently decreased a few weeks ago.    Plan/Recommendations:  -Patient had dialysis finished in the morning with duration of 3 hrs and UF 1L   -PRN midodrine provided   -Continue phos binders   -Continue serial renal function panels for phos and albumin levels   -Strict I/Os and daily weights   -Will be discharged Hospice today             Lefty Feng  Nephrology  Fellow  [facility]    Pager [phone]

## 2019-06-08 NOTE — PLAN OF CARE
Problem: Adult Inpatient Plan of Care  Goal: Plan of Care Review  Outcome: Ongoing (interventions implemented as appropriate)  No acute events noted within the shift.   Pt GCS 15, OX4.  VSS  HR on 's on Afib. BP MAP above 60.   On CPAP while sleeping at 40%/ 7cmH20.  No BM. On renal diet and 1500FR.  Anuric . HD done last night.  Afebrile. Last CHG bath 06/07/19-dayshift.  For discharged today to hospice.  Plan of care reviewed with pt. All concerns and questions addressed. WCTM

## 2019-06-08 NOTE — NURSING
Patient discharged home accompanied by her brother and niece. Discharge instructions reviewed with patient. Discussed 24 hour nurse line with extended family as well. Lines removed per protocol. Pt transported home on 2L via home oxygen. All home meds sent home with patient. Per family DME had been delivered. Per family they had contact information to get in touch with hospice company if/when needed. Emotional support provided to patient and family.

## 2019-06-08 NOTE — PROGRESS NOTES
Hemodialysis    3-hour HD completed, patient tolerated well, rinsed back. No prolonged bleeding upon needle removal, hemostasis acheived. Left upper arm fistula with bruit and thrill, gauzed and taped.      NET UF REMOVED: 1 liter

## 2019-06-08 NOTE — TELEPHONE ENCOUNTER
On Call  Note  On Call  (TERRI) received a telephone call from SICU Nurse Mtz inquiring about DME orders for the pt.  Upon review of TERRI Evans S note, pt's DME would be received at her residence as stated by Lorraine sewell/[facility].  After reviewing documentation, SICU Nurse Mtz advised she would contact Lorraine.  TERRI advised Nurse Mtz to contact TERRI back should she require further assistance.  Nurse Mtz contacted TERRI to state everything was in place and no further assistance was required.  TERRI remains available.

## 2019-06-08 NOTE — SUBJECTIVE & OBJECTIVE
Interval History:   Patient evaluated at bedside, had dialysis finished this morning in which ran for 3 hrs and UF 1 L. NET positive 278 cc.     Review of patient's allergies indicates:   Allergen Reactions    Penicillins Swelling    Iodine      Other reaction(s): Hives    Sulfamethoxazole-trimethoprim      Other reaction(s): Swelling  Other reaction(s): Hives     Current Facility-Administered Medications   Medication Frequency    0.9%  NaCl infusion (for blood administration) Q24H PRN    0.9%  NaCl infusion Once    calcium acetate capsule 1,334 mg TID WM    ciprofloxacin HCl tablet 500 mg QHS    levothyroxine tablet 100 mcg Before breakfast    metroNIDAZOLE tablet 500 mg Q8H    midodrine tablet 10 mg TID    midodrine tablet 5 mg Daily PRN    midodrine tablet 5 mg PRN    pantoprazole EC tablet 40 mg Daily    riociguat (ADEMPAS) tablet 2.5 mg TID    selexipag Tab 1,000 mcg BID    sodium chloride 0.9% flush 10 mL PRN     Facility-Administered Medications Ordered in Other Encounters   Medication Frequency    sodium chloride 0.9% flush 5 mL PRN       Objective:     Vital Signs (Most Recent):  Temp: 97.6 °F (36.4 °C) (06/08/19 0300)  Pulse: 87 (06/08/19 0722)  Resp: 18 (06/08/19 0722)  BP: 106/70 (06/08/19 0600)  SpO2: 99 % (06/08/19 0722)  O2 Device (Oxygen Therapy): CPAP (06/08/19 0722) Vital Signs (24h Range):  Temp:  [97.6 °F (36.4 °C)-97.8 °F (36.6 °C)] 97.6 °F (36.4 °C)  Pulse:  [] 87  Resp:  [13-40] 18  SpO2:  [86 %-100 %] 99 %  BP: ()/(48-75) 106/70     Weight: 63.7 kg (140 lb 6.9 oz) (06/08/19 0400)  Body mass index is 26.53 kg/m².  Body surface area is 1.66 meters squared.    I/O last 3 completed shifts:  In: 2278.3 [P.O.:1150; I.V.:628.3; Other:500]  Out: 2000 [Urine:500; Other:1500]    Physical Exam   Constitutional: She is oriented to person, place, and time. She appears well-developed. No distress. Nasal cannula in place.   HENT:   Head: Normocephalic and atraumatic.   Right  Ear: External ear normal.   Left Ear: External ear normal.   Eyes: Conjunctivae and EOM are normal. Right eye exhibits no discharge. Left eye exhibits no discharge.   Neck: Normal range of motion. Neck supple. JVD present.   Cardiovascular: An irregularly irregular rhythm present. Exam reveals no gallop and no friction rub.   Murmur heard.  Pulmonary/Chest: Effort normal. No respiratory distress. She has no wheezes. She has no rales.   Abdominal: Soft. Bowel sounds are normal. She exhibits no distension. There is no tenderness. There is no guarding.   Musculoskeletal: She exhibits no edema or deformity.   Neurological: She is alert and oriented to person, place, and time.   Skin: Skin is warm and dry. She is not diaphoretic.   Psychiatric: She has a normal mood and affect. Her behavior is normal.       Significant Labs:  ABGs:   Recent Labs   Lab 06/04/19 2008   PH 7.259*   PCO2 53.6*   HCO3 24.0   POCSATURATED 44*   BE -3     BMP:   Recent Labs   Lab 06/04/19  1117  06/06/19  0355   GLU 86   < > 123*   CL 99   < > 106   CO2 17*   < > 23   BUN 54*   < > 33*   CREATININE 9.2*   < > 6.5*   CALCIUM 8.5*   < > 7.9*   MG 2.4  --   --     < > = values in this interval not displayed.     CBC:   Recent Labs   Lab 06/06/19  0355   WBC 6.21   RBC 4.24   HGB 11.3*   HCT 37.3   PLT 98*   MCV 88   MCH 26.7*   MCHC 30.3*     CMP:   Recent Labs   Lab 06/06/19  0355   *   CALCIUM 7.9*   ALBUMIN 2.4*   PROT 5.5*      K 4.5   CO2 23      BUN 33*   CREATININE 6.5*   ALKPHOS 119   ALT 45*   AST 39   BILITOT 0.6     All labs within the past 24 hours have been reviewed.

## 2019-06-08 NOTE — PROGRESS NOTES
Hemodialysis    bediside HD treatment in 6071. Patient is awake, alert, oriented, hooked to cardiac monitor, on oxygen via nasal cannula saturating @ 91-93%.  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   Cannulated away from the aneurysm , on the upper part of her arm, A1,V1 without problem.    UF goal set to 1 liter

## 2019-06-08 NOTE — ASSESSMENT & PLAN NOTE
ESRD on iHD MWF  Olamide  3.5 hours  Dr. Deleon  No residual renal function  ABEL AVF  Reports an EDW of 68 kg that was recently decreased a few weeks ago.    Plan/Recommendations:  -Patient had dialysis finished in the morning with duration of 3 hrs and UF 1L   -PRN midodrine provided   -Continue phos binders   -Continue serial renal function panels for phos and albumin levels   -Strict I/Os and daily weights   -Will be discharged Hospice today

## 2019-06-09 LAB
BACTERIA BLD CULT: NORMAL
BACTERIA BLD CULT: NORMAL

## 2019-06-10 ENCOUNTER — TELEPHONE (OUTPATIENT)
Dept: ELECTROPHYSIOLOGY | Facility: CLINIC | Age: 64
End: 2019-06-10

## 2019-06-10 ENCOUNTER — ANTI-COAG VISIT (OUTPATIENT)
Dept: CARDIOLOGY | Facility: CLINIC | Age: 64
End: 2019-06-10

## 2019-06-10 DIAGNOSIS — Z79.01 LONG TERM (CURRENT) USE OF ANTICOAGULANTS: ICD-10-CM

## 2019-06-10 DIAGNOSIS — I48.21 PERMANENT ATRIAL FIBRILLATION: ICD-10-CM

## 2019-06-10 NOTE — PROGRESS NOTES
Patient admitted 6/3-6/6. See hospital course below. D/c to home hospice and coumadin d/c. We will d/c from clinic    Hospital Course: Patient admitted with GI symptoms, on day 2 these resolved, however she had worsening lactic acidosis, high procalcitonin, and decreasing blood pressure. Started on cipro/flagyl and inhaled nitric oxide which led to clinical improvement and correction of lactic acidosis. Lisa was gradually weaned, antibiotics were recommended for 7-day course.     Despite some recovery, patient felt debilitated and had low blood pressures, she requested a family meeting and on multi-disciplinary discussion made it evident that she was tired and nearing the end of her life, wanting to die at home with family. Given a lack of additional options for her worsening PH and poor life expectancy (<6 months), this was a reasonable decision. Hospice was consulted and to arrange discharge home with hospice. She was discharged on 06/07 with hospice and plans for outpatient dialysis as tolerated by patient.

## 2019-06-10 NOTE — TELEPHONE ENCOUNTER
Spoke to Shivani Sheets    CONFIRMED procedure arrival time of 9am on 6/13/19  Reiterated instructions including:  -Directions to check in desk  -NPO after midnight night prior to procedure     Pt verbalizes understanding of above and appreciates call.

## 2019-06-13 ENCOUNTER — HOSPITAL ENCOUNTER (OUTPATIENT)
Facility: HOSPITAL | Age: 64
Discharge: HOSPICE | End: 2019-06-13
Attending: INTERNAL MEDICINE | Admitting: INTERNAL MEDICINE
Payer: MEDICARE

## 2019-06-13 DIAGNOSIS — I48.19 PERSISTENT ATRIAL FIBRILLATION: ICD-10-CM

## 2019-06-13 DIAGNOSIS — I48.91 ATRIAL FIBRILLATION: ICD-10-CM

## 2019-06-13 PROCEDURE — 93005 ELECTROCARDIOGRAM TRACING: CPT

## 2019-06-13 PROCEDURE — 93010 EKG 12-LEAD: ICD-10-PCS | Mod: GW,,, | Performed by: INTERNAL MEDICINE

## 2019-06-13 PROCEDURE — 93010 ELECTROCARDIOGRAM REPORT: CPT | Mod: GW,,, | Performed by: INTERNAL MEDICINE

## 2019-06-13 RX ORDER — SODIUM CHLORIDE 0.9 % (FLUSH) 0.9 %
5 SYRINGE (ML) INJECTION
Status: DISCONTINUED | OUTPATIENT
Start: 2019-06-13 | End: 2019-06-13 | Stop reason: HOSPADM

## 2019-06-13 NOTE — DISCHARGE SUMMARY
[facility]  Cardiology  Discharge Summary      Patient Name: Shivani Sheets  MRN: [id number]  Admission Date: 6/13/2019  Hospital Length of Stay: 0 days  Discharge Date and Time:  06/13/2019 10:22 AM  Attending Physician: Jenny Hennessy MD  Discharging Provider: Canelo Keene MD  Primary Care Physician: Eula Weiss MD    HPI: N/A    Procedure(s) (LRB):  Transesophageal echo (JACE) intra-procedure log documentation (N/A)     Indwelling Lines/Drains at time of discharge:  Lines/Drains/Airways     Drain                 Hemodialysis AV Fistula Left upper arm -- days                Hospital Course  The patient has PHT and is a hospice patient. Discussed with Dr. Hennessy (patient's PHT outpatient physician) who believes the post-Watchman surveillance procedure is unecessary. There was concern for sedation toleration in the setting of her PHT from the [facility] staff. Our plan to discharge without a surveillance JACE was discussed with the patient. Case discussed with Dr. Guevara who agreed to discharge.    Consults: None    Significant Diagnostic Studies: None    Pending Diagnostic Studies:     Procedure Component Value Units Date/Time    EKG 12-lead [[id number]]     Order Status:  Sent Lab Status:  No result     Transesophageal echo (JACE) [[id number]]     Order Status:  Sent Lab Status:  No result           Final Active Diagnoses:    Diagnosis Date Noted POA    Atrial fibrillation [I48.91] 02/11/2014 Yes      Problems Resolved During this Admission:       Discharged Condition: good    Canelo Keene MD  Cardiology  [facility]

## 2019-06-13 NOTE — PROGRESS NOTES
MD at bedside, speaking with patient. Procedure cancelled per MD. Patient discharged per MD orders. Instructions given. Pt verbalized understanding.  Patient AAOx3, no c/o pain or discomfort at this time.  Patient left unit via wheelchair with family.

## 2019-06-13 NOTE — PROGRESS NOTES
Notified by Debby MURILLO from [facility] that patient is currently on hospice and should not have a JACE completed, poor outcome expected. Notified Dr. Keene.

## 2019-07-21 DIAGNOSIS — Z79.01 ANTICOAGULATION MONITORING BY PHARMACIST: ICD-10-CM

## 2019-07-21 RX ORDER — WARFARIN SODIUM 5 MG/1
TABLET ORAL
Qty: 120 TABLET | Refills: 0 | OUTPATIENT
Start: 2019-07-21

## 2020-05-26 NOTE — ASSESSMENT & PLAN NOTE
Etiology: ?HTN    Modality: iHD  Days: MWF (last full session this past Friday, did not go today 2ndary weakness)  Access: LUE fistula, g  [facility] [facility]  [place] Duration: ?3-4hrs  EDW: patient not sure  UF : ?2-3L  Residual fxn: none  Transplant list: no    Admitted with GIB, no source identified at this time, negative EGD, hemodynamically stable    End-Stage Renal Disease on HD  - Will provide dialysis for metabolic clearance and volume management  - Seen and examined today during hemodialysis; tolerating treatment well without issues. Denied headaches, chest pain, abdominal pain, or muscle cramps   - Target ultrafiltration ~2L  - Dialysate adjusted to current labs   - Avoid nephrotoxic medications  - Medications to renal parameters  - Strict Input and Output and chart  - Daily standing weights    GI Bleed:  - Stable Hb levels  - GI on the case, has done EGD and no active bleed observed  - On medical management    Anemia of Chronic Kidney Disease   - Hb to target level 10 gm/dL  - To follow iron studies as outpatient due to recent blood transfusions x 4 which can alter results    Mineral Bone Disease in CKD   - Renal Function Panel for electrolytes monitoring  - Stable Calcium levels    HTN   - Stable BP, will continue to monitor. Goal for BP is <140 mmHg SBP and BDP <90 mmHg, maintain MAP > 65.    Nutrition   - Renal Diet    Case discussed with staff further recs with attestation.         (4) walks frequently

## 2021-04-27 NOTE — TELEPHONE ENCOUNTER
Spoke with pt and informed her that the paperwork was faxed on 5-9/19 and also on 5/10/19 with clinical notes that was asked for at 4:53 pm on this day to [facility] fax # [phone]. Pt voiced an verbal understanding.   Medical Necessity Information: It is in your best interest to select a reason for this procedure from the list below. All of these items fulfill various CMS LCD requirements except the new and changing color options. Medical Necessity Clause: This procedure was medically necessary because the lesion that was treated was: Lab: [id number] [facility] Lab Facility: [address] [place] [address] Body Location Override (Optional - Billing Will Still Be Based On Selected Body Map Location If Applicable): right nasal crease Detail Level: Detailed Was A Bandage Applied: Yes Size Of Lesion In Cm (Required): 1.1 X Size Of Lesion In Cm (Optional): 0 Biopsy Method: Personna blade Anesthesia Type: 1% lidocaine with epinephrine Anesthesia Volume In Cc: 0.3 Hemostasis: Drysol Wound Care: Bacitracin Path Notes (To The Dermatopathologist): R/o DN \\nSize: 1.1 cm Render Path Notes In Note?: No Consent was obtained from the patient. The risks and benefits to therapy were discussed in detail. Specifically, the risks of infection, scarring, bleeding, prolonged wound healing, incomplete removal, allergy to anesthesia, nerve injury and recurrence were addressed. Prior to the procedure, the treatment site was clearly identified and confirmed by the patient. All components of Universal Protocol/PAUSE Rule completed. Post-Care Instructions: I reviewed with the patient in detail post-care instructions. Patient is to keep the biopsy site dry overnight, and then apply bacitracin twice daily until healed. Patient may apply hydrogen peroxide soaks to remove any crusting. Notification Instructions: Patient will be notified of biopsy results. However, patient instructed to call the office if not contacted within 2 weeks. Billing Type: [employer]

## 2021-05-24 NOTE — PROGRESS NOTES
INR subtherapeutic today. Patient confirms dose but did miss her dose two days prior to lab.  She denies any other changes and is just resuming therapy after coumadin interruption.  Will boost dose today and resume usual dose.    present

## 2021-10-01 PROBLEM — Z96.89 HISTORY OF GLAUCOMA TUBE SHUNT PROCEDURE: Status: ACTIVE | Noted: 2018-05-01

## 2022-03-18 NOTE — TELEPHONE ENCOUNTER
----- Message from Ester Howell MA sent at 4/11/2019  2:01 PM CDT -----  Contact: Eve Boyer RN [facility] [place]      ----- Message -----  From: Reina Boyer RN  Sent: 4/11/2019   1:01 PM  To: Abhishek MADRIGAL Staff    Good afternoon,    I just got off the phone with the above pt on a follow-up call. Pt fell this past week and pt reports that she has been stumbling and tripping often lately and she wants an appt to have her balance checked.  She has a walker but it is the old and says it is a hindrance rather than a help as she has to push it -she has not been using it. She is requesting 2 things:    - Could you reach out to her and make an appt for her? And  - Would you consider ordering a rolator for her for safety as she has had 2 falls in 2 weeks according to her?    Thank you for your time and attention in these matters.    Best Regards,    Eve Blackburn) Rosalind PAYNE BSN RN   Outpatient Care Management  [phone]   Discharge Summary/Instructions after an Endoscopic Procedure  Patient Name: Bharathi Contreras  Patient MRN: [id number]  Patient YOB: 1964 Friday, March 18, 2022  Tiago Hsieh MD  Dear patient,  As a result of recent federal legislation (The Federal Cures Act), you may   receive lab or pathology results from your procedure in your [facility]   account before your physician is able to contact you. Your physician or   their representative will relay the results to you with their   recommendations at their soonest availability.  Thank you,  RESTRICTIONS:  During your procedure today, you received medications for sedation.  These   medications may affect your judgment, balance and coordination.  Therefore,   for 24 hours, you have the following restrictions:   - DO NOT drive a car, operate machinery, make legal/financial decisions,   sign important papers or drink alcohol.    ACTIVITY:  Today: no heavy lifting, straining or running due to procedural   sedation/anesthesia.  The following day: return to full activity including work.  DIET:  Eat and drink normally unless instructed otherwise.     TREATMENT FOR COMMON SIDE EFFECTS:  - Mild abdominal pain, nausea, belching, bloating or excessive gas:  rest,   eat lightly and use a heating pad.  - Sore Throat: treat with throat lozenges and/or gargle with warm salt   water.  - Because air was used during the procedure, expelling large amounts of air   from your rectum or belching is normal.  - If a bowel prep was taken, you may not have a bowel movement for 1-3 days.    This is normal.  SYMPTOMS TO WATCH FOR AND REPORT TO YOUR PHYSICIAN:  1. Abdominal pain or bloating, other than gas cramps.  2. Chest pain.  3. Back pain.  4. Signs of infection such as: chills or fever occurring within 24 hours   after the procedure.  5. Rectal bleeding, which would show as bright red, maroon, or black stools.   (A tablespoon of blood from the rectum is not serious, especially if    hemorrhoids are present.)  6. Vomiting.  7. Weakness or dizziness.  GO DIRECTLY TO THE NEAREST EMERGENCY ROOM IF YOU HAVE ANY OF THE FOLLOWING:      Difficulty breathing              Chills and/or fever over 101 F   Persistent vomiting and/or vomiting blood   Severe abdominal pain   Severe chest pain   Black, tarry stools   Bleeding- more than one tablespoon   Any other symptom or condition that you feel may need urgent attention  Your doctor recommends these additional instructions:  If any biopsies were taken, your doctors clinic will contact you in 1 to 2   weeks with any results.  - Discharge patient to home.   - Resume previous diet.   - Continue present medications.   - Use a proton pump inhibitor PO daily.   - Return to referring physician as previously scheduled.  For questions, problems or results please call your physician - Tiago Hsieh MD at Work:  [phone].  [facility], EMERGENCY ROOM PHONE NUMBER: [phone]  IF A COMPLICATION OR EMERGENCY SITUATION ARISES AND YOU ARE UNABLE TO REACH   YOUR PHYSICIAN - GO DIRECTLY TO THE EMERGENCY ROOM.  Tiago Hsieh MD  3/18/2022 9:25:53 AM  This report has been verified and signed electronically.  Dear patient,  As a result of recent federal legislation (The Federal Cures Act), you may   receive lab or pathology results from your procedure in your [facility]   account before your physician is able to contact you. Your physician or   their representative will relay the results to you with their   recommendations at their soonest availability.  Thank you,  [employer]

## 2022-05-09 NOTE — HOSPITAL COURSE
Admitted for anemia, suspected due to GI bleeding. GI was consulted. Colonoscopy was performed 2/4 which was unremarkable. Required multiple pRBCs transfusions during this admission and recurrently over the past few months. Noted to have iron deficiency, started on PO and IV iron. She was given Epo injections per nephrology. Hem/Onc was consulted to evaluate for alternate causes of anemia. Bone marrow biopsy noted indicated at this time. Palliative care was consulted to help with goals of care. Possible her anemia is related to BM suppression from PH medications. Holding coumadin. Will consider resuming as an outpatient. EP referral upon discharge for Watchman device. Follow up next week with EP and then HTS.   
4

## 2023-03-06 NOTE — PROGRESS NOTES
[facility]  Nephrology  Progress Note    Patient Name: Shivani Sheets  MRN: [id number]  Admission Date: 6/3/2019  Hospital Length of Stay: 2 days  Attending Provider: Jenny Hennessy MD   Primary Care Physician: Eula Weiss MD  Principal Problem:Diarrhea    Subjective:     HPI: Ms. Shivani Sheets is a 62 yo AAF with HTN, pHTN, HFpEF (50%), and ESRD on iHD MWF who presented to the hospital on 06/03 for evaluation of nausea/vomiting/diarrhea x 1 day.  She denies any changes in her diet.  No abdominal pain, fever, or chills.  She missed her hemodialysis treatment yesterday because she felt bad, and Nephrology has been consulted for ESRD management while in-patient.  She underwent CT Abdomen and it was read as increased abdominal wall edema, cardiomegaly with small pericardial effusion, and ascites.  She reports recently having her EDW decreased a few weeks ago by her OP Nephrologist, and she has been tolerating her hemodialysis treatments well without problems.  On exam today, she denies any complaints, no abdominal pain, nausea/vomiting/diarrhea have resolved.  She denies SOB, on O2 @ 2L NC, which she normally uses @ home.      She dialyzes at [facility] under the care of Dr. Deleon on a MWF schedule.  She reports a recent decrease in her EDW to 68 kg and she no longer makes urine.  She has a ABEL AVF with a very large anuerysm, followed by surgeons at the [facility] surgery center in [facility].  He rnormal treatment time is 3.5 hours, last treatment being on Friday of last week.    Interval History: Patient moved to ICU yesterday due to elevated lactate, which has since improved to 1.0. The patient is alert and oriented w/o distress. HD in progress this morning during assessment for volume removal. Patient hypotensive overnight unsure of the accuracy due to BP being taken in the leg. Patient states that her SBP is normally in the 90s.    Review of patient's allergies indicates:   Allergen  Reactions    Penicillins Swelling    Iodine      Other reaction(s): Hives    Sulfamethoxazole-trimethoprim      Other reaction(s): Swelling  Other reaction(s): Hives     Current Facility-Administered Medications   Medication Frequency    0.9%  NaCl infusion (for blood administration) Q24H PRN    0.9%  NaCl infusion Once    aspirin EC tablet 81 mg Daily    atorvastatin tablet 10 mg Daily    calcium acetate capsule 1,334 mg TID WM    ciprofloxacin (CIPRO)400mg/200ml D5W IVPB 400 mg Every Tues, Thurs, Sat    levothyroxine tablet 100 mcg Before breakfast    metoprolol tartrate (LOPRESSOR) tablet 25 mg BID    metronidazole IVPB 500 mg Q8H    midodrine tablet 10 mg TID    nitric oxide gas Gas 10 ppm Continuous    pantoprazole EC tablet 40 mg Daily    riociguat (ADEMPAS) tablet 2.5 mg TID    selexipag Tab 1,000 mcg BID    sodium chloride 0.9% flush 10 mL PRN     Facility-Administered Medications Ordered in Other Encounters   Medication Frequency    sodium chloride 0.9% flush 5 mL PRN       Objective:     Vital Signs (Most Recent):  Temp: 98 °F (36.7 °C) (06/05/19 0701)  Pulse: 108 (06/05/19 1144)  Resp: 15 (06/05/19 1144)  BP: (!) 101/54 (06/05/19 1130)  SpO2: 96 % (06/05/19 1144)  O2 Device (Oxygen Therapy): nasal cannula w/ humidification (06/05/19 1144) Vital Signs (24h Range):  Temp:  [97.8 °F (36.6 °C)-98.2 °F (36.8 °C)] 98 °F (36.7 °C)  Pulse:  [] 108  Resp:  [6-31] 15  SpO2:  [56 %-100 %] 96 %  BP: ()/(41-81) 101/54     Weight: 65 kg (143 lb 4.8 oz) (06/04/19 2020)  Body mass index is 27.08 kg/m².  Body surface area is 1.67 meters squared.    I/O last 3 completed shifts:  In: 1635.3 [P.O.:380; I.V.:23.3; Blood:482; IV Piggyback:750]  Out: 0     Physical Exam   Constitutional: She is oriented to person, place, and time. She appears well-developed. No distress.   HENT:   Head: Normocephalic and atraumatic.   Right Ear: External ear normal.   Left Ear: External ear normal.   Eyes:  Conjunctivae and EOM are normal. Right eye exhibits no discharge. Left eye exhibits no discharge.   Periorbital edema   Neck: Normal range of motion. Neck supple. JVD present.   Cardiovascular: An irregularly irregular rhythm present. Exam reveals no gallop and no friction rub.   Murmur heard.  Pulmonary/Chest: Effort normal. No respiratory distress. She has no wheezes. She has no rales.   Abdominal: Soft. Bowel sounds are normal. She exhibits no distension. There is no tenderness. There is no guarding.   Musculoskeletal: She exhibits no edema or deformity.   Neurological: She is alert and oriented to person, place, and time.   Skin: Skin is warm and dry. She is not diaphoretic.   Psychiatric: She has a normal mood and affect. Her behavior is normal.       Significant Labs:  CBC:   Recent Labs   Lab 06/05/19 0223   WBC 7.80   RBC 4.01   HGB 10.8*   HCT 35.2*   PLT 93*   MCV 88   MCH 26.9*   MCHC 30.7*     CMP:   Recent Labs   Lab 06/05/19 0223   GLU 89   CALCIUM 7.4*   ALBUMIN 2.6*   PROT 5.4*      K 4.3   CO2 23      BUN 63*   CREATININE 9.5*   ALKPHOS 119   ALT 49*   AST 75*   BILITOT 0.7            Assessment/Plan:     * Diarrhea  - Being treated by primary service     End stage renal disease on dialysis  ESRD on iHD F  Olamide  3.5 hours  Dr. Deleon  No residual renal function  [facility] AVF  Reports an EDW of 68 kg that was recently decreased a few weeks ago.    Plan/Recommendations:  -UF completed at bedside, UF 1.5 L, however, patient hypotensive during treatment (SBP 70s) despite Midodrine. Will not plan for any further treatment at this time, if the patient remains hypotensive a line will need to be placed for CRRT for metabolic clearance and volume management.  -Pending US of anuerysm to her L AVF  -Phos 7.4, restarted PhosLo   -Recommend serial renal function panels for phos and albumin levels   -Strict I/Os and daily weights   -Hgb 10.8, within normal range         Case discussed with  staff further recs with attestation.     I will follow-up with patient. Please contact us if you have any additional questions.    ERIKA Soni DNP, REYES, FNP-C  Department of Nephrology  [facility]  Pager: [phone]         THROAT RED

## 2023-03-21 NOTE — SUBJECTIVE & OBJECTIVE
Past Medical History:   Diagnosis Date    Allergy     Anemia     Anticoagulant long-term use     4 years coumadin, asa    Arthritis     Awaiting organ transplant 2013    Cataract     Central serous chorioretinopathy of eye, right 2018    CHF (congestive heart failure)     Chronic diarrhea     Chronic kidney disease     Colon polyps     COPD (chronic obstructive pulmonary disease)     Coronary artery disease     Diabetes mellitus     Diabetic retinopathy     Diverticulosis     Encounter for blood transfusion     ESRD from HTN strtied RRT 1999    Failed  donor kidney transplant      Glaucoma     History of bleeding peptic ulcer      as stated per pt    Hyperlipidemia     Hypertension     Hypothyroidism     Morbid obesity 8/10/2012    Renal hypertension     Stroke            Past Surgical History:   Procedure Laterality Date    CATARACT EXTRACTION W/  INTRAOCULAR LENS IMPLANT Bilateral     COLON SURGERY      COLONOSCOPY      COLONOSCOPY N/A 2019    Performed by Indio Beltran MD at [facility] (2ND FLR)    COLONOSCOPY N/A 2018    Performed by Jose Falk MD at [facility] (2ND FLR)    EGD (ESOPHAGOGASTRODUODENOSCOPY) N/A 2019    Performed by Miranda Maradiaga MD at [facility] (2ND FLR)    EGD (ESOPHAGOGASTRODUODENOSCOPY) N/A 2018    Performed by Luis Washington MD at [facility] (2ND FLR)    ESOPHAGOGASTRODUODENOSCOPY (EGD) N/A 2018    Performed by Kenji Desir MD at [facility] (2ND FLR)    EYE SURGERY      FRACTURE SURGERY      R arm    HERNIA REPAIR      HYSTERECTOMY      INSERTION-IMPLANT-GLAUCOMA Left 10/12/2017    Performed by Junaid Kern MD at [facility] OR 1ST FLR    KIDNEY TRANSPLANT      Left atrial appendage closure device N/A 2019    Performed by César Thomas MD at [facility] EP LAB    NEPHRECTOMY  2008    transplant     PARATHYROID GLAND SURGERY      TONSILLECTOMY      Transesophageal  Rx Refill Request Telephone Encounter    Name:  Magno Zimmer  :  [id number]  Medication Name:  Eliquis 5 mg Zolpidem 10 mg Triphrocaps 1 mg Ipratropium Albuterol 0.5-2.5  Specific Pharmacy location:  [facility]  Date of last appointment:    Date of next appointment:    Best number to reach patient:  [phone]             echo (JACE) intra-procedure log documentation N/A 2019    Performed by [facility] at [facility] EP [facility]    Transesophageal echo (JACE) intra-procedure log documentation N/A 2019    Performed by [facility] at [facility] EP [facility]    UPPER GASTROINTESTINAL ENDOSCOPY         Review of patient's allergies indicates:   Allergen Reactions    Penicillins Swelling    Iodine      Other reaction(s): Hives    Sulfamethoxazole-trimethoprim      Other reaction(s): Swelling  Other reaction(s): Hives     Current Facility-Administered Medications   Medication Frequency    aspirin tablet 325 mg Daily    atorvastatin tablet 10 mg Daily    levothyroxine tablet 100 mcg Before breakfast    metoprolol tartrate (LOPRESSOR) tablet 25 mg BID    pantoprazole EC tablet 40 mg Daily    riociguat (ADEMPAS) tablet 2.5 mg TID    selexipag Tab 1,000 mcg BID    sodium chloride 0.9% flush 10 mL PRN     Facility-Administered Medications Ordered in Other Encounters   Medication Frequency    sodium chloride 0.9% flush 5 mL PRN     Family History     Problem Relation (Age of Onset)    Asthma Sister    Blindness Father    Breast cancer Maternal Aunt    Depression Sister    Diabetes Maternal Aunt    Heart attack Father, Mother    Heart failure Father, Mother    Hypertension Father, Mother, Sister, Brother    Kidney disease Brother        Tobacco Use    Smoking status: Former Smoker     Types: Cigarettes     Last attempt to quit: 8/10/2000     Years since quittin.8    Smokeless tobacco: Never Used   Substance and Sexual Activity    Alcohol use: No     Comment: hx of etoh     Drug use: No    Sexual activity: Never     Review of Systems   Constitutional: Positive for appetite change. Negative for activity change, fatigue, fever and unexpected weight change.   HENT: Negative for congestion, facial swelling, postnasal drip, rhinorrhea, sinus pressure and sinus pain.    Respiratory: Negative for cough, chest tightness,  shortness of breath and wheezing.    Cardiovascular: Negative for chest pain, palpitations and leg swelling.   Gastrointestinal: Positive for diarrhea, nausea and vomiting. Negative for abdominal distention, abdominal pain and constipation.   Musculoskeletal: Negative for arthralgias, gait problem and myalgias.   Skin: Negative for color change, pallor and rash.   Neurological: Negative for dizziness, light-headedness and headaches.   Psychiatric/Behavioral: Negative for agitation, behavioral problems and confusion.     Objective:     Vital Signs (Most Recent):  Temp: 97.8 °F (36.6 °C) (06/04/19 0827)  Pulse: (!) 111 (06/04/19 0834)  Resp: 18 (06/04/19 0827)  BP: (!) 163/125 (06/04/19 0834)  SpO2: (!) 94 % (06/04/19 0827)  O2 Device (Oxygen Therapy): nasal cannula (06/04/19 0342) Vital Signs (24h Range):  Temp:  [96.4 °F (35.8 °C)-98.2 °F (36.8 °C)] 97.8 °F (36.6 °C)  Pulse:  [] 111  Resp:  [16-20] 18  SpO2:  [93 %-96 %] 94 %  BP: ()/() 163/125     Weight: 65 kg (143 lb 3.2 oz) (06/04/19 0600)  Body mass index is 27.06 kg/m².  Body surface area is 1.67 meters squared.    I/O last 3 completed shifts:  In: 620 [P.O.:120; IV Piggyback:500]  Out: 0     Physical Exam   Constitutional: She is oriented to person, place, and time. She appears well-developed. No distress.   HENT:   Head: Normocephalic and atraumatic.   Right Ear: External ear normal.   Left Ear: External ear normal.   Eyes: Conjunctivae and EOM are normal. Right eye exhibits no discharge. Left eye exhibits no discharge.   Periorbital edema   Neck: Normal range of motion. Neck supple. JVD present.   Cardiovascular: An irregularly irregular rhythm present. Exam reveals no gallop and no friction rub.   Murmur heard.  Pulmonary/Chest: Effort normal. No respiratory distress. She has no wheezes. She has no rales.   Abdominal: Soft. Bowel sounds are normal. She exhibits no distension. There is no tenderness. There is no guarding.    Musculoskeletal: She exhibits no edema or deformity.   Neurological: She is alert and oriented to person, place, and time.   Skin: Skin is warm and dry. She is not diaphoretic.   Psychiatric: She has a normal mood and affect. Her behavior is normal.       Significant Labs:  CBC:   Recent Labs   Lab 06/04/19 0426   WBC 10.76   RBC 5.21   HGB 14.0   HCT 47.9   *   MCV 92   MCH 26.9*   MCHC 29.2*     CMP:   Recent Labs   Lab 06/04/19 0426   *  164*   CALCIUM 8.2*  8.2*   ALBUMIN 3.0*   PROT 6.7     138   K 4.5  4.5   CO2 17*  17*     100   BUN 48*  48*   CREATININE 8.6*  8.6*   ALKPHOS 165*   ALT 39   AST 58*   BILITOT 1.2*

## 2024-03-13 NOTE — PROGRESS NOTES
Pt AVSS this shift with no acute events. Pt satting well on 0.25L via vent with prn tracheal suction for moderate amounts of thick white secretions and large amounts of thin to thick clear and white nasal secretions. Pt tolerating GT feeds with adequate output and no emesis. Family at bedside this shift, sitter at bedside. Alarms active and audible.    Summary:  LCSW made 3rd attempt at completion of SW assessment. Pt stated she was currently at dialysis but asked if the call can be returned later this afternoon.     Interventions:  None    Plan:  Make attempt later this afternoon; if no answer, case closure

## 2025-01-02 NOTE — PROGRESS NOTES
Patient arrived to floor via stretcher.  Eyes open, respirations even and unlabored,no distress observed,no concerns voiced.  Treatment scheduled x3hrs, UF Goal set for 2L, will continue to monitor. Aneurysm noted to access arm (left), bruit, thrill present.  Access cleansed with chlorprep x30sec x2, allowed to air dry. Cannulation successful [id number], away from aneurysm, lab draws collected. Will continue to monitor patient.   Patient is resting in bed, has a call light within reach, is not in acute distress, and denies any needs

## 2025-07-21 NOTE — ASSESSMENT & PLAN NOTE
Continue home synthroid  - Levothyroxine 100 mcg daily before breakfast     [FreeTextEntry1] :  This note was written by Dylan Rooney on Jul 21 2025 10:40AM acting as medical scribe for Dr. Letty Palmer.I, Dr. Letty Palmer, have read and attest that all the information, medical decision making and discharge instructions within are true and accurate.
